# Patient Record
Sex: FEMALE | Race: WHITE | Employment: FULL TIME | URBAN - METROPOLITAN AREA
[De-identification: names, ages, dates, MRNs, and addresses within clinical notes are randomized per-mention and may not be internally consistent; named-entity substitution may affect disease eponyms.]

---

## 2017-01-11 DIAGNOSIS — F33.0 MAJOR DEPRESSIVE DISORDER, RECURRENT EPISODE, MILD (H): Primary | ICD-10-CM

## 2017-01-11 RX ORDER — SERTRALINE HYDROCHLORIDE 100 MG/1
TABLET, FILM COATED ORAL
Qty: 180 TABLET | Refills: 1 | Status: SHIPPED | OUTPATIENT
Start: 2017-01-11 | End: 2017-07-06

## 2017-01-11 NOTE — TELEPHONE ENCOUNTER
Prescription approved per [facility] Refill Protocol or patient Primary care provider (PCP)  EBER Mayer RN/Lowell Jones

## 2017-01-11 NOTE — TELEPHONE ENCOUNTER
sertraline      Last Written Prescription Date: 6/15/16  Last Fill Quantity: 180, # refills: 1  Last Office Visit with [facility] primary care provider:  7/25/16        Last PHQ-9 score on record=   PHQ-9 SCORE 6/27/2016   Total Score -   Total Score MyChart 2 (Minimal depression)   Total Score -

## 2017-02-20 ENCOUNTER — TELEPHONE (OUTPATIENT)
Dept: FAMILY MEDICINE | Facility: CLINIC | Age: 57
End: 2017-02-20

## 2017-02-20 NOTE — TELEPHONE ENCOUNTER
Panel Management Review      Patient has the following on her problem list:     Depression / Dysthymia review  PHQ-9 SCORE 9/22/2015 6/15/2016 6/27/2016   Total Score - - -   Total Score MyChart - - 2 (Minimal depression)   Total Score 4 5 -      Patient is due for:  PHQ9    Asthma review     ACT Total Scores 6/15/2016   ACT TOTAL SCORE -   ASTHMA ER VISITS -   ASTHMA HOSPITALIZATIONS -   ACT TOTAL SCORE (Goal Greater than or Equal to 20) 24   In the past 12 months, how many times did you visit the emergency room for your asthma without being admitted to the hospital? 0   In the past 12 months, how many times were you hospitalized overnight because of your asthma? 0      1. Is Asthma diagnosis on the Problem List? Yes    2. Is Asthma listed on Health Maintenance? Yes    3. Patient is due for:  ACT    Hypertension   Last three blood pressure readings:  BP Readings from Last 3 Encounters:   07/25/16 122/76   06/29/16 108/73   06/15/16 132/78     Blood pressure: Passed    HTN Guidelines:  Age 18-59 BP range:  Less than 140/90  Age 60-85 with Diabetes:  Less than 140/90  Age 60-85 without Diabetes:  less than 150/90      Composite cancer screening  Chart review shows that this patient is due/due soon for the following Mammogram  Summary:    Patient is due/failing the following:   MAMMOGRAM; ACT; PHQ9    Action needed:   Patient needs to do ACT; Patient needs to do PHQ9; Patient needs referral/order: MAMMOGRAM    Type of outreach:    Sent [employer] message.    Questions for provider review:    None                                                                                                                                    Phyllis Kwan CMA (AAMA)       Chart routed to None .

## 2017-02-23 ENCOUNTER — TELEPHONE (OUTPATIENT)
Dept: FAMILY MEDICINE | Facility: CLINIC | Age: 57
End: 2017-02-23

## 2017-02-23 DIAGNOSIS — F98.8 ATTENTION DEFICIT DISORDER OF ADULT: ICD-10-CM

## 2017-02-23 NOTE — TELEPHONE ENCOUNTER
Pt is calling and states that she needs a refill on her adderall medication.     Please advise.     Cassy Jaramillo, [place]

## 2017-02-24 RX ORDER — DEXTROAMPHETAMINE SACCHARATE, AMPHETAMINE ASPARTATE MONOHYDRATE, DEXTROAMPHETAMINE SULFATE AND AMPHETAMINE SULFATE 6.25; 6.25; 6.25; 6.25 MG/1; MG/1; MG/1; MG/1
25 CAPSULE, EXTENDED RELEASE ORAL DAILY
Qty: 30 CAPSULE | Refills: 0 | Status: SHIPPED | OUTPATIENT
Start: 2017-02-24 | End: 2017-06-08

## 2017-02-24 RX ORDER — DEXTROAMPHETAMINE SACCHARATE, AMPHETAMINE ASPARTATE MONOHYDRATE, DEXTROAMPHETAMINE SULFATE AND AMPHETAMINE SULFATE 6.25; 6.25; 6.25; 6.25 MG/1; MG/1; MG/1; MG/1
25 CAPSULE, EXTENDED RELEASE ORAL DAILY
Qty: 30 CAPSULE | Refills: 0 | Status: SHIPPED | OUTPATIENT
Start: 2017-04-24 | End: 2017-06-08

## 2017-02-24 RX ORDER — DEXTROAMPHETAMINE SACCHARATE, AMPHETAMINE ASPARTATE MONOHYDRATE, DEXTROAMPHETAMINE SULFATE AND AMPHETAMINE SULFATE 6.25; 6.25; 6.25; 6.25 MG/1; MG/1; MG/1; MG/1
25 CAPSULE, EXTENDED RELEASE ORAL DAILY
Qty: 30 CAPSULE | Refills: 0 | Status: SHIPPED | OUTPATIENT
Start: 2017-03-24 | End: 2017-06-08

## 2017-02-27 NOTE — TELEPHONE ENCOUNTER
Pt notified scripts are at the [facility] pharmacy.     Cassy Jaramillo, Station

## 2017-03-17 ENCOUNTER — OFFICE VISIT (OUTPATIENT)
Dept: URGENT CARE | Facility: URGENT CARE | Age: 57
End: 2017-03-17
Payer: COMMERCIAL

## 2017-03-17 VITALS
OXYGEN SATURATION: 97 % | DIASTOLIC BLOOD PRESSURE: 91 MMHG | HEART RATE: 82 BPM | WEIGHT: 158 LBS | SYSTOLIC BLOOD PRESSURE: 152 MMHG | TEMPERATURE: 97 F | BODY MASS INDEX: 27.12 KG/M2

## 2017-03-17 DIAGNOSIS — S61.219A LACERATION OF FINGER, INITIAL ENCOUNTER: Primary | ICD-10-CM

## 2017-03-17 PROCEDURE — 99207 ZZC DROP WITH A PROCEDURE: CPT | Mod: 25 | Performed by: NURSE PRACTITIONER

## 2017-03-17 PROCEDURE — 12001 RPR S/N/AX/GEN/TRNK 2.5CM/<: CPT | Performed by: NURSE PRACTITIONER

## 2017-03-17 ASSESSMENT — ENCOUNTER SYMPTOMS
CONSTITUTIONAL NEGATIVE: 1
CARDIOVASCULAR NEGATIVE: 1
TINGLING: 0
RESPIRATORY NEGATIVE: 1
MUSCULOSKELETAL NEGATIVE: 1
FOCAL WEAKNESS: 0

## 2017-03-17 NOTE — MR AVS SNAPSHOT
After Visit Summary   3/17/2017    Patricia Perez    MRN: [id number]           Patient Information     Date Of Birth          1960        Visit Information        Provider Department      3/17/2017 8:30 PM Dona Kerr APRN [facility]        Today's Diagnoses     Laceration of finger, initial encounter    -  1      Care Instructions    SUTURE REMOVAL IN & DAYS    Extremity Laceration: Sutures, Staples, or Tape  A laceration is a cut through the skin. If it is deep, it may require stitches (sutures) or staples to close so it can heal. Minor cuts may be treated with surgical tape closures.   X-rays may be done if something may have entered the skin through the cut. You may also need a tetanus shot if you are not up to date on this vaccination.  Home care    Follow the health care provider s instructions on how to care for the cut.    Wash your hands with soap and warm water before and after caring for your wound. This is to help prevent infection.    Keep the wound clean and dry. If a bandage was applied and it becomes wet or dirty, replace it. Otherwise, leave it in place for the first 24 hours, then change it once a day or as directed.    If sutures or staples were used, clean the wound daily:    After removing the bandage, wash the area with soap and water. Use a wet cotton swab to loosen and remove any blood or crust that forms.    After cleaning, keep the wound clean and dry. Talk with your doctor before applying any antibiotic ointment to the wound. Reapply the bandage.    You may remove the bandage to shower as usual after the first 24 hours, but do not soak the area in water (no swimming) until the stitches or staples are removed.    If surgical tape closures were used, keep the area clean and dry. If it becomes wet, blot it dry with a towel.    The doctor may prescribe an antibiotic cream or ointment to prevent infection. Do not stop taking this medication  until you have finished the prescribed course or the doctor tells you to stop. The doctor may also prescribe medications for pain. Follow the doctor s instructions for taking these medications.    Avoid activities that may reopen your wound.  Follow-up care  Follow up with your health care provider. Most skin wounds heal within ten days. However, an infection may sometimes occur despite proper treatment. Therefore, check the wound daily for the signs of infection listed below. Stitches and staples should be removed within 7-14 days. If surgical tape closures were used, you may remove them after 10 days if they have not fallen off by then.   When to seek medical advice  Call your health care provider right away if any of these occur:    Wound bleeding not controlled by direct pressure    Signs of infection, including increasing pain in the wound, increasing wound redness or swelling, or pus or bad odor coming from the wound    Fever of 100.4 F (38 C) or higher or as directed by your healthcare provider    Stitches or staples come apart or fall out or surgical tape falls off before 7 days    Wound edges re-open    Wound changes colors    Numbness around the wound     Decreased movement around the injured area    1878-6226 [employer]. [address] [place] [address]. All rights reserved. This information is not intended as a substitute for professional medical care. Always follow your healthcare professional's instructions.      Last tetanus shot was 4/2011        Follow-ups after your visit        Follow-up notes from your care team     Return in about 10 days (around 3/27/2017), or For suture removal.      Who to contact     If you have questions or need follow up information about today's clinic visit or your schedule please contact [facility] directly at [phone].  Normal or non-critical lab and imaging results will be communicated to you by MyChart, letter or phone  "within 4 business days after the clinic has received the results. If you do not hear from us within 7 days, please contact the clinic through [email or web address] or phone. If you have a critical or abnormal lab result, we will notify you by phone as soon as possible.  Submit refill requests through [email or web address] or call your pharmacy and they will forward the refill request to us. Please allow 3 business days for your refill to be completed.          Additional Information About Your Visit        [email or web address] Information     [email or web address] lets you send messages to your doctor, view your test results, renew your prescriptions, schedule appointments and more. To sign up, go to [email or web address] . Click on \"Log in\" on the left side of the screen, which will take you to the Welcome page. Then click on \"Sign up Now\" on the right side of the page.     You will be asked to enter the access code listed below, as well as some personal information. Please follow the directions to create your username and password.     Your access code is: [email or web address]  Expires: 6/15/2017  9:11 PM     Your access code will  in 90 days. If you need help or a new code, please call your [facility] clinic or [phone].        Care EveryWhere ID     This is your Care EveryWhere ID. This could be used by other organizations to access your [facility] medical records  [id number]        Your Vitals Were     Pulse Temperature Last Period Pulse Oximetry BMI (Body Mass Index)       82 97  F (36.1  C) (Oral) 2012 97% 27.12 kg/m2        Blood Pressure from Last 3 Encounters:   17 (!) 152/91   16 122/76   16 108/73    Weight from Last 3 Encounters:   17 158 lb (71.7 kg)   16 161 lb 12.8 oz (73.4 kg)   16 163 lb (73.9 kg)              Today, you had the following     No orders found for display         Today's Medication Changes          These changes are accurate as of: 3/17/17  9:11 PM.  If you have any questions, ask your nurse or " doctor.               These medicines have changed or have updated prescriptions.        Dose/Directions    predniSONE 20 MG tablet   Commonly known as:  DELTASONE   This may have changed:    - when to take this  - reasons to take this  - additional instructions        Dose:  20 mg   Take 1 tablet by mouth 2 times daily. For Yellow or Red zone on Asthma Action Plan.   Quantity:  10 tablet   Refills:  1                Primary Care Provider Office Phone # Fax #    Blanche Cummins APRJACQUIE HERNANDEZ [phone]       [facility] [address] [place] [address]        Thank you!     Thank you for choosing [facility]  for your care. Our goal is always to provide you with excellent care. Hearing back from our patients is one way we can continue to improve our services. Please take a few minutes to complete the written survey that you may receive in the mail after your visit with us. Thank you!             Your Updated Medication List - Protect others around you: Learn how to safely use, store and throw away your medicines at www.disposemymeds.org.          This list is accurate as of: 3/17/17  9:11 PM.  Always use your most recent med list.                   Brand Name Dispense Instructions for use    albuterol 108 (90 BASE) MCG/ACT Inhaler    PROAIR HFA/PROVENTIL HFA/VENTOLIN HFA    1 Inhaler    Inhale 2 puffs into the lungs every 6 hours as needed for shortness of breath / dyspnea or wheezing       * amphetamine-dextroamphetamine 25 MG 24 hr capsule    ADDERALL XR    30 capsule    Take 1 capsule (25 mg) by mouth daily       * amphetamine-dextroamphetamine 25 MG 24 hr capsule   Start taking on:  3/24/2017    ADDERALL XR    30 capsule    Take 1 capsule (25 mg) by mouth daily       * amphetamine-dextroamphetamine 25 MG 24 hr capsule   Start taking on:  4/24/2017    ADDERALL XR    30 capsule    Take 1 capsule (25 mg) by mouth daily       blood glucose monitoring test strip     no brand specified    1 Box    Use to test blood sugar 1-2 times daily or as directed.       calcium carbonate-vitamin D 600-400 MG-UNIT Chew    CALTRATE 600+D    180 tablet    Take 1 chew tab by mouth 2 times daily       CALCIUM CITRATE + D PO      Take  by mouth daily.       carvedilol 25 MG tablet    COREG    180 tablet    TAKE 1 TABLET(25 MG) BY MOUTH TWICE DAILY WITH MEALS       * COMFORT LANCETS Misc     1 Box    1 Device 2 times daily as needed       * MICROLET LANCETS Misc     2 Box    1 each by Subdermal route 2 times daily       estrogen (conjugated)-medroxyPROGESTERone 0.625-5 MG per tablet    PREMPRO    90 tablet    Take 1 tablet by mouth daily       hydrALAZINE 10 MG tablet    APRESOLINE    270 tablet    Take 1 tablet (10 mg) by mouth 3 times daily       isosorbide mononitrate 30 MG 24 hr tablet    IMDUR    90 tablet    Take 1 tablet (30 mg) by mouth daily       lisinopril 5 MG tablet    PRINIVIL/ZESTRIL    90 tablet    Take 1 tablet (5 mg) by mouth daily       meclizine 25 MG tablet    ANTIVERT    30 tablet    Take 1 tablet (25 mg) by mouth 3 times daily as needed       Multi-vitamin Tabs tablet   Generic drug:  multivitamin, therapeutic with minerals     100 tablet    Take 1 tablet by mouth daily.       ondansetron 4 MG ODT tab    ZOFRAN-ODT    20 tablet    Take 1 tablet (4 mg) by mouth every 6 hours as needed for nausea       predniSONE 20 MG tablet    DELTASONE    10 tablet    Take 1 tablet by mouth 2 times daily. For Yellow or Red zone on Asthma Action Plan.       sertraline 100 MG tablet    ZOLOFT    180 tablet    Take 2 tablets daily  DUE for yearly office visit in June 2017       simvastatin 20 MG tablet    ZOCOR    90 tablet    Take 1 tablet (20 mg) by mouth At Bedtime       sulfamethoxazole-trimethoprim 800-160 MG per tablet    BACTRIM DS/SEPTRA DS    14 tablet    Take 1 tablet by mouth 2 times daily       venlafaxine 225 MG Tb24 24 hr tablet    EFFEXOR-ER    90 each    Take 1 tablet (225  mg) by mouth daily (with breakfast)       VITAMIN B-12 SL      Place  under the tongue daily.       * Notice:  This list has 5 medication(s) that are the same as other medications prescribed for you. Read the directions carefully, and ask your doctor or other care provider to review them with you.

## 2017-03-18 NOTE — PATIENT INSTRUCTIONS
SUTURE REMOVAL IN & DAYS    Extremity Laceration: Sutures, Staples, or Tape  A laceration is a cut through the skin. If it is deep, it may require stitches (sutures) or staples to close so it can heal. Minor cuts may be treated with surgical tape closures.   X-rays may be done if something may have entered the skin through the cut. You may also need a tetanus shot if you are not up to date on this vaccination.  Home care    Follow the health care provider s instructions on how to care for the cut.    Wash your hands with soap and warm water before and after caring for your wound. This is to help prevent infection.    Keep the wound clean and dry. If a bandage was applied and it becomes wet or dirty, replace it. Otherwise, leave it in place for the first 24 hours, then change it once a day or as directed.    If sutures or staples were used, clean the wound daily:    After removing the bandage, wash the area with soap and water. Use a wet cotton swab to loosen and remove any blood or crust that forms.    After cleaning, keep the wound clean and dry. Talk with your doctor before applying any antibiotic ointment to the wound. Reapply the bandage.    You may remove the bandage to shower as usual after the first 24 hours, but do not soak the area in water (no swimming) until the stitches or staples are removed.    If surgical tape closures were used, keep the area clean and dry. If it becomes wet, blot it dry with a towel.    The doctor may prescribe an antibiotic cream or ointment to prevent infection. Do not stop taking this medication until you have finished the prescribed course or the doctor tells you to stop. The doctor may also prescribe medications for pain. Follow the doctor s instructions for taking these medications.    Avoid activities that may reopen your wound.  Follow-up care  Follow up with your health care provider. Most skin wounds heal within ten days. However, an infection may sometimes occur despite  proper treatment. Therefore, check the wound daily for the signs of infection listed below. Stitches and staples should be removed within 7-14 days. If surgical tape closures were used, you may remove them after 10 days if they have not fallen off by then.   When to seek medical advice  Call your health care provider right away if any of these occur:    Wound bleeding not controlled by direct pressure    Signs of infection, including increasing pain in the wound, increasing wound redness or swelling, or pus or bad odor coming from the wound    Fever of 100.4 F (38 C) or higher or as directed by your healthcare provider    Stitches or staples come apart or fall out or surgical tape falls off before 7 days    Wound edges re-open    Wound changes colors    Numbness around the wound     Decreased movement around the injured area    5105-4846 [employer] [address] [place] [address], [place] [address]. All rights reserved. This information is not intended as a substitute for professional medical care. Always follow your healthcare professional's instructions.      Last tetanus shot was 4/2011

## 2017-03-18 NOTE — PROCEDURES
Wound cleaned with soap and water. Sterile prep in usual fashion.  Four simple interrupted sutures of 4-0 nylon placed with close approximation  Bacitracin and bandaid applied.  Dona Kerr, ELIOT, CNP

## 2017-03-18 NOTE — PROGRESS NOTES
"Chief Complaint   Patient presents with     Laceration       Initial BP (!) 152/91 (BP Location: Left arm, Patient Position: Chair, Cuff Size: Adult Large)  Pulse 82  Temp 97  F (36.1  C) (Oral)  Wt 158 lb (71.7 kg)  LMP 06/01/2012  SpO2 97%  BMI 27.12 kg/m2 Estimated body mass index is 27.12 kg/(m^2) as calculated from the following:    Height as of 6/29/16: 5' 4\" (1.626 m).    Weight as of this encounter: 158 lb (71.7 kg).  Medication Reconciliation: complete   Clau Serrato CMA      "

## 2017-03-18 NOTE — PROGRESS NOTES
HPI  56 year old Patricia Perez presents with a laceration to the right 5th digit. She was cut on a kitchen knife preparing vegetables. It continues to actively bleed. Injury 1.5 hours ago. Tetanus is up to date.     Past Medical History   Diagnosis Date     Anxiety      Arthritis      Asthma      Bursitis of shoulder 3/4/2010     Chronic rhinitis 3/25/2009     Coronary artery disease      Depression      Depression      Dyspnea on exertion      Gastro-oesophageal reflux disease      GERD (gastroesophageal reflux disease) 10/14/2008     Hypertension      Idiopathic cardiomyopathy (H) 1/8/2009     Indigestion      Itchy eyes      Itchy nose      Left leg pain 6/16/2011     Motion sickness 2/27/2008     Nasal congestion      Pneumonia      Problems related to lack of adequate sleep      Ringing in ears      Sleep apnea 3/25/2009     Sneezing        Past Surgical History   Procedure Laterality Date     Uretural sticture  As Child     Diabetes resources  As Child     Tonsillectomy     Hernia repair       Ent surgery       Laparoscopic bypass gastric  10/16/2012     Procedure: LAPAROSCOPIC BYPASS GASTRIC;  laparoscopic reyna en y gastric bypass;  Surgeon: Nish Bradford MD;  Location: [facility] OR     Bariatric surgery       Tonsillectomy         Allergies   Allergen Reactions     No Known Drug Allergies        Current Outpatient Prescriptions   Medication     [START ON 4/24/2017] amphetamine-dextroamphetamine (ADDERALL XR) 25 MG 24 hr capsule     [START ON 3/24/2017] amphetamine-dextroamphetamine (ADDERALL XR) 25 MG 24 hr capsule     amphetamine-dextroamphetamine (ADDERALL XR) 25 MG 24 hr capsule     sertraline (ZOLOFT) 100 MG tablet     carvedilol (COREG) 25 MG tablet     venlafaxine (EFFEXOR-ER) 225 MG TB24     estrogen, conjugated,-medroxyPROGESTERone (PREMPRO) 0.625-5 MG per tablet     hydrALAZINE (APRESOLINE) 10 MG tablet     isosorbide mononitrate (IMDUR) 30 MG 24 hr tablet     lisinopril (PRINIVIL,ZESTRIL) 5 MG  tablet     simvastatin (ZOCOR) 20 MG tablet     sulfamethoxazole-trimethoprim (BACTRIM DS,SEPTRA DS) 800-160 MG per tablet     ondansetron (ZOFRAN-ODT) 4 MG disintegrating tablet     blood glucose monitoring (NO BRAND SPECIFIED) test strip     MICROLET LANCETS MISC     albuterol (PROAIR HFA, PROVENTIL HFA, VENTOLIN HFA) 108 (90 BASE) MCG/ACT inhaler     calcium carbonate-vitamin D (CALTRATE 600+D) 600-400 MG-UNIT CHEW     COMFORT LANCETS MISC     meclizine (ANTIVERT) 25 MG tablet     Calcium Citrate-Vitamin D (CALCIUM CITRATE + D PO)     predniSONE (DELTASONE) 20 MG tablet     Cyanocobalamin (VITAMIN B-12 SL)     Multiple Vitamin (MULTI-VITAMIN) per tablet     No current facility-administered medications for this visit.        Review of Systems   Constitutional: Negative.    Respiratory: Negative.    Cardiovascular: Negative.    Musculoskeletal: Negative.    Skin:        1 cm finger laceration at the PIP joint palmar surface of the right 5th digit.    Neurological: Negative for tingling and focal weakness.         Physical Exam   Constitutional: She is well-developed, well-nourished, and in no distress. No distress.   BP (!) 152/91 (BP Location: Left arm, Patient Position: Chair, Cuff Size: Adult Large)  Pulse 82  Temp 97  F (36.1  C) (Oral)  Wt 158 lb (71.7 kg)  LMP 06/01/2012  SpO2 97%  BMI 27.12 kg/m2     HENT:   Head: Normocephalic.   Cardiovascular: Normal rate.    Pulmonary/Chest: Effort normal.   Musculoskeletal: Normal range of motion.        Left hand: She exhibits laceration. She exhibits normal range of motion and normal two-point discrimination. Normal sensation noted. Normal strength noted.        Hands:  Neurological: She has normal sensation.   Nursing note and vitals reviewed.    Assessment:  1. Laceration of finger, initial encounter        Plan:  Wound Prep and 4 simple interrupted sutures placed in the right 5th finger at the PIP joint palmar surface  Bacitacin and tube gauze  applied      Instructions regarding self-care of patient with laceration/sutures reviewed.   Written instructions provided in after visit summary and reviewed.  Patient instructed to see primary care provider for suture removal in 10 DAYS      ELIOT Adams, CNP

## 2017-03-28 ENCOUNTER — ALLIED HEALTH/NURSE VISIT (OUTPATIENT)
Dept: NURSING | Facility: CLINIC | Age: 57
End: 2017-03-28
Payer: COMMERCIAL

## 2017-03-28 DIAGNOSIS — Z48.02 ENCOUNTER FOR REMOVAL OF SUTURES: Primary | ICD-10-CM

## 2017-03-28 PROCEDURE — 99207 ZZC NO CHARGE NURSE ONLY: CPT

## 2017-03-28 NOTE — MR AVS SNAPSHOT
After Visit Summary   3/28/2017    Patricia Perez    MRN: [id number]           Patient Information     Date Of Birth          1960        Visit Information        Provider Department      3/28/2017 8:30 AM PATRICE CAMPBELL RN [facility]        Today's Diagnoses     Encounter for removal of sutures    -  1       Follow-ups after your visit        Your next 10 appointments already scheduled     Apr 13, 2017 11:30 AM CDT   [facility] Anita [facility]   [facility]    [address] [place] [id number] [phone]            May 10, 2017  8:00 AM CDT   (Arrive by 7:45 AM)   Return Visit with Preston Cuadra MD   [facility])    [address] [place] [address] [phone]              Who to contact     If you have questions or need follow up information about today's clinic visit or your schedule please contact [facility] directly at [phone].  Normal or non-critical lab and imaging results will be communicated to you by Ludeihart, letter or phone within 4 business days after the clinic has received the results. If you do not hear from us within 7 days, please contact the clinic through Ludeihart or phone. If you have a critical or abnormal lab result, we will notify you by phone as soon as possible.  Submit refill requests through Neuralieve or call your pharmacy and they will forward the refill request to us. Please allow 3 business days for your refill to be completed.          Additional Information About Your Visit        MyChart Information     Neuralieve gives you secure access to your electronic health record. If you see a primary care provider, you can also send messages to your care team and make appointments. If you have questions, please call your primary care clinic.  If you do not have a primary care  provider, please call [phone] and they will assist you.        Care EveryWhere ID     This is your Care EveryWhere ID. This could be used by other organizations to access your [place] medical records  [id number]        Your Vitals Were     Last Period                   06/01/2012            Blood Pressure from Last 3 Encounters:   03/17/17 (!) 152/91   07/25/16 122/76   06/29/16 108/73    Weight from Last 3 Encounters:   03/17/17 158 lb (71.7 kg)   07/25/16 161 lb 12.8 oz (73.4 kg)   06/29/16 163 lb (73.9 kg)              Today, you had the following     No orders found for display         Today's Medication Changes          These changes are accurate as of: 3/28/17  9:03 AM.  If you have any questions, ask your nurse or doctor.               These medicines have changed or have updated prescriptions.        Dose/Directions    predniSONE 20 MG tablet   Commonly known as:  DELTASONE   This may have changed:    - when to take this  - reasons to take this  - additional instructions        Dose:  20 mg   Take 1 tablet by mouth 2 times daily. For Yellow or Red zone on Asthma Action Plan.   Quantity:  10 tablet   Refills:  1                Primary Care Provider Office Phone # Fax #    ELIOT Rodriguez [facility] [phone]       [facility] [address]   [facility] [address]        Thank you!     Thank you for choosing [facility]  for your care. Our goal is always to provide you with excellent care. Hearing back from our patients is one way we can continue to improve our services. Please take a few minutes to complete the written survey that you may receive in the mail after your visit with us. Thank you!             Your Updated Medication List - Protect others around you: Learn how to safely use, store and throw away your medicines at www.disposemymeds.org.          This list is accurate as of: 3/28/17  9:03 AM.  Always use your most recent med list.                    Brand Name Dispense Instructions for use    albuterol 108 (90 BASE) MCG/ACT Inhaler    PROAIR HFA/PROVENTIL HFA/VENTOLIN HFA    1 Inhaler    Inhale 2 puffs into the lungs every 6 hours as needed for shortness of breath / dyspnea or wheezing       * amphetamine-dextroamphetamine 25 MG 24 hr capsule    ADDERALL XR    30 capsule    Take 1 capsule (25 mg) by mouth daily       * amphetamine-dextroamphetamine 25 MG 24 hr capsule    ADDERALL XR    30 capsule    Take 1 capsule (25 mg) by mouth daily       * amphetamine-dextroamphetamine 25 MG 24 hr capsule   Start taking on:  4/24/2017    ADDERALL XR    30 capsule    Take 1 capsule (25 mg) by mouth daily       blood glucose monitoring test strip    no brand specified    1 Box    Use to test blood sugar 1-2 times daily or as directed.       calcium carbonate-vitamin D 600-400 MG-UNIT Chew    CALTRATE 600+D    180 tablet    Take 1 chew tab by mouth 2 times daily       CALCIUM CITRATE + D PO      Take  by mouth daily.       carvedilol 25 MG tablet    COREG    180 tablet    TAKE 1 TABLET(25 MG) BY MOUTH TWICE DAILY WITH MEALS       * COMFORT LANCETS Misc     1 Box    1 Device 2 times daily as needed       * MICROLET LANCETS Misc     2 Box    1 each by Subdermal route 2 times daily       estrogen (conjugated)-medroxyPROGESTERone 0.625-5 MG per tablet    PREMPRO    90 tablet    Take 1 tablet by mouth daily       hydrALAZINE 10 MG tablet    APRESOLINE    270 tablet    Take 1 tablet (10 mg) by mouth 3 times daily       isosorbide mononitrate 30 MG 24 hr tablet    IMDUR    90 tablet    Take 1 tablet (30 mg) by mouth daily       lisinopril 5 MG tablet    PRINIVIL/ZESTRIL    90 tablet    Take 1 tablet (5 mg) by mouth daily       meclizine 25 MG tablet    ANTIVERT    30 tablet    Take 1 tablet (25 mg) by mouth 3 times daily as needed       Multi-vitamin Tabs tablet   Generic drug:  multivitamin, therapeutic with minerals     100 tablet    Take 1 tablet by mouth daily.        ondansetron 4 MG ODT tab    ZOFRAN-ODT    20 tablet    Take 1 tablet (4 mg) by mouth every 6 hours as needed for nausea       predniSONE 20 MG tablet    DELTASONE    10 tablet    Take 1 tablet by mouth 2 times daily. For Yellow or Red zone on Asthma Action Plan.       sertraline 100 MG tablet    ZOLOFT    180 tablet    Take 2 tablets daily  DUE for yearly office visit in June 2017       simvastatin 20 MG tablet    ZOCOR    90 tablet    Take 1 tablet (20 mg) by mouth At Bedtime       sulfamethoxazole-trimethoprim 800-160 MG per tablet    BACTRIM DS/SEPTRA DS    14 tablet    Take 1 tablet by mouth 2 times daily       venlafaxine 225 MG Tb24 24 hr tablet    EFFEXOR-ER    90 each    Take 1 tablet (225 mg) by mouth daily (with breakfast)       VITAMIN B-12 SL      Place  under the tongue daily.       * Notice:  This list has 5 medication(s) that are the same as other medications prescribed for you. Read the directions carefully, and ask your doctor or other care provider to review them with you.

## 2017-04-19 ENCOUNTER — OFFICE VISIT (OUTPATIENT)
Dept: OPTOMETRY | Facility: CLINIC | Age: 57
End: 2017-04-19
Payer: COMMERCIAL

## 2017-04-19 DIAGNOSIS — H52.202 ASTIGMATISM, LEFT: ICD-10-CM

## 2017-04-19 DIAGNOSIS — H52.4 MYOPIA WITH PRESBYOPIA, BILATERAL: Primary | ICD-10-CM

## 2017-04-19 DIAGNOSIS — H25.13 NUCLEAR SCLEROSIS OF BOTH EYES: ICD-10-CM

## 2017-04-19 DIAGNOSIS — H52.13 MYOPIA WITH PRESBYOPIA, BILATERAL: Primary | ICD-10-CM

## 2017-04-19 PROCEDURE — 92015 DETERMINE REFRACTIVE STATE: CPT | Performed by: OPTOMETRIST

## 2017-04-19 PROCEDURE — 92014 COMPRE OPH EXAM EST PT 1/>: CPT | Performed by: OPTOMETRIST

## 2017-04-19 ASSESSMENT — REFRACTION_MANIFEST
OS_SPHERE: -7.00
OD_ADD: +2.75
OS_AXIS: 079
METHOD_AUTOREFRACTION: 1
OS_CYLINDER: +0.75
OD_SPHERE: -5.75
OS_CYLINDER: +1.25
OD_AXIS: 104
OS_ADD: +2.75
OS_SPHERE: -7.00
OD_CYLINDER: +1.75
OD_SPHERE: -6.50
OS_AXIS: 083

## 2017-04-19 ASSESSMENT — TONOMETRY
OD_IOP_MMHG: 20
IOP_METHOD: APPLANATION
OS_IOP_MMHG: 20

## 2017-04-19 ASSESSMENT — VISUAL ACUITY
OS_SC: CF @ 3'
OS_CC: 20/30-1
OS_CC: 20/30
OS_SC: 20/200
METHOD: SNELLEN - LINEAR
OD_CC: 20/40
OD_CC: 20/40
OD_SC: 20/120
OD_SC: CF @ 3'
CORRECTION_TYPE: GLASSES

## 2017-04-19 ASSESSMENT — REFRACTION_WEARINGRX
OS_AXIS: 078
SPECS_TYPE: PAL
OS_SPHERE: -7.75
OD_ADD: +2.75
OD_CYLINDER: +1.50
OS_CYLINDER: +1.25
OD_SPHERE: -6.75
OD_AXIS: 108
OS_ADD: +2.75

## 2017-04-19 ASSESSMENT — SLIT LAMP EXAM - LIDS
COMMENTS: NORMAL
COMMENTS: NORMAL

## 2017-04-19 ASSESSMENT — CONF VISUAL FIELD
OS_NORMAL: 1
OD_NORMAL: 1
METHOD: COUNTING FINGERS

## 2017-04-19 ASSESSMENT — CUP TO DISC RATIO
OD_RATIO: 0.2
OS_RATIO: 0.2

## 2017-04-19 ASSESSMENT — EXTERNAL EXAM - LEFT EYE: OS_EXAM: NORMAL

## 2017-04-19 ASSESSMENT — EXTERNAL EXAM - RIGHT EYE: OD_EXAM: NORMAL

## 2017-04-19 NOTE — PATIENT INSTRUCTIONS
There was a change in the prescription for your glasses.    Your eyes may be blurry at near and sensitive to light for several hours from the dilating drops.    Please return yearly for eye exams.

## 2017-04-19 NOTE — MR AVS SNAPSHOT
After Visit Summary   4/19/2017    Patricia Perez    MRN: [id number]           Patient Information     Date Of Birth          1960        Visit Information        Provider Department      4/19/2017 2:30 PM Anita Borjas OD [facility]        Today's Diagnoses     Myopia with presbyopia, bilateral    -  1    Astigmatism, left        Nuclear sclerosis of both eyes          Care Instructions    There was a change in the prescription for your glasses.    Your eyes may be blurry at near and sensitive to light for several hours from the dilating drops.    Please return yearly for eye exams.          Follow-ups after your visit        Follow-up notes from your care team     Return in about 1 year (around 4/19/2018) for comprehensive eye exam.      Your next 10 appointments already scheduled     Apr 24, 2017 12:40 PM CDT   Teddy Bacon [facility] ELIOT Rodriguez [facility]    [address] [address]   [phone]            May 10, 2017  8:00 AM CDT   (Arrive by 7:45 AM)   Return Visit with Preston Cuadra MD   [facility] ([facility])    [address] [place] [address] [phone]              Who to contact     If you have questions or need follow up information about today's clinic visit or your schedule please contact [facility] directly at [phone].  Normal or non-critical lab and imaging results will be communicated to you by MyChart, letter or phone within 4 business days after the clinic has received the results. If you do not hear from us within 7 days, please contact the clinic through Unowhyhart or phone. If you have a critical or abnormal lab result, we will notify you by phone as soon as possible.  Submit refill requests through [employer] or call your pharmacy and they will forward the refill request to  us. Please allow 3 business days for your refill to be completed.          Additional Information About Your Visit        MyChart Information     Applaudhart gives you secure access to your electronic health record. If you see a primary care provider, you can also send messages to your care team and make appointments. If you have questions, please call your primary care clinic.  If you do not have a primary care provider, please call [phone] and they will assist you.        Care EveryWhere ID     This is your Care EveryWhere ID. This could be used by other organizations to access your [place] medical records  [id number]        Your Vitals Were     Last Period                   06/01/2012            Blood Pressure from Last 3 Encounters:   03/17/17 (!) 152/91   07/25/16 122/76   06/29/16 108/73    Weight from Last 3 Encounters:   03/17/17 71.7 kg (158 lb)   07/25/16 73.4 kg (161 lb 12.8 oz)   06/29/16 73.9 kg (163 lb)              We Performed the Following     EYE EXAM (SIMPLE-NONBILLABLE)     REFRACTION          Today's Medication Changes          These changes are accurate as of: 4/19/17  4:24 PM.  If you have any questions, ask your nurse or doctor.               These medicines have changed or have updated prescriptions.        Dose/Directions    predniSONE 20 MG tablet   Commonly known as:  DELTASONE   This may have changed:    - when to take this  - reasons to take this  - additional instructions        Dose:  20 mg   Take 1 tablet by mouth 2 times daily. For Yellow or Red zone on Asthma Action Plan.   Quantity:  10 tablet   Refills:  1                Primary Care Provider Office Phone # Fax #    ELIOT Rodriguez [facility] [phone]       [place] [facility] [address] [place] [address]        Thank you!     Thank you for choosing [facility]  for your care. Our goal is always to provide you with excellent care. Hearing back from our patients is one way  we can continue to improve our services. Please take a few minutes to complete the written survey that you may receive in the mail after your visit with us. Thank you!             Your Updated Medication List - Protect others around you: Learn how to safely use, store and throw away your medicines at www.disposemymeds.org.          This list is accurate as of: 4/19/17  4:24 PM.  Always use your most recent med list.                   Brand Name Dispense Instructions for use    albuterol 108 (90 BASE) MCG/ACT Inhaler    PROAIR HFA/PROVENTIL HFA/VENTOLIN HFA    1 Inhaler    Inhale 2 puffs into the lungs every 6 hours as needed for shortness of breath / dyspnea or wheezing       * amphetamine-dextroamphetamine 25 MG 24 hr capsule    ADDERALL XR    30 capsule    Take 1 capsule (25 mg) by mouth daily       * amphetamine-dextroamphetamine 25 MG 24 hr capsule    ADDERALL XR    30 capsule    Take 1 capsule (25 mg) by mouth daily       * amphetamine-dextroamphetamine 25 MG 24 hr capsule   Start taking on:  4/24/2017    ADDERALL XR    30 capsule    Take 1 capsule (25 mg) by mouth daily       blood glucose monitoring test strip    no brand specified    1 Box    Use to test blood sugar 1-2 times daily or as directed.       calcium carbonate-vitamin D 600-400 MG-UNIT Chew    CALTRATE 600+D    180 tablet    Take 1 chew tab by mouth 2 times daily       CALCIUM CITRATE + D PO      Take  by mouth daily.       carvedilol 25 MG tablet    COREG    180 tablet    TAKE 1 TABLET(25 MG) BY MOUTH TWICE DAILY WITH MEALS       * COMFORT LANCETS Misc     1 Box    1 Device 2 times daily as needed       * MICROLET LANCETS Misc     2 Box    1 each by Subdermal route 2 times daily       estrogen (conjugated)-medroxyPROGESTERone 0.625-5 MG per tablet    PREMPRO    90 tablet    Take 1 tablet by mouth daily       hydrALAZINE 10 MG tablet    APRESOLINE    270 tablet    Take 1 tablet (10 mg) by mouth 3 times daily       isosorbide mononitrate 30 MG 24  hr tablet    IMDUR    90 tablet    Take 1 tablet (30 mg) by mouth daily       lisinopril 5 MG tablet    PRINIVIL/ZESTRIL    90 tablet    Take 1 tablet (5 mg) by mouth daily       meclizine 25 MG tablet    ANTIVERT    30 tablet    Take 1 tablet (25 mg) by mouth 3 times daily as needed       Multi-vitamin Tabs tablet   Generic drug:  multivitamin, therapeutic with minerals     100 tablet    Take 1 tablet by mouth daily.       ondansetron 4 MG ODT tab    ZOFRAN-ODT    20 tablet    Take 1 tablet (4 mg) by mouth every 6 hours as needed for nausea       predniSONE 20 MG tablet    DELTASONE    10 tablet    Take 1 tablet by mouth 2 times daily. For Yellow or Red zone on Asthma Action Plan.       sertraline 100 MG tablet    ZOLOFT    180 tablet    Take 2 tablets daily  DUE for yearly office visit in June 2017       simvastatin 20 MG tablet    ZOCOR    90 tablet    Take 1 tablet (20 mg) by mouth At Bedtime       sulfamethoxazole-trimethoprim 800-160 MG per tablet    BACTRIM DS/SEPTRA DS    14 tablet    Take 1 tablet by mouth 2 times daily       venlafaxine 225 MG Tb24 24 hr tablet    EFFEXOR-ER    90 each    Take 1 tablet (225 mg) by mouth daily (with breakfast)       VITAMIN B-12 SL      Place  under the tongue daily.       * Notice:  This list has 5 medication(s) that are the same as other medications prescribed for you. Read the directions carefully, and ask your doctor or other care provider to review them with you.

## 2017-04-19 NOTE — PROGRESS NOTES
Chief Complaint   Patient presents with     COMPREHENSIVE EYE EXAM      Pt is still seeing floaters - but no flashes of light.       Last Eye Exam: 2 years ago  Dilated Previously: Yes    What are you currently using to see?  glasses       Distance Vision Acuity: Noticed gradual change in both eyes    Near Vision Acuity: Satisfied with vision while reading  with glasses    Eye Comfort: May have discomfort with allergies  Do you use eye drops? : Yes: If needed for allergies - Zaditor  Occupation or Hobbies: Works at [employer]    Janie Garibay  [facility]            Medical, surgical and family histories reviewed and updated 4/19/2017.       OBJECTIVE: See Ophthalmology exam    ASSESSMENT:    ICD-10-CM    1. Myopia with presbyopia, bilateral H52.13 EYE EXAM (SIMPLE-NONBILLABLE)    H52.4 REFRACTION   2. Astigmatism, left H52.202 EYE EXAM (SIMPLE-NONBILLABLE)     REFRACTION   3. Nuclear sclerosis of both eyes H25.13 EYE EXAM (SIMPLE-NONBILLABLE)      PLAN:     Patient Instructions   There was a change in the prescription for your glasses.    Your eyes may be blurry at near and sensitive to light for several hours from the dilating drops.    Please return yearly for eye exams.

## 2017-04-20 ENCOUNTER — MYC MEDICAL ADVICE (OUTPATIENT)
Dept: FAMILY MEDICINE | Facility: CLINIC | Age: 57
End: 2017-04-20

## 2017-04-20 DIAGNOSIS — Z01.84 IMMUNITY STATUS TESTING: Primary | ICD-10-CM

## 2017-04-24 ENCOUNTER — OFFICE VISIT (OUTPATIENT)
Dept: FAMILY MEDICINE | Facility: CLINIC | Age: 57
End: 2017-04-24
Payer: COMMERCIAL

## 2017-04-24 VITALS
BODY MASS INDEX: 26.88 KG/M2 | OXYGEN SATURATION: 98 % | HEART RATE: 84 BPM | WEIGHT: 156.6 LBS | SYSTOLIC BLOOD PRESSURE: 104 MMHG | TEMPERATURE: 97.9 F | DIASTOLIC BLOOD PRESSURE: 62 MMHG

## 2017-04-24 DIAGNOSIS — J01.01 ACUTE RECURRENT MAXILLARY SINUSITIS: Primary | ICD-10-CM

## 2017-04-24 DIAGNOSIS — J20.9 ACUTE BRONCHITIS, UNSPECIFIED ORGANISM: ICD-10-CM

## 2017-04-24 PROCEDURE — 99213 OFFICE O/P EST LOW 20 MIN: CPT | Performed by: NURSE PRACTITIONER

## 2017-04-24 RX ORDER — CODEINE PHOSPHATE AND GUAIFENESIN 10; 100 MG/5ML; MG/5ML
1 SOLUTION ORAL EVERY 4 HOURS PRN
Qty: 120 ML | Refills: 0 | Status: SHIPPED | OUTPATIENT
Start: 2017-04-24 | End: 2017-06-22

## 2017-04-24 RX ORDER — BENZONATATE 200 MG/1
200 CAPSULE ORAL 3 TIMES DAILY PRN
Qty: 21 CAPSULE | Refills: 0 | Status: SHIPPED | OUTPATIENT
Start: 2017-04-24 | End: 2017-06-22

## 2017-04-24 NOTE — PATIENT INSTRUCTIONS
Stay well hydrated - urine should be clear.     Use the tessalon pearls during the day for cough   And the Robitussin AC during the night     For the sore throat use warm tea and honey or even just spoonful of honey and hold it to the back of the throat. This will help to decrease the inflammation and thin the mucous.     Start the  Augmentin  Today and you can still get both doses in today   TAKE WITH FOOD>

## 2017-04-24 NOTE — MR AVS SNAPSHOT
After Visit Summary   4/24/2017    Patricia Perez    MRN: [id number]           Patient Information     Date Of Birth          1960        Visit Information        Provider Department      4/24/2017 12:40 PM Blanche Cummins APRN [facility]        Today's Diagnoses     Acute recurrent maxillary sinusitis    -  1    Acute bronchitis, unspecified organism          Care Instructions    Stay well hydrated - urine should be clear.     Use the tessalon pearls during the day for cough   And the Robitussin AC during the night     For the sore throat use warm tea and honey or even just spoonful of honey and hold it to the back of the throat. This will help to decrease the inflammation and thin the mucous.     Start the  Augmentin  Today and you can still get both doses in today   TAKE WITH FOOD>         Follow-ups after your visit        Your next 10 appointments already scheduled     May 31, 2017  7:30 AM CDT   (Arrive by 7:15 AM)   Return Visit with Preston Cuadra MD   [facility])    [address] [place] [id number] [phone]              Who to contact     Normal or non-critical lab and imaging results will be communicated to you by [employer], letter or phone within 4 business days after the clinic has received the results. If you do not hear from us within 7 days, please contact the clinic through [employer] or phone. If you have a critical or abnormal lab result, we will notify you by phone as soon as possible.  Submit refill requests through Flite or call your pharmacy and they will forward the refill request to us. Please allow 3 business days for your refill to be completed.          If you need to speak with a  for additional information , please call: [phone]           Additional Information About Your Visit        Flite Information     Flite gives you secure access  to your electronic health record. If you see a primary care provider, you can also send messages to your care team and make appointments. If you have questions, please call your primary care clinic.  If you do not have a primary care provider, please call [phone] and they will assist you.        Care EveryWhere ID     This is your Care EveryWhere ID. This could be used by other organizations to access your [place] medical records  [id number]        Your Vitals Were     Pulse Temperature Last Period Pulse Oximetry BMI (Body Mass Index)       84 97.9  F (36.6  C) (Tympanic) 06/01/2012 98% 26.88 kg/m2        Blood Pressure from Last 3 Encounters:   04/24/17 104/62   03/17/17 (!) 152/91   07/25/16 122/76    Weight from Last 3 Encounters:   04/24/17 156 lb 9.6 oz (71 kg)   03/17/17 158 lb (71.7 kg)   07/25/16 161 lb 12.8 oz (73.4 kg)              Today, you had the following     No orders found for display         Today's Medication Changes          These changes are accurate as of: 4/24/17  1:26 PM.  If you have any questions, ask your nurse or doctor.               Start taking these medicines.        Dose/Directions    amoxicillin-clavulanate 875-125 MG per tablet   Commonly known as:  AUGMENTIN   Used for:  Acute recurrent maxillary sinusitis, Acute bronchitis, unspecified organism   Started by:  Blanche Cummins APRN CNP        Dose:  1 tablet   Take 1 tablet by mouth 2 times daily Take with food   Quantity:  20 tablet   Refills:  0       benzonatate 200 MG capsule   Commonly known as:  TESSALON   Used for:  Acute bronchitis, unspecified organism   Started by:  Blanche Cummins APRN CNP        Dose:  200 mg   Take 1 capsule (200 mg) by mouth 3 times daily as needed for cough   Quantity:  21 capsule   Refills:  0       guaiFENesin-codeine 100-10 MG/5ML Soln solution   Commonly known as:  ROBITUSSIN AC   Used for:  Acute bronchitis, unspecified organism   Started by:  Blanche Cummins  APRN CNP        Dose:  1 tsp.   Take 5 mLs by mouth every 4 hours as needed for cough   Quantity:  120 mL   Refills:  0         These medicines have changed or have updated prescriptions.        Dose/Directions    COMFORT LANCETS Misc   This may have changed:  Another medication with the same name was removed. Continue taking this medication, and follow the directions you see here.   Used for:  Hypoglycemia   Changed by:  Blanche Cummins APRN CNP        Dose:  1 Device   1 Device 2 times daily as needed   Quantity:  1 Box   Refills:  1            Where to get your medicines      These medications were sent to [facility] [address] [facility]  [address] [place] [id number]     Phone:  [phone]     amoxicillin-clavulanate 875-125 MG per tablet    benzonatate 200 MG capsule         Some of these will need a paper prescription and others can be bought over the counter.  Ask your nurse if you have questions.     Bring a paper prescription for each of these medications     guaiFENesin-codeine 100-10 MG/5ML Soln solution                Primary Care Provider Office Phone # Fax #    ELIOT Rodriguez [phone]       [facility] [address] [facility] [address]        Thank you!     Thank you for choosing [facility]  for your care. Our goal is always to provide you with excellent care. Hearing back from our patients is one way we can continue to improve our services. Please take a few minutes to complete the written survey that you may receive in the mail after your visit with us. Thank you!             Your Updated Medication List - Protect others around you: Learn how to safely use, store and throw away your medicines at www.disposemymeds.org.          This list is accurate as of: 4/24/17  1:26 PM.  Always use your most recent med list.                   Brand Name Dispense Instructions for use     albuterol 108 (90 BASE) MCG/ACT Inhaler    PROAIR HFA/PROVENTIL HFA/VENTOLIN HFA    1 Inhaler    Inhale 2 puffs into the lungs every 6 hours as needed for shortness of breath / dyspnea or wheezing       amoxicillin-clavulanate 875-125 MG per tablet    AUGMENTIN    20 tablet    Take 1 tablet by mouth 2 times daily Take with food       * amphetamine-dextroamphetamine 25 MG 24 hr capsule    ADDERALL XR    30 capsule    Take 1 capsule (25 mg) by mouth daily       * amphetamine-dextroamphetamine 25 MG 24 hr capsule    ADDERALL XR    30 capsule    Take 1 capsule (25 mg) by mouth daily       * amphetamine-dextroamphetamine 25 MG 24 hr capsule    ADDERALL XR    30 capsule    Take 1 capsule (25 mg) by mouth daily       benzonatate 200 MG capsule    TESSALON    21 capsule    Take 1 capsule (200 mg) by mouth 3 times daily as needed for cough       blood glucose monitoring test strip    no brand specified    1 Box    Use to test blood sugar 1-2 times daily or as directed.       calcium carbonate-vitamin D 600-400 MG-UNIT Chew    CALTRATE 600+D    180 tablet    Take 1 chew tab by mouth 2 times daily       CALCIUM CITRATE + D PO      Take  by mouth daily.       carvedilol 25 MG tablet    COREG    180 tablet    TAKE 1 TABLET(25 MG) BY MOUTH TWICE DAILY WITH MEALS       COMFORT LANCETS Misc     1 Box    1 Device 2 times daily as needed       estrogen (conjugated)-medroxyPROGESTERone 0.625-5 MG per tablet    PREMPRO    90 tablet    Take 1 tablet by mouth daily       guaiFENesin-codeine 100-10 MG/5ML Soln solution    ROBITUSSIN AC    120 mL    Take 5 mLs by mouth every 4 hours as needed for cough       hydrALAZINE 10 MG tablet    APRESOLINE    270 tablet    Take 1 tablet (10 mg) by mouth 3 times daily       isosorbide mononitrate 30 MG 24 hr tablet    IMDUR    90 tablet    Take 1 tablet (30 mg) by mouth daily       lisinopril 5 MG tablet    PRINIVIL/ZESTRIL    90 tablet    Take 1 tablet (5 mg) by mouth daily       meclizine 25 MG  tablet    ANTIVERT    30 tablet    Take 1 tablet (25 mg) by mouth 3 times daily as needed       Multi-vitamin Tabs tablet   Generic drug:  multivitamin, therapeutic with minerals     100 tablet    Take 1 tablet by mouth daily.       ondansetron 4 MG ODT tab    ZOFRAN-ODT    20 tablet    Take 1 tablet (4 mg) by mouth every 6 hours as needed for nausea       predniSONE 20 MG tablet    DELTASONE    10 tablet    Take 1 tablet by mouth 2 times daily. For Yellow or Red zone on Asthma Action Plan.       sertraline 100 MG tablet    ZOLOFT    180 tablet    Take 2 tablets daily  DUE for yearly office visit in June 2017       simvastatin 20 MG tablet    ZOCOR    90 tablet    Take 1 tablet (20 mg) by mouth At Bedtime       venlafaxine 225 MG Tb24 24 hr tablet    EFFEXOR-ER    90 each    Take 1 tablet (225 mg) by mouth daily (with breakfast)       VITAMIN B-12 SL      Place  under the tongue daily.       * Notice:  This list has 3 medication(s) that are the same as other medications prescribed for you. Read the directions carefully, and ask your doctor or other care provider to review them with you.

## 2017-04-24 NOTE — PROGRESS NOTES
SUBJECTIVE:                                                    Patricia Perez is a 57 year old female who presents to clinic today for the following health issues:      ENT Symptoms             Symptoms: cc Present Absent Comment   Fever/Chills   x    Fatigue  x  Tired x2 weeks   Muscle Aches       Eye Irritation   x    Sneezing   x    Nasal Ryne/Drg  x  Nasal congestion and drainage x2 weeks   Sinus Pressure/Pain   x    Loss of smell  x  And can't taste anything    Dental pain   x    Sore Throat   x    Swollen Glands   x    Ear Pain/Fullness   x    Cough x   Nonproductive cough x2 weeks   Wheeze  x  Wheezing x1 week   Chest Pain   x    Shortness of breath  x  Some shortness of breath and chest tightness after coughing   Rash   x    Other  x  Headache x3-4 days, related to  Sinuses      Symptom duration:  2 weeks   Symptom severity:  severe   Treatments tried:  inhaler, mucinex, waltussin, severe cold and allergy medication, vicks vapor rub   Contacts:  none         Bethea been sick for the past  2 + weeks.      Problem list and histories reviewed & adjusted, as indicated.  Additional history: as documented    Patient Active Problem List   Diagnosis     Dizziness and giddiness     Contraceptive management     Motion sickness     GERD (gastroesophageal reflux disease)     Idiopathic cardiomyopathy (H)     Sleep apnea     Bursitis of Shoulder left shoulder     Hyperlipidemia LDL goal <100     Allergic rhinitis     Mild major depression (H)     Hypertension goal BP (blood pressure) < 130/80     Pelvic pain in female     Health Care Home     24 hour contact handout given     ETD (eustachian tube dysfunction)     DUB (dysfunctional uterine bleeding)     Muscle tension headache     Advanced directives, counseling/discussion     Bilirubin in urine     Obese     Menopausal syndrome (hot flashes)     Ingrown toenail     Elevated PTHrP level (H)     H/O bariatric surgery     Hx of gastric bypass     Hypovitaminosis D     Heat  intolerance     Allergic rhinitis     Attention deficit disorder of adult     Mild persistent asthma     Low back pain     Hand joint pain     Posterior vitreous detachment, left eye     Myopia, bilateral     Dumping syndrome     Benign essential hypertension     Nuclear sclerosis of both eyes     Past Surgical History:   Procedure Laterality Date     BARIATRIC SURGERY       DIABETES RESOURCES  As Child    Tonsillectomy     ENT SURGERY       HERNIA REPAIR       LAPAROSCOPIC BYPASS GASTRIC  10/16/2012    Procedure: LAPAROSCOPIC BYPASS GASTRIC;  laparoscopic reyna en y gastric bypass;  Surgeon: Nish Bradford MD;  Location: [facility] OR     TONSILLECTOMY       Uretural Sticture  As Child       Social History   Substance Use Topics     Smoking status: Never Smoker     Smokeless tobacco: Never Used     Alcohol use No     Family History   Problem Relation Age of Onset     Hypertension Father      Lipids Father      Alcohol/Drug Father      Abuse     Depression Father      Respiratory Father      COPD     Cardiovascular Father      GASTROINTESTINAL DISEASE Mother      non cancerous pancreatic tumor     Hypertension Mother      HEART DISEASE Mother      A fib     Breast Cancer Maternal Grandmother      Glaucoma Paternal Grandmother      Depression Brother      Hypertension Brother      CEREBROVASCULAR DISEASE Other      Macular Degeneration Other      DIABETES No family hx of      Cancer - colorectal No family hx of      Retinal detachment No family hx of      Amblyopia No family hx of      Thyroid Disease No family hx of          Current Outpatient Prescriptions   Medication Sig Dispense Refill     amphetamine-dextroamphetamine (ADDERALL XR) 25 MG 24 hr capsule Take 1 capsule (25 mg) by mouth daily 30 capsule 0     sertraline (ZOLOFT) 100 MG tablet Take 2 tablets daily  DUE for yearly office visit in June 2017 180 tablet 1     carvedilol (COREG) 25 MG tablet TAKE 1 TABLET(25 MG) BY MOUTH TWICE DAILY WITH MEALS 180 tablet 3      venlafaxine (EFFEXOR-ER) 225 MG TB24 Take 1 tablet (225 mg) by mouth daily (with breakfast) 90 each 1     estrogen, conjugated,-medroxyPROGESTERone (PREMPRO) 0.625-5 MG per tablet Take 1 tablet by mouth daily 90 tablet 3     hydrALAZINE (APRESOLINE) 10 MG tablet Take 1 tablet (10 mg) by mouth 3 times daily 270 tablet 3     isosorbide mononitrate (IMDUR) 30 MG 24 hr tablet Take 1 tablet (30 mg) by mouth daily 90 tablet 3     lisinopril (PRINIVIL,ZESTRIL) 5 MG tablet Take 1 tablet (5 mg) by mouth daily 90 tablet 3     simvastatin (ZOCOR) 20 MG tablet Take 1 tablet (20 mg) by mouth At Bedtime 90 tablet 3     ondansetron (ZOFRAN-ODT) 4 MG disintegrating tablet Take 1 tablet (4 mg) by mouth every 6 hours as needed for nausea 20 tablet 0     blood glucose monitoring (NO BRAND SPECIFIED) test strip Use to test blood sugar 1-2 times daily or as directed. 1 Box 2     albuterol (PROAIR HFA, PROVENTIL HFA, VENTOLIN HFA) 108 (90 BASE) MCG/ACT inhaler Inhale 2 puffs into the lungs every 6 hours as needed for shortness of breath / dyspnea or wheezing 1 Inhaler 11     COMFORT LANCETS MISC 1 Device 2 times daily as needed 1 Box 1     meclizine (ANTIVERT) 25 MG tablet Take 1 tablet (25 mg) by mouth 3 times daily as needed 30 tablet 1     Calcium Citrate-Vitamin D (CALCIUM CITRATE + D PO) Take  by mouth daily.       Cyanocobalamin (VITAMIN B-12 SL) Place  under the tongue daily.       Multiple Vitamin (MULTI-VITAMIN) per tablet Take 1 tablet by mouth daily. 100 tablet 3     amphetamine-dextroamphetamine (ADDERALL XR) 25 MG 24 hr capsule Take 1 capsule (25 mg) by mouth daily 30 capsule 0     amphetamine-dextroamphetamine (ADDERALL XR) 25 MG 24 hr capsule Take 1 capsule (25 mg) by mouth daily 30 capsule 0     calcium carbonate-vitamin D (CALTRATE 600+D) 600-400 MG-UNIT CHEW Take 1 chew tab by mouth 2 times daily 180 tablet 3     predniSONE (DELTASONE) 20 MG tablet Take 1 tablet by mouth 2 times daily. For Yellow or Red zone on  Asthma Action Plan. (Patient not taking: Reported on 4/24/2017) 10 tablet 1     Allergies   Allergen Reactions     No Known Drug Allergies      BP Readings from Last 3 Encounters:   04/24/17 104/62   03/17/17 (!) 152/91   07/25/16 122/76    Wt Readings from Last 3 Encounters:   04/24/17 156 lb 9.6 oz (71 kg)   03/17/17 158 lb (71.7 kg)   07/25/16 161 lb 12.8 oz (73.4 kg)                    Reviewed and updated as needed this visit by clinical staff       Reviewed and updated as needed this visit by Provider         ROS:  See notes above for  HPI     OBJECTIVE:                                                    /62  Pulse 84  Temp 97.9  F (36.6  C) (Tympanic)  Wt 156 lb 9.6 oz (71 kg)  LMP 06/01/2012  SpO2 98%  BMI 26.88 kg/m2  Body mass index is 26.88 kg/(m^2).  GENERAL: no distress, pale and fatigued  HENT: right ear: clear effusion, left ear: clear effusion, nasal mucosa edematous , rhinorrhea clear and yellow, oropharynx clear, oral mucous membranes moist and sinuses: maxillary, frontal tenderness on bilateral, maxillary, frontal swelling on bilateral  NECK: no adenopathy, no asymmetry, masses, or scars and thyroid normal to palpation  RESP: rhonchi bilateral, expiratory wheezes bilateral and inspiratory wheezes bilateral  CV: regular rate and rhythm, normal S1 S2, no S3 or S4, no murmur, click or rub, no peripheral edema and peripheral pulses strong    Diagnostic Test Results:  none      ASSESSMENT/PLAN:                                                      ASSESSMENT/PLAN:      ICD-10-CM    1. Acute recurrent maxillary sinusitis J01.01 amoxicillin-clavulanate (AUGMENTIN) 875-125 MG per tablet   2. Acute bronchitis, unspecified organism J20.9 amoxicillin-clavulanate (AUGMENTIN) 875-125 MG per tablet     guaiFENesin-codeine (ROBITUSSIN AC) 100-10 MG/5ML SOLN solution     benzonatate (TESSALON) 200 MG capsule       Patient Instructions   Stay well hydrated - urine should be clear.     Use the tessalon  pearls during the day for cough   And the Robitussin AC during the night     For the sore throat use warm tea and honey or even just spoonful of honey and hold it to the back of the throat. This will help to decrease the inflammation and thin the mucous.     Start the  Augmentin  Today and you can still get both doses in today   TAKE WITH FOOD>         MEDICATIONS:        - Start taking Augmentin        - Continue other medications without change  See Patient Instructions    JOHN VELAZQUEZ NP, APRN [facility]

## 2017-04-24 NOTE — NURSING NOTE
"Initial /62  Pulse 84  Temp 97.9  F (36.6  C) (Tympanic)  Wt 156 lb 9.6 oz (71 kg)  LMP 06/01/2012  SpO2 98%  BMI 26.88 kg/m2 Estimated body mass index is 26.88 kg/(m^2) as calculated from the following:    Height as of 6/29/16: 5' 4\" (1.626 m).    Weight as of this encounter: 156 lb 9.6 oz (71 kg). .    "

## 2017-04-29 ENCOUNTER — APPOINTMENT (OUTPATIENT)
Dept: CT IMAGING | Facility: CLINIC | Age: 57
End: 2017-04-29
Attending: INTERNAL MEDICINE
Payer: COMMERCIAL

## 2017-04-29 ENCOUNTER — HOSPITAL ENCOUNTER (EMERGENCY)
Facility: CLINIC | Age: 57
Discharge: HOME | End: 2017-04-29
Attending: INTERNAL MEDICINE | Admitting: INTERNAL MEDICINE
Payer: COMMERCIAL

## 2017-04-29 VITALS
OXYGEN SATURATION: 98 % | SYSTOLIC BLOOD PRESSURE: 138 MMHG | RESPIRATION RATE: 14 BRPM | DIASTOLIC BLOOD PRESSURE: 93 MMHG | TEMPERATURE: 98 F | HEIGHT: 64 IN | HEART RATE: 78 BPM

## 2017-04-29 DIAGNOSIS — N23 RENAL COLIC: ICD-10-CM

## 2017-04-29 DIAGNOSIS — R00.1 SINUS BRADYCARDIA: ICD-10-CM

## 2017-04-29 DIAGNOSIS — N13.2 HYDRONEPHROSIS WITH RENAL AND URETERAL CALCULUS OBSTRUCTION: ICD-10-CM

## 2017-04-29 LAB
ALBUMIN SERPL-MCNC: 3.3 G/DL (ref 3.4–5)
ALBUMIN UR-MCNC: NEGATIVE MG/DL
ALP SERPL-CCNC: 72 U/L (ref 40–150)
ALT SERPL W P-5'-P-CCNC: 27 U/L (ref 0–50)
ANION GAP SERPL CALCULATED.3IONS-SCNC: 8 MMOL/L (ref 3–14)
APPEARANCE UR: CLEAR
AST SERPL W P-5'-P-CCNC: ABNORMAL U/L (ref 0–45)
BASOPHILS # BLD AUTO: 0 10E9/L (ref 0–0.2)
BASOPHILS NFR BLD AUTO: 0.3 %
BILIRUB SERPL-MCNC: 0.5 MG/DL (ref 0.2–1.3)
BILIRUB UR QL STRIP: NEGATIVE
BUN SERPL-MCNC: 17 MG/DL (ref 7–30)
CALCIUM SERPL-MCNC: 8.6 MG/DL (ref 8.5–10.1)
CHLORIDE SERPL-SCNC: 106 MMOL/L (ref 94–109)
CO2 SERPL-SCNC: 25 MMOL/L (ref 20–32)
COLOR UR AUTO: YELLOW
CREAT SERPL-MCNC: 0.66 MG/DL (ref 0.52–1.04)
DIFFERENTIAL METHOD BLD: ABNORMAL
EOSINOPHIL # BLD AUTO: 0.1 10E9/L (ref 0–0.7)
EOSINOPHIL NFR BLD AUTO: 1.1 %
ERYTHROCYTE [DISTWIDTH] IN BLOOD BY AUTOMATED COUNT: 13.3 % (ref 10–15)
GFR SERPL CREATININE-BSD FRML MDRD: ABNORMAL ML/MIN/1.7M2
GLUCOSE SERPL-MCNC: 123 MG/DL (ref 70–99)
GLUCOSE UR STRIP-MCNC: NEGATIVE MG/DL
HCT VFR BLD AUTO: 37.3 % (ref 35–47)
HGB BLD-MCNC: 12.4 G/DL (ref 11.7–15.7)
HGB UR QL STRIP: ABNORMAL
IMM GRANULOCYTES # BLD: 0.1 10E9/L (ref 0–0.4)
IMM GRANULOCYTES NFR BLD: 0.5 %
KETONES UR STRIP-MCNC: 10 MG/DL
LEUKOCYTE ESTERASE UR QL STRIP: NEGATIVE
LIPASE SERPL-CCNC: 153 U/L (ref 73–393)
LYMPHOCYTES # BLD AUTO: 1.6 10E9/L (ref 0.8–5.3)
LYMPHOCYTES NFR BLD AUTO: 13.1 %
MCH RBC QN AUTO: 31.3 PG (ref 26.5–33)
MCHC RBC AUTO-ENTMCNC: 33.2 G/DL (ref 31.5–36.5)
MCV RBC AUTO: 94 FL (ref 78–100)
MONOCYTES # BLD AUTO: 0.7 10E9/L (ref 0–1.3)
MONOCYTES NFR BLD AUTO: 5.7 %
MUCOUS THREADS #/AREA URNS LPF: PRESENT /LPF
NEUTROPHILS # BLD AUTO: 9.7 10E9/L (ref 1.6–8.3)
NEUTROPHILS NFR BLD AUTO: 79.3 %
NITRATE UR QL: NEGATIVE
NRBC # BLD AUTO: 0 10*3/UL
NRBC BLD AUTO-RTO: 0 /100
PH UR STRIP: 6 PH (ref 5–7)
PLATELET # BLD AUTO: 307 10E9/L (ref 150–450)
POTASSIUM SERPL-SCNC: 5.2 MMOL/L (ref 3.4–5.3)
PROT SERPL-MCNC: 7 G/DL (ref 6.8–8.8)
RADIOLOGIST FLAGS: ABNORMAL
RBC # BLD AUTO: 3.96 10E12/L (ref 3.8–5.2)
RBC #/AREA URNS AUTO: 19 /HPF (ref 0–2)
SODIUM SERPL-SCNC: 140 MMOL/L (ref 133–144)
SP GR UR STRIP: 1.02 (ref 1–1.03)
SQUAMOUS #/AREA URNS AUTO: 3 /HPF (ref 0–1)
TRANS CELLS #/AREA URNS HPF: <1 /HPF (ref 0–1)
URN SPEC COLLECT METH UR: ABNORMAL
UROBILINOGEN UR STRIP-MCNC: 2 MG/DL (ref 0–2)
WBC # BLD AUTO: 12.2 10E9/L (ref 4–11)
WBC #/AREA URNS AUTO: 3 /HPF (ref 0–2)

## 2017-04-29 PROCEDURE — 81001 URINALYSIS AUTO W/SCOPE: CPT | Performed by: EMERGENCY MEDICINE

## 2017-04-29 PROCEDURE — 99285 EMERGENCY DEPT VISIT HI MDM: CPT | Mod: 25 | Performed by: INTERNAL MEDICINE

## 2017-04-29 PROCEDURE — 74176 CT ABD & PELVIS W/O CONTRAST: CPT

## 2017-04-29 PROCEDURE — 83690 ASSAY OF LIPASE: CPT | Performed by: EMERGENCY MEDICINE

## 2017-04-29 PROCEDURE — 96374 THER/PROPH/DIAG INJ IV PUSH: CPT | Performed by: INTERNAL MEDICINE

## 2017-04-29 PROCEDURE — 87086 URINE CULTURE/COLONY COUNT: CPT | Performed by: INTERNAL MEDICINE

## 2017-04-29 PROCEDURE — 80053 COMPREHEN METABOLIC PANEL: CPT | Performed by: EMERGENCY MEDICINE

## 2017-04-29 PROCEDURE — 96376 TX/PRO/DX INJ SAME DRUG ADON: CPT | Performed by: INTERNAL MEDICINE

## 2017-04-29 PROCEDURE — 25000128 H RX IP 250 OP 636: Performed by: EMERGENCY MEDICINE

## 2017-04-29 PROCEDURE — 96375 TX/PRO/DX INJ NEW DRUG ADDON: CPT | Performed by: INTERNAL MEDICINE

## 2017-04-29 PROCEDURE — 93010 ELECTROCARDIOGRAM REPORT: CPT | Mod: Z6 | Performed by: INTERNAL MEDICINE

## 2017-04-29 PROCEDURE — 85025 COMPLETE CBC W/AUTO DIFF WBC: CPT | Performed by: EMERGENCY MEDICINE

## 2017-04-29 PROCEDURE — 93005 ELECTROCARDIOGRAM TRACING: CPT | Performed by: INTERNAL MEDICINE

## 2017-04-29 PROCEDURE — 25000128 H RX IP 250 OP 636: Performed by: INTERNAL MEDICINE

## 2017-04-29 RX ORDER — ONDANSETRON 4 MG/1
4 TABLET, ORALLY DISINTEGRATING ORAL
Status: DISCONTINUED | OUTPATIENT
Start: 2017-04-29 | End: 2017-04-29 | Stop reason: HOSPADM

## 2017-04-29 RX ORDER — HYDROCODONE BITARTRATE AND ACETAMINOPHEN 5; 325 MG/1; MG/1
1-2 TABLET ORAL EVERY 6 HOURS PRN
Qty: 16 TABLET | Refills: 0 | Status: SHIPPED | OUTPATIENT
Start: 2017-04-29 | End: 2018-03-01

## 2017-04-29 RX ORDER — KETOROLAC TROMETHAMINE 15 MG/ML
15 INJECTION, SOLUTION INTRAMUSCULAR; INTRAVENOUS ONCE
Status: COMPLETED | OUTPATIENT
Start: 2017-04-29 | End: 2017-04-29

## 2017-04-29 RX ORDER — HYDROMORPHONE HYDROCHLORIDE 1 MG/ML
0.5 INJECTION, SOLUTION INTRAMUSCULAR; INTRAVENOUS; SUBCUTANEOUS
Status: DISCONTINUED | OUTPATIENT
Start: 2017-04-29 | End: 2017-04-29 | Stop reason: HOSPADM

## 2017-04-29 RX ORDER — ONDANSETRON 2 MG/ML
4 INJECTION INTRAMUSCULAR; INTRAVENOUS
Status: COMPLETED | OUTPATIENT
Start: 2017-04-29 | End: 2017-04-29

## 2017-04-29 RX ORDER — ONDANSETRON 4 MG/1
4 TABLET, ORALLY DISINTEGRATING ORAL EVERY 8 HOURS PRN
Qty: 10 TABLET | Refills: 0 | Status: SHIPPED | OUTPATIENT
Start: 2017-04-29 | End: 2018-03-01

## 2017-04-29 RX ADMIN — HYDROMORPHONE HYDROCHLORIDE 0.5 MG: 1 INJECTION, SOLUTION INTRAMUSCULAR; INTRAVENOUS; SUBCUTANEOUS at 17:34

## 2017-04-29 RX ADMIN — HYDROMORPHONE HYDROCHLORIDE 0.5 MG: 1 INJECTION, SOLUTION INTRAMUSCULAR; INTRAVENOUS; SUBCUTANEOUS at 18:52

## 2017-04-29 RX ADMIN — KETOROLAC TROMETHAMINE 15 MG: 15 INJECTION, SOLUTION INTRAMUSCULAR; INTRAVENOUS at 20:00

## 2017-04-29 RX ADMIN — ONDANSETRON 4 MG: 2 INJECTION INTRAMUSCULAR; INTRAVENOUS at 17:07

## 2017-04-29 ASSESSMENT — ENCOUNTER SYMPTOMS
CHILLS: 1
COUGH: 0
APPETITE CHANGE: 0
CHEST TIGHTNESS: 0
VOMITING: 0
FLANK PAIN: 1
RHINORRHEA: 0
HEADACHES: 0
FREQUENCY: 0
CONSTIPATION: 0
CONFUSION: 0
NECK PAIN: 0
PALPITATIONS: 0
SORE THROAT: 0
NUMBNESS: 0
NAUSEA: 1
BACK PAIN: 0
FEVER: 0
WHEEZING: 0
LIGHT-HEADEDNESS: 0
DYSURIA: 0
DIARRHEA: 0
SHORTNESS OF BREATH: 1
DIFFICULTY URINATING: 0
WEAKNESS: 0
ABDOMINAL PAIN: 1
ADENOPATHY: 0

## 2017-04-29 NOTE — ED NOTES
Pain on left side above hip. + nausea. Denies vomiting and diarrhea. Started at 1000 today while she was sitting on the sofa. Felt fine when she woke up at 0830. Pain has been getting worse throughout the day. Has not taken any medications for the pain.

## 2017-04-29 NOTE — ED AVS SNAPSHOT
[facility], Emergency Department    [address] [address]    Phone:  [phone]                                       Patricia Perez   MRN: [id number]    Department:  [facility], Emergency Department   Date of Visit:  4/29/2017           Patient Information     Date Of Birth          1960        Your diagnoses for this visit were:     Renal colic        You were seen by Louie Rivera MD.      Follow-up Information     Follow up with Blanche Cummins, ELIOT CNP.    Specialty:  Family Practice    Contact information:    [facility]  [address] [place] [address]  [phone]          Discharge Instructions       Miralax twice/ day in water for 2 days.  Ibuprofen 400 mg every 6 hours as needed for pain.  Vicodin 1-2 every 6 hours as needed for pain.  Zofran 4 mg every 6 hours as needed for nausea.  Return for worsened, uncontrolled pain, fever, frequent vomiting, new or worsening symptoms.  Follow up [facility].    Future Appointments        Provider Department Dept Phone Center    5/31/2017 7:30 AM Preston Cuadra MD [facility] [phone] [facility]      24 Hour Appointment Hotline       To make an appointment at any [facility], call [phone] ([phone]). If you don't have a family doctor or clinic, we will help you find one. [facility] are conveniently located to serve the needs of you and your family.             Review of your medicines      START taking        Dose / Directions Last dose taken    HYDROcodone-acetaminophen 5-325 MG per tablet   Commonly known as:  NORCO   Dose:  1-2 tablet   Quantity:  16 tablet        Take 1-2 tablets by mouth every 6 hours as needed for moderate to severe pain   Refills:  0          CONTINUE these medicines which may have CHANGED, or have new prescriptions. If we are uncertain of the size of tablets/capsules you have at home, strength may be listed as something  that might have changed.        Dose / Directions Last dose taken    ondansetron 4 MG ODT tab   Commonly known as:  ZOFRAN ODT   Dose:  4 mg   What changed:  when to take this   Quantity:  10 tablet        Take 1 tablet (4 mg) by mouth every 8 hours as needed for nausea   Refills:  0          Our records show that you are taking the medicines listed below. If these are incorrect, please call your family doctor or clinic.        Dose / Directions Last dose taken    albuterol 108 (90 BASE) MCG/ACT Inhaler   Commonly known as:  PROAIR HFA/PROVENTIL HFA/VENTOLIN HFA   Dose:  2 puff   Quantity:  1 Inhaler        Inhale 2 puffs into the lungs every 6 hours as needed for shortness of breath / dyspnea or wheezing   Refills:  11        amoxicillin-clavulanate 875-125 MG per tablet   Commonly known as:  AUGMENTIN   Dose:  1 tablet   Quantity:  20 tablet        Take 1 tablet by mouth 2 times daily Take with food   Refills:  0        * amphetamine-dextroamphetamine 25 MG 24 hr capsule   Commonly known as:  ADDERALL XR   Dose:  25 mg   Quantity:  30 capsule        Take 1 capsule (25 mg) by mouth daily   Refills:  0        * amphetamine-dextroamphetamine 25 MG 24 hr capsule   Commonly known as:  ADDERALL XR   Dose:  25 mg   Quantity:  30 capsule        Take 1 capsule (25 mg) by mouth daily   Refills:  0        * amphetamine-dextroamphetamine 25 MG 24 hr capsule   Commonly known as:  ADDERALL XR   Dose:  25 mg   Quantity:  30 capsule        Take 1 capsule (25 mg) by mouth daily   Refills:  0        benzonatate 200 MG capsule   Commonly known as:  TESSALON   Dose:  200 mg   Quantity:  21 capsule        Take 1 capsule (200 mg) by mouth 3 times daily as needed for cough   Refills:  0        blood glucose monitoring test strip   Commonly known as:  no brand specified   Quantity:  1 Box        Use to test blood sugar 1-2 times daily or as directed.   Refills:  2        calcium carbonate-vitamin D 600-400 MG-UNIT Chew   Commonly known  as:  CALTRATE 600+D   Dose:  1 chew tab   Quantity:  180 tablet        Take 1 chew tab by mouth 2 times daily   Refills:  3        CALCIUM CITRATE + D PO        Take  by mouth daily.   Refills:  0        carvedilol 25 MG tablet   Commonly known as:  COREG   Quantity:  180 tablet        TAKE 1 TABLET(25 MG) BY MOUTH TWICE DAILY WITH MEALS   Refills:  3        COMFORT LANCETS Misc   Dose:  1 Device   Quantity:  1 Box        1 Device 2 times daily as needed   Refills:  1        estrogen (conjugated)-medroxyPROGESTERone 0.625-5 MG per tablet   Commonly known as:  PREMPRO   Dose:  1 tablet   Quantity:  90 tablet        Take 1 tablet by mouth daily   Refills:  3        guaiFENesin-codeine 100-10 MG/5ML Soln solution   Commonly known as:  ROBITUSSIN AC   Dose:  1 tsp.   Quantity:  120 mL        Take 5 mLs by mouth every 4 hours as needed for cough   Refills:  0        hydrALAZINE 10 MG tablet   Commonly known as:  APRESOLINE   Dose:  10 mg   Quantity:  270 tablet        Take 1 tablet (10 mg) by mouth 3 times daily   Refills:  3        isosorbide mononitrate 30 MG 24 hr tablet   Commonly known as:  IMDUR   Dose:  30 mg   Quantity:  90 tablet        Take 1 tablet (30 mg) by mouth daily   Refills:  3        lisinopril 5 MG tablet   Commonly known as:  PRINIVIL/ZESTRIL   Dose:  5 mg   Quantity:  90 tablet        Take 1 tablet (5 mg) by mouth daily   Refills:  3        meclizine 25 MG tablet   Commonly known as:  ANTIVERT   Dose:  25 mg   Quantity:  30 tablet        Take 1 tablet (25 mg) by mouth 3 times daily as needed   Refills:  1        Multi-vitamin Tabs tablet   Dose:  1 tablet   Quantity:  100 tablet   Generic drug:  multivitamin, therapeutic with minerals        Take 1 tablet by mouth daily.   Refills:  3        predniSONE 20 MG tablet   Commonly known as:  DELTASONE   Dose:  20 mg   Quantity:  10 tablet        Take 1 tablet by mouth 2 times daily. For Yellow or Red zone on Asthma Action Plan.   Refills:  1         sertraline 100 MG tablet   Commonly known as:  ZOLOFT   Quantity:  180 tablet        Take 2 tablets daily  DUE for yearly office visit in June 2017   Refills:  1        simvastatin 20 MG tablet   Commonly known as:  ZOCOR   Dose:  20 mg   Quantity:  90 tablet        Take 1 tablet (20 mg) by mouth At Bedtime   Refills:  3        venlafaxine 225 MG Tb24 24 hr tablet   Commonly known as:  EFFEXOR-ER   Dose:  225 mg   Quantity:  90 each        Take 1 tablet (225 mg) by mouth daily (with breakfast)   Refills:  1        VITAMIN B-12 SL        Place  under the tongue daily.   Refills:  0        * Notice:  This list has 3 medication(s) that are the same as other medications prescribed for you. Read the directions carefully, and ask your doctor or other care provider to review them with you.            Prescriptions were sent or printed at these locations (2 Prescriptions)                   Other Prescriptions                Printed at Department/Unit printer (2 of 2)         ondansetron (ZOFRAN ODT) 4 MG ODT tab               HYDROcodone-acetaminophen (NORCO) 5-325 MG per tablet                Procedures and tests performed during your visit     Abd/pelvis CT no contrast - Stone Protocol    CBC with platelets differential    Comprehensive metabolic panel    EKG 12-lead, tracing only    Lipase    Peripheral IV: Standard    Routine UA with microscopic    Urine Culture      Orders Needing Specimen Collection     None      Pending Results     Date and Time Order Name Status Description    4/29/2017 1722 Abd/pelvis CT no contrast - Stone Protocol Preliminary     4/29/2017 1714 Urine Culture Preliminary     4/29/2017 1644 EKG 12-lead, tracing only Preliminary             Pending Culture Results     Date and Time Order Name Status Description    4/29/2017 1714 Urine Culture Preliminary             Thank you for choosing [facility]       Thank you for choosing [facility] for your care. Our goal is always to provide you with excellent  care. Hearing back from our patients is one way we can continue to improve our services. Please take a few minutes to complete the written survey that you may receive in the mail after you visit with us. Thank you!        [email or web address] Information     [facility] gives you secure access to your electronic health record. If you see a primary care provider, you can also send messages to your care team and make appointments. If you have questions, please call your primary care clinic.  If you do not have a primary care provider, please call [phone] and they will assist you.        Care EveryWhere ID     This is your Care EveryWhere ID. This could be used by other organizations to access your [facility] medical records  [id number]        After Visit Summary       This is your record. Keep this with you and show to your community pharmacist(s) and doctor(s) at your next visit.

## 2017-04-29 NOTE — ED PROVIDER NOTES
History     Chief Complaint   Patient presents with     Abdominal Pain     LLQ and left side     HPI  Patricia Perez is a 57 year old female who presents with abrupt onset of left flank/LUQ abdominal pain radiating to the suprapubic region this afternoon while sitting working on the computer. The pain is more anterior than posterior. There is no associated chest pain, palpitations, diarrhea, constipation, dysuria, frequency or urgency. The pain waxes and wanes in intensity, but is never gone. She has no fever, but has had some chills. She has no cough. She at times is short of breath with the pain. She has nausea without vomiting. She has never experienced pain like this before. There is no mid back pain or radiation of the pain to the lower extremities.    PAST MEDICAL HISTORY:   Past Medical History:   Diagnosis Date     Anxiety      Arthritis      Asthma      Bursitis of shoulder 3/4/2010     Chronic rhinitis 3/25/2009     Coronary artery disease      Depression      Depression      Dyspnea on exertion      Gastro-oesophageal reflux disease      GERD (gastroesophageal reflux disease) 10/14/2008     Hypertension      Idiopathic cardiomyopathy (H) 1/8/2009     Indigestion      Itchy eyes      Itchy nose      Left leg pain 6/16/2011     Motion sickness 2/27/2008     Nasal congestion      Pneumonia      Problems related to lack of adequate sleep      Ringing in ears      Sleep apnea 3/25/2009     Sneezing        PAST SURGICAL HISTORY:   Past Surgical History:   Procedure Laterality Date     BARIATRIC SURGERY       DIABETES RESOURCES  As Child    Tonsillectomy     ENT SURGERY       HERNIA REPAIR       LAPAROSCOPIC BYPASS GASTRIC  10/16/2012    Procedure: LAPAROSCOPIC BYPASS GASTRIC;  laparoscopic reyna en y gastric bypass;  Surgeon: Nish Bradford MD;  Location: [facility] OR     TONSILLECTOMY       Uretural Sticture  As Child       FAMILY HISTORY:   Family History   Problem Relation Age of Onset     Hypertension Father       Lipids Father      Alcohol/Drug Father      Abuse     Depression Father      Respiratory Father      COPD     Cardiovascular Father      GASTROINTESTINAL DISEASE Mother      non cancerous pancreatic tumor     Hypertension Mother      HEART DISEASE Mother      A fib     Breast Cancer Maternal Grandmother      Glaucoma Paternal Grandmother      Depression Brother      Hypertension Brother      CEREBROVASCULAR DISEASE Other      Macular Degeneration Other      DIABETES No family hx of      Cancer - colorectal No family hx of      Retinal detachment No family hx of      Amblyopia No family hx of      Thyroid Disease No family hx of        SOCIAL HISTORY:   Social History   Substance Use Topics     Smoking status: Never Smoker     Smokeless tobacco: Never Used     Alcohol use No         I have reviewed the Medications, Allergies, Past Medical and Surgical History, and Social History in the Epic system.    Review of Systems   Constitutional: Positive for chills. Negative for appetite change and fever.   HENT: Negative for congestion, rhinorrhea and sore throat.    Eyes: Negative for visual disturbance.   Respiratory: Positive for shortness of breath. Negative for cough, chest tightness and wheezing.    Cardiovascular: Negative for chest pain, palpitations and leg swelling.   Gastrointestinal: Positive for abdominal pain and nausea. Negative for constipation, diarrhea and vomiting.   Endocrine: Negative for polyuria.   Genitourinary: Positive for flank pain. Negative for difficulty urinating, dysuria, frequency and urgency.   Musculoskeletal: Negative for back pain and neck pain.   Skin: Negative for rash.   Allergic/Immunologic: Negative for immunocompromised state.   Neurological: Negative for weakness, light-headedness, numbness and headaches.   Hematological: Negative for adenopathy.   Psychiatric/Behavioral: Negative for confusion.       Physical Exam   BP: 145/52  Heart Rate: 51  Temp: 97.3  F (36.3  C)  Resp:  "18  Height: 162.6 cm (5' 4\")  SpO2: 100 %  Physical Exam   Constitutional: She is oriented to person, place, and time. She appears well-developed and well-nourished. No distress.   HENT:   Head: Normocephalic and atraumatic.   Right Ear: External ear normal.   Left Ear: External ear normal.   Nose: Nose normal.   Mouth/Throat: Oropharynx is clear and moist. No oropharyngeal exudate.   Eyes: EOM are normal. Pupils are equal, round, and reactive to light. No scleral icterus.   Neck: Normal range of motion. Neck supple. No JVD present.   Cardiovascular: Normal rate, regular rhythm and normal heart sounds.  Exam reveals no friction rub.    No murmur heard.  Pulmonary/Chest: Effort normal. She has no wheezes. She has no rales.   Abdominal: Soft. Bowel sounds are normal. There is no tenderness. There is no rebound, no guarding and no CVA tenderness.   Musculoskeletal: She exhibits no edema or tenderness.   Lymphadenopathy:     She has no cervical adenopathy.   Neurological: She is alert and oriented to person, place, and time. No cranial nerve deficit.   Skin: Skin is warm and dry.   Psychiatric: She has a normal mood and affect. Her behavior is normal.   Nursing note and vitals reviewed.      ED Course     ED Course     Procedures             EKG Interpretation:      Interpreted by TERESO CLANCY  Time reviewed: 1650  Symptoms at time of EKG: abdominal pain   Rhythm: sinus bradycardia  Rate: 50-60  Axis: Normal  Ectopy: none  Conduction: normal  ST Segments/ T Waves: No ST-T wave changes and No acute ischemic changes  Q Waves: none  Comparison to prior: No old EKG available    Clinical Impression: no acute changes      Labs/Imaging    Results for orders placed or performed during the hospital encounter of 04/29/17 (from the past 24 hour(s))   EKG 12-lead, tracing only   Result Value Ref Range    Interpretation ECG Click View Image link to view waveform and result    CBC with platelets differential   Result Value Ref " Range    WBC 12.2 (H) 4.0 - 11.0 10e9/L    RBC Count 3.96 3.8 - 5.2 10e12/L    Hemoglobin 12.4 11.7 - 15.7 g/dL    Hematocrit 37.3 35.0 - 47.0 %    MCV 94 78 - 100 fl    MCH 31.3 26.5 - 33.0 pg    MCHC 33.2 31.5 - 36.5 g/dL    RDW 13.3 10.0 - 15.0 %    Platelet Count 307 150 - 450 10e9/L    Diff Method Automated Method     % Neutrophils 79.3 %    % Lymphocytes 13.1 %    % Monocytes 5.7 %    % Eosinophils 1.1 %    % Basophils 0.3 %    % Immature Granulocytes 0.5 %    Nucleated RBCs 0 0 /100    Absolute Neutrophil 9.7 (H) 1.6 - 8.3 10e9/L    Absolute Lymphocytes 1.6 0.8 - 5.3 10e9/L    Absolute Monocytes 0.7 0.0 - 1.3 10e9/L    Absolute Eosinophils 0.1 0.0 - 0.7 10e9/L    Absolute Basophils 0.0 0.0 - 0.2 10e9/L    Abs Immature Granulocytes 0.1 0 - 0.4 10e9/L    Absolute Nucleated RBC 0.0    Comprehensive metabolic panel   Result Value Ref Range    Sodium 140 133 - 144 mmol/L    Potassium 5.2 3.4 - 5.3 mmol/L    Chloride 106 94 - 109 mmol/L    Carbon Dioxide 25 20 - 32 mmol/L    Anion Gap 8 3 - 14 mmol/L    Glucose 123 (H) 70 - 99 mg/dL    Urea Nitrogen 17 7 - 30 mg/dL    Creatinine 0.66 0.52 - 1.04 mg/dL    GFR Estimate >90  Non  GFR Calc   >60 mL/min/1.7m2    GFR Estimate If Black >90   GFR Calc   >60 mL/min/1.7m2    Calcium 8.6 8.5 - 10.1 mg/dL    Bilirubin Total 0.5 0.2 - 1.3 mg/dL    Albumin 3.3 (L) 3.4 - 5.0 g/dL    Protein Total 7.0 6.8 - 8.8 g/dL    Alkaline Phosphatase 72 40 - 150 U/L    ALT 27 0 - 50 U/L    AST  0 - 45 U/L     Unsatisfactory specimen - hemolyzed   Canceled, Test credited  NOTIFIED LINNETTE ABRAHAM RN [facility] 1830 4/29/2017 BY AA     Lipase   Result Value Ref Range    Lipase 153 73 - 393 U/L   Routine UA with microscopic   Result Value Ref Range    Color Urine Yellow     Appearance Urine Clear     Glucose Urine Negative NEG mg/dL    Bilirubin Urine Negative NEG    Ketones Urine 10 (A) NEG mg/dL    Specific Gravity Urine 1.017 1.003 - 1.035    Blood Urine Small (A)  NEG    pH Urine 6.0 5.0 - 7.0 pH    Protein Albumin Urine Negative NEG mg/dL    Urobilinogen mg/dL 2.0 0.0 - 2.0 mg/dL    Nitrite Urine Negative NEG    Leukocyte Esterase Urine Negative NEG    Source Midstream Urine     WBC Urine 3 (H) 0 - 2 /HPF    RBC Urine 19 (H) 0 - 2 /HPF    Squamous Epithelial /HPF Urine 3 (H) 0 - 1 /HPF    Transitional Epi <1 0 - 1 /HPF    Mucous Urine Present (A) NEG /LPF   Urine Culture   Result Value Ref Range    Specimen Description Midstream Urine     Special Requests Specimen received in preservative     Culture Micro Pending     Micro Report Status Pending    Abd/pelvis CT no contrast - Stone Protocol   Result Value Ref Range    Radiologist flags (Urgent)      Mild to moderate left hydronephrosis with mild left    Narrative    EXAMINATION: CT ABDOMEN PELVIS W/O CONTRAST, 4/29/2017 6:09 PM    TECHNIQUE:  Helical CT images from the lung bases through the  symphysis pubis were obtained without IV contrast.    COMPARISON: CT abdomen 11/17/2010.    HISTORY: left flank pain radiating to suprapubic region    FINDINGS:    Abdomen and pelvis:     Left kidney: Mild to moderate left hydronephrosis with mild proximal  hydroureter. There is a 3 mm renal calculus within the inferior left  calyx without upstream calyceal dilatation. Asymmetric enlargement of  the left kidney. Left perinephric fatty stranding. No perinephric  collection. The left ureter is obscured by adjacent bowel and vessels,  making it difficult to identify in the pelvis.    Right kidney: No hydronephrosis or perinephric fatty stranding. There  is nonobstructive 2 mm right interpolar renal calculus. No  hydroureter.    The urinary bladder is decompressed. No urinary calculus. No evidence  of urethral calculus.    Noncontrast evaluation of liver, gallbladder, adrenal gland, pancreas,  spleen are unremarkable. Postsurgical changes of Basilio-en-Y gastric  bypass. Redundant transverse colon. No bowel obstruction. Significant  colonic  stool burden. Atherosclerotic calcification of abdominal aorta  and iliac vessels without aneurysmal dilatation. Prominent mesenteric  vessels. No significant free fluid or air within the abdomen and  pelvis.    Lower thorax: No pleural effusion. Mild bibasilar subsegmental  atelectasis. Subtle tree-in-bud and groundglass opacities of the  posterior left lower lung (series 3, image 7).    Osseous: Moderate to severe degenerative changes of lower thoracic and  lumbar spine. Multilevel Schmorl nodes. Multilevel facet arthropathy.  Subchondral cystic changes of left acetabulum. Mild left convex lumbar  scoliosis.      Impression    IMPRESSION:   1. Mild to moderate left hydronephrosis and mild left hydroureter with  perinephric fatty stranding . Differential diagnosis includes acute  pyelonephritis versus recent passed stone. Please correlate with  clinical findings.  2. Nonobstructive 3 mm left lower calyceal stone and nonobstructive 2  mm right interpolar renal calculi.  3. Postsurgical changes of Basilio-en-Y gastric bypass.  4. Small tree-in-bud and ground glass opacity within the left lower  lobe, may represent infectious process.    [Urgent Result: Mild to moderate left hydronephrosis with mild left  hydroureter]    Finding was identified on 4/29/2017 6:16 PM.     Dr. Gardner was contacted by Dr. Darcy Barrett M.D.  at  4/29/2017 6:42 PM and verbalized understanding of the urgent finding.     I have personally reviewed the examination and initial interpretation  and I agree with the findings.    SALOMON MARSHALL MD     1920: Pain improved, still some residual pain.    Assessments & Plan (with Medical Decision Making)   Impression:  Middle aged female presents with Abrupt onset of LUQ/left flank pain. She has intraparenchymal stone on CT. There is mild fat stranding around the left kidney, mild hydronephrosis and mild hydroureter on the left without evidence of ureteral stone. She has mild hematuria without  pyuria. Labs otherwise notable for mildly elevated WBC which could be related to demargination from pain. She is afebrile. She has normal creatinine, LFT and lipase. Abdominal CT is otherwise unremarkable other than moderate stool burden in the colon in the LUQ. Overall I think her symptoms are most suggestive of a recently passed kidney stone with the combination of hematuria, hydronephrosis/hudroureter and residual stones in the kidney as well as the pattern and character of the pain being consistent.    I have reviewed the nursing notes.    I have reviewed the findings, diagnosis, plan and need for follow up with the patient.    Discharge Medication List as of 4/29/2017  7:58 PM      START taking these medications    Details   HYDROcodone-acetaminophen (NORCO) 5-325 MG per tablet Take 1-2 tablets by mouth every 6 hours as needed for moderate to severe pain, Disp-16 tablet, R-0, Local Print             Final diagnoses:   Renal colic       4/29/2017   [facility], [place], EMERGENCY DEPARTMENT     Louie Rivera MD  04/30/17 0128

## 2017-04-29 NOTE — ED AVS SNAPSHOT
[facility], [place], Emergency Department    [address] [place] [address]    Phone:  [phone]                                       Patricia Perez   MRN: [id number]    Department:  [facility], Emergency Department   Date of Visit:  4/29/2017           After Visit Summary Signature Page     I have received my discharge instructions, and my questions have been answered. I have discussed any challenges I see with this plan with the nurse or doctor.    ..........................................................................................................................................  Patient/Patient Representative Signature      ..........................................................................................................................................  Patient Representative Print Name and Relationship to Patient    ..................................................               ................................................  Date                                            Time    ..........................................................................................................................................  Reviewed by Signature/Title    ...................................................              ..............................................  Date                                                            Time

## 2017-04-30 LAB
BACTERIA SPEC CULT: NORMAL
Lab: NORMAL
MICRO REPORT STATUS: NORMAL
SPECIMEN SOURCE: NORMAL

## 2017-04-30 NOTE — DISCHARGE INSTRUCTIONS
Miralax twice/ day in water for 2 days.  Ibuprofen 400 mg every 6 hours as needed for pain.  Vicodin 1-2 every 6 hours as needed for pain.  Zofran 4 mg every 6 hours as needed for nausea.  Return for worsened, uncontrolled pain, fever, frequent vomiting, new or worsening symptoms.  Follow up [facility].

## 2017-05-02 LAB — INTERPRETATION ECG - MUSE: NORMAL

## 2017-05-17 ASSESSMENT — ENCOUNTER SYMPTOMS
DECREASED APPETITE: 0
SNORES LOUDLY: 0
CHILLS: 0
EYE REDNESS: 0
DECREASED LIBIDO: 1
SMELL DISTURBANCE: 0
NECK MASS: 0
WEIGHT LOSS: 1
SORE THROAT: 0
EYE IRRITATION: 0
MUSCLE CRAMPS: 1
SINUS CONGESTION: 1
HALLUCINATIONS: 0
EYE WATERING: 0
INCREASED ENERGY: 1
TROUBLE SWALLOWING: 0
DYSPNEA ON EXERTION: 0
WEIGHT GAIN: 0
MUSCLE WEAKNESS: 0
ALTERED TEMPERATURE REGULATION: 1
HOARSE VOICE: 1
INSOMNIA: 1
TASTE DISTURBANCE: 0
FEVER: 0
SHORTNESS OF BREATH: 0
HEMOPTYSIS: 0
STIFFNESS: 1
WHEEZING: 1
DEPRESSION: 1
FATIGUE: 1
DECREASED CONCENTRATION: 1
NERVOUS/ANXIOUS: 1
HOT FLASHES: 0
ARTHRALGIAS: 0
DOUBLE VISION: 0
COUGH DISTURBING SLEEP: 0
NIGHT SWEATS: 0
COUGH: 0
BACK PAIN: 1
SINUS PAIN: 0
POLYDIPSIA: 0
SPUTUM PRODUCTION: 1
EYE PAIN: 0
NECK PAIN: 0
JOINT SWELLING: 0
PANIC: 0
POSTURAL DYSPNEA: 1
MYALGIAS: 1
RESPIRATORY PAIN: 0

## 2017-05-30 ENCOUNTER — MYC MEDICAL ADVICE (OUTPATIENT)
Dept: FAMILY MEDICINE | Facility: CLINIC | Age: 57
End: 2017-05-30

## 2017-05-30 ENCOUNTER — PRE VISIT (OUTPATIENT)
Dept: CARDIOLOGY | Facility: CLINIC | Age: 57
End: 2017-05-30

## 2017-05-30 DIAGNOSIS — F33.0 MAJOR DEPRESSIVE DISORDER, RECURRENT EPISODE, MILD (H): ICD-10-CM

## 2017-05-30 RX ORDER — VENLAFAXINE HYDROCHLORIDE 225 MG/1
225 TABLET, EXTENDED RELEASE ORAL
Qty: 90 EACH | Refills: 0 | Status: SHIPPED | OUTPATIENT
Start: 2017-05-30 | End: 2018-03-01

## 2017-05-30 NOTE — TELEPHONE ENCOUNTER
Prescription approved per [facility] Refill Protocol.  Sneha Amaya RN

## 2017-05-30 NOTE — TELEPHONE ENCOUNTER
Venlafaxine ER 225mg    Last Written Prescription Date: 08/18/2016 #90 x 1  Last filled 02/23/2017  Last Office Visit with [facility], [facility] or  Health prescribing provider: 04/24/2017 DINORA Cummins        BP Readings from Last 3 Encounters:   04/29/17 (!) 138/93   04/24/17 104/62   03/17/17 (!) 152/91     Pulse: (for Fetzima)  Creatinine   Date Value Ref Range Status   04/29/2017 0.66 0.52 - 1.04 mg/dL Final   ]    Last PHQ-9 score on record=   PHQ-9 SCORE 6/27/2016   Total Score -   Total Score MyChart 2 (Minimal depression)   Total Score -

## 2017-05-31 ENCOUNTER — OFFICE VISIT (OUTPATIENT)
Dept: CARDIOLOGY | Facility: CLINIC | Age: 57
End: 2017-05-31
Attending: INTERNAL MEDICINE
Payer: COMMERCIAL

## 2017-05-31 VITALS
DIASTOLIC BLOOD PRESSURE: 68 MMHG | SYSTOLIC BLOOD PRESSURE: 107 MMHG | HEART RATE: 86 BPM | OXYGEN SATURATION: 98 % | WEIGHT: 156.6 LBS | BODY MASS INDEX: 26.73 KG/M2 | HEIGHT: 64 IN

## 2017-05-31 DIAGNOSIS — I25.10 CORONARY ARTERY DISEASE INVOLVING NATIVE CORONARY ARTERY OF NATIVE HEART WITHOUT ANGINA PECTORIS: ICD-10-CM

## 2017-05-31 DIAGNOSIS — I42.9 CARDIOMYOPATHY, UNSPECIFIED (H): ICD-10-CM

## 2017-05-31 DIAGNOSIS — E78.5 HYPERLIPIDEMIA LDL GOAL <100: ICD-10-CM

## 2017-05-31 DIAGNOSIS — I42.9 IDIOPATHIC CARDIOMYOPATHY (H): ICD-10-CM

## 2017-05-31 DIAGNOSIS — I10 ESSENTIAL HYPERTENSION WITH GOAL BLOOD PRESSURE LESS THAN 130/80: ICD-10-CM

## 2017-05-31 PROCEDURE — 99212 OFFICE O/P EST SF 10 MIN: CPT | Mod: ZF

## 2017-05-31 PROCEDURE — 99214 OFFICE O/P EST MOD 30 MIN: CPT | Mod: ZP | Performed by: INTERNAL MEDICINE

## 2017-05-31 RX ORDER — SIMVASTATIN 20 MG
20 TABLET ORAL AT BEDTIME
Qty: 90 TABLET | Refills: 3 | Status: SHIPPED | OUTPATIENT
Start: 2017-05-31 | End: 2018-06-07

## 2017-05-31 RX ORDER — HYDRALAZINE HYDROCHLORIDE 10 MG/1
10 TABLET, FILM COATED ORAL 3 TIMES DAILY
Qty: 270 TABLET | Refills: 3 | Status: SHIPPED | OUTPATIENT
Start: 2017-05-31 | End: 2018-06-07

## 2017-05-31 RX ORDER — LISINOPRIL 5 MG/1
5 TABLET ORAL DAILY
Qty: 90 TABLET | Refills: 3 | Status: SHIPPED | OUTPATIENT
Start: 2017-05-31 | End: 2018-06-07

## 2017-05-31 RX ORDER — CARVEDILOL 25 MG/1
TABLET ORAL
Qty: 180 TABLET | Refills: 3 | Status: SHIPPED | OUTPATIENT
Start: 2017-05-31 | End: 2018-06-07

## 2017-05-31 RX ORDER — ISOSORBIDE MONONITRATE 30 MG/1
30 TABLET, EXTENDED RELEASE ORAL DAILY
Qty: 90 TABLET | Refills: 3 | Status: SHIPPED | OUTPATIENT
Start: 2017-05-31 | End: 2018-03-01

## 2017-05-31 ASSESSMENT — PAIN SCALES - GENERAL: PAINLEVEL: NO PAIN (0)

## 2017-05-31 NOTE — PATIENT INSTRUCTIONS
You were seen today in the Cardiovascular Clinic at the [facility].     Cardiology Providers you saw during your visit: Dr Preston Cuadra    Diagnosis: History of hypertension and hyperlipidemia    Results: No testing at this time    Recommendations:   No changes    Follow-up: Follow up with Dr Cuadra in one year.         For emergencies call 911.    For any scheduling needs or nursing related questions, please call [phone].    Thank you for your visit today! If you have questions or concerns about today's visit, please call me.      Chet Patricio RN  RN Care Coordinator  [facility]  [phone]    Press option 1 for the [place] and then 3 for nursing related questions

## 2017-05-31 NOTE — NURSING NOTE
Chief Complaint   Patient presents with     Follow Up For     heart problem--57 year old female with history of hypertension, hyperlipidemia, FABIANA, depression and mild nonischemic cardiomyopathy presenting for follow up

## 2017-05-31 NOTE — NURSING NOTE
Med Reconcile: Reviewed and verified all current medications with the patient. The updated medication list was printed and given to the patient.    Return Appointment:  Follow up with Dr Cuadra in one year. Patient given instructions regarding scheduling next clinic visit. Patient demonstrated understanding of this information and agreed to call with further questions or concerns.    Patient stated she understood all health information given and agreed to call with further questions or concerns.    Chet Patricio RN  RN Care Coordinator  [facility]  [phone]

## 2017-05-31 NOTE — PROGRESS NOTES
May 31, 2017             ELIOT Elise, CNP    [address] [address]       RE:    Patricia Perez   MRN:  [id number]   :  1960      Dear Ms. Cummins:       It was a pleasure participating care of your patient, Ms. Patricia Perez.  As you know, she is a 57-year-old lady whom I see today for a mild nonischemic cardiomyopathy.      Her past medical history is significant for the followin.  Hypertension.   2.  Hyperlipidemia.   3.  Sleep apnea on CPAP.   4.  Depression.   5.  Gastroesophageal reflux disease.   6.  Asthma.   7.  Obesity.   8.  Status post gastric bypass surgery.      Her cardiac history is significant for a nonischemic cardiomyopathy that was mild to moderate in degree back in .  Coronary angiogram 2008 revealed the following:     Left main within normal limits.   LAD, mild disease.   Circumflex normal.   RCA 20%-30% stenosis.      Echocardiogram in  revealed an ejection fraction of 40%-45%.      I last saw her 2016.  At that time she was doing well.  She presents today for continuing care.      Since our last visit, she continues to walk for 10 minutes 2-3 times a day.  She feels that her exertional capacity has been good, and has not limited order and has not changed her decreased in any way since our last visit.  She denies any new chest pain or problems breathing.  She denies PND, orthopnea or edema.  She denies palpitations, syncope or near-syncope.  She is tolerating her medications well without gross side effects.      In terms of her present medications, she is takin.  Carvedilol 25 twice daily.   2.  Imdur 30 mg a day.   3.  Lisinopril 5 mg a day.   4.  Zocor 20 mg a day.      PHYSICAL EXAMINATION:     VITAL SIGNS:  Her blood pressure is 107/68 with a pulse 68.  Her weight is 156 pounds.   NECK:  Exam reveals normal jugular venous pressure.  No gross hepatojugular reflux is identified.   LUNGS:  Clear to  auscultation.  Respiratory effort is normal.   CARDIAC:  Reveals a regular rate and rhythm, no obvious murmur or gallop appreciated.   ABDOMEN:  Belly soft, nontender.   EXTREMITIES:  Without gross edema.      Her echocardiogram 06/29/2015 reveals an ejection fraction of 51% without gross valvular pathology, no significant change from prior.      Pharmacologic nuclear stress test 09/12/2012 reveals no evidence for stress-induced ischemia.      On 04/25/2017, potassium 5.2, GFR normal.      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        IMPRESSION:       Patricia is a 57-year-old lady whose cardiac history is significant for a mild nonischemic cardiomyopathy.  Back in 2008, her coronary angiogram revealed only mild nonobstructive coronary disease.  In 2009, her ejection fraction was in the 40%-45% range.  With medical therapy, her ejection fraction has improved to the 51% range as of echo 06/29/2016.      Currently, from a clinical standpoint, she does not manifest any objective or subjective evidence for congestive heart failure and appears clinically stable.        PLAN:     1.  Continue present medications at present doses.     2.  Renew medications and follow up in 1 year or earlier if needed.      Once again, it was a pleasure participating care of your patient, Mr. Patricia Perez.  Please feel free to contact me anytime if you have any questions regarding her care in the future.      Sincerely,         Preston Cuadra MD       Addendum 12/8/20:    Lipids elevated  in context of known CAD    Plan:    1.  Discontinue simvistatin    2.  Start Lipitor 40mg po at bedtime    3.  Schedule virtual video visit Dec 16th, 2020

## 2017-05-31 NOTE — LETTER
2017      RE: Patricia Perez  [address]    [facility] [address]       Dear Colleague,    Thank you for the opportunity to participate in the care of your patient, Patricia Perez, at the [facility]. Please see a copy of my visit note below.    May 31, 2017             ELIOT Elise, CNP    [facility]   [address] [place] [address]       RE:    Patricia Perez   MRN:  [id number]   :  1960      Dear Ms. Cummins:       It was a pleasure participating care of your patient, Ms. Patricia Perez.  As you know, she is a 57-year-old lady whom I see today for a mild nonischemic cardiomyopathy.      Her past medical history is significant for the followin.  Hypertension.   2.  Hyperlipidemia.   3.  Sleep apnea on CPAP.   4.  Depression.   5.  Gastroesophageal reflux disease.   6.  Asthma.   7.  Obesity.   8.  Status post gastric bypass surgery.      Her cardiac history is significant for a nonischemic cardiomyopathy that was mild to moderate in degree back in .  Coronary angiogram 2008 revealed the following:     Left main within normal limits.   LAD, mild disease.   Circumflex normal.   RCA 20%-30% stenosis.      Echocardiogram in  revealed an ejection fraction of 40%-45%.      I last saw her 2016.  At that time she was doing well.  She presents today for continuing care.      Since our last visit, she continues to walk for 10 minutes 2-3 times a day.  She feels that her exertional capacity has been good, and has not limited order and has not changed her decreased in any way since our last visit.  She denies any new chest pain or problems breathing.  She denies PND, orthopnea or edema.  She denies palpitations, syncope or near-syncope.  She is tolerating her medications well without gross side effects.      In terms of her present medications, she is takin.  Carvedilol 25  twice daily.   2.  Imdur 30 mg a day.   3.  Lisinopril 5 mg a day.   4.  Zocor 20 mg a day.      PHYSICAL EXAMINATION:     VITAL SIGNS:  Her blood pressure is 107/68 with a pulse 68.  Her weight is 156 pounds.   NECK:  Exam reveals normal jugular venous pressure.  No gross hepatojugular reflux is identified.   LUNGS:  Clear to auscultation.  Respiratory effort is normal.   CARDIAC:  Reveals a regular rate and rhythm, no obvious murmur or gallop appreciated.   ABDOMEN:  Belly soft, nontender.   EXTREMITIES:  Without gross edema.      Her echocardiogram 06/29/2015 reveals an ejection fraction of 51% without gross valvular pathology, no significant change from prior.      Pharmacologic nuclear stress test 09/12/2012 reveals no evidence for stress-induced ischemia.      On 04/25/2017, potassium 5.2, GFR normal.      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        IMPRESSION:       Patricia is a 57-year-old lady whose cardiac history is significant for a mild nonischemic cardiomyopathy.  Back in 2008, her coronary angiogram revealed only mild nonobstructive coronary disease.  In 2009, her ejection fraction was in the 40%-45% range.  With medical therapy, her ejection fraction has improved to the 51% range as of echo 06/29/2016.      Currently, from a clinical standpoint, she does not manifest any objective or subjective evidence for congestive heart failure and appears clinically stable.        PLAN:     1.  Continue present medications at present doses.     2.  Renew medications and follow up in 1 year or earlier if needed.      Once again, it was a pleasure participating care of your patient, Mr. Patricia Perez.  Please feel free to contact me anytime if you have any questions regarding her care in the future.      Sincerely,         Preston Cuadra MD

## 2017-05-31 NOTE — MR AVS SNAPSHOT
After Visit Summary   5/31/2017    Patricia Perez    MRN: [id number]           Patient Information     Date Of Birth          1960        Visit Information        Provider Department      5/31/2017 7:30 AM Preston Cuadra MD [facility]        Today's Diagnoses     Cardiomyopathy, unspecified (H)          Care Instructions    You were seen today in the Cardiovascular Clinic at the [facility].     Cardiology Providers you saw during your visit: Dr Preston Cuadra    Diagnosis: History of hypertension and hyperlipidemia    Results: No testing at this time    Recommendations:   No changes    Follow-up: Follow up with Dr Cuadra in one year.         For emergencies call 911.    For any scheduling needs or nursing related questions, please call [phone].    Thank you for your visit today! If you have questions or concerns about today's visit, please call me.      Chet Patricio RN  RN Care Coordinator  [facility]  [phone]    Press option 1 for the [facility] and then 3 for nursing related questions                  Follow-ups after your visit        Who to contact     If you have questions or need follow up information about today's clinic visit or your schedule please contact [facility] directly at [phone].  Normal or non-critical lab and imaging results will be communicated to you by [employer], letter or phone within 4 business days after the clinic has received the results. If you do not hear from us within 7 days, please contact the clinic through [employer] or phone. If you have a critical or abnormal lab result, we will notify you by phone as soon as possible.  Submit refill requests through [employer] or call your pharmacy and they will forward the refill request to us. Please allow 3 business days for your refill to be completed.          Additional Information About Your Visit        [employer] Information     [employer] gives you secure  "access to your electronic health record. If you see a primary care provider, you can also send messages to your care team and make appointments. If you have questions, please call your primary care clinic.  If you do not have a primary care provider, please call [phone] and they will assist you.        Care EveryWhere ID     This is your Care EveryWhere ID. This could be used by other organizations to access your [place] medical records  [id number]        Your Vitals Were     Pulse Height Last Period Pulse Oximetry BMI (Body Mass Index)       86 1.613 m (5' 3.5\") 06/01/2012 98% 27.31 kg/m2        Blood Pressure from Last 3 Encounters:   05/31/17 107/68   04/29/17 (!) 138/93   04/24/17 104/62    Weight from Last 3 Encounters:   05/31/17 71 kg (156 lb 9.6 oz)   04/24/17 71 kg (156 lb 9.6 oz)   03/17/17 71.7 kg (158 lb)              Today, you had the following     No orders found for display       Primary Care Provider Office Phone # Fax #    ELIOT Rodriguez [phone]       [facility] [address] [facility] [address] [facility] [address]        Thank you!     Thank you for choosing [facility]  for your care. Our goal is always to provide you with excellent care. Hearing back from our patients is one way we can continue to improve our services. Please take a few minutes to complete the written survey that you may receive in the mail after your visit with us. Thank you!             Your Updated Medication List - Protect others around you: Learn how to safely use, store and throw away your medicines at www.disposemymeds.org.          This list is accurate as of: 5/31/17  8:00 AM.  Always use your most recent med list.                   Brand Name Dispense Instructions for use    albuterol 108 (90 BASE) MCG/ACT Inhaler    PROAIR HFA/PROVENTIL HFA/VENTOLIN HFA    1 Inhaler    Inhale 2 puffs into the lungs every 6 hours as needed for shortness of breath / dyspnea or wheezing    "    amoxicillin-clavulanate 875-125 MG per tablet    AUGMENTIN    20 tablet    Take 1 tablet by mouth 2 times daily Take with food       * amphetamine-dextroamphetamine 25 MG 24 hr capsule    ADDERALL XR    30 capsule    Take 1 capsule (25 mg) by mouth daily       * amphetamine-dextroamphetamine 25 MG 24 hr capsule    ADDERALL XR    30 capsule    Take 1 capsule (25 mg) by mouth daily       * amphetamine-dextroamphetamine 25 MG 24 hr capsule    ADDERALL XR    30 capsule    Take 1 capsule (25 mg) by mouth daily       benzonatate 200 MG capsule    TESSALON    21 capsule    Take 1 capsule (200 mg) by mouth 3 times daily as needed for cough       blood glucose monitoring test strip    no brand specified    1 Box    Use to test blood sugar 1-2 times daily or as directed.       calcium carbonate-vitamin D 600-400 MG-UNIT Chew    CALTRATE 600+D    180 tablet    Take 1 chew tab by mouth 2 times daily       CALCIUM CITRATE + D PO      Take  by mouth daily.       carvedilol 25 MG tablet    COREG    180 tablet    TAKE 1 TABLET(25 MG) BY MOUTH TWICE DAILY WITH MEALS       COMFORT LANCETS Misc     1 Box    1 Device 2 times daily as needed       estrogen (conjugated)-medroxyPROGESTERone 0.625-5 MG per tablet    PREMPRO    90 tablet    Take 1 tablet by mouth daily       guaiFENesin-codeine 100-10 MG/5ML Soln solution    ROBITUSSIN AC    120 mL    Take 5 mLs by mouth every 4 hours as needed for cough       hydrALAZINE 10 MG tablet    APRESOLINE    270 tablet    Take 1 tablet (10 mg) by mouth 3 times daily       HYDROcodone-acetaminophen 5-325 MG per tablet    NORCO    16 tablet    Take 1-2 tablets by mouth every 6 hours as needed for moderate to severe pain       isosorbide mononitrate 30 MG 24 hr tablet    IMDUR    90 tablet    Take 1 tablet (30 mg) by mouth daily       lisinopril 5 MG tablet    PRINIVIL/ZESTRIL    90 tablet    Take 1 tablet (5 mg) by mouth daily       meclizine 25 MG tablet    ANTIVERT    30 tablet    Take 1 tablet  (25 mg) by mouth 3 times daily as needed       Multi-vitamin Tabs tablet   Generic drug:  multivitamin, therapeutic with minerals     100 tablet    Take 1 tablet by mouth daily.       ondansetron 4 MG ODT tab    ZOFRAN ODT    10 tablet    Take 1 tablet (4 mg) by mouth every 8 hours as needed for nausea       predniSONE 20 MG tablet    DELTASONE    10 tablet    Take 1 tablet by mouth 2 times daily. For Yellow or Red zone on Asthma Action Plan.       sertraline 100 MG tablet    ZOLOFT    180 tablet    Take 2 tablets daily  DUE for yearly office visit in June 2017       simvastatin 20 MG tablet    ZOCOR    90 tablet    Take 1 tablet (20 mg) by mouth At Bedtime       venlafaxine 225 MG Tb24 24 hr tablet    EFFEXOR-ER    90 each    Take 1 tablet (225 mg) by mouth daily (with breakfast)       VITAMIN B-12 SL      Place  under the tongue daily.       * Notice:  This list has 3 medication(s) that are the same as other medications prescribed for you. Read the directions carefully, and ask your doctor or other care provider to review them with you.

## 2017-06-08 ENCOUNTER — MYC MEDICAL ADVICE (OUTPATIENT)
Dept: FAMILY MEDICINE | Facility: CLINIC | Age: 57
End: 2017-06-08

## 2017-06-08 DIAGNOSIS — F98.8 ATTENTION DEFICIT DISORDER OF ADULT: ICD-10-CM

## 2017-06-08 RX ORDER — DEXTROAMPHETAMINE SACCHARATE, AMPHETAMINE ASPARTATE MONOHYDRATE, DEXTROAMPHETAMINE SULFATE AND AMPHETAMINE SULFATE 6.25; 6.25; 6.25; 6.25 MG/1; MG/1; MG/1; MG/1
25 CAPSULE, EXTENDED RELEASE ORAL DAILY
Qty: 30 CAPSULE | Refills: 0 | Status: SHIPPED | OUTPATIENT
Start: 2017-07-07 | End: 2017-08-31

## 2017-06-08 RX ORDER — DEXTROAMPHETAMINE SACCHARATE, AMPHETAMINE ASPARTATE MONOHYDRATE, DEXTROAMPHETAMINE SULFATE AND AMPHETAMINE SULFATE 6.25; 6.25; 6.25; 6.25 MG/1; MG/1; MG/1; MG/1
25 CAPSULE, EXTENDED RELEASE ORAL DAILY
Qty: 30 CAPSULE | Refills: 0 | Status: SHIPPED | OUTPATIENT
Start: 2017-06-08 | End: 2017-08-31

## 2017-06-08 RX ORDER — DEXTROAMPHETAMINE SACCHARATE, AMPHETAMINE ASPARTATE MONOHYDRATE, DEXTROAMPHETAMINE SULFATE AND AMPHETAMINE SULFATE 6.25; 6.25; 6.25; 6.25 MG/1; MG/1; MG/1; MG/1
25 CAPSULE, EXTENDED RELEASE ORAL DAILY
Qty: 30 CAPSULE | Refills: 0 | Status: SHIPPED | OUTPATIENT
Start: 2017-08-07 | End: 2017-08-31

## 2017-06-22 ENCOUNTER — OFFICE VISIT (OUTPATIENT)
Dept: FAMILY MEDICINE | Facility: CLINIC | Age: 57
End: 2017-06-22
Payer: COMMERCIAL

## 2017-06-22 VITALS
SYSTOLIC BLOOD PRESSURE: 118 MMHG | HEIGHT: 63 IN | HEART RATE: 68 BPM | TEMPERATURE: 98.1 F | DIASTOLIC BLOOD PRESSURE: 70 MMHG | BODY MASS INDEX: 28.14 KG/M2 | WEIGHT: 158.8 LBS

## 2017-06-22 DIAGNOSIS — F43.21 ADJUSTMENT DISORDER WITH DEPRESSED MOOD: ICD-10-CM

## 2017-06-22 DIAGNOSIS — I10 HYPERTENSION GOAL BP (BLOOD PRESSURE) < 130/80: ICD-10-CM

## 2017-06-22 DIAGNOSIS — F98.8 ATTENTION DEFICIT DISORDER OF ADULT: ICD-10-CM

## 2017-06-22 DIAGNOSIS — F33.0 MAJOR DEPRESSIVE DISORDER, RECURRENT EPISODE, MILD (H): ICD-10-CM

## 2017-06-22 DIAGNOSIS — I42.9 IDIOPATHIC CARDIOMYOPATHY (H): ICD-10-CM

## 2017-06-22 DIAGNOSIS — K21.9 GASTROESOPHAGEAL REFLUX DISEASE WITHOUT ESOPHAGITIS: ICD-10-CM

## 2017-06-22 DIAGNOSIS — H91.93 DECREASED HEARING OF BOTH EARS: ICD-10-CM

## 2017-06-22 DIAGNOSIS — H93.13 RINGING IN EARS, BILATERAL: ICD-10-CM

## 2017-06-22 DIAGNOSIS — Z00.00 ROUTINE GENERAL MEDICAL EXAMINATION AT A HEALTH CARE FACILITY: Primary | ICD-10-CM

## 2017-06-22 DIAGNOSIS — E78.5 HYPERLIPIDEMIA LDL GOAL <100: ICD-10-CM

## 2017-06-22 DIAGNOSIS — N95.1 MENOPAUSAL SYNDROME (HOT FLASHES): ICD-10-CM

## 2017-06-22 LAB
ANION GAP SERPL CALCULATED.3IONS-SCNC: 5 MMOL/L (ref 3–14)
BUN SERPL-MCNC: 23 MG/DL (ref 7–30)
CALCIUM SERPL-MCNC: 8.9 MG/DL (ref 8.5–10.1)
CHLORIDE SERPL-SCNC: 107 MMOL/L (ref 94–109)
CHOLEST SERPL-MCNC: 267 MG/DL
CO2 SERPL-SCNC: 27 MMOL/L (ref 20–32)
CREAT SERPL-MCNC: 0.66 MG/DL (ref 0.52–1.04)
CREAT UR-MCNC: 83 MG/DL
GFR SERPL CREATININE-BSD FRML MDRD: NORMAL ML/MIN/1.7M2
GLUCOSE SERPL-MCNC: 91 MG/DL (ref 70–99)
HDLC SERPL-MCNC: 116 MG/DL
LDLC SERPL CALC-MCNC: 136 MG/DL
MICROALBUMIN UR-MCNC: 6 MG/L
MICROALBUMIN/CREAT UR: 6.95 MG/G CR (ref 0–25)
NONHDLC SERPL-MCNC: 151 MG/DL
POTASSIUM SERPL-SCNC: 4.4 MMOL/L (ref 3.4–5.3)
SODIUM SERPL-SCNC: 139 MMOL/L (ref 133–144)
TRIGL SERPL-MCNC: 73 MG/DL

## 2017-06-22 PROCEDURE — 82043 UR ALBUMIN QUANTITATIVE: CPT | Performed by: NURSE PRACTITIONER

## 2017-06-22 PROCEDURE — 80061 LIPID PANEL: CPT | Performed by: NURSE PRACTITIONER

## 2017-06-22 PROCEDURE — 80048 BASIC METABOLIC PNL TOTAL CA: CPT | Performed by: NURSE PRACTITIONER

## 2017-06-22 PROCEDURE — 99396 PREV VISIT EST AGE 40-64: CPT | Performed by: NURSE PRACTITIONER

## 2017-06-22 PROCEDURE — 36415 COLL VENOUS BLD VENIPUNCTURE: CPT | Performed by: NURSE PRACTITIONER

## 2017-06-22 RX ORDER — VENLAFAXINE HYDROCHLORIDE 150 MG/1
150 CAPSULE, EXTENDED RELEASE ORAL DAILY
Qty: 90 CAPSULE | Refills: 1 | Status: SHIPPED | OUTPATIENT
Start: 2017-06-22 | End: 2017-12-03

## 2017-06-22 ASSESSMENT — ANXIETY QUESTIONNAIRES
6. BECOMING EASILY ANNOYED OR IRRITABLE: NOT AT ALL
3. WORRYING TOO MUCH ABOUT DIFFERENT THINGS: SEVERAL DAYS
GAD7 TOTAL SCORE: 5
5. BEING SO RESTLESS THAT IT IS HARD TO SIT STILL: SEVERAL DAYS
7. FEELING AFRAID AS IF SOMETHING AWFUL MIGHT HAPPEN: NOT AT ALL
1. FEELING NERVOUS, ANXIOUS, OR ON EDGE: SEVERAL DAYS
2. NOT BEING ABLE TO STOP OR CONTROL WORRYING: SEVERAL DAYS

## 2017-06-22 ASSESSMENT — PATIENT HEALTH QUESTIONNAIRE - PHQ9: 5. POOR APPETITE OR OVEREATING: SEVERAL DAYS

## 2017-06-22 NOTE — MR AVS SNAPSHOT
After Visit Summary   6/22/2017    Patricia Perez    MRN: [id number]           Patient Information     Date Of Birth          1960        Visit Information        Provider Department      6/22/2017 7:40 AM Blanche Cummins APRN [facility]        Today's Diagnoses     Routine general medical examination at a health care facility    -  1    Idiopathic cardiomyopathy (H)        Hypertension goal BP (blood pressure) < 130/80        Hyperlipidemia LDL goal <100        Gastroesophageal reflux disease without esophagitis        Attention deficit disorder of adult        Menopausal syndrome (hot flashes)        Major depressive disorder, recurrent episode, mild (H)        Adjustment disorder with depressed mood        Decreased hearing of both ears        Ringing in ears, bilateral          Care Instructions      Preventive Health Recommendations  Female Ages 50 - 64    Yearly exam: See your health care provider every year in order to  o Review health changes.   o Discuss preventive care.    o Review your medicines if your doctor has prescribed any.      Get a Pap test every three years (unless you have an abnormal result and your provider advises testing more often).    If you get Pap tests with HPV test, you only need to test every 5 years, unless you have an abnormal result.     You do not need a Pap test if your uterus was removed (hysterectomy) and you have not had cancer.    You should be tested each year for STDs (sexually transmitted diseases) if you're at risk.     Have a mammogram every 1 to 2 years.    Have a colonoscopy at age 50, or have a yearly FIT test (stool test). These exams screen for colon cancer.      Have a cholesterol test every 5 years, or more often if advised.    Have a diabetes test (fasting glucose) every three years. If you are at risk for diabetes, you should have this test more often.     If you are at risk for osteoporosis (brittle bone  disease), think about having a bone density scan (DEXA).    Shots: Get a flu shot each year. Get a tetanus shot every 10 years.    Nutrition:     Eat at least 5 servings of fruits and vegetables each day.    Eat whole-grain bread, whole-wheat pasta and brown rice instead of white grains and rice.    Talk to your provider about Calcium and Vitamin D.     Lifestyle    Exercise at least 150 minutes a week (30 minutes a day, 5 days a week). This will help you control your weight and prevent disease.    Limit alcohol to one drink per day.    No smoking.     Wear sunscreen to prevent skin cancer.     See your dentist every six months for an exam and cleaning.    See your eye doctor every 1 to 2 years.      Did decrease the Effexor to  150 mg   Start to alternate the  225 mg - that you already have. And then once you are feeling comfortable you can then go down to the    150 mg dose daily     Did decrease the  Prempro. To  4 mg     Stay well hydrated - urine should be clear.      Exercise 30-60 min daily     For the constipation try adding in Benefiber     Get your ear rechecked  For hearing  Then see ENT            Follow-ups after your visit        Additional Services     AUDIOLOGY ADULT REFERRAL       [facility] - [place] [phone]   [email or web address]    Specialty Testing:  Audiogram w/Tymps and Reflexes (Comprehensive Audiology Evaluation)            OTOLARYNGOLOGY REFERRAL       Your provider has referred you to: [facility]: Adult Ear, Nose and Throat Clinic (Otolaryngology) - [place] [phone]  [email or web address]    Please be aware that coverage of these services is subject to the terms and limitations of your health insurance plan.  Call member services at your health plan with any benefit or coverage questions.      Please bring  "the following with you to your appointment:    (1) Any X-Rays, CTs or MRIs which have been performed.  Contact the facility where they were done to arrange for  prior to your scheduled appointment.   (2) List of current medications  (3) This referral request   (4) Any documents/labs given to you for this referral                  Who to contact     Normal or non-critical lab and imaging results will be communicated to you by ConnectAndSellhart, letter or phone within 4 business days after the clinic has received the results. If you do not hear from us within 7 days, please contact the clinic through ConnectAndSell[employer] or phone. If you have a critical or abnormal lab result, we will notify you by phone as soon as possible.  Submit refill requests through MobiApps or call your pharmacy and they will forward the refill request to us. Please allow 3 business days for your refill to be completed.          If you need to speak with a  for additional information , please call: [phone]           Additional Information About Your Visit        MobiApps Information     MobiApps gives you secure access to your electronic health record. If you see a primary care provider, you can also send messages to your care team and make appointments. If you have questions, please call your primary care clinic.  If you do not have a primary care provider, please call [phone] and they will assist you.        Care EveryWhere ID     This is your Care EveryWhere ID. This could be used by other organizations to access your [place] medical records  [id number]        Your Vitals Were     Pulse Temperature Height Last Period BMI (Body Mass Index)       68 98.1  F (36.7  C) (Tympanic) 5' 3\" (1.6 m) 06/01/2012 28.13 kg/m2        Blood Pressure from Last 3 Encounters:   06/22/17 118/70   05/31/17 107/68   04/29/17 (!) 138/93    Weight from Last 3 Encounters:   06/22/17 158 lb 12.8 oz (72 kg)   05/31/17 156 lb 9.6 oz (71 kg)   04/24/17 156 " lb 9.6 oz (71 kg)              We Performed the Following     Albumin Random Urine Quantitative     AUDIOLOGY ADULT REFERRAL     Basic metabolic panel  (Ca, Cl, CO2, Creat, Gluc, K, Na, BUN)     DEPRESSION ACTION PLAN (DAP)     HEART FAILURE ACTION PLAN     Lipid panel reflex to direct LDL     OTOLARYNGOLOGY REFERRAL          Today's Medication Changes          These changes are accurate as of: 6/22/17  8:27 AM.  If you have any questions, ask your nurse or doctor.               Start taking these medicines.        Dose/Directions    estrogen conj-medroxyPROGESTERone 0.45-1.5 MG per tablet   Commonly known as:  Prempro   Used for:  Menopausal syndrome (hot flashes)   Replaces:  estrogen (conjugated)-medroxyPROGESTERone 0.625-5 MG per tablet   Started by:  Blanche Cummins APRN CNP        Dose:  1 tablet   Take 1 tablet by mouth daily   Quantity:  90 tablet   Refills:  3         These medicines have changed or have updated prescriptions.        Dose/Directions    * venlafaxine 225 MG Tb24 24 hr tablet   Commonly known as:  EFFEXOR-ER   This may have changed:  Another medication with the same name was added. Make sure you understand how and when to take each.   Used for:  Major depressive disorder, recurrent episode, mild (H)   Changed by:  Blanche Cummins APRN CNP        Dose:  225 mg   Take 1 tablet (225 mg) by mouth daily (with breakfast)   Quantity:  90 each   Refills:  0       * venlafaxine 150 MG 24 hr capsule   Commonly known as:  EFFEXOR-XR   This may have changed:  You were already taking a medication with the same name, and this prescription was added. Make sure you understand how and when to take each.   Used for:  Adjustment disorder with depressed mood   Changed by:  Blanche Cummins APRN CNP        Dose:  150 mg   Take 1 capsule (150 mg) by mouth daily   Quantity:  90 capsule   Refills:  1       * Notice:  This list has 2 medication(s) that are the same as other medications  prescribed for you. Read the directions carefully, and ask your doctor or other care provider to review them with you.      Stop taking these medicines if you haven't already. Please contact your care team if you have questions.     estrogen (conjugated)-medroxyPROGESTERone 0.625-5 MG per tablet   Commonly known as:  PREMPRO   Replaced by:  estrogen conj-medroxyPROGESTERone 0.45-1.5 MG per tablet   Stopped by:  Blanche Cummins, ELIOT HERNANDEZ                Where to get your medicines      These medications were sent to [id number] - [address]  AT [address] [facility] [address]     Phone:  [phone]     estrogen conj-medroxyPROGESTERone 0.45-1.5 MG per tablet    venlafaxine 150 MG 24 hr capsule                Primary Care Provider Office Phone # Fax #    ELIOT Rodriguez [phone]       [facility] [address] [place] [facility] [address]        Equal Access to Services     [facility] Center[facility]AVEEN : Hadii aad ku hadasho Soomaali, waaxda luqadaha, qaybta kaalmada adeegyada, danilo euceda . So [facility] [phone].    ATENCIÓN: Si habla español, tiene a ortiz disposición servicios gratuitos de asistencia lingüística. Rosenda al [phone].    We comply with applicable federal civil rights laws and [place] laws. We do not discriminate on the basis of race, color, national origin, age, disability sex, sexual orientation or gender identity.            Thank you!     Thank you for choosing [facility]  for your care. Our goal is always to provide you with excellent care. Hearing back from our patients is one way we can continue to improve our services. Please take a few minutes to complete the written survey that you may receive in the mail after your visit with us. Thank you!             Your Updated Medication List - Protect others around you: Learn how to safely use,  store and throw away your medicines at www.disposemymeds.org.          This list is accurate as of: 6/22/17  8:27 AM.  Always use your most recent med list.                   Brand Name Dispense Instructions for use Diagnosis    albuterol 108 (90 BASE) MCG/ACT Inhaler    PROAIR HFA/PROVENTIL HFA/VENTOLIN HFA    1 Inhaler    Inhale 2 puffs into the lungs every 6 hours as needed for shortness of breath / dyspnea or wheezing    Mild persistent asthma, uncomplicated       * amphetamine-dextroamphetamine 25 MG 24 hr capsule    ADDERALL XR    30 capsule    Take 1 capsule (25 mg) by mouth daily    Attention deficit disorder of adult       * amphetamine-dextroamphetamine 25 MG 24 hr capsule   Start taking on:  7/7/2017    ADDERALL XR    30 capsule    Take 1 capsule (25 mg) by mouth daily    Attention deficit disorder of adult       * amphetamine-dextroamphetamine 25 MG 24 hr capsule   Start taking on:  8/7/2017    ADDERALL XR    30 capsule    Take 1 capsule (25 mg) by mouth daily    Attention deficit disorder of adult       blood glucose monitoring test strip    no brand specified    1 Box    Use to test blood sugar 1-2 times daily or as directed.    Hypoglycemia       calcium carbonate-vitamin D 600-400 MG-UNIT Chew    CALTRATE 600+D    180 tablet    Take 1 chew tab by mouth 2 times daily    Hypovitaminosis D, Hx of gastric bypass       carvedilol 25 MG tablet    COREG    180 tablet    TAKE 1 TABLET(25 MG) BY MOUTH TWICE DAILY WITH MEALS    Cardiomyopathy, unspecified (H)       COMFORT LANCETS Misc     1 Box    1 Device 2 times daily as needed    Hypoglycemia       estrogen conj-medroxyPROGESTERone 0.45-1.5 MG per tablet    Prempro    90 tablet    Take 1 tablet by mouth daily    Menopausal syndrome (hot flashes)       hydrALAZINE 10 MG tablet    APRESOLINE    270 tablet    Take 1 tablet (10 mg) by mouth 3 times daily    Idiopathic cardiomyopathy (H), Essential hypertension with goal blood pressure less than 130/80        HYDROcodone-acetaminophen 5-325 MG per tablet    NORCO    16 tablet    Take 1-2 tablets by mouth every 6 hours as needed for moderate to severe pain        isosorbide mononitrate 30 MG 24 hr tablet    IMDUR    90 tablet    Take 1 tablet (30 mg) by mouth daily    Essential hypertension with goal blood pressure less than 130/80       lisinopril 5 MG tablet    PRINIVIL/ZESTRIL    90 tablet    Take 1 tablet (5 mg) by mouth daily    Coronary artery disease involving native coronary artery of native heart without angina pectoris       meclizine 25 MG tablet    ANTIVERT    30 tablet    Take 1 tablet (25 mg) by mouth 3 times daily as needed    Labyrinthitis, unspecified       Multi-vitamin Tabs tablet   Generic drug:  multivitamin, therapeutic with minerals     100 tablet    Take 1 tablet by mouth daily.    Gastric bypass status for obesity       ondansetron 4 MG ODT tab    ZOFRAN ODT    10 tablet    Take 1 tablet (4 mg) by mouth every 8 hours as needed for nausea        sertraline 100 MG tablet    ZOLOFT    180 tablet    Take 2 tablets daily  DUE for yearly office visit in June 2017    Major depressive disorder, recurrent episode, mild (H)       simvastatin 20 MG tablet    ZOCOR    90 tablet    Take 1 tablet (20 mg) by mouth At Bedtime    Hyperlipidemia LDL goal <100       * venlafaxine 225 MG Tb24 24 hr tablet    EFFEXOR-ER    90 each    Take 1 tablet (225 mg) by mouth daily (with breakfast)    Major depressive disorder, recurrent episode, mild (H)       * venlafaxine 150 MG 24 hr capsule    EFFEXOR-XR    90 capsule    Take 1 capsule (150 mg) by mouth daily    Adjustment disorder with depressed mood       VITAMIN B-12 SL      Place  under the tongue daily.        * Notice:  This list has 5 medication(s) that are the same as other medications prescribed for you. Read the directions carefully, and ask your doctor or other care provider to review them with you.

## 2017-06-22 NOTE — NURSING NOTE
"Chief Complaint   Patient presents with     Physical     pt is fasting       Initial /70 (BP Location: Left arm, Patient Position: Chair, Cuff Size: Adult Regular)  Pulse 68  Temp 98.1  F (36.7  C) (Tympanic)  Ht 5' 3\" (1.6 m)  Wt 158 lb 12.8 oz (72 kg)  LMP 06/01/2012  BMI 28.13 kg/m2 Estimated body mass index is 28.13 kg/(m^2) as calculated from the following:    Height as of this encounter: 5' 3\" (1.6 m).    Weight as of this encounter: 158 lb 12.8 oz (72 kg).  Medication Reconciliation: complete     April JUSTIN Marte      "

## 2017-06-22 NOTE — LETTER
My Heart Failure Action Plan   Name: Patricia Perez    YOB: 1960   Date: 6/22/2017    My doctor: Blanche Cummins     [address] [id number] [phone]  My Diagnosis:    My Ejection Fraction:     My Exercise Goal: 30 minutes daily  .     My Weight Goal:   Wt Readings from Last 2 Encounters:   05/31/17 156 lb 9.6 oz (71 kg)   04/24/17 156 lb 9.6 oz (71 kg)     Weigh yourself daily using the same scale. If you gain more than 2 pounds in 24 hours or 5 pounds in a week     My Diet Goal:     Emergency Room Visits:    Our goal is to improve your quality of life and help you avoid a visit to the emergency room or hospital.  If we work together, we can achieve this goal. But, if you feel you need to call 911 or go to the emergency room, please do so.  If you go to the emergency room, please bring your list of medicines and your daily weight chart with you.       GREEN ZONE     Doing well today    Weight gained is no more than 2 pounds a day or 5 pounds a week.    No swelling in feet, ankles, legs or stomach.    No more swelling than usual.    No more trouble breathing than usual.    No change in my sleep.    No other problems. Actions:    I am doing fine.  I will take my medicine, follow my diet, see my doctor, exercise, and watch for symptoms.           YELLOW ZONE         Having a bad day or flare up    Weight gain of more than 2 pounds in one day or 5 pounds in one week.    New swelling in ankle, leg, knee or thigh.    Bloating in belly, pants feel tighter.    Swelling in hands or face.    Coughing or trouble breathing while walking or talking.    Harder to breathe last night.    Have trouble sleeping, wake up short of breath.    Much more tired than usual.    Not eating.    Pain in my chest or bad leg cramps.    Feel weak or dizzy. Actions:    I need to take action and call my doctor or nurse today.                 RED ZONE         Need  medical care now    Weight gain of 5 pounds overnight.    Chest pain or pressure that does not go away.    Feel less alert.    Wheezing or have trouble breathing when at rest.    Cannot sleep lying down.    Cannot take my water pill.    Pass out or faint. Actions:    I need to call my doctor or nurse now!    Call 911 if I have chest pain or cannot breathe.

## 2017-06-22 NOTE — PROGRESS NOTES
SUBJECTIVE:     CC: Patricia Perez is an 57 year old woman who presents for preventive health visit.   Answers for HPI/ROS submitted by the patient on 6/19/2017   Annual Exam:  Getting at least 3 servings of Calcium per day:: Yes  Bi-annual eye exam:: Yes  Dental care twice a year:: Yes  Sleep apnea or symptoms of sleep apnea:: Daytime drowsiness  Diet:: Regular (no restrictions)  Frequency of exercise:: 1 day/week  Taking medications regularly:: Yes  Medication side effects:: Not applicable  Additional concerns today:: YES  PHQ-2 Score: 2  Duration of exercise:: Less than 15 minutes        Today's PHQ-2 Score:   PHQ-2 ( 1999 [employer]) 6/19/2017 6/15/2016   Q1: Little interest or pleasure in doing things 1 0   Q2: Feeling down, depressed or hopeless 1 1   PHQ-2 Score 2 1   Q1: Little interest or pleasure in doing things Several days -   Q2: Feeling down, depressed or hopeless Several days -   PHQ-2 Score 2 -       Abuse: Current or Past(Physical, Sexual or Emotional)- No  Do you feel safe in your environment - Yes    Social History   Substance Use Topics     Smoking status: Never Smoker     Smokeless tobacco: Never Used     Alcohol use No     The patient does not drink >3 drinks per day nor >7 drinks per week.    Recent Labs   Lab Test  06/15/16   0948  06/29/15   0940  06/24/14   0945  11/20/13   1240   CHOL  272*   --   224*  270*   HDL  107   --   96  103   LDL  145*  148*  112  145*   TRIG  98   --   82  109   CHOLHDLRATIO   --    --   2.0  2.6   NHDL  165*   --    --    --        Reviewed orders with patient.  Reviewed health maintenance and updated orders accordingly - Yes    Mammo Decision Support:  Patient over age 50, mutual decision to screen reflected in health maintenance.    Pertinent mammograms are reviewed under the imaging tab.  History of abnormal Pap smear: NO - age 30-65 PAP every 5 years with negative HPV co-testing recommended    Reviewed and updated as needed this visit by clinical  staff  Tobacco  Allergies  Meds  Med Hx  Surg Hx  Fam Hx  Soc Hx      Reviewed and updated as needed this visit by Provider  Allergies  Meds            ROS:  C: NEGATIVE for fever, chills, change in weight  I: NEGATIVE for worrisome rashes, moles or lesions  EYES: NEGATIVE for vision changes or irritation and POSITIVE for corrected vision and current with eye exam   ENT: NEGATIVE for ear, mouth and throat problems and POSITIVE for decreased hearing   R: NEGATIVE for significant cough or SOB  B: NEGATIVE for masses, tenderness or discharge  BREAST: NEGATIVE for masses, tenderness or discharge  CV: NEGATIVE for chest pain, palpitations or peripheral edema and is current with cardiology   GI: NEGATIVE for nausea, abdominal pain, heartburn, or change in bowel habits - does have  Some constipation    menopausal female: no unusual urinary symptoms, no unusual vaginal symptoms and does have decreased sex drive   M: NEGATIVE for significant arthralgias or myalgia  NEURO: NEGATIVE for weakness, dizziness or paresthesias  ENDOCRINE: NEGATIVE for temperature intolerance, skin/hair changes  H: NEGATIVE for bleeding problems  PSYCHIATRIC: NEGATIVE for changes in mood or affect and depression and anxiety are controlled      Problem list, Medication list, Allergies, and Medical/Social/Surgical histories reviewed in [facility] and updated as appropriate.  Labs reviewed in [facility]  BP Readings from Last 3 Encounters:   06/22/17 118/70   05/31/17 107/68   04/29/17 (!) 138/93    Wt Readings from Last 3 Encounters:   06/22/17 158 lb 12.8 oz (72 kg)   05/31/17 156 lb 9.6 oz (71 kg)   04/24/17 156 lb 9.6 oz (71 kg)                  Patient Active Problem List   Diagnosis     Dizziness and giddiness     Motion sickness     GERD (gastroesophageal reflux disease)     Idiopathic cardiomyopathy (H)     Sleep apnea     Hyperlipidemia LDL goal <100     Allergic rhinitis     Mild major depression (H)     Hypertension goal BP (blood pressure) <  130/80     Pelvic pain in female     Health Care Home     24 hour contact handout given     Advanced directives, counseling/discussion     Elevated PTHrP level (H)     H/O bariatric surgery     Hx of gastric bypass     Hypovitaminosis D     Allergic rhinitis     Attention deficit disorder of adult     Mild persistent asthma     Low back pain     Hand joint pain     Posterior vitreous detachment, left eye     Myopia, bilateral     Dumping syndrome     Benign essential hypertension     Nuclear sclerosis of both eyes     Major depressive disorder, recurrent episode, mild (H)     Adjustment disorder with depressed mood     Decreased hearing of both ears     Past Surgical History:   Procedure Laterality Date     BARIATRIC SURGERY       DIABETES RESOURCES  As Child    Tonsillectomy     ENT SURGERY       HERNIA REPAIR       LAPAROSCOPIC BYPASS GASTRIC  10/16/2012    Procedure: LAPAROSCOPIC BYPASS GASTRIC;  laparoscopic reyna en y gastric bypass;  Surgeon: Nish Bradford MD;  Location: [facility] OR     TONSILLECTOMY       Uretural Sticture  As Child       Social History   Substance Use Topics     Smoking status: Never Smoker     Smokeless tobacco: Never Used     Alcohol use No     Family History   Problem Relation Age of Onset     Hypertension Father      Lipids Father      Alcohol/Drug Father      Abuse     Depression Father      Respiratory Father      COPD     Cardiovascular Father      GASTROINTESTINAL DISEASE Mother      non cancerous pancreatic tumor     Hypertension Mother      HEART DISEASE Mother      A fib     Breast Cancer Maternal Grandmother      Glaucoma Paternal Grandmother      Depression Brother      Hypertension Brother      CEREBROVASCULAR DISEASE Other      Macular Degeneration Other      DIABETES No family hx of      Cancer - colorectal No family hx of      Retinal detachment No family hx of      Amblyopia No family hx of      Thyroid Disease No family hx of          Current Outpatient Prescriptions    Medication Sig Dispense Refill     estrogen conj-medroxyPROGESTERone (PREMPRO) 0.45-1.5 MG per tablet Take 1 tablet by mouth daily 90 tablet 3     venlafaxine (EFFEXOR-XR) 150 MG 24 hr capsule Take 1 capsule (150 mg) by mouth daily 90 capsule 1     [START ON 8/7/2017] amphetamine-dextroamphetamine (ADDERALL XR) 25 MG 24 hr capsule Take 1 capsule (25 mg) by mouth daily 30 capsule 0     carvedilol (COREG) 25 MG tablet TAKE 1 TABLET(25 MG) BY MOUTH TWICE DAILY WITH MEALS 180 tablet 3     hydrALAZINE (APRESOLINE) 10 MG tablet Take 1 tablet (10 mg) by mouth 3 times daily 270 tablet 3     isosorbide mononitrate (IMDUR) 30 MG 24 hr tablet Take 1 tablet (30 mg) by mouth daily 90 tablet 3     lisinopril (PRINIVIL/ZESTRIL) 5 MG tablet Take 1 tablet (5 mg) by mouth daily 90 tablet 3     simvastatin (ZOCOR) 20 MG tablet Take 1 tablet (20 mg) by mouth At Bedtime 90 tablet 3     venlafaxine (EFFEXOR-ER) 225 MG TB24 24 hr tablet Take 1 tablet (225 mg) by mouth daily (with breakfast) 90 each 0     ondansetron (ZOFRAN ODT) 4 MG ODT tab Take 1 tablet (4 mg) by mouth every 8 hours as needed for nausea 10 tablet 0     sertraline (ZOLOFT) 100 MG tablet Take 2 tablets daily  DUE for yearly office visit in June 2017 180 tablet 1     blood glucose monitoring (NO BRAND SPECIFIED) test strip Use to test blood sugar 1-2 times daily or as directed. 1 Box 2     albuterol (PROAIR HFA, PROVENTIL HFA, VENTOLIN HFA) 108 (90 BASE) MCG/ACT inhaler Inhale 2 puffs into the lungs every 6 hours as needed for shortness of breath / dyspnea or wheezing 1 Inhaler 11     calcium carbonate-vitamin D (CALTRATE 600+D) 600-400 MG-UNIT CHEW Take 1 chew tab by mouth 2 times daily 180 tablet 3     COMFORT LANCETS MISC 1 Device 2 times daily as needed 1 Box 1     meclizine (ANTIVERT) 25 MG tablet Take 1 tablet (25 mg) by mouth 3 times daily as needed 30 tablet 1     Cyanocobalamin (VITAMIN B-12 SL) Place  under the tongue daily.       Multiple Vitamin  "(MULTI-VITAMIN) per tablet Take 1 tablet by mouth daily. 100 tablet 3     [START ON 7/7/2017] amphetamine-dextroamphetamine (ADDERALL XR) 25 MG 24 hr capsule Take 1 capsule (25 mg) by mouth daily 30 capsule 0     amphetamine-dextroamphetamine (ADDERALL XR) 25 MG 24 hr capsule Take 1 capsule (25 mg) by mouth daily 30 capsule 0     HYDROcodone-acetaminophen (NORCO) 5-325 MG per tablet Take 1-2 tablets by mouth every 6 hours as needed for moderate to severe pain (Patient not taking: Reported on 6/22/2017) 16 tablet 0     [DISCONTINUED] estrogen, conjugated,-medroxyPROGESTERone (PREMPRO) 0.625-5 MG per tablet Take 1 tablet by mouth daily 90 tablet 3     Allergies   Allergen Reactions     No Known Drug Allergies      OBJECTIVE:     /70 (BP Location: Left arm, Patient Position: Chair, Cuff Size: Adult Regular)  Pulse 68  Temp 98.1  F (36.7  C) (Tympanic)  Ht 5' 3\" (1.6 m)  Wt 158 lb 12.8 oz (72 kg)  LMP 06/01/2012  BMI 28.13 kg/m2  EXAM:  GENERAL APPEARANCE: healthy, alert and no distress  EYES: Eyes grossly normal to inspection, PERRL and conjunctivae and sclerae normal  HENT: ear canals and TM's normal, nose and mouth without ulcers or lesions, oropharynx clear and oral mucous membranes moist  NECK: no adenopathy, no asymmetry, masses, or scars and thyroid normal to palpation  RESP: lungs clear to auscultation - no rales, rhonchi or wheezes  BREAST: normal without masses, tenderness or nipple discharge and no palpable axillary masses or adenopathy  CV: regular rate and rhythm, normal S1 S2, no S3 or S4, no murmur, click or rub, no peripheral edema and peripheral pulses strong  ABDOMEN: soft, nontender, no hepatosplenomegaly, no masses and bowel sounds normal   (female): deferred  Normal pap last year   MS: no musculoskeletal defects are noted, gait is age appropriate without ataxia, does have  Hammer toe with the  2nd toe on both feet   SKIN: no suspicious lesions or rashes  NEURO: Normal strength and " tone, sensory exam grossly normal, mentation intact and speech normal  PSYCH: mentation appears normal and affect normal/bright    ASSESSMENT/PLAN:       ASSESSMENT/PLAN:      ICD-10-CM    1. Routine general medical examination at a health care facility Z00.00    2. Idiopathic cardiomyopathy (H) I42.8 HEART FAILURE ACTION PLAN   3. Hypertension goal BP (blood pressure) < 130/80 I10 Albumin Random Urine Quantitative     Basic metabolic panel  (Ca, Cl, CO2, Creat, Gluc, K, Na, BUN)   4. Hyperlipidemia LDL goal <100 E78.5 Lipid panel reflex to direct LDL   5. Gastroesophageal reflux disease without esophagitis K21.9    6. Attention deficit disorder of adult F98.8    7. Menopausal syndrome (hot flashes) N95.1 estrogen conj-medroxyPROGESTERone (PREMPRO) 0.45-1.5 MG per tablet   8. Major depressive disorder, recurrent episode, mild (H) F33.0    9. Adjustment disorder with depressed mood F43.21 venlafaxine (EFFEXOR-XR) 150 MG 24 hr capsule   10. Decreased hearing of both ears H91.93    11. Ringing in ears, bilateral H93.13 AUDIOLOGY ADULT REFERRAL     OTOLARYNGOLOGY REFERRAL       Patient Instructions     Preventive Health Recommendations  Female Ages 50 - 64    Yearly exam: See your health care provider every year in order to  o Review health changes.   o Discuss preventive care.    o Review your medicines if your doctor has prescribed any.      Get a Pap test every three years (unless you have an abnormal result and your provider advises testing more often).    If you get Pap tests with HPV test, you only need to test every 5 years, unless you have an abnormal result.     You do not need a Pap test if your uterus was removed (hysterectomy) and you have not had cancer.    You should be tested each year for STDs (sexually transmitted diseases) if you're at risk.     Have a mammogram every 1 to 2 years.    Have a colonoscopy at age 50, or have a yearly FIT test (stool test). These exams screen for colon cancer.      Have a  "cholesterol test every 5 years, or more often if advised.    Have a diabetes test (fasting glucose) every three years. If you are at risk for diabetes, you should have this test more often.     If you are at risk for osteoporosis (brittle bone disease), think about having a bone density scan (DEXA).    Shots: Get a flu shot each year. Get a tetanus shot every 10 years.    Nutrition:     Eat at least 5 servings of fruits and vegetables each day.    Eat whole-grain bread, whole-wheat pasta and brown rice instead of white grains and rice.    Talk to your provider about Calcium and Vitamin D.     Lifestyle    Exercise at least 150 minutes a week (30 minutes a day, 5 days a week). This will help you control your weight and prevent disease.    Limit alcohol to one drink per day.    No smoking.     Wear sunscreen to prevent skin cancer.     See your dentist every six months for an exam and cleaning.    See your eye doctor every 1 to 2 years.      Did decrease the Effexor to  150 mg   Start to alternate the  225 mg - that you already have. And then once you are feeling comfortable you can then go down to the    150 mg dose daily     Did decrease the  Prempro. To  4 mg     Stay well hydrated - urine should be clear.      Exercise 30-60 min daily     For the constipation try adding in Benefiber     Get your ear rechecked  For hearing  Then see ENT                        COUNSELING:   Reviewed preventive health counseling, as reflected in patient instructions         reports that she has never smoked. She has never used smokeless tobacco.    Estimated body mass index is 28.13 kg/(m^2) as calculated from the following:    Height as of this encounter: 5' 3\" (1.6 m).    Weight as of this encounter: 158 lb 12.8 oz (72 kg).   Weight management plan: Discussed healthy diet and exercise guidelines and patient will follow up in 6 months in clinic to re-evaluate.    Counseling Resources:  ATP IV Guidelines  Pooled Cohorts Equation " Calculator  Breast Cancer Risk Calculator  FRAX Risk Assessment  ICSI Preventive Guidelines  Dietary Guidelines for Americans, 2010  USDA's MyPlate  ASA Prophylaxis  Lung CA Screening    JOHN VELAZQUEZ NP, APRN CNP  [facility]

## 2017-06-22 NOTE — LETTER
My Depression Action Plan  Name: Patricia Perez   Date of Birth 1960  Date: 6/22/2017    My doctor: Blanche Cummins   My clinic: [address] [id number] [phone]          GREEN    ZONE   Good Control    What it looks like:     Things are going generally well. You have normal up s and down s. You may even feel depressed from time to time, but bad moods usually last less than a day.   What you need to do:  1. Continue to care for yourself (see self care plan)  2. Check your depression survival kit and update it as needed  3. Follow your physician s recommendations including any medication.  4. Do not stop taking medication unless you consult with your physician first.           YELLOW         ZONE Getting Worse    What it looks like:     Depression is starting to interfere with your life.     It may be hard to get out of bed; you may be starting to isolate yourself from others.    Symptoms of depression are starting to last most all day and this has happened for several days.     You may have suicidal thoughts but they are not constant.   What you need to do:     1. Call your care team, your response to treatment will improve if you keep your care team informed of your progress. Yellow periods are signs an adjustment may need to be made.     2. Continue your self-care, even if you have to fake it!    3. Talk to someone in your support network    4. Open up your depression survival kit           RED    ZONE Medical Alert - Get Help    What it looks like:     Depression is seriously interfering with your life.     You may experience these or other symptoms: You can t get out of bed most days, can t work or engage in other necessary activities, you have trouble taking care of basic hygiene, or basic responsibilities, thoughts of suicide or death that will not go away, self-injurious behavior.     What you need to do:  1. Call your care  team and request a same-day appointment. If they are not available (weekends or after hours) call your local crisis line, emergency room or 911.          Depression Self Care Plan / Survival Kit    Self-Care for Depression  Here s the deal. Your body and mind are really not as separate as most people think.  What you do and think affects how you feel and how you feel influences what you do and think. This means if you do things that people who feel good do, it will help you feel better.  Sometimes this is all it takes.  There is also a place for medication and therapy depending on how severe your depression is, so be sure to consult with your medical provider and/ or Behavioral Health Consultant if your symptoms are worsening or not improving.     In order to better manage my stress, I will:    Exercise  Get some form of exercise, every day. This will help reduce pain and release endorphins, the  feel good  chemicals in your brain. This is almost as good as taking antidepressants!  This is not the same as joining a gym and then never going! (they count on that by the way ) It can be as simple as just going for a walk or doing some gardening, anything that will get you moving.      Hygiene   Maintain good hygiene (Get out of bed in the morning, Make your bed, Brush your teeth, Take a shower, and Get dressed like you were going to work, even if you are unemployed).  If your clothes don't fit try to get ones that do.    Diet  I will strive to eat foods that are good for me, drink plenty of water, and avoid excessive sugar, caffeine, alcohol, and other mood-altering substances.  Some foods that are helpful in depression are: complex carbohydrates, B vitamins, flaxseed, fish or fish oil, fresh fruits and vegetables.    Psychotherapy  I agree to participate in Individual Therapy (if recommended).    Medication  If prescribed medications, I agree to take them.  Missing doses can result in serious side effects.  I  understand that drinking alcohol, or other illicit drug use, may cause potential side effects.  I will not stop my medication abruptly without first discussing it with my provider.    Staying Connected With Others  I will stay in touch with my friends, family members, and my primary care provider/team.    Use your imagination  Be creative.  We all have a creative side; it doesn t matter if it s oil painting, sand castles, or mud pies! This will also kick up the endorphins.    Witness Beauty  (AKA stop and smell the roses) Take a look outside, even in mid-winter. Notice colors, textures. Watch the squirrels and birds.     Service to others  Be of service to others.  There is always someone else in need.  By helping others we can  get out of ourselves  and remember the really important things.  This also provides opportunities for practicing all the other parts of the program.    Humor  Laugh and be silly!  Adjust your TV habits for less news and crime-drama and more comedy.    Control your stress  Try breathing deep, massage therapy, biofeedback, and meditation. Find time to relax each day.     My support system    Clinic Contact:  Phone number:    Contact 1:  Phone number:    Contact 2:  Phone number:    Anabaptism/:  Phone number:    Therapist:  Phone number:    Local crisis center:    Phone number:    Other community support:  Phone number:

## 2017-06-22 NOTE — PATIENT INSTRUCTIONS
Preventive Health Recommendations  Female Ages 50 - 64    Yearly exam: See your health care provider every year in order to  o Review health changes.   o Discuss preventive care.    o Review your medicines if your doctor has prescribed any.      Get a Pap test every three years (unless you have an abnormal result and your provider advises testing more often).    If you get Pap tests with HPV test, you only need to test every 5 years, unless you have an abnormal result.     You do not need a Pap test if your uterus was removed (hysterectomy) and you have not had cancer.    You should be tested each year for STDs (sexually transmitted diseases) if you're at risk.     Have a mammogram every 1 to 2 years.    Have a colonoscopy at age 50, or have a yearly FIT test (stool test). These exams screen for colon cancer.      Have a cholesterol test every 5 years, or more often if advised.    Have a diabetes test (fasting glucose) every three years. If you are at risk for diabetes, you should have this test more often.     If you are at risk for osteoporosis (brittle bone disease), think about having a bone density scan (DEXA).    Shots: Get a flu shot each year. Get a tetanus shot every 10 years.    Nutrition:     Eat at least 5 servings of fruits and vegetables each day.    Eat whole-grain bread, whole-wheat pasta and brown rice instead of white grains and rice.    Talk to your provider about Calcium and Vitamin D.     Lifestyle    Exercise at least 150 minutes a week (30 minutes a day, 5 days a week). This will help you control your weight and prevent disease.    Limit alcohol to one drink per day.    No smoking.     Wear sunscreen to prevent skin cancer.     See your dentist every six months for an exam and cleaning.    See your eye doctor every 1 to 2 years.      Did decrease the Effexor to  150 mg   Start to alternate the  225 mg - that you already have. And then once you are feeling comfortable you can then go down to  the    150 mg dose daily     Did decrease the  Prempro. To  4 mg     Stay well hydrated - urine should be clear.      Exercise 30-60 min daily     For the constipation try adding in Benefiber     Get your ear rechecked  For hearing  Then see ENT

## 2017-06-23 ASSESSMENT — ANXIETY QUESTIONNAIRES: GAD7 TOTAL SCORE: 5

## 2017-06-23 ASSESSMENT — ASTHMA QUESTIONNAIRES: ACT_TOTALSCORE: 21

## 2017-06-23 ASSESSMENT — PATIENT HEALTH QUESTIONNAIRE - PHQ9: SUM OF ALL RESPONSES TO PHQ QUESTIONS 1-9: 3

## 2017-06-27 ENCOUNTER — TELEPHONE (OUTPATIENT)
Dept: FAMILY MEDICINE | Facility: CLINIC | Age: 57
End: 2017-06-27

## 2017-06-27 DIAGNOSIS — J45.30 MILD PERSISTENT ASTHMA: Primary | ICD-10-CM

## 2017-06-27 NOTE — LETTER
My Asthma Action Plan  Name: Patricia Perez   YOB: 1960  Date: 6/27/2017   My doctor: JOHN VELAZQUEZ NP, APRN CNP   My clinic: [facility]        My Control Medicine: none  My Rescue Medicine: Albuterol (Proair/Ventolin/Proventil) inhaler 2 puffs every 6 hours as needed for shortness of breath   My Asthma Severity: mild persistent  Avoid your asthma triggers: see enclosed list             GREEN ZONE   Good Control    I feel good    No cough or wheeze    Can work, sleep and play without asthma symptoms       Take your asthma control medicine every day.     1. If exercise triggers your asthma, take your rescue medication    15 minutes before exercise or sports, and    During exercise if you have asthma symptoms  2. Spacer to use with inhaler: If you have a spacer, make sure to use it with your inhaler             YELLOW ZONE Getting Worse  I have ANY of these:    I do not feel good    Cough or wheeze    Chest feels tight    Wake up at night   1. Keep taking your Green Zone medications  2. Start taking your rescue medicine:    every 20 minutes for up to 1 hour. Then every 4 hours for 24-48 hours.  3. If you stay in the Yellow Zone for more than 12-24 hours, contact your doctor.  4. If you do not return to the Green Zone in 12-24 hours or you get worse, start taking your oral steroid medicine if prescribed by your provider.           RED ZONE Medical Alert - Get Help  I have ANY of these:    I feel awful    Medicine is not helping    Breathing getting harder    Trouble walking or talking    Nose opens wide to breathe       1. Take your rescue medicine NOW  2. If your provider has prescribed an oral steroid medicine, start taking it NOW  3. Call your doctor NOW  4. If you are still in the Red Zone after 20 minutes and you have not reached your doctor:    Take your rescue medicine again and    Call 911 or go to the emergency room right away    See your regular doctor within 2 weeks of an  Emergency Room or Urgent Care visit for follow-up treatment.          Annual Reminders:  Meet with Asthma Educator,  Flu Shot in the Fall, consider Pneumonia Vaccination for patients with asthma (aged 19 and older).    Pharmacy:    [facility] [place], [place] - [address] [facility] [address] [address] [facility] [address] AT [facility]                    Asthma Triggers  How To Control Things That Make Your Asthma Worse    Triggers are things that make your asthma worse.  Look at the list below to help you find your triggers and what you can do about them.  You can help prevent asthma flare-ups by staying away from your triggers.      Trigger                                                          What you can do   Cigarette Smoke  Tobacco smoke can make asthma worse. Do not allow smoking in your home, car or around you.  Be sure no one smokes at a child s day care or school.  If you smoke, ask your health care provider for ways to help you quit.  Ask family members to quit too.  Ask your health care provider for a referral to Quit Plan to help you quit smoking, or call 8-187-662-PLAN.     Colds, Flu, Bronchitis  These are common triggers of asthma. Wash your hands often.  Don t touch your eyes, nose or mouth.  Get a flu shot every year.     Dust Mites  These are tiny bugs that live in cloth or carpet. They are too small to see. Wash sheets and blankets in hot water every week.   Encase pillows and mattress in dust mite proof covers.  Avoid having carpet if you can. If you have carpet, vacuum weekly.   Use a dust mask and HEPA vacuum.   Pollen and Outdoor Mold  Some people are allergic to trees, grass, or weed pollen, or molds. Try to keep your windows closed.  Limit time out doors when pollen count is high.   Ask you health care provider about taking medicine during allergy season.     Animal Dander  Some people are allergic to skin flakes, urine  or saliva from pets with fur or feathers. Keep pets with fur or feathers out of your home.    If you can t keep the pet outdoors, then keep the pet out of your bedroom.  Keep the bedroom door closed.  Keep pets off cloth furniture and away from stuffed toys.     Mice, Rats, and Cockroaches  Some people are allergic to the waste from these pests.   Cover food and garbage.  Clean up spills and food crumbs.  Store grease in the refrigerator.   Keep food out of the bedroom.   Indoor Mold  This can be a trigger if your home has high moisture. Fix leaking faucets, pipes, or other sources of water.   Clean moldy surfaces.  Dehumidify basement if it is damp and smelly.   Smoke, Strong Odors, and Sprays  These can reduce air quality. Stay away from strong odors and sprays, such as perfume, powder, hair spray, paints, smoke incense, paint, cleaning products, candles and new carpet.   Exercise or Sports  Some people with asthma have this trigger. Be active!  Ask your doctor about taking medicine before sports or exercise to prevent symptoms.    Warm up for 5-10 minutes before and after sports or exercise.     Other Triggers of Asthma  Cold air:  Cover your nose and mouth with a scarf.  Sometimes laughing or crying can be a trigger.  Some medicines and food can trigger asthma.

## 2017-06-27 NOTE — TELEPHONE ENCOUNTER
Panel Management Review      Patient has the following on her problem list:     Depression / Dysthymia review  PHQ-9 SCORE 6/15/2016 6/27/2016 6/22/2017   Total Score - - -   Total Score MyChart - 2 (Minimal depression) -   Total Score 5 - 3      Patient is due for:  None    Asthma review     ACT Total Scores 6/22/2017   ACT TOTAL SCORE (Goal Greater than or Equal to 20) 21   In the past 12 months, how many times did you visit the emergency room for your asthma without being admitted to the hospital? 0   In the past 12 months, how many times were you hospitalized overnight because of your asthma? 0      1. Is Asthma diagnosis on the Problem List? Yes    2. Is Asthma listed on Health Maintenance? Yes    3. Patient is due for:  LACHELLE COELLO   ASA: FAILED    Last LDL:    Lab Results   Component Value Date    CHOL 267 06/22/2017     Lab Results   Component Value Date     06/22/2017     Lab Results   Component Value Date     06/22/2017     Lab Results   Component Value Date    TRIG 73 06/22/2017        Lab Results   Component Value Date    CHOLHDLRATIO 2.0 06/24/2014        Is the patient on a Statin? YES   Is the patient on Aspirin? NO                  Medications     HMG CoA Reductase Inhibitors    simvastatin (ZOCOR) 20 MG tablet          Last three blood pressure readings:  BP Readings from Last 3 Encounters:   06/22/17 118/70   05/31/17 107/68   04/29/17 (!) 138/93        Tobacco History:     History   Smoking Status     Never Smoker   Smokeless Tobacco     Never Used       Hypertension   Last three blood pressure readings:  BP Readings from Last 3 Encounters:   06/22/17 118/70   05/31/17 107/68   04/29/17 (!) 138/93     Blood pressure: Passed    HTN Guidelines:  Age 18-59 BP range:  Less than 140/90  Age 60-85 with Diabetes:  Less than 140/90  Age 60-85 without Diabetes:  less than 150/90      Composite cancer screening  Chart review shows that this patient is due/due soon for the following  None  Summary:    Patient is due/failing the following:   AAP    Action needed:   Do AAP letter    Type of outreach:    Sent MindQuiltt message. for patient to view letter to see AAP.    Questions for provider review:    None                                                                                                                                    Bibiana HELLER       Chart routed to none .

## 2017-07-06 DIAGNOSIS — F33.0 MAJOR DEPRESSIVE DISORDER, RECURRENT EPISODE, MILD (H): ICD-10-CM

## 2017-07-07 RX ORDER — SERTRALINE HYDROCHLORIDE 100 MG/1
TABLET, FILM COATED ORAL
Qty: 180 TABLET | Refills: 1 | Status: SHIPPED | OUTPATIENT
Start: 2017-07-07 | End: 2017-12-31

## 2017-07-07 NOTE — TELEPHONE ENCOUNTER
Sertraline 100mg     Last Written Prescription Date: 01/11/2017  #180 x 1  Last filled 04/08/2017  Last Office Visit with FM primary care provider:  06/22/2017 DINORA Cummins        Last PHQ-9 score on record=   PHQ-9 SCORE 6/22/2017   Total Score -   Total Score MyChart -   Total Score 3

## 2017-07-19 DIAGNOSIS — I10 ESSENTIAL HYPERTENSION WITH GOAL BLOOD PRESSURE LESS THAN 130/80: ICD-10-CM

## 2017-07-19 RX ORDER — ISOSORBIDE MONONITRATE 30 MG/1
TABLET, EXTENDED RELEASE ORAL
Qty: 90 TABLET | Refills: 3 | Status: SHIPPED | OUTPATIENT
Start: 2017-07-19 | End: 2018-06-07

## 2017-08-04 ENCOUNTER — OFFICE VISIT (OUTPATIENT)
Dept: URGENT CARE | Facility: URGENT CARE | Age: 57
End: 2017-08-04
Payer: COMMERCIAL

## 2017-08-04 VITALS
BODY MASS INDEX: 28.87 KG/M2 | OXYGEN SATURATION: 99 % | HEART RATE: 80 BPM | DIASTOLIC BLOOD PRESSURE: 80 MMHG | WEIGHT: 163 LBS | SYSTOLIC BLOOD PRESSURE: 119 MMHG | TEMPERATURE: 97.8 F

## 2017-08-04 DIAGNOSIS — S00.81XA ABRASION OF FACE, INITIAL ENCOUNTER: Primary | ICD-10-CM

## 2017-08-04 PROCEDURE — 12013 RPR F/E/E/N/L/M 2.6-5.0 CM: CPT | Performed by: FAMILY MEDICINE

## 2017-08-04 PROCEDURE — 99212 OFFICE O/P EST SF 10 MIN: CPT | Mod: 25 | Performed by: FAMILY MEDICINE

## 2017-08-04 ASSESSMENT — PAIN SCALES - GENERAL: PAINLEVEL: EXTREME PAIN (8)

## 2017-08-04 NOTE — MR AVS SNAPSHOT
After Visit Summary   8/4/2017    Patricia Perez    MRN: [id number]           Patient Information     Date Of Birth          1960        Visit Information        Provider Department      8/4/2017 2:05 PM Brice Mosher MD [facility]         Follow-ups after your visit        Who to contact     If you have questions or need follow up information about today's clinic visit or your schedule please contact [facility] directly at [phone].  Normal or non-critical lab and imaging results will be communicated to you by Vidienthart, letter or phone within 4 business days after the clinic has received the results. If you do not hear from us within 7 days, please contact the clinic through Ziipat or phone. If you have a critical or abnormal lab result, we will notify you by phone as soon as possible.  Submit refill requests through aCon or call your pharmacy and they will forward the refill request to us. Please allow 3 business days for your refill to be completed.          Additional Information About Your Visit        MyChart Information     aCon gives you secure access to your electronic health record. If you see a primary care provider, you can also send messages to your care team and make appointments. If you have questions, please call your primary care clinic.  If you do not have a primary care provider, please call [phone] and they will assist you.        Care EveryWhere ID     This is your Care EveryWhere ID. This could be used by other organizations to access your [place] medical records  [id number]        Your Vitals Were     Pulse Temperature Last Period Pulse Oximetry Breastfeeding? BMI (Body Mass Index)    80 97.8  F (36.6  C) (Oral) 06/01/2012 99% No 28.87 kg/m2       Blood Pressure from Last 3 Encounters:   08/04/17 119/80   06/22/17 118/70   05/31/17 107/68    Weight from Last 3 Encounters:   08/04/17 163 lb (73.9 kg)    06/22/17 158 lb 12.8 oz (72 kg)   05/31/17 156 lb 9.6 oz (71 kg)              Today, you had the following     No orders found for display       Primary Care Provider Office Phone # Fax #    Blanche Olguin ELIOT [facility] [phone]       [place] [facility] [address]   [place] [address]        Equal Access to Services     FROILAN HAY : Hadii aad ku hadasho Soomaali, waaxda luqadaha, qaybta kaalmada adeegyada, waxay idiin hayaan adeeg kharash la'aan . So [facility] [phone].    ATENCIÓN: Si habla español, tiene a ortiz disposición servicios gratuitos de asistencia lingüística. Llame al [phone].    We comply with applicable federal civil rights laws and [place] laws. We do not discriminate on the basis of race, color, national origin, age, disability sex, sexual orientation or gender identity.            Thank you!     Thank you for choosing [facility]  for your care. Our goal is always to provide you with excellent care. Hearing back from our patients is one way we can continue to improve our services. Please take a few minutes to complete the written survey that you may receive in the mail after your visit with us. Thank you!             Your Updated Medication List - Protect others around you: Learn how to safely use, store and throw away your medicines at www.disposemymeds.org.          This list is accurate as of: 8/4/17  3:04 PM.  Always use your most recent med list.                   Brand Name Dispense Instructions for use Diagnosis    albuterol 108 (90 BASE) MCG/ACT Inhaler    PROAIR HFA/PROVENTIL HFA/VENTOLIN HFA    1 Inhaler    Inhale 2 puffs into the lungs every 6 hours as needed for shortness of breath / dyspnea or wheezing    Mild persistent asthma, uncomplicated       * amphetamine-dextroamphetamine 25 MG 24 hr capsule    ADDERALL XR    30 capsule    Take 1 capsule (25 mg) by mouth daily    Attention deficit disorder of adult       *  amphetamine-dextroamphetamine 25 MG 24 hr capsule    ADDERALL XR    30 capsule    Take 1 capsule (25 mg) by mouth daily    Attention deficit disorder of adult       * amphetamine-dextroamphetamine 25 MG 24 hr capsule   Start taking on:  8/7/2017    ADDERALL XR    30 capsule    Take 1 capsule (25 mg) by mouth daily    Attention deficit disorder of adult       blood glucose monitoring test strip    no brand specified    1 Box    Use to test blood sugar 1-2 times daily or as directed.    Hypoglycemia       calcium carbonate-vitamin D 600-400 MG-UNIT Chew    CALTRATE 600+D    180 tablet    Take 1 chew tab by mouth 2 times daily    Hypovitaminosis D, Hx of gastric bypass       carvedilol 25 MG tablet    COREG    180 tablet    TAKE 1 TABLET(25 MG) BY MOUTH TWICE DAILY WITH MEALS    Cardiomyopathy, unspecified (H)       COMFORT LANCETS Misc     1 Box    1 Device 2 times daily as needed    Hypoglycemia       estrogen conj-medroxyPROGESTERone 0.45-1.5 MG per tablet    Prempro    90 tablet    Take 1 tablet by mouth daily    Menopausal syndrome (hot flashes)       hydrALAZINE 10 MG tablet    APRESOLINE    270 tablet    Take 1 tablet (10 mg) by mouth 3 times daily    Idiopathic cardiomyopathy (H), Essential hypertension with goal blood pressure less than 130/80       HYDROcodone-acetaminophen 5-325 MG per tablet    NORCO    16 tablet    Take 1-2 tablets by mouth every 6 hours as needed for moderate to severe pain        * isosorbide mononitrate 30 MG 24 hr tablet    IMDUR    90 tablet    Take 1 tablet (30 mg) by mouth daily    Essential hypertension with goal blood pressure less than 130/80       * isosorbide mononitrate 30 MG 24 hr tablet    IMDUR    90 tablet    TAKE 1 TABLET(30 MG) BY MOUTH DAILY    Essential hypertension with goal blood pressure less than 130/80       lisinopril 5 MG tablet    PRINIVIL/ZESTRIL    90 tablet    Take 1 tablet (5 mg) by mouth daily    Coronary artery disease involving native coronary artery of  native heart without angina pectoris       meclizine 25 MG tablet    ANTIVERT    30 tablet    Take 1 tablet (25 mg) by mouth 3 times daily as needed    Labyrinthitis, unspecified       Multi-vitamin Tabs tablet   Generic drug:  multivitamin, therapeutic with minerals     100 tablet    Take 1 tablet by mouth daily.    Gastric bypass status for obesity       ondansetron 4 MG ODT tab    ZOFRAN ODT    10 tablet    Take 1 tablet (4 mg) by mouth every 8 hours as needed for nausea        sertraline 100 MG tablet    ZOLOFT    180 tablet    TAKE 2 TABLETS BY MOUTH DAILY    Major depressive disorder, recurrent episode, mild (H)       simvastatin 20 MG tablet    ZOCOR    90 tablet    Take 1 tablet (20 mg) by mouth At Bedtime    Hyperlipidemia LDL goal <100       * venlafaxine 225 MG Tb24 24 hr tablet    EFFEXOR-ER    90 each    Take 1 tablet (225 mg) by mouth daily (with breakfast)    Major depressive disorder, recurrent episode, mild (H)       * venlafaxine 150 MG 24 hr capsule    EFFEXOR-XR    90 capsule    Take 1 capsule (150 mg) by mouth daily    Adjustment disorder with depressed mood       VITAMIN B-12 SL      Place  under the tongue daily.        * Notice:  This list has 7 medication(s) that are the same as other medications prescribed for you. Read the directions carefully, and ask your doctor or other care provider to review them with you.

## 2017-08-04 NOTE — NURSING NOTE
"Chief Complaint   Patient presents with     Fall     Pt c/o falling around 1pm.        Initial /80 (BP Location: Left arm, Patient Position: Chair, Cuff Size: Adult Regular)  Pulse 80  Temp 97.8  F (36.6  C) (Oral)  Wt 163 lb (73.9 kg)  LMP 06/01/2012  SpO2 99%  Breastfeeding? No  BMI 28.87 kg/m2 Estimated body mass index is 28.87 kg/(m^2) as calculated from the following:    Height as of 6/22/17: 5' 3\" (1.6 m).    Weight as of this encounter: 163 lb (73.9 kg).  Medication Reconciliation: complete     Martha Betancourt CMA (AAMA)      "

## 2017-08-04 NOTE — PROGRESS NOTES
Some of this note was populated by a medical assistant.    SUBJECTIVE:                                                    Patricia Perez is a 57 year old female who presents to clinic today for the following health issues:    Fall      Duration: around 1 pm    Description (location/character/radiation): Pt was on the way to a meeting and she tripped and fell outside. Pt fell face forward and hit her head on the ground. Apparently wearing glasses at the time and was cut by the classes.     Intensity:  8/10    Accompanying signs and symptoms: nose bleed, cuts on nose, face, lip    History (similar episodes/previous evaluation): None    Precipitating or alleviating factors: None    Therapies tried and outcome: None       Problem list and histories reviewed & adjusted, as indicated.  Additional history: as documented    Patient Active Problem List   Diagnosis     Dizziness and giddiness     Motion sickness     GERD (gastroesophageal reflux disease)     Idiopathic cardiomyopathy (H)     Sleep apnea     Hyperlipidemia LDL goal <100     Allergic rhinitis     Mild major depression (H)     Hypertension goal BP (blood pressure) < 130/80     Pelvic pain in female     Health Care Home     24 hour contact handout given     Advanced directives, counseling/discussion     Elevated PTHrP level (H)     H/O bariatric surgery     Hx of gastric bypass     Hypovitaminosis D     Allergic rhinitis     Attention deficit disorder of adult     Mild persistent asthma     Low back pain     Hand joint pain     Posterior vitreous detachment, left eye     Myopia, bilateral     Dumping syndrome     Benign essential hypertension     Nuclear sclerosis of both eyes     Major depressive disorder, recurrent episode, mild (H)     Adjustment disorder with depressed mood     Decreased hearing of both ears     Past Surgical History:   Procedure Laterality Date     BARIATRIC SURGERY       DIABETES RESOURCES  As Child    Tonsillectomy     ENT SURGERY        HERNIA REPAIR       LAPAROSCOPIC BYPASS GASTRIC  10/16/2012    Procedure: LAPAROSCOPIC BYPASS GASTRIC;  laparoscopic reyna en y gastric bypass;  Surgeon: Nish Bradford MD;  Location: [facility] OR     TONSILLECTOMY       Uretural Sticture  As Child       Social History   Substance Use Topics     Smoking status: Never Smoker     Smokeless tobacco: Never Used     Alcohol use No     Family History   Problem Relation Age of Onset     Hypertension Father      Lipids Father      Alcohol/Drug Father      Abuse     Depression Father      Respiratory Father      COPD     Cardiovascular Father      GASTROINTESTINAL DISEASE Mother      non cancerous pancreatic tumor     Hypertension Mother      HEART DISEASE Mother      A fib     Breast Cancer Maternal Grandmother      Glaucoma Paternal Grandmother      Depression Brother      Hypertension Brother      CEREBROVASCULAR DISEASE Other      Macular Degeneration Other      DIABETES No family hx of      Cancer - colorectal No family hx of      Retinal detachment No family hx of      Amblyopia No family hx of      Thyroid Disease No family hx of          Current Outpatient Prescriptions   Medication Sig Dispense Refill     isosorbide mononitrate (IMDUR) 30 MG 24 hr tablet TAKE 1 TABLET(30 MG) BY MOUTH DAILY 90 tablet 3     sertraline (ZOLOFT) 100 MG tablet TAKE 2 TABLETS BY MOUTH DAILY 180 tablet 1     estrogen conj-medroxyPROGESTERone (PREMPRO) 0.45-1.5 MG per tablet Take 1 tablet by mouth daily 90 tablet 3     venlafaxine (EFFEXOR-XR) 150 MG 24 hr capsule Take 1 capsule (150 mg) by mouth daily 90 capsule 1     [START ON 8/7/2017] amphetamine-dextroamphetamine (ADDERALL XR) 25 MG 24 hr capsule Take 1 capsule (25 mg) by mouth daily 30 capsule 0     amphetamine-dextroamphetamine (ADDERALL XR) 25 MG 24 hr capsule Take 1 capsule (25 mg) by mouth daily 30 capsule 0     amphetamine-dextroamphetamine (ADDERALL XR) 25 MG 24 hr capsule Take 1 capsule (25 mg) by mouth daily 30 capsule 0      carvedilol (COREG) 25 MG tablet TAKE 1 TABLET(25 MG) BY MOUTH TWICE DAILY WITH MEALS 180 tablet 3     hydrALAZINE (APRESOLINE) 10 MG tablet Take 1 tablet (10 mg) by mouth 3 times daily 270 tablet 3     isosorbide mononitrate (IMDUR) 30 MG 24 hr tablet Take 1 tablet (30 mg) by mouth daily 90 tablet 3     lisinopril (PRINIVIL/ZESTRIL) 5 MG tablet Take 1 tablet (5 mg) by mouth daily 90 tablet 3     simvastatin (ZOCOR) 20 MG tablet Take 1 tablet (20 mg) by mouth At Bedtime 90 tablet 3     venlafaxine (EFFEXOR-ER) 225 MG TB24 24 hr tablet Take 1 tablet (225 mg) by mouth daily (with breakfast) 90 each 0     HYDROcodone-acetaminophen (NORCO) 5-325 MG per tablet Take 1-2 tablets by mouth every 6 hours as needed for moderate to severe pain 16 tablet 0     blood glucose monitoring (NO BRAND SPECIFIED) test strip Use to test blood sugar 1-2 times daily or as directed. 1 Box 2     albuterol (PROAIR HFA, PROVENTIL HFA, VENTOLIN HFA) 108 (90 BASE) MCG/ACT inhaler Inhale 2 puffs into the lungs every 6 hours as needed for shortness of breath / dyspnea or wheezing 1 Inhaler 11     calcium carbonate-vitamin D (CALTRATE 600+D) 600-400 MG-UNIT CHEW Take 1 chew tab by mouth 2 times daily 180 tablet 3     COMFORT LANCETS MISC 1 Device 2 times daily as needed 1 Box 1     meclizine (ANTIVERT) 25 MG tablet Take 1 tablet (25 mg) by mouth 3 times daily as needed 30 tablet 1     Cyanocobalamin (VITAMIN B-12 SL) Place  under the tongue daily.       Multiple Vitamin (MULTI-VITAMIN) per tablet Take 1 tablet by mouth daily. 100 tablet 3     ondansetron (ZOFRAN ODT) 4 MG ODT tab Take 1 tablet (4 mg) by mouth every 8 hours as needed for nausea 10 tablet 0     Allergies   Allergen Reactions     No Known Drug Allergies          Reviewed and updated as needed this visit by clinical staff     Reviewed and updated as needed this visit by Provider         ROS:  Constitutional, HEENT, cardiovascular, pulmonary, gi and gu systems are negative, except as  otherwise noted.      OBJECTIVE:   /80 (BP Location: Left arm, Patient Position: Chair, Cuff Size: Adult Regular)  Pulse 80  Temp 97.8  F (36.6  C) (Oral)  Wt 163 lb (73.9 kg)  LMP 06/01/2012  SpO2 99%  Breastfeeding? No  BMI 28.87 kg/m2  Body mass index is 28.87 kg/(m^2).  GENERAL: healthy, alert and no distress  NECK: no adenopathy, no asymmetry, masses, or scars and thyroid normal to palpation  PERRLA, EOMI  NOSE: noted laceration, nasal mucosa without septal hematoma   RESP: lungs clear to auscultation - no rales, rhonchi or wheezes  CV: regular rate and rhythm, normal S1 S2, no S3 or S4, no murmur, click or rub, no peripheral edema and peripheral pulses strong  ABDOMEN: soft, nontender, no hepatosplenomegaly, no masses and bowel sounds normal  MS: no gross musculoskeletal defects noted, no edema  SKIN; noted multiple lacerations along nose and lips anterior x 3 and bridge of the nose horizontally each between 1 and 3 cm in length.   NEURO: CN II-12 intact, full ROM with normal sensation and strength.       Diagnostic Test Results:  none     ASSESSMENT/PLAN:      Diagnosis Comments   1. Abrasion of face, initial encounter  REPAIR SUPERFICIAL, WOUND BODY 2.6-7.5 CM         PLAN  Area cleaned and dermabond applied to 4 lacerations with good result.   Wound cares explained.   Head injury information provided.   The patient indicates understanding of these issues and agrees with the plan.  Brice Mosher MD      [facility]

## 2017-08-31 ENCOUNTER — RADIANT APPOINTMENT (OUTPATIENT)
Dept: GENERAL RADIOLOGY | Facility: CLINIC | Age: 57
End: 2017-08-31
Attending: NURSE PRACTITIONER
Payer: COMMERCIAL

## 2017-08-31 ENCOUNTER — OFFICE VISIT (OUTPATIENT)
Dept: FAMILY MEDICINE | Facility: CLINIC | Age: 57
End: 2017-08-31
Payer: COMMERCIAL

## 2017-08-31 VITALS
WEIGHT: 161.6 LBS | TEMPERATURE: 98.3 F | HEART RATE: 68 BPM | DIASTOLIC BLOOD PRESSURE: 78 MMHG | SYSTOLIC BLOOD PRESSURE: 120 MMHG | BODY MASS INDEX: 28.63 KG/M2

## 2017-08-31 DIAGNOSIS — H81.10 BPV (BENIGN POSITIONAL VERTIGO), UNSPECIFIED LATERALITY: ICD-10-CM

## 2017-08-31 DIAGNOSIS — F98.8 ATTENTION DEFICIT DISORDER OF ADULT: ICD-10-CM
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
## 2017-08-31 DIAGNOSIS — S02.2XXD: Primary | ICD-10-CM

## 2017-08-31 PROBLEM — Z71.89 ADVANCED DIRECTIVES, COUNSELING/DISCUSSION: Status: ACTIVE | Noted: 2017-08-31

## 2017-08-31 PROCEDURE — 99214 OFFICE O/P EST MOD 30 MIN: CPT | Performed by: NURSE PRACTITIONER

## 2017-08-31 PROCEDURE — 70160 X-RAY EXAM OF NASAL BONES: CPT

## 2017-08-31 RX ORDER — DEXTROAMPHETAMINE SACCHARATE, AMPHETAMINE ASPARTATE MONOHYDRATE, DEXTROAMPHETAMINE SULFATE AND AMPHETAMINE SULFATE 6.25; 6.25; 6.25; 6.25 MG/1; MG/1; MG/1; MG/1
25 CAPSULE, EXTENDED RELEASE ORAL DAILY
Qty: 30 CAPSULE | Refills: 0 | Status: SHIPPED | OUTPATIENT
Start: 2017-09-06 | End: 2018-03-01

## 2017-08-31 RX ORDER — MECLIZINE HYDROCHLORIDE 25 MG/1
25 TABLET ORAL 3 TIMES DAILY PRN
Qty: 30 TABLET | Refills: 1 | Status: SHIPPED | OUTPATIENT
Start: 2017-08-31

## 2017-08-31 RX ORDER — DEXTROAMPHETAMINE SACCHARATE, AMPHETAMINE ASPARTATE MONOHYDRATE, DEXTROAMPHETAMINE SULFATE AND AMPHETAMINE SULFATE 6.25; 6.25; 6.25; 6.25 MG/1; MG/1; MG/1; MG/1
25 CAPSULE, EXTENDED RELEASE ORAL DAILY
Qty: 30 CAPSULE | Refills: 0 | Status: SHIPPED | OUTPATIENT
Start: 2017-11-06 | End: 2018-03-01

## 2017-08-31 RX ORDER — DEXTROAMPHETAMINE SACCHARATE, AMPHETAMINE ASPARTATE MONOHYDRATE, DEXTROAMPHETAMINE SULFATE AND AMPHETAMINE SULFATE 6.25; 6.25; 6.25; 6.25 MG/1; MG/1; MG/1; MG/1
25 CAPSULE, EXTENDED RELEASE ORAL DAILY
Qty: 30 CAPSULE | Refills: 0 | Status: SHIPPED | OUTPATIENT
Start: 2017-10-06 | End: 2018-03-01

## 2017-08-31 NOTE — MR AVS SNAPSHOT
After Visit Summary   8/31/2017    aPtricia Perez    MRN: [id number]           Patient Information     Date Of Birth          1960        Visit Information        Provider Department      8/31/2017 2:00 PM Blanche Cummins, [facility]        Today's Diagnoses     Nasal septum fracture, with routine healing, subsequent encounter    -  1    Fall, subsequent encounter        BPV (benign positional vertigo), unspecified laterality        Attention deficit disorder of adult          Care Instructions    For the dizziness .  Avoid  Coffee and salt.   Stay well hydrated - urine should be clear.    Lay down and hold the position until the dizziness resolves. And then sit up slowly  And hold position again until the dizziness resolved     Nasal films  You do have a nasal fracture  - call ENT for an appointment     For the nose and for the dizziness take the Meclizine  Or   For the allergies get started on a non-sedating anti-histamine such as Claritin, Allegra or Zyrtec, ( get the generic - it is a lot cheaper)  Stay well hydrated - urine should be clear.        Please get an over the counter Vitamin D supplement of 2000 units. Take 4000 units during the fall/winter months and 1000 units during the spring/summer. If after taking Vitamin D for  2-3 months and the memory isn't getting better let me know           Follow-ups after your visit        Additional Services     OTOLARYNGOLOGY REFERRAL       Your provider has referred you to: [facility]: Adult [facility] (Otolaryngology) - [place] [phone]  [email or web address]    Please be aware that coverage of these services is subject to the terms and limitations of your health insurance plan.  Call member services at your health plan with any benefit or coverage questions.      Please bring the following with you to your appointment:    (1) Any X-Rays, CTs or MRIs  which have been performed.  Contact the facility where they were done to arrange for  prior to your scheduled appointment.   (2) List of current medications  (3) This referral request   (4) Any documents/labs given to you for this referral                  Who to contact     Normal or non-critical lab and imaging results will be communicated to you by [employer], letter or phone within 4 business days after the clinic has received the results. If you do not hear from us within 7 days, please contact the clinic through [employer] or phone. If you have a critical or abnormal lab result, we will notify you by phone as soon as possible.  Submit refill requests through [employer] or call your pharmacy and they will forward the refill request to us. Please allow 3 business days for your refill to be completed.          If you need to speak with a  for additional information , please call: [phone]           Additional Information About Your Visit        [employer] Information     [employer] gives you secure access to your electronic health record. If you see a primary care provider, you can also send messages to your care team and make appointments. If you have questions, please call your primary care clinic.  If you do not have a primary care provider, please call [phone] and they will assist you.        Care EveryWhere ID     This is your Care EveryWhere ID. This could be used by other organizations to access your [place] medical records  [id number]        Your Vitals Were     Pulse Temperature Last Period BMI (Body Mass Index)          68 98.3  F (36.8  C) (Tympanic) 06/01/2012 28.63 kg/m2         Blood Pressure from Last 3 Encounters:   08/31/17 120/78   08/04/17 119/80   06/22/17 118/70    Weight from Last 3 Encounters:   08/31/17 161 lb 9.6 oz (73.3 kg)   08/04/17 163 lb (73.9 kg)   06/22/17 158 lb 12.8 oz (72 kg)              We Performed the Following     OTOLARYNGOLOGY REFERRAL          Where  to get your medicines      These medications were sent to [id number] [place] [facility] [address] AT [address] , [place] [facility] [address]     Phone:  [phone]     meclizine 25 MG tablet         Some of these will need a paper prescription and others can be bought over the counter.  Ask your nurse if you have questions.     Bring a paper prescription for each of these medications     amphetamine-dextroamphetamine 25 MG 24 hr capsule    amphetamine-dextroamphetamine 25 MG 24 hr capsule    amphetamine-dextroamphetamine 25 MG 24 hr capsule          Primary Care Provider Office Phone # Fax #    Blanche Olguin Lorenza Cummins, APRN [facility] [phone] [address] [place] [address]        Equal Access to Services     FROILAN HAY : Hadii aad ku hadasho Soomaali, waaxda luqadaha, qaybta kaalmada adeegyada, danilo qureshi haydarnell euceda . So [facility] [phone].    ATENCIÓN: Si habla español, tiene a ortiz disposición servicios gratuitos de asistencia lingüística. Llame al [phone].    We comply with applicable federal civil rights laws and [place] laws. We do not discriminate on the basis of race, color, national origin, age, disability sex, sexual orientation or gender identity.            Thank you!     Thank you for choosing [facility]  for your care. Our goal is always to provide you with excellent care. Hearing back from our patients is one way we can continue to improve our services. Please take a few minutes to complete the written survey that you may receive in the mail after your visit with us. Thank you!             Your Updated Medication List - Protect others around you: Learn how to safely use, store and throw away your medicines at www.disposemymeds.org.          This list is accurate as of: 8/31/17  3:05 PM.  Always use your most recent med list.                   Brand Name Dispense Instructions for use  Diagnosis    albuterol 108 (90 BASE) MCG/ACT Inhaler    PROAIR HFA/PROVENTIL HFA/VENTOLIN HFA    1 Inhaler    Inhale 2 puffs into the lungs every 6 hours as needed for shortness of breath / dyspnea or wheezing    Mild persistent asthma, uncomplicated       * amphetamine-dextroamphetamine 25 MG 24 hr capsule   Start taking on:  9/6/2017    ADDERALL XR    30 capsule    Take 1 capsule (25 mg) by mouth daily    Attention deficit disorder of adult       * amphetamine-dextroamphetamine 25 MG 24 hr capsule   Start taking on:  10/6/2017    ADDERALL XR    30 capsule    Take 1 capsule (25 mg) by mouth daily    Attention deficit disorder of adult       * amphetamine-dextroamphetamine 25 MG 24 hr capsule   Start taking on:  11/6/2017    ADDERALL XR    30 capsule    Take 1 capsule (25 mg) by mouth daily    Attention deficit disorder of adult       blood glucose monitoring test strip    no brand specified    1 Box    Use to test blood sugar 1-2 times daily or as directed.    Hypoglycemia       calcium carbonate-vitamin D 600-400 MG-UNIT Chew    CALTRATE 600+D    180 tablet    Take 1 chew tab by mouth 2 times daily    Hypovitaminosis D, Hx of gastric bypass       carvedilol 25 MG tablet    COREG    180 tablet    TAKE 1 TABLET(25 MG) BY MOUTH TWICE DAILY WITH MEALS    Cardiomyopathy, unspecified (H)       COMFORT LANCETS Misc     1 Box    1 Device 2 times daily as needed    Hypoglycemia       estrogen conj-medroxyPROGESTERone 0.45-1.5 MG per tablet    Prempro    90 tablet    Take 1 tablet by mouth daily    Menopausal syndrome (hot flashes)       hydrALAZINE 10 MG tablet    APRESOLINE    270 tablet    Take 1 tablet (10 mg) by mouth 3 times daily    Idiopathic cardiomyopathy (H), Essential hypertension with goal blood pressure less than 130/80       HYDROcodone-acetaminophen 5-325 MG per tablet    NORCO    16 tablet    Take 1-2 tablets by mouth every 6 hours as needed for moderate to severe pain        * isosorbide mononitrate 30 MG  24 hr tablet    IMDUR    90 tablet    Take 1 tablet (30 mg) by mouth daily    Essential hypertension with goal blood pressure less than 130/80       * isosorbide mononitrate 30 MG 24 hr tablet    IMDUR    90 tablet    TAKE 1 TABLET(30 MG) BY MOUTH DAILY    Essential hypertension with goal blood pressure less than 130/80       lisinopril 5 MG tablet    PRINIVIL/ZESTRIL    90 tablet    Take 1 tablet (5 mg) by mouth daily    Coronary artery disease involving native coronary artery of native heart without angina pectoris       meclizine 25 MG tablet    ANTIVERT    30 tablet    Take 1 tablet (25 mg) by mouth 3 times daily as needed    BPV (benign positional vertigo), unspecified laterality       Multi-vitamin Tabs tablet   Generic drug:  multivitamin, therapeutic with minerals     100 tablet    Take 1 tablet by mouth daily.    Gastric bypass status for obesity       ondansetron 4 MG ODT tab    ZOFRAN ODT    10 tablet    Take 1 tablet (4 mg) by mouth every 8 hours as needed for nausea        sertraline 100 MG tablet    ZOLOFT    180 tablet    TAKE 2 TABLETS BY MOUTH DAILY    Major depressive disorder, recurrent episode, mild (H)       simvastatin 20 MG tablet    ZOCOR    90 tablet    Take 1 tablet (20 mg) by mouth At Bedtime    Hyperlipidemia LDL goal <100       * venlafaxine 225 MG Tb24 24 hr tablet    EFFEXOR-ER    90 each    Take 1 tablet (225 mg) by mouth daily (with breakfast)    Major depressive disorder, recurrent episode, mild (H)       * venlafaxine 150 MG 24 hr capsule    EFFEXOR-XR    90 capsule    Take 1 capsule (150 mg) by mouth daily    Adjustment disorder with depressed mood       VITAMIN B-12 SL      Place  under the tongue daily.        * Notice:  This list has 7 medication(s) that are the same as other medications prescribed for you. Read the directions carefully, and ask your doctor or other care provider to review them with you.

## 2017-08-31 NOTE — NURSING NOTE
"Chief Complaint   Patient presents with     Hospital F/U       Initial /78 (BP Location: Left arm, Patient Position: Chair, Cuff Size: Adult Regular)  Pulse 68  Temp 98.3  F (36.8  C) (Tympanic)  Wt 161 lb 9.6 oz (73.3 kg)  LMP 06/01/2012  BMI 28.63 kg/m2 Estimated body mass index is 28.63 kg/(m^2) as calculated from the following:    Height as of 6/22/17: 5' 3\" (1.6 m).    Weight as of this encounter: 161 lb 9.6 oz (73.3 kg).  Medication Reconciliation: complete     Phyllis Kwan CMA (AAMA)      "

## 2017-08-31 NOTE — PROGRESS NOTES
"  SUBJECTIVE:   Patricia Perez is a 57 year old female who presents to clinic today for the following health issues:      ED/ Followup:    Facility:  [facility]  Date of visit: 08/04/17  Reason for visit: Fall; Abrasion of face  Current Status: Tripped on an uneven part of the sidewalk, did fall face first into the grass. Did have quite a bit of bleeding, they did use derma bond to get the skin closed up. Patient states that she has felt \"scatter-brained\" since the incident and has been having problems with nasal congestion, states that the nose is still very tender, especially when she has to blow her nose. Is wondering if she has a fractured nose and/or a concussion. The woman who picked her up and brought her to the  stated that patient was confused surrounding the fall. Patient states that she remembers falling and feeling dazed, does not believe there was any LOC, but can not be positive. Does remember 2 young men helping her up and helping her get all of the blood cleaned up immediately following the fall.          The fall she hit the ground face first.  Her glasses  Remained on her face when the  Hit the ground.  Black eye to the left eye .  Was gazed after the fall. Was able to sit up. l  Sat on the ground for a little while.    2 young men helped her. She did have blood on her face.  And they helped to clean her up.   Her friend picked her up and brought her to  at [facility].   Had  Eaten and had been drinking water,   Knows that she did trip on the sidewalk.     Problem list and histories reviewed & adjusted, as indicated.  Additional history: as documented    Patient Active Problem List   Diagnosis     Dizziness and giddiness     Motion sickness     GERD (gastroesophageal reflux disease)     Idiopathic cardiomyopathy (H)     Sleep apnea     Hyperlipidemia LDL goal <100     Allergic rhinitis     Mild major depression (H)     Hypertension goal BP (blood pressure) < 130/80     " Pelvic pain in female     Health Care Home     24 hour contact handout given     Advanced directives, counseling/discussion     Elevated PTHrP level (H)     H/O bariatric surgery     Hx of gastric bypass     Hypovitaminosis D     Allergic rhinitis     Attention deficit disorder of adult     Mild persistent asthma     Low back pain     Hand joint pain     Posterior vitreous detachment, left eye     Myopia, bilateral     Dumping syndrome     Benign essential hypertension     Nuclear sclerosis of both eyes     Major depressive disorder, recurrent episode, mild (H)     Adjustment disorder with depressed mood     Decreased hearing of both ears     Past Surgical History:   Procedure Laterality Date     BARIATRIC SURGERY       DIABETES RESOURCES  As Child    Tonsillectomy     ENT SURGERY       HERNIA REPAIR       LAPAROSCOPIC BYPASS GASTRIC  10/16/2012    Procedure: LAPAROSCOPIC BYPASS GASTRIC;  laparoscopic reyna en y gastric bypass;  Surgeon: Nish Bradford MD;  Location: [facility] OR     TONSILLECTOMY       Uretural Sticture  As Child       Social History   Substance Use Topics     Smoking status: Never Smoker     Smokeless tobacco: Never Used     Alcohol use No     Family History   Problem Relation Age of Onset     Hypertension Father      Lipids Father      Alcohol/Drug Father      Abuse     Depression Father      Respiratory Father      COPD     Cardiovascular Father      GASTROINTESTINAL DISEASE Mother      non cancerous pancreatic tumor     Hypertension Mother      HEART DISEASE Mother      A fib     Breast Cancer Maternal Grandmother      Glaucoma Paternal Grandmother      Depression Brother      Hypertension Brother      CEREBROVASCULAR DISEASE Other      Macular Degeneration Other      DIABETES No family hx of      Cancer - colorectal No family hx of      Retinal detachment No family hx of      Amblyopia No family hx of      Thyroid Disease No family hx of          Current Outpatient Prescriptions   Medication Sig  Dispense Refill     isosorbide mononitrate (IMDUR) 30 MG 24 hr tablet TAKE 1 TABLET(30 MG) BY MOUTH DAILY 90 tablet 3     sertraline (ZOLOFT) 100 MG tablet TAKE 2 TABLETS BY MOUTH DAILY 180 tablet 1     estrogen conj-medroxyPROGESTERone (PREMPRO) 0.45-1.5 MG per tablet Take 1 tablet by mouth daily 90 tablet 3     venlafaxine (EFFEXOR-XR) 150 MG 24 hr capsule Take 1 capsule (150 mg) by mouth daily 90 capsule 1     amphetamine-dextroamphetamine (ADDERALL XR) 25 MG 24 hr capsule Take 1 capsule (25 mg) by mouth daily 30 capsule 0     amphetamine-dextroamphetamine (ADDERALL XR) 25 MG 24 hr capsule Take 1 capsule (25 mg) by mouth daily 30 capsule 0     amphetamine-dextroamphetamine (ADDERALL XR) 25 MG 24 hr capsule Take 1 capsule (25 mg) by mouth daily 30 capsule 0     carvedilol (COREG) 25 MG tablet TAKE 1 TABLET(25 MG) BY MOUTH TWICE DAILY WITH MEALS 180 tablet 3     hydrALAZINE (APRESOLINE) 10 MG tablet Take 1 tablet (10 mg) by mouth 3 times daily 270 tablet 3     isosorbide mononitrate (IMDUR) 30 MG 24 hr tablet Take 1 tablet (30 mg) by mouth daily 90 tablet 3     lisinopril (PRINIVIL/ZESTRIL) 5 MG tablet Take 1 tablet (5 mg) by mouth daily 90 tablet 3     simvastatin (ZOCOR) 20 MG tablet Take 1 tablet (20 mg) by mouth At Bedtime 90 tablet 3     venlafaxine (EFFEXOR-ER) 225 MG TB24 24 hr tablet Take 1 tablet (225 mg) by mouth daily (with breakfast) 90 each 0     albuterol (PROAIR HFA, PROVENTIL HFA, VENTOLIN HFA) 108 (90 BASE) MCG/ACT inhaler Inhale 2 puffs into the lungs every 6 hours as needed for shortness of breath / dyspnea or wheezing 1 Inhaler 11     calcium carbonate-vitamin D (CALTRATE 600+D) 600-400 MG-UNIT CHEW Take 1 chew tab by mouth 2 times daily 180 tablet 3     COMFORT LANCETS MISC 1 Device 2 times daily as needed 1 Box 1     meclizine (ANTIVERT) 25 MG tablet Take 1 tablet (25 mg) by mouth 3 times daily as needed 30 tablet 1     Cyanocobalamin (VITAMIN B-12 SL) Place  under the tongue daily.        Multiple Vitamin (MULTI-VITAMIN) per tablet Take 1 tablet by mouth daily. 100 tablet 3     ondansetron (ZOFRAN ODT) 4 MG ODT tab Take 1 tablet (4 mg) by mouth every 8 hours as needed for nausea 10 tablet 0     HYDROcodone-acetaminophen (NORCO) 5-325 MG per tablet Take 1-2 tablets by mouth every 6 hours as needed for moderate to severe pain (Patient not taking: Reported on 8/31/2017) 16 tablet 0     blood glucose monitoring (NO BRAND SPECIFIED) test strip Use to test blood sugar 1-2 times daily or as directed. (Patient not taking: Reported on 8/31/2017) 1 Box 2     Allergies   Allergen Reactions     No Known Drug Allergies      BP Readings from Last 3 Encounters:   08/31/17 120/78   08/04/17 119/80   06/22/17 118/70    Wt Readings from Last 3 Encounters:   08/31/17 161 lb 9.6 oz (73.3 kg)   08/04/17 163 lb (73.9 kg)   06/22/17 158 lb 12.8 oz (72 kg)                        Reviewed and updated as needed this visit by clinical staff       Reviewed and updated as needed this visit by Provider         ROS:  C: NEGATIVE for fever, chills, change in weight  EYES: NEGATIVE for vision changes or irritation and will be getting new glasses   E/M: NEGATIVE for ear, mouth and throat problems,    Is having  Problems breathing through her nose.   R: NEGATIVE for significant cough or SOB  CV: NEGATIVE for chest pain, palpitations or peripheral edema  NEURO: NEGATIVE for weakness, POSITIVE for having difficulty remembering things. Getting up to get something and will forget what she was going to get. Some times she is able to remember it.  Or she will try to start over from the beginning   Will have some dizziness  With quick turning of head.  Getting up quickly  But the    Dizziness is not completely uncommon for her       OBJECTIVE:     /78 (BP Location: Left arm, Patient Position: Chair, Cuff Size: Adult Regular)  Pulse 68  Temp 98.3  F (36.8  C) (Tympanic)  Wt 161 lb 9.6 oz (73.3 kg)  LMP 06/01/2012  BMI 28.63  kg/m2  Body mass index is 28.63 kg/(m^2).   GENERAL: healthy, alert and no distress  EYES: Eyes grossly normal to inspection, PERRL and conjunctivae and sclerae normal  HENT: normal cephalic/atraumatic, ear canals and TM's normal, nasal mucosa edematous , rhinorrhea clear, oropharynx clear, oral mucous membranes moist, sinuses: not tender and nose is tender to palpation.  The right nostril is more difficult to  visualize the  Turbinates   NECK: no adenopathy, no asymmetry, masses, or scars and thyroid normal to palpation  RESP: lungs clear to auscultation - no rales, rhonchi or wheezes  CV: regular rate and rhythm, normal S1 S2, no S3 or S4, no murmur, click or rub, no peripheral edema and peripheral pulses strong  ABDOMEN: soft, nontender, no hepatosplenomegaly, no masses and bowel sounds normal  NEURO: Normal strength and tone, sensory exam grossly normal, mentation intact, cranial nerves 2-12 intact, Romberg normal, rapid alternating movements normal and  Does have mild dizziness with  Laying /sitting   Does have a little issue with bringing up the date.  But know day of week/month     Diagnostic Test Results:  Nasal fracture      ASSESSMENT/PLAN:        ASSESSMENT/PLAN:      ICD-10-CM    1. Nasal septum fracture, with routine healing, subsequent encounter S02.2XXD OTOLARYNGOLOGY REFERRAL   2. Fall, subsequent encounter W19.XXXD XR Nasal Bones 3 Views     OTOLARYNGOLOGY REFERRAL   3. BPV (benign positional vertigo), unspecified laterality H81.10 meclizine (ANTIVERT) 25 MG tablet   4. Attention deficit disorder of adult F98.8 amphetamine-dextroamphetamine (ADDERALL XR) 25 MG 24 hr capsule     amphetamine-dextroamphetamine (ADDERALL XR) 25 MG 24 hr capsule     amphetamine-dextroamphetamine (ADDERALL XR) 25 MG 24 hr capsule       Patient Instructions   For the dizziness .  Avoid  Coffee and salt.   Stay well hydrated - urine should be clear.    Lay down and hold the position until the dizziness resolves. And then  sit up slowly  And hold position again until the dizziness resolved     Nasal films  You do have a nasal fracture  - call ENT for an appointment     For the nose and for the dizziness take the Meclizine  Or   For the allergies get started on a non-sedating anti-histamine such as Claritin, Allegra or Zyrtec, ( get the generic - it is a lot cheaper)  Stay well hydrated - urine should be clear.        Please get an over the counter Vitamin D supplement of 2000 units. Take 4000 units during the fall/winter months and 1000 units during the spring/summer. If after taking Vitamin D for  2-3 months and the memory isn't getting better let me know                 MEDICATIONS:        - Continue other medications without change       - restart the Vitamin D   CONSULTATION/REFERRAL to ENT   See Patient Instructions    JOHN VELAZQUEZ NP, APRN [facility]

## 2017-08-31 NOTE — PATIENT INSTRUCTIONS
For the dizziness .  Avoid  Coffee and salt.   Stay well hydrated - urine should be clear.    Lay down and hold the position until the dizziness resolves. And then sit up slowly  And hold position again until the dizziness resolved     Nasal films  You do have a nasal fracture  - call ENT for an appointment     For the nose and for the dizziness take the Meclizine  Or   For the allergies get started on a non-sedating anti-histamine such as Claritin, Allegra or Zyrtec, ( get the generic - it is a lot cheaper)  Stay well hydrated - urine should be clear.        Please get an over the counter Vitamin D supplement of 2000 units. Take 4000 units during the fall/winter months and 1000 units during the spring/summer. If after taking Vitamin D for  2-3 months and the memory isn't getting better let me know

## 2017-12-03 DIAGNOSIS — F43.21 ADJUSTMENT DISORDER WITH DEPRESSED MOOD: ICD-10-CM

## 2017-12-04 RX ORDER — VENLAFAXINE HYDROCHLORIDE 150 MG/1
CAPSULE, EXTENDED RELEASE ORAL
Qty: 90 CAPSULE | Refills: 1 | Status: SHIPPED | OUTPATIENT
Start: 2017-12-04 | End: 2018-06-07

## 2017-12-31 DIAGNOSIS — F33.0 MAJOR DEPRESSIVE DISORDER, RECURRENT EPISODE, MILD (H): ICD-10-CM

## 2018-01-03 RX ORDER — SERTRALINE HYDROCHLORIDE 100 MG/1
TABLET, FILM COATED ORAL
Qty: 180 TABLET | Refills: 1 | Status: SHIPPED | OUTPATIENT
Start: 2018-01-03 | End: 2018-06-07

## 2018-01-03 NOTE — TELEPHONE ENCOUNTER
PHQ-9 SCORE 6/15/2016 6/27/2016 6/22/2017   Total Score - - -   Total Score MyChart - 2 (Minimal depression) -   Total Score 5 - 3       Prescription approved per [facility] Refill Protocol or patient Primary care provider (PCP)  EBER Mayer  RN/Lowell Jones

## 2018-03-01 ENCOUNTER — OFFICE VISIT (OUTPATIENT)
Dept: FAMILY MEDICINE | Facility: CLINIC | Age: 58
End: 2018-03-01
Payer: COMMERCIAL

## 2018-03-01 ENCOUNTER — TELEPHONE (OUTPATIENT)
Dept: FAMILY MEDICINE | Facility: CLINIC | Age: 58
End: 2018-03-01

## 2018-03-01 VITALS
HEART RATE: 72 BPM | SYSTOLIC BLOOD PRESSURE: 118 MMHG | DIASTOLIC BLOOD PRESSURE: 72 MMHG | RESPIRATION RATE: 16 BRPM | BODY MASS INDEX: 28.81 KG/M2 | HEIGHT: 63 IN | WEIGHT: 162.6 LBS

## 2018-03-01 DIAGNOSIS — T75.3XXS MOTION SICKNESS, SEQUELA: ICD-10-CM

## 2018-03-01 DIAGNOSIS — E55.9 VITAMIN D DEFICIENCY: Primary | ICD-10-CM

## 2018-03-01 DIAGNOSIS — Z72.820 SLEEP DEPRIVATION: Primary | ICD-10-CM

## 2018-03-01 DIAGNOSIS — E55.9 VITAMIN D DEFICIENCY: ICD-10-CM

## 2018-03-01 DIAGNOSIS — F98.8 ATTENTION DEFICIT DISORDER OF ADULT: ICD-10-CM

## 2018-03-01 LAB — DEPRECATED CALCIDIOL+CALCIFEROL SERPL-MC: 38 UG/L (ref 20–75)

## 2018-03-01 PROCEDURE — 99213 OFFICE O/P EST LOW 20 MIN: CPT | Performed by: NURSE PRACTITIONER

## 2018-03-01 PROCEDURE — 36415 COLL VENOUS BLD VENIPUNCTURE: CPT | Performed by: NURSE PRACTITIONER

## 2018-03-01 PROCEDURE — 82306 VITAMIN D 25 HYDROXY: CPT | Performed by: NURSE PRACTITIONER

## 2018-03-01 RX ORDER — ONDANSETRON 4 MG/1
4 TABLET, ORALLY DISINTEGRATING ORAL EVERY 8 HOURS PRN
Qty: 10 TABLET | Refills: 0 | Status: SHIPPED | OUTPATIENT
Start: 2018-03-01 | End: 2019-06-14

## 2018-03-01 RX ORDER — DEXTROAMPHETAMINE SACCHARATE, AMPHETAMINE ASPARTATE MONOHYDRATE, DEXTROAMPHETAMINE SULFATE AND AMPHETAMINE SULFATE 6.25; 6.25; 6.25; 6.25 MG/1; MG/1; MG/1; MG/1
25 CAPSULE, EXTENDED RELEASE ORAL DAILY
Qty: 90 CAPSULE | Refills: 0 | Status: SHIPPED | OUTPATIENT
Start: 2018-03-01 | End: 2018-05-31

## 2018-03-01 ASSESSMENT — ANXIETY QUESTIONNAIRES
2. NOT BEING ABLE TO STOP OR CONTROL WORRYING: MORE THAN HALF THE DAYS
GAD7 TOTAL SCORE: 12
7. FEELING AFRAID AS IF SOMETHING AWFUL MIGHT HAPPEN: SEVERAL DAYS
5. BEING SO RESTLESS THAT IT IS HARD TO SIT STILL: SEVERAL DAYS
6. BECOMING EASILY ANNOYED OR IRRITABLE: NEARLY EVERY DAY
1. FEELING NERVOUS, ANXIOUS, OR ON EDGE: MORE THAN HALF THE DAYS
3. WORRYING TOO MUCH ABOUT DIFFERENT THINGS: MORE THAN HALF THE DAYS

## 2018-03-01 ASSESSMENT — PATIENT HEALTH QUESTIONNAIRE - PHQ9: 5. POOR APPETITE OR OVEREATING: SEVERAL DAYS

## 2018-03-01 ASSESSMENT — PAIN SCALES - GENERAL: PAINLEVEL: NO PAIN (0)

## 2018-03-01 NOTE — PATIENT INSTRUCTIONS
For the day time drowsiness and irritableness,  Try getting back on Adderall       Try to NOT get up when the cat is coming in and waking you up.   You have established is new BAD habit.    it will take several days /nights to change the habit,    Vitamin D  Take  5000 units daily.  Until summer and then you can drop to 2000 u daily then in the fall/winter go back to  5000 units

## 2018-03-01 NOTE — PROGRESS NOTES
SUBJECTIVE:   Patricia Perez is a 57 year old female who presents to clinic today for the following health issues:    *Is fasting.    Concern - Fatigue  Onset: 2-3 weeks ago    Description:   Mind feels foggy and is finding herself short tempered, which is not normal for her. Is going to bed about 8PM and wakes around 4-5AM, is being woken 2-3 times per night by her fiance's cat. States that she feels almost like it is hormonal with the fatigue and is feeling emotional. Will be weepy at times.     Intensity: moderate    Progression of Symptoms:  same    Accompanying Signs & Symptoms:  None    Previous history of similar problem:   Does feel similar to menopause    Therapies Tried and outcome: None    She is feeling at odds.  She is weepy  And is losing her patience    She is feeling hormonal     Problem list and histories reviewed & adjusted, as indicated.  Additional history: as documented    Patient Active Problem List   Diagnosis     Dizziness and giddiness     Motion sickness     GERD (gastroesophageal reflux disease)     Idiopathic cardiomyopathy (H)     Sleep apnea     Hyperlipidemia LDL goal <100     Allergic rhinitis     Mild major depression (H)     Hypertension goal BP (blood pressure) < 130/80     Pelvic pain in female     Health Care Home     24 hour contact handout given     Elevated PTHrP level (H)     H/O bariatric surgery     Hx of gastric bypass     Hypovitaminosis D     Allergic rhinitis     Attention deficit disorder of adult     Mild persistent asthma     Low back pain     Hand joint pain     Posterior vitreous detachment, left eye     Myopia, bilateral     Dumping syndrome     Benign essential hypertension     Nuclear sclerosis of both eyes     Major depressive disorder, recurrent episode, mild (H)     Adjustment disorder with depressed mood     Decreased hearing of both ears     BPV (benign positional vertigo), unspecified laterality     Advanced directives, counseling/discussion     Past  Surgical History:   Procedure Laterality Date     BARIATRIC SURGERY       DIABETES RESOURCES  As Child    Tonsillectomy     ENT SURGERY       HERNIA REPAIR       LAPAROSCOPIC BYPASS GASTRIC  10/16/2012    Procedure: LAPAROSCOPIC BYPASS GASTRIC;  laparoscopic reyna en y gastric bypass;  Surgeon: Nish Bradford MD;  Location: [facility] OR     TONSILLECTOMY       Uretural Sticture  As Child       Social History   Substance Use Topics     Smoking status: Never Smoker     Smokeless tobacco: Never Used     Alcohol use No     Family History   Problem Relation Age of Onset     Hypertension Father      Lipids Father      Alcohol/Drug Father      Abuse     Depression Father      Respiratory Father      COPD     Cardiovascular Father      GASTROINTESTINAL DISEASE Mother      non cancerous pancreatic tumor     Hypertension Mother      HEART DISEASE Mother      A fib     Breast Cancer Maternal Grandmother      Glaucoma Paternal Grandmother      Depression Brother      Hypertension Brother      CEREBROVASCULAR DISEASE Other      Macular Degeneration Other      DIABETES No family hx of      Cancer - colorectal No family hx of      Retinal detachment No family hx of      Amblyopia No family hx of      Thyroid Disease No family hx of          Current Outpatient Prescriptions   Medication Sig Dispense Refill     sertraline (ZOLOFT) 100 MG tablet TAKE 2 TABLETS BY MOUTH DAILY 180 tablet 1     venlafaxine (EFFEXOR-XR) 150 MG 24 hr capsule TAKE 1 CAPSULE(150 MG) BY MOUTH DAILY 90 capsule 1     meclizine (ANTIVERT) 25 MG tablet Take 1 tablet (25 mg) by mouth 3 times daily as needed 30 tablet 1     isosorbide mononitrate (IMDUR) 30 MG 24 hr tablet TAKE 1 TABLET(30 MG) BY MOUTH DAILY 90 tablet 3     estrogen conj-medroxyPROGESTERone (PREMPRO) 0.45-1.5 MG per tablet Take 1 tablet by mouth daily 90 tablet 3     carvedilol (COREG) 25 MG tablet TAKE 1 TABLET(25 MG) BY MOUTH TWICE DAILY WITH MEALS 180 tablet 3     hydrALAZINE (APRESOLINE) 10 MG  tablet Take 1 tablet (10 mg) by mouth 3 times daily 270 tablet 3     lisinopril (PRINIVIL/ZESTRIL) 5 MG tablet Take 1 tablet (5 mg) by mouth daily 90 tablet 3     simvastatin (ZOCOR) 20 MG tablet Take 1 tablet (20 mg) by mouth At Bedtime 90 tablet 3     blood glucose monitoring (NO BRAND SPECIFIED) test strip Use to test blood sugar 1-2 times daily or as directed. 1 Box 2     calcium carbonate-vitamin D (CALTRATE 600+D) 600-400 MG-UNIT CHEW Take 1 chew tab by mouth 2 times daily 180 tablet 3     COMFORT LANCETS MISC 1 Device 2 times daily as needed 1 Box 1     Cyanocobalamin (VITAMIN B-12 SL) Place  under the tongue daily.       Multiple Vitamin (MULTI-VITAMIN) per tablet Take 1 tablet by mouth daily. 100 tablet 3     [DISCONTINUED] isosorbide mononitrate (IMDUR) 30 MG 24 hr tablet Take 1 tablet (30 mg) by mouth daily 90 tablet 3     [DISCONTINUED] venlafaxine (EFFEXOR-ER) 225 MG TB24 24 hr tablet Take 1 tablet (225 mg) by mouth daily (with breakfast) 90 each 0     ondansetron (ZOFRAN ODT) 4 MG ODT tab Take 1 tablet (4 mg) by mouth every 8 hours as needed for nausea 10 tablet 0     albuterol (PROAIR HFA, PROVENTIL HFA, VENTOLIN HFA) 108 (90 BASE) MCG/ACT inhaler Inhale 2 puffs into the lungs every 6 hours as needed for shortness of breath / dyspnea or wheezing (Patient not taking: Reported on 3/1/2018) 1 Inhaler 11     Allergies   Allergen Reactions     No Known Drug Allergies      BP Readings from Last 3 Encounters:   03/01/18 118/72   08/31/17 120/78   08/04/17 119/80    Wt Readings from Last 3 Encounters:   03/01/18 162 lb 9.6 oz (73.8 kg)   08/31/17 161 lb 9.6 oz (73.3 kg)   08/04/17 163 lb (73.9 kg)                    Reviewed and updated as needed this visit by clinical staff       Reviewed and updated as needed this visit by Provider         ROS:  CONSTITUTIONAL: NEGATIVE for fever, chills, change in weight  ENT/MOUTH: NEGATIVE for ear, mouth and throat problems  RESP: NEGATIVE for significant cough or  "SOB  CV: NEGATIVE for chest pain, palpitations or peripheral edema  NEURO: 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  Is doing some word searching   PSYCHIATRIC: POSITIVE for concentration difficulty, depressed mood, fatigue and sleep is being disrupted with the cat waking her up several times per night.   She is getting short tempered and she has never been this way.   She is becoming short tempered at work.   Has stopped her Adderall as it is hard to pick the medication up every 30 days.       OBJECTIVE:     /72 (BP Location: Left arm, Patient Position: Chair, Cuff Size: Adult Regular)  Pulse 72  Resp 16  Ht 5' 2.72\" (1.593 m)  Wt 162 lb 9.6 oz (73.8 kg)  LMP 07/16/2012  BMI 29.06 kg/m2  Body mass index is 29.06 kg/(m^2).   GENERAL: healthy, alert and no distress  NECK: no adenopathy, no asymmetry, masses, or scars and thyroid normal to palpation  RESP: lungs clear to auscultation - no rales, rhonchi or wheezes  CV: regular rate and rhythm, normal S1 S2, no S3 or S4, no murmur, click or rub, no peripheral edema and peripheral pulses strong  PSYCH: mentation appears normal, judgement and insight intact, appearance well groomed and she is upset with the move of her office to a smaller room - the size of the office is a statis symbol at her work and now they are giving her office to an  .   Is sleep deprived .  Reviewed the s/sx of sleep deprivation     Diagnostic Test Results:  Vitamin D pending     ASSESSMENT/PLAN:     ASSESSMENT/PLAN:      ICD-10-CM    1. Sleep deprivation Z72.820    2. Attention deficit disorder of adult F98.8 amphetamine-dextroamphetamine (ADDERALL XR) 25 MG 24 hr capsule   3. Vitamin D deficiency E55.9 Vitamin D Deficiency   4. Motion sickness, sequela T75.3XXS ondansetron (ZOFRAN ODT) 4 MG ODT tab       Patient Instructions   For the day time drowsiness and irritableness,  Try getting back on Adderall       Try to NOT get up when the cat is coming in and waking you " up.   You have established is new BAD habit.    it will take several days /nights to change the habit,    Vitamin D  Take  5000 units daily.  Until summer and then you can drop to 2000 u daily then in the fall/winter go back to  5000 units                MEDICATIONS:        - Start taking Adderall        - Continue other medications without change    See Patient Instructions    JOHN VELAZQUEZ NP, APRN [facility]

## 2018-03-01 NOTE — MR AVS SNAPSHOT
After Visit Summary   3/1/2018    Patricia Perez    MRN: [id number]           Patient Information     Date Of Birth          1960        Visit Information        Provider Department      3/1/2018 8:40 AM Blanche Cummins APRN [facility]        Today's Diagnoses     Sleep deprivation    -  1    Attention deficit disorder of adult        Vitamin D deficiency        Motion sickness, sequela          Care Instructions    For the day time drowsiness and irritableness,  Try getting back on Adderall       Try to NOT get up when the cat is coming in and waking you up.   You have established is new BAD habit.    it will take several days /nights to change the habit,    Vitamin D  Take  5000 units daily.  Until summer and then you can drop to 2000 u daily then in the fall/winter go back to  5000 units           Follow-ups after your visit        Who to contact     Normal or non-critical lab and imaging results will be communicated to you by Taskdoerhart, letter or phone within 4 business days after the clinic has received the results. If you do not hear from us within 7 days, please contact the clinic through [employer] or phone. If you have a critical or abnormal lab result, we will notify you by phone as soon as possible.  Submit refill requests through [employer] or call your pharmacy and they will forward the refill request to us. Please allow 3 business days for your refill to be completed.          If you need to speak with a  for additional information , please call: [phone]           Additional Information About Your Visit        TaskdoerharWhi Information     [employer] gives you secure access to your electronic health record. If you see a primary care provider, you can also send messages to your care team and make appointments. If you have questions, please call your primary care clinic.  If you do not have a primary care provider, please call [phone] and they  "will assist you.        Care EveryWhere ID     This is your Care EveryWhere ID. This could be used by other organizations to access your [place] medical records  [id number]        Your Vitals Were     Pulse Respirations Height Last Period BMI (Body Mass Index)       72 16 5' 2.72\" (1.593 m) 07/16/2012 29.06 kg/m2        Blood Pressure from Last 3 Encounters:   03/01/18 118/72   08/31/17 120/78   08/04/17 119/80    Weight from Last 3 Encounters:   03/01/18 162 lb 9.6 oz (73.8 kg)   08/31/17 161 lb 9.6 oz (73.3 kg)   08/04/17 163 lb (73.9 kg)              We Performed the Following     Vitamin D Deficiency          Today's Medication Changes          These changes are accurate as of 3/1/18  9:23 AM.  If you have any questions, ask your nurse or doctor.               These medicines have changed or have updated prescriptions.        Dose/Directions    amphetamine-dextroamphetamine 25 MG 24 hr capsule   Commonly known as:  ADDERALL XR   This may have changed:  Another medication with the same name was removed. Continue taking this medication, and follow the directions you see here.   Used for:  Attention deficit disorder of adult   Changed by:  Blanche Cummins APRN CNP        Dose:  25 mg   Take 1 capsule (25 mg) by mouth daily   Quantity:  90 capsule   Refills:  0       isosorbide mononitrate 30 MG 24 hr tablet   Commonly known as:  IMDUR   This may have changed:  Another medication with the same name was removed. Continue taking this medication, and follow the directions you see here.   Used for:  Essential hypertension with goal blood pressure less than 130/80   Changed by:  Blanche Cummins APRN CNP        TAKE 1 TABLET(30 MG) BY MOUTH DAILY   Quantity:  90 tablet   Refills:  3       venlafaxine 150 MG 24 hr capsule   Commonly known as:  EFFEXOR-XR   This may have changed:  Another medication with the same name was removed. Continue taking this medication, and follow the directions you see here. "   Used for:  Adjustment disorder with depressed mood   Changed by:  Blanche Cummins APRN CNP        TAKE 1 CAPSULE(150 MG) BY MOUTH DAILY   Quantity:  90 capsule   Refills:  1         Stop taking these medicines if you haven't already. Please contact your care team if you have questions.     HYDROcodone-acetaminophen 5-325 MG per tablet   Commonly known as:  NORCO   Stopped by:  Blanche Cummins APRN CNP                Where to get your medicines      These medications were sent to [facility] [address] [place] [address]  AT [address] [facility] [address]     Phone:  [phone]     ondansetron 4 MG ODT tab         Some of these will need a paper prescription and others can be bought over the counter.  Ask your nurse if you have questions.     Bring a paper prescription for each of these medications     amphetamine-dextroamphetamine 25 MG 24 hr capsule                Primary Care Provider Office Phone # Fax #    ELIOT Rodriguez [phone] [address] [place] [address]        Equal Access to Services     SAURABH [facility]portPRAVEEN : Hadii aad ku hadasho Soomaali, waaxda luqadaha, qaybta kaalmada adeegyada, danilo euceda . So [facility] [phone].    ATENCIÓN: Si habla español, tiene a ortiz disposición servicios gratuitos de asistencia lingüística. [facility] [phone].    We comply with applicable federal civil rights laws and [place] laws. We do not discriminate on the basis of race, color, national origin, age, disability, sex, sexual orientation, or gender identity.            Thank you!     Thank you for choosing [facility]  for your care. Our goal is always to provide you with excellent care. Hearing back from our patients is one way we can continue to improve our services. Please take a few minutes to complete the written survey that you may receive in the mail  after your visit with us. Thank you!             Your Updated Medication List - Protect others around you: Learn how to safely use, store and throw away your medicines at www.disposemymeds.org.          This list is accurate as of 3/1/18  9:23 AM.  Always use your most recent med list.                   Brand Name Dispense Instructions for use Diagnosis    albuterol 108 (90 BASE) MCG/ACT Inhaler    PROAIR HFA/PROVENTIL HFA/VENTOLIN HFA    1 Inhaler    Inhale 2 puffs into the lungs every 6 hours as needed for shortness of breath / dyspnea or wheezing    Mild persistent asthma, uncomplicated       amphetamine-dextroamphetamine 25 MG 24 hr capsule    ADDERALL XR    90 capsule    Take 1 capsule (25 mg) by mouth daily    Attention deficit disorder of adult       blood glucose monitoring test strip    no brand specified    1 Box    Use to test blood sugar 1-2 times daily or as directed.    Hypoglycemia       calcium carbonate-vitamin D 600-400 MG-UNIT Chew    CALTRATE 600+D    180 tablet    Take 1 chew tab by mouth 2 times daily    Hypovitaminosis D, Hx of gastric bypass       carvedilol 25 MG tablet    COREG    180 tablet    TAKE 1 TABLET(25 MG) BY MOUTH TWICE DAILY WITH MEALS    Cardiomyopathy, unspecified       COMFORT LANCETS Misc     1 Box    1 Device 2 times daily as needed    Hypoglycemia       estrogen conj-medroxyPROGESTERone 0.45-1.5 MG per tablet    Prempro    90 tablet    Take 1 tablet by mouth daily    Menopausal syndrome (hot flashes)       hydrALAZINE 10 MG tablet    APRESOLINE    270 tablet    Take 1 tablet (10 mg) by mouth 3 times daily    Idiopathic cardiomyopathy (H), Essential hypertension with goal blood pressure less than 130/80       isosorbide mononitrate 30 MG 24 hr tablet    IMDUR    90 tablet    TAKE 1 TABLET(30 MG) BY MOUTH DAILY    Essential hypertension with goal blood pressure less than 130/80       lisinopril 5 MG tablet    PRINIVIL/ZESTRIL    90 tablet    Take 1 tablet (5 mg) by mouth  daily    Coronary artery disease involving native coronary artery of native heart without angina pectoris       meclizine 25 MG tablet    ANTIVERT    30 tablet    Take 1 tablet (25 mg) by mouth 3 times daily as needed    BPV (benign positional vertigo), unspecified laterality       Multi-vitamin Tabs tablet   Generic drug:  multivitamin, therapeutic with minerals     100 tablet    Take 1 tablet by mouth daily.    Gastric bypass status for obesity       ondansetron 4 MG ODT tab    ZOFRAN ODT    10 tablet    Take 1 tablet (4 mg) by mouth every 8 hours as needed for nausea    Motion sickness, sequela       sertraline 100 MG tablet    ZOLOFT    180 tablet    TAKE 2 TABLETS BY MOUTH DAILY    Major depressive disorder, recurrent episode, mild (H)       simvastatin 20 MG tablet    ZOCOR    90 tablet    Take 1 tablet (20 mg) by mouth At Bedtime    Hyperlipidemia LDL goal <100       venlafaxine 150 MG 24 hr capsule    EFFEXOR-XR    90 capsule    TAKE 1 CAPSULE(150 MG) BY MOUTH DAILY    Adjustment disorder with depressed mood       VITAMIN B-12 SL      Place  under the tongue daily.

## 2018-03-01 NOTE — NURSING NOTE
"Chief Complaint   Patient presents with     Fatigue       Initial /72 (BP Location: Left arm, Patient Position: Chair, Cuff Size: Adult Regular)  Pulse 72  Resp 16  Ht 5' 2.72\" (1.593 m)  Wt 162 lb 9.6 oz (73.8 kg)  LMP 07/16/2012  BMI 29.06 kg/m2 Estimated body mass index is 29.06 kg/(m^2) as calculated from the following:    Height as of this encounter: 5' 2.72\" (1.593 m).    Weight as of this encounter: 162 lb 9.6 oz (73.8 kg).  Medication Reconciliation: complete     Phyllis Kwan CMA (AAMA)      "

## 2018-03-02 ASSESSMENT — PATIENT HEALTH QUESTIONNAIRE - PHQ9: SUM OF ALL RESPONSES TO PHQ QUESTIONS 1-9: 4

## 2018-03-02 ASSESSMENT — ANXIETY QUESTIONNAIRES: GAD7 TOTAL SCORE: 12

## 2018-03-02 ASSESSMENT — ASTHMA QUESTIONNAIRES: ACT_TOTALSCORE: 25

## 2018-05-30 ENCOUNTER — MYC MEDICAL ADVICE (OUTPATIENT)
Dept: FAMILY MEDICINE | Facility: CLINIC | Age: 58
End: 2018-05-30

## 2018-05-30 DIAGNOSIS — F98.8 ATTENTION DEFICIT DISORDER OF ADULT: ICD-10-CM

## 2018-05-31 ENCOUNTER — TELEPHONE (OUTPATIENT)
Dept: FAMILY MEDICINE | Facility: CLINIC | Age: 58
End: 2018-05-31

## 2018-05-31 DIAGNOSIS — F98.8 ATTENTION DEFICIT DISORDER OF ADULT: ICD-10-CM

## 2018-05-31 RX ORDER — DEXTROAMPHETAMINE SACCHARATE, AMPHETAMINE ASPARTATE MONOHYDRATE, DEXTROAMPHETAMINE SULFATE AND AMPHETAMINE SULFATE 6.25; 6.25; 6.25; 6.25 MG/1; MG/1; MG/1; MG/1
25 CAPSULE, EXTENDED RELEASE ORAL DAILY
Qty: 90 CAPSULE | Refills: 0 | Status: SHIPPED | OUTPATIENT
Start: 2018-05-31 | End: 2018-09-11

## 2018-06-01 NOTE — TELEPHONE ENCOUNTER
Script walked over to the [facility].    Ifrah Francis, [facility]

## 2018-06-07 ENCOUNTER — OFFICE VISIT (OUTPATIENT)
Dept: FAMILY MEDICINE | Facility: CLINIC | Age: 58
End: 2018-06-07
Payer: COMMERCIAL

## 2018-06-07 VITALS
WEIGHT: 166.2 LBS | HEART RATE: 72 BPM | BODY MASS INDEX: 29.71 KG/M2 | RESPIRATION RATE: 14 BRPM | DIASTOLIC BLOOD PRESSURE: 76 MMHG | TEMPERATURE: 97.3 F | SYSTOLIC BLOOD PRESSURE: 118 MMHG

## 2018-06-07 DIAGNOSIS — E78.5 HYPERLIPIDEMIA LDL GOAL <100: ICD-10-CM

## 2018-06-07 DIAGNOSIS — F32.0 MILD MAJOR DEPRESSION (H): ICD-10-CM

## 2018-06-07 DIAGNOSIS — I25.10 CORONARY ARTERY DISEASE INVOLVING NATIVE CORONARY ARTERY OF NATIVE HEART WITHOUT ANGINA PECTORIS: ICD-10-CM

## 2018-06-07 DIAGNOSIS — Z12.31 ENCOUNTER FOR SCREENING MAMMOGRAM FOR MALIGNANT NEOPLASM OF BREAST: ICD-10-CM

## 2018-06-07 DIAGNOSIS — I10 HYPERTENSION GOAL BP (BLOOD PRESSURE) < 130/80: ICD-10-CM

## 2018-06-07 DIAGNOSIS — I42.9 IDIOPATHIC CARDIOMYOPATHY (H): ICD-10-CM

## 2018-06-07 DIAGNOSIS — J45.30 MILD PERSISTENT ASTHMA WITHOUT COMPLICATION: ICD-10-CM

## 2018-06-07 DIAGNOSIS — N95.1 MENOPAUSAL SYNDROME (HOT FLASHES): ICD-10-CM

## 2018-06-07 DIAGNOSIS — F33.0 MAJOR DEPRESSIVE DISORDER, RECURRENT EPISODE, MILD (H): ICD-10-CM

## 2018-06-07 DIAGNOSIS — Z11.4 SCREENING FOR HIV (HUMAN IMMUNODEFICIENCY VIRUS): ICD-10-CM

## 2018-06-07 DIAGNOSIS — I10 ESSENTIAL HYPERTENSION WITH GOAL BLOOD PRESSURE LESS THAN 130/80: ICD-10-CM

## 2018-06-07 DIAGNOSIS — Z00.00 ROUTINE GENERAL MEDICAL EXAMINATION AT A HEALTH CARE FACILITY: Primary | ICD-10-CM

## 2018-06-07 DIAGNOSIS — F43.21 ADJUSTMENT DISORDER WITH DEPRESSED MOOD: ICD-10-CM

## 2018-06-07 LAB
ALBUMIN SERPL-MCNC: 3.8 G/DL (ref 3.4–5)
ALP SERPL-CCNC: 86 U/L (ref 40–150)
ALT SERPL W P-5'-P-CCNC: 27 U/L (ref 0–50)
ANION GAP SERPL CALCULATED.3IONS-SCNC: 9 MMOL/L (ref 3–14)
AST SERPL W P-5'-P-CCNC: 17 U/L (ref 0–45)
BASOPHILS # BLD AUTO: 0 10E9/L (ref 0–0.2)
BASOPHILS NFR BLD AUTO: 0.7 %
BILIRUB SERPL-MCNC: 0.3 MG/DL (ref 0.2–1.3)
BUN SERPL-MCNC: 18 MG/DL (ref 7–30)
CALCIUM SERPL-MCNC: 8.7 MG/DL (ref 8.5–10.1)
CHLORIDE SERPL-SCNC: 104 MMOL/L (ref 94–109)
CHOLEST SERPL-MCNC: 271 MG/DL
CO2 SERPL-SCNC: 27 MMOL/L (ref 20–32)
CREAT SERPL-MCNC: 0.68 MG/DL (ref 0.52–1.04)
CREAT UR-MCNC: 21 MG/DL
DIFFERENTIAL METHOD BLD: NORMAL
EOSINOPHIL # BLD AUTO: 0.1 10E9/L (ref 0–0.7)
EOSINOPHIL NFR BLD AUTO: 1.8 %
ERYTHROCYTE [DISTWIDTH] IN BLOOD BY AUTOMATED COUNT: 13.9 % (ref 10–15)
GFR SERPL CREATININE-BSD FRML MDRD: 89 ML/MIN/1.7M2
GLUCOSE SERPL-MCNC: 84 MG/DL (ref 70–99)
HCT VFR BLD AUTO: 38.5 % (ref 35–47)
HDLC SERPL-MCNC: 115 MG/DL
HGB BLD-MCNC: 12.3 G/DL (ref 11.7–15.7)
LDLC SERPL CALC-MCNC: 134 MG/DL
LYMPHOCYTES # BLD AUTO: 2.5 10E9/L (ref 0.8–5.3)
LYMPHOCYTES NFR BLD AUTO: 40.2 %
MCH RBC QN AUTO: 29.9 PG (ref 26.5–33)
MCHC RBC AUTO-ENTMCNC: 31.9 G/DL (ref 31.5–36.5)
MCV RBC AUTO: 94 FL (ref 78–100)
MICROALBUMIN UR-MCNC: <5 MG/L
MICROALBUMIN/CREAT UR: NORMAL MG/G CR (ref 0–25)
MONOCYTES # BLD AUTO: 0.4 10E9/L (ref 0–1.3)
MONOCYTES NFR BLD AUTO: 7 %
NEUTROPHILS # BLD AUTO: 3.1 10E9/L (ref 1.6–8.3)
NEUTROPHILS NFR BLD AUTO: 50.3 %
NONHDLC SERPL-MCNC: 156 MG/DL
PLATELET # BLD AUTO: 264 10E9/L (ref 150–450)
POTASSIUM SERPL-SCNC: 3.7 MMOL/L (ref 3.4–5.3)
PROT SERPL-MCNC: 7 G/DL (ref 6.8–8.8)
RBC # BLD AUTO: 4.11 10E12/L (ref 3.8–5.2)
SODIUM SERPL-SCNC: 140 MMOL/L (ref 133–144)
TRIGL SERPL-MCNC: 109 MG/DL
WBC # BLD AUTO: 6.1 10E9/L (ref 4–11)

## 2018-06-07 PROCEDURE — 87389 HIV-1 AG W/HIV-1&-2 AB AG IA: CPT | Performed by: NURSE PRACTITIONER

## 2018-06-07 PROCEDURE — 85025 COMPLETE CBC W/AUTO DIFF WBC: CPT | Performed by: NURSE PRACTITIONER

## 2018-06-07 PROCEDURE — 99396 PREV VISIT EST AGE 40-64: CPT | Performed by: NURSE PRACTITIONER

## 2018-06-07 PROCEDURE — 80061 LIPID PANEL: CPT | Performed by: NURSE PRACTITIONER

## 2018-06-07 PROCEDURE — 80053 COMPREHEN METABOLIC PANEL: CPT | Performed by: NURSE PRACTITIONER

## 2018-06-07 PROCEDURE — 36415 COLL VENOUS BLD VENIPUNCTURE: CPT | Performed by: NURSE PRACTITIONER

## 2018-06-07 PROCEDURE — 82043 UR ALBUMIN QUANTITATIVE: CPT | Performed by: NURSE PRACTITIONER

## 2018-06-07 RX ORDER — ISOSORBIDE MONONITRATE 30 MG/1
TABLET, EXTENDED RELEASE ORAL
Qty: 90 TABLET | Refills: 3 | Status: SHIPPED | OUTPATIENT
Start: 2018-06-07 | End: 2019-06-14

## 2018-06-07 RX ORDER — HYDRALAZINE HYDROCHLORIDE 10 MG/1
10 TABLET, FILM COATED ORAL 3 TIMES DAILY
Qty: 270 TABLET | Refills: 3 | Status: SHIPPED | OUTPATIENT
Start: 2018-06-07 | End: 2019-06-14

## 2018-06-07 RX ORDER — VENLAFAXINE HYDROCHLORIDE 150 MG/1
CAPSULE, EXTENDED RELEASE ORAL
Qty: 90 CAPSULE | Refills: 1 | Status: SHIPPED | OUTPATIENT
Start: 2018-06-07 | End: 2018-12-12

## 2018-06-07 RX ORDER — SIMVASTATIN 20 MG
20 TABLET ORAL AT BEDTIME
Qty: 90 TABLET | Refills: 3 | Status: SHIPPED | OUTPATIENT
Start: 2018-06-07 | End: 2019-06-14

## 2018-06-07 RX ORDER — LISINOPRIL 5 MG/1
5 TABLET ORAL DAILY
Qty: 90 TABLET | Refills: 3 | Status: SHIPPED | OUTPATIENT
Start: 2018-06-07 | End: 2019-06-14

## 2018-06-07 RX ORDER — CARVEDILOL 25 MG/1
TABLET ORAL
Qty: 180 TABLET | Refills: 3 | Status: SHIPPED | OUTPATIENT
Start: 2018-06-07 | End: 2019-06-14

## 2018-06-07 RX ORDER — SERTRALINE HYDROCHLORIDE 100 MG/1
TABLET, FILM COATED ORAL
Qty: 180 TABLET | Refills: 1 | Status: SHIPPED | OUTPATIENT
Start: 2018-06-07 | End: 2018-12-27

## 2018-06-07 ASSESSMENT — ANXIETY QUESTIONNAIRES
3. WORRYING TOO MUCH ABOUT DIFFERENT THINGS: SEVERAL DAYS
1. FEELING NERVOUS, ANXIOUS, OR ON EDGE: SEVERAL DAYS
7. FEELING AFRAID AS IF SOMETHING AWFUL MIGHT HAPPEN: NOT AT ALL
5. BEING SO RESTLESS THAT IT IS HARD TO SIT STILL: SEVERAL DAYS
2. NOT BEING ABLE TO STOP OR CONTROL WORRYING: NOT AT ALL
GAD7 TOTAL SCORE: 3
6. BECOMING EASILY ANNOYED OR IRRITABLE: NOT AT ALL

## 2018-06-07 ASSESSMENT — PAIN SCALES - GENERAL: PAINLEVEL: NO PAIN (0)

## 2018-06-07 ASSESSMENT — PATIENT HEALTH QUESTIONNAIRE - PHQ9: 5. POOR APPETITE OR OVEREATING: NOT AT ALL

## 2018-06-07 NOTE — LETTER
My Depression Action Plan  Name: Patricia Perez   Date of Birth 1960  Date: 6/7/2018    My doctor: Blanche Cummins   My clinic: [address] [id number] [phone]          GREEN    ZONE   Good Control    What it looks like:     Things are going generally well. You have normal up s and down s. You may even feel depressed from time to time, but bad moods usually last less than a day.   What you need to do:  1. Continue to care for yourself (see self care plan)  2. Check your depression survival kit and update it as needed  3. Follow your physician s recommendations including any medication.  4. Do not stop taking medication unless you consult with your physician first.           YELLOW         ZONE Getting Worse    What it looks like:     Depression is starting to interfere with your life.     It may be hard to get out of bed; you may be starting to isolate yourself from others.    Symptoms of depression are starting to last most all day and this has happened for several days.     You may have suicidal thoughts but they are not constant.   What you need to do:     1. Call your care team, your response to treatment will improve if you keep your care team informed of your progress. Yellow periods are signs an adjustment may need to be made.     2. Continue your self-care, even if you have to fake it!    3. Talk to someone in your support network    4. Open up your depression survival kit           RED    ZONE Medical Alert - Get Help    What it looks like:     Depression is seriously interfering with your life.     You may experience these or other symptoms: You can t get out of bed most days, can t work or engage in other necessary activities, you have trouble taking care of basic hygiene, or basic responsibilities, thoughts of suicide or death that will not go away, self-injurious behavior.     What you need to do:  1. Call your care team  and request a same-day appointment. If they are not available (weekends or after hours) call your local crisis line, emergency room or 911.            Depression Self Care Plan / Survival Kit    Self-Care for Depression  Here s the deal. Your body and mind are really not as separate as most people think.  What you do and think affects how you feel and how you feel influences what you do and think. This means if you do things that people who feel good do, it will help you feel better.  Sometimes this is all it takes.  There is also a place for medication and therapy depending on how severe your depression is, so be sure to consult with your medical provider and/ or Behavioral Health Consultant if your symptoms are worsening or not improving.     In order to better manage my stress, I will:    Exercise  Get some form of exercise, every day. This will help reduce pain and release endorphins, the  feel good  chemicals in your brain. This is almost as good as taking antidepressants!  This is not the same as joining a gym and then never going! (they count on that by the way ) It can be as simple as just going for a walk or doing some gardening, anything that will get you moving.      Hygiene   Maintain good hygiene (Get out of bed in the morning, Make your bed, Brush your teeth, Take a shower, and Get dressed like you were going to work, even if you are unemployed).  If your clothes don't fit try to get ones that do.    Diet  I will strive to eat foods that are good for me, drink plenty of water, and avoid excessive sugar, caffeine, alcohol, and other mood-altering substances.  Some foods that are helpful in depression are: complex carbohydrates, B vitamins, flaxseed, fish or fish oil, fresh fruits and vegetables.    Psychotherapy  I agree to participate in Individual Therapy (if recommended).    Medication  If prescribed medications, I agree to take them.  Missing doses can result in serious side effects.  I understand  that drinking alcohol, or other illicit drug use, may cause potential side effects.  I will not stop my medication abruptly without first discussing it with my provider.    Staying Connected With Others  I will stay in touch with my friends, family members, and my primary care provider/team.    Use your imagination  Be creative.  We all have a creative side; it doesn t matter if it s oil painting, sand castles, or mud pies! This will also kick up the endorphins.    Witness Beauty  (AKA stop and smell the roses) Take a look outside, even in mid-winter. Notice colors, textures. Watch the squirrels and birds.     Service to others  Be of service to others.  There is always someone else in need.  By helping others we can  get out of ourselves  and remember the really important things.  This also provides opportunities for practicing all the other parts of the program.    Humor  Laugh and be silly!  Adjust your TV habits for less news and crime-drama and more comedy.    Control your stress  Try breathing deep, massage therapy, biofeedback, and meditation. Find time to relax each day.     My support system    Clinic Contact:  Phone number:    Contact 1:  Phone number:    Contact 2:  Phone number:    Sabianism/:  Phone number:    Therapist:  Phone number:    Local crisis center:    Phone number:    Other community support:  Phone number:

## 2018-06-07 NOTE — MR AVS SNAPSHOT
After Visit Summary   6/7/2018    Patricia Perez    MRN: [id number]           Patient Information     Date Of Birth          1960        Visit Information        Provider Department      6/7/2018 7:40 AM Blanche Cummins APRN [facility]        Today's Diagnoses     Routine general medical examination at a health care facility    -  1    Hypertension goal BP (blood pressure) < 130/80        Hyperlipidemia LDL goal <100        Mild major depression (H)        Mild persistent asthma without complication        Adjustment disorder with depressed mood        Major depressive disorder, recurrent episode, mild (H)        Coronary artery disease involving native coronary artery of native heart without angina pectoris        Menopausal syndrome (hot flashes)        Idiopathic cardiomyopathy (H)        Essential hypertension with goal blood pressure less than 130/80        Encounter for screening mammogram for malignant neoplasm of breast          Care Instructions      Preventive Health Recommendations  Female Ages 50 - 64    Yearly exam: See your health care provider every year in order to  o Review health changes.   o Discuss preventive care.    o Review your medicines if your doctor has prescribed any.      Get a Pap test every three years (unless you have an abnormal result and your provider advises testing more often).    If you get Pap tests with HPV test, you only need to test every 5 years, unless you have an abnormal result.     You do not need a Pap test if your uterus was removed (hysterectomy) and you have not had cancer.    You should be tested each year for STDs (sexually transmitted diseases) if you're at risk.     Have a mammogram every 1 to 2 years.    Have a colonoscopy at age 50, or have a yearly FIT test (stool test). These exams screen for colon cancer.      Have a cholesterol test every 5 years, or more often if advised.    Have a diabetes test (fasting  glucose) every three years. If you are at risk for diabetes, you should have this test more often.     If you are at risk for osteoporosis (brittle bone disease), think about having a bone density scan (DEXA).    Shots: Get a flu shot each year. Get a tetanus shot every 10 years.    Nutrition:     Eat at least 5 servings of fruits and vegetables each day.    Eat whole-grain bread, whole-wheat pasta and brown rice instead of white grains and rice.    Talk to your provider about Calcium and Vitamin D.     Lifestyle    Exercise at least 150 minutes a week (30 minutes a day, 5 days a week). This will help you control your weight and prevent disease.    Limit alcohol to one drink per day.    No smoking.     Wear sunscreen to prevent skin cancer.     See your dentist every six months for an exam and cleaning.    See your eye doctor every 1 to 2 years.    For the decreased sexual interest.   Exercise,    Decrease the Zoloft by  1/2 tablet    Work is very busy,  Is leaving home at 6 am to  6 pm .       Try this  Every  90 min  Walk 90 seconds = MOVE      Stay well hydrated - urine should be clear.            Follow-ups after your visit        Who to contact     Normal or non-critical lab and imaging results will be communicated to you by Krakent, letter or phone within 4 business days after the clinic has received the results. If you do not hear from us within 7 days, please contact the clinic through Krakent or phone. If you have a critical or abnormal lab result, we will notify you by phone as soon as possible.  Submit refill requests through AskU or call your pharmacy and they will forward the refill request to us. Please allow 3 business days for your refill to be completed.          If you need to speak with a  for additional information , please call: [phone]           Additional Information About Your Visit        AskU Information     AskU gives you secure access to your electronic  health record. If you see a primary care provider, you can also send messages to your care team and make appointments. If you have questions, please call your primary care clinic.  If you do not have a primary care provider, please call [phone] and they will assist you.        Care EveryWhere ID     This is your Care EveryWhere ID. This could be used by other organizations to access your [facility] medical records  [id number]        Your Vitals Were     Pulse Temperature Respirations Last Period BMI (Body Mass Index)       72 97.3  F (36.3  C) (Tympanic) 14 07/16/2012 29.71 kg/m2        Blood Pressure from Last 3 Encounters:   06/07/18 118/76   03/01/18 118/72   08/31/17 120/78    Weight from Last 3 Encounters:   06/07/18 166 lb 3.2 oz (75.4 kg)   03/01/18 162 lb 9.6 oz (73.8 kg)   08/31/17 161 lb 9.6 oz (73.3 kg)              Today, you had the following     No orders found for display       Primary Care Provider Office Phone # Fax #    Blanche Cummins, APRN [facility] [phone] [address] [place] [address]        Equal Access to Services     FROILAN HAY : Hadii aad ku hadasho Soomaali, waaxda luqadaha, qaybta kaalmada adeegyada, danilo luquein haycarlitan andry mathur. So [facility] [phone].    ATENCIÓN: Si habla español, tiene a ortiz disposición servicios gratuitos de asistencia lingüística. Llame al [phone].    We comply with applicable federal civil rights laws and [place] laws. We do not discriminate on the basis of race, color, national origin, age, disability, sex, sexual orientation, or gender identity.            Thank you!     Thank you for choosing [facility] [place]  for your care. Our goal is always to provide you with excellent care. Hearing back from our patients is one way we can continue to improve our services. Please take a few minutes to complete the written survey that you may receive in the mail after your visit with us. Thank you!              Your Updated Medication List - Protect others around you: Learn how to safely use, store and throw away your medicines at www.disposemymeds.org.          This list is accurate as of 6/7/18  8:38 AM.  Always use your most recent med list.                   Brand Name Dispense Instructions for use Diagnosis    albuterol 108 (90 Base) MCG/ACT Inhaler    PROAIR HFA/PROVENTIL HFA/VENTOLIN HFA    1 Inhaler    Inhale 2 puffs into the lungs every 6 hours as needed for shortness of breath / dyspnea or wheezing    Mild persistent asthma, uncomplicated       amphetamine-dextroamphetamine 25 MG 24 hr capsule    ADDERALL XR    90 capsule    Take 1 capsule (25 mg) by mouth daily    Attention deficit disorder of adult       blood glucose monitoring test strip    no brand specified    1 Box    Use to test blood sugar 1-2 times daily or as directed.    Hypoglycemia       calcium carbonate-vitamin D 600-400 MG-UNIT Chew    CALTRATE 600+D    180 tablet    Take 1 chew tab by mouth 2 times daily    Hypovitaminosis D, Hx of gastric bypass       carvedilol 25 MG tablet    COREG    180 tablet    TAKE 1 TABLET(25 MG) BY MOUTH TWICE DAILY WITH MEALS    Cardiomyopathy, unspecified       COMFORT LANCETS Misc     1 Box    1 Device 2 times daily as needed    Hypoglycemia       estrogen conj-medroxyPROGESTERone 0.45-1.5 MG per tablet    Prempro    90 tablet    Take 1 tablet by mouth daily    Menopausal syndrome (hot flashes)       hydrALAZINE 10 MG tablet    APRESOLINE    270 tablet    Take 1 tablet (10 mg) by mouth 3 times daily    Idiopathic cardiomyopathy (H), Essential hypertension with goal blood pressure less than 130/80       isosorbide mononitrate 30 MG 24 hr tablet    IMDUR    90 tablet    TAKE 1 TABLET(30 MG) BY MOUTH DAILY    Essential hypertension with goal blood pressure less than 130/80       lisinopril 5 MG tablet    PRINIVIL/ZESTRIL    90 tablet    Take 1 tablet (5 mg) by mouth daily    Coronary artery disease involving native  coronary artery of native heart without angina pectoris       meclizine 25 MG tablet    ANTIVERT    30 tablet    Take 1 tablet (25 mg) by mouth 3 times daily as needed    BPV (benign positional vertigo), unspecified laterality       Multi-vitamin Tabs tablet   Generic drug:  multivitamin, therapeutic with minerals     100 tablet    Take 1 tablet by mouth daily.    Gastric bypass status for obesity       ondansetron 4 MG ODT tab    ZOFRAN ODT    10 tablet    Take 1 tablet (4 mg) by mouth every 8 hours as needed for nausea    Motion sickness, sequela       sertraline 100 MG tablet    ZOLOFT    180 tablet    TAKE 2 TABLETS BY MOUTH DAILY    Major depressive disorder, recurrent episode, mild (H)       simvastatin 20 MG tablet    ZOCOR    90 tablet    Take 1 tablet (20 mg) by mouth At Bedtime    Hyperlipidemia LDL goal <100       venlafaxine 150 MG 24 hr capsule    EFFEXOR-XR    90 capsule    TAKE 1 CAPSULE(150 MG) BY MOUTH DAILY    Adjustment disorder with depressed mood       VITAMIN B-12 SL      Place  under the tongue daily.

## 2018-06-07 NOTE — LETTER
My Asthma Action Plan  Name: Patricia Perez   YOB: 1960  Date: 6/7/2018   My doctor: JOHN VELAZQUEZ NP, APRN CNP   My clinic: [facility]        My Control Medicine: None  My Rescue Medicine: Albuterol (Proair/Ventolin/Proventil) inhaler     My Asthma Severity: mild persistent  Avoid your asthma triggers: Please see attached list of possible triggers               GREEN ZONE   Good Control    I feel good    No cough or wheeze    Can work, sleep and play without asthma symptoms       Take your asthma control medicine every day.     1. If exercise triggers your asthma, take your rescue medication    15 minutes before exercise or sports, and    During exercise if you have asthma symptoms  2. Spacer to use with inhaler: If you have a spacer, make sure to use it with your inhaler             YELLOW ZONE Getting Worse  I have ANY of these:    I do not feel good    Cough or wheeze    Chest feels tight    Wake up at night   1. Keep taking your Green Zone medications  2. Start taking your rescue medicine:    every 20 minutes for up to 1 hour. Then every 4 hours for 24-48 hours.  3. If you stay in the Yellow Zone for more than 12-24 hours, contact your doctor.  4. If you do not return to the Green Zone in 12-24 hours or you get worse, start taking your oral steroid medicine if prescribed by your provider.           RED ZONE Medical Alert - Get Help  I have ANY of these:    I feel awful    Medicine is not helping    Breathing getting harder    Trouble walking or talking    Nose opens wide to breathe       1. Take your rescue medicine NOW  2. If your provider has prescribed an oral steroid medicine, start taking it NOW  3. Call your doctor NOW  4. If you are still in the Red Zone after 20 minutes and you have not reached your doctor:    Take your rescue medicine again and    Call 911 or go to the emergency room right away    See your regular doctor within 2 weeks of an Emergency Room or Urgent Care  visit for follow-up treatment.          Annual Reminders:  Meet with Asthma Educator,  Flu Shot in the Fall, consider Pneumonia Vaccination for patients with asthma (aged 19 and older).    Pharmacy:    [facility] [address] [facility] [address] [facility] [address] AT [facility]                      Asthma Triggers  How To Control Things That Make Your Asthma Worse    Triggers are things that make your asthma worse.  Look at the list below to help you find your triggers and what you can do about them.  You can help prevent asthma flare-ups by staying away from your triggers.      Trigger                                                          What you can do   Cigarette Smoke  Tobacco smoke can make asthma worse. Do not allow smoking in your home, car or around you.  Be sure no one smokes at a child s day care or school.  If you smoke, ask your health care provider for ways to help you quit.  Ask family members to quit too.  Ask your health care provider for a referral to Quit Plan to help you quit smoking, or call 9-740-117-PLAN.     Colds, Flu, Bronchitis  These are common triggers of asthma. Wash your hands often.  Don t touch your eyes, nose or mouth.  Get a flu shot every year.     Dust Mites  These are tiny bugs that live in cloth or carpet. They are too small to see. Wash sheets and blankets in hot water every week.   Encase pillows and mattress in dust mite proof covers.  Avoid having carpet if you can. If you have carpet, vacuum weekly.   Use a dust mask and HEPA vacuum.   Pollen and Outdoor Mold  Some people are allergic to trees, grass, or weed pollen, or molds. Try to keep your windows closed.  Limit time out doors when pollen count is high.   Ask you health care provider about taking medicine during allergy season.     Animal Dander  Some people are allergic to skin flakes, urine or saliva from pets with fur  or feathers. Keep pets with fur or feathers out of your home.    If you can t keep the pet outdoors, then keep the pet out of your bedroom.  Keep the bedroom door closed.  Keep pets off cloth furniture and away from stuffed toys.     Mice, Rats, and Cockroaches  Some people are allergic to the waste from these pests.   Cover food and garbage.  Clean up spills and food crumbs.  Store grease in the refrigerator.   Keep food out of the bedroom.   Indoor Mold  This can be a trigger if your home has high moisture. Fix leaking faucets, pipes, or other sources of water.   Clean moldy surfaces.  Dehumidify basement if it is damp and smelly.   Smoke, Strong Odors, and Sprays  These can reduce air quality. Stay away from strong odors and sprays, such as perfume, powder, hair spray, paints, smoke incense, paint, cleaning products, candles and new carpet.   Exercise or Sports  Some people with asthma have this trigger. Be active!  Ask your doctor about taking medicine before sports or exercise to prevent symptoms.    Warm up for 5-10 minutes before and after sports or exercise.     Other Triggers of Asthma  Cold air:  Cover your nose and mouth with a scarf.  Sometimes laughing or crying can be a trigger.  Some medicines and food can trigger asthma.

## 2018-06-07 NOTE — PATIENT INSTRUCTIONS

## 2018-06-07 NOTE — PROGRESS NOTES
SUBJECTIVE:   CC: Patricia Perez is an 58 year old woman who presents for preventive health visit.     Healthy Habits:    Do you get at least three servings of calcium containing foods daily (dairy, green leafy vegetables, etc.)? no, taking calcium and/or vitamin D supplement: yes    Amount of exercise or daily activities, outside of work: 2-3 day(s) per week    Problems taking medications regularly No    Medication side effects: No    Have you had an eye exam in the past two years? yes    Do you see a dentist twice per year? yes    Do you have sleep apnea, excessive snoring or daytime drowsiness?no    *Is fasting.     *No sex drive, for probably over the past year.       Today's PHQ-2 Score:   PHQ-2 ( 1999 Pfizer) 3/1/2018 6/19/2017   Q1: Little interest or pleasure in doing things 1 1   Q2: Feeling down, depressed or hopeless 1 1   PHQ-2 Score 2 2   Q1: Little interest or pleasure in doing things - Several days   Q2: Feeling down, depressed or hopeless - Several days   PHQ-2 Score - 2     Abuse: Current or Past(Physical, Sexual or Emotional)- No  Do you feel safe in your environment - Yes    Social History   Substance Use Topics     Smoking status: Never Smoker     Smokeless tobacco: Never Used     Alcohol use No     If you drink alcohol do you typically have >3 drinks per day or >7 drinks per week? No                     Reviewed orders with patient.  Reviewed health maintenance and updated orders accordingly - Yes  [facility]    Patient over age 50, mutual decision to screen reflected in health maintenance.    Pertinent mammograms are reviewed under the imaging tab.  History of abnormal Pap smear: NO - age 30- 65 PAP every 3 years recommended    Reviewed and updated as needed this visit by clinical staff  Tobacco  Allergies  Meds  Med Hx  Surg Hx  Fam Hx  Soc Hx        Reviewed and updated as needed this visit by Provider  Tobacco  Allergies  Meds  Med Hx  Surg Hx  Fam Hx  Soc Hx            ROS:  CONSTITUTIONAL:NEGATIVE for fever, chills, change in weight and POSITIVE  for weight gain over the last year   INTEGUMENTARY/SKIN: NEGATIVE for worrisome rashes, moles or lesions  EYES: NEGATIVE for vision changes or irritation and POSITIVE for corrected vision and current with  Eye exam   ENT: NEGATIVE for ear, mouth and throat problems and current with dentist   RESP: NEGATIVE for significant cough or SOB  BREAST: NEGATIVE for masses, tenderness or discharge  CV: NEGATIVE for chest pain, palpitations or peripheral edema  GI: NEGATIVE for nausea, abdominal pain, heartburn, or change in bowel habits  : NEGATIVE for unusual urinary or vaginal symptoms. No vaginal bleeding.  MUSCULOSKELETAL:NEGATIVE for significant arthralgias or myalgia and POSITIVE  for arthralgias  With the hands.   NEURO: NEGATIVE for weakness, dizziness or paresthesias  ENDOCRINE: NEGATIVE for temperature intolerance, skin/hair changes  HEME/ALLERGY/IMMUNE: NEGATIVE for bleeding problems  PSYCHIATRIC: NEGATIVE for changes in mood or affect and feels that her depression /anxiety are controlled,   Does have a decreased sex drive,        OBJECTIVE:   /76 (BP Location: Left arm, Patient Position: Chair, Cuff Size: Adult Regular)  Pulse 72  Temp 97.3  F (36.3  C) (Tympanic)  Resp 14  Wt 166 lb 3.2 oz (75.4 kg)  LMP 07/16/2012  BMI 29.71 kg/m2  EXAM:  GENERAL APPEARANCE: healthy, alert and no distress  EYES: Eyes grossly normal to inspection, PERRL and conjunctivae and sclerae normal  HENT: ear canals and TM's normal, nose and mouth without ulcers or lesions, oropharynx clear and oral mucous membranes moist  NECK: no adenopathy, no asymmetry, masses, or scars and thyroid normal to palpation  RESP: lungs clear to auscultation - no rales, rhonchi or wheezes  BREAST: normal without masses, tenderness or nipple discharge and no palpable axillary masses or adenopathy  CV: regular rate and rhythm, normal S1 S2, no S3 or S4, no  murmur, click or rub, no peripheral edema and peripheral pulses strong  ABDOMEN: soft, nontender, no hepatosplenomegaly, no masses and bowel sounds normal   (female): deferred  MS: no musculoskeletal defects are noted and gait is age appropriate without ataxia  SKIN: no suspicious lesions or rashes  NEURO: Normal strength and tone, sensory exam grossly normal, mentation intact and speech normal  PSYCH: mentation appears normal and affect normal/bright    ASSESSMENT/PLAN:     ASSESSMENT/PLAN:      ICD-10-CM    1. Routine general medical examination at a health care facility Z00.00 CBC with platelets and differential   2. Hypertension goal BP (blood pressure) < 130/80 I10 Albumin Random Urine Quantitative with Creat Ratio     Comprehensive metabolic panel (BMP + Alb, Alk Phos, ALT, AST, Total. Bili, TP)   3. Hyperlipidemia LDL goal <100 E78.5 Lipid panel reflex to direct LDL Fasting     simvastatin (ZOCOR) 20 MG tablet   4. Mild major depression (H) F32.0    5. Mild persistent asthma without complication J45.30 Asthma Action Plan (AAP)   6. Adjustment disorder with depressed mood F43.21 DEPRESSION ACTION PLAN (DAP)     venlafaxine (EFFEXOR-XR) 150 MG 24 hr capsule   7. Major depressive disorder, recurrent episode, mild (H) F33.0 sertraline (ZOLOFT) 100 MG tablet   8. Coronary artery disease involving native coronary artery of native heart without angina pectoris I25.10 lisinopril (PRINIVIL/ZESTRIL) 5 MG tablet   9. Menopausal syndrome (hot flashes) N95.1 estrogen conj-medroxyPROGESTERone (PREMPRO) 0.45-1.5 MG per tablet   10. Idiopathic cardiomyopathy (H) I42.8 hydrALAZINE (APRESOLINE) 10 MG tablet     carvedilol (COREG) 25 MG tablet   11. Essential hypertension with goal blood pressure less than 130/80 I10 hydrALAZINE (APRESOLINE) 10 MG tablet     isosorbide mononitrate (IMDUR) 30 MG 24 hr tablet     carvedilol (COREG) 25 MG tablet   12. Encounter for screening mammogram for malignant neoplasm of breast Z12.31 MA  Screening Digital Bilateral   13. Screening for HIV (human immunodeficiency virus) Z11.4 **HIV Antigen Antibody Combo FUTURE anytime       Patient Instructions     Preventive Health Recommendations  Female Ages 50 - 64    Yearly exam: See your health care provider every year in order to  o Review health changes.   o Discuss preventive care.    o Review your medicines if your doctor has prescribed any.      Get a Pap test every three years (unless you have an abnormal result and your provider advises testing more often).    If you get Pap tests with HPV test, you only need to test every 5 years, unless you have an abnormal result.     You do not need a Pap test if your uterus was removed (hysterectomy) and you have not had cancer.    You should be tested each year for STDs (sexually transmitted diseases) if you're at risk.     Have a mammogram every 1 to 2 years.    Have a colonoscopy at age 50, or have a yearly FIT test (stool test). These exams screen for colon cancer.      Have a cholesterol test every 5 years, or more often if advised.    Have a diabetes test (fasting glucose) every three years. If you are at risk for diabetes, you should have this test more often.     If you are at risk for osteoporosis (brittle bone disease), think about having a bone density scan (DEXA).    Shots: Get a flu shot each year. Get a tetanus shot every 10 years.    Nutrition:     Eat at least 5 servings of fruits and vegetables each day.    Eat whole-grain bread, whole-wheat pasta and brown rice instead of white grains and rice.    Talk to your provider about Calcium and Vitamin D.     Lifestyle    Exercise at least 150 minutes a week (30 minutes a day, 5 days a week). This will help you control your weight and prevent disease.    Limit alcohol to one drink per day.    No smoking.     Wear sunscreen to prevent skin cancer.     See your dentist every six months for an exam and cleaning.    See your eye doctor every 1 to 2  "years.    For the decreased sexual interest.   Exercise,    Decrease the Zoloft by  1/2 tablet    Work is very busy,  Is leaving home at 6 am to  6 pm .       Try this  Every  90 min  Walk 90 seconds = MOVE      Stay well hydrated - urine should be clear.              COUNSELING:   Reviewed preventive health counseling, as reflected in patient instructions       Regular exercise       Healthy diet/nutrition       Vision screening       Advance Care Planning         reports that she has never smoked. She has never used smokeless tobacco.    Estimated body mass index is 29.71 kg/(m^2) as calculated from the following:    Height as of 3/1/18: 5' 2.72\" (1.593 m).    Weight as of this encounter: 166 lb 3.2 oz (75.4 kg).   Weight management plan: Discussed healthy diet and exercise guidelines and patient will follow up in 6 months in clinic to re-evaluate.    Counseling Resources:  ATP IV Guidelines  Pooled Cohorts Equation Calculator  Breast Cancer Risk Calculator  FRAX Risk Assessment  ICSI Preventive Guidelines  Dietary Guidelines for Americans, 2010  USDA's MyPlate  ASA Prophylaxis  Lung CA Screening    JOHN VELAZQUEZ NP, APRN CNP  [facility]  "

## 2018-06-08 LAB — HIV 1+2 AB+HIV1 P24 AG SERPL QL IA: NONREACTIVE

## 2018-06-08 ASSESSMENT — ANXIETY QUESTIONNAIRES: GAD7 TOTAL SCORE: 3

## 2018-06-08 ASSESSMENT — PATIENT HEALTH QUESTIONNAIRE - PHQ9: SUM OF ALL RESPONSES TO PHQ QUESTIONS 1-9: 2

## 2018-06-08 ASSESSMENT — ASTHMA QUESTIONNAIRES: ACT_TOTALSCORE: 22

## 2018-08-06 PROBLEM — I25.10 CORONARY ARTERY DISEASE INVOLVING NATIVE CORONARY ARTERY OF NATIVE HEART WITHOUT ANGINA PECTORIS: Status: ACTIVE | Noted: 2018-08-06

## 2018-09-07 ENCOUNTER — MYC MEDICAL ADVICE (OUTPATIENT)
Dept: FAMILY MEDICINE | Facility: CLINIC | Age: 58
End: 2018-09-07

## 2018-09-07 DIAGNOSIS — F98.8 ATTENTION DEFICIT DISORDER OF ADULT: ICD-10-CM

## 2018-09-11 RX ORDER — DEXTROAMPHETAMINE SACCHARATE, AMPHETAMINE ASPARTATE MONOHYDRATE, DEXTROAMPHETAMINE SULFATE AND AMPHETAMINE SULFATE 6.25; 6.25; 6.25; 6.25 MG/1; MG/1; MG/1; MG/1
25 CAPSULE, EXTENDED RELEASE ORAL DAILY
Qty: 90 CAPSULE | Refills: 0 | Status: SHIPPED | OUTPATIENT
Start: 2018-09-11 | End: 2018-12-27

## 2018-11-16 ENCOUNTER — OFFICE VISIT (OUTPATIENT)
Dept: FAMILY MEDICINE | Facility: CLINIC | Age: 58
End: 2018-11-16
Payer: COMMERCIAL

## 2018-11-16 VITALS
TEMPERATURE: 98.3 F | RESPIRATION RATE: 16 BRPM | BODY MASS INDEX: 30.24 KG/M2 | SYSTOLIC BLOOD PRESSURE: 124 MMHG | DIASTOLIC BLOOD PRESSURE: 72 MMHG | HEART RATE: 84 BPM | WEIGHT: 169.2 LBS

## 2018-11-16 DIAGNOSIS — M25.562 CHRONIC PAIN OF LEFT KNEE: Primary | ICD-10-CM

## 2018-11-16 DIAGNOSIS — I10 BENIGN ESSENTIAL HYPERTENSION: ICD-10-CM

## 2018-11-16 DIAGNOSIS — G89.29 CHRONIC PAIN OF LEFT KNEE: Primary | ICD-10-CM

## 2018-11-16 DIAGNOSIS — Z23 NEED FOR PROPHYLACTIC VACCINATION AND INOCULATION AGAINST INFLUENZA: ICD-10-CM

## 2018-11-16 PROCEDURE — 99213 OFFICE O/P EST LOW 20 MIN: CPT | Mod: 25 | Performed by: NURSE PRACTITIONER

## 2018-11-16 PROCEDURE — 90471 IMMUNIZATION ADMIN: CPT | Performed by: NURSE PRACTITIONER

## 2018-11-16 PROCEDURE — 90682 RIV4 VACC RECOMBINANT DNA IM: CPT | Performed by: NURSE PRACTITIONER

## 2018-11-16 ASSESSMENT — PAIN SCALES - GENERAL: PAINLEVEL: NO PAIN (0)

## 2018-11-16 ASSESSMENT — ANXIETY QUESTIONNAIRES
GAD7 TOTAL SCORE: 4
2. NOT BEING ABLE TO STOP OR CONTROL WORRYING: SEVERAL DAYS
1. FEELING NERVOUS, ANXIOUS, OR ON EDGE: SEVERAL DAYS
3. WORRYING TOO MUCH ABOUT DIFFERENT THINGS: NOT AT ALL
7. FEELING AFRAID AS IF SOMETHING AWFUL MIGHT HAPPEN: NOT AT ALL
6. BECOMING EASILY ANNOYED OR IRRITABLE: SEVERAL DAYS
5. BEING SO RESTLESS THAT IT IS HARD TO SIT STILL: SEVERAL DAYS

## 2018-11-16 ASSESSMENT — PATIENT HEALTH QUESTIONNAIRE - PHQ9
SUM OF ALL RESPONSES TO PHQ QUESTIONS 1-9: 3
5. POOR APPETITE OR OVEREATING: NOT AT ALL

## 2018-11-16 NOTE — PATIENT INSTRUCTIONS
Get to the [facility]  And start to workout .    Try the exercise bike,  The machines for leg exercises,     Get up and MOVE during your working hours.  Do NOT cross your legs.       Consider getting a stand up /sit down desk     You can also try to massage in Asper cream to decrease any inflammation

## 2018-11-16 NOTE — PROGRESS NOTES

## 2018-11-16 NOTE — PROGRESS NOTES
SUBJECTIVE:   Patricia Perez is a 58 year old female who presents to clinic today for the following health issues:      Knee Problem    Onset: A couple of week    Description:   Location: left knee, medial  Character: Sharp    Intensity: moderate, intermittent    Progression of Symptoms: happening more frequently    Accompanying Signs & Symptoms:  Other symptoms: none    History:   Previous similar pain: no       Precipitating factors:   Trauma or overuse: no     Alleviating factors:  Improved by: massage    Therapies Tried and outcome: Massage, moving the joint - this will help to alleviate some of the pain      No injury or trauma .   Will fall every now and then .  Will trip over things.      Problem list and histories reviewed & adjusted, as indicated.  Additional history: as documented    Patient Active Problem List   Diagnosis     Dizziness and giddiness     Motion sickness     GERD (gastroesophageal reflux disease)     Idiopathic cardiomyopathy (H)     Sleep apnea     Hyperlipidemia LDL goal <100     Allergic rhinitis     Mild major depression (H)     Hypertension goal BP (blood pressure) < 130/80     Pelvic pain in female     Health Care Home     24 hour contact handout given     Elevated PTHrP level     H/O bariatric surgery     Hx of gastric bypass     Hypovitaminosis D     Allergic rhinitis     Attention deficit disorder of adult     Mild persistent asthma     Low back pain     Hand joint pain     Posterior vitreous detachment, left eye     Myopia, bilateral     Dumping syndrome     Benign essential hypertension     Nuclear sclerosis of both eyes     Major depressive disorder, recurrent episode, mild (H)     Adjustment disorder with depressed mood     Decreased hearing of both ears     BPV (benign positional vertigo), unspecified laterality     Advanced directives, counseling/discussion     Vitamin D deficiency     Coronary artery disease involving native coronary artery of native heart without angina  pectoris     Past Surgical History:   Procedure Laterality Date     BARIATRIC SURGERY       DIABETES RESOURCES  As Child    Tonsillectomy     ENT SURGERY       HERNIA REPAIR       LAPAROSCOPIC BYPASS GASTRIC  10/16/2012    Procedure: LAPAROSCOPIC BYPASS GASTRIC;  laparoscopic reyna en y gastric bypass;  Surgeon: Nish Bradford MD;  Location: [facility] OR     TONSILLECTOMY       Uretural Sticture  As Child       Social History   Substance Use Topics     Smoking status: Never Smoker     Smokeless tobacco: Never Used     Alcohol use No     Family History   Problem Relation Age of Onset     Hypertension Father      Lipids Father      Alcohol/Drug Father      Abuse     Depression Father      Respiratory Father      COPD     Cardiovascular Father      GASTROINTESTINAL DISEASE Mother      non cancerous pancreatic tumor     Hypertension Mother      HEART DISEASE Mother      A fib     Breast Cancer Maternal Grandmother      Glaucoma Paternal Grandmother      Depression Brother      Hypertension Brother      Cerebrovascular Disease Other      Macular Degeneration Other      Diabetes No family hx of      Cancer - colorectal No family hx of      Retinal detachment No family hx of      Amblyopia No family hx of      Thyroid Disease No family hx of          Current Outpatient Prescriptions   Medication Sig Dispense Refill     amphetamine-dextroamphetamine (ADDERALL XR) 25 MG 24 hr capsule Take 1 capsule (25 mg) by mouth daily 90 capsule 0     blood glucose monitoring (NO BRAND SPECIFIED) test strip Use to test blood sugar 1-2 times daily or as directed. 1 Box 2     calcium carbonate-vitamin D (CALTRATE 600+D) 600-400 MG-UNIT CHEW Take 1 chew tab by mouth 2 times daily 180 tablet 3     carvedilol (COREG) 25 MG tablet TAKE 1 TABLET(25 MG) BY MOUTH TWICE DAILY WITH MEALS 180 tablet 3     COMFORT LANCETS MISC 1 Device 2 times daily as needed 1 Box 1     Cyanocobalamin (VITAMIN B-12 SL) Place  under the tongue daily.       estrogen  conj-medroxyPROGESTERone (PREMPRO) 0.45-1.5 MG per tablet Take 1 tablet by mouth daily 90 tablet 3     hydrALAZINE (APRESOLINE) 10 MG tablet Take 1 tablet (10 mg) by mouth 3 times daily 270 tablet 3     isosorbide mononitrate (IMDUR) 30 MG 24 hr tablet TAKE 1 TABLET(30 MG) BY MOUTH DAILY 90 tablet 3     lisinopril (PRINIVIL/ZESTRIL) 5 MG tablet Take 1 tablet (5 mg) by mouth daily 90 tablet 3     MAGNESIUM PO        Multiple Vitamin (MULTI-VITAMIN) per tablet Take 1 tablet by mouth daily. 100 tablet 3     sertraline (ZOLOFT) 100 MG tablet TAKE 2 TABLETS BY MOUTH DAILY 180 tablet 1     simvastatin (ZOCOR) 20 MG tablet Take 1 tablet (20 mg) by mouth At Bedtime 90 tablet 3     venlafaxine (EFFEXOR-XR) 150 MG 24 hr capsule TAKE 1 CAPSULE(150 MG) BY MOUTH DAILY 90 capsule 1     albuterol (PROAIR HFA, PROVENTIL HFA, VENTOLIN HFA) 108 (90 BASE) MCG/ACT inhaler Inhale 2 puffs into the lungs every 6 hours as needed for shortness of breath / dyspnea or wheezing (Patient not taking: Reported on 3/1/2018) 1 Inhaler 11     meclizine (ANTIVERT) 25 MG tablet Take 1 tablet (25 mg) by mouth 3 times daily as needed (Patient not taking: Reported on 11/16/2018) 30 tablet 1     ondansetron (ZOFRAN ODT) 4 MG ODT tab Take 1 tablet (4 mg) by mouth every 8 hours as needed for nausea (Patient not taking: Reported on 6/7/2018) 10 tablet 0     Allergies   Allergen Reactions     No Known Drug Allergies      BP Readings from Last 3 Encounters:   11/16/18 124/72   06/07/18 118/76   03/01/18 118/72    Wt Readings from Last 3 Encounters:   11/16/18 169 lb 3.2 oz (76.7 kg)   06/07/18 166 lb 3.2 oz (75.4 kg)   03/01/18 162 lb 9.6 oz (73.8 kg)                    Reviewed and updated as needed this visit by clinical staff       Reviewed and updated as needed this visit by Provider         ROS:  CONSTITUTIONAL: NEGATIVE for fever, chills, change in weight  ENT/MOUTH: NEGATIVE for ear, mouth and throat problems  RESP: NEGATIVE for significant cough or  SOB  CV: NEGATIVE for chest pain, palpitations or peripheral edema  MUSCULOSKELETAL: POSITIVE  for joint pain left knee pain .  No particular injury ,  Inner left knee is still and will just hurt.  The pain is at the point that she just can't walk.  At times the pain will go away .    It is not the shoes- happens with any shoe type     OBJECTIVE:     /72 (BP Location: Right arm, Patient Position: Chair, Cuff Size: Adult Regular)  Pulse 84  Temp 98.3  F (36.8  C) (Oral)  Resp 16  Wt 169 lb 3.2 oz (76.7 kg)  LMP 07/16/2012  BMI 30.24 kg/m2  Body mass index is 30.24 kg/(m^2).   GENERAL: healthy, alert and no distress  RESP: lungs clear to auscultation - no rales, rhonchi or wheezes  CV: regular rate and rhythm, normal S1 S2, no S3 or S4, no murmur, click or rub, no peripheral edema and peripheral pulses strong  ABDOMEN: soft, nontender, no hepatosplenomegaly, no masses and bowel sounds normal  MS: Inspection: genu valgum, No effusion, No quad atrophy  Tender: patella tendon  Non-tender: lateral patellar facet, medial patellar facet, MCL, LCL, lateral joint line, medial joint line, medial tibial plateau, lateral tibial plateau  Active Range of Motion: full flexion, no pain with flexion, full extension, no pain with extension  Strength: quad  5-/5, Hamstrings  5-/5 and Gastroc  5-/5  Special tests: normal Varus, negative Lachman's test, negative pivot shift, negative posterior drawer        Diagnostic Test Results:  none     ASSESSMENT/PLAN:     ASSESSMENT/PLAN:      ICD-10-CM    1. Chronic pain of left knee M25.562     G89.29    2. Benign essential hypertension I10 aspirin 81 MG EC tablet       Patient Instructions   Get to the [facility]  And start to workout .    Try the exercise bike,  The machines for leg exercises,     Get up and MOVE during your working hours.  Do NOT cross your legs.       Consider getting a stand up /sit down desk     You can also try to massage in Asper cream to decrease any inflammation                       See Patient Instructions    JOHN VELAZQUEZ NP, APRN CNP  [facility]

## 2018-11-16 NOTE — MR AVS SNAPSHOT
After Visit Summary   11/16/2018    Patricia Perez    MRN: [id number]           Patient Information     Date Of Birth          1960        Visit Information        Provider Department      11/16/2018 2:40 PM Blanche Cummins APRN [facility]        Care Instructions    Get to the [facility]  And start to workout .    Try the exercise bike,  The machines for leg exercises,     Get up and MOVE during your working hours.  Do NOT cross your legs.       Consider getting a stand up /sit down desk     You can also try to massage in Asper cream to decrease any inflammation               Follow-ups after your visit        Who to contact     Normal or non-critical lab and imaging results will be communicated to you by erentohart, letter or phone within 4 business days after the clinic has received the results. If you do not hear from us within 7 days, please contact the clinic through erentohart or phone. If you have a critical or abnormal lab result, we will notify you by phone as soon as possible.  Submit refill requests through [email or web address] or call your pharmacy and they will forward the refill request to us. Please allow 3 business days for your refill to be completed.          If you need to speak with a  for additional information , please call: [phone]           Additional Information About Your Visit        [email or web address] Information     [email or web address] gives you secure access to your electronic health record. If you see a primary care provider, you can also send messages to your care team and make appointments. If you have questions, please call your primary care clinic.  If you do not have a primary care provider, please call [phone] and they will assist you.        Care EveryWhere ID     This is your Care EveryWhere ID. This could be used by other organizations to access your [facility] medical records  [id number]        Your Vitals Were     Pulse Temperature Respirations  Last Period BMI (Body Mass Index)       84 98.3  F (36.8  C) (Oral) 16 07/16/2012 30.24 kg/m2        Blood Pressure from Last 3 Encounters:   11/16/18 124/72   06/07/18 118/76   03/01/18 118/72    Weight from Last 3 Encounters:   11/16/18 169 lb 3.2 oz (76.7 kg)   06/07/18 166 lb 3.2 oz (75.4 kg)   03/01/18 162 lb 9.6 oz (73.8 kg)              Today, you had the following     No orders found for display       Primary Care Provider Office Phone # Fax #    Blanche Olguin Lorenza Cummins, APRN [facility] [phone]       [address] [place] [address]        Equal Access to Services     FROILAN HAY : Heladio Jean, waaxda luqadaha, qaybta kaalmada adeegyada, danilo euceda . So [facility] [phone].    ATENCIÓN: Si habla español, tiene a ortiz disposición servicios gratuitos de asistencia lingüística. Llame al [phone].    We comply with applicable federal civil rights laws and [place] laws. We do not discriminate on the basis of race, color, national origin, age, disability, sex, sexual orientation, or gender identity.            Thank you!     Thank you for choosing [facility] [place]  for your care. Our goal is always to provide you with excellent care. Hearing back from our patients is one way we can continue to improve our services. Please take a few minutes to complete the written survey that you may receive in the mail after your visit with us. Thank you!             Your Updated Medication List - Protect others around you: Learn how to safely use, store and throw away your medicines at www.disposemymeds.org.          This list is accurate as of 11/16/18  3:17 PM.  Always use your most recent med list.                   Brand Name Dispense Instructions for use Diagnosis    albuterol 108 (90 Base) MCG/ACT inhaler    PROAIR HFA/PROVENTIL HFA/VENTOLIN HFA    1 Inhaler    Inhale 2 puffs into the lungs every 6 hours as needed for shortness of breath / dyspnea  or wheezing    Mild persistent asthma, uncomplicated       amphetamine-dextroamphetamine 25 MG 24 hr capsule    ADDERALL XR    90 capsule    Take 1 capsule (25 mg) by mouth daily    Attention deficit disorder of adult       blood glucose monitoring test strip    no brand specified    1 Box    Use to test blood sugar 1-2 times daily or as directed.    Hypoglycemia       calcium carbonate-vitamin D 600-400 MG-UNIT chewable tablet    CALTRATE 600+D    180 tablet    Take 1 chew tab by mouth 2 times daily    Hypovitaminosis D, Hx of gastric bypass       carvedilol 25 MG tablet    COREG    180 tablet    TAKE 1 TABLET(25 MG) BY MOUTH TWICE DAILY WITH MEALS    Idiopathic cardiomyopathy (H), Essential hypertension with goal blood pressure less than 130/80       COMFORT LANCETS Misc     1 Box    1 Device 2 times daily as needed    Hypoglycemia       estrogen conj-medroxyPROGESTERone 0.45-1.5 MG per tablet    Prempro    90 tablet    Take 1 tablet by mouth daily    Menopausal syndrome (hot flashes)       hydrALAZINE 10 MG tablet    APRESOLINE    270 tablet    Take 1 tablet (10 mg) by mouth 3 times daily    Idiopathic cardiomyopathy (H), Essential hypertension with goal blood pressure less than 130/80       isosorbide mononitrate 30 MG 24 hr tablet    IMDUR    90 tablet    TAKE 1 TABLET(30 MG) BY MOUTH DAILY    Essential hypertension with goal blood pressure less than 130/80       lisinopril 5 MG tablet    PRINIVIL/ZESTRIL    90 tablet    Take 1 tablet (5 mg) by mouth daily    Coronary artery disease involving native coronary artery of native heart without angina pectoris       MAGNESIUM PO           meclizine 25 MG tablet    ANTIVERT    30 tablet    Take 1 tablet (25 mg) by mouth 3 times daily as needed    BPV (benign positional vertigo), unspecified laterality       Multi-vitamin Tabs tablet   Generic drug:  multivitamin, therapeutic with minerals     100 tablet    Take 1 tablet by mouth daily.    Gastric bypass status for  obesity       ondansetron 4 MG ODT tab    ZOFRAN ODT    10 tablet    Take 1 tablet (4 mg) by mouth every 8 hours as needed for nausea    Motion sickness, sequela       sertraline 100 MG tablet    ZOLOFT    180 tablet    TAKE 2 TABLETS BY MOUTH DAILY    Major depressive disorder, recurrent episode, mild (H)       simvastatin 20 MG tablet    ZOCOR    90 tablet    Take 1 tablet (20 mg) by mouth At Bedtime    Hyperlipidemia LDL goal <100       venlafaxine 150 MG 24 hr capsule    EFFEXOR-XR    90 capsule    TAKE 1 CAPSULE(150 MG) BY MOUTH DAILY    Adjustment disorder with depressed mood       VITAMIN B-12 SL      Place  under the tongue daily.

## 2018-11-16 NOTE — NURSING NOTE
"Initial /72 (BP Location: Right arm, Patient Position: Chair, Cuff Size: Adult Regular)  Pulse 84  Temp 98.3  F (36.8  C) (Oral)  Resp 16  Wt 169 lb 3.2 oz (76.7 kg)  LMP 07/16/2012  BMI 30.24 kg/m2 Estimated body mass index is 30.24 kg/(m^2) as calculated from the following:    Height as of 3/1/18: 5' 2.72\" (1.593 m).    Weight as of this encounter: 169 lb 3.2 oz (76.7 kg). .    Phyllis Jo CMA ([facility])    "

## 2018-11-17 ASSESSMENT — ASTHMA QUESTIONNAIRES: ACT_TOTALSCORE: 25

## 2018-11-17 ASSESSMENT — ANXIETY QUESTIONNAIRES: GAD7 TOTAL SCORE: 4

## 2018-12-05 ENCOUNTER — OFFICE VISIT (OUTPATIENT)
Dept: OPTOMETRY | Facility: CLINIC | Age: 58
End: 2018-12-05
Payer: COMMERCIAL

## 2018-12-05 DIAGNOSIS — H43.812 POSTERIOR VITREOUS DETACHMENT, LEFT EYE: ICD-10-CM

## 2018-12-05 DIAGNOSIS — H25.13 NUCLEAR SCLEROSIS OF BOTH EYES: ICD-10-CM

## 2018-12-05 DIAGNOSIS — H52.4 MYOPIA WITH PRESBYOPIA OF RIGHT EYE: Primary | ICD-10-CM

## 2018-12-05 DIAGNOSIS — H52.4 MYOPIA OF LEFT EYE WITH ASTIGMATISM AND PRESBYOPIA: ICD-10-CM

## 2018-12-05 DIAGNOSIS — H52.12 MYOPIA OF LEFT EYE WITH ASTIGMATISM AND PRESBYOPIA: ICD-10-CM

## 2018-12-05 DIAGNOSIS — H10.13 ALLERGIC CONJUNCTIVITIS, BILATERAL: ICD-10-CM

## 2018-12-05 DIAGNOSIS — H52.11 MYOPIA WITH PRESBYOPIA OF RIGHT EYE: Primary | ICD-10-CM

## 2018-12-05 DIAGNOSIS — H52.202 MYOPIA OF LEFT EYE WITH ASTIGMATISM AND PRESBYOPIA: ICD-10-CM

## 2018-12-05 PROCEDURE — 92015 DETERMINE REFRACTIVE STATE: CPT | Performed by: OPTOMETRIST

## 2018-12-05 PROCEDURE — 92014 COMPRE OPH EXAM EST PT 1/>: CPT | Performed by: OPTOMETRIST

## 2018-12-05 RX ORDER — OLOPATADINE HYDROCHLORIDE 1 MG/ML
1 SOLUTION/ DROPS OPHTHALMIC 2 TIMES DAILY PRN
Qty: 5 ML | Refills: 11 | Status: SHIPPED | OUTPATIENT
Start: 2018-12-05 | End: 2020-09-03

## 2018-12-05 ASSESSMENT — CUP TO DISC RATIO
OD_RATIO: 0.2
OS_RATIO: 0.2

## 2018-12-05 ASSESSMENT — SLIT LAMP EXAM - LIDS
COMMENTS: 1+ MEIBOMIAN GLAND DYSFUNCTION
COMMENTS: 1+ MEIBOMIAN GLAND DYSFUNCTION

## 2018-12-05 ASSESSMENT — REFRACTION_MANIFEST
OD_SPHERE: -5.75
OD_ADD: +2.75
OD_CYLINDER: +0.75
OS_AXIS: 085
OS_AXIS: 076
OS_ADD: +2.75
OS_CYLINDER: +1.00
OS_CYLINDER: +1.25
OD_SPHERE: -5.25
OS_SPHERE: -7.00
OS_SPHERE: -7.00
OD_AXIS: 106
METHOD_AUTOREFRACTION: 1

## 2018-12-05 ASSESSMENT — REFRACTION_WEARINGRX
SPECS_TYPE: PAL
OS_AXIS: 083
OD_ADD: +2.75
OD_SPHERE: -5.75
OS_SPHERE: -7.00
OS_ADD: +2.75
OS_CYLINDER: +0.75

## 2018-12-05 ASSESSMENT — EXTERNAL EXAM - LEFT EYE: OS_EXAM: NORMAL

## 2018-12-05 ASSESSMENT — VISUAL ACUITY
OS_CC: 20/30
OD_CC: 20/30
OS_CC+: +2
CORRECTION_TYPE: GLASSES
OD_CC+: +2
METHOD: SNELLEN - LINEAR
OD_SC: 20/600
OD_CC: 20/30-2
OS_CC: 20/25
OS_SC: 20/400

## 2018-12-05 ASSESSMENT — TONOMETRY
OD_IOP_MMHG: 22
OS_IOP_MMHG: 22
IOP_METHOD: APPLANATION

## 2018-12-05 ASSESSMENT — EXTERNAL EXAM - RIGHT EYE: OD_EXAM: NORMAL

## 2018-12-05 ASSESSMENT — CONF VISUAL FIELD
OD_NORMAL: 1
METHOD: COUNTING FINGERS
OS_NORMAL: 1

## 2018-12-05 NOTE — PROGRESS NOTES
Chief Complaint   Patient presents with     COMPREHENSIVE EYE EXAM         Last Eye Exam: 4/2017  Dilated Previously: Yes    Pt has recently seen an increase in floaters - floaters mostly in left eye - no light flashes with floaters.    What are you currently using to see?  glasses       Distance Vision Acuity: Noticed gradual change in both eyes    Near Vision Acuity: Satisfied with vision while reading  with glasses - sometimes computer is a little iffy    Eye Comfort: watery and itchy  Do you use eye drops? : Yes: Clear eyes triple action - 1-2x a day  Occupation or Hobbies: works at [employer]    [email or web address]            Medical, surgical and family histories reviewed and updated 12/5/2018.       OBJECTIVE: See Ophthalmology exam    ASSESSMENT:    ICD-10-CM    1. Myopia with presbyopia of right eye H52.11 EYE EXAM (SIMPLE-NONBILLABLE)    H52.4 REFRACTION   2. Myopia of left eye with astigmatism and presbyopia H52.12 EYE EXAM (SIMPLE-NONBILLABLE)    H52.202 REFRACTION    H52.4    3. Posterior vitreous detachment, left eye H43.812 EYE EXAM (SIMPLE-NONBILLABLE)   4. Nuclear sclerosis of both eyes H25.13 EYE EXAM (SIMPLE-NONBILLABLE)   5. Allergic conjunctivitis, bilateral H10.13 EYE EXAM (SIMPLE-NONBILLABLE)     olopatadine (PATANOL) 0.1 % ophthalmic solution      PLAN:     Patient Instructions   There was no significant change in the prescription for your glasses.    Your eyes may be blurry at near and sensitive to light for several hours from the dilating drops.    Use the Patanol drops twice a day for about 3 weeks and then try to decrease to once a day.    Yearly eye exams recommended.     Patricia does have a family history of glaucoma in her paternal grandparents and one uncle. She wanted to wait on glaucoma testing. May consider next year.

## 2018-12-05 NOTE — LETTER
12/5/2018         RE: Patricia Perez  [address]  [place] [address]        Dear Colleague,    Thank you for referring your patient, Patricia Perez, to the [facility]. Please see a copy of my visit note below.    Chief Complaint   Patient presents with     COMPREHENSIVE EYE EXAM         Last Eye Exam: 4/2017  Dilated Previously: Yes    Pt has recently seen an increase in floaters - floaters mostly in left eye - no light flashes with floaters.    What are you currently using to see?  glasses       Distance Vision Acuity: Noticed gradual change in both eyes    Near Vision Acuity: Satisfied with vision while reading  with glasses - sometimes computer is a little iffy    Eye Comfort: watery and itchy  Do you use eye drops? : Yes: Clear eyes triple action - 1-2x a day  Occupation or Hobbies: works at [employer]            Medical, surgical and family histories reviewed and updated 12/5/2018.       OBJECTIVE: See Ophthalmology exam    ASSESSMENT:    ICD-10-CM    1. Myopia with presbyopia of right eye H52.11 EYE EXAM (SIMPLE-NONBILLABLE)    H52.4 REFRACTION   2. Myopia of left eye with astigmatism and presbyopia H52.12 EYE EXAM (SIMPLE-NONBILLABLE)    H52.202 REFRACTION    H52.4    3. Posterior vitreous detachment, left eye H43.812 EYE EXAM (SIMPLE-NONBILLABLE)   4. Nuclear sclerosis of both eyes H25.13 EYE EXAM (SIMPLE-NONBILLABLE)   5. Allergic conjunctivitis, bilateral H10.13 EYE EXAM (SIMPLE-NONBILLABLE)     olopatadine (PATANOL) 0.1 % ophthalmic solution      PLAN:     Patient Instructions   There was no significant change in the prescription for your glasses.    Your eyes may be blurry at near and sensitive to light for several hours from the dilating drops.    Use the Patanol drops twice a day for about 3 weeks and then try to decrease to once a day.    Yearly eye exams recommended.     Patricia does have a family history of  glaucoma in her paternal grandparents and one uncle. She wanted to wait on glaucoma testing. May consider next year.    Again, thank you for allowing me to participate in the care of your patient.        Sincerely,        Anita Borjas OD

## 2018-12-05 NOTE — PATIENT INSTRUCTIONS
There was no significant change in the prescription for your glasses.    Your eyes may be blurry at near and sensitive to light for several hours from the dilating drops.    Use the Patanol drops twice a day for about 3 weeks and then try to decrease to once a day.     Yearly eye exams recommended.

## 2018-12-05 NOTE — MR AVS SNAPSHOT
After Visit Summary   12/5/2018    Patricia Perez    MRN: [id number]           Patient Information     Date Of Birth          1960        Visit Information        Provider Department      12/5/2018 11:40 AM Anita Borjas OD [facility]        Today's Diagnoses     Myopia with presbyopia of right eye    -  1    Myopia of left eye with astigmatism and presbyopia        Posterior vitreous detachment, left eye        Nuclear sclerosis of both eyes        Allergic conjunctivitis, bilateral          Care Instructions    There was no significant change in the prescription for your glasses.    Your eyes may be blurry at near and sensitive to light for several hours from the dilating drops.    Use the Patanol drops twice a day for about 3 weeks and then try to decrease to once a day.     Yearly eye exams recommended.            Follow-ups after your visit        Who to contact     If you have questions or need follow up information about today's clinic visit or your schedule please contact [facility] directly at [phone].  Normal or non-critical lab and imaging results will be communicated to you by Coravinhart, letter or phone within 4 business days after the clinic has received the results. If you do not hear from us within 7 days, please contact the clinic through Stemt or phone. If you have a critical or abnormal lab result, we will notify you by phone as soon as possible.  Submit refill requests through Spacious or call your pharmacy and they will forward the refill request to us. Please allow 3 business days for your refill to be completed.          Additional Information About Your Visit        MyChart Information     Spacious gives you secure access to your electronic health record. If you see a primary care provider, you can also send messages to your care team and make appointments. If you have questions, please call your primary care clinic.  If you  do not have a primary care provider, please call [phone] and they will assist you.        Care EveryWhere ID     This is your Care EveryWhere ID. This could be used by other organizations to access your [place] medical records  [id number]        Your Vitals Were     Last Period                   07/16/2012            Blood Pressure from Last 3 Encounters:   11/16/18 124/72   06/07/18 118/76   03/01/18 118/72    Weight from Last 3 Encounters:   11/16/18 76.7 kg (169 lb 3.2 oz)   06/07/18 75.4 kg (166 lb 3.2 oz)   03/01/18 73.8 kg (162 lb 9.6 oz)              We Performed the Following     EYE EXAM (SIMPLE-NONBILLABLE)     REFRACTION          Today's Medication Changes          These changes are accurate as of 12/5/18  2:51 PM.  If you have any questions, ask your nurse or doctor.               Start taking these medicines.        Dose/Directions    olopatadine 0.1 % ophthalmic solution   Commonly known as:  PATANOL   Used for:  Allergic conjunctivitis, bilateral   Started by:  Anita Borjas, ORACIO        Dose:  1 drop   Place 1 drop into both eyes 2 times daily as needed for allergies   Quantity:  5 mL   Refills:  11            Where to get your medicines      These medications were sent to [facility] - [place], [place] - [address]  [address], [facility] [address]     Phone:  [phone]     olopatadine 0.1 % ophthalmic solution                Primary Care Provider Office Phone # Fax #    Blanche Cummins, APRN [facility] [phone] [address] [place] [address]        Equal Access to Services     [facility] AH: Hadii michelle beltran Sotye, waaxda luqadaha, qaybta kaalmada danilo akhtar. So [facility] [phone].    ATENCIÓN: Si habla español, tiene a ortiz disposición servicios gratuitos de asistencia lingüística. Rosenda moses [phone].    We comply with applicable federal civil rights laws and [place]  laws. We do not discriminate on the basis of race, color, national origin, age, disability, sex, sexual orientation, or gender identity.            Thank you!     Thank you for choosing [facility]  for your care. Our goal is always to provide you with excellent care. Hearing back from our patients is one way we can continue to improve our services. Please take a few minutes to complete the written survey that you may receive in the mail after your visit with us. Thank you!             Your Updated Medication List - Protect others around you: Learn how to safely use, store and throw away your medicines at www.disposemymeds.org.          This list is accurate as of 12/5/18  2:51 PM.  Always use your most recent med list.                   Brand Name Dispense Instructions for use Diagnosis    albuterol 108 (90 Base) MCG/ACT inhaler    PROAIR HFA/PROVENTIL HFA/VENTOLIN HFA    1 Inhaler    Inhale 2 puffs into the lungs every 6 hours as needed for shortness of breath / dyspnea or wheezing    Mild persistent asthma, uncomplicated       amphetamine-dextroamphetamine 25 MG 24 hr capsule    ADDERALL XR    90 capsule    Take 1 capsule (25 mg) by mouth daily    Attention deficit disorder of adult       aspirin 81 MG EC tablet    ASA    90 tablet    Take 1 tablet (81 mg) by mouth daily    Benign essential hypertension       blood glucose monitoring test strip    NO BRAND SPECIFIED    1 Box    Use to test blood sugar 1-2 times daily or as directed.    Hypoglycemia       calcium carbonate-vitamin D 600-400 MG-UNIT chewable tablet    CALTRATE 600+D    180 tablet    Take 1 chew tab by mouth 2 times daily    Hypovitaminosis D, Hx of gastric bypass       carvedilol 25 MG tablet    COREG    180 tablet    TAKE 1 TABLET(25 MG) BY MOUTH TWICE DAILY WITH MEALS    Idiopathic cardiomyopathy (H), Essential hypertension with goal blood pressure less than 130/80       COMFORT LANCETS Misc     1 Box    1 Device 2 times daily  as needed    Hypoglycemia       estrogen conj-medroxyPROGESTERone 0.45-1.5 MG tablet    PREMPRO    90 tablet    Take 1 tablet by mouth daily    Menopausal syndrome (hot flashes)       hydrALAZINE 10 MG tablet    APRESOLINE    270 tablet    Take 1 tablet (10 mg) by mouth 3 times daily    Idiopathic cardiomyopathy (H), Essential hypertension with goal blood pressure less than 130/80       isosorbide mononitrate 30 MG 24 hr tablet    IMDUR    90 tablet    TAKE 1 TABLET(30 MG) BY MOUTH DAILY    Essential hypertension with goal blood pressure less than 130/80       lisinopril 5 MG tablet    PRINIVIL/ZESTRIL    90 tablet    Take 1 tablet (5 mg) by mouth daily    Coronary artery disease involving native coronary artery of native heart without angina pectoris       MAGNESIUM PO           meclizine 25 MG tablet    ANTIVERT    30 tablet    Take 1 tablet (25 mg) by mouth 3 times daily as needed    BPV (benign positional vertigo), unspecified laterality       Multi-vitamin tablet   Generic drug:  multivitamin w/minerals     100 tablet    Take 1 tablet by mouth daily.    Gastric bypass status for obesity       olopatadine 0.1 % ophthalmic solution    PATANOL    5 mL    Place 1 drop into both eyes 2 times daily as needed for allergies    Allergic conjunctivitis, bilateral       ondansetron 4 MG ODT tab    ZOFRAN ODT    10 tablet    Take 1 tablet (4 mg) by mouth every 8 hours as needed for nausea    Motion sickness, sequela       sertraline 100 MG tablet    ZOLOFT    180 tablet    TAKE 2 TABLETS BY MOUTH DAILY    Major depressive disorder, recurrent episode, mild (H)       simvastatin 20 MG tablet    ZOCOR    90 tablet    Take 1 tablet (20 mg) by mouth At Bedtime    Hyperlipidemia LDL goal <100       venlafaxine 150 MG 24 hr capsule    EFFEXOR-XR    90 capsule    TAKE 1 CAPSULE(150 MG) BY MOUTH DAILY    Adjustment disorder with depressed mood       VITAMIN B-12 SL      Place  under the tongue daily.

## 2018-12-12 DIAGNOSIS — F43.21 ADJUSTMENT DISORDER WITH DEPRESSED MOOD: ICD-10-CM

## 2018-12-12 RX ORDER — VENLAFAXINE HYDROCHLORIDE 150 MG/1
CAPSULE, EXTENDED RELEASE ORAL
Qty: 90 CAPSULE | Refills: 1 | Status: SHIPPED | OUTPATIENT
Start: 2018-12-12 | End: 2019-06-10

## 2018-12-12 NOTE — TELEPHONE ENCOUNTER
"Requested Prescriptions   Pending Prescriptions Disp Refills     venlafaxine (EFFEXOR-XR) 150 MG 24 hr capsule [Pharmacy Med Name: VENLAFAXINE ER 150MG CAPSULES] 90 capsule 0    Last Written Prescription Date:  6/7/18  Last Fill Quantity: 90,  # refills: 1   Last office visit: 11/16/2018 with prescribing provider:  myke   Future Office Visit:     Sig: TAKE 1 CAPSULE(150 MG) BY MOUTH DAILY    Serotonin-Norepinephrine Reuptake Inhibitors  Passed - 12/12/2018 12:46 PM       Passed - Blood pressure under 140/90 in past 12 months    BP Readings from Last 3 Encounters:   11/16/18 124/72   06/07/18 118/76   03/01/18 118/72                Passed - PHQ-9 score of less than 5 in past 6 months    Please review last PHQ-9 score.          Passed - Patient is age 18 or older       Passed - No active pregnancy on record       Passed - Normal serum creatinine on file in past 12 months    Recent Labs   Lab Test 06/07/18  0843   CR 0.68            Passed - No positive pregnancy test in past 12 months       Passed - Recent (6 mo) or future (30 days) visit within the authorizing provider's specialty    Patient had office visit in the last 6 months or has a visit in the next 30 days with authorizing provider or within the authorizing provider's specialty.  See \"Patient Info\" tab in inbasket, or \"Choose Columns\" in Meds & Orders section of the refill encounter.              "

## 2018-12-20 ENCOUNTER — MYC REFILL (OUTPATIENT)
Dept: FAMILY MEDICINE | Facility: CLINIC | Age: 58
End: 2018-12-20

## 2018-12-20 DIAGNOSIS — F98.8 ATTENTION DEFICIT DISORDER OF ADULT: ICD-10-CM

## 2018-12-26 NOTE — TELEPHONE ENCOUNTER
Routing refill request to provider for review/approval because:  Drug not on the [facility] refill protocol or controlled substance  [facility]  RN/Lowell Jones

## 2018-12-27 DIAGNOSIS — F98.8 ATTENTION DEFICIT DISORDER OF ADULT: ICD-10-CM

## 2018-12-27 DIAGNOSIS — J45.30 MILD PERSISTENT ASTHMA, UNCOMPLICATED: ICD-10-CM

## 2018-12-27 DIAGNOSIS — F33.0 MAJOR DEPRESSIVE DISORDER, RECURRENT EPISODE, MILD (H): ICD-10-CM

## 2018-12-27 RX ORDER — SERTRALINE HYDROCHLORIDE 100 MG/1
TABLET, FILM COATED ORAL
Qty: 180 TABLET | Refills: 1 | Status: SHIPPED | OUTPATIENT
Start: 2018-12-27 | End: 2019-06-14

## 2018-12-27 RX ORDER — DEXTROAMPHETAMINE SACCHARATE, AMPHETAMINE ASPARTATE MONOHYDRATE, DEXTROAMPHETAMINE SULFATE AND AMPHETAMINE SULFATE 6.25; 6.25; 6.25; 6.25 MG/1; MG/1; MG/1; MG/1
25 CAPSULE, EXTENDED RELEASE ORAL DAILY
Qty: 90 CAPSULE | Refills: 0 | Status: SHIPPED | OUTPATIENT
Start: 2018-12-27 | End: 2019-06-14

## 2018-12-27 RX ORDER — DEXTROAMPHETAMINE SACCHARATE, AMPHETAMINE ASPARTATE MONOHYDRATE, DEXTROAMPHETAMINE SULFATE AND AMPHETAMINE SULFATE 6.25; 6.25; 6.25; 6.25 MG/1; MG/1; MG/1; MG/1
25 CAPSULE, EXTENDED RELEASE ORAL DAILY
Qty: 90 CAPSULE | Refills: 0 | Status: SHIPPED | OUTPATIENT
Start: 2018-12-27 | End: 2019-04-02

## 2018-12-27 RX ORDER — ALBUTEROL SULFATE 90 UG/1
2 AEROSOL, METERED RESPIRATORY (INHALATION) EVERY 6 HOURS PRN
Qty: 1 INHALER | Refills: 11 | Status: SHIPPED | OUTPATIENT
Start: 2018-12-27 | End: 2021-04-15

## 2018-12-27 NOTE — TELEPHONE ENCOUNTER
PHQ-9 SCORE 3/1/2018 6/7/2018 11/16/2018   PHQ-9 Total Score - - -   PHQ-9 Total Score MyChart - - -   PHQ-9 Total Score 4 2 3       Prescription approved per [facility] Refill Protocol or patient Primary care provider (PCP)  EBER Mayer RN/Lowell Jones

## 2019-03-11 ENCOUNTER — OFFICE VISIT (OUTPATIENT)
Dept: FAMILY MEDICINE | Facility: CLINIC | Age: 59
End: 2019-03-11
Payer: COMMERCIAL

## 2019-03-11 VITALS
DIASTOLIC BLOOD PRESSURE: 84 MMHG | OXYGEN SATURATION: 100 % | WEIGHT: 167 LBS | RESPIRATION RATE: 14 BRPM | SYSTOLIC BLOOD PRESSURE: 136 MMHG | HEART RATE: 75 BPM | HEIGHT: 63 IN | BODY MASS INDEX: 29.59 KG/M2 | TEMPERATURE: 98.9 F

## 2019-03-11 DIAGNOSIS — J38.3 VOCAL CORD DYSFUNCTION: Primary | ICD-10-CM

## 2019-03-11 PROCEDURE — 99213 OFFICE O/P EST LOW 20 MIN: CPT | Performed by: NURSE PRACTITIONER

## 2019-03-11 RX ORDER — PREDNISONE 10 MG/1
TABLET ORAL
Qty: 30 TABLET | Refills: 0 | Status: SHIPPED | OUTPATIENT
Start: 2019-03-11 | End: 2019-06-14

## 2019-03-11 ASSESSMENT — PAIN SCALES - GENERAL: PAINLEVEL: NO PAIN (0)

## 2019-03-11 ASSESSMENT — MIFFLIN-ST. JEOR: SCORE: 1291.87

## 2019-03-11 NOTE — PATIENT INSTRUCTIONS
You need to rest your voice.   Your vocal cords are inflamed causing the hoarseness with the voice.     Rest your voice.     Take the Prednisone.   This is an antiinflammatory. l     For the sore throat use warm tea and honey or even just spoonful of honey and hold it to the back of the throat. This will help to decrease the inflammation and thin the mucous.    Take an antihistamine.      Stay well hydrated.

## 2019-03-11 NOTE — NURSING NOTE
"Initial /84 (BP Location: Left arm, Patient Position: Chair, Cuff Size: Adult Regular)   Pulse 75   Temp 98.9  F (37.2  C) (Tympanic)   Resp 14   Ht 1.593 m (5' 2.7\")   Wt 75.8 kg (167 lb)   LMP 07/16/2012   SpO2 100%   BMI 29.87 kg/m   Estimated body mass index is 29.87 kg/m  as calculated from the following:    Height as of this encounter: 1.593 m (5' 2.7\").    Weight as of this encounter: 75.8 kg (167 lb). .    Phyllis Jo CMA ([facility])    "

## 2019-03-11 NOTE — PROGRESS NOTES
SUBJECTIVE:   Patricia Perez is a 58 year old female who presents to clinic today for the following health issues:    ENT Symptoms             Symptoms: cc Present Absent Comment   Fever/Chills   x    Fatigue  x  More tired than usual, taking 2 naps and going to bed early   Muscle Aches   x    Eye Irritation   x    Sneezing   x    Nasal Ryne/Drg  x  Stuffy/runny nose   Sinus Pressure/Pain  x     Loss of smell  x  Some loss of smell   Dental pain   x    Sore Throat  x  Intermittent throat pain   Swollen Glands   x    Ear Pain/Fullness  x  Intermittent right ear pain   Cough  x  Mostly dry cough, will be productive at times   Wheeze   x    Chest Pain   x    Shortness of breath  x  With exertion   Rash   x    Other  x  Loss of voice x 4 days      Symptom duration:  4 days   Symptom severity:  Worsening, moderate   Treatments tried:  Rest, OTC cold medication   Contacts:  Unknown             Problem list and histories reviewed & adjusted, as indicated.  Additional history: as documented    Patient Active Problem List   Diagnosis     Dizziness and giddiness     Motion sickness     GERD (gastroesophageal reflux disease)     Idiopathic cardiomyopathy (H)     Sleep apnea     Hyperlipidemia LDL goal <100     Allergic rhinitis     Mild major depression (H)     Hypertension goal BP (blood pressure) < 130/80     Pelvic pain in female     Health Care Home     24 hour contact handout given     Elevated PTHrP level     H/O bariatric surgery     Hx of gastric bypass     Hypovitaminosis D     Allergic rhinitis     Attention deficit disorder of adult     Mild persistent asthma     Low back pain     Hand joint pain     Posterior vitreous detachment, left eye     Myopia, bilateral     Dumping syndrome     Benign essential hypertension     Nuclear sclerosis of both eyes     Major depressive disorder, recurrent episode, mild (H)     Adjustment disorder with depressed mood     Decreased hearing of both ears     BPV (benign positional  vertigo), unspecified laterality     Advanced directives, counseling/discussion     Vitamin D deficiency     Coronary artery disease involving native coronary artery of native heart without angina pectoris     Chronic pain of left knee     Past Surgical History:   Procedure Laterality Date     BARIATRIC SURGERY       DIABETES RESOURCES  As Child    Tonsillectomy     ENT SURGERY       HERNIA REPAIR       LAPAROSCOPIC BYPASS GASTRIC  10/16/2012    Procedure: LAPAROSCOPIC BYPASS GASTRIC;  laparoscopic reyna en y gastric bypass;  Surgeon: Nish Bradford MD;  Location: [facility] OR     TONSILLECTOMY       Uretural Sticture  As Child       Social History     Tobacco Use     Smoking status: Never Smoker     Smokeless tobacco: Never Used   Substance Use Topics     Alcohol use: No     Family History   Problem Relation Age of Onset     Hypertension Father      Lipids Father      Alcohol/Drug Father         Abuse     Depression Father      Respiratory Father         COPD     Cardiovascular Father      Gastrointestinal Disease Mother         non cancerous pancreatic tumor     Hypertension Mother      Heart Disease Mother         A fib     Breast Cancer Maternal Grandmother      Glaucoma Paternal Grandmother      Depression Brother      Hypertension Brother      Cerebrovascular Disease Other      Macular Degeneration Other      Diabetes No family hx of      Cancer - colorectal No family hx of      Retinal detachment No family hx of      Amblyopia No family hx of      Thyroid Disease No family hx of          Current Outpatient Medications   Medication Sig Dispense Refill     albuterol (PROAIR HFA/PROVENTIL HFA/VENTOLIN HFA) 108 (90 Base) MCG/ACT inhaler Inhale 2 puffs into the lungs every 6 hours as needed for shortness of breath / dyspnea or wheezing 1 Inhaler 11     amphetamine-dextroamphetamine (ADDERALL XR) 25 MG 24 hr capsule Take 1 capsule (25 mg) by mouth daily 90 capsule 0     amphetamine-dextroamphetamine (ADDERALL XR) 25  MG 24 hr capsule Take 1 capsule (25 mg) by mouth daily 90 capsule 0     blood glucose monitoring (NO BRAND SPECIFIED) test strip Use to test blood sugar 1-2 times daily or as directed. 1 Box 2     calcium carbonate-vitamin D (CALTRATE 600+D) 600-400 MG-UNIT CHEW Take 1 chew tab by mouth 2 times daily 180 tablet 3     carvedilol (COREG) 25 MG tablet TAKE 1 TABLET(25 MG) BY MOUTH TWICE DAILY WITH MEALS 180 tablet 3     COMFORT LANCETS MISC 1 Device 2 times daily as needed 1 Box 1     Cyanocobalamin (VITAMIN B-12 SL) Place  under the tongue daily.       estrogen conj-medroxyPROGESTERone (PREMPRO) 0.45-1.5 MG per tablet Take 1 tablet by mouth daily 90 tablet 3     hydrALAZINE (APRESOLINE) 10 MG tablet Take 1 tablet (10 mg) by mouth 3 times daily 270 tablet 3     isosorbide mononitrate (IMDUR) 30 MG 24 hr tablet TAKE 1 TABLET(30 MG) BY MOUTH DAILY 90 tablet 3     lisinopril (PRINIVIL/ZESTRIL) 5 MG tablet Take 1 tablet (5 mg) by mouth daily 90 tablet 3     MAGNESIUM PO        Multiple Vitamin (MULTI-VITAMIN) per tablet Take 1 tablet by mouth daily. 100 tablet 3     olopatadine (PATANOL) 0.1 % ophthalmic solution Place 1 drop into both eyes 2 times daily as needed for allergies 5 mL 11     sertraline (ZOLOFT) 100 MG tablet TAKE 2 TABLETS BY MOUTH DAILY 180 tablet 1     simvastatin (ZOCOR) 20 MG tablet Take 1 tablet (20 mg) by mouth At Bedtime 90 tablet 3     venlafaxine (EFFEXOR-XR) 150 MG 24 hr capsule TAKE 1 CAPSULE(150 MG) BY MOUTH DAILY 90 capsule 1     aspirin 81 MG EC tablet Take 1 tablet (81 mg) by mouth daily (Patient not taking: Reported on 3/11/2019) 90 tablet 3     meclizine (ANTIVERT) 25 MG tablet Take 1 tablet (25 mg) by mouth 3 times daily as needed (Patient not taking: Reported on 11/16/2018) 30 tablet 1     ondansetron (ZOFRAN ODT) 4 MG ODT tab Take 1 tablet (4 mg) by mouth every 8 hours as needed for nausea (Patient not taking: Reported on 6/7/2018) 10 tablet 0     Allergies   Allergen Reactions     No  "Known Drug Allergies      BP Readings from Last 3 Encounters:   03/11/19 136/84   11/16/18 124/72   06/07/18 118/76    Wt Readings from Last 3 Encounters:   03/11/19 75.8 kg (167 lb)   11/16/18 76.7 kg (169 lb 3.2 oz)   06/07/18 75.4 kg (166 lb 3.2 oz)                    Reviewed and updated as needed this visit by clinical staff       Reviewed and updated as needed this visit by Provider         ROS:  See notes above.      OBJECTIVE:     /84 (BP Location: Left arm, Patient Position: Chair, Cuff Size: Adult Regular)   Pulse 75   Temp 98.9  F (37.2  C) (Tympanic)   Resp 14   Ht 1.593 m (5' 2.7\")   Wt 75.8 kg (167 lb)   LMP 07/16/2012   SpO2 100%   BMI 29.87 kg/m    Body mass index is 29.87 kg/m .   GENERAL: healthy, alert and no distress  HENT: ear canals and TM's normal, nasal mucosa edematous , rhinorrhea clear, oropharynx clear, oral mucous membranes moist and sinuses: not tender  NECK: no adenopathy, no asymmetry, masses, or scars and thyroid normal to palpation  RESP: lungs clear to auscultation - no rales, rhonchi or wheezes  CV: regular rate and rhythm, normal S1 S2, no S3 or S4, no murmur, click or rub, no peripheral edema and peripheral pulses strong      Diagnostic Test Results:  none     ASSESSMENT/PLAN:     ASSESSMENT/PLAN:      ICD-10-CM    1. Vocal cord dysfunction J38.3 predniSONE (DELTASONE) 10 MG tablet       Patient Instructions   You need to rest your voice.   Your vocal cords are inflamed causing the hoarseness with the voice.     Rest your voice.     Take the Prednisone.   This is an antiinflammatory. l     For the sore throat use warm tea and honey or even just spoonful of honey and hold it to the back of the throat. This will help to decrease the inflammation and thin the mucous.    Take an antihistamine.      Stay well hydrated.                      MEDICATIONS:        - Start taking Prednisone        - Continue other medications without change  See Patient Instructions    JOHN CHAVEZ" MARCUS NP, APRN [facility]

## 2019-03-23 ENCOUNTER — ANCILLARY PROCEDURE (OUTPATIENT)
Dept: GENERAL RADIOLOGY | Facility: CLINIC | Age: 59
End: 2019-03-23
Attending: PHYSICIAN ASSISTANT
Payer: COMMERCIAL

## 2019-03-23 ENCOUNTER — OFFICE VISIT (OUTPATIENT)
Dept: URGENT CARE | Facility: URGENT CARE | Age: 59
End: 2019-03-23
Payer: COMMERCIAL

## 2019-03-23 VITALS
OXYGEN SATURATION: 98 % | BODY MASS INDEX: 30.42 KG/M2 | HEART RATE: 81 BPM | TEMPERATURE: 98 F | WEIGHT: 170.1 LBS | SYSTOLIC BLOOD PRESSURE: 115 MMHG | DIASTOLIC BLOOD PRESSURE: 67 MMHG

## 2019-03-23 DIAGNOSIS — M25.562 ACUTE PAIN OF LEFT KNEE: Primary | ICD-10-CM

## 2019-03-23 DIAGNOSIS — S83.412A SPRAIN OF MEDIAL COLLATERAL LIGAMENT OF LEFT KNEE, INITIAL ENCOUNTER: ICD-10-CM

## 2019-03-23 DIAGNOSIS — M17.12 ARTHRITIS OF LEFT KNEE: ICD-10-CM

## 2019-03-23 DIAGNOSIS — M25.562 ACUTE PAIN OF LEFT KNEE: ICD-10-CM

## 2019-03-23 PROCEDURE — 73562 X-RAY EXAM OF KNEE 3: CPT | Mod: LT

## 2019-03-23 PROCEDURE — 99213 OFFICE O/P EST LOW 20 MIN: CPT | Performed by: PHYSICIAN ASSISTANT

## 2019-03-23 NOTE — PROGRESS NOTES
S: 58-year-old female comes in with left knee pain for 3 days.  She did walk [facility] the [facility] Wednesday twice that day and that evening she noted the onset of some soreness and swelling.  No specific injury.  When reviewing her chart I see that she was seen for chronic left knee pain x 2 weeks in November 2018.  Note says that she had had recurring episodes of it.  This pain was always posterior aspect of the knee.  This pain is much different.  She denies any numbness or tingling.  No fever.  No night sweats.  Does have a recurrent viral respiratory infection that she is getting over.  Knee feels as if it will give out.  Does buckle at times.    Past Medical History:   Diagnosis Date     Anxiety      Arthritis      Asthma      Bursitis of shoulder 3/4/2010     Chronic rhinitis 3/25/2009     Coronary artery disease      Depression      Depression      Dyspnea on exertion      Gastro-oesophageal reflux disease      GERD (gastroesophageal reflux disease) 10/14/2008     Hypertension      Idiopathic cardiomyopathy (H) 1/8/2009     Indigestion      Itchy eyes      Itchy nose      Left leg pain 6/16/2011     Motion sickness 2/27/2008     Nasal congestion      Pneumonia      Problems related to lack of adequate sleep      Ringing in ears      Sleep apnea 3/25/2009     Sneezing      History   Smoking Status     Never Smoker   Smokeless Tobacco     Never Used       ROS:  GEN no fevers  SKIN no erythema  Musculoskeletal:  See HPI.      OBJECTIVE:  Blood pressure 115/67, pulse 81, temperature 98  F (36.7  C), temperature source Oral, weight 77.2 kg (170 lb 1.6 oz), last menstrual period 07/16/2012, SpO2 98 %, not currently breastfeeding.  Patient is alert and NAD.  EYES: conjunctiva clear  Knee  Exam (left):  Inspection: Swelling over the medial aspect.  No bruising.  Palpation: Joint lines nontender.  Tender over the medial collateral ligament.  No joint laxity noted with varus and valgus stress.   Negative drawers and Lachman's.  Full extension.  Mild decreased flexion.  Cap refill intact.    Good doralis pedis.  Neurovascularly Intact Distally.     Study Result     LEFT KNEE THREE VIEWS  3/23/2019 10:09 AM      HISTORY: Acute pain of left knee.     COMPARISON: None.                                                                      IMPRESSION: Very advanced patellofemoral degenerative changes. Mild to  moderate medial and lateral compartment degenerative changes. No  evidence of acute fracture.         ASSESSMENT:    ICD-10-CM    1. Acute pain of left knee M25.562 XR Knee Left 3 Views     order for DME     ORTHO  REFERRAL   2. Sprain of medial collateral ligament of left knee, initial encounter S83.412A ORTHO  REFERRAL   3. Arthritis of left knee M17.12 ORTHO  REFERRAL         PLAN:  Advil 200 mg, 2-3 tabs p.o. 3 times daily with food for 7-10 days.  Ice/elevate/knee brace.  Follow-up with Orth O 2 weeks if not improved.  Suspect medial collateral ligament injury although cannot exclude that meniscus tear or ACL/PCL injury.      Sharon Krueger PA-C

## 2019-03-28 ENCOUNTER — OFFICE VISIT (OUTPATIENT)
Dept: ORTHOPEDICS | Facility: CLINIC | Age: 59
End: 2019-03-28
Payer: COMMERCIAL

## 2019-03-28 VITALS
SYSTOLIC BLOOD PRESSURE: 143 MMHG | BODY MASS INDEX: 29.77 KG/M2 | DIASTOLIC BLOOD PRESSURE: 85 MMHG | HEIGHT: 63 IN | RESPIRATION RATE: 16 BRPM | WEIGHT: 168 LBS

## 2019-03-28 DIAGNOSIS — M17.12 PRIMARY OSTEOARTHRITIS OF LEFT KNEE: Primary | ICD-10-CM

## 2019-03-28 DIAGNOSIS — M25.562 ACUTE PAIN OF LEFT KNEE: ICD-10-CM

## 2019-03-28 PROCEDURE — 99244 OFF/OP CNSLTJ NEW/EST MOD 40: CPT | Performed by: ORTHOPAEDIC SURGERY

## 2019-03-28 ASSESSMENT — MIFFLIN-ST. JEOR: SCORE: 1301.17

## 2019-03-28 ASSESSMENT — PAIN SCALES - GENERAL: PAINLEVEL: SEVERE PAIN (6)

## 2019-03-28 NOTE — PROGRESS NOTES
"CHIEF COMPLAINT:   Chief Complaint   Patient presents with     Left Knee - Pain     Onset: 1 week. NKI or past injuries or surgeries. Pain mainly medial. Has tried some ice and ibuprofen. Went to  and referred here.   .    Patricia Perez is seen today in the [facility] for evaluation of left knee pain at the request of Sharon Krueger PA-C.    HISTORY OF PRESENT ILLNESS    Patricia Perez is a 58 year old female seen for evaluation of ongoing left knee pain with no known injury.   Pain has been present for 1 weeks. 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 By the time she got home that day, pain was progressing. A couple days later upon wakening had really bad pain. Locates pain mostly medial and some anterior. Ok at rest, aggravated with walking, standing and gets worse as the day goes on. Denies numbness and tingling. Ok at night.    States has had pain in the knee \"once in a while\" over the years.    Treatment has been rest, knee sleeve, ice, ibuprofen.    History of low back pain, hip pain, unrelated.    Present symptoms: pain medially  and anteriorly, pain dull/achy , mild pain, moderate pain.    Pain severity: 6/10  Frequency of symptoms: are constant  Exacerbating Factors: weight bearing, prolonged standing  Relieving Factors: rest, sitting, ice  Night Pain: No  Pain while at rest: No   Numbness or tingling: No   Patient has tried:     NSAIDS: Yes      Physical Therapy: No      Activity modification: Yes      Bracing: knee sleeve      Injections: No      Ice: Yes      Assistive device:  No    Other PMH:  has a past medical history of Anxiety, Arthritis, Asthma, Bursitis of shoulder (3/4/2010), Chronic rhinitis (3/25/2009), Coronary artery disease, Depression, Depression, Dyspnea on exertion, Gastro-oesophageal reflux disease, GERD (gastroesophageal reflux disease) (10/14/2008), Hypertension, " Idiopathic cardiomyopathy (H) (1/8/2009), Indigestion, Itchy eyes, Itchy nose, Left leg pain (6/16/2011), Motion sickness (2/27/2008), Nasal congestion, Pneumonia, Problems related to lack of adequate sleep, Ringing in ears, Sleep apnea (3/25/2009), and Sneezing.  Patient Active Problem List   Diagnosis     Dizziness and giddiness     Motion sickness     GERD (gastroesophageal reflux disease)     Idiopathic cardiomyopathy (H)     Sleep apnea     Hyperlipidemia LDL goal <100     Allergic rhinitis     Mild major depression (H)     Hypertension goal BP (blood pressure) < 130/80     Pelvic pain in female     Health Care Home     24 hour contact handout given     Elevated PTHrP level     H/O bariatric surgery     Hx of gastric bypass     Hypovitaminosis D     Allergic rhinitis     Attention deficit disorder of adult     Mild persistent asthma     Low back pain     Hand joint pain     Posterior vitreous detachment, left eye     Myopia, bilateral     Dumping syndrome     Benign essential hypertension     Nuclear sclerosis of both eyes     Major depressive disorder, recurrent episode, mild (H)     Adjustment disorder with depressed mood     Decreased hearing of both ears     BPV (benign positional vertigo), unspecified laterality     Advanced directives, counseling/discussion     Vitamin D deficiency     Coronary artery disease involving native coronary artery of native heart without angina pectoris     Acute pain of left knee       Surgical Hx:  has a past surgical history that includes Uretural Sticture (As Child); diabetes resources (As Child); hernia repair; ENT surgery; Laparoscopic bypass gastric (10/16/2012); Bariatric Surgery; and tonsillectomy.    Medications:   Current Outpatient Medications:      albuterol (PROAIR HFA/PROVENTIL HFA/VENTOLIN HFA) 108 (90 Base) MCG/ACT inhaler, Inhale 2 puffs into the lungs every 6 hours as needed for shortness of breath / dyspnea or wheezing, Disp: 1 Inhaler, Rfl: 11      amphetamine-dextroamphetamine (ADDERALL XR) 25 MG 24 hr capsule, Take 1 capsule (25 mg) by mouth daily, Disp: 90 capsule, Rfl: 0     amphetamine-dextroamphetamine (ADDERALL XR) 25 MG 24 hr capsule, Take 1 capsule (25 mg) by mouth daily, Disp: 90 capsule, Rfl: 0     aspirin 81 MG EC tablet, Take 1 tablet (81 mg) by mouth daily (Patient not taking: Reported on 3/11/2019), Disp: 90 tablet, Rfl: 3     blood glucose monitoring (NO BRAND SPECIFIED) test strip, Use to test blood sugar 1-2 times daily or as directed., Disp: 1 Box, Rfl: 2     calcium carbonate-vitamin D (CALTRATE 600+D) 600-400 MG-UNIT CHEW, Take 1 chew tab by mouth 2 times daily, Disp: 180 tablet, Rfl: 3     carvedilol (COREG) 25 MG tablet, TAKE 1 TABLET(25 MG) BY MOUTH TWICE DAILY WITH MEALS, Disp: 180 tablet, Rfl: 3     COMFORT LANCETS MISC, 1 Device 2 times daily as needed, Disp: 1 Box, Rfl: 1     Cyanocobalamin (VITAMIN B-12 SL), Place  under the tongue daily., Disp: , Rfl:      estrogen conj-medroxyPROGESTERone (PREMPRO) 0.45-1.5 MG per tablet, Take 1 tablet by mouth daily, Disp: 90 tablet, Rfl: 3     hydrALAZINE (APRESOLINE) 10 MG tablet, Take 1 tablet (10 mg) by mouth 3 times daily, Disp: 270 tablet, Rfl: 3     isosorbide mononitrate (IMDUR) 30 MG 24 hr tablet, TAKE 1 TABLET(30 MG) BY MOUTH DAILY, Disp: 90 tablet, Rfl: 3     lisinopril (PRINIVIL/ZESTRIL) 5 MG tablet, Take 1 tablet (5 mg) by mouth daily, Disp: 90 tablet, Rfl: 3     MAGNESIUM PO, , Disp: , Rfl:      meclizine (ANTIVERT) 25 MG tablet, Take 1 tablet (25 mg) by mouth 3 times daily as needed (Patient not taking: Reported on 11/16/2018), Disp: 30 tablet, Rfl: 1     Multiple Vitamin (MULTI-VITAMIN) per tablet, Take 1 tablet by mouth daily., Disp: 100 tablet, Rfl: 3     olopatadine (PATANOL) 0.1 % ophthalmic solution, Place 1 drop into both eyes 2 times daily as needed for allergies, Disp: 5 mL, Rfl: 11     ondansetron (ZOFRAN ODT) 4 MG ODT tab, Take 1 tablet (4 mg) by mouth every 8 hours as  "needed for nausea (Patient not taking: Reported on 6/7/2018), Disp: 10 tablet, Rfl: 0     predniSONE (DELTASONE) 10 MG tablet, Take  40 mg for  3 days.  30 mg for 3 days.  20 mg for 3 days and  10 mg for 3 days.., Disp: 30 tablet, Rfl: 0     sertraline (ZOLOFT) 100 MG tablet, TAKE 2 TABLETS BY MOUTH DAILY, Disp: 180 tablet, Rfl: 1     simvastatin (ZOCOR) 20 MG tablet, Take 1 tablet (20 mg) by mouth At Bedtime, Disp: 90 tablet, Rfl: 3     venlafaxine (EFFEXOR-XR) 150 MG 24 hr capsule, TAKE 1 CAPSULE(150 MG) BY MOUTH DAILY, Disp: 90 capsule, Rfl: 1    Allergies:   Allergies   Allergen Reactions     No Known Drug Allergies        Social Hx: .   reports that  has never smoked. she has never used smokeless tobacco. She reports that she does not drink alcohol or use drugs.    Family Hx: family history includes Alcohol/Drug in her father; Breast Cancer in her maternal grandmother; Cardiovascular in her father; Cerebrovascular Disease in an other family member; Depression in her brother and father; Gastrointestinal Disease in her mother; Glaucoma in her paternal grandmother; Heart Disease in her mother; Hypertension in her brother, father, and mother; Lipids in her father; Macular Degeneration in an other family member; Respiratory in her father.    REVIEW OF SYSTEMS: 10 point ROS neg other than the symptoms noted above in the HPI and PMH. Notables include  CONSTITUTIONAL:NEGATIVE for fever, chills, change in weight  INTEGUMENTARY/SKIN: NEGATIVE for worrisome rashes, moles or lesions  MUSCULOSKELETAL:See HPI above  NEURO: NEGATIVE for weakness, dizziness or paresthesias    PHYSICAL EXAM:  /85   Resp 16   Ht 1.6 m (5' 3\")   Wt 76.2 kg (168 lb)   LMP 07/16/2012   BMI 29.76 kg/m     GENERAL APPEARANCE: healthy, alert, no distress  SKIN: no suspicious lesions or rashes  NEURO: Normal strength and tone, mentation intact and speech normal  PSYCH:  mentation appears normal and affect normal, not " anxious  RESPIRATORY: No increased work of breathing.  HANDS: no clubbing, nail pitting  LYMPH: no palpable popliteal lymphadenopathy.    BILATERAL LOWER EXTREMITIES:  Gait: quadriceps avoidance left   Alignment: valgus left more than the right.  No gross deformities or masses.  No Quad atrophy, strength normal.  Intact sensation deep peroneal nerve, superficial peroneal nerve, med/lat tibial nerve, sural nerve, saphenous nerve  Intact EHL, EDL, TA, FHL, GS, quadriceps hamstrings and hip flexors  Toes warm and well perfused, brisk capillary refill. Palpable 2+ dp pulses.  Bilateral calf soft and nttp or squeeze.  DTRs: achilles 2+, patella 2+.  Edema: 1+    LEFT KNEE EXAM:    Skin: intact, no ecchymosis or erythema  Squat: 100 %, not limited by pain.     ROM: 2 extension to 120 flexion  Tight hamstrings on straight leg raise.  Effusion: small  Tender: medial joint line, lateral joint line, pes.   McMurrays: negative    MCL: stable, and non-painful at both 0 and 30 degrees knee flexion  Varus stress: stable, and non-painful at both 0 and 30 degrees knee flexion  Lachmans: neg, firm endpoint  Posterior Drawer stable  Patellofemoral joint:                Apprehension: negative              Crepitations: moderate   Grind: positive.    RIGHT KNEE EXAM:    Skin: intact, no ecchymosis or erythema  Squat: 100 %, not limited by pain.     ROM: 1 extension to 130 flexion  Tight hamstrings on straight leg raise.  Effusion: none  Tender: NTTP med/lat joint line, anterior or posterior knee  McMurrays: negative    MCL: stable, and non-painful at both 0 and 30 degrees knee flexion  Varus stress: stable, and non-painful at both 0 and 30 degrees knee flexion  Lachmans: neg, firm endpoint  Posterior Drawer stable  Patellofemoral joint:                Apprehension: negative              Crepitations: minimal    X-RAY:  3 views left knee from 3/23/2019 were reviewed in clinic today. On my review, no obvious fractures or dislocations.  "There is bone on bone loss of patello-femoral joint space, mostly lateral, with articular flattening and subchondral sclerosis. There is mild-moderate lateral > medial joint space narrowing. Tricompartmental marginal osteophytes.         ASSESSMENT/PLAN: Patricia Perez is a 58 year old female with acute left knee pain, advanced primary osteoarthritis.     * reviewed imaging studies with patient, showing arthritic changes, or wearing of the cartilage in the knee. This can be caused by normal \"wear and tear\" over the years or following prior injury to the knee.    Non-surgical treatment for knee arthritis includes:    * rest, sitting  * Activity modification - avoid impact activities or activities that aggravate symptoms.  * NSAIDS (non-steroidal anti-inflammatory medications; e.g. Aleve, advil, motrin, ibuprofen) - regular use for inflammation ( twice daily or three times daily), with food, as long as no contra-indications Please discuss with primary care doctor if needed  * ice, 15-20 minutes at a time several times a day or as needed.  * Strengthening of quadriceps muscles  * Physical Therapy for strengthening, stretching and range of motion exercises of legs  * Tylenol as needed for pain, consider Tylenol arthritis or similar  * Weight loss: Weight loss:  There is no height or weight on file to calculate BMI.. weight loss benefits, not only for the current pain symptoms, but also overall health. Recommend a good diet plan that works for the patient, with the assistance of a dietician or primary care doctor as needed. Also, a good, low-impact exercise program for at least 20 minutes per day, 3 times per week, such as exercise bike, elliptical , or pool.  * Exercise: low impact such as stationary bike, elliptical, pool.  * Injections: cortisone versus viscosupplementation (hyaluronic acid, \"rooster comb\", \"gel shots\"); risks and perceived benefits discussed today. Patient elects NOT to proceed.  * Bracing: " bracing the knee may offer some relief of symptoms when worn and provide some stability.  * over the counter supplements such as glucosamine and chondroitin sulfate may help with joint pain.  * topical ointments may help as well    * return to clinic as needed.          Hima Quinn M.D., M.S.  Dept. of Orthopaedic Surgery  [facility]

## 2019-03-28 NOTE — LETTER
"    3/28/2019         RE: Patricia Perez  [address]  [facility] [address]        Dear Colleague,    Thank you for referring your patient, Patricia Perez, to the [facility]. Please see a copy of my visit note below.    CHIEF COMPLAINT:   Chief Complaint   Patient presents with     Left Knee - Pain     Onset: 1 week. NKI or past injuries or surgeries. Pain mainly medial. Has tried some ice and ibuprofen. Went to  and referred here.   .    Patricia Perez is seen today in the [facility] for evaluation of left knee pain at the request of Sharon Krueger PA-C.    HISTORY OF PRESENT ILLNESS    Patricia Perez is a 58 year old female seen for evaluation of ongoing left knee pain with no known injury.   Pain has been present for 1 weeks. 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 By the time she got home that day, pain was progressing. A couple days later upon wakening had really bad pain. Locates pain mostly medial and some anterior. Ok at rest, aggravated with walking, standing and gets worse as the day goes on. Denies numbness and tingling. Ok at night.    States has had pain in the knee \"once in a while\" over the years.    Treatment has been rest, knee sleeve, ice, ibuprofen.    History of low back pain, hip pain, unrelated.    Present symptoms: pain medially  and anteriorly, pain dull/achy , mild pain, moderate pain.    Pain severity: 6/10  Frequency of symptoms: are constant  Exacerbating Factors: weight bearing, prolonged standing  Relieving Factors: rest, sitting, ice  Night Pain: No  Pain while at rest: No   Numbness or tingling: No   Patient has tried:     NSAIDS: Yes      Physical Therapy: No      Activity modification: Yes      Bracing: knee sleeve      Injections: No      Ice: Yes      Assistive device:  No    Other PMH:  has a past medical history of " Anxiety, Arthritis, Asthma, Bursitis of shoulder (3/4/2010), Chronic rhinitis (3/25/2009), Coronary artery disease, Depression, Depression, Dyspnea on exertion, Gastro-oesophageal reflux disease, GERD (gastroesophageal reflux disease) (10/14/2008), Hypertension, Idiopathic cardiomyopathy (H) (1/8/2009), Indigestion, Itchy eyes, Itchy nose, Left leg pain (6/16/2011), Motion sickness (2/27/2008), Nasal congestion, Pneumonia, Problems related to lack of adequate sleep, Ringing in ears, Sleep apnea (3/25/2009), and Sneezing.  Patient Active Problem List   Diagnosis     Dizziness and giddiness     Motion sickness     GERD (gastroesophageal reflux disease)     Idiopathic cardiomyopathy (H)     Sleep apnea     Hyperlipidemia LDL goal <100     Allergic rhinitis     Mild major depression (H)     Hypertension goal BP (blood pressure) < 130/80     Pelvic pain in female     Health Care Home     24 hour contact handout given     Elevated PTHrP level     H/O bariatric surgery     Hx of gastric bypass     Hypovitaminosis D     Allergic rhinitis     Attention deficit disorder of adult     Mild persistent asthma     Low back pain     Hand joint pain     Posterior vitreous detachment, left eye     Myopia, bilateral     Dumping syndrome     Benign essential hypertension     Nuclear sclerosis of both eyes     Major depressive disorder, recurrent episode, mild (H)     Adjustment disorder with depressed mood     Decreased hearing of both ears     BPV (benign positional vertigo), unspecified laterality     Advanced directives, counseling/discussion     Vitamin D deficiency     Coronary artery disease involving native coronary artery of native heart without angina pectoris     Acute pain of left knee       Surgical Hx:  has a past surgical history that includes Uretural Sticture (As Child); diabetes resources (As Child); hernia repair; ENT surgery; Laparoscopic bypass gastric (10/16/2012); Bariatric Surgery; and  tonsillectomy.    Medications:   Current Outpatient Medications:      albuterol (PROAIR HFA/PROVENTIL HFA/VENTOLIN HFA) 108 (90 Base) MCG/ACT inhaler, Inhale 2 puffs into the lungs every 6 hours as needed for shortness of breath / dyspnea or wheezing, Disp: 1 Inhaler, Rfl: 11     amphetamine-dextroamphetamine (ADDERALL XR) 25 MG 24 hr capsule, Take 1 capsule (25 mg) by mouth daily, Disp: 90 capsule, Rfl: 0     amphetamine-dextroamphetamine (ADDERALL XR) 25 MG 24 hr capsule, Take 1 capsule (25 mg) by mouth daily, Disp: 90 capsule, Rfl: 0     aspirin 81 MG EC tablet, Take 1 tablet (81 mg) by mouth daily (Patient not taking: Reported on 3/11/2019), Disp: 90 tablet, Rfl: 3     blood glucose monitoring (NO BRAND SPECIFIED) test strip, Use to test blood sugar 1-2 times daily or as directed., Disp: 1 Box, Rfl: 2     calcium carbonate-vitamin D (CALTRATE 600+D) 600-400 MG-UNIT CHEW, Take 1 chew tab by mouth 2 times daily, Disp: 180 tablet, Rfl: 3     carvedilol (COREG) 25 MG tablet, TAKE 1 TABLET(25 MG) BY MOUTH TWICE DAILY WITH MEALS, Disp: 180 tablet, Rfl: 3     COMFORT LANCETS MISC, 1 Device 2 times daily as needed, Disp: 1 Box, Rfl: 1     Cyanocobalamin (VITAMIN B-12 SL), Place  under the tongue daily., Disp: , Rfl:      estrogen conj-medroxyPROGESTERone (PREMPRO) 0.45-1.5 MG per tablet, Take 1 tablet by mouth daily, Disp: 90 tablet, Rfl: 3     hydrALAZINE (APRESOLINE) 10 MG tablet, Take 1 tablet (10 mg) by mouth 3 times daily, Disp: 270 tablet, Rfl: 3     isosorbide mononitrate (IMDUR) 30 MG 24 hr tablet, TAKE 1 TABLET(30 MG) BY MOUTH DAILY, Disp: 90 tablet, Rfl: 3     lisinopril (PRINIVIL/ZESTRIL) 5 MG tablet, Take 1 tablet (5 mg) by mouth daily, Disp: 90 tablet, Rfl: 3     MAGNESIUM PO, , Disp: , Rfl:      meclizine (ANTIVERT) 25 MG tablet, Take 1 tablet (25 mg) by mouth 3 times daily as needed (Patient not taking: Reported on 11/16/2018), Disp: 30 tablet, Rfl: 1     Multiple Vitamin (MULTI-VITAMIN) per tablet, Take  "1 tablet by mouth daily., Disp: 100 tablet, Rfl: 3     olopatadine (PATANOL) 0.1 % ophthalmic solution, Place 1 drop into both eyes 2 times daily as needed for allergies, Disp: 5 mL, Rfl: 11     ondansetron (ZOFRAN ODT) 4 MG ODT tab, Take 1 tablet (4 mg) by mouth every 8 hours as needed for nausea (Patient not taking: Reported on 6/7/2018), Disp: 10 tablet, Rfl: 0     predniSONE (DELTASONE) 10 MG tablet, Take  40 mg for  3 days.  30 mg for 3 days.  20 mg for 3 days and  10 mg for 3 days.., Disp: 30 tablet, Rfl: 0     sertraline (ZOLOFT) 100 MG tablet, TAKE 2 TABLETS BY MOUTH DAILY, Disp: 180 tablet, Rfl: 1     simvastatin (ZOCOR) 20 MG tablet, Take 1 tablet (20 mg) by mouth At Bedtime, Disp: 90 tablet, Rfl: 3     venlafaxine (EFFEXOR-XR) 150 MG 24 hr capsule, TAKE 1 CAPSULE(150 MG) BY MOUTH DAILY, Disp: 90 capsule, Rfl: 1    Allergies:   Allergies   Allergen Reactions     No Known Drug Allergies        Social Hx: .   reports that  has never smoked. she has never used smokeless tobacco. She reports that she does not drink alcohol or use drugs.    Family Hx: family history includes Alcohol/Drug in her father; Breast Cancer in her maternal grandmother; Cardiovascular in her father; Cerebrovascular Disease in an other family member; Depression in her brother and father; Gastrointestinal Disease in her mother; Glaucoma in her paternal grandmother; Heart Disease in her mother; Hypertension in her brother, father, and mother; Lipids in her father; Macular Degeneration in an other family member; Respiratory in her father.    REVIEW OF SYSTEMS: 10 point ROS neg other than the symptoms noted above in the HPI and PMH. Notables include  CONSTITUTIONAL:NEGATIVE for fever, chills, change in weight  INTEGUMENTARY/SKIN: NEGATIVE for worrisome rashes, moles or lesions  MUSCULOSKELETAL:See HPI above  NEURO: NEGATIVE for weakness, dizziness or paresthesias    PHYSICAL EXAM:  /85   Resp 16   Ht 1.6 m (5' 3\")   Wt " 76.2 kg (168 lb)   LMP 07/16/2012   BMI 29.76 kg/m      GENERAL APPEARANCE: healthy, alert, no distress  SKIN: no suspicious lesions or rashes  NEURO: Normal strength and tone, mentation intact and speech normal  PSYCH:  mentation appears normal and affect normal, not anxious  RESPIRATORY: No increased work of breathing.  HANDS: no clubbing, nail pitting  LYMPH: no palpable popliteal lymphadenopathy.    BILATERAL LOWER EXTREMITIES:  Gait: quadriceps avoidance left   Alignment: valgus left more than the right.  No gross deformities or masses.  No Quad atrophy, strength normal.  Intact sensation deep peroneal nerve, superficial peroneal nerve, med/lat tibial nerve, sural nerve, saphenous nerve  Intact EHL, EDL, TA, FHL, GS, quadriceps hamstrings and hip flexors  Toes warm and well perfused, brisk capillary refill. Palpable 2+ dp pulses.  Bilateral calf soft and nttp or squeeze.  DTRs: achilles 2+, patella 2+.  Edema: 1+    LEFT KNEE EXAM:    Skin: intact, no ecchymosis or erythema  Squat: 100 %, not limited by pain.     ROM: 2 extension to 120 flexion  Tight hamstrings on straight leg raise.  Effusion: small  Tender: medial joint line, lateral joint line, pes.   McMurrays: negative    MCL: stable, and non-painful at both 0 and 30 degrees knee flexion  Varus stress: stable, and non-painful at both 0 and 30 degrees knee flexion  Lachmans: neg, firm endpoint  Posterior Drawer stable  Patellofemoral joint:                Apprehension: negative              Crepitations: moderate   Grind: positive.    RIGHT KNEE EXAM:    Skin: intact, no ecchymosis or erythema  Squat: 100 %, not limited by pain.     ROM: 1 extension to 130 flexion  Tight hamstrings on straight leg raise.  Effusion: none  Tender: NTTP med/lat joint line, anterior or posterior knee  McMurrays: negative    MCL: stable, and non-painful at both 0 and 30 degrees knee flexion  Varus stress: stable, and non-painful at both 0 and 30 degrees knee  "flexion  Lachmans: neg, firm endpoint  Posterior Drawer stable  Patellofemoral joint:                Apprehension: negative              Crepitations: minimal    X-RAY:  3 views left knee from 3/23/2019 were reviewed in clinic today. On my review, no obvious fractures or dislocations. There is bone on bone loss of patello-femoral joint space, mostly lateral, with articular flattening and subchondral sclerosis. There is mild-moderate lateral > medial joint space narrowing. Tricompartmental marginal osteophytes.         ASSESSMENT/PLAN: Patricia Perez is a 58 year old female with acute left knee pain, advanced primary osteoarthritis.     * reviewed imaging studies with patient, showing arthritic changes, or wearing of the cartilage in the knee. This can be caused by normal \"wear and tear\" over the years or following prior injury to the knee.    Non-surgical treatment for knee arthritis includes:    * rest, sitting  * Activity modification - avoid impact activities or activities that aggravate symptoms.  * NSAIDS (non-steroidal anti-inflammatory medications; e.g. Aleve, advil, motrin, ibuprofen) - regular use for inflammation ( twice daily or three times daily), with food, as long as no contra-indications Please discuss with primary care doctor if needed  * ice, 15-20 minutes at a time several times a day or as needed.  * Strengthening of quadriceps muscles  * Physical Therapy for strengthening, stretching and range of motion exercises of legs  * Tylenol as needed for pain, consider Tylenol arthritis or similar  * Weight loss: Weight loss:  There is no height or weight on file to calculate BMI.. weight loss benefits, not only for the current pain symptoms, but also overall health. Recommend a good diet plan that works for the patient, with the assistance of a dietician or primary care doctor as needed. Also, a good, low-impact exercise program for at least 20 minutes per day, 3 times per week, such as exercise bike, " "elliptical , or pool.  * Exercise: low impact such as stationary bike, elliptical, pool.  * Injections: cortisone versus viscosupplementation (hyaluronic acid, \"rooster comb\", \"gel shots\"); risks and perceived benefits discussed today. Patient elects NOT to proceed.  * Bracing: bracing the knee may offer some relief of symptoms when worn and provide some stability.  * over the counter supplements such as glucosamine and chondroitin sulfate may help with joint pain.  * topical ointments may help as well    * return to clinic as needed.          Hima Quinn M.D., M.S.  Dept. of Orthopaedic Surgery  [facility]        Again, thank you for allowing me to participate in the care of your patient.        Sincerely,        Hima Quinn MD    "

## 2019-04-02 ENCOUNTER — MYC MEDICAL ADVICE (OUTPATIENT)
Dept: FAMILY MEDICINE | Facility: CLINIC | Age: 59
End: 2019-04-02

## 2019-04-02 DIAGNOSIS — F98.8 ATTENTION DEFICIT DISORDER OF ADULT: ICD-10-CM

## 2019-04-04 DIAGNOSIS — F98.8 ATTENTION DEFICIT DISORDER OF ADULT: ICD-10-CM

## 2019-04-04 RX ORDER — DEXTROAMPHETAMINE SACCHARATE, AMPHETAMINE ASPARTATE MONOHYDRATE, DEXTROAMPHETAMINE SULFATE AND AMPHETAMINE SULFATE 6.25; 6.25; 6.25; 6.25 MG/1; MG/1; MG/1; MG/1
25 CAPSULE, EXTENDED RELEASE ORAL DAILY
Qty: 90 CAPSULE | Refills: 0 | Status: SHIPPED | OUTPATIENT
Start: 2019-04-04 | End: 2019-07-02

## 2019-04-04 RX ORDER — DEXTROAMPHETAMINE SACCHARATE, AMPHETAMINE ASPARTATE MONOHYDRATE, DEXTROAMPHETAMINE SULFATE AND AMPHETAMINE SULFATE 6.25; 6.25; 6.25; 6.25 MG/1; MG/1; MG/1; MG/1
25 CAPSULE, EXTENDED RELEASE ORAL DAILY
Qty: 90 CAPSULE | Refills: 0 | Status: SHIPPED | OUTPATIENT
Start: 2019-04-04 | End: 2019-04-04

## 2019-04-04 NOTE — TELEPHONE ENCOUNTER
Script walked over to the [facility].    Ifrah Francis, [facility]

## 2019-04-18 ENCOUNTER — THERAPY VISIT (OUTPATIENT)
Dept: PHYSICAL THERAPY | Facility: CLINIC | Age: 59
End: 2019-04-18
Payer: COMMERCIAL

## 2019-04-18 DIAGNOSIS — M22.2X2 PATELLOFEMORAL PAIN SYNDROME OF LEFT KNEE: ICD-10-CM

## 2019-04-18 DIAGNOSIS — M17.12 PRIMARY OSTEOARTHRITIS OF LEFT KNEE: ICD-10-CM

## 2019-04-18 PROCEDURE — 97110 THERAPEUTIC EXERCISES: CPT | Mod: GP | Performed by: PHYSICAL THERAPIST

## 2019-04-18 PROCEDURE — 97530 THERAPEUTIC ACTIVITIES: CPT | Mod: GP | Performed by: PHYSICAL THERAPIST

## 2019-04-18 PROCEDURE — 97161 PT EVAL LOW COMPLEX 20 MIN: CPT | Mod: GP | Performed by: PHYSICAL THERAPIST

## 2019-04-18 ASSESSMENT — ACTIVITIES OF DAILY LIVING (ADL)
LIMPING: THE SYMPTOM AFFECTS MY ACTIVITY SLIGHTLY
GO UP STAIRS: ACTIVITY IS SOMEWHAT DIFFICULT
WALK: ACTIVITY IS MINIMALLY DIFFICULT
AS_A_RESULT_OF_YOUR_KNEE_INJURY,_HOW_WOULD_YOU_RATE_YOUR_CURRENT_LEVEL_OF_DAILY_ACTIVITY?: ABNORMAL
RAW_SCORE: 47
SQUAT: ACTIVITY IS FAIRLY DIFFICULT
RISE FROM A CHAIR: ACTIVITY IS NOT DIFFICULT
STIFFNESS: THE SYMPTOM AFFECTS MY ACTIVITY SLIGHTLY
KNEE_ACTIVITY_OF_DAILY_LIVING_SUM: 47
GO DOWN STAIRS: ACTIVITY IS MINIMALLY DIFFICULT
SWELLING: THE SYMPTOM AFFECTS MY ACTIVITY MODERATELY
SIT WITH YOUR KNEE BENT: ACTIVITY IS SOMEWHAT DIFFICULT
HOW_WOULD_YOU_RATE_THE_OVERALL_FUNCTION_OF_YOUR_KNEE_DURING_YOUR_USUAL_DAILY_ACTIVITIES?: ABNORMAL
HOW_WOULD_YOU_RATE_THE_CURRENT_FUNCTION_OF_YOUR_KNEE_DURING_YOUR_USUAL_DAILY_ACTIVITIES_ON_A_SCALE_FROM_0_TO_100_WITH_100_BEING_YOUR_LEVEL_OF_KNEE_FUNCTION_PRIOR_TO_YOUR_INJURY_AND_0_BEING_THE_INABILITY_TO_PERFORM_ANY_OF_YOUR_USUAL_DAILY_ACTIVITIES?: 70
GIVING WAY, BUCKLING OR SHIFTING OF KNEE: THE SYMPTOM AFFECTS MY ACTIVITY MODERATELY
STAND: ACTIVITY IS NOT DIFFICULT
KNEE_ACTIVITY_OF_DAILY_LIVING_SCORE: 67.14
WEAKNESS: I DO NOT HAVE THE SYMPTOM
KNEEL ON THE FRONT OF YOUR KNEE: ACTIVITY IS SOMEWHAT DIFFICULT
PAIN: THE SYMPTOM AFFECTS MY ACTIVITY SLIGHTLY

## 2019-04-18 NOTE — PROGRESS NOTES
[facility] Initial Evaluation  Subjective:  The history is provided by the patient.   Patricia Perez is a 59 year old female with a left knee condition.  Condition occurred with:  Repetition/overuse.  Condition occurred: in the community.  This is a recurrent condition  Pt reports 1 month history of L knee pain with swelling. Was walking and felt sudden onset of anteromedial aspect of L knee. Pain was a sharp pain. Went to urgent care the next day and had x-rays done, no significant findings. Was given a knee brace. Since then the pain has gotten a little better, but still is painful. Feels at times the knee gives way. Pain worse with stairs, lifting/bending/kneeling, any sort of bending of the knee, sleeping (without brace). Pain better with bracing, walking, ice, OTC meds. 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 Does have history of L hip pain..    Patient reports pain:  Anterior and medial.    Pain is described as sharp and is intermittent and reported as 2/10.  Associated symptoms:  Buckling/giving out, edema and loss of motion/stiffness (Due to pain).        Special tests:  X-ray (No significant findings).      General health as reported by patient is good.  Pertinent medical history includes:  Asthma, depression, heart problems, high blood pressure, menopausal, overweight and sleep disorder/apnea.  Medical allergies: no.    Current medications:  Anti-depressants, anti-inflammatory and high blood pressure medication.  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                                    Objective:  System                                           Hip Evaluation    Hip Strength:    Flexion:   Left: 5-/5   Pain:  Right: 4+/5   Pain:                    Extension:  Left: 4/5  Pain:Right: 4/5    Pain:    Abduction:  Left: 4/5     Pain:Right: 4/5    Pain:                           Knee Evaluation:  ROM:    AROM      Extension:  Left: WNL    Right:  WNL  Flexion: Left: 126     Right: 131        Strength:     Extension:  Left: 5-/5   Pain:      Right: 5-/5   Pain:  Flexion:  Left: 5-/5   Pain:      Right: 5-/5   Pain:    Quad Set Left: Fair    Pain:   Quad Set Right: Good    Pain:    Special Tests:   Left knee positive for the following special tests:  Patellar Compression  Left knee negative for the following special tests:  Meninscal        Mobility Testing:      Patellofemoral Medial:  Left: hypomobile    Right: normal  Patellofemoral Lateral:  Left: hypomobile    Right: normal  Patellofemoral Superior:  Left: hypomobile    Right: normal  Patellofemoral Inferior:  Left: hypomobile    Right: normal  Functional Testing:          Quad:    Single Leg Squat:  Left:      Right:        Bilateral Leg Squat:   Mild loss of control, hip substitution, femoral IR and excessive anterior knee excursion                  General Evaluation:                      Balance:    Single Leg Stance--Eyes Open:  Left: 4/30 sec    Right: 3/30 sec                                                     ROS    Assessment/Plan:    Patient is a 59 year old female with left side knee complaints.    Patient has the following significant findings with corresponding treatment plan.                Diagnosis 1:  L PFP  Pain -  hot/cold therapy, US, electric stimulation, mechanical traction, manual therapy, splint/taping/bracing/orthotics, self management, education, directional preference exercise and home program  Decreased ROM/flexibility - manual therapy, therapeutic exercise, therapeutic activity and home program  Decreased joint mobility - manual therapy, therapeutic exercise, therapeutic activity and home program  Decreased strength - therapeutic exercise, therapeutic activities and home program  Impaired balance - neuro re-education, therapeutic activities and home program  Impaired muscle performance - neuro re-education and home program  Decreased function - therapeutic activities and home program    Therapy  Evaluation Codes:   1) History comprised of:   Personal factors that impact the plan of care:      None.    Comorbidity factors that impact the plan of care are:      Asthma, Depression, Heart problems, High blood pressure, Menopausal, Overweight and Sleep disorder/apnea.     Medications impacting care: Anti-depressant, Anti-inflammatory and High blood pressure.  2) Examination of Body Systems comprised of:   Body structures and functions that impact the plan of care:      Knee.   Activity limitations that impact the plan of care are:      Dressing, Lifting and Stairs.  3) Clinical presentation characteristics are:   Stable/Uncomplicated.  4) Decision-Making    Low complexity using standardized patient assessment instrument and/or measureable assessment of functional outcome.  Cumulative Therapy Evaluation is: Low complexity.    Previous and current functional limitations:  (See Goal Flow Sheet for this information)    Short term and Long term goals: (See Goal Flow Sheet for this information)     Communication ability:  Patient appears to be able to clearly communicate and understand verbal and written communication and follow directions correctly.  Treatment Explanation - The following has been discussed with the patient:   RX ordered/plan of care  Anticipated outcomes  Possible risks and side effects  This patient would benefit from PT intervention to resume normal activities.   Rehab potential is good.    Frequency:  1 X week, once daily  Duration:  for 6 weeks  Discharge Plan:  Achieve all LTG.  Independent in home treatment program.  Reach maximal therapeutic benefit.    Please refer to the daily flowsheet for treatment today, total treatment time and time spent performing 1:1 timed codes.

## 2019-05-30 PROBLEM — M22.2X2 PATELLOFEMORAL PAIN SYNDROME OF LEFT KNEE: Status: RESOLVED | Noted: 2019-04-18 | Resolved: 2019-05-30

## 2019-06-10 DIAGNOSIS — F43.21 ADJUSTMENT DISORDER WITH DEPRESSED MOOD: ICD-10-CM

## 2019-06-10 NOTE — TELEPHONE ENCOUNTER
"Requested Prescriptions   Pending Prescriptions Disp Refills     venlafaxine (EFFEXOR-XR) 150 MG 24 hr capsule [Pharmacy Med Name: VENLAFAXINE ER 150MG CAPSULES] 90 capsule 0     Sig: TAKE 1 CAPSULE(150 MG) BY MOUTH DAILY  Last Written Prescription Date:  12/12/2018 #90 x 1  Last filled 03/15/2019   Last office visit: 3/11/2019 DINORA Cummins     Future Office Visit:   Next 5 appointments (look out 90 days)    Jun 14, 2019  8:00 AM CDT  PHYSICAL with ELIOT Rodriguez CNP  [facility] ([facility]) [address] [address]  [phone]                Serotonin-Norepinephrine Reuptake Inhibitors  Failed - 6/10/2019 12:10 PM        Failed - Blood pressure under 140/90 in past 12 months     BP Readings from Last 3 Encounters:   03/28/19 143/85   03/23/19 115/67   03/11/19 136/84                 Failed - PHQ-9 score of less than 5 in past 6 months     Please review last PHQ-9 score.   PHQ-9 SCORE 3/1/2018 6/7/2018 11/16/2018   PHQ-9 Total Score - - -   PHQ-9 Total Score MyChart - - -   PHQ-9 Total Score 4 2 3               Failed - Normal serum creatinine on file in past 12 months     Recent Labs   Lab Test 06/07/18  0843   CR 0.68             Passed - Medication is active on med list        Passed - Patient is age 18 or older        Passed - No active pregnancy on record        Passed - No positive pregnancy test in past 12 months        Passed - Recent (6 mo) or future (30 days) visit within the authorizing provider's specialty     Patient had office visit in the last 6 months or has a visit in the next 30 days with authorizing provider or within the authorizing provider's specialty.  See \"Patient Info\" tab in inbasket, or \"Choose Columns\" in Meds & Orders section of the refill encounter.              "

## 2019-06-11 RX ORDER — VENLAFAXINE HYDROCHLORIDE 150 MG/1
CAPSULE, EXTENDED RELEASE ORAL
Qty: 90 CAPSULE | Refills: 0 | Status: SHIPPED | OUTPATIENT
Start: 2019-06-11 | End: 2019-09-27

## 2019-06-11 NOTE — TELEPHONE ENCOUNTER
Routing refill request to provider for review/approval because:  Due for PHQ-9. Sent to patient via My Chart. Rachna Strauss RN

## 2019-06-14 ENCOUNTER — OFFICE VISIT (OUTPATIENT)
Dept: FAMILY MEDICINE | Facility: CLINIC | Age: 59
End: 2019-06-14
Payer: COMMERCIAL

## 2019-06-14 VITALS
BODY MASS INDEX: 30.26 KG/M2 | SYSTOLIC BLOOD PRESSURE: 116 MMHG | TEMPERATURE: 98 F | RESPIRATION RATE: 14 BRPM | HEART RATE: 72 BPM | WEIGHT: 170.8 LBS | DIASTOLIC BLOOD PRESSURE: 72 MMHG

## 2019-06-14 DIAGNOSIS — Z00.00 ROUTINE GENERAL MEDICAL EXAMINATION AT A HEALTH CARE FACILITY: Primary | ICD-10-CM

## 2019-06-14 DIAGNOSIS — I10 ESSENTIAL HYPERTENSION WITH GOAL BLOOD PRESSURE LESS THAN 130/80: ICD-10-CM

## 2019-06-14 DIAGNOSIS — I42.9 IDIOPATHIC CARDIOMYOPATHY (H): ICD-10-CM

## 2019-06-14 DIAGNOSIS — Z12.31 ENCOUNTER FOR SCREENING MAMMOGRAM FOR MALIGNANT NEOPLASM OF BREAST: ICD-10-CM

## 2019-06-14 DIAGNOSIS — T75.3XXS MOTION SICKNESS, SEQUELA: ICD-10-CM

## 2019-06-14 DIAGNOSIS — F33.0 MAJOR DEPRESSIVE DISORDER, RECURRENT EPISODE, MILD (H): ICD-10-CM

## 2019-06-14 DIAGNOSIS — I25.10 CORONARY ARTERY DISEASE INVOLVING NATIVE CORONARY ARTERY OF NATIVE HEART WITHOUT ANGINA PECTORIS: ICD-10-CM

## 2019-06-14 DIAGNOSIS — M54.2 NECK PAIN: ICD-10-CM

## 2019-06-14 DIAGNOSIS — E78.5 HYPERLIPIDEMIA LDL GOAL <100: ICD-10-CM

## 2019-06-14 PROCEDURE — 99396 PREV VISIT EST AGE 40-64: CPT | Performed by: NURSE PRACTITIONER

## 2019-06-14 RX ORDER — LISINOPRIL 5 MG/1
5 TABLET ORAL DAILY
Qty: 90 TABLET | Refills: 3 | Status: SHIPPED | OUTPATIENT
Start: 2019-06-14 | End: 2020-08-26

## 2019-06-14 RX ORDER — HYDRALAZINE HYDROCHLORIDE 10 MG/1
10 TABLET, FILM COATED ORAL 3 TIMES DAILY
Qty: 270 TABLET | Refills: 3 | Status: SHIPPED | OUTPATIENT
Start: 2019-06-14 | End: 2020-08-26

## 2019-06-14 RX ORDER — CARVEDILOL 25 MG/1
TABLET ORAL
Qty: 180 TABLET | Refills: 3 | Status: SHIPPED | OUTPATIENT
Start: 2019-06-14 | End: 2020-06-24

## 2019-06-14 RX ORDER — ISOSORBIDE MONONITRATE 30 MG/1
TABLET, EXTENDED RELEASE ORAL
Qty: 90 TABLET | Refills: 3 | Status: SHIPPED | OUTPATIENT
Start: 2019-06-14 | End: 2020-08-26

## 2019-06-14 RX ORDER — ONDANSETRON 4 MG/1
4 TABLET, ORALLY DISINTEGRATING ORAL EVERY 8 HOURS PRN
Qty: 10 TABLET | Refills: 0 | Status: SHIPPED | OUTPATIENT
Start: 2019-06-14 | End: 2021-04-15

## 2019-06-14 RX ORDER — SERTRALINE HYDROCHLORIDE 100 MG/1
200 TABLET, FILM COATED ORAL DAILY
Qty: 180 TABLET | Refills: 1 | Status: SHIPPED | OUTPATIENT
Start: 2019-06-14 | End: 2019-12-26

## 2019-06-14 RX ORDER — SIMVASTATIN 20 MG
20 TABLET ORAL AT BEDTIME
Qty: 90 TABLET | Refills: 3 | Status: SHIPPED | OUTPATIENT
Start: 2019-06-14 | End: 2020-08-26

## 2019-06-14 ASSESSMENT — ANXIETY QUESTIONNAIRES
7. FEELING AFRAID AS IF SOMETHING AWFUL MIGHT HAPPEN: NOT AT ALL
1. FEELING NERVOUS, ANXIOUS, OR ON EDGE: SEVERAL DAYS
GAD7 TOTAL SCORE: 8
6. BECOMING EASILY ANNOYED OR IRRITABLE: SEVERAL DAYS
5. BEING SO RESTLESS THAT IT IS HARD TO SIT STILL: MORE THAN HALF THE DAYS
3. WORRYING TOO MUCH ABOUT DIFFERENT THINGS: SEVERAL DAYS
2. NOT BEING ABLE TO STOP OR CONTROL WORRYING: SEVERAL DAYS

## 2019-06-14 ASSESSMENT — PATIENT HEALTH QUESTIONNAIRE - PHQ9
SUM OF ALL RESPONSES TO PHQ QUESTIONS 1-9: 4
5. POOR APPETITE OR OVEREATING: MORE THAN HALF THE DAYS

## 2019-06-14 ASSESSMENT — PAIN SCALES - GENERAL: PAINLEVEL: NO PAIN (0)

## 2019-06-14 NOTE — PROGRESS NOTES
"   SUBJECTIVE:   CC: Patricia Perez is an 59 year old woman who presents for preventive health visit.     Healthy Habits:    Do you get at least three servings of calcium containing foods daily (dairy, green leafy vegetables, etc.)? no, taking calcium and/or vitamin D supplement: yes    Amount of exercise or daily activities, outside of work: 1-2 day(s) per week    Problems taking medications regularly No    Medication side effects: No    Have you had an eye exam in the past two years? yes    Do you see a dentist twice per year? yes    Do you have sleep apnea, excessive snoring or daytime drowsiness?no    *Is not fasting.    *Neck pain for \"a while\" that does not seem to be getting better. Always has a stiffness in her neck, has tried a lighter purse and massage but it always seems to be tight. The neck \"crackles\" when she will move it around.   She changed from carrying a brief case to a pull     *Will notice that when her mouth is dry that she will keep biting her cheek, is wondering why this is happening. Did discuss this with her dentist and they didn't say too much about it.     Today's PHQ-2 Score:   PHQ-2 ( 1999 Pfizer) 6/14/2019 11/16/2018   Q1: Little interest or pleasure in doing things 0 1   Q2: Feeling down, depressed or hopeless 0 1   PHQ-2 Score 0 2   Q1: Little interest or pleasure in doing things - -   Q2: Feeling down, depressed or hopeless - -   PHQ-2 Score - -     Abuse: Current or Past(Physical, Sexual or Emotional)- No  Do you feel safe in your environment? Yes    Social History     Tobacco Use     Smoking status: Never Smoker     Smokeless tobacco: Never Used   Substance Use Topics     Alcohol use: No     If you drink alcohol do you typically have >3 drinks per day or >7 drinks per week? No                     Reviewed orders with patient.  Reviewed health maintenance and updated orders accordingly - Yes  Labs reviewed in [facility]    Mammogram Screening: Patient over age 50, mutual decision to " screen reflected in health maintenance.    Pertinent mammograms are reviewed under the imaging tab.  History of abnormal Pap smear: NO - age 30- 65 PAP every 3 years recommended  PAP / HPV Latest Ref Rng & Units 6/15/2016 5/29/2013 5/9/2012   PAP - NIL NIL NIL   HPV 16 DNA NEG Negative - -   HPV 18 DNA NEG Negative - -   OTHER HR HPV NEG Negative - -     Reviewed and updated as needed this visit by clinical staff  Tobacco  Allergies  Meds  Med Hx  Surg Hx  Fam Hx  Soc Hx        Reviewed and updated as needed this visit by Provider  Tobacco  Allergies  Meds  Med Hx  Surg Hx  Fam Hx  Soc Hx           ROS:  CONSTITUTIONAL:POSITIVE  for weight gain and not getting out and exercising   INTEGUMENTARY/SKIN: NEGATIVE for worrisome rashes, moles or lesions  EYES: NEGATIVE for vision changes or irritation  ENT: NEGATIVE for ear, mouth and throat problems and POSITIVE for started to bite the inside of her mouth on the right side   RESP: NEGATIVE for significant cough or SOB  BREAST: NEGATIVE for masses, tenderness or discharge  CV: NEGATIVE for chest pain, palpitations or peripheral edema  GI: NEGATIVE for nausea, abdominal pain, heartburn, or change in bowel habits and POSITIVE for heartburn or reflux every now and then   : NEGATIVE for unusual urinary or vaginal symptoms. No vaginal bleeding.  MUSCULOSKELETAL:NEGATIVE for significant arthralgias or myalgia and POSITIVE  for arthralgias knees left knee worse than the right   NEURO: NEGATIVE for weakness, dizziness or paresthesias  ENDOCRINE: NEGATIVE for temperature intolerance, skin/hair changes  HEME/ALLERGY/IMMUNE: NEGATIVE for bleeding problems  PSYCHIATRIC: NEGATIVE for changes in mood or affect and POSITIVE for stress related to work      OBJECTIVE:   /72 (BP Location: Right arm, Patient Position: Chair, Cuff Size: Adult Large)   Pulse 72   Temp 98  F (36.7  C) (Oral)   Resp 14   Wt 77.5 kg (170 lb 12.8 oz)   LMP 07/16/2012   BMI 30.26 kg/m     EXAM:  GENERAL APPEARANCE: healthy, alert and no distress  EYES: Eyes grossly normal to inspection, PERRL and conjunctivae and sclerae normal  HENT: ear canals and TM's normal, nose and mouth without ulcers or lesions, oropharynx clear and oral mucous membranes moist  NECK: no adenopathy, no asymmetry, masses, or scars and thyroid normal to palpation  RESP: lungs clear to auscultation - no rales, rhonchi or wheezes  BREAST: normal without masses, tenderness or nipple discharge and no palpable axillary masses or adenopathy  CV: regular rate and rhythm, normal S1 S2, no S3 or S4, no murmur, click or rub, no peripheral edema and peripheral pulses strong  ABDOMEN: soft, nontender, no hepatosplenomegaly, no masses and bowel sounds normal  MS: gait is age appropriate without ataxia, neck exam reveals normal strength and does have tight muscles  And the head is forward going.   SKIN: no suspicious lesions or rashes  NEURO: Normal strength and tone, sensory exam grossly normal, mentation intact and speech normal  PSYCH: mentation appears normal, well groomed, judgement and insight intact and stressed     Diagnostic Test Results:  Labs reviewed in Epic    ASSESSMENT/PLAN:     ASSESSMENT/PLAN:      ICD-10-CM    1. Routine general medical examination at a health care facility Z00.00 CBC with platelets and differential   2. Idiopathic cardiomyopathy (H) I42.8 carvedilol (COREG) 25 MG tablet     hydrALAZINE (APRESOLINE) 10 MG tablet     CARDIOLOGY EVAL ADULT REFERRAL     **Comprehensive metabolic panel FUTURE 1yr   3. Essential hypertension with goal blood pressure less than 130/80 I10 carvedilol (COREG) 25 MG tablet     hydrALAZINE (APRESOLINE) 10 MG tablet     isosorbide mononitrate (IMDUR) 30 MG 24 hr tablet     CARDIOLOGY EVAL ADULT REFERRAL     **Comprehensive metabolic panel FUTURE 1yr     Albumin Random Urine Quantitative with Creat Ratio     CANCELED: Comprehensive metabolic panel (BMP + Alb, Alk Phos, ALT, AST, Total.  KEVIN Logan)     CANCELED: Albumin Random Urine Quantitative with Creat Ratio   4. Coronary artery disease involving native coronary artery of native heart without angina pectoris I25.10 lisinopril (PRINIVIL/ZESTRIL) 5 MG tablet     CARDIOLOGY EVAL ADULT REFERRAL     **Comprehensive metabolic panel FUTURE 1yr   5. Major depressive disorder, recurrent episode, mild (H) F33.0 sertraline (ZOLOFT) 100 MG tablet   6. Hyperlipidemia LDL goal <100 E78.5 simvastatin (ZOCOR) 20 MG tablet     Lipid panel reflex to direct LDL Fasting     CANCELED: Lipid panel reflex to direct LDL Fasting   7. Encounter for screening mammogram for malignant neoplasm of breast Z12.31 MA Screening Digital Bilateral   8. Motion sickness, sequela T75.3XXS ondansetron (ZOFRAN ODT) 4 MG ODT tab   9. Neck pain M54.2 [facility]ANK PT, HAND, AND CHIROPRACTIC REFERRAL       Patient Instructions     Preventive Health Recommendations  Female Ages 50 - 64    Yearly exam: See your health care provider every year in order to  o Review health changes.   o Discuss preventive care.    o Review your medicines if your doctor has prescribed any.      Get a Pap test every three years (unless you have an abnormal result and your provider advises testing more often).    If you get Pap tests with HPV test, you only need to test every 5 years, unless you have an abnormal result.     You do not need a Pap test if your uterus was removed (hysterectomy) and you have not had cancer.    You should be tested each year for STDs (sexually transmitted diseases) if you're at risk.     Have a mammogram every 1 to 2 years.    Have a colonoscopy at age 50, or have a yearly FIT test (stool test). These exams screen for colon cancer.      Have a cholesterol test every 5 years, or more often if advised.    Have a diabetes test (fasting glucose) every three years. If you are at risk for diabetes, you should have this test more often.     If you are at risk for osteoporosis (brittle bone disease),  "think about having a bone density scan (DEXA).    Shots: Get a flu shot each year. Get a tetanus shot every 10 years.    Nutrition:     Eat at least 5 servings of fruits and vegetables each day.    Eat whole-grain bread, whole-wheat pasta and brown rice instead of white grains and rice.    Get adequate Calcium and Vitamin D.     Lifestyle    Exercise at least 150 minutes a week (30 minutes a day, 5 days a week). This will help you control your weight and prevent disease.    Limit alcohol to one drink per day.    No smoking.     Wear sunscreen to prevent skin cancer.     See your dentist every six months for an exam and cleaning.    See your eye doctor every 1 to 2 years.    You need to bring some fun to work .   Try getting up and turn up some music and dance ,  Stretch and relieve stress     Stay well hydrated - urine should be clear.       for the biting of the mouth  Try using a bite guard to prevent the biting and it will also help to relax your jaw       See cardiology     You will need to make a lab only appointment,  [phone].                     COUNSELING:   Reviewed preventive health counseling, as reflected in patient instructions       Regular exercise       Healthy diet/nutrition       Vision screening       Immunizations    {due for  Shingles vaccine and  Pneu 13         Estimated body mass index is 30.26 kg/m  as calculated from the following:    Height as of 3/28/19: 1.6 m (5' 3\").    Weight as of this encounter: 77.5 kg (170 lb 12.8 oz).    Weight management plan: Discussed healthy diet and exercise guidelines     reports that she has never smoked. She has never used smokeless tobacco.      Counseling Resources:  ATP IV Guidelines  Pooled Cohorts Equation Calculator  Breast Cancer Risk Calculator  FRAX Risk Assessment  ICSI Preventive Guidelines  Dietary Guidelines for Americans, 2010  USDA's MyPlate  ASA Prophylaxis  Lung CA Screening    JOHN VELAZQUEZ NP, APRN CNP  [facility]  "

## 2019-06-14 NOTE — PATIENT INSTRUCTIONS
Preventive Health Recommendations  Female Ages 50 - 64    Yearly exam: See your health care provider every year in order to  o Review health changes.   o Discuss preventive care.    o Review your medicines if your doctor has prescribed any.      Get a Pap test every three years (unless you have an abnormal result and your provider advises testing more often).    If you get Pap tests with HPV test, you only need to test every 5 years, unless you have an abnormal result.     You do not need a Pap test if your uterus was removed (hysterectomy) and you have not had cancer.    You should be tested each year for STDs (sexually transmitted diseases) if you're at risk.     Have a mammogram every 1 to 2 years.    Have a colonoscopy at age 50, or have a yearly FIT test (stool test). These exams screen for colon cancer.      Have a cholesterol test every 5 years, or more often if advised.    Have a diabetes test (fasting glucose) every three years. If you are at risk for diabetes, you should have this test more often.     If you are at risk for osteoporosis (brittle bone disease), think about having a bone density scan (DEXA).    Shots: Get a flu shot each year. Get a tetanus shot every 10 years.    Nutrition:     Eat at least 5 servings of fruits and vegetables each day.    Eat whole-grain bread, whole-wheat pasta and brown rice instead of white grains and rice.    Get adequate Calcium and Vitamin D.     Lifestyle    Exercise at least 150 minutes a week (30 minutes a day, 5 days a week). This will help you control your weight and prevent disease.    Limit alcohol to one drink per day.    No smoking.     Wear sunscreen to prevent skin cancer.     See your dentist every six months for an exam and cleaning.    See your eye doctor every 1 to 2 years.    You need to bring some fun to work .   Try getting up and turn up some music and dance ,  Stretch and relieve stress     Stay well hydrated - urine should be clear.       for  the biting of the mouth  Try using a bite guard to prevent the biting and it will also help to relax your jaw       See cardiology     You will need to make a lab only appointment,  [phone].

## 2019-06-14 NOTE — NURSING NOTE
"Initial /72 (BP Location: Right arm, Patient Position: Chair, Cuff Size: Adult Large)   Pulse 72   Temp 98  F (36.7  C) (Oral)   Resp 14   Wt 77.5 kg (170 lb 12.8 oz)   LMP 07/16/2012   BMI 30.26 kg/m   Estimated body mass index is 30.26 kg/m  as calculated from the following:    Height as of 3/28/19: 1.6 m (5' 3\").    Weight as of this encounter: 77.5 kg (170 lb 12.8 oz). .    Phyllis Jo CMA ([facility])    "

## 2019-06-15 ASSESSMENT — ANXIETY QUESTIONNAIRES: GAD7 TOTAL SCORE: 8

## 2019-06-15 ASSESSMENT — ASTHMA QUESTIONNAIRES: ACT_TOTALSCORE: 23

## 2019-06-25 ENCOUNTER — THERAPY VISIT (OUTPATIENT)
Dept: PHYSICAL THERAPY | Facility: CLINIC | Age: 59
End: 2019-06-25
Attending: NURSE PRACTITIONER
Payer: COMMERCIAL

## 2019-06-25 DIAGNOSIS — M54.2 NECK PAIN: ICD-10-CM

## 2019-06-25 PROCEDURE — 97161 PT EVAL LOW COMPLEX 20 MIN: CPT | Mod: GP | Performed by: PHYSICAL THERAPIST

## 2019-06-25 PROCEDURE — 97110 THERAPEUTIC EXERCISES: CPT | Mod: GP | Performed by: PHYSICAL THERAPIST

## 2019-06-25 PROCEDURE — 97530 THERAPEUTIC ACTIVITIES: CPT | Mod: GP | Performed by: PHYSICAL THERAPIST

## 2019-06-25 NOTE — PROGRESS NOTES
[facility] Initial Evaluation  Subjective:  The history is provided by the patient.   Type of problem:  Cervical spine   Condition occurred with:  Insidious onset. This is a chronic condition   Problem details: Pt notes onset of midline to R sided neck pain since March of 2019. Not sure what caused onset of pain. Pain described as dull and achy, lately getting sharper. Pain worse general movement, checking blind spots, looking up to wash hair in shower, looking down. Does work at computer at work, but pain does not limit her ability to work. Pain better with ice and medication. .           Patricia Perez being seen for Neck pain.   Date of Onset: March 2019. Problem occurred: Unknown  and reported as 5/10 on pain scale. General health as reported by patient is good. Pertinent medical history includes:  Asthma, depression, heart problems, high blood pressure, overweight and sleep disorder/apnea.    Surgeries include:  None.  Current medications:  Anti-depressants, cardiac medication, high blood pressure medication and hormone replacement therapy.              Patient is Admin.                           Objective:  System              Cervical/Thoracic Evaluation    AROM:  AROM Cervical:    Flexion:          100% ROM, 6/10 pain  Extension:       50% ROM, 5/10 pain  Rotation:         Left: 75% ROM, 5/10 pain     Right: 75% ROM, 6-7/10 pain  Side Bend:      Left:     Right:                                                                   General     ROS    Assessment/Plan:    Patient is a 59 year old female with cervical complaints.    Patient has the following significant findings with corresponding treatment plan.                Diagnosis 1:  R sided neck pain  Pain -  hot/cold therapy, US, electric stimulation, mechanical traction, manual therapy, splint/taping/bracing/orthotics, self management, education, directional preference exercise and home program  Decreased ROM/flexibility - manual  therapy, therapeutic exercise, therapeutic activity and home program  Decreased joint mobility - manual therapy, therapeutic exercise, therapeutic activity and home program  Decreased strength - therapeutic exercise, therapeutic activities and home program  Impaired muscle performance - neuro re-education and home program  Decreased function - therapeutic activities and home program  Impaired posture - neuro re-education, therapeutic activities and home program    Therapy Evaluation Codes:   1) History comprised of:   Personal factors that impact the plan of care:      None.    Comorbidity factors that impact the plan of care are:      Asthma, Depression, Heart problems, High blood pressure, Overweight and Sleep disorder/apnea.     Medications impacting care: Anti-depressant, Cardiac and High blood pressure.  2) Examination of Body Systems comprised of:   Body structures and functions that impact the plan of care:      Cervical spine.   Activity limitations that impact the plan of care are:      Cooking, Driving, Dressing, Lifting, Reading/Computer work, Sitting and Sleeping.  3) Clinical presentation characteristics are:   Stable/Uncomplicated.  4) Decision-Making    Low complexity using standardized patient assessment instrument and/or measureable assessment of functional outcome.  Cumulative Therapy Evaluation is: Low complexity.    Previous and current functional limitations:  (See Goal Flow Sheet for this information)    Short term and Long term goals: (See Goal Flow Sheet for this information)     Communication ability:  Patient appears to be able to clearly communicate and understand verbal and written communication and follow directions correctly.  Treatment Explanation - The following has been discussed with the patient:   RX ordered/plan of care  Anticipated outcomes  Possible risks and side effects  This patient would benefit from PT intervention to resume normal activities.   Rehab potential is  good.    Frequency:  1 X week, once daily  Duration:  for 6 weeks  Discharge Plan:  Achieve all LTG.  Independent in home treatment program.  Reach maximal therapeutic benefit.    Please refer to the daily flowsheet for treatment today, total treatment time and time spent performing 1:1 timed codes.

## 2019-07-02 ENCOUNTER — THERAPY VISIT (OUTPATIENT)
Dept: PHYSICAL THERAPY | Facility: CLINIC | Age: 59
End: 2019-07-02
Payer: COMMERCIAL

## 2019-07-02 ENCOUNTER — ALLIED HEALTH/NURSE VISIT (OUTPATIENT)
Dept: NURSING | Facility: CLINIC | Age: 59
End: 2019-07-02
Payer: COMMERCIAL

## 2019-07-02 DIAGNOSIS — M54.2 NECK PAIN: ICD-10-CM

## 2019-07-02 DIAGNOSIS — F98.8 ATTENTION DEFICIT DISORDER OF ADULT: ICD-10-CM

## 2019-07-02 DIAGNOSIS — F98.8 ATTENTION DEFICIT DISORDER OF ADULT: Primary | ICD-10-CM

## 2019-07-02 PROCEDURE — 97112 NEUROMUSCULAR REEDUCATION: CPT | Mod: GP | Performed by: PHYSICAL THERAPIST

## 2019-07-02 PROCEDURE — 97110 THERAPEUTIC EXERCISES: CPT | Mod: GP | Performed by: PHYSICAL THERAPIST

## 2019-07-02 PROCEDURE — 99207 ZZC NO CHARGE NURSE ONLY: CPT

## 2019-07-02 RX ORDER — DEXTROAMPHETAMINE SACCHARATE, AMPHETAMINE ASPARTATE MONOHYDRATE, DEXTROAMPHETAMINE SULFATE AND AMPHETAMINE SULFATE 6.25; 6.25; 6.25; 6.25 MG/1; MG/1; MG/1; MG/1
25 CAPSULE, EXTENDED RELEASE ORAL DAILY
Qty: 30 CAPSULE | Refills: 0 | Status: ON HOLD | OUTPATIENT
Start: 2019-07-03 | End: 2020-01-07

## 2019-07-02 NOTE — TELEPHONE ENCOUNTER
Unfortunately as a covering provider, I can only prescribe 30 day supply.  Patient needs to address with PCP for longer than 30 days refills.    1. Attention deficit disorder of adult  - amphetamine-dextroamphetamine (ADDERALL XR) 25 MG 24 hr capsule; Take 1 capsule (25 mg) by mouth daily  Dispense: 30 capsule; Refill: 0

## 2019-07-02 NOTE — NURSING NOTE
Patient presents to clinic to request her Adderall XR be refilled. I submitted a request for her.    Belén Cobian RN

## 2019-07-02 NOTE — TELEPHONE ENCOUNTER
Patient walked into clinic to request her Adderall XR be refilled. She is requesting a 90 day supply. Advised I will submit her request.     Belén Cobian RN

## 2019-07-03 RX ORDER — DEXTROAMPHETAMINE SACCHARATE, AMPHETAMINE ASPARTATE MONOHYDRATE, DEXTROAMPHETAMINE SULFATE AND AMPHETAMINE SULFATE 6.25; 6.25; 6.25; 6.25 MG/1; MG/1; MG/1; MG/1
25 CAPSULE, EXTENDED RELEASE ORAL DAILY
Qty: 90 CAPSULE | Refills: 0 | Status: SHIPPED | OUTPATIENT
Start: 2019-07-03 | End: 2019-10-14

## 2019-07-09 ENCOUNTER — THERAPY VISIT (OUTPATIENT)
Dept: PHYSICAL THERAPY | Facility: CLINIC | Age: 59
End: 2019-07-09
Payer: COMMERCIAL

## 2019-07-09 DIAGNOSIS — M54.2 NECK PAIN: ICD-10-CM

## 2019-07-09 PROCEDURE — 97140 MANUAL THERAPY 1/> REGIONS: CPT | Mod: GP | Performed by: PHYSICAL THERAPIST

## 2019-07-09 PROCEDURE — 97112 NEUROMUSCULAR REEDUCATION: CPT | Mod: GP | Performed by: PHYSICAL THERAPIST

## 2019-07-09 PROCEDURE — 97110 THERAPEUTIC EXERCISES: CPT | Mod: GP | Performed by: PHYSICAL THERAPIST

## 2019-09-30 ENCOUNTER — DOCUMENTATION ONLY (OUTPATIENT)
Dept: CARE COORDINATION | Facility: CLINIC | Age: 59
End: 2019-09-30

## 2019-10-01 ENCOUNTER — HEALTH MAINTENANCE LETTER (OUTPATIENT)
Age: 59
End: 2019-10-01

## 2019-10-17 ENCOUNTER — OFFICE VISIT (OUTPATIENT)
Dept: OPTOMETRY | Facility: CLINIC | Age: 59
End: 2019-10-17
Payer: COMMERCIAL

## 2019-10-17 DIAGNOSIS — H16.141 SUPERFICIAL PUNCTATE KERATITIS OF RIGHT EYE: Primary | ICD-10-CM

## 2019-10-17 PROCEDURE — 92014 COMPRE OPH EXAM EST PT 1/>: CPT | Performed by: OPTOMETRIST

## 2019-10-17 RX ORDER — TOBRAMYCIN AND DEXAMETHASONE 3; 1 MG/ML; MG/ML
1 SUSPENSION/ DROPS OPHTHALMIC 4 TIMES DAILY
Qty: 1 BOTTLE | Refills: 0 | Status: ON HOLD | OUTPATIENT
Start: 2019-10-17 | End: 2020-01-07

## 2019-10-17 ASSESSMENT — EXTERNAL EXAM - RIGHT EYE: OD_EXAM: NORMAL

## 2019-10-17 ASSESSMENT — VISUAL ACUITY
CORRECTION_TYPE: GLASSES
METHOD: SNELLEN - LINEAR
OD_CC: 20/30
OS_CC+: -2
OS_CC: 20/20

## 2019-10-17 ASSESSMENT — TONOMETRY
OD_IOP_MMHG: 18
IOP_METHOD: TONOPEN

## 2019-10-17 ASSESSMENT — SLIT LAMP EXAM - LIDS: COMMENTS: MEIBOMIAN GLAND DYSFUNCTION

## 2019-10-17 ASSESSMENT — EXTERNAL EXAM - LEFT EYE: OS_EXAM: NORMAL

## 2019-10-17 NOTE — PROGRESS NOTES
Chief Complaint   Patient presents with     Eye Pain     Eye Pain   HPI    Eye Pain      Laterality:  In right eye     Quality:  aching and foreign body sensation     Pain scale:  4/10     Frequency:  intermittently     Timing:  throughout the day     Duration:  2 weeks     Course:  gradually worsening     Associated symptoms:  foreign body sensation and tearing     Treatments tried:  no treatments     Patient does not wear contacts     Mely Lopez, Optometric Assistant, A.B.O.C.      Medical, surgical and family histories reviewed and updated 10/17/2019.       OBJECTIVE: See Ophthalmology exam    ASSESSMENT:    ICD-10-CM    1. Superficial punctate keratitis of right eye H16.141 tobramycin-dexamethasone (TOBRADEX) 0.3-0.1 % ophthalmic suspension      PLAN:     Patient Instructions   1 drop Tobradex- right eye 4 x day for 1 week.    Non preserved Refresh- 2-4 x day.    Return for recheck 1 week and discuss dry eyes and hygeine.    Khoa Michel, OD

## 2019-10-17 NOTE — LETTER
10/17/2019         RE: Patricia Perez  [address]  [place] [address]        Dear Colleague,    Thank you for referring your patient, Patricia Perez, to the [facility]. Please see a copy of my visit note below.    Chief Complaint   Patient presents with     Eye Pain     Eye Pain   HPI    Eye Pain      Laterality:  In right eye     Quality:  aching and foreign body sensation     Pain scale:  4/10     Frequency:  intermittently     Timing:  throughout the day     Duration:  2 weeks     Course:  gradually worsening     Associated symptoms:  foreign body sensation and tearing     Treatments tried:  no treatments     Patient does not wear contacts     Mely Lopez, Optometric Assistant, A.B.O.C.      Medical, surgical and family histories reviewed and updated 10/17/2019.       OBJECTIVE: See Ophthalmology exam    ASSESSMENT:    ICD-10-CM    1. Superficial punctate keratitis of right eye H16.141 tobramycin-dexamethasone (TOBRADEX) 0.3-0.1 % ophthalmic suspension      PLAN:     Patient Instructions   1 drop Tobradex- right eye 4 x day for 1 week.    Non preserved Refresh- 2-4 x day.    Return for recheck 1 week and discuss dry eyes and hygeine.    Khoa Michel, ORACIO           Again, thank you for allowing me to participate in the care of your patient.        Sincerely,        Khoa Michel OD

## 2019-10-24 ENCOUNTER — OFFICE VISIT (OUTPATIENT)
Dept: OPTOMETRY | Facility: CLINIC | Age: 59
End: 2019-10-24
Payer: COMMERCIAL

## 2019-10-24 DIAGNOSIS — H02.889 MEIBOMIAN GLAND DYSFUNCTION: ICD-10-CM

## 2019-10-24 DIAGNOSIS — H16.141 SUPERFICIAL PUNCTATE KERATITIS OF RIGHT EYE: Primary | ICD-10-CM

## 2019-10-24 PROCEDURE — 99213 OFFICE O/P EST LOW 20 MIN: CPT | Performed by: OPTOMETRIST

## 2019-10-24 ASSESSMENT — EXTERNAL EXAM - RIGHT EYE: OD_EXAM: NORMAL

## 2019-10-24 ASSESSMENT — VISUAL ACUITY
CORRECTION_TYPE: GLASSES
OD_CC+: -2
OS_CC+: -2
OS_CC: 20/25
METHOD: SNELLEN - LINEAR
OD_CC: 20/25

## 2019-10-24 ASSESSMENT — TONOMETRY
OS_IOP_MMHG: 17
OD_IOP_MMHG: 17
IOP_METHOD: TONOPEN

## 2019-10-24 ASSESSMENT — SLIT LAMP EXAM - LIDS
COMMENTS: MEIBOMIAN GLAND DYSFUNCTION
COMMENTS: MEIBOMIAN GLAND DYSFUNCTION

## 2019-10-24 ASSESSMENT — EXTERNAL EXAM - LEFT EYE: OS_EXAM: NORMAL

## 2019-10-24 NOTE — PROGRESS NOTES
Red Eye Recheck    Reason:   Chief Complaint   Patient presents with     Eye Pain Follow-Up         Eyes feel: better    Medications used: Tobradex    How often: 3 times per day      Mely Lopez, Optometric Assistant, A.B.O.C.     OBJECTIVE:     See ophthalmology exam      ASSESSMENT:        ICD-10-CM    1. Superficial punctate keratitis of right eye H16.141    2. Meibomian gland dysfunction H02.889    Resolved       PLAN:       Patient Instructions   Continue Tobradex- 1 drop right eye 2 x day for 1 week and discontinue.    Heat to the eyes daily for 10-15 minutes nightly with warm washcloth or reusable gel masks from the pharmacy or  [employer] heat masks can be purchased at [employer].    Blephadex eyelid wipes or Ocusoft Oust- cleanse eyelids 2 x day for 6 weeks then nightly.  These can be purchased on Amazon.[email or web address]    Return  for annual exam 12/19 or sooner if needed.    Khoa Michel, OD

## 2019-10-24 NOTE — PATIENT INSTRUCTIONS
Continue Tobradex- 1 drop right eye 2 x day for 1 week and discontinue.    Heat to the eyes daily for 10-15 minutes nightly with warm washcloth or reusable gel masks from the pharmacy or  Fei heat masks can be purchased at [employer].    Blephadex eyelid wipes or Ocusoft Oust- cleanse eyelids 2 x day for 6 weeks then nightly.  These can be purchased on [employer].[email or web address]    Return  for annual exam 12/19 or sooner if needed.    Khoa Michel, OD    Use one drop of artificial tears both eyes 3-4 x daily.  Continue to use the drops regardless if your eyes are comfortable.  Artificial tears work best as a preventative and not as well after your eyes are starting to bother you.  It may take 4- 6 weeks of using the drops before you notice improvement.  If after that time you are still having problems schedule an appointment for an evaluation and discussion of different treatments such as Restasis or Xiidra.  Dry eyes are a chronic condition and you may have more symptoms at certain times of the year.    Excess tearing can be due to the right tears not working properly or a blockage in the tear drainage system.  You can try using artificial tears 1 drop right eye 3-4 x day.  If the excess tearing is bothersome after 3-4 weeks and that doesn't help we can send you for further testing on the puncta which allows the tears to drain.  This would entail a referral to our oculo plastic specialist at the [facility].    Brands of drops that are recommended are:    Systane Complete  Systane Ultra  Systane Balance  Refresh Advanced Optive  Blink    If you are using drops more than 4 x day or have sensitivities to preservatives I recommend non preserved artificial tears.  These come in 1 use vials.  They can be used every 1-2 hours.    Brands of drops that are recommended are:    Systane- preservative free  Refresh-  preservative free  Blink- preservative free    Gels or ointment can be used at night.    Brands recommended  are:    Systane Gel  Refresh Gel  Blink Gel  Genteal Gel    Systane night time (ointment)  Refresh Celluvisc  Refresh PM (ointment)      Visine, Clear Eyes or Murine (drops that get the red out) can irritate the eyes and cause a rebound effect where the eyes become more red and you end up using more drops.  Avoid drops containing tetrahydrozoline, naphazoline, phenylephrine, oxymetazoline.      OTC Lumify is a newer product that gives immediate redness relief with out the rebound effect.  Use as needed to take the redness out.    Artificial tears may be used with other drops (such as allergy, glaucoma, antibiotics) around the same time.  Be sure to wait 5 minutes in between drops.    Heat to the eyelids can also improve your symptoms of dry eyes.  Fei heat masks can be purchased at [employer] to be used daily for 10-15 minutes.  Other options are gel masks that can be put in the microwave and purchased at most pharmacies.

## 2019-10-24 NOTE — LETTER
10/24/2019         RE: Patricia Perez  [address]  [facility] [address]        Dear Colleague,    Thank you for referring your patient, Patricia Perez, to the [facility]. Please see a copy of my visit note below.    Red Eye Recheck    Reason:   Chief Complaint   Patient presents with     Eye Pain Follow-Up         Eyes feel: better    Medications used: Tobradex    How often: 3 times per day      Mely Lopez, Optometric Assistant, A.B.O.C.     OBJECTIVE:     See ophthalmology exam      ASSESSMENT:        ICD-10-CM    1. Superficial punctate keratitis of right eye H16.141    2. Meibomian gland dysfunction H02.889    Resolved       PLAN:       Patient Instructions   Continue Tobradex- 1 drop right eye 2 x day for 1 week and discontinue.    Heat to the eyes daily for 10-15 minutes nightly with warm washcloth or reusable gel masks from the pharmacy or  Fei heat masks can be purchased at [employer].    Blephadex eyelid wipes or Ocusoft Oust- cleanse eyelids 2 x day for 6 weeks then nightly.  These can be purchased on [email or web address]    Return  for annual exam 12/19 or sooner if needed.    Khoa Michel OD               Again, thank you for allowing me to participate in the care of your patient.        Sincerely,        Khoa Michel OD

## 2019-10-29 DIAGNOSIS — Z00.00 ROUTINE GENERAL MEDICAL EXAMINATION AT A HEALTH CARE FACILITY: ICD-10-CM

## 2019-10-29 DIAGNOSIS — E78.5 HYPERLIPIDEMIA LDL GOAL <100: ICD-10-CM

## 2019-10-29 DIAGNOSIS — I42.9 IDIOPATHIC CARDIOMYOPATHY (H): ICD-10-CM

## 2019-10-29 DIAGNOSIS — I10 ESSENTIAL HYPERTENSION WITH GOAL BLOOD PRESSURE LESS THAN 130/80: ICD-10-CM

## 2019-10-29 DIAGNOSIS — I25.10 CORONARY ARTERY DISEASE INVOLVING NATIVE CORONARY ARTERY OF NATIVE HEART WITHOUT ANGINA PECTORIS: ICD-10-CM

## 2019-10-29 LAB
ALBUMIN SERPL-MCNC: 3.7 G/DL (ref 3.4–5)
ALP SERPL-CCNC: 94 U/L (ref 40–150)
ALT SERPL W P-5'-P-CCNC: 23 U/L (ref 0–50)
ANION GAP SERPL CALCULATED.3IONS-SCNC: 4 MMOL/L (ref 3–14)
AST SERPL W P-5'-P-CCNC: 14 U/L (ref 0–45)
BASOPHILS # BLD AUTO: 0 10E9/L (ref 0–0.2)
BASOPHILS NFR BLD AUTO: 0.8 %
BILIRUB SERPL-MCNC: 0.3 MG/DL (ref 0.2–1.3)
BUN SERPL-MCNC: 19 MG/DL (ref 7–30)
CALCIUM SERPL-MCNC: 8.7 MG/DL (ref 8.5–10.1)
CHLORIDE SERPL-SCNC: 107 MMOL/L (ref 94–109)
CHOLEST SERPL-MCNC: 284 MG/DL
CO2 SERPL-SCNC: 28 MMOL/L (ref 20–32)
CREAT SERPL-MCNC: 0.72 MG/DL (ref 0.52–1.04)
CREAT UR-MCNC: 90 MG/DL
DIFFERENTIAL METHOD BLD: NORMAL
EOSINOPHIL # BLD AUTO: 0.1 10E9/L (ref 0–0.7)
EOSINOPHIL NFR BLD AUTO: 2 %
ERYTHROCYTE [DISTWIDTH] IN BLOOD BY AUTOMATED COUNT: 15 % (ref 10–15)
GFR SERPL CREATININE-BSD FRML MDRD: 90 ML/MIN/{1.73_M2}
GLUCOSE SERPL-MCNC: 85 MG/DL (ref 70–99)
HCT VFR BLD AUTO: 36.9 % (ref 35–47)
HDLC SERPL-MCNC: 127 MG/DL
HGB BLD-MCNC: 11.8 G/DL (ref 11.7–15.7)
LDLC SERPL CALC-MCNC: 138 MG/DL
LYMPHOCYTES # BLD AUTO: 1.9 10E9/L (ref 0.8–5.3)
LYMPHOCYTES NFR BLD AUTO: 39.4 %
MCH RBC QN AUTO: 28.4 PG (ref 26.5–33)
MCHC RBC AUTO-ENTMCNC: 32 G/DL (ref 31.5–36.5)
MCV RBC AUTO: 89 FL (ref 78–100)
MICROALBUMIN UR-MCNC: 10 MG/L
MICROALBUMIN/CREAT UR: 10.63 MG/G CR (ref 0–25)
MONOCYTES # BLD AUTO: 0.4 10E9/L (ref 0–1.3)
MONOCYTES NFR BLD AUTO: 7.8 %
NEUTROPHILS # BLD AUTO: 2.5 10E9/L (ref 1.6–8.3)
NEUTROPHILS NFR BLD AUTO: 50 %
NONHDLC SERPL-MCNC: 157 MG/DL
PLATELET # BLD AUTO: 245 10E9/L (ref 150–450)
POTASSIUM SERPL-SCNC: 4.7 MMOL/L (ref 3.4–5.3)
PROT SERPL-MCNC: 7 G/DL (ref 6.8–8.8)
RBC # BLD AUTO: 4.16 10E12/L (ref 3.8–5.2)
SODIUM SERPL-SCNC: 139 MMOL/L (ref 133–144)
TRIGL SERPL-MCNC: 95 MG/DL
WBC # BLD AUTO: 4.9 10E9/L (ref 4–11)

## 2019-10-29 PROCEDURE — 36415 COLL VENOUS BLD VENIPUNCTURE: CPT | Performed by: NURSE PRACTITIONER

## 2019-10-29 PROCEDURE — 80053 COMPREHEN METABOLIC PANEL: CPT | Performed by: NURSE PRACTITIONER

## 2019-10-29 PROCEDURE — 82043 UR ALBUMIN QUANTITATIVE: CPT | Performed by: NURSE PRACTITIONER

## 2019-10-29 PROCEDURE — 85025 COMPLETE CBC W/AUTO DIFF WBC: CPT | Performed by: NURSE PRACTITIONER

## 2019-10-29 PROCEDURE — 80061 LIPID PANEL: CPT | Performed by: NURSE PRACTITIONER

## 2019-11-11 ENCOUNTER — TELEPHONE (OUTPATIENT)
Dept: FAMILY MEDICINE | Facility: CLINIC | Age: 59
End: 2019-11-11

## 2019-11-11 NOTE — TELEPHONE ENCOUNTER
Panel Management Review      Patient has the following on her problem list:     Depression / Dysthymia review    Patient is due for:  PHQ9      Composite cancer screening  Chart review shows that this patient is due/due soon for the following Mammogram  Summary:    Patient is due/failing the following:   PHQ9, ACT, Mammogram, Shingrix    Action needed:   Patient needs to do ACT, Patient needs to do PHQ9, Patient needs nurse only appointment for SHINGRIX and Patient needs referral/order: MAMMOGRAM    Type of outreach:    Sent letter.-included PHQ9 and ACT with return envelope    Questions for provider review:    None                                                                                                                                    Phyllis Jo CMA (AAMA)       Chart routed to none .

## 2019-11-11 NOTE — LETTER
November 11, 2019      Patricia Perez  [address]  [place] [address]      Dear Patricia Perez    We care about your health and have reviewed your health plan including your medical conditions, medication list, and lab results.  Based on this review, it is recommended that you follow up regarding the following health topic(s):     -Asthma  -Depression  -Breast Cancer Screening  -Immunization    We recommend you take the following action(s):    -- Shingles vaccine. This is recommended for your age group. To receive this vaccine please call to schedule a nurse appointment or, if it is more convenient for you, our pharmacy takes walk ins for this also.     -- An Asthma Control Test. To ensure you are receiving the best care, it is important to stay current with healthcare visits. We recommend that you have an office visit every 6 months for your asthma and have an  Updated Asthma Control Test and Asthma Action Plan each year to help you stay in good control with your asthma.      We have enclosed the Asthma Control Test for you to fill out and mail back to us (no matter what your score is).  If your end score is 20 or less, please schedule an appointment to discuss your asthma score.    -- Questionnaire for depression. Please fill out the attached questionnaire. These questions are about your depression and how you have been feeling in the last 2 weeks.     -- Mammogram. The last mammogram that we have on file for you was from 06/05/2017.   Please call one of the following numbers to schedule:  ** [facility] [phone] (at [facility] once a month)  [facility] [phone]  [facility] [phone]  [facility] [phone]   of [facility] [phone]  [facility] [phone]    Please call us at [phone] (or use [employer]) to address the above recommendations.     Thank you for trusting [facility] and we appreciate the opportunity to serve  you.  We look forward to supporting your healthcare needs in the future.    Healthy Regards,      Your Health Care Team  [facility]

## 2019-11-25 ENCOUNTER — OFFICE VISIT (OUTPATIENT)
Dept: URGENT CARE | Facility: URGENT CARE | Age: 59
End: 2019-11-25
Payer: COMMERCIAL

## 2019-11-25 VITALS
BODY MASS INDEX: 30.93 KG/M2 | SYSTOLIC BLOOD PRESSURE: 150 MMHG | HEART RATE: 80 BPM | TEMPERATURE: 98.4 F | RESPIRATION RATE: 16 BRPM | DIASTOLIC BLOOD PRESSURE: 88 MMHG | OXYGEN SATURATION: 99 % | WEIGHT: 174.6 LBS

## 2019-11-25 DIAGNOSIS — M77.02 MEDIAL EPICONDYLITIS OF LEFT ELBOW: Primary | ICD-10-CM

## 2019-11-25 PROCEDURE — 99213 OFFICE O/P EST LOW 20 MIN: CPT | Performed by: PHYSICIAN ASSISTANT

## 2019-11-25 RX ORDER — IBUPROFEN 800 MG/1
800 TABLET, FILM COATED ORAL EVERY 8 HOURS PRN
Qty: 30 TABLET | Refills: 0 | Status: SHIPPED | OUTPATIENT
Start: 2019-11-25 | End: 2021-04-15

## 2019-11-25 ASSESSMENT — ENCOUNTER SYMPTOMS
ARTHRALGIAS: 1
CARDIOVASCULAR NEGATIVE: 1
CONSTITUTIONAL NEGATIVE: 1
RESPIRATORY NEGATIVE: 1
EYES NEGATIVE: 1

## 2019-11-25 ASSESSMENT — PAIN SCALES - GENERAL: PAINLEVEL: MILD PAIN (3)

## 2019-11-25 NOTE — PROGRESS NOTES
SUBJECTIVE:   Patricia Perez is a 59 year old female presenting with a chief complaint of   Chief Complaint   Patient presents with     Elbow Pain     left elbow hurts started yesterday       She is an established patient of [facility].  Patient presents with left elbow pain that started yesterday.  No trauma.  RHD    MS Injury/Pain    Onset of symptoms was 1 day(s) ago.  Location: left elbow  Context:       The injury happened while no trauma      Mechanism: no trauma      Patient experienced immediate pain  Course of symptoms is same.    Severity moderate  Current and Associated symptoms: Pain  Denies  Bruising, Warmth, Redness, Decreased range of motion and Stiffness  Aggravating Factors: movement  Therapies to improve symptoms include: ibuprofen  This is the first time this type of problem has occurred for this patient.       Review of Systems   Constitutional: Negative.    HENT: Negative.    Eyes: Negative.    Respiratory: Negative.    Cardiovascular: Negative.    Genitourinary: Negative.    Musculoskeletal: Positive for arthralgias.   Skin: Negative.    All other systems reviewed and are negative.      Past Medical History:   Diagnosis Date     Anxiety      Arthritis      Asthma      Bursitis of shoulder 3/4/2010     Chronic rhinitis 3/25/2009     Coronary artery disease      Depression      Depression      Dyspnea on exertion      Gastro-oesophageal reflux disease      GERD (gastroesophageal reflux disease) 10/14/2008     Hypertension      Idiopathic cardiomyopathy (H) 1/8/2009     Indigestion      Itchy eyes      Itchy nose      Left leg pain 6/16/2011     Motion sickness 2/27/2008     Nasal congestion      Pneumonia      Problems related to lack of adequate sleep      Ringing in ears      Sleep apnea 3/25/2009     Sneezing      Family History   Problem Relation Age of Onset     Hypertension Father      Lipids Father      Alcohol/Drug Father         Abuse     Depression Father      Respiratory Father          COPD     Cardiovascular Father      Gastrointestinal Disease Mother         non cancerous pancreatic tumor     Hypertension Mother      Heart Disease Mother         A fib     Breast Cancer Maternal Grandmother      Glaucoma Paternal Grandmother      Depression Brother      Hypertension Brother      Cerebrovascular Disease Other      Macular Degeneration Other      Diabetes No family hx of      Cancer - colorectal No family hx of      Retinal detachment No family hx of      Amblyopia No family hx of      Thyroid Disease No family hx of      Current Outpatient Medications   Medication Sig Dispense Refill     albuterol (PROAIR HFA/PROVENTIL HFA/VENTOLIN HFA) 108 (90 Base) MCG/ACT inhaler Inhale 2 puffs into the lungs every 6 hours as needed for shortness of breath / dyspnea or wheezing 1 Inhaler 11     amphetamine-dextroamphetamine (ADDERALL XR) 25 MG 24 hr capsule Take 1 capsule (25 mg) by mouth daily 90 capsule 0     amphetamine-dextroamphetamine (ADDERALL XR) 25 MG 24 hr capsule Take 1 capsule (25 mg) by mouth daily 30 capsule 0     blood glucose monitoring (NO BRAND SPECIFIED) test strip Use to test blood sugar 1-2 times daily or as directed. 1 Box 2     calcium carbonate-vitamin D (CALTRATE 600+D) 600-400 MG-UNIT CHEW Take 1 chew tab by mouth 2 times daily 180 tablet 3     carvedilol (COREG) 25 MG tablet TAKE 1 TABLET(25 MG) BY MOUTH TWICE DAILY WITH MEALS 180 tablet 3     Cyanocobalamin (VITAMIN B-12 SL) Place  under the tongue daily.       hydrALAZINE (APRESOLINE) 10 MG tablet Take 1 tablet (10 mg) by mouth 3 times daily 270 tablet 3     isosorbide mononitrate (IMDUR) 30 MG 24 hr tablet TAKE 1 TABLET(30 MG) BY MOUTH DAILY 90 tablet 3     lisinopril (PRINIVIL/ZESTRIL) 5 MG tablet Take 1 tablet (5 mg) by mouth daily 90 tablet 3     MAGNESIUM PO        MAGNESIUM PO        meclizine (ANTIVERT) 25 MG tablet Take 1 tablet (25 mg) by mouth 3 times daily as needed 30 tablet 1     Multiple Vitamin (MULTI-VITAMIN) per  tablet Take 1 tablet by mouth daily. 100 tablet 3     olopatadine (PATANOL) 0.1 % ophthalmic solution Place 1 drop into both eyes 2 times daily as needed for allergies 5 mL 11     ondansetron (ZOFRAN ODT) 4 MG ODT tab Take 1 tablet (4 mg) by mouth every 8 hours as needed for nausea 10 tablet 0     PREMPRO 0.45-1.5 MG tablet TAKE 1 TABLET BY MOUTH DAILY 84 tablet 1     sertraline (ZOLOFT) 100 MG tablet Take 2 tablets (200 mg) by mouth daily 180 tablet 1     simvastatin (ZOCOR) 20 MG tablet Take 1 tablet (20 mg) by mouth At Bedtime 90 tablet 3     tobramycin-dexamethasone (TOBRADEX) 0.3-0.1 % ophthalmic suspension Place 1 drop into both eyes 4 times daily 1 Bottle 0     TURMERIC PO        venlafaxine (EFFEXOR-XR) 150 MG 24 hr capsule TAKE 1 CAPSULE(150 MG) BY MOUTH DAILY 30 capsule 0     COMFORT LANCETS MISC 1 Device 2 times daily as needed 1 Box 1     Social History     Tobacco Use     Smoking status: Never Smoker     Smokeless tobacco: Never Used   Substance Use Topics     Alcohol use: No       OBJECTIVE  BP (!) 150/88 (BP Location: Right arm, Patient Position: Sitting, Cuff Size: Adult Regular)   Pulse 80   Temp 98.4  F (36.9  C) (Oral)   Resp 16   Wt 79.2 kg (174 lb 9.6 oz)   LMP 07/16/2012   SpO2 99%   BMI 30.93 kg/m      Physical Exam  Vitals signs and nursing note reviewed.   Constitutional:       Appearance: Normal appearance. She is normal weight.   HENT:      Head: Normocephalic and atraumatic.      Right Ear: Tympanic membrane normal.   Eyes:      Extraocular Movements: Extraocular movements intact.      Conjunctiva/sclera: Conjunctivae normal.   Cardiovascular:      Rate and Rhythm: Normal rate.      Pulses: Normal pulses.   Musculoskeletal:      Comments: Left elbow with tenderness to palpation of medial epicondyle.  Full ROM , excellent radial pulses.     Skin:     General: Skin is warm and dry.      Capillary Refill: Capillary refill takes less than 2 seconds.   Neurological:      General: No  focal deficit present.      Mental Status: She is alert and oriented to person, place, and time.   Psychiatric:         Mood and Affect: Mood normal.         Behavior: Behavior normal.         Labs:  No results found for this or any previous visit (from the past 24 hour(s)).    X-Ray was not done.    ASSESSMENT:    No diagnosis found.     Medical Decision Making:    Differential Diagnosis:  MS Injury Pain: tendonitis    Serious Comorbid Conditions:  Adult:  None    PLAN:    MS Injury/Pain  ice and Ibuprofen    Followup:    If not improving or if condition worsens, follow up with your Primary Care Provider, If not improving or if conditions worsens over the next 12-24 hours, go to the Emergency Department    There are no Patient Instructions on file for this visit.

## 2019-11-25 NOTE — PATIENT INSTRUCTIONS
Patient Education     Understanding Medial Epicondylitis    Several muscles attach to the arm at the elbow joint. The tough bands of tissue that attach muscle to bones are called tendons. The bone in the upper arm has knobs on the farthest end called epicondyles. Tendons attach some arm muscles to these knobs. The tissues in this area can become irritated.  Epicondylitis is the medical term for a painful elbow over the epicondyle. Medial refers to the inner side of the elbow. Medial epicondylitis is sometimes called  golfer s elbow.      How to say it  IDRIS-jin-peter ax-yxj-MKEW-dye-lie-tis   Causes of medial epicondylitis  A painful inner elbow may be caused by:    Using an elbow or hand the same way over and over    Using poor form or too much force in a sport such as golf, tennis, or baseball    Lifting too heavy a weight    Other injuries to the arm or elbow  Symptoms of medial epicondylitis    Pain or tenderness on the inside of the elbow that may travel down the forearm    Pain when moving the wrist    Pain or weakness when gripping something    A crackling sound or grating feeling when moving the elbow  Treatment for medial epicondylitis  Treatments may include:    Avoiding or changing the action that caused the problem. This helps prevent irritating the tissues more.    Prescription or over-the-counter pain medicines. These help reduce inflammation, swelling, and pain.    Cold or heat packs. These help reduce pain and swelling.    Stretching and other exercises. These improve flexibility and strength.    Physical therapy. This may include exercises or other treatments.    Injections of medicine. This may relieve symptoms.  If other treatments do not relieve symptoms, you may need surgery.  Possible complications  If you don t give your elbow time to heal, symptoms may return or get worse. Follow your healthcare provider s instructions on resting and treating your elbow.     When to call your healthcare  provider  Call your healthcare provider right away if you have any of these:    Fever of 100.4 F (38 C) or higher, or as directed    Redness, swelling, or warmth that gets worse    Symptoms that don t get better with prescribed medicines, or get worse    New symptoms   Date Last Reviewed: 3/10/2016    0216-4385 [employer]. [address] [place] [address], [place] [address]. All rights reserved. This information is not intended as a substitute for professional medical care. Always follow your healthcare professional's instructions.

## 2019-12-05 ENCOUNTER — MYC MEDICAL ADVICE (OUTPATIENT)
Dept: FAMILY MEDICINE | Facility: CLINIC | Age: 59
End: 2019-12-05

## 2019-12-05 DIAGNOSIS — F43.21 ADJUSTMENT DISORDER WITH DEPRESSED MOOD: ICD-10-CM

## 2019-12-05 RX ORDER — VENLAFAXINE HYDROCHLORIDE 150 MG/1
150 CAPSULE, EXTENDED RELEASE ORAL DAILY
Qty: 30 CAPSULE | Refills: 0 | Status: SHIPPED | OUTPATIENT
Start: 2019-12-05 | End: 2020-09-03

## 2019-12-14 ENCOUNTER — OFFICE VISIT (OUTPATIENT)
Dept: URGENT CARE | Facility: URGENT CARE | Age: 59
End: 2019-12-14
Payer: COMMERCIAL

## 2019-12-14 VITALS
OXYGEN SATURATION: 100 % | SYSTOLIC BLOOD PRESSURE: 118 MMHG | BODY MASS INDEX: 31 KG/M2 | DIASTOLIC BLOOD PRESSURE: 76 MMHG | TEMPERATURE: 97.4 F | WEIGHT: 175 LBS | HEART RATE: 75 BPM | RESPIRATION RATE: 19 BRPM

## 2019-12-14 DIAGNOSIS — H92.01 ACUTE EAR PAIN, RIGHT: ICD-10-CM

## 2019-12-14 DIAGNOSIS — M26.609 TMJ (TEMPOROMANDIBULAR JOINT SYNDROME): Primary | ICD-10-CM

## 2019-12-14 PROCEDURE — 99213 OFFICE O/P EST LOW 20 MIN: CPT | Performed by: PHYSICIAN ASSISTANT

## 2019-12-14 RX ORDER — CYCLOBENZAPRINE HCL 10 MG
10 TABLET ORAL
Qty: 20 TABLET | Refills: 0 | Status: SHIPPED | OUTPATIENT
Start: 2019-12-14 | End: 2020-01-03

## 2019-12-14 RX ORDER — IBUPROFEN 800 MG/1
800 TABLET, FILM COATED ORAL EVERY 8 HOURS PRN
Qty: 30 TABLET | Refills: 0 | Status: ON HOLD | OUTPATIENT
Start: 2019-12-14 | End: 2020-01-07

## 2019-12-14 NOTE — PATIENT INSTRUCTIONS
Patient Education     Understanding Temporomandibular Disorders (TMD)    Do you have pain in your face, jaw, or teeth? Do you have trouble chewing? Does your jaw make clicking or popping noises? These symptoms can be caused by temporomandibular disorders (TMD). This term describes a group of problems related to the temporomandibular joint (TMJ) and nearby muscles. Your symptoms may be painful and frustrating. But don t worry. Your healthcare team can help you treat TMD and prevent future problems.  What s wrong?  TMD causes many kinds of symptoms. That s part of the reason it can be hard to diagnose. You may have headaches, tooth pain, or muscle aches. Your pain may be constant. Or it may come and go without any apparent reason. TMD-related problems include:    Tight muscles    Joint inflammation    Joint damage    Teeth grinding or clenching  What can you do?  If you are having TMD symptoms, don t wait. Call your dentist or healthcare provider right away. You don t have to live with pain or discomfort. TMD can be treated. In fact, a key part of treatment is learning to manage your condition at home.  Which treatment is right for you?  Treatment helps rest the muscles and joint. It also helps relieve symptoms and restore function. Depending on the type of problem you have, your treatment plan may include:    Short-term (temporary) diet changes    New habits for managing stress and maintaining the health of your jaw    Medicine to reduce pain and inflammation    Therapy to reduce pressure on the joint and restore function    Dental treatment to reduce pressure on the joint  How can you avoid future problems?  Treatment can help relieve your current condition. But TMD symptoms may return over time. You can avoid future problems by maintaining the health of your jaw:    Stay away foods and habits that make your symptoms worse.    Lower the stress level in your life.    Follow your treatment plan.    Pay attention to  your body and get help if symptoms return.  Date Last Reviewed: 8/1/2017 2000-2018 [employer]. [address] [facility], [place] [address]. All rights reserved. This information is not intended as a substitute for professional medical care. Always follow your healthcare professional's instructions.

## 2019-12-14 NOTE — PROGRESS NOTES
S: 59-year-old female presents for right ear pain for 5 to 6 days.  She denies any current cold symptoms.  Did see her dentist 3 days ago and was told it was not a tooth problem.  She states she does clench and grind her teeth.  She has history of bilateral tinnitus, longstanding.  She denies any worsening hearing in the right ear.  No fever.  The pain seems to be related to chewing.      Allergies   Allergen Reactions     No Known Drug Allergies        Past Medical History:   Diagnosis Date     Anxiety      Arthritis      Asthma      Bursitis of shoulder 3/4/2010     Chronic rhinitis 3/25/2009     Coronary artery disease      Depression      Depression      Dyspnea on exertion      Gastro-oesophageal reflux disease      GERD (gastroesophageal reflux disease) 10/14/2008     Hypertension      Idiopathic cardiomyopathy (H) 1/8/2009     Indigestion      Itchy eyes      Itchy nose      Left leg pain 6/16/2011     Motion sickness 2/27/2008     Nasal congestion      Pneumonia      Problems related to lack of adequate sleep      Ringing in ears      Sleep apnea 3/25/2009     Sneezing        albuterol (PROAIR HFA/PROVENTIL HFA/VENTOLIN HFA) 108 (90 Base) MCG/ACT inhaler, Inhale 2 puffs into the lungs every 6 hours as needed for shortness of breath / dyspnea or wheezing  amphetamine-dextroamphetamine (ADDERALL XR) 25 MG 24 hr capsule, Take 1 capsule (25 mg) by mouth daily  amphetamine-dextroamphetamine (ADDERALL XR) 25 MG 24 hr capsule, Take 1 capsule (25 mg) by mouth daily  blood glucose monitoring (NO BRAND SPECIFIED) test strip, Use to test blood sugar 1-2 times daily or as directed.  calcium carbonate-vitamin D (CALTRATE 600+D) 600-400 MG-UNIT CHEW, Take 1 chew tab by mouth 2 times daily  carvedilol (COREG) 25 MG tablet, TAKE 1 TABLET(25 MG) BY MOUTH TWICE DAILY WITH MEALS  COMFORT LANCETS MISC, 1 Device 2 times daily as needed  Cyanocobalamin (VITAMIN B-12 SL), Place  under the tongue daily.  hydrALAZINE (APRESOLINE) 10  MG tablet, Take 1 tablet (10 mg) by mouth 3 times daily  ibuprofen (ADVIL/MOTRIN) 800 MG tablet, Take 1 tablet (800 mg) by mouth every 8 hours as needed  lisinopril (PRINIVIL/ZESTRIL) 5 MG tablet, Take 1 tablet (5 mg) by mouth daily  MAGNESIUM PO,   MAGNESIUM PO,   meclizine (ANTIVERT) 25 MG tablet, Take 1 tablet (25 mg) by mouth 3 times daily as needed  Multiple Vitamin (MULTI-VITAMIN) per tablet, Take 1 tablet by mouth daily.  olopatadine (PATANOL) 0.1 % ophthalmic solution, Place 1 drop into both eyes 2 times daily as needed for allergies  ondansetron (ZOFRAN ODT) 4 MG ODT tab, Take 1 tablet (4 mg) by mouth every 8 hours as needed for nausea  PREMPRO 0.45-1.5 MG tablet, TAKE 1 TABLET BY MOUTH DAILY  sertraline (ZOLOFT) 100 MG tablet, Take 2 tablets (200 mg) by mouth daily  simvastatin (ZOCOR) 20 MG tablet, Take 1 tablet (20 mg) by mouth At Bedtime  tobramycin-dexamethasone (TOBRADEX) 0.3-0.1 % ophthalmic suspension, Place 1 drop into both eyes 4 times daily  TURMERIC PO,   venlafaxine (EFFEXOR-XR) 150 MG 24 hr capsule, Take 1 capsule (150 mg) by mouth daily  isosorbide mononitrate (IMDUR) 30 MG 24 hr tablet, TAKE 1 TABLET(30 MG) BY MOUTH DAILY (Patient not taking: Reported on 11/25/2019)    No current facility-administered medications on file prior to visit.       Social History     Tobacco Use     Smoking status: Never Smoker     Smokeless tobacco: Never Used   Substance Use Topics     Alcohol use: No       ROS:  CONSTITUTIONAL: Negative for fatigue or fever.  EYES: Negative for eye problems.  ENT: As above.  RESP: As above.  CV: Negative for chest pains.  GI: Negative for vomiting.  MUSCULOSKELETAL:  Negative for significant muscle or joint pains.  NEUROLOGIC: Negative for headaches.  SKIN: Negative for rash.  PSYCH: Normal mentation for age.    OBJECTIVE:  /76 (BP Location: Left arm, Patient Position: Chair, Cuff Size: Adult Large)   Pulse 75   Temp 97.4  F (36.3  C) (Oral)   Resp 19   Wt 79.4 kg  (175 lb)   LMP 07/16/2012   SpO2 100%   BMI 31.00 kg/m    GENERAL APPEARANCE: Healthy, alert and no distress.  EYES:Conjunctiva/sclera clear.  EARS: No cerumen.   Ear canals w/o erythema.  TM's intact w/o erythema.    SINUSES: No maxillary sinus tenderness.  THROAT: No erythema w/o tonsillar enlargement . No exudates.  NECK: Supple, nontender, no lymphadenopathy.  RESP: Lungs clear to auscultation - no rales, rhonchi or wheezes  CV: Regular rate and rhythm, normal S1 S2, no murmur noted.  NEURO: Awake, alert    SKIN: No rashes  Right TMJ tenderness to palpation.      ASSESSMENT:     ICD-10-CM    1. TMJ (temporomandibular joint syndrome) M26.609 ibuprofen (ADVIL/MOTRIN) 800 MG tablet     cyclobenzaprine (FLEXERIL) 10 MG tablet   2. Acute ear pain, right H92.01 ibuprofen (ADVIL/MOTRIN) 800 MG tablet     cyclobenzaprine (FLEXERIL) 10 MG tablet         PLAN: Motrin and muscle relaxant.  Avoid chewing hard foods.  There are over-the-counter mouthguards she could try at bedtime.  Otherwise see dentist for evaluation for possible mouthguard.  Follow-up as needed 2 weeks  I have discussed clinical findings with patient.  Side effects of medications discussed.  Symptomatic care is discussed.  I have discussed the possibility of  worsening symptoms and to RTC or ER if they occur.  All questions are answered and patient is in agreement with plan.   Patient care instructions are given to at the end of visit.   Lots of rest and fluids.  Patient Instructions     Patient Education     Understanding Temporomandibular Disorders (TMD)    Do you have pain in your face, jaw, or teeth? Do you have trouble chewing? Does your jaw make clicking or popping noises? These symptoms can be caused by temporomandibular disorders (TMD). This term describes a group of problems related to the temporomandibular joint (TMJ) and nearby muscles. Your symptoms may be painful and frustrating. But don t worry. Your healthcare team can help you treat TMD  and prevent future problems.  What s wrong?  TMD causes many kinds of symptoms. That s part of the reason it can be hard to diagnose. You may have headaches, tooth pain, or muscle aches. Your pain may be constant. Or it may come and go without any apparent reason. TMD-related problems include:    Tight muscles    Joint inflammation    Joint damage    Teeth grinding or clenching  What can you do?  If you are having TMD symptoms, don t wait. Call your dentist or healthcare provider right away. You don t have to live with pain or discomfort. TMD can be treated. In fact, a key part of treatment is learning to manage your condition at home.  Which treatment is right for you?  Treatment helps rest the muscles and joint. It also helps relieve symptoms and restore function. Depending on the type of problem you have, your treatment plan may include:    Short-term (temporary) diet changes    New habits for managing stress and maintaining the health of your jaw    Medicine to reduce pain and inflammation    Therapy to reduce pressure on the joint and restore function    Dental treatment to reduce pressure on the joint  How can you avoid future problems?  Treatment can help relieve your current condition. But TMD symptoms may return over time. You can avoid future problems by maintaining the health of your jaw:    Stay away foods and habits that make your symptoms worse.    Lower the stress level in your life.    Follow your treatment plan.    Pay attention to your body and get help if symptoms return.  Date Last Reviewed: 8/1/2017 2000-2018 [employer]. [address] [place] [address]. All rights reserved. This information is not intended as a substitute for professional medical care. Always follow your healthcare professional's instructions.                 Sharon Krueger PA-C

## 2019-12-18 DIAGNOSIS — N95.1 MENOPAUSAL SYNDROME (HOT FLASHES): ICD-10-CM

## 2019-12-18 NOTE — TELEPHONE ENCOUNTER
Routed to myke to review due to pt on hormones, HTN, heart disease , plz review and address   I dont see it addressed at [facility]  RN/Lowell Jones

## 2019-12-19 RX ORDER — ESTROGEN,CON/M-PROGEST ACET 0.45-1.5MG
TABLET ORAL
Qty: 84 TABLET | Refills: 1 | Status: SHIPPED | OUTPATIENT
Start: 2019-12-19 | End: 2020-06-10

## 2019-12-24 DIAGNOSIS — F33.0 MAJOR DEPRESSIVE DISORDER, RECURRENT EPISODE, MILD (H): ICD-10-CM

## 2019-12-24 NOTE — TELEPHONE ENCOUNTER
"Requested Prescriptions   Pending Prescriptions Disp Refills     sertraline (ZOLOFT) 100 MG tablet [Pharmacy Med Name: SERTRALINE 100MG TABLETS] 180 tablet 1     Sig: TAKE 2 TABLETS(200 MG) BY MOUTH DAILY  Last Written Prescription Date:  06/14/2019 #180 x 1  Last filled - not provided  Last office visit: 6/14/2019 DINORA Cummins     Future Office Visit:   Next 5 appointments (look out 90 days)    Jan 03, 2020  8:00 AM SPIKE Bacon with ELIOT Rodriguez CNP  [facility] [address] [address]  [phone]                SSRIs Protocol Failed - 12/24/2019  4:11 AM        Failed - PHQ-9 score less than 5 in past 6 months     Please review last PHQ-9 score.   PHQ-9 SCORE 6/7/2018 11/16/2018 6/14/2019   PHQ-9 Total Score - - -   PHQ-9 Total Score MyCsuzannat - - -   PHQ-9 Total Score 2 3 4       PRISCILLA-7 SCORE 6/7/2018 11/16/2018 6/14/2019   Total Score - - -   Total Score 3 4 8                 Passed - Medication is active on med list        Passed - Patient is age 18 or older        Passed - No active pregnancy on record        Passed - No positive pregnancy test in last 12 months        Passed - Recent (6 mo) or future (30 days) visit within the authorizing provider's specialty     Patient had office visit in the last 6 months or has a visit in the next 30 days with authorizing provider or within the authorizing provider's specialty.  See \"Patient Info\" tab in inbasket, or \"Choose Columns\" in Meds & Orders section of the refill encounter.              "

## 2019-12-26 ENCOUNTER — MYC MEDICAL ADVICE (OUTPATIENT)
Dept: FAMILY MEDICINE | Facility: CLINIC | Age: 59
End: 2019-12-26

## 2019-12-26 RX ORDER — SERTRALINE HYDROCHLORIDE 100 MG/1
TABLET, FILM COATED ORAL
Qty: 10 TABLET | Refills: 0 | Status: SHIPPED | OUTPATIENT
Start: 2019-12-26 | End: 2020-01-16

## 2019-12-26 ASSESSMENT — PATIENT HEALTH QUESTIONNAIRE - PHQ9
SUM OF ALL RESPONSES TO PHQ QUESTIONS 1-9: 8
10. IF YOU CHECKED OFF ANY PROBLEMS, HOW DIFFICULT HAVE THESE PROBLEMS MADE IT FOR YOU TO DO YOUR WORK, TAKE CARE OF THINGS AT HOME, OR GET ALONG WITH OTHER PEOPLE: SOMEWHAT DIFFICULT
SUM OF ALL RESPONSES TO PHQ QUESTIONS 1-9: 8

## 2019-12-26 ASSESSMENT — ANXIETY QUESTIONNAIRES
2. NOT BEING ABLE TO STOP OR CONTROL WORRYING: MORE THAN HALF THE DAYS
7. FEELING AFRAID AS IF SOMETHING AWFUL MIGHT HAPPEN: NOT AT ALL
4. TROUBLE RELAXING: MORE THAN HALF THE DAYS
GAD7 TOTAL SCORE: 9
1. FEELING NERVOUS, ANXIOUS, OR ON EDGE: SEVERAL DAYS
7. FEELING AFRAID AS IF SOMETHING AWFUL MIGHT HAPPEN: NOT AT ALL
3. WORRYING TOO MUCH ABOUT DIFFERENT THINGS: SEVERAL DAYS
6. BECOMING EASILY ANNOYED OR IRRITABLE: SEVERAL DAYS
5. BEING SO RESTLESS THAT IT IS HARD TO SIT STILL: MORE THAN HALF THE DAYS
GAD7 TOTAL SCORE: 9

## 2019-12-26 NOTE — TELEPHONE ENCOUNTER
Review chart pt has scheduled appt Farshad 3,  Short fill till appt only send  #10 tabs   Pt must keep appt   EBER Jiménez  Clinic  RN/Lowell Jones

## 2019-12-27 ASSESSMENT — PATIENT HEALTH QUESTIONNAIRE - PHQ9: SUM OF ALL RESPONSES TO PHQ QUESTIONS 1-9: 8

## 2019-12-27 ASSESSMENT — ANXIETY QUESTIONNAIRES: GAD7 TOTAL SCORE: 9

## 2019-12-27 NOTE — TELEPHONE ENCOUNTER
PHQ-9 and PRISCILLA-7 completed via [employer].  Has appt scheduled for 1/3/2020.  Next 5 appointments (look out 90 days)    Jan 03, 2020  8:00 AM SPIKE Bacon with ELIOT Rodriguez CNP  [facility] [address] [address]  [phone]           PHQ-9 SCORE 11/16/2018 6/14/2019 12/26/2019   PHQ-9 Total Score - - -   PHQ-9 Total Score MyChart - - 8 (Mild depression)   PHQ-9 Total Score 3 4 8     PRISCILLA-7 SCORE 11/16/2018 6/14/2019 12/26/2019   Total Score - - -   Total Score - - 9 (mild anxiety)   Total Score 4 8 9       Elizabeth JERONIMO RN

## 2020-01-03 ENCOUNTER — OFFICE VISIT (OUTPATIENT)
Dept: FAMILY MEDICINE | Facility: CLINIC | Age: 60
End: 2020-01-03
Payer: COMMERCIAL

## 2020-01-03 VITALS
SYSTOLIC BLOOD PRESSURE: 122 MMHG | WEIGHT: 176.2 LBS | BODY MASS INDEX: 31.22 KG/M2 | OXYGEN SATURATION: 98 % | RESPIRATION RATE: 18 BRPM | HEART RATE: 79 BPM | TEMPERATURE: 97.5 F | DIASTOLIC BLOOD PRESSURE: 74 MMHG | HEIGHT: 63 IN

## 2020-01-03 DIAGNOSIS — H92.01 ACUTE EAR PAIN, RIGHT: ICD-10-CM

## 2020-01-03 DIAGNOSIS — F33.0 MAJOR DEPRESSIVE DISORDER, RECURRENT EPISODE, MILD (H): Primary | ICD-10-CM

## 2020-01-03 DIAGNOSIS — F43.21 ADJUSTMENT DISORDER WITH DEPRESSED MOOD: ICD-10-CM

## 2020-01-03 DIAGNOSIS — F32.0 MILD MAJOR DEPRESSION (H): ICD-10-CM

## 2020-01-03 DIAGNOSIS — M26.609 TMJ (TEMPOROMANDIBULAR JOINT SYNDROME): ICD-10-CM

## 2020-01-03 PROCEDURE — 99213 OFFICE O/P EST LOW 20 MIN: CPT | Performed by: NURSE PRACTITIONER

## 2020-01-03 RX ORDER — VENLAFAXINE HYDROCHLORIDE 225 MG/1
225 TABLET, EXTENDED RELEASE ORAL DAILY
Qty: 90 TABLET | Refills: 1 | Status: SHIPPED | OUTPATIENT
Start: 2020-01-03 | End: 2020-07-06

## 2020-01-03 RX ORDER — CYCLOBENZAPRINE HCL 10 MG
10 TABLET ORAL
Qty: 30 TABLET | Refills: 0 | Status: SHIPPED | OUTPATIENT
Start: 2020-01-03 | End: 2020-08-03

## 2020-01-03 RX ORDER — VENLAFAXINE HYDROCHLORIDE 150 MG/1
150 CAPSULE, EXTENDED RELEASE ORAL DAILY
Qty: 30 CAPSULE | Refills: 0 | Status: CANCELLED | OUTPATIENT
Start: 2020-01-03

## 2020-01-03 ASSESSMENT — MIFFLIN-ST. JEOR: SCORE: 1333.37

## 2020-01-03 ASSESSMENT — ANXIETY QUESTIONNAIRES
5. BEING SO RESTLESS THAT IT IS HARD TO SIT STILL: MORE THAN HALF THE DAYS
2. NOT BEING ABLE TO STOP OR CONTROL WORRYING: MORE THAN HALF THE DAYS
1. FEELING NERVOUS, ANXIOUS, OR ON EDGE: MORE THAN HALF THE DAYS
7. FEELING AFRAID AS IF SOMETHING AWFUL MIGHT HAPPEN: NOT AT ALL
IF YOU CHECKED OFF ANY PROBLEMS ON THIS QUESTIONNAIRE, HOW DIFFICULT HAVE THESE PROBLEMS MADE IT FOR YOU TO DO YOUR WORK, TAKE CARE OF THINGS AT HOME, OR GET ALONG WITH OTHER PEOPLE: NOT DIFFICULT AT ALL
3. WORRYING TOO MUCH ABOUT DIFFERENT THINGS: MORE THAN HALF THE DAYS
6. BECOMING EASILY ANNOYED OR IRRITABLE: SEVERAL DAYS
GAD7 TOTAL SCORE: 11

## 2020-01-03 ASSESSMENT — PATIENT HEALTH QUESTIONNAIRE - PHQ9
5. POOR APPETITE OR OVEREATING: MORE THAN HALF THE DAYS
SUM OF ALL RESPONSES TO PHQ QUESTIONS 1-9: 10

## 2020-01-03 NOTE — PROGRESS NOTES
"Subjective     Patricia Perez is a 59 year old female who presents to clinic today for the following health issues:    HPI   Depression and Anxiety Follow-Up    How are you doing with your depression since your last visit? \"not well, I think we may need to change the meds around a little.\"    How are you doing with your anxiety since your last visit?  Worsened     Are you having other symptoms that might be associated with depression or anxiety? Yes:  jaw concerns on right side. has seen dentist and urgent care.     Have you had a significant life event? 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     Do you have any concerns with your use of alcohol or other drugs? No    Social History     Tobacco Use     Smoking status: Never Smoker     Smokeless tobacco: Never Used   Substance Use Topics     Alcohol use: No     Drug use: No     PHQ 11/16/2018 6/14/2019 12/26/2019   PHQ-9 Total Score 3 4 8   Q9: Thoughts of better off dead/self-harm past 2 weeks Not at all Not at all Not at all     PRISCILLA-7 SCORE 11/16/2018 6/14/2019 12/26/2019   Total Score - - -   Total Score - - 9 (mild anxiety)   Total Score 4 8 9     Last PHQ-9 12/26/2019   1.  Little interest or pleasure in doing things 1   2.  Feeling down, depressed, or hopeless 1   3.  Trouble falling or staying asleep, or sleeping too much 0   4.  Feeling tired or having little energy 2   5.  Poor appetite or overeating 1   6.  Feeling bad about yourself 0   7.  Trouble concentrating 3   8.  Moving slowly or restless 0   Q9: Thoughts of better off dead/self-harm past 2 weeks 0   PHQ-9 Total Score 8     PRISCILLA-7  12/26/2019   1. Feeling nervous, anxious, or on edge 1   2. Not being able to stop or control worrying 2   3. Worrying too much about different things 1   4. Trouble relaxing 2   5. Being so restless that it is hard to sit still 2   6. Becoming easily annoyed or " irritable 1   7. Feeling afraid, as if something awful might happen 0   PRISCILLA-7 Total Score 9     In the past two weeks have you had thoughts of suicide or self-harm?  No.    Do you have concerns about your personal safety or the safety of others?   No    Suicide Assessment Five-step Evaluation and Treatment (SAFE-T)    Chronic Pain Follow-Up       Type / Location of Pain: Jaw pain on right side  Analgesia/pain control:       Recent changes:  worse      Overall control: unsure  Activity level/function:      Daily activities:  Able to do all daily activities    Work:  not applicable  Adverse effects:  No  Adherance    Taking medication as directed?  Yes    Participating in other treatments: yes  Risk Factors:    Sleep:  Fair    Mood/anxiety:  worsened    Recent family or social stressors:  job change/loss    Other aggravating factors: clenching of jaw  PHQ-9 SCORE 11/16/2018 6/14/2019 12/26/2019   PHQ-9 Total Score - - -   PHQ-9 Total Score MyChart - - 8 (Mild depression)   PHQ-9 Total Score 3 4 8     PRISCILLA-7 SCORE 11/16/2018 6/14/2019 12/26/2019   Total Score - - -   Total Score - - 9 (mild anxiety)   Total Score 4 8 9     Encounter-Level CSA:    There are no encounter-level csa.     Patient-Level CSA:    There are no patient-level csa.         How many servings of fruits and vegetables do you eat daily?  2-3    On average, how many sweetened beverages do you drink each day (Examples: soda, juice, sweet tea, etc.  Do NOT count diet or artificially sweetened beverages)?   0    How many days per week do you miss taking your medication? 0        Patient Active Problem List   Diagnosis     Dizziness and giddiness     Motion sickness     GERD (gastroesophageal reflux disease)     Idiopathic cardiomyopathy (H)     Sleep apnea     Hyperlipidemia LDL goal <100     Allergic rhinitis     Mild major depression (H)     Hypertension goal BP (blood pressure) < 130/80     Pelvic pain in female     Health Care Home     24 hour contact  handout given     Elevated PTHrP level     H/O bariatric surgery     Hx of gastric bypass     Hypovitaminosis D     Allergic rhinitis     Attention deficit disorder of adult     Mild persistent asthma     Low back pain     Hand joint pain     Posterior vitreous detachment, left eye     Myopia, bilateral     Dumping syndrome     Benign essential hypertension     Nuclear sclerosis of both eyes     Major depressive disorder, recurrent episode, mild (H)     Adjustment disorder with depressed mood     Decreased hearing of both ears     BPV (benign positional vertigo), unspecified laterality     Advanced directives, counseling/discussion     Vitamin D deficiency     Coronary artery disease involving native coronary artery of native heart without angina pectoris     Acute pain of left knee     Neck pain     Past Surgical History:   Procedure Laterality Date     BARIATRIC SURGERY       DIABETES RESOURCES  As Child    Tonsillectomy     ENT SURGERY       HERNIA REPAIR       LAPAROSCOPIC BYPASS GASTRIC  10/16/2012    Procedure: LAPAROSCOPIC BYPASS GASTRIC;  laparoscopic reyna en y gastric bypass;  Surgeon: Nish Bradford MD;  Location: [facility] OR     TONSILLECTOMY       Uretural Sticture  As Child       Social History     Tobacco Use     Smoking status: Never Smoker     Smokeless tobacco: Never Used   Substance Use Topics     Alcohol use: No     Family History   Problem Relation Age of Onset     Hypertension Father      Lipids Father      Alcohol/Drug Father         Abuse     Depression Father      Respiratory Father         COPD     Cardiovascular Father      Gastrointestinal Disease Mother         non cancerous pancreatic tumor     Hypertension Mother      Heart Disease Mother         A fib     Breast Cancer Maternal Grandmother      Glaucoma Paternal Grandmother      Depression Brother      Hypertension Brother      Cerebrovascular Disease Other      Macular Degeneration Other      Diabetes No family hx of      Cancer -  colorectal No family hx of      Retinal detachment No family hx of      Amblyopia No family hx of      Thyroid Disease No family hx of          Current Outpatient Medications   Medication Sig Dispense Refill     albuterol (PROAIR HFA/PROVENTIL HFA/VENTOLIN HFA) 108 (90 Base) MCG/ACT inhaler Inhale 2 puffs into the lungs every 6 hours as needed for shortness of breath / dyspnea or wheezing 1 Inhaler 11     amphetamine-dextroamphetamine (ADDERALL XR) 25 MG 24 hr capsule Take 1 capsule (25 mg) by mouth daily 90 capsule 0     amphetamine-dextroamphetamine (ADDERALL XR) 25 MG 24 hr capsule Take 1 capsule (25 mg) by mouth daily 30 capsule 0     blood glucose monitoring (NO BRAND SPECIFIED) test strip Use to test blood sugar 1-2 times daily or as directed. 1 Box 2     calcium carbonate-vitamin D (CALTRATE 600+D) 600-400 MG-UNIT CHEW Take 1 chew tab by mouth 2 times daily 180 tablet 3     carvedilol (COREG) 25 MG tablet TAKE 1 TABLET(25 MG) BY MOUTH TWICE DAILY WITH MEALS 180 tablet 3     COMFORT LANCETS MISC 1 Device 2 times daily as needed 1 Box 1     Cyanocobalamin (VITAMIN B-12 SL) Place  under the tongue daily.       cyclobenzaprine (FLEXERIL) 10 MG tablet Take 1 tablet (10 mg) by mouth nightly as needed for muscle spasms 20 tablet 0     hydrALAZINE (APRESOLINE) 10 MG tablet Take 1 tablet (10 mg) by mouth 3 times daily 270 tablet 3     ibuprofen (ADVIL/MOTRIN) 800 MG tablet Take 1 tablet (800 mg) by mouth every 8 hours as needed 30 tablet 0     lisinopril (PRINIVIL/ZESTRIL) 5 MG tablet Take 1 tablet (5 mg) by mouth daily 90 tablet 3     MAGNESIUM PO        MAGNESIUM PO        meclizine (ANTIVERT) 25 MG tablet Take 1 tablet (25 mg) by mouth 3 times daily as needed 30 tablet 1     Multiple Vitamin (MULTI-VITAMIN) per tablet Take 1 tablet by mouth daily. 100 tablet 3     olopatadine (PATANOL) 0.1 % ophthalmic solution Place 1 drop into both eyes 2 times daily as needed for allergies 5 mL 11     ondansetron (ZOFRAN ODT) 4  "MG ODT tab Take 1 tablet (4 mg) by mouth every 8 hours as needed for nausea 10 tablet 0     PREMPRO 0.45-1.5 MG tablet TAKE 1 TABLET BY MOUTH DAILY 84 tablet 1     sertraline (ZOLOFT) 100 MG tablet TAKE 2 TABLETS(200 MG) BY MOUTH DAILY 10 tablet 0     simvastatin (ZOCOR) 20 MG tablet Take 1 tablet (20 mg) by mouth At Bedtime 90 tablet 3     tobramycin-dexamethasone (TOBRADEX) 0.3-0.1 % ophthalmic suspension Place 1 drop into both eyes 4 times daily 1 Bottle 0     venlafaxine (EFFEXOR-XR) 150 MG 24 hr capsule Take 1 capsule (150 mg) by mouth daily 30 capsule 0     isosorbide mononitrate (IMDUR) 30 MG 24 hr tablet TAKE 1 TABLET(30 MG) BY MOUTH DAILY (Patient not taking: Reported on 11/25/2019) 90 tablet 3     TURMERIC PO        Allergies   Allergen Reactions     No Known Drug Allergies      BP Readings from Last 3 Encounters:   01/03/20 122/74   12/14/19 118/76   11/25/19 (!) 150/88    Wt Readings from Last 3 Encounters:   01/03/20 79.9 kg (176 lb 3.2 oz)   12/14/19 79.4 kg (175 lb)   11/25/19 79.2 kg (174 lb 9.6 oz)                    Reviewed and updated as needed this visit by Provider         Review of Systems   ROS COMP: CONSTITUTIONAL: NEGATIVE for fever, chills, change in weight  ENT/MOUTH: NEGATIVE for ear, mouth and throat problems, is having   RESP: NEGATIVE for significant cough or SOB  CV: NEGATIVE for chest pain, palpitations or peripheral edema  GI: NEGATIVE for nausea, abdominal pain, heartburn, or change in bowel habits  PSYCHIATRIC: POSITIVE for anxiety, depressed mood and stress related to her work.  Does a lot of clenching   Pushes her tongue to her teeth       Objective    /74 (BP Location: Right arm, Patient Position: Sitting, Cuff Size: Adult Regular)   Pulse 79   Temp 97.5  F (36.4  C) (Tympanic)   Resp 18   Ht 1.6 m (5' 3\")   Wt 79.9 kg (176 lb 3.2 oz)   LMP 07/16/2012   SpO2 98%   BMI 31.21 kg/m    Body mass index is 31.21 kg/m .  Physical Exam   GENERAL: healthy, alert and no " distress  HENT: ear canals and TM's normal, nose and mouth without ulcers or lesions, oropharynx clear, oral mucous membranes moist, sinuses: not tender and does have mild TMJ pain .   NECK: no adenopathy, no asymmetry, masses, or scars and thyroid normal to palpation  RESP: lungs clear to auscultation - no rales, rhonchi or wheezes  CV: regular rate and rhythm, normal S1 S2, no S3 or S4, no murmur, click or rub, no peripheral edema and peripheral pulses strong  PSYCH: mentation appears normal, affect normal/bright, judgement and insight intact, appearance well groomed and is very concerned about her job     Diagnostic Test Results:  Labs reviewed in Epic  none         ASSESSMENT/PLAN:      ICD-10-CM    1. Major depressive disorder, recurrent episode, mild (H) F33.0    2. Acute ear pain, right H92.01 cyclobenzaprine (FLEXERIL) 10 MG tablet   3. TMJ (temporomandibular joint syndrome) M26.609 cyclobenzaprine (FLEXERIL) 10 MG tablet   4. Adjustment disorder with depressed mood F43.21    5. Mild major depression (H) F32.0        Patient Instructions   With the situation and the winter months.  Increase your Effexor to  225 mg daily     Let me know how that is working.     Make a plan for work exit       Talk with your dentist about a  Mouth guard

## 2020-01-03 NOTE — PATIENT INSTRUCTIONS
With the situation and the winter months.  Increase your Effexor to  225 mg daily     Let me know how that is working.     Make a plan for work exit       Talk with your dentist about a  Mouth guard

## 2020-01-04 ASSESSMENT — ASTHMA QUESTIONNAIRES: ACT_TOTALSCORE: 25

## 2020-01-04 ASSESSMENT — ANXIETY QUESTIONNAIRES: GAD7 TOTAL SCORE: 11

## 2020-01-07 ENCOUNTER — HOSPITAL ENCOUNTER (OUTPATIENT)
Facility: CLINIC | Age: 60
Setting detail: OBSERVATION
Discharge: HOME | End: 2020-01-08
Attending: EMERGENCY MEDICINE | Admitting: EMERGENCY MEDICINE
Payer: COMMERCIAL

## 2020-01-07 ENCOUNTER — APPOINTMENT (OUTPATIENT)
Dept: GENERAL RADIOLOGY | Facility: CLINIC | Age: 60
End: 2020-01-07
Attending: EMERGENCY MEDICINE
Payer: COMMERCIAL

## 2020-01-07 ENCOUNTER — APPOINTMENT (OUTPATIENT)
Dept: CARDIOLOGY | Facility: CLINIC | Age: 60
End: 2020-01-07
Attending: PHYSICIAN ASSISTANT
Payer: COMMERCIAL

## 2020-01-07 ENCOUNTER — APPOINTMENT (OUTPATIENT)
Dept: CT IMAGING | Facility: CLINIC | Age: 60
End: 2020-01-07
Attending: PHYSICIAN ASSISTANT
Payer: COMMERCIAL

## 2020-01-07 DIAGNOSIS — R68.84 JAW PAIN: ICD-10-CM

## 2020-01-07 DIAGNOSIS — R55 SYNCOPE AND COLLAPSE: ICD-10-CM

## 2020-01-07 LAB
ALBUMIN SERPL-MCNC: 3.1 G/DL (ref 3.4–5)
ALBUMIN UR-MCNC: NEGATIVE MG/DL
ALP SERPL-CCNC: 83 U/L (ref 40–150)
ALT SERPL W P-5'-P-CCNC: 24 U/L (ref 0–50)
ANION GAP SERPL CALCULATED.3IONS-SCNC: 6 MMOL/L (ref 3–14)
APPEARANCE UR: CLEAR
AST SERPL W P-5'-P-CCNC: 12 U/L (ref 0–45)
BASOPHILS # BLD AUTO: 0 10E9/L (ref 0–0.2)
BASOPHILS NFR BLD AUTO: 0.7 %
BILIRUB SERPL-MCNC: 0.3 MG/DL (ref 0.2–1.3)
BILIRUB UR QL STRIP: NEGATIVE
BUN SERPL-MCNC: 21 MG/DL (ref 7–30)
CALCIUM SERPL-MCNC: 8.1 MG/DL (ref 8.5–10.1)
CHLORIDE SERPL-SCNC: 114 MMOL/L (ref 94–109)
CO2 SERPL-SCNC: 23 MMOL/L (ref 20–32)
COLOR UR AUTO: YELLOW
CREAT SERPL-MCNC: 0.69 MG/DL (ref 0.52–1.04)
CREAT SERPL-MCNC: 0.83 MG/DL (ref 0.52–1.04)
DIFFERENTIAL METHOD BLD: ABNORMAL
EOSINOPHIL # BLD AUTO: 0.1 10E9/L (ref 0–0.7)
EOSINOPHIL NFR BLD AUTO: 1.7 %
ERYTHROCYTE [DISTWIDTH] IN BLOOD BY AUTOMATED COUNT: 14.3 % (ref 10–15)
GFR SERPL CREATININE-BSD FRML MDRD: 75 ML/MIN/{1.73_M2}
GFR SERPL CREATININE-BSD FRML MDRD: >90 ML/MIN/{1.73_M2}
GLUCOSE SERPL-MCNC: 124 MG/DL (ref 70–99)
GLUCOSE UR STRIP-MCNC: NEGATIVE MG/DL
HCT VFR BLD AUTO: 32.4 % (ref 35–47)
HGB BLD-MCNC: 10.1 G/DL (ref 11.7–15.7)
HGB UR QL STRIP: NEGATIVE
HYALINE CASTS #/AREA URNS LPF: 3 /LPF (ref 0–2)
IMM GRANULOCYTES # BLD: 0 10E9/L (ref 0–0.4)
IMM GRANULOCYTES NFR BLD: 0.5 %
INTERPRETATION ECG - MUSE: NORMAL
KETONES UR STRIP-MCNC: NEGATIVE MG/DL
LEUKOCYTE ESTERASE UR QL STRIP: ABNORMAL
LYMPHOCYTES # BLD AUTO: 1.1 10E9/L (ref 0.8–5.3)
LYMPHOCYTES NFR BLD AUTO: 18.5 %
MAGNESIUM SERPL-MCNC: 2 MG/DL (ref 1.6–2.3)
MCH RBC QN AUTO: 28.3 PG (ref 26.5–33)
MCHC RBC AUTO-ENTMCNC: 31.2 G/DL (ref 31.5–36.5)
MCV RBC AUTO: 91 FL (ref 78–100)
MONOCYTES # BLD AUTO: 0.4 10E9/L (ref 0–1.3)
MONOCYTES NFR BLD AUTO: 6.5 %
MUCOUS THREADS #/AREA URNS LPF: PRESENT /LPF
NEUTROPHILS # BLD AUTO: 4.3 10E9/L (ref 1.6–8.3)
NEUTROPHILS NFR BLD AUTO: 72.1 %
NITRATE UR QL: NEGATIVE
NRBC # BLD AUTO: 0 10*3/UL
NRBC BLD AUTO-RTO: 0 /100
NT-PROBNP SERPL-MCNC: 159 PG/ML (ref 0–900)
PH UR STRIP: 6 PH (ref 5–7)
PLATELET # BLD AUTO: 248 10E9/L (ref 150–450)
POTASSIUM SERPL-SCNC: 3.7 MMOL/L (ref 3.4–5.3)
PROT SERPL-MCNC: 5.9 G/DL (ref 6.8–8.8)
RBC # BLD AUTO: 3.57 10E12/L (ref 3.8–5.2)
RBC #/AREA URNS AUTO: 1 /HPF (ref 0–2)
SODIUM SERPL-SCNC: 143 MMOL/L (ref 133–144)
SOURCE: ABNORMAL
SP GR UR STRIP: 1.01 (ref 1–1.03)
SQUAMOUS #/AREA URNS AUTO: 1 /HPF (ref 0–1)
TRANS CELLS #/AREA URNS HPF: <1 /HPF (ref 0–1)
TROPONIN I SERPL-MCNC: <0.015 UG/L (ref 0–0.04)
UROBILINOGEN UR STRIP-MCNC: NORMAL MG/DL (ref 0–2)
WBC # BLD AUTO: 6 10E9/L (ref 4–11)
WBC #/AREA URNS AUTO: 2 /HPF (ref 0–5)

## 2020-01-07 PROCEDURE — 25000128 H RX IP 250 OP 636: Performed by: EMERGENCY MEDICINE

## 2020-01-07 PROCEDURE — 36415 COLL VENOUS BLD VENIPUNCTURE: CPT | Performed by: PHYSICIAN ASSISTANT

## 2020-01-07 PROCEDURE — 99285 EMERGENCY DEPT VISIT HI MDM: CPT | Mod: 25

## 2020-01-07 PROCEDURE — 25500064 ZZH RX 255 OP 636: Performed by: EMERGENCY MEDICINE

## 2020-01-07 PROCEDURE — 71046 X-RAY EXAM CHEST 2 VIEWS: CPT

## 2020-01-07 PROCEDURE — 25000132 ZZH RX MED GY IP 250 OP 250 PS 637: Performed by: INTERNAL MEDICINE

## 2020-01-07 PROCEDURE — 80053 COMPREHEN METABOLIC PANEL: CPT | Performed by: EMERGENCY MEDICINE

## 2020-01-07 PROCEDURE — G0378 HOSPITAL OBSERVATION PER HR: HCPCS

## 2020-01-07 PROCEDURE — 84484 ASSAY OF TROPONIN QUANT: CPT | Performed by: PHYSICIAN ASSISTANT

## 2020-01-07 PROCEDURE — 25000132 ZZH RX MED GY IP 250 OP 250 PS 637: Performed by: EMERGENCY MEDICINE

## 2020-01-07 PROCEDURE — 93306 TTE W/DOPPLER COMPLETE: CPT | Mod: 26 | Performed by: INTERNAL MEDICINE

## 2020-01-07 PROCEDURE — 99220 ZZC INITIAL OBSERVATION CARE,LEVL III: CPT | Mod: 25 | Performed by: PHYSICIAN ASSISTANT

## 2020-01-07 PROCEDURE — 83735 ASSAY OF MAGNESIUM: CPT | Performed by: EMERGENCY MEDICINE

## 2020-01-07 PROCEDURE — 83880 ASSAY OF NATRIURETIC PEPTIDE: CPT | Performed by: EMERGENCY MEDICINE

## 2020-01-07 PROCEDURE — 82565 ASSAY OF CREATININE: CPT | Performed by: PHYSICIAN ASSISTANT

## 2020-01-07 PROCEDURE — 93306 TTE W/DOPPLER COMPLETE: CPT

## 2020-01-07 PROCEDURE — 25000132 ZZH RX MED GY IP 250 OP 250 PS 637: Performed by: PHYSICIAN ASSISTANT

## 2020-01-07 PROCEDURE — 75574 CT ANGIO HRT W/3D IMAGE: CPT

## 2020-01-07 PROCEDURE — 93010 ELECTROCARDIOGRAM REPORT: CPT | Mod: Z6 | Performed by: EMERGENCY MEDICINE

## 2020-01-07 PROCEDURE — 93005 ELECTROCARDIOGRAM TRACING: CPT

## 2020-01-07 PROCEDURE — 40000559 ZZH STATISTIC FAILED PERIPHERAL IV START

## 2020-01-07 PROCEDURE — 85025 COMPLETE CBC W/AUTO DIFF WBC: CPT | Performed by: EMERGENCY MEDICINE

## 2020-01-07 PROCEDURE — 81001 URINALYSIS AUTO W/SCOPE: CPT | Performed by: EMERGENCY MEDICINE

## 2020-01-07 PROCEDURE — 75574 CT ANGIO HRT W/3D IMAGE: CPT | Mod: 26 | Performed by: INTERNAL MEDICINE

## 2020-01-07 PROCEDURE — 84484 ASSAY OF TROPONIN QUANT: CPT | Performed by: EMERGENCY MEDICINE

## 2020-01-07 RX ORDER — METOPROLOL TARTRATE 50 MG
50-100 TABLET ORAL
Status: DISCONTINUED | OUTPATIENT
Start: 2020-01-07 | End: 2020-01-08 | Stop reason: HOSPADM

## 2020-01-07 RX ORDER — HYDRALAZINE HYDROCHLORIDE 10 MG/1
10 TABLET, FILM COATED ORAL 3 TIMES DAILY
Status: DISCONTINUED | OUTPATIENT
Start: 2020-01-07 | End: 2020-01-07

## 2020-01-07 RX ORDER — METOPROLOL TARTRATE 1 MG/ML
5-15 INJECTION, SOLUTION INTRAVENOUS
Status: DISCONTINUED | OUTPATIENT
Start: 2020-01-07 | End: 2020-01-07

## 2020-01-07 RX ORDER — METHYLPREDNISOLONE SODIUM SUCCINATE 125 MG/2ML
125 INJECTION, POWDER, LYOPHILIZED, FOR SOLUTION INTRAMUSCULAR; INTRAVENOUS
Status: DISCONTINUED | OUTPATIENT
Start: 2020-01-07 | End: 2020-01-07

## 2020-01-07 RX ORDER — METOPROLOL TARTRATE 50 MG
50-100 TABLET ORAL
Status: DISCONTINUED | OUTPATIENT
Start: 2020-01-07 | End: 2020-01-07

## 2020-01-07 RX ORDER — CETIRIZINE HYDROCHLORIDE 10 MG/1
10 TABLET ORAL DAILY
COMMUNITY
End: 2020-08-24

## 2020-01-07 RX ORDER — NITROGLYCERIN 0.4 MG/1
.4-.8 TABLET SUBLINGUAL
Status: DISCONTINUED | OUTPATIENT
Start: 2020-01-07 | End: 2020-01-07

## 2020-01-07 RX ORDER — ONDANSETRON 2 MG/ML
4 INJECTION INTRAMUSCULAR; INTRAVENOUS
Status: DISCONTINUED | OUTPATIENT
Start: 2020-01-07 | End: 2020-01-07

## 2020-01-07 RX ORDER — METOPROLOL TARTRATE 50 MG
100 TABLET ORAL ONCE
Status: COMPLETED | OUTPATIENT
Start: 2020-01-07 | End: 2020-01-07

## 2020-01-07 RX ORDER — ACYCLOVIR 200 MG/1
0-1 CAPSULE ORAL
Status: DISCONTINUED | OUTPATIENT
Start: 2020-01-07 | End: 2020-01-08 | Stop reason: HOSPADM

## 2020-01-07 RX ORDER — LIDOCAINE 40 MG/G
CREAM TOPICAL
Status: DISCONTINUED | OUTPATIENT
Start: 2020-01-07 | End: 2020-01-08 | Stop reason: HOSPADM

## 2020-01-07 RX ORDER — DIPHENHYDRAMINE HCL 25 MG
25 CAPSULE ORAL
Status: DISCONTINUED | OUTPATIENT
Start: 2020-01-07 | End: 2020-01-07

## 2020-01-07 RX ORDER — IOPAMIDOL 755 MG/ML
120 INJECTION, SOLUTION INTRAVASCULAR ONCE
Status: COMPLETED | OUTPATIENT
Start: 2020-01-07 | End: 2020-01-07

## 2020-01-07 RX ORDER — DEXTROAMPHETAMINE SACCHARATE, AMPHETAMINE ASPARTATE MONOHYDRATE, DEXTROAMPHETAMINE SULFATE AND AMPHETAMINE SULFATE 6.25; 6.25; 6.25; 6.25 MG/1; MG/1; MG/1; MG/1
25 CAPSULE, EXTENDED RELEASE ORAL DAILY
Status: DISCONTINUED | OUTPATIENT
Start: 2020-01-07 | End: 2020-01-07

## 2020-01-07 RX ORDER — OXYMETAZOLINE HYDROCHLORIDE 0.05 G/100ML
1 SPRAY NASAL EVERY EVENING
COMMUNITY
End: 2021-04-28

## 2020-01-07 RX ORDER — ASPIRIN 81 MG/1
324 TABLET, CHEWABLE ORAL ONCE
Status: COMPLETED | OUTPATIENT
Start: 2020-01-07 | End: 2020-01-07

## 2020-01-07 RX ORDER — CARVEDILOL 25 MG/1
25 TABLET ORAL 2 TIMES DAILY WITH MEALS
Status: DISCONTINUED | OUTPATIENT
Start: 2020-01-07 | End: 2020-01-07

## 2020-01-07 RX ORDER — NITROGLYCERIN 0.4 MG/1
0.4 TABLET SUBLINGUAL EVERY 5 MIN PRN
Status: DISCONTINUED | OUTPATIENT
Start: 2020-01-07 | End: 2020-01-08 | Stop reason: HOSPADM

## 2020-01-07 RX ORDER — ONDANSETRON 2 MG/ML
4 INJECTION INTRAMUSCULAR; INTRAVENOUS EVERY 6 HOURS PRN
Status: DISCONTINUED | OUTPATIENT
Start: 2020-01-07 | End: 2020-01-08 | Stop reason: HOSPADM

## 2020-01-07 RX ORDER — SIMVASTATIN 20 MG
20 TABLET ORAL EVERY MORNING
Status: DISCONTINUED | OUTPATIENT
Start: 2020-01-08 | End: 2020-01-08 | Stop reason: HOSPADM

## 2020-01-07 RX ORDER — CARVEDILOL 25 MG/1
25 TABLET ORAL 2 TIMES DAILY WITH MEALS
Status: DISCONTINUED | OUTPATIENT
Start: 2020-01-07 | End: 2020-01-08 | Stop reason: HOSPADM

## 2020-01-07 RX ORDER — ONDANSETRON 4 MG/1
4 TABLET, ORALLY DISINTEGRATING ORAL EVERY 6 HOURS PRN
Status: DISCONTINUED | OUTPATIENT
Start: 2020-01-07 | End: 2020-01-08 | Stop reason: HOSPADM

## 2020-01-07 RX ORDER — VENLAFAXINE HYDROCHLORIDE 150 MG/1
150 CAPSULE, EXTENDED RELEASE ORAL DAILY
Status: DISCONTINUED | OUTPATIENT
Start: 2020-01-08 | End: 2020-01-08 | Stop reason: HOSPADM

## 2020-01-07 RX ORDER — SIMVASTATIN 20 MG
20 TABLET ORAL AT BEDTIME
Status: DISCONTINUED | OUTPATIENT
Start: 2020-01-07 | End: 2020-01-07

## 2020-01-07 RX ORDER — CARVEDILOL 25 MG/1
25 TABLET ORAL 2 TIMES DAILY WITH MEALS
Status: DISCONTINUED | OUTPATIENT
Start: 2020-01-08 | End: 2020-01-07

## 2020-01-07 RX ORDER — ACETAMINOPHEN 325 MG/1
650 TABLET ORAL EVERY 4 HOURS PRN
Status: DISCONTINUED | OUTPATIENT
Start: 2020-01-07 | End: 2020-01-08 | Stop reason: HOSPADM

## 2020-01-07 RX ORDER — DIPHENHYDRAMINE HYDROCHLORIDE 50 MG/ML
25-50 INJECTION INTRAMUSCULAR; INTRAVENOUS
Status: DISCONTINUED | OUTPATIENT
Start: 2020-01-07 | End: 2020-01-07

## 2020-01-07 RX ORDER — LISINOPRIL 2.5 MG/1
5 TABLET ORAL DAILY
Status: DISCONTINUED | OUTPATIENT
Start: 2020-01-08 | End: 2020-01-08 | Stop reason: HOSPADM

## 2020-01-07 RX ORDER — SERTRALINE HYDROCHLORIDE 100 MG/1
200 TABLET, FILM COATED ORAL DAILY
Status: DISCONTINUED | OUTPATIENT
Start: 2020-01-08 | End: 2020-01-08 | Stop reason: HOSPADM

## 2020-01-07 RX ADMIN — HUMAN ALBUMIN MICROSPHERES AND PERFLUTREN 6 ML: 10; .22 INJECTION, SOLUTION INTRAVENOUS at 16:30

## 2020-01-07 RX ADMIN — ASPIRIN 81 MG CHEWABLE TABLET 324 MG: 81 TABLET CHEWABLE at 08:59

## 2020-01-07 RX ADMIN — CARVEDILOL 25 MG: 25 TABLET, FILM COATED ORAL at 19:41

## 2020-01-07 RX ADMIN — METOPROLOL TARTRATE 100 MG: 50 TABLET, FILM COATED ORAL at 14:14

## 2020-01-07 RX ADMIN — IOPAMIDOL 120 ML: 755 INJECTION, SOLUTION INTRAVENOUS at 15:42

## 2020-01-07 RX ADMIN — NITROGLYCERIN 0.8 MG: 0.4 TABLET SUBLINGUAL at 15:40

## 2020-01-07 ASSESSMENT — ENCOUNTER SYMPTOMS
NAUSEA: 1
BLOOD IN STOOL: 0
HEADACHES: 0
UNEXPECTED WEIGHT CHANGE: 0
SHORTNESS OF BREATH: 1
FEVER: 0
DIARRHEA: 0
LIGHT-HEADEDNESS: 1
VOMITING: 0
COUGH: 0

## 2020-01-07 ASSESSMENT — MIFFLIN-ST. JEOR: SCORE: 1343.13

## 2020-01-07 NOTE — PHARMACY-ADMISSION MEDICATION HISTORY
Admission medication history interview status for the 1/7/2020 admission is complete. See Epic admission navigator for allergy information, pharmacy, prior to admission medications and immunization status.     Medication history interview sources:  Patient, SureScripts, Chart Review    Changes made to PTA medication list (reason)  Added: Cetirizine, afrin (Pt reports taking)  Deleted: Turmeric, tobradex (Pt reported not taking either)  Changed:   -Cyanocobalamin place under the tongue daily-> place under the tongue every other day  -Carvedilol take 1 tablet by mouth twice daily with meals->take 1 tablet by mouth daily  -Hydralazine take 1 tablet by mouth 3 times daily->take 10mg by mouth daily    Additional medication history information (including reliability of information, actions taken by pharmacist):  -Pt reports being prescribed to take carvedilol twice daily and hydralazine three times daily but takes both once daily  -Pt reports taking her simvastatin in the morning even though prescribed to take it at night  -Pt endorsed nightly use of afrin nasal spray    Prior to Admission medications    Medication Sig Last Dose Taking? Auth Provider   albuterol (PROAIR HFA/PROVENTIL HFA/VENTOLIN HFA) 108 (90 Base) MCG/ACT inhaler Inhale 2 puffs into the lungs every 6 hours as needed for shortness of breath / dyspnea or wheezing 1/7/2020 at AM Yes Blanche Cummins APRN CNP   amphetamine-dextroamphetamine (ADDERALL XR) 25 MG 24 hr capsule Take 1 capsule (25 mg) by mouth daily 1/7/2020 at AM Yes Radha Saeed MD   calcium carbonate-vitamin D (CALTRATE 600+D) 600-400 MG-UNIT CHEW Take 1 chew tab by mouth 2 times daily Past Week at Unknown time Yes Blanche Cummins APRN CNP   carvedilol (COREG) 25 MG tablet TAKE 1 TABLET(25 MG) BY MOUTH TWICE DAILY WITH MEALS  Patient taking differently: TAKE 1 TABLET(25 MG) BY MOUTH DAILY 1/7/2020 at AM Yes Blanche Cummins APRN CNP   cetirizine (ZYRTEC) 10 MG  tablet Take 10 mg by mouth daily  Yes Unknown, Entered By History   Cyanocobalamin (VITAMIN B-12 SL) Place under the tongue every other day  Past Week at Unknown time Yes Reported, Patient   cyclobenzaprine (FLEXERIL) 10 MG tablet Take 1 tablet (10 mg) by mouth nightly as needed for muscle spasms Past Week at Unknown time Yes Blanche Cummins APRN CNP   hydrALAZINE (APRESOLINE) 10 MG tablet Take 1 tablet (10 mg) by mouth 3 times daily  Patient taking differently: Take 10 mg by mouth daily  1/7/2020 at AM Yes Blanche Cummins APRN CNP   ibuprofen (ADVIL/MOTRIN) 800 MG tablet Take 1 tablet (800 mg) by mouth every 8 hours as needed 1/6/2020 at PM Yes Yoana Melton PA-C   isosorbide mononitrate (IMDUR) 30 MG 24 hr tablet TAKE 1 TABLET(30 MG) BY MOUTH DAILY 1/7/2020 at AM Yes Blanche Cummins APRN CNP   lisinopril (PRINIVIL/ZESTRIL) 5 MG tablet Take 1 tablet (5 mg) by mouth daily 1/7/2020 at AM Yes Blanche Cummins APRN CNP   meclizine (ANTIVERT) 25 MG tablet Take 1 tablet (25 mg) by mouth 3 times daily as needed Past Week at Unknown time Yes Blanche Cummins APRN CNP   Multiple Vitamin (MULTI-VITAMIN) per tablet Take 1 tablet by mouth daily. Past Week at Unknown time Yes Ankur Centeno MD   ondansetron (ZOFRAN ODT) 4 MG ODT tab Take 1 tablet (4 mg) by mouth every 8 hours as needed for nausea Past Week at Unknown time Yes Blanche Cummins APRN CNP   oxymetazoline (AFRIN) 0.05 % nasal spray Spray 1 spray into both nostrils every evening 1/6/2020 at PM Yes Unknown, Entered By History   PREMPRO 0.45-1.5 MG tablet TAKE 1 TABLET BY MOUTH DAILY 1/7/2020 at AM Yes Blanche Cummins APRN CNP   sertraline (ZOLOFT) 100 MG tablet TAKE 2 TABLETS(200 MG) BY MOUTH DAILY 1/7/2020 at AM Yes Blanche Cummins APRN CNP   simvastatin (ZOCOR) 20 MG tablet Take 1 tablet (20 mg) by mouth At Bedtime 1/7/2020 at AM Yes Blanche Cummins APRN CNP   venlafaxine  (EFFEXOR-XR) 150 MG 24 hr capsule Take 1 capsule (150 mg) by mouth daily 1/7/2020 at AM Yes Blanche Cummins APRN CNP   blood glucose monitoring (NO BRAND SPECIFIED) test strip Use to test blood sugar 1-2 times daily or as directed.   Blanche Cummins APRN CNP   COMFORT LANCETS MISC 1 Device 2 times daily as needed   Blanche Cummins APRN CNP   MAGNESIUM PO Take 500 mg by mouth daily    Reported, Patient   olopatadine (PATANOL) 0.1 % ophthalmic solution Place 1 drop into both eyes 2 times daily as needed for allergies More than a month at Unknown time  Anita Borjas, OD   venlafaxine (EFFEXOR-ER) 225 MG 24 hr tablet Take 1 tablet (225 mg) by mouth daily Not Started  Blanche Cummins APRN CNP         Medication history completed by:   Otoniel Forrest, PharmD  Pharmacy Practice Resident

## 2020-01-07 NOTE — PROGRESS NOTES
Pt arrived for Coronary CT angiogram. Test, meds and side effects reviewed with pt.  Resting HR >70 bpm. Given 100 mg PO Metoprolol per verbal order. Administered 0.8 mg SL nitro  on CTA table per order. CTA completed.  Patient tolerated procedure well and denies symptoms of allergic reaction.  Post monitoring completed and VSS.  D/C instructions reviewed with pt whom verbalized understanding of need to increase PO fluids today. Report called to 6D clancy secretary.

## 2020-01-07 NOTE — PROGRESS NOTES
Vascular Access Services Notes:    Pt was NOT in the room for PIV catheter insertion.        JAMARI BustamanteN, RN [facility]

## 2020-01-07 NOTE — ED TRIAGE NOTES
Pt BIBA c/o lightheadedness and nausea that started around 0630. Pt came to work and fainted in elevator and woke up face down. 4mg zofran with EMS. Jaw pain present for a few weeks d/t stress, none present on arrival.

## 2020-01-07 NOTE — PLAN OF CARE
Observation Goals:   List all  goals to be met before discharge:   - Diagnostic tests and consults completed and resulted: not met   - No further episodes of syncope and any new arrhythmia addressed with controlled heart rates: met   - Vital signs normal or at patient baseline and orthostatic vitals are normal and patient not lightheaded with standing: met   - Tolerating oral intake to maintain hydration: pending   - Safe disposition plan has been identified: pending   Nurse to notify provider when observation goals have been met and patient is ready for discharge.

## 2020-01-07 NOTE — ED NOTES
Bed: ED10  Expected date: 1/7/20  Expected time: 7:23 AM  Means of arrival:   Comments:  H425. 59F. Syncope and hypotension. Yellow.

## 2020-01-07 NOTE — ED PROVIDER NOTES
[place] EMERGENCY DEPARTMENT ([facility])  1/07/20 ED 10  History     Chief Complaint   Patient presents with     Loss of Consciousness     Hypotension     The history is provided by the patient and medical records.     Patricia Perez is a 59 year old female with a history of idiopathic cardiomyopathy, anxiety, depression, HTN, hyperlipidemia, GERD, BPV, and status post bariatric surgery who presents to the Emergency Department today for evaluation of loss of consciousness.  The patient reports that she woke up this morning feeling unwell.  She states that she still went to work and while walking into work she notes feeling lightheaded.  When she was at work, the patient was on elevator and when she was walking out of the elevator felt warm and blacked out.  The patient reports that she did strike her face in the process of the fall.  The patient is unaware of how long she was unconscious.  The patient denies any similar episodes to this in the past.  She denies any loss of control of bowel or bladder and did not bite her tongue.  She denies any pain from the fall.  Patient otherwise reports having some shortness of breath this morning as well as some nausea.  She denies chest pain.  She does note a new jaw pain for the past 2 weeks and thought this was due to clenching her jaw.  The patient reports that she was feeling normal yesterday, but notes that she has been under a lot of stress lately due to work.  The patient notes that she did not eat dinner last night as she was very tired but did eat breakfast this morning.  She denies leg swelling, vomiting, diarrhea, cough, fever, weight changes, black or bloody stools, urinary symptoms, headache, dental injuries.  She denies recent changes in medications.    I have reviewed the Medications, Allergies, Past Medical and Surgical History, and Social History in the [employer] system.    Past Medical History:   Diagnosis Date     Anxiety      Arthritis      Asthma       Bursitis of shoulder 3/4/2010     Chronic rhinitis 3/25/2009     Coronary artery disease      Depression      Depression      Dyspnea on exertion      Gastro-oesophageal reflux disease      GERD (gastroesophageal reflux disease) 10/14/2008     Hypertension      Idiopathic cardiomyopathy (H) 1/8/2009     Indigestion      Itchy eyes      Itchy nose      Left leg pain 6/16/2011     Motion sickness 2/27/2008     Nasal congestion      Pneumonia      Problems related to lack of adequate sleep      Ringing in ears      Sleep apnea 3/25/2009     Sneezing      Past Surgical History:   Procedure Laterality Date     BARIATRIC SURGERY       DIABETES RESOURCES  As Child    Tonsillectomy     ENT SURGERY       HERNIA REPAIR       LAPAROSCOPIC BYPASS GASTRIC  10/16/2012    Procedure: LAPAROSCOPIC BYPASS GASTRIC;  laparoscopic reyna en y gastric bypass;  Surgeon: Nish Bradford MD;  Location: [facility] OR     TONSILLECTOMY       Uretural Sticture  As Child     Family History   Problem Relation Age of Onset     Hypertension Father      Lipids Father      Alcohol/Drug Father         Abuse     Depression Father      Respiratory Father         COPD     Cardiovascular Father      Gastrointestinal Disease Mother         non cancerous pancreatic tumor     Hypertension Mother      Heart Disease Mother         A fib     Breast Cancer Maternal Grandmother      Glaucoma Paternal Grandmother      Depression Brother      Hypertension Brother      Cerebrovascular Disease Other      Macular Degeneration Other      Diabetes No family hx of      Cancer - colorectal No family hx of      Retinal detachment No family hx of      Amblyopia No family hx of      Thyroid Disease No family hx of      Social History     Tobacco Use     Smoking status: Never Smoker     Smokeless tobacco: Never Used   Substance Use Topics     Alcohol use: No     No current facility-administered medications for this encounter.      Current Outpatient Medications   Medication  "    albuterol (PROAIR HFA/PROVENTIL HFA/VENTOLIN HFA) 108 (90 Base) MCG/ACT inhaler     amphetamine-dextroamphetamine (ADDERALL XR) 25 MG 24 hr capsule     amphetamine-dextroamphetamine (ADDERALL XR) 25 MG 24 hr capsule     blood glucose monitoring (NO BRAND SPECIFIED) test strip     calcium carbonate-vitamin D (CALTRATE 600+D) 600-400 MG-UNIT CHEW     carvedilol (COREG) 25 MG tablet     COMFORT LANCETS MISC     Cyanocobalamin (VITAMIN B-12 SL)     cyclobenzaprine (FLEXERIL) 10 MG tablet     hydrALAZINE (APRESOLINE) 10 MG tablet     ibuprofen (ADVIL/MOTRIN) 800 MG tablet     isosorbide mononitrate (IMDUR) 30 MG 24 hr tablet     lisinopril (PRINIVIL/ZESTRIL) 5 MG tablet     MAGNESIUM PO     MAGNESIUM PO     meclizine (ANTIVERT) 25 MG tablet     Multiple Vitamin (MULTI-VITAMIN) per tablet     olopatadine (PATANOL) 0.1 % ophthalmic solution     ondansetron (ZOFRAN ODT) 4 MG ODT tab     PREMPRO 0.45-1.5 MG tablet     sertraline (ZOLOFT) 100 MG tablet     simvastatin (ZOCOR) 20 MG tablet     tobramycin-dexamethasone (TOBRADEX) 0.3-0.1 % ophthalmic suspension     TURMERIC PO     venlafaxine (EFFEXOR-ER) 225 MG 24 hr tablet     venlafaxine (EFFEXOR-XR) 150 MG 24 hr capsule     Allergies   Allergen Reactions     No Known Drug Allergies       Review of Systems   Constitutional: Negative for fever and unexpected weight change.   HENT: Negative for dental problem.    Respiratory: Positive for shortness of breath. Negative for cough.    Cardiovascular: Negative for leg swelling.   Gastrointestinal: Positive for nausea. Negative for blood in stool, diarrhea and vomiting.   Genitourinary: Negative.    Neurological: Positive for syncope and light-headedness. Negative for headaches.   All other systems reviewed and are negative.    Physical Exam   BP: 103/69  Heart Rate: 60  Resp: 18  Height: 160 cm (5' 3\")  SpO2: 99 %    Physical Exam  General: patient is alert and oriented and in no acute distress   Head: atraumatic and " normocephalic   EENT: moist mucus membranes without tonsillar erythema or exudates, pupils 3mm equal round and reactive, extraocular movements intact, no tenderness to palpation of the orbital rims, very slight abrasion along the left eye just lateral to the temporal aspect, nasal septum is midline without any septal hematoma, no laxity or subluxed teeth luxation of the dentition, no periorbital or postauricular ecchymoses or post auricular ecchycmosis  Neck: supple, no midline TTP of the cervical spine, full ROM without tenderness or pain with axial loading  Cardiovascular: regular rate and rhythm, extremities warm and well perfused, no lower extremity edema, 2+ radial and PT pulses bilaterally  Pulmonary: lungs clear to auscultation bilaterally, symmetric  Abdomen: soft, non-tender, non-distended  Musculoskeletal: normal range of motion   Neurological: alert and oriented, moving all extremities symmetrically, gait normal   Skin: warm, dry    ED Course   8:41 AM  The patient was seen and examined by Dr. Bay in Room 10.       Procedures             EKG Interpretation:      Interpreted by Oksana Bay MD  Time reviewed: 0740  Symptoms at time of EKG: None   Rhythm: sinus bradycardia  Rate: Bradycardia  Axis: Right Axis Deviation  Ectopy: none  Conduction: normal  ST Segments/ T Waves: No acute ischemic changes  Q Waves: none  Comparison to prior: Unchanged    Clinical Impression: sinus bradycardia                Critical Care time:  none             Labs Ordered and Resulted from Time of ED Arrival Up to the Time of Departure from the ED   CBC WITH PLATELETS DIFFERENTIAL - Abnormal; Notable for the following components:       Result Value    RBC Count 3.57 (*)     Hemoglobin 10.1 (*)     Hematocrit 32.4 (*)     MCHC 31.2 (*)     All other components within normal limits   COMPREHENSIVE METABOLIC PANEL - Abnormal; Notable for the following components:    Chloride 114 (*)     Glucose 124 (*)     Calcium 8.1  (*)     Albumin 3.1 (*)     Protein Total 5.9 (*)     All other components within normal limits   TROPONIN I          Assessments & Plan (with Medical Decision Making)   Ms. Perez is a 59-year-old female with a history of coronary artery disease, idiopathic cardiomyopathy, hypertension, depression and asthma who presents to the emergency department following a syncopal episode.  She is currently noted to be mildly bradycardic, similar to prior visits and normotensive.  Her ECG shows no acute ischemic changes, sinus bradycardia, no evidence of QT prolongation, WPW or Brugada syndrome.  Labs are notable for anemia of 10.1 down from 11.8 2 months ago.  She denies any melanic stools.  Her troponin is negative.  Remainder of electrolytes WNL. Currently she is asymptomatic however given her cardiac history, recent sharp pain and syncopal episode I am concerned for possible cardiac etiology.  Low suspicion for PE.  Her episode does sound due to a syncopal episode as opposed to seizure activity.  She otherwise denies any recent medication changes or decrease in p.o. intake. Bedside cardiac ultrasound shows generally normal global function with no pericardial effusion.  Her cervical spine was cleared clinically with NEXUS criteria.  She remains asymptomatic without headache or nausea and does not have indication for additional head CT by [place] head CT rules.  Will plan to admit to Emergency Department Obs unit for cardiac work-up.    This part of the document was transcribed by Loren Ely Medical Scribe.      I have reviewed the nursing notes.    I have reviewed the findings, diagnosis, plan and need for follow up with the patient.  Current Discharge Medication List        Final diagnoses:   Syncope and collapse   Jaw pain   I, Karthik Johnson, am serving as a trained medical scribe to document services personally performed by Oksana Bay MD, based on the provider's statements to me.   I, Oksana Bay MD, was  physically present and have reviewed and verified the accuracy of this note documented by Karthik Johnson.     1/7/2020   [facility] [place], EMERGENCY DEPARTMENT     Oksana Bay MD  01/07/20 3103

## 2020-01-07 NOTE — H&P
[facility] ED Observation Admission Note    Chief Complaint   Patient presents with     Loss of Consciousness     Hypotension       Assessment/Plan:  Patricia Perez is a 59 year old female with a history of idiopathic cardiomyopathy, anxiety, depression, HTN, hyperlipidemia, GERD, BPV, and status post bariatric surgery who presents to the Emergency Department today for evaluation of loss of consciousness.     1. Syncope: presents in the ed for syncope evaluation. Reports waking up this morning with dizziness and slightly lightheaded. Report that she went to work this morning despite not feeling well. Patient states she was walking out of an elevator when she all of sudden collapsed face down. Reports hitting her face in the process. No facial laceration or contusions. She is not clear how long she was unconscious. She denies any warning symptoms prior to the syncope. Denies chest pain or SOB. No diaphoresis. No nausea or vomiting. No vision changes.  No prior hx of syncope. She reports intermittent jaw pain for about 3-4 weeks. Denies urinary symptoms. Denies cough or fever. Her cardiac history is significant for a nonischemic cardiomyopathy that was mild to moderate in degree back in 2008. Coronary angiogram in 2008 revealed only mild nonobstructive coronary disease.  In 2009, her ejection fraction was in the 40%-45% range.  With medical therapy, her ejection fraction has improved to the 51% range as of echo 06/29/2016. She is currently on Carvedilol 25 twice daily, Lisinopril 5 mg daily , and Zocor 20 mg daily. In the ED, VSS. Lab: Na 143, K 3.7. Cr 0.83, BUN 21. LFTs normal. . Trop x 2 negative. EKG shows sinus bradycardia. CBC with hgb of 10.1 (this is at baseline). No leukocytosis. UA, no indication of infection. Chest Xray is clear. Pt is being admitted for ACS r/o with serial troponin, tele, and CT angiogram as well as resting echo to evaluate EF.   - Serial troponins q4h x 1 more  - Continuous telemetry  -  CT angiogram    - Echocardiogram   - Nitro PRN  - ASA 81mg daily  - Clear liquid diet after midnight  - Zio patch at discharge      2. HTN:   -Continue with PTA Lisinopril    3. Depression/Anxiety:   -Continue with PTA Venlafaxine and Sertraline       HPI:    Patricia Perez is a 59 year old female with a history of idiopathic cardiomyopathy, anxiety, depression, HTN, hyperlipidemia, GERD, BPV, and status post bariatric surgery who presents to the Emergency Department today for evaluation of loss of consciousness.  The patient reports that she woke up this morning feeling unwell.  She states that she still went to work and while walking into work she notes feeling lightheaded.  When she was at work, the patient was on elevator and when she was walking out of the elevator felt warm and blacked out.  The patient reports that she did strike her face in the process of the fall.  The patient is unaware of how long she was unconscious.  The patient denies any similar episodes to this in the past.  She denies any loss of control of bowel or bladder and did not bite her tongue.  She did not sustain injuries from the fall.  Patient otherwise reports having some shortness of breath this morning as well as some nausea.  She denies chest pain.  She does note having chronic neck pain, but denies changes in this.  She also notes a new jaw pain for the past few weeks and thought this was due to clenching her jaw.  The patient reports that she was feeling normal yesterday, but notes that she has been under a lot of stress lately due to work.  The patient notes that she did not eat dinner last night as she was very tired but did eat breakfast this morning.  She denies leg swelling, vomiting, diarrhea, cough, fever, weight changes, black or bloody stools, urinary symptoms, headache, dental injuries.  She denies recent changes in medications.    In the ED, VSS. Lab: Na 143, K 3.7. Cr 0.83, BUN 21. LFTs normal. . Trop x 2  negative. EKG shows sinus bradycardia. CBC with hgb of 10.1 (this is at baseline). No leukocytosis. UA, no indication of infection. Chest Xray is clear. Pt is being admitted for ACS r/o with serial troponin, tele, and CT angiogram as well as resting echo to evaluate EF.     On admission to the observation unit the patient was stable.    History:    Past Medical History:   Diagnosis Date     Anxiety      Arthritis      Asthma      Bursitis of shoulder 3/4/2010     Chronic rhinitis 3/25/2009     Coronary artery disease      Depression      Depression      Dyspnea on exertion      Gastro-oesophageal reflux disease      GERD (gastroesophageal reflux disease) 10/14/2008     Hypertension      Idiopathic cardiomyopathy (H) 1/8/2009     Indigestion      Itchy eyes      Itchy nose      Left leg pain 6/16/2011     Motion sickness 2/27/2008     Nasal congestion      Pneumonia      Problems related to lack of adequate sleep      Ringing in ears      Sleep apnea 3/25/2009     Sneezing        Past Surgical History:   Procedure Laterality Date     BARIATRIC SURGERY       DIABETES RESOURCES  As Child    Tonsillectomy     ENT SURGERY       HERNIA REPAIR       LAPAROSCOPIC BYPASS GASTRIC  10/16/2012    Procedure: LAPAROSCOPIC BYPASS GASTRIC;  laparoscopic reyna en y gastric bypass;  Surgeon: Nish Bradford MD;  Location: [facility] OR     TONSILLECTOMY       Uretural Sticture  As Child       Family History   Problem Relation Age of Onset     Hypertension Father      Lipids Father      Alcohol/Drug Father         Abuse     Depression Father      Respiratory Father         COPD     Cardiovascular Father      Gastrointestinal Disease Mother         non cancerous pancreatic tumor     Hypertension Mother      Heart Disease Mother         A fib     Breast Cancer Maternal Grandmother      Glaucoma Paternal Grandmother      Depression Brother      Hypertension Brother      Cerebrovascular Disease Other      Macular Degeneration Other       Diabetes No family hx of      Cancer - colorectal No family hx of      Retinal detachment No family hx of      Amblyopia No family hx of      Thyroid Disease No family hx of        Social History     Socioeconomic History     Marital status:      Spouse name: Not on file     Number of children: 0     Years of education: 12+     Highest education level: Not on file   Occupational History     Employer: [employer]   Social Needs     Financial resource strain: Not on file     Food insecurity:     Worry: Not on file     Inability: Not on file     Transportation needs:     Medical: Not on file     Non-medical: Not on file   Tobacco Use     Smoking status: Never Smoker     Smokeless tobacco: Never Used   Substance and Sexual Activity     Alcohol use: No     Drug use: No     Sexual activity: Not Currently     Partners: Male   Lifestyle     Physical activity:     Days per week: Not on file     Minutes per session: Not on file     Stress: Not on file   Relationships     Social connections:     Talks on phone: Not on file     Gets together: Not on file     Attends Baptism service: Not on file     Active member of club or organization: Not on file     Attends meetings of clubs or organizations: Not on file     Relationship status: Not on file     Intimate partner violence:     Fear of current or ex partner: Not on file     Emotionally abused: Not on file     Physically abused: Not on file     Forced sexual activity: Not on file   Other Topics Concern     Parent/sibling w/ CABG, MI or angioplasty before 65F 55M? No   Social History Narrative    Dairy/d 2 servings/d.     Caffeine 3 servings/d    Exercise 2 x week    Sunscreen used - Yes    Seatbelts used - Yes    Working smoke/CO detectors in the home - Yes    Guns stored in the home - No    Self Breast Exams - Yes    Self Testicular Exam - NA    Eye Exam up to date - Yes    Dental Exam up to date - No    Pap Smear up to date - Yes    Mammogram up to date -  Yes    PSA up to date - NA    Dexa Scan up to date - NA    Flex Sig / Colonoscopy up to date - Yes    Immunizations up to date - Yes    Abuse: Current or Past(Physical, Sexual or Emotional)- No    Do you feel safe in your environment - Yes    LOWELL SMITH LPN.    02/05/2007       No current facility-administered medications on file prior to encounter.   albuterol (PROAIR HFA/PROVENTIL HFA/VENTOLIN HFA) 108 (90 Base) MCG/ACT inhaler, Inhale 2 puffs into the lungs every 6 hours as needed for shortness of breath / dyspnea or wheezing  amphetamine-dextroamphetamine (ADDERALL XR) 25 MG 24 hr capsule, Take 1 capsule (25 mg) by mouth daily  blood glucose monitoring (NO BRAND SPECIFIED) test strip, Use to test blood sugar 1-2 times daily or as directed.  calcium carbonate-vitamin D (CALTRATE 600+D) 600-400 MG-UNIT CHEW, Take 1 chew tab by mouth 2 times daily  carvedilol (COREG) 25 MG tablet, TAKE 1 TABLET(25 MG) BY MOUTH TWICE DAILY WITH MEALS  COMFORT LANCETS MISC, 1 Device 2 times daily as needed  Cyanocobalamin (VITAMIN B-12 SL), Place  under the tongue daily.  cyclobenzaprine (FLEXERIL) 10 MG tablet, Take 1 tablet (10 mg) by mouth nightly as needed for muscle spasms  hydrALAZINE (APRESOLINE) 10 MG tablet, Take 1 tablet (10 mg) by mouth 3 times daily  ibuprofen (ADVIL/MOTRIN) 800 MG tablet, Take 1 tablet (800 mg) by mouth every 8 hours as needed  isosorbide mononitrate (IMDUR) 30 MG 24 hr tablet, TAKE 1 TABLET(30 MG) BY MOUTH DAILY (Patient not taking: Reported on 11/25/2019)  lisinopril (PRINIVIL/ZESTRIL) 5 MG tablet, Take 1 tablet (5 mg) by mouth daily  MAGNESIUM PO,   meclizine (ANTIVERT) 25 MG tablet, Take 1 tablet (25 mg) by mouth 3 times daily as needed  Multiple Vitamin (MULTI-VITAMIN) per tablet, Take 1 tablet by mouth daily.  olopatadine (PATANOL) 0.1 % ophthalmic solution, Place 1 drop into both eyes 2 times daily as needed for allergies  ondansetron (ZOFRAN ODT) 4 MG ODT tab, Take 1 tablet (4 mg) by mouth every  8 hours as needed for nausea  PREMPRO 0.45-1.5 MG tablet, TAKE 1 TABLET BY MOUTH DAILY  sertraline (ZOLOFT) 100 MG tablet, TAKE 2 TABLETS(200 MG) BY MOUTH DAILY  simvastatin (ZOCOR) 20 MG tablet, Take 1 tablet (20 mg) by mouth At Bedtime  venlafaxine (EFFEXOR-ER) 225 MG 24 hr tablet, Take 1 tablet (225 mg) by mouth daily  venlafaxine (EFFEXOR-XR) 150 MG 24 hr capsule, Take 1 capsule (150 mg) by mouth daily        Data:    Results for orders placed or performed during the hospital encounter of 01/07/20   CBC with platelets differential     Status: Abnormal   Result Value Ref Range    WBC 6.0 4.0 - 11.0 10e9/L    RBC Count 3.57 (L) 3.8 - 5.2 10e12/L    Hemoglobin 10.1 (L) 11.7 - 15.7 g/dL    Hematocrit 32.4 (L) 35.0 - 47.0 %    MCV 91 78 - 100 fl    MCH 28.3 26.5 - 33.0 pg    MCHC 31.2 (L) 31.5 - 36.5 g/dL    RDW 14.3 10.0 - 15.0 %    Platelet Count 248 150 - 450 10e9/L    Diff Method Automated Method     % Neutrophils 72.1 %    % Lymphocytes 18.5 %    % Monocytes 6.5 %    % Eosinophils 1.7 %    % Basophils 0.7 %    % Immature Granulocytes 0.5 %    Nucleated RBCs 0 0 /100    Absolute Neutrophil 4.3 1.6 - 8.3 10e9/L    Absolute Lymphocytes 1.1 0.8 - 5.3 10e9/L    Absolute Monocytes 0.4 0.0 - 1.3 10e9/L    Absolute Eosinophils 0.1 0.0 - 0.7 10e9/L    Absolute Basophils 0.0 0.0 - 0.2 10e9/L    Abs Immature Granulocytes 0.0 0 - 0.4 10e9/L    Absolute Nucleated RBC 0.0    Comprehensive metabolic panel     Status: Abnormal   Result Value Ref Range    Sodium 143 133 - 144 mmol/L    Potassium 3.7 3.4 - 5.3 mmol/L    Chloride 114 (H) 94 - 109 mmol/L    Carbon Dioxide 23 20 - 32 mmol/L    Anion Gap 6 3 - 14 mmol/L    Glucose 124 (H) 70 - 99 mg/dL    Urea Nitrogen 21 7 - 30 mg/dL    Creatinine 0.83 0.52 - 1.04 mg/dL    GFR Estimate 75 >60 mL/min/[1.73_m2]    GFR Estimate If Black 87 >60 mL/min/[1.73_m2]    Calcium 8.1 (L) 8.5 - 10.1 mg/dL    Bilirubin Total 0.3 0.2 - 1.3 mg/dL    Albumin 3.1 (L) 3.4 - 5.0 g/dL    Protein Total  5.9 (L) 6.8 - 8.8 g/dL    Alkaline Phosphatase 83 40 - 150 U/L    ALT 24 0 - 50 U/L    AST 12 0 - 45 U/L   Troponin I     Status: None   Result Value Ref Range    Troponin I ES <0.015 0.000 - 0.045 ug/L   Magnesium     Status: None   Result Value Ref Range    Magnesium 2.0 1.6 - 2.3 mg/dL   Nt probnp inpatient     Status: None   Result Value Ref Range    N-Terminal Pro BNP Inpatient 159 0 - 900 pg/mL   Troponin I     Status: None   Result Value Ref Range    Troponin I ES <0.015 0.000 - 0.045 ug/L   EKG 12 lead     Status: None (Preliminary result)   Result Value Ref Range    Interpretation ECG Click View Image link to view waveform and result              EKG Interpretation:      Interpreted by Oksana Bay MD  Time reviewed: 0740  Symptoms at time of EKG: None   Rhythm: sinus bradycardia  Rate: Bradycardia  Axis: Right Axis Deviation  Ectopy: none  Conduction: normal  ST Segments/ T Waves: No acute ischemic changes  Q Waves: none  Comparison to prior: Unchanged     Clinical Impression: sinus bradycardia       ROS:    REVIEW OF SYSTEMS:   General: fatigue   EYES: Negative for vision changes or eye problems   ENT: No problems with ears, nose or throat. No difficulty swallowing.   RESP: No coughing, wheezing or shortness of breath   CV: Syncope and jaw tightness. No Chest pains or palpitations.   GI: No nausea, vomiting, heartburn, abdominal pain, diarrhea, constipation or change in bowel habits   : No urinary frequency or dysuria, bladder or kidney problems   MUSCULOSKELETAL: No significant muscle or joint pains   NEUROLOGIC: Dizziness and lightheadedness. No headaches, numbness, tingling, weakness, problems with balance or coordination   PSYCHIATRIC: No problems with anxiety, depression or mental health   HEME/IMMUNE/ALLERGY: No history of bleeding or clotting problems or anemia. No allergies or immune system problems   ENDOCRINE: No history of thyroid disease, diabetes or other endocrine disorders   SKIN:  No rashes,worrisome lesions or skin problems      PCP: JOHN VELAZQUEZ  CARDIAC RISK: Non ischemic cardiomyopathy, HTN, HLD     10 point ROS negative other than the symptoms noted above.      Exam:    Vitals:  B/P: 127/81, T: 97.7, P: 70, R: 18    Physical Exam   Constitutional: Pt is oriented to person, place, and time.Pt appears well-developed and well-nourished.   HENT:   Head: Normocephalic and atraumatic.   Eyes: Conjunctivae are normal. Pupils are equal, round, and reactive to light.   Neck: Normal range of motion. Neck supple.   Cardiovascular: Normal rate, regular rhythm, normal heart sounds and intact distal pulses.    Pulmonary/Chest: Effort normal and breath sounds normal. No respiratory distress. Pt has no wheezes. Pt has no rales  Abdominal: Soft. Bowel sounds are normal. Pt exhibits no distension and no mass. No tenderness. Pt has no rebound and no guarding.   Musculoskeletal: Normal range of motion. Pt exhibits no edema.   Neurological: Pt is alert and oriented to person, place, and time. Normal reflexes.   Skin: Skin is warm and dry. No rash noted.   Psychiatric: Pt has a normal mood and affect. Behavior is normal. Judgment and thought content normal.     Consults: Low threshold   FEN: Regular   DVT prophylaxis: Early ambulation  Code Status: Full  Disposition: Stable vital signs. Patient return to baseline.  All labs/images reviewed    Signed:  Eli Garcia PA-C  January 7, 2020 at 2:03 PM

## 2020-01-08 ENCOUNTER — APPOINTMENT (OUTPATIENT)
Dept: CARDIOLOGY | Facility: CLINIC | Age: 60
End: 2020-01-08
Attending: NURSE PRACTITIONER
Payer: COMMERCIAL

## 2020-01-08 VITALS
HEIGHT: 63 IN | HEART RATE: 73 BPM | DIASTOLIC BLOOD PRESSURE: 81 MMHG | TEMPERATURE: 96.5 F | SYSTOLIC BLOOD PRESSURE: 141 MMHG | RESPIRATION RATE: 16 BRPM | WEIGHT: 178.35 LBS | BODY MASS INDEX: 31.6 KG/M2 | OXYGEN SATURATION: 96 %

## 2020-01-08 LAB
ANION GAP SERPL CALCULATED.3IONS-SCNC: 6 MMOL/L (ref 3–14)
BUN SERPL-MCNC: 15 MG/DL (ref 7–30)
CALCIUM SERPL-MCNC: 8.7 MG/DL (ref 8.5–10.1)
CHLORIDE SERPL-SCNC: 112 MMOL/L (ref 94–109)
CO2 SERPL-SCNC: 25 MMOL/L (ref 20–32)
CREAT SERPL-MCNC: 0.71 MG/DL (ref 0.52–1.04)
ERYTHROCYTE [DISTWIDTH] IN BLOOD BY AUTOMATED COUNT: 14.5 % (ref 10–15)
GFR SERPL CREATININE-BSD FRML MDRD: >90 ML/MIN/{1.73_M2}
GLUCOSE SERPL-MCNC: 84 MG/DL (ref 70–99)
HCT VFR BLD AUTO: 35.8 % (ref 35–47)
HGB BLD-MCNC: 11.1 G/DL (ref 11.7–15.7)
MCH RBC QN AUTO: 27.9 PG (ref 26.5–33)
MCHC RBC AUTO-ENTMCNC: 31 G/DL (ref 31.5–36.5)
MCV RBC AUTO: 90 FL (ref 78–100)
PLATELET # BLD AUTO: 276 10E9/L (ref 150–450)
POTASSIUM SERPL-SCNC: 3.8 MMOL/L (ref 3.4–5.3)
RBC # BLD AUTO: 3.98 10E12/L (ref 3.8–5.2)
SODIUM SERPL-SCNC: 142 MMOL/L (ref 133–144)
WBC # BLD AUTO: 5.5 10E9/L (ref 4–11)

## 2020-01-08 PROCEDURE — 80048 BASIC METABOLIC PNL TOTAL CA: CPT | Performed by: PHYSICIAN ASSISTANT

## 2020-01-08 PROCEDURE — 25000132 ZZH RX MED GY IP 250 OP 250 PS 637: Performed by: PHYSICIAN ASSISTANT

## 2020-01-08 PROCEDURE — G0378 HOSPITAL OBSERVATION PER HR: HCPCS

## 2020-01-08 PROCEDURE — 0296T ZIO PATCH HOLTER ADULT PEDIATRIC GREATER THAN 48 HRS: CPT

## 2020-01-08 PROCEDURE — 0298T ZIO PATCH HOLTER ADULT PEDIATRIC GREATER THAN 48 HRS: CPT | Performed by: INTERNAL MEDICINE

## 2020-01-08 PROCEDURE — 85027 COMPLETE CBC AUTOMATED: CPT | Performed by: PHYSICIAN ASSISTANT

## 2020-01-08 PROCEDURE — 36415 COLL VENOUS BLD VENIPUNCTURE: CPT | Performed by: PHYSICIAN ASSISTANT

## 2020-01-08 RX ADMIN — CARVEDILOL 25 MG: 25 TABLET, FILM COATED ORAL at 07:24

## 2020-01-08 RX ADMIN — VENLAFAXINE HYDROCHLORIDE 150 MG: 150 CAPSULE, EXTENDED RELEASE ORAL at 07:24

## 2020-01-08 RX ADMIN — SIMVASTATIN 20 MG: 20 TABLET, FILM COATED ORAL at 07:24

## 2020-01-08 RX ADMIN — LISINOPRIL 5 MG: 2.5 TABLET ORAL at 07:23

## 2020-01-08 RX ADMIN — SERTRALINE HYDROCHLORIDE 200 MG: 100 TABLET ORAL at 07:23

## 2020-01-08 NOTE — PLAN OF CARE
Observation Goals:   List all  goals to be met before discharge:   - Diagnostic tests and consults completed and resulted: met     - No further episodes of syncope and any new arrhythmia addressed with controlled heart rates: met     - Vital signs normal or at patient baseline and orthostatic vitals are normal and patient not lightheaded with standing: met     - Tolerating oral intake to maintain hydration: met     - Safe disposition plan has been identified: met     Nurse to notify provider when observation goals have been met and patient is ready for discharge.

## 2020-01-08 NOTE — DISCHARGE SUMMARY
[facility], [place]  Hospitalist Discharge Summary       Date of Admission:  1/7/2020  Date of Discharge:  1/8/2020  Discharging Provider: ELIOT Gonsales CNP  Discharge Team: Emergency Department Observation Unit    Discharge Diagnoses   syncope    Follow-ups Needed After Discharge   Follow-up Appointments     Adult [facility] Follow-up and recommended labs and tests      Follow up with primary care provider, JOHN VELAZQUEZ NP, within 7 days for   hospital follow- up.  Follow up on Ziopatch results.     Appointments on [place] and/or [facility] (with [facility] or [facility]   provider or service). Call [phone] if you haven't heard regarding   these appointments within 7 days of discharge.         {Additional follow-up instructions/to-do's for PCP    :Hospital follow up    Unresulted Labs Ordered in the Past 30 Days of this Admission     No orders found for last 31 day(s).      These results will be followed up by PCP    Discharge Disposition   Discharged to home  Condition at discharge: Stable    Hospital Course   Patricia Perez is a 59 year old female with a history of idiopathic cardiomyopathy, anxiety, depression, HTN, hyperlipidemia, GERD, BPV, and status post bariatric surgery who presents to the Emergency Department today for evaluation of loss of consciousness.      1. Syncope: presents in the ed for syncope evaluation. Reports waking up this morning with dizziness and slightly lightheaded. Report that she went to work this morning despite not feeling well. Patient states she was walking out of an elevator when she all of sudden collapsed face down. Reports hitting her face in the process. No facial laceration or contusions. She is not clear how long she was unconscious. She denies any warning symptoms prior to the syncope. Denies chest pain or SOB. No diaphoresis. No nausea or vomiting. No vision changes.  No prior hx of syncope. She reports intermittent jaw pain for about  3-4 weeks. Denies urinary symptoms. Denies cough or fever. Her cardiac history is significant for a nonischemic cardiomyopathy that was mild to moderate in degree back in 2008. Coronary angiogram in 2008 revealed only mild nonobstructive coronary disease.  In 2009, her ejection fraction was in the 40%-45% range.  With medical therapy, her ejection fraction has improved to the 51% range as of echo 06/29/2016. She is currently on Carvedilol 25 twice daily, Lisinopril 5 mg daily , and Zocor 20 mg daily. In the ED, VSS. Lab: Na 143, K 3.7. Cr 0.83, BUN 21. LFTs normal. . Trop x 2 negative. EKG shows sinus bradycardia. CBC with hgb of 10.1 (this is at baseline). No leukocytosis. UA, no indication of infection. Chest Xray is clear. Pt is being admitted for ACS r/o with serial troponin, tele, and CT angiogram as well as resting echo to evaluate EF.  Mild non-obstructive coronary artery disease.Total Agatston score 362 placing the patient in the 97th percentile when compared to age and gender matched control group.  Please review Radiology report for incidental noncardiac findings that will follow separately. Echo shows: Compared to the previous study dated 6/29/2016: There has been no significant change. Ziopatch placed at discharge.      2. HTN:   -Continue with PTA Lisinopril     3. Depression/Anxiety:   -Continue with PTA Venlafaxine and Sertraline     Consultations This Hospital Stay   VASCULAR ACCESS CARE ADULT IP CONSULT  MEDICATION HISTORY IP PHARMACY CONSULT    Code Status   Full Code    Time Spent on this Encounter   I, ELIOT Gonsales CNP, personally saw the patient today and spent less than or equal to 30 minutes discharging this patient.       ELIOT Gonsales CNP  [facility] [place]  ______________________________________________________________________    Physical Exam   Vital Signs: Temp: 96.5  F (35.8  C) Temp src: Axillary BP: (!) 141/81 Pulse: 73 Heart Rate:  74 Resp: 16 SpO2: 96 % O2 Device: None (Room air)    Weight: 178 lbs 5.63 oz  Constitutional: Pt is oriented to person, place, and time.Pt appears well-developed and well-nourished.   HENT:   Head: Normocephalic and atraumatic.   Eyes: Conjunctivae are normal. Pupils are equal, round, and reactive to light.   Neck: Normal range of motion. Neck supple.   Cardiovascular: Normal rate, regular rhythm, normal heart sounds and intact distal pulses.    Pulmonary/Chest: Effort normal and breath sounds normal. No respiratory distress. Pt has no wheezes. Pt has no rales  Abdominal: Soft. Bowel sounds are normal. Pt exhibits no distension and no mass. No tenderness. Pt has no rebound and no guarding.   Musculoskeletal: Normal range of motion. Pt exhibits no edema.   Neurological: Pt is alert and oriented to person, place, and time. Normal reflexes.   Skin: Skin is warm and dry. No rash noted.   Psychiatric: Pt has a normal mood and affect. Behavior is normal. Judgment and thought content normal.           Primary Care Physician   JOHN VELAZQUEZ NP    Discharge Orders      Reason for your hospital stay    Syncopal episode     Adult [facility] Follow-up and recommended labs and tests    Follow up with primary care provider, JOHN VELAZQUEZ NP, within 7 days for hospital follow- up.  Follow up on Ziopatch results.     Appointments on [place] and/or [facility] (with [facility] or [facility] provider or service). Call [phone] if you haven't heard regarding these appointments within 7 days of discharge.     Activity    Your activity upon discharge: activity as tolerated     Discharge Instructions    You were admitted to the ED observation unit at the  of  after a syncopal event. Troponins (a lab test to check if there were damage to the heart tissue) were checked and these were negative. Chest xray was negative for infection.   CT angiogram was completed yesterday and showed Mild non-obstructive coronary artery disease and a small lung  nodule. Follow up with your primary care doctor regarding your lung nodule and further imaging if necessary. Echo or ultrasound of your heart showed no change from your previous Echo in June 2019. A Zio patch was placed at discharge to evaluate your heart rhythm in your everyday life. These results can be reviewed by your primary care doctor. Please follow up with your primary care doctor in one week for hospital follow up.     When to contact your care team    Call 911 if any of these occur:    Another fainting spell     Pain in your chest, arm, neck, jaw, back, or abdomen    Shortness of breath    Severe headache or seizure    Blood in vomit or stools (black or red color)    Unexpected vaginal bleeding    Your heart beats very rapidly, very slowly, or irregularly (palpitations)    Weakness in an arm or leg or on one side of the face    Difficulty speaking or seeing    Extreme drowsiness, confusion, dizziness, or fainting     Diet    Follow this diet upon discharge: Regular       Significant Results and Procedures   Results for orders placed or performed during the hospital encounter of 01/07/20   XR Chest 2 Views    Narrative    EXAM: XR CHEST 2 VW  1/7/2020 9:24 AM     HISTORY:  syncope, chest heaviness       COMPARISON:  9/28/2012    FINDINGS:   PA and lateral radiographs of the chest. The trachea is midline. The  cardiac silhouette size is normal. No pneumothorax or large pleural  effusion. No acute airspace opacities. Azygos lobe visualized. The  visualized upper abdomen is unremarkable. No acute osseous  abnormality. Diffuse degenerative changes of the spine.      Impression    IMPRESSION: No acute airspace opacities.    I have personally reviewed the examination and initial interpretation  and I agree with the findings.    SALOMON MARSHALL MD   POC US ECHO LIMITED    Impression    Limited Bedside Cardiac Ultrasound, performed and interpreted by me.   Indication: syncope.  Parasternal long axis, parasternal  short axis, apical 4 chamber and subcostal views were acquired.   Image quality was satisfactory.    Findings:    Global left ventricular function appears intact.  Chambers do not appear dilated.  There is no evidence of free fluid within the pericardium.    IMPRESSION: Grossly normal left ventricular function and chamber size.  No pericardial effusion..   CT Angiogram coronary artery    Narrative    Procedure: CTA ANGIOGRAM CORONARY ARTERY   Examination Date: 2020 3:52 PM   Indication: CAD eval, syncope, high CHD risk, Asx for ischemia;  Syncope episode. History of coronary artery disease, idiopathic  cardiomyopathy, hypertension   Ordering Provider: JOHN Edwards  Overall quality of the study: Good.     PROCEDURE: ECG gated multi-slice computed tomography of the heart   with and without intravenous contrast  (Isovue 370, 120 mL at rate of  6.0 mL/sec) was  performed on a Siemens Dual Source Flash scanner  without incident. Beta-blockers were used to optimize heart rate  (Metoprolol 100 mg Oral/ Metoprolol 0 mg IV). Sublingual Nitrostat 0.8  mg was given prior to scanning. Coronary artery calcium score was  performed using the Flash scanner protocol. CTA was performed in the  spiral mode at a heart rate of 66 bpm with 100 kVp. Images were  reconstructed and analyzed on a [employer] workstation. Scan protocol  was optimized to minimize radiation exposure. The total radiation  exposure including calcium score was calculated to be 249 DLP, and 3.5  mSv.        Impression    IMPRESSION:  1.  Mild non-obstructive coronary artery disease.  2.  Total Agatston score 362 placing the patient in the 97th  percentile when compared to age and gender matched control group.  3.  Please review Radiology report for incidental noncardiac findings  that will follow separately.    FINDINGS:    CORONARY CALCIUM SCORE    Total Agatston calcium score: 362   Left main: 0  Left anterior descendin  Left circumflex: 1  Right  coronary artery: 12   This places the patient in the 97th  percentile when compared to age  and gender matched control group.    CORONARY ANGIOGRAPHY    DOMINANCE: Left dominant system.   Normal coronary origins and course.    LEFT MAIN:   The LM is a large caliber vessel.  The left main arises normally from the left coronary cusp and is  widely patent without detectable atherosclerosis or stenosis.     LEFT ANTERIOR DESCENDING:   The LAD is a large caliber vessel that has a type III morphology. It  gives rise to one very small caliber diagonal branch.  Proximal LAD: Mild (25-49%) stenosis composed of mixed plaque.  Mid LAD: Mild (25-49%) stenosis composed of mixed plaque.  Distal LAD: Minimal (<25%) stenosis composed of noncalcified plaque.  The remainder of the left anterior descending and its major diagonal  branches are patent with minimal luminal irregularities.    LEFT CIRCUMFLEX:   The LCx is a large caliber vessel that is dominant.  It gives rise to  a large, branching OM1, a small caliber OM2, and a small caliber OM3.   Proximal LCX:Mild (25-49%) stenosis composed of noncalcified plaque.  The remainder of the left circumflex and its major marginal branches  are patent with minimal luminal irregularities.    RIGHT CORONARY ARTERY:   The RCA is a small caliber vessel.  Proximal RCA: Mild (25-49%) stenosis stenosis composed of mixed  plaque.  Mid RCA: Mild (25-49%) stenosis composed of noncalcified plaque.  Distal RCA: Minimal (<25%) stenosis composed of noncalcified plaque.    ADDITIONAL FINDINGS:   The proximal ascending aorta is normal in size.   Normal pulmonary venous anatomy with all four pulmonary veins draining  into the left atrium.    There is no left ventricular mass or thrombus.   There is no left atrial appendage thrombus.  Normal pericardial thickness. There is no pericardial effusion.  Please review Radiology report for incidental noncardiac findings that  will follow separately.    I have  personally reviewed the examination and initial interpretation  and I agree with the findings.    KATERYNA MILIAN MD   Radiologist Consult For Cardiology    Narrative    EXAMINATION: RADIOLOGIST CONSULT FOR CARDIOLOGY, 2020 3:52 PM    TECHNIQUE:  Helical CT images from the thoracic inlet through the lung  bases were obtained without IV contrast.     COMPARISON: Radiograph 2020    HISTORY: CAD eval, syncope, high CHD risk, Asx for ischemia    FINDINGS:    Postsurgical changes of gastric bypass.     Chest:  Visualized tracheobronchial tree appears patent. Visualized  esophagus appears unremarkable. 4 mm groundglass pulmonary nodule of  the right upper lobe (series 5, image 8). No evidence of lung  infection. No pleural effusion. The pleura appears unremarkable.. No  pneumothorax. Atherosclerotic calcifications of the coronary arteries.  Heart size is within normal limits. No significant pericardial  effusion.  Visualized thoracic aorta and main pulmonary artery  diameters appear within normal limits. There are no visualized  pathologically enlarged mediastinal or hilar with nodes. Please see  cardiology report for detailed assessment of the cardiac findings.     Abdomen: Visualized abdomen limited . Gastric bypass changes.    Bones and Soft Tissues: No suspicious osseous lesion. No suspicious  mass.          Impression    IMPRESSION: 4 mm ground left pulmonary nodule of the right upper lobe,  likely benign. Attention on follow-up.     Please see cardiology report for detailed assessment of the cardiac  findings.    I have personally reviewed the examination and initial interpretation  and I agree with the findings.    SALOMON MARSHALL MD   Echo Complete    Narrative    [id number]  [id number]  [id number]  [id number]^PJ^ALTHEA^EDITH           [facility],[place]  Echocardiography Laboratory  [address] [place] [id number]     Name: DONNA OLIVA  MRN: [id number]  :  1960  Study Date: 01/07/2020 04:05 PM  Age: 59 yrs  Gender: Female  Patient Location: [facility]  Reason For Study: Syncope  Ordering Physician: ALTHEA OLIVA  Referring Physician: ALTHEA OLIVA  Performed By: Tavares Villanueva     BSA: 1.8 m2  Height: 63 in  Weight: 180 lb  HR: 65  BP: 118/81 mmHg  _____________________________________________________________________________  __        Procedure  Complete Portable Echo Adult. Contrast Optison. Patient was given 6 ml mixture  of 3 ml Optison and 6 ml saline. 3 ml wasted.  _____________________________________________________________________________  __        Interpretation Summary  Borderline (EF 50-55%) reduced left ventricular function is present.  Moderate right ventricular dilation is present. Global right ventricular  function is normal.  Pulmonary artery systolic pressure cannot be assessed.  IVC diameter <2.1 cm collapsing >50% with sniff suggests a normal RA pressure  of 3 mmHg.  Compared to the previous study dated 6/29/2016: There has been no significant  change.  _____________________________________________________________________________  __        Left Ventricle  Left ventricular size is normal. Left ventricular wall thickness is normal.  LVEF 53% based on biplane 2D tracing. Borderline (EF 50-55%) reduced left  ventricular function is present. Left ventricular diastolic function is  normal.     Right Ventricle  Global right ventricular function is normal. Right ventricular wall thickness  is normal. Moderate right ventricular dilation is present.     Atria  The right atria appears normal. Mild left atrial enlargement is present.     Mitral Valve  Mild mitral annular calcification is present.        Aortic Valve  Aortic valve is normal in structure and function. The aortic valve is  tricuspid.     Tricuspid Valve  Trace tricuspid insufficiency is present. The peak velocity of the tricuspid  regurgitant jet is not obtainable. Pulmonary artery systolic  pressure cannot  be assessed.     Pulmonic Valve  The pulmonic valve is normal.     Vessels  The aorta root is normal. The inferior vena cava was normal in size with  preserved respiratory variability. IVC diameter <2.1 cm collapsing >50% with  sniff suggests a normal RA pressure of 3 mmHg.     Pericardium  No pericardial effusion is present.        Compared to Previous Study  Compared to the previous study dated 2016: There has been no significant  change.     Attestation  I have personally viewed the imaging and agree with the interpretation and  report as documented by the fellow, Devante Lei MD, and/or edited by me.  _____________________________________________________________________________  __     MMode/2D Measurements & Calculations  IVSd: 0.87 cm  LVIDd: 4.4 cm  LVIDs: 3.0 cm  LVPWd: 0.71 cm  FS: 32.2 %  LV mass(C)d: 108.1 grams  LV mass(C)dI: 58.4 grams/m2  Ao root diam: 3.5 cm  asc Aorta Diam: 3.3 cm  LVOT diam: 2.2 cm  LVOT area: 3.8 cm2  LA Volume (BP): 72.4 ml     LA Volume Index (BP): 39.1 ml/m2  RWT: 0.32        Doppler Measurements & Calculations  MV E max oneal: 95.5 cm/sec  MV A max oneal: 92.4 cm/sec  MV E/A: 1.0  MV dec slope: 470.0 cm/sec2  Ao V2 max: 162.0 cm/sec  Ao max PG: 10.0 mmHg  Ao V2 mean: 111.0 cm/sec  Ao mean P.0 mmHg  Ao V2 VTI: 34.7 cm  YONY(I,D): 2.7 cm2  YONY(V,D): 2.4 cm2  LV V1 max P.3 mmHg  LV V1 max: 104.0 cm/sec  LV V1 VTI: 25.1 cm  SV(LVOT): 95.4 ml  SI(LVOT): 51.6 ml/m2  PA V2 max: 95.3 cm/sec  PA max PG: 3.6 mmHg  PA acc time: 0.08 sec  AV Oneal Ratio (DI): 0.64  YONY Index (cm2/m2): 1.5  Lateral E/e': 7.2        _____________________________________________________________________________  __        Report approved by: Josephine Tristan 2020 05:31 PM            Discharge Medications   Current Discharge Medication List      CONTINUE these medications which have NOT CHANGED    Details   albuterol (PROAIR HFA/PROVENTIL HFA/VENTOLIN HFA) 108 (90 Base)  MCG/ACT inhaler Inhale 2 puffs into the lungs every 6 hours as needed for shortness of breath / dyspnea or wheezing  Qty: 1 Inhaler, Refills: 11    Associated Diagnoses: Mild persistent asthma, uncomplicated      amphetamine-dextroamphetamine (ADDERALL XR) 25 MG 24 hr capsule Take 1 capsule (25 mg) by mouth daily  Qty: 90 capsule, Refills: 0    Associated Diagnoses: Attention deficit disorder of adult      calcium carbonate-vitamin D (CALTRATE 600+D) 600-400 MG-UNIT CHEW Take 1 chew tab by mouth 2 times daily  Qty: 180 tablet, Refills: 3    Associated Diagnoses: Hypovitaminosis D; Hx of gastric bypass      carvedilol (COREG) 25 MG tablet TAKE 1 TABLET(25 MG) BY MOUTH TWICE DAILY WITH MEALS  Qty: 180 tablet, Refills: 3    Associated Diagnoses: Idiopathic cardiomyopathy (H); Essential hypertension with goal blood pressure less than 130/80      cetirizine (ZYRTEC) 10 MG tablet Take 10 mg by mouth daily      Cyanocobalamin (VITAMIN B-12 SL) Place under the tongue every other day       cyclobenzaprine (FLEXERIL) 10 MG tablet Take 1 tablet (10 mg) by mouth nightly as needed for muscle spasms  Qty: 30 tablet, Refills: 0    Associated Diagnoses: Acute ear pain, right; TMJ (temporomandibular joint syndrome)      hydrALAZINE (APRESOLINE) 10 MG tablet Take 1 tablet (10 mg) by mouth 3 times daily  Qty: 270 tablet, Refills: 3    Associated Diagnoses: Idiopathic cardiomyopathy (H); Essential hypertension with goal blood pressure less than 130/80      ibuprofen (ADVIL/MOTRIN) 800 MG tablet Take 1 tablet (800 mg) by mouth every 8 hours as needed  Qty: 30 tablet, Refills: 0    Associated Diagnoses: Medial epicondylitis of left elbow      isosorbide mononitrate (IMDUR) 30 MG 24 hr tablet TAKE 1 TABLET(30 MG) BY MOUTH DAILY  Qty: 90 tablet, Refills: 3    Associated Diagnoses: Essential hypertension with goal blood pressure less than 130/80      lisinopril (PRINIVIL/ZESTRIL) 5 MG tablet Take 1 tablet (5 mg) by mouth daily  Qty: 90  tablet, Refills: 3    Associated Diagnoses: Coronary artery disease involving native coronary artery of native heart without angina pectoris      meclizine (ANTIVERT) 25 MG tablet Take 1 tablet (25 mg) by mouth 3 times daily as needed  Qty: 30 tablet, Refills: 1    Associated Diagnoses: BPV (benign positional vertigo), unspecified laterality      Multiple Vitamin (MULTI-VITAMIN) per tablet Take 1 tablet by mouth daily.  Qty: 100 tablet, Refills: 3    Associated Diagnoses: Gastric bypass status for obesity      ondansetron (ZOFRAN ODT) 4 MG ODT tab Take 1 tablet (4 mg) by mouth every 8 hours as needed for nausea  Qty: 10 tablet, Refills: 0    Associated Diagnoses: Motion sickness, sequela      oxymetazoline (AFRIN) 0.05 % nasal spray Spray 1 spray into both nostrils every evening      PREMPRO 0.45-1.5 MG tablet TAKE 1 TABLET BY MOUTH DAILY  Qty: 84 tablet, Refills: 1    Associated Diagnoses: Menopausal syndrome (hot flashes)      sertraline (ZOLOFT) 100 MG tablet TAKE 2 TABLETS(200 MG) BY MOUTH DAILY  Qty: 10 tablet, Refills: 0    Comments: Very short fill till sched appt only pt must keep sched appt Jan3 2020  Associated Diagnoses: Major depressive disorder, recurrent episode, mild (H)      simvastatin (ZOCOR) 20 MG tablet Take 1 tablet (20 mg) by mouth At Bedtime  Qty: 90 tablet, Refills: 3    Associated Diagnoses: Hyperlipidemia LDL goal <100      venlafaxine (EFFEXOR-XR) 150 MG 24 hr capsule Take 1 capsule (150 mg) by mouth daily  Qty: 30 capsule, Refills: 0    Associated Diagnoses: Adjustment disorder with depressed mood      blood glucose monitoring (NO BRAND SPECIFIED) test strip Use to test blood sugar 1-2 times daily or as directed.  Qty: 1 Box, Refills: 2    Associated Diagnoses: Hypoglycemia      COMFORT LANCETS MISC 1 Device 2 times daily as needed  Qty: 1 Box, Refills: 1    Associated Diagnoses: Hypoglycemia      MAGNESIUM PO Take 500 mg by mouth daily       olopatadine (PATANOL) 0.1 % ophthalmic  solution Place 1 drop into both eyes 2 times daily as needed for allergies  Qty: 5 mL, Refills: 11    Associated Diagnoses: Allergic conjunctivitis, bilateral      venlafaxine (EFFEXOR-ER) 225 MG 24 hr tablet Take 1 tablet (225 mg) by mouth daily  Qty: 90 tablet, Refills: 1    Associated Diagnoses: Adjustment disorder with depressed mood; Mild major depression (H)           Allergies   Allergies   Allergen Reactions     No Known Drug Allergies

## 2020-01-08 NOTE — PLAN OF CARE
"Observation Goals  - Diagnostic tests and consults completed and resulted No  - No further episodes of syncope and any new arrhythmia addressed with controlled heart rates Yes  - Vital signs normal or at patient baseline and orthostatic vitals are normal and patient not lightheaded with standing Yes BP (!) 151/92 (BP Location: Right arm)   Pulse 60   Temp 97.6  F (36.4  C) (Axillary)   Resp 18   Ht 1.6 m (5' 3\")   Wt 80.9 kg (178 lb 5.6 oz)   LMP 07/16/2012   SpO2 98%   BMI 31.59 kg/m    - Tolerating oral intake to maintain hydration Yes  - Safe disposition plan has been identified No  "

## 2020-01-08 NOTE — PLAN OF CARE
- Diagnostic tests and consults completed and resulted: No   - No further episodes of syncope and any new arrhythmia addressed with controlled heart rates: Yes   - Vital signs normal or at patient baseline and orthostatic vitals are normal and patient not lightheaded with standing: Yes   - Tolerating oral intake to maintain hydration: Yes   - Safe disposition plan has been identified: Pending

## 2020-01-08 NOTE — DISCHARGE SUMMARY
Discharge instructions reviewed, understood, and signed by patient. VSS, PIV removed, patient has all belongings. Patient ambulated off unit   HISTORY  2013    mole removal    TONSILLECTOMY  1950's       Social History:    Social History   Substance Use Topics    Smoking status: Current Every Day Smoker     Packs/day: 1.00     Years: 44.00     Types: Cigarettes    Smokeless tobacco: Never Used    Alcohol use Yes      Comment: 1-2 BEERS DAILY                                Ready to quit: Not Answered  Counseling given: Not Answered      Vital Signs (Current):   Vitals:    10/03/17 0918 10/06/17 0836   BP:  135/81   Pulse:  94   Resp:  18   Temp:  98.6 °F (37 °C)   TempSrc:  Temporal   SpO2:  97%   Weight: 209 lb (94.8 kg) 204 lb 6.4 oz (92.7 kg)   Height: 6' (1.829 m) 6' (1.829 m)                                              BP Readings from Last 3 Encounters:   10/06/17 135/81   09/21/17 122/70   08/17/17 112/68       NPO Status: Time of last liquid consumption: 0630 (sips with medication)                        Time of last solid consumption: 1030                        Date of last liquid consumption: 10/06/17                        Date of last solid food consumption: 10/05/17    BMI:   Wt Readings from Last 3 Encounters:   10/06/17 204 lb 6.4 oz (92.7 kg)   09/21/17 209 lb (94.8 kg)   08/17/17 200 lb (90.7 kg)     Body mass index is 27.72 kg/(m^2). CBC:   Lab Results   Component Value Date    WBC 7.0 09/29/2017    RBC 4.76 09/29/2017    HGB 15.3 09/29/2017    HCT 44.7 09/29/2017    MCV 93.9 09/29/2017    RDW 14.5 09/29/2017     09/29/2017       CMP:   Lab Results   Component Value Date     09/29/2017    K 4.3 09/29/2017     09/29/2017    CO2 24 09/29/2017    BUN 16 09/29/2017    CREATININE 1.13 09/29/2017    LABGLOM 67 01/09/2016    GLUCOSE 97 09/29/2017    PROT 6.9 01/09/2016    CALCIUM 9.1 09/29/2017    BILITOT 0.6 09/29/2017    ALKPHOS 53 09/29/2017    AST 29 09/29/2017    ALT 52 09/29/2017       POC Tests: No results for input(s): POCGLU, POCNA, POCK, POCCL, POCBUN, POCHEMO, POCHCT in the last 72 hours.     Coags: No

## 2020-01-08 NOTE — PLAN OF CARE
"Observation Goals  - Diagnostic tests and consults completed and resulted No  - No further episodes of syncope and any new arrhythmia addressed with controlled heart rates Yes  - Vital signs normal or at patient baseline and orthostatic vitals are normal and patient not lightheaded with standing Yes /67 (BP Location: Left arm)   Pulse 73   Temp 97.7  F (36.5  C) (Oral)   Resp 16   Ht 1.6 m (5' 3\")   Wt 80.9 kg (178 lb 5.6 oz)   LMP 07/16/2012   SpO2 100%   BMI 31.59 kg/m    - Tolerating oral intake to maintain hydration Yes  - Safe disposition plan has been identified No  "

## 2020-01-09 ENCOUNTER — TELEPHONE (OUTPATIENT)
Dept: FAMILY MEDICINE | Facility: CLINIC | Age: 60
End: 2020-01-09

## 2020-01-09 NOTE — TELEPHONE ENCOUNTER
"    ED/Discharge Protocol    \"Hi, my name is FEDE Moy, a registered nurse, and I am calling on behalf of Dr. Mendosa's office at [place].  I am calling to follow up and see how things are going for you after your recent visit.\"    \"I see that you were in the (ER/UC/IP)   How are you doing now that you are home?\"    ED 1/7/2020  Syncope, HTN,depresson anxiety     Is patient experiencing symptoms that may require a hospital visit? No       Discharge Instructions   spoke with patient  Has f/u appt made with [facility],   Pt has ZIO patch,   On , for 14 days   Currently        Medications    Per pt no change in meds ,,  Was advised to take her medication as  ordered she has NOT beentaking her medications as ordered, but now is doing so     Will call if need before advised to call cardiologist and make future appt  After she is done with ZIO test , she will do that   PSly Mayer  RN/Lowell Jones    "

## 2020-01-09 NOTE — TELEPHONE ENCOUNTER
ED/UC/IP follow up phone call:    RN please call to follow up.    Number of ED visits in past 12 months = 0/0.    Lowell Scott [facility]

## 2020-01-13 ENCOUNTER — OFFICE VISIT (OUTPATIENT)
Dept: OPTOMETRY | Facility: CLINIC | Age: 60
End: 2020-01-13
Payer: COMMERCIAL

## 2020-01-13 DIAGNOSIS — S05.10XS: Primary | ICD-10-CM

## 2020-01-13 DIAGNOSIS — H52.13 MYOPIA WITH PRESBYOPIA OF BOTH EYES: ICD-10-CM

## 2020-01-13 DIAGNOSIS — H52.4 MYOPIA WITH PRESBYOPIA OF BOTH EYES: ICD-10-CM

## 2020-01-13 DIAGNOSIS — H25.043 POSTERIOR SUBCAPSULAR POLAR AGE-RELATED CATARACT OF BOTH EYES: ICD-10-CM

## 2020-01-13 ASSESSMENT — TONOMETRY
OS_IOP_MMHG: 21
OD_IOP_MMHG: 18
IOP_METHOD: ICARE

## 2020-01-13 ASSESSMENT — REFRACTION_WEARINGRX
OS_ADD: +2.75
OS_SPHERE: -7.00
OS_AXIS: 083
SPECS_TYPE: PAL
OS_CYLINDER: +0.75
OD_ADD: +2.75
OD_SPHERE: -5.75

## 2020-01-13 ASSESSMENT — REFRACTION_MANIFEST
OS_ADD: +2.75
OD_SPHERE: -5.75
OS_SPHERE: -7.00
OS_CYLINDER: +0.75
OD_AXIS: 165
OD_ADD: +2.75
OD_CYLINDER: +0.50
OS_AXIS: 080

## 2020-01-13 ASSESSMENT — VISUAL ACUITY
METHOD: SNELLEN - LINEAR
OS_CC+: -1
OD_CC: 20/30
OD_CC: J2
OS_CC: 20/25
CORRECTION_TYPE: GLASSES
OS_CC: J1

## 2020-01-13 ASSESSMENT — CONF VISUAL FIELD
OD_NORMAL: 1
OS_NORMAL: 1
METHOD: COUNTING FINGERS

## 2020-01-13 ASSESSMENT — SLIT LAMP EXAM - LIDS
COMMENTS: MEIBOMIAN GLAND DYSFUNCTION
COMMENTS: MEIBOMIAN GLAND DYSFUNCTION

## 2020-01-13 ASSESSMENT — CUP TO DISC RATIO
OS_RATIO: 0.2
OD_RATIO: 0.2

## 2020-01-13 ASSESSMENT — EXTERNAL EXAM - RIGHT EYE: OD_EXAM: NORMAL

## 2020-01-13 NOTE — PROGRESS NOTES
A/P  1.) Left eye periorbtial ecchymosis s/p fall last week  -Eye itself unaffected, dilated ocular health unremarkable  -Reviewed signs/symptoms of RD and need for stat eye eval should they occur  -Cold compress for any residual swelling/irritation    2.) Myopia/Presbyopia each eye  -Doing well in current Rx - continue    3.) PSC cataracts  -Minimal visual significance  -No DM, no longterm steroid use  -Continue to monitor    Monitor 1 year routine eye exam, sooner prn    I have confirmed the patient's CC, HPI and reviewed Past Medical History, Past Surgical History, Social History, Family History, Problem List, Medication List and agree with Tech note.     Dona Boone, OD [facility]

## 2020-01-16 ENCOUNTER — OFFICE VISIT (OUTPATIENT)
Dept: FAMILY MEDICINE | Facility: CLINIC | Age: 60
End: 2020-01-16
Payer: COMMERCIAL

## 2020-01-16 VITALS
HEIGHT: 63 IN | DIASTOLIC BLOOD PRESSURE: 78 MMHG | RESPIRATION RATE: 14 BRPM | WEIGHT: 175.6 LBS | HEART RATE: 84 BPM | TEMPERATURE: 98.2 F | SYSTOLIC BLOOD PRESSURE: 114 MMHG | BODY MASS INDEX: 31.11 KG/M2

## 2020-01-16 DIAGNOSIS — F33.0 MAJOR DEPRESSIVE DISORDER, RECURRENT EPISODE, MILD (H): ICD-10-CM

## 2020-01-16 DIAGNOSIS — R55 SYNCOPE, UNSPECIFIED SYNCOPE TYPE: Primary | ICD-10-CM

## 2020-01-16 DIAGNOSIS — Z12.31 ENCOUNTER FOR SCREENING MAMMOGRAM FOR MALIGNANT NEOPLASM OF BREAST: ICD-10-CM

## 2020-01-16 PROCEDURE — 99213 OFFICE O/P EST LOW 20 MIN: CPT | Performed by: NURSE PRACTITIONER

## 2020-01-16 RX ORDER — SERTRALINE HYDROCHLORIDE 100 MG/1
100 TABLET, FILM COATED ORAL DAILY
Qty: 90 TABLET | Refills: 1 | Status: SHIPPED | OUTPATIENT
Start: 2020-01-16 | End: 2020-03-26

## 2020-01-16 ASSESSMENT — PAIN SCALES - GENERAL: PAINLEVEL: NO PAIN (0)

## 2020-01-16 ASSESSMENT — ANXIETY QUESTIONNAIRES
7. FEELING AFRAID AS IF SOMETHING AWFUL MIGHT HAPPEN: NOT AT ALL
1. FEELING NERVOUS, ANXIOUS, OR ON EDGE: MORE THAN HALF THE DAYS
6. BECOMING EASILY ANNOYED OR IRRITABLE: MORE THAN HALF THE DAYS
3. WORRYING TOO MUCH ABOUT DIFFERENT THINGS: SEVERAL DAYS
GAD7 TOTAL SCORE: 10
2. NOT BEING ABLE TO STOP OR CONTROL WORRYING: MORE THAN HALF THE DAYS
5. BEING SO RESTLESS THAT IT IS HARD TO SIT STILL: SEVERAL DAYS

## 2020-01-16 ASSESSMENT — MIFFLIN-ST. JEOR: SCORE: 1328.9

## 2020-01-16 ASSESSMENT — PATIENT HEALTH QUESTIONNAIRE - PHQ9
SUM OF ALL RESPONSES TO PHQ QUESTIONS 1-9: 7
5. POOR APPETITE OR OVEREATING: MORE THAN HALF THE DAYS

## 2020-01-16 NOTE — PATIENT INSTRUCTIONS
Stay well hydrated - bring a clear water bottle and fill it up.  So you know how much you drink     Try to leave work on time     If you know that there will be a late meeting  Go in later.     Get up slower. Don't  Pop up out of a chair,

## 2020-01-16 NOTE — NURSING NOTE
"Initial /78 (BP Location: Left arm, Patient Position: Chair, Cuff Size: Adult Regular)   Pulse 84   Temp 98.2  F (36.8  C) (Tympanic)   Resp 14   Ht 1.597 m (5' 2.89\")   Wt 79.7 kg (175 lb 9.6 oz)   LMP 07/16/2012   BMI 31.22 kg/m   Estimated body mass index is 31.22 kg/m  as calculated from the following:    Height as of this encounter: 1.597 m (5' 2.89\").    Weight as of this encounter: 79.7 kg (175 lb 9.6 oz). .    Phyllis Jo CMA ([facility])    "

## 2020-01-16 NOTE — PROGRESS NOTES
Subjective     Patricia Perez is a 59 year old female who presents to clinic today for the following health issues:    HPI     Hospital Follow-up Visit:    Hospital/Nursing Home/IP Rehab Facility: [facility]  Date of Admission: 01/07/2020  Date of Discharge: 01/08/2020  Reason(s) for Admission: Syncope and collapse; Jaw pain            Problems taking medications regularly:  None       Medication changes since discharge: None       Problems adhering to non-medication therapy:  None    Summary of hospitalization:  [facility] discharge summary reviewed  Diagnostic Tests/Treatments reviewed.  Follow up needed: none  Other Healthcare Providers Involved in Patient s Care:         None  Update since discharge: improved.     Post Discharge Medication Reconciliation: discharge medications reconciled, continue medications without change.  Plan of care communicated with patient     Coding guidelines for this visit:  Type of Medical   Decision Making Face-to-Face Visit       within 7 Days of discharge Face-to-Face Visit        within 14 days of discharge   Moderate Complexity [id number]   High Complexity [id number]                Patient Active Problem List   Diagnosis     Dizziness and giddiness     Motion sickness     GERD (gastroesophageal reflux disease)     Idiopathic cardiomyopathy (H)     Sleep apnea     Hyperlipidemia LDL goal <100     Allergic rhinitis     Mild major depression (H)     Hypertension goal BP (blood pressure) < 130/80     Pelvic pain in female     Health Care Home     24 hour contact handout given     Elevated PTHrP level     H/O bariatric surgery     Hx of gastric bypass     Hypovitaminosis D     Allergic rhinitis     Attention deficit disorder of adult     Mild persistent asthma     Low back pain     Hand joint pain     Posterior vitreous detachment, left eye     Myopia, bilateral     Dumping syndrome     Benign essential hypertension     Nuclear sclerosis of both eyes      Major depressive disorder, recurrent episode, mild (H)     Adjustment disorder with depressed mood     Decreased hearing of both ears     BPV (benign positional vertigo), unspecified laterality     Advanced directives, counseling/discussion     Vitamin D deficiency     Coronary artery disease involving native coronary artery of native heart without angina pectoris     Acute pain of left knee     Neck pain     Syncope     Past Surgical History:   Procedure Laterality Date     BARIATRIC SURGERY       DIABETES RESOURCES  As Child    Tonsillectomy     ENT SURGERY       HERNIA REPAIR       LAPAROSCOPIC BYPASS GASTRIC  10/16/2012    Procedure: LAPAROSCOPIC BYPASS GASTRIC;  laparoscopic reyna en y gastric bypass;  Surgeon: Nish Bradford MD;  Location: [facility] OR     TONSILLECTOMY       Uretural Sticture  As Child       Social History     Tobacco Use     Smoking status: Never Smoker     Smokeless tobacco: Never Used   Substance Use Topics     Alcohol use: No     Family History   Problem Relation Age of Onset     Hypertension Father      Lipids Father      Alcohol/Drug Father         Abuse     Depression Father      Respiratory Father         COPD     Cardiovascular Father      Gastrointestinal Disease Mother         non cancerous pancreatic tumor     Hypertension Mother      Heart Disease Mother         A fib     Breast Cancer Maternal Grandmother      Glaucoma Paternal Grandmother      Depression Brother      Hypertension Brother      Cerebrovascular Disease Other      Macular Degeneration Other      Diabetes No family hx of      Cancer - colorectal No family hx of      Retinal detachment No family hx of      Amblyopia No family hx of      Thyroid Disease No family hx of          Current Outpatient Medications   Medication Sig Dispense Refill     albuterol (PROAIR HFA/PROVENTIL HFA/VENTOLIN HFA) 108 (90 Base) MCG/ACT inhaler Inhale 2 puffs into the lungs every 6 hours as needed for shortness of breath / dyspnea or  wheezing 1 Inhaler 11     amphetamine-dextroamphetamine (ADDERALL XR) 25 MG 24 hr capsule Take 1 capsule (25 mg) by mouth daily 90 capsule 0     blood glucose monitoring (NO BRAND SPECIFIED) test strip Use to test blood sugar 1-2 times daily or as directed. 1 Box 2     calcium carbonate-vitamin D (CALTRATE 600+D) 600-400 MG-UNIT CHEW Take 1 chew tab by mouth 2 times daily 180 tablet 3     carvedilol (COREG) 25 MG tablet TAKE 1 TABLET(25 MG) BY MOUTH TWICE DAILY WITH MEALS (Patient taking differently: 2 times daily (with meals) ) 180 tablet 3     cetirizine (ZYRTEC) 10 MG tablet Take 10 mg by mouth daily       COMFORT LANCETS MISC 1 Device 2 times daily as needed 1 Box 1     Cyanocobalamin (VITAMIN B-12 SL) Place under the tongue every other day        cyclobenzaprine (FLEXERIL) 10 MG tablet Take 1 tablet (10 mg) by mouth nightly as needed for muscle spasms 30 tablet 0     hydrALAZINE (APRESOLINE) 10 MG tablet Take 1 tablet (10 mg) by mouth 3 times daily (Patient taking differently: Take 10 mg by mouth 2 times daily ) 270 tablet 3     ibuprofen (ADVIL/MOTRIN) 800 MG tablet Take 1 tablet (800 mg) by mouth every 8 hours as needed 30 tablet 0     isosorbide mononitrate (IMDUR) 30 MG 24 hr tablet TAKE 1 TABLET(30 MG) BY MOUTH DAILY 90 tablet 3     lisinopril (PRINIVIL/ZESTRIL) 5 MG tablet Take 1 tablet (5 mg) by mouth daily 90 tablet 3     MAGNESIUM PO Take 500 mg by mouth daily        meclizine (ANTIVERT) 25 MG tablet Take 1 tablet (25 mg) by mouth 3 times daily as needed 30 tablet 1     Multiple Vitamin (MULTI-VITAMIN) per tablet Take 1 tablet by mouth daily. 100 tablet 3     olopatadine (PATANOL) 0.1 % ophthalmic solution Place 1 drop into both eyes 2 times daily as needed for allergies 5 mL 11     oxymetazoline (AFRIN) 0.05 % nasal spray Spray 1 spray into both nostrils every evening       PREMPRO 0.45-1.5 MG tablet TAKE 1 TABLET BY MOUTH DAILY 84 tablet 1     sertraline (ZOLOFT) 100 MG tablet TAKE 2 TABLETS(200 MG)  "BY MOUTH DAILY 10 tablet 0     simvastatin (ZOCOR) 20 MG tablet Take 1 tablet (20 mg) by mouth At Bedtime 90 tablet 3     venlafaxine (EFFEXOR-ER) 225 MG 24 hr tablet Take 1 tablet (225 mg) by mouth daily 90 tablet 1     venlafaxine (EFFEXOR-XR) 150 MG 24 hr capsule Take 1 capsule (150 mg) by mouth daily 30 capsule 0     ondansetron (ZOFRAN ODT) 4 MG ODT tab Take 1 tablet (4 mg) by mouth every 8 hours as needed for nausea (Patient not taking: Reported on 1/16/2020) 10 tablet 0     Allergies   Allergen Reactions     No Known Drug Allergies      BP Readings from Last 3 Encounters:   01/16/20 114/78   01/08/20 (!) 141/81   01/03/20 122/74    Wt Readings from Last 3 Encounters:   01/16/20 79.7 kg (175 lb 9.6 oz)   01/07/20 80.9 kg (178 lb 5.6 oz)   01/03/20 79.9 kg (176 lb 3.2 oz)                      Reviewed and updated as needed this visit by Provider  Allergies         Review of Systems   ROS COMP: CONSTITUTIONAL: NEGATIVE for fever, chills, change in weight  EYES: NEGATIVE for vision changes or irritation and POSITIVE for does have glasses,   Left eye has resolving black eye.    ENT/MOUTH: NEGATIVE for ear, mouth and throat problems  RESP: NEGATIVE for significant cough or SOB  CV: NEGATIVE for chest pain, palpitations or peripheral edema, after having a syncopal episode  Her blood pressure was low    MUSCULOSKELETAL: NEGATIVE for significant arthralgias or myalgia  NEURO: NEGATIVE for weakness, dizziness or paresthesias and she did have a syncopal episode   She is thinking it was from maybe taking roxana extra diuretic pill    PSYCHIATRIC: POSITIVE stress at work.       Objective    /78 (BP Location: Left arm, Patient Position: Chair, Cuff Size: Adult Regular)   Pulse 84   Temp 98.2  F (36.8  C) (Tympanic)   Resp 14   Ht 1.597 m (5' 2.89\")   Wt 79.7 kg (175 lb 9.6 oz)   LMP 07/16/2012   BMI 31.22 kg/m    Body mass index is 31.22 kg/m .  Physical Exam   GENERAL: healthy, alert and no distress  HENT: ear " canals and TM's normal, nose and mouth without ulcers or lesions  NECK: no adenopathy, no asymmetry, masses, or scars and thyroid normal to palpation  RESP: lungs clear to auscultation - no rales, rhonchi or wheezes  CV: regular rate and rhythm, normal S1 S2, no S3 or S4, no murmur, click or rub, no peripheral edema and peripheral pulses strong  MS: no gross musculoskeletal defects noted, no edema  NEURO: Normal strength and tone, sensory exam grossly normal, mentation intact, oriented times 3, speech normal and cranial nerves 2-12 intact    Diagnostic Test Results:  Labs reviewed in Epic  none         ASSESSMENT/PLAN:    ASSESSMENT/PLAN:      ICD-10-CM    1. Syncope, unspecified syncope type R55    2. Encounter for screening mammogram for malignant neoplasm of breast Z12.31 MA SCREENING DIGITAL BILAT - Future  (s+30)   3. Major depressive disorder, recurrent episode, mild (H) F33.0 sertraline (ZOLOFT) 100 MG tablet       Patient Instructions   Stay well hydrated - bring a clear water bottle and fill it up.  So you know how much you drink     Try to leave work on time     If you know that there will be a late meeting  Go in later.     Get up slower. Don't  Pop up out of a chair,

## 2020-01-17 ASSESSMENT — ASTHMA QUESTIONNAIRES: ACT_TOTALSCORE: 22

## 2020-01-17 ASSESSMENT — ANXIETY QUESTIONNAIRES: GAD7 TOTAL SCORE: 10

## 2020-02-05 ENCOUNTER — MYC MEDICAL ADVICE (OUTPATIENT)
Dept: FAMILY MEDICINE | Facility: CLINIC | Age: 60
End: 2020-02-05

## 2020-02-05 DIAGNOSIS — F98.8 ATTENTION DEFICIT DISORDER OF ADULT: ICD-10-CM

## 2020-02-05 RX ORDER — DEXTROAMPHETAMINE SACCHARATE, AMPHETAMINE ASPARTATE MONOHYDRATE, DEXTROAMPHETAMINE SULFATE AND AMPHETAMINE SULFATE 6.25; 6.25; 6.25; 6.25 MG/1; MG/1; MG/1; MG/1
25 CAPSULE, EXTENDED RELEASE ORAL DAILY
Qty: 90 CAPSULE | Refills: 0 | Status: SHIPPED | OUTPATIENT
Start: 2020-02-05 | End: 2020-05-18

## 2020-03-03 NOTE — PATIENT INSTRUCTIONS
1 drop Tobradex- right eye 4 x day for 1 week.    Non preserved Refresh- 2-4 x day.    Return for recheck 1 week and discuss dry eyes and hygeine.    Khoa Michel, OD     no

## 2020-03-24 PROBLEM — M54.2 NECK PAIN: Status: RESOLVED | Noted: 2019-06-25 | Resolved: 2020-03-24

## 2020-03-24 NOTE — PROGRESS NOTES
Discharge Note    Progress reporting period is from initial eval to Jul 9, 2019.     Patricia failed to return for next follow up visit and current status is unknown.  Please see information below for last relevant information on current status.  Patient seen for 3 visits.    SUBJECTIVE  Subjective changes noted by patient:  Pt overall is feeling a little better. Hasn't been doing neck exercises as much as she knows she should. Driving is getting better, checking blind spots is getting much less painful. Still notes mild pain with checking blind spots.  .  Current pain level is 2/10.     Previous pain level was  6/10.   Changes in function:  Yes (See Goal flowsheet attached for changes in current functional level)  Adverse reaction to treatment or activity: None    OBJECTIVE  Changes noted in objective findings: Cervical ROM: Extension 50% ROM 2/10 pain, flexion 100% ROM 3/10 pain, R rot 100% ROM 2/10 pain, L rot 10% ROM 1/10 pain     ASSESSMENT/PLAN  Diagnosis: R sided neck pain   DIAGP:  The encounter diagnosis was Neck pain.  STG/LTGs have been met or progress has been made towards goals:  Yes, please see goal flowsheet for most current information  Assessment of Progress: current status is unknown.    Last current status:     Self Management Plans:  HEP  I have re-evaluated this patient and find that the nature, scope, duration and intensity of the therapy is appropriate for the medical condition of the patient.  Patricia continues to require the following intervention to meet STG and LTG's:  HEP.    Recommendations:  Discharge with current home program.  Patient to follow up with MD as needed.    Please refer to the daily flowsheet for treatment today, total treatment time and time spent performing 1:1 timed codes.

## 2020-03-26 ENCOUNTER — MYC MEDICAL ADVICE (OUTPATIENT)
Dept: FAMILY MEDICINE | Facility: CLINIC | Age: 60
End: 2020-03-26

## 2020-03-26 DIAGNOSIS — F33.0 MAJOR DEPRESSIVE DISORDER, RECURRENT EPISODE, MILD (H): ICD-10-CM

## 2020-03-26 RX ORDER — SERTRALINE HYDROCHLORIDE 100 MG/1
TABLET, FILM COATED ORAL
Qty: 180 TABLET | Refills: 1 | Status: SHIPPED | OUTPATIENT
Start: 2020-03-26 | End: 2020-10-19

## 2020-04-23 ENCOUNTER — TELEPHONE (OUTPATIENT)
Dept: BEHAVIORAL HEALTH | Facility: CLINIC | Age: 60
End: 2020-04-23

## 2020-04-23 NOTE — TELEPHONE ENCOUNTER
Community Paramedic Social Needs Outreach  [facility]/Voicemail       Clinical Data: Community Paramedic Outreach    Outreach attempted x 1.      Left message on patient's voicemail with call back information and requested return call.    Community Paramedic Plan will try to reach patient again in 1-2 business days.

## 2020-04-27 ENCOUNTER — TELEPHONE (OUTPATIENT)
Dept: BEHAVIORAL HEALTH | Facility: CLINIC | Age: 60
End: 2020-04-27

## 2020-04-27 NOTE — TELEPHONE ENCOUNTER
Community Paramedic Social Needs Outreach  [facility] [[email or web address]Voicemail       Clinical Data: Community Paramedic Outreach    Outreach attempted x 2.      Left message on patient's voicemail with call back information and requested return call.    Community Paramedic Plan will do no further outreaches at this time.

## 2020-05-18 ENCOUNTER — MYC MEDICAL ADVICE (OUTPATIENT)
Dept: FAMILY MEDICINE | Facility: CLINIC | Age: 60
End: 2020-05-18

## 2020-05-18 DIAGNOSIS — F98.8 ATTENTION DEFICIT DISORDER OF ADULT: ICD-10-CM

## 2020-05-18 RX ORDER — DEXTROAMPHETAMINE SACCHARATE, AMPHETAMINE ASPARTATE MONOHYDRATE, DEXTROAMPHETAMINE SULFATE AND AMPHETAMINE SULFATE 6.25; 6.25; 6.25; 6.25 MG/1; MG/1; MG/1; MG/1
25 CAPSULE, EXTENDED RELEASE ORAL DAILY
Qty: 90 CAPSULE | Refills: 0 | Status: SHIPPED | OUTPATIENT
Start: 2020-05-18 | End: 2020-08-24

## 2020-06-09 DIAGNOSIS — N95.1 MENOPAUSAL SYNDROME (HOT FLASHES): ICD-10-CM

## 2020-06-10 RX ORDER — ESTROGEN,CON/M-PROGEST ACET 0.45-1.5MG
TABLET ORAL
Qty: 84 TABLET | Refills: 1 | Status: SHIPPED | OUTPATIENT
Start: 2020-06-10 | End: 2020-12-05

## 2020-06-10 NOTE — TELEPHONE ENCOUNTER
Prescription approved per [facility] Refill Protocol or patient Primary care provider (PCP) Chart and last OV reviewed   EBER Mayer RN/Lowell Jones

## 2020-06-15 NOTE — TELEPHONE ENCOUNTER
RECORDS RECEIVED FROM: Internal   DATE RECEIVED: 8-26-20   NOTES STATUS DETAILS   OFFICE NOTE from referring provider    N/A Self-referred   OFFICE NOTE from other cardiologist    Internal    DISCHARGE SUMMARY from hospital    Internal 1-7-20   DISCHARGE REPORT from the ER   N/A    OPERATIVE REPORT    N/A    MEDICATION LIST   Internal    LABS     BMP   Internal 1-8-20   CBC   Internal 1-8-20   CMP   Internal 1-7-20   Lipids   Internal 10-29-19   TSH   N/A    DIAGNOSTIC PROCEDURES     EKG   Internal 1-7-20   Monitor Reports   Internal 1-8-20   IMAGING (DISC & REPORT)      Echo   Internal 1-7-20   Stress Tests   N/A    Cath   N/A    MRI/MRA   N/A    CT/CTA   Internal 1-7-20

## 2020-06-23 DIAGNOSIS — I42.9 IDIOPATHIC CARDIOMYOPATHY (H): ICD-10-CM

## 2020-06-23 DIAGNOSIS — I10 ESSENTIAL HYPERTENSION WITH GOAL BLOOD PRESSURE LESS THAN 130/80: ICD-10-CM

## 2020-06-24 RX ORDER — CARVEDILOL 25 MG/1
TABLET ORAL
Qty: 180 TABLET | Refills: 1 | Status: SHIPPED | OUTPATIENT
Start: 2020-06-24 | End: 2021-03-19

## 2020-06-24 NOTE — TELEPHONE ENCOUNTER
JEZ for pt to callback make virtual visit  For 6 mo f/u post hospital     Prescription approved per [facility] Refill Protocol or patient Primary care provider (PCP) Chart and last OV reviewed   EBER Mayer  RN/Lowell Jones

## 2020-07-04 DIAGNOSIS — F43.21 ADJUSTMENT DISORDER WITH DEPRESSED MOOD: ICD-10-CM

## 2020-07-04 DIAGNOSIS — F32.0 MILD MAJOR DEPRESSION (H): ICD-10-CM

## 2020-07-06 RX ORDER — VENLAFAXINE HYDROCHLORIDE 225 MG/1
225 TABLET, EXTENDED RELEASE ORAL DAILY
Qty: 90 TABLET | Refills: 0 | Status: SHIPPED | OUTPATIENT
Start: 2020-07-06 | End: 2020-09-17

## 2020-07-06 NOTE — TELEPHONE ENCOUNTER
"Prescription approved per [facility] [place].  Due for follow up in July 2020  Sneha Amaya RN      Last office visit: 1/16/2020 with prescribing provider:  [facility]     Requested Prescriptions   Pending Prescriptions Disp Refills     venlafaxine (EFFEXOR-ER) 225 MG 24 hr tablet [Pharmacy Med Name: VENLAFAXINE ER 225MG TABLETS] 90 tablet 1     Sig: TAKE 1 TABLET(225 MG) BY MOUTH DAILY       Serotonin-Norepinephrine Reuptake Inhibitors  Failed - 7/4/2020  4:10 AM        Failed - PHQ-9 score of less than 5 in past 6 months     Please review last PHQ-9 score.           Passed - Blood pressure under 140/90 in past 12 months     BP Readings from Last 3 Encounters:   01/16/20 114/78   01/08/20 (!) 141/81   01/03/20 122/74                 Passed - Medication is active on med list        Passed - Patient is age 18 or older        Passed - No active pregnancy on record        Passed - Normal serum creatinine on file in past 12 months     Recent Labs   Lab Test 01/08/20  0606   CR 0.71       Ok to refill medication if creatinine is low          Passed - No positive pregnancy test in past 12 months        Passed - Recent (6 mo) or future (30 days) visit within the authorizing provider's specialty     Patient had office visit in the last 6 months or has a visit in the next 30 days with authorizing provider or within the authorizing provider's specialty.  See \"Patient Info\" tab in inbasket, or \"Choose Columns\" in Meds & Orders section of the refill encounter.                 "

## 2020-08-02 ENCOUNTER — MYC MEDICAL ADVICE (OUTPATIENT)
Dept: FAMILY MEDICINE | Facility: CLINIC | Age: 60
End: 2020-08-02

## 2020-08-02 DIAGNOSIS — M26.609 TMJ (TEMPOROMANDIBULAR JOINT SYNDROME): ICD-10-CM

## 2020-08-02 DIAGNOSIS — H92.01 ACUTE EAR PAIN, RIGHT: ICD-10-CM

## 2020-08-03 RX ORDER — CYCLOBENZAPRINE HCL 10 MG
10 TABLET ORAL
Qty: 30 TABLET | Refills: 0 | Status: SHIPPED | OUTPATIENT
Start: 2020-08-03 | End: 2021-03-01

## 2020-08-23 ENCOUNTER — MYC MEDICAL ADVICE (OUTPATIENT)
Dept: FAMILY MEDICINE | Facility: CLINIC | Age: 60
End: 2020-08-23

## 2020-08-23 DIAGNOSIS — F98.8 ATTENTION DEFICIT DISORDER OF ADULT: ICD-10-CM

## 2020-08-24 ENCOUNTER — TELEPHONE (OUTPATIENT)
Dept: CARDIOLOGY | Facility: CLINIC | Age: 60
End: 2020-08-24

## 2020-08-24 DIAGNOSIS — E78.5 HYPERLIPIDEMIA LDL GOAL <100: ICD-10-CM

## 2020-08-24 DIAGNOSIS — I42.9 IDIOPATHIC CARDIOMYOPATHY (H): Primary | ICD-10-CM

## 2020-08-24 DIAGNOSIS — I10 BENIGN ESSENTIAL HYPERTENSION: ICD-10-CM

## 2020-08-24 DIAGNOSIS — I25.10 CORONARY ARTERY DISEASE INVOLVING NATIVE CORONARY ARTERY OF NATIVE HEART WITHOUT ANGINA PECTORIS: ICD-10-CM

## 2020-08-24 RX ORDER — DEXTROAMPHETAMINE SACCHARATE, AMPHETAMINE ASPARTATE MONOHYDRATE, DEXTROAMPHETAMINE SULFATE AND AMPHETAMINE SULFATE 6.25; 6.25; 6.25; 6.25 MG/1; MG/1; MG/1; MG/1
25 CAPSULE, EXTENDED RELEASE ORAL DAILY
Qty: 90 CAPSULE | Refills: 0 | Status: SHIPPED | OUTPATIENT
Start: 2020-08-24 | End: 2020-11-11

## 2020-08-24 NOTE — TELEPHONE ENCOUNTER
Date: 8/24/2020    Time of Call: 12:43 PM     Diagnosis:  Cardiomyopathy, HLD, HTN, CAD     [ TORB ] Ordering provider: Dr Preston Cuadra  Order: CBC, BMP,TSH, lipids prior to 08/26 visit     Order received by: Sofia Neely LPN     Follow-up/additional notes: Called and left  for Pt informing her of the request for fasting labs prior to her visit with Dr Cuadra.  Informed her that orders were placed and labs may be completed at local [facility].  Requested a return call to clinic to discuss further if needed.  Clinic telephone provided.

## 2020-08-25 DIAGNOSIS — I10 ESSENTIAL HYPERTENSION WITH GOAL BLOOD PRESSURE LESS THAN 130/80: ICD-10-CM

## 2020-08-25 DIAGNOSIS — E78.5 HYPERLIPIDEMIA LDL GOAL <100: ICD-10-CM

## 2020-08-25 DIAGNOSIS — I25.10 CORONARY ARTERY DISEASE INVOLVING NATIVE CORONARY ARTERY OF NATIVE HEART WITHOUT ANGINA PECTORIS: ICD-10-CM

## 2020-08-25 DIAGNOSIS — I42.9 IDIOPATHIC CARDIOMYOPATHY (H): ICD-10-CM

## 2020-08-26 ENCOUNTER — PRE VISIT (OUTPATIENT)
Dept: CARDIOLOGY | Facility: CLINIC | Age: 60
End: 2020-08-26

## 2020-08-26 RX ORDER — SIMVASTATIN 20 MG
TABLET ORAL
Qty: 90 TABLET | Refills: 3 | Status: SHIPPED | OUTPATIENT
Start: 2020-08-26 | End: 2021-08-18

## 2020-08-26 RX ORDER — HYDRALAZINE HYDROCHLORIDE 10 MG/1
10 TABLET, FILM COATED ORAL 2 TIMES DAILY
Qty: 180 TABLET | Refills: 3 | Status: SHIPPED | OUTPATIENT
Start: 2020-08-26 | End: 2021-08-18

## 2020-08-26 RX ORDER — LISINOPRIL 5 MG/1
TABLET ORAL
Qty: 90 TABLET | Refills: 3 | Status: SHIPPED | OUTPATIENT
Start: 2020-08-26 | End: 2021-08-18

## 2020-08-26 RX ORDER — ISOSORBIDE MONONITRATE 30 MG/1
TABLET, EXTENDED RELEASE ORAL
Qty: 90 TABLET | Refills: 3 | Status: SHIPPED | OUTPATIENT
Start: 2020-08-26 | End: 2021-08-18

## 2020-09-02 NOTE — PROGRESS NOTES
Subjective     Patricia Perez is a 60 year old female who presents to clinic today for the following health issues:    HPI       Musculoskeletal problem/pain  Onset/Duration: 1 week  Description  Location: jaw - right  Joint Swelling: no  Redness: no  Pain: YES  Warmth: no  Intensity:  6/10  Progression of Symptoms:  improving  Accompanying signs and symptoms: pain in right forehead, excessive thirst/dry mouth  Fevers: no  Numbness/tingling/weakness: no  History  Trauma to the area: YES-had been using OTC mouth guard before pain began  Recent illness:  no  Previous similar problem: YES  Previous evaluation:  no  Precipitating or alleviating factors:  Aggravating factors include: eating  Therapies tried and outcome: Ibuprofen and fluoride rinse helps          Is having difficulty working from home   Is using zoom    Is having increase in  Depression,  She isn't seeing people other than her ,    The medication had worked well, but now for a while     Review of Systems   CONSTITUTIONAL: NEGATIVE for fever, chills, change in weight  ENT/MOUTH: NEGATIVE for ear, mouth and throat problems,  Is noticing that she is having some hearing issues   POSITIVE for jaw pain.  Got some over the counter mouth guards.  Had one in her mouth , woke with terrible pain.  Did go to the dentist but they were not able to find anything wrong.   She does clench her teeth during work. She doesn't wear a mouth guard when working   Has been using a florid rinse with this have been helping    RESP: NEGATIVE for significant cough or SOB  CV: NEGATIVE for chest pain, palpitations or peripheral edema  GI: NEGATIVE for nausea, abdominal pain, heartburn, or change in bowel habits  MUSCULOSKELETAL: POSITIVE  for jaw pain started using the over counter mouth guard   Does have a very bad habit with clenching   Is using the muscle relaxants ? If that is helping   PSYCHIATRIC: POSITIVE foranxiety, depressed mood and stress does have a strong family  history of depression.  Mother and  Siblings  ( both of her sisters and her brother)       Objective    /70 (BP Location: Right arm, Patient Position: Sitting, Cuff Size: Adult Large)   Pulse 64   Temp 97.6  F (36.4  C) (Tympanic)   Resp 20   Wt 82.1 kg (181 lb)   LMP 07/16/2012   BMI 32.17 kg/m    Body mass index is 32.17 kg/m .  Physical Exam   GENERAL: healthy, alert and no distress  HENT: normal cephalic/atraumatic, ear canals and TM's normal, nose and mouth without ulcers or lesions, oropharynx clear, oral mucous membranes moist, sinuses: not tender and does have bilat jaw pain. .  Does have pain with opening and closing her mouth   When biting down her teeth her upper teeth will go in front her bottom teeth    Is able to hear our conversation    NECK: no adenopathy, no asymmetry, masses, or scars and thyroid normal to palpation  RESP: lungs clear to auscultation - no rales, rhonchi or wheezes  CV: regular rate and rhythm, normal S1 S2, no S3 or S4, no murmur, click or rub, no peripheral edema and peripheral pulses strong  PSYCH: mentation appears normal, affect normal/bright, judgement and insight intact, appearance well groomed and does have  Increased self assessment  Is increased  PHQ-9 and  PRISCILLA 7 .       {does have pending labs will get those done         ASSESSMENT/PLAN:      ICD-10-CM    1. Mild major depression (H)  F32.0 MENTAL HEALTH REFERRAL  - Adult; Psychiatry; Psychiatry; [facility]: Psychiatry Clinic [phone]; We will contact you to schedule the appointment or please call with any questions   2. Major depressive disorder, recurrent episode, mild (H)  F33.0 MENTAL HEALTH REFERRAL  - Adult; Psychiatry; Psychiatry; [facility]: Psychiatry Clinic [phone]; We will contact you to schedule the appointment or please call with any questions   3. Decreased hearing of both ears  H91.93 AUDIOLOGY ADULT REFERRAL       Patient Instructions   With the elevated PHQ-9 and   PRISCILLA 7 and taking medication  is no longer improving your depression and anxiety.   I do  Recommend that you see psychiatry to discuss medication  And possible  Therapy       Set positive goals.   Get out and go for a walk     For the jaw pain .  Call your dentist to get a mouth guard     If that doesn't work will need to see  ENT for  TMJ       You do have future labs still in the computer.    [facility],  .[address] [facility] [place] [address], [place] [address] [phone]

## 2020-09-03 ENCOUNTER — OFFICE VISIT (OUTPATIENT)
Dept: FAMILY MEDICINE | Facility: CLINIC | Age: 60
End: 2020-09-03
Payer: COMMERCIAL

## 2020-09-03 VITALS
BODY MASS INDEX: 32.17 KG/M2 | RESPIRATION RATE: 20 BRPM | HEART RATE: 64 BPM | WEIGHT: 181 LBS | SYSTOLIC BLOOD PRESSURE: 112 MMHG | TEMPERATURE: 97.6 F | DIASTOLIC BLOOD PRESSURE: 70 MMHG

## 2020-09-03 DIAGNOSIS — H91.93 DECREASED HEARING OF BOTH EARS: ICD-10-CM

## 2020-09-03 DIAGNOSIS — F33.0 MAJOR DEPRESSIVE DISORDER, RECURRENT EPISODE, MILD (H): ICD-10-CM

## 2020-09-03 DIAGNOSIS — F32.0 MILD MAJOR DEPRESSION (H): Primary | ICD-10-CM

## 2020-09-03 PROCEDURE — 99214 OFFICE O/P EST MOD 30 MIN: CPT | Performed by: NURSE PRACTITIONER

## 2020-09-03 ASSESSMENT — ANXIETY QUESTIONNAIRES
3. WORRYING TOO MUCH ABOUT DIFFERENT THINGS: SEVERAL DAYS
6. BECOMING EASILY ANNOYED OR IRRITABLE: SEVERAL DAYS
2. NOT BEING ABLE TO STOP OR CONTROL WORRYING: MORE THAN HALF THE DAYS
GAD7 TOTAL SCORE: 10
IF YOU CHECKED OFF ANY PROBLEMS ON THIS QUESTIONNAIRE, HOW DIFFICULT HAVE THESE PROBLEMS MADE IT FOR YOU TO DO YOUR WORK, TAKE CARE OF THINGS AT HOME, OR GET ALONG WITH OTHER PEOPLE: SOMEWHAT DIFFICULT
5. BEING SO RESTLESS THAT IT IS HARD TO SIT STILL: MORE THAN HALF THE DAYS
1. FEELING NERVOUS, ANXIOUS, OR ON EDGE: MORE THAN HALF THE DAYS
7. FEELING AFRAID AS IF SOMETHING AWFUL MIGHT HAPPEN: NOT AT ALL

## 2020-09-03 ASSESSMENT — PATIENT HEALTH QUESTIONNAIRE - PHQ9
SUM OF ALL RESPONSES TO PHQ QUESTIONS 1-9: 15
5. POOR APPETITE OR OVEREATING: MORE THAN HALF THE DAYS

## 2020-09-03 ASSESSMENT — PAIN SCALES - GENERAL: PAINLEVEL: SEVERE PAIN (6)

## 2020-09-03 NOTE — LETTER
My Asthma Action Plan  Name: Patricia Perez   YOB: 1960  Date: 9/3/2020   My doctor: Blanche Cummins   My clinic: [facility]      My Control Medicine:         Dose:   My Rescue Medicine:         Dose:    My Asthma Severity:   Avoid your asthma triggers:         GREEN ZONE   Good Control    I feel good    No cough or wheeze    Can work, sleep and play without asthma symptoms       Take your asthma control medicine every day.     1. If exercise triggers your asthma, take your rescue medication    15 minutes before exercise or sports, and    During exercise if you have asthma symptoms  2. Spacer to use with inhaler: If you have a spacer, make sure to use it with your inhaler             YELLOW ZONE Getting Worse  I have ANY of these:    I do not feel good    Cough or wheeze    Chest feels tight    Wake up at night   1. Keep taking your Green Zone medications  2. Start taking your rescue medicine:    every 20 minutes for up to 1 hour. Then every 4 hours for 24-48 hours.  3. If you stay in the Yellow Zone for more than 12-24 hours, contact your doctor.  4. If you do not return to the Green Zone in 12-24 hours or you get worse, start taking your oral steroid medicine if prescribed by your provider.           RED ZONE Medical Alert - Get Help  I have ANY of these:    I feel awful    Medicine is not helping    Breathing getting harder    Trouble walking or talking    Nose opens wide to breathe       1. Take your rescue medicine NOW  2. If your provider has prescribed an oral steroid medicine, start taking it NOW  3. Call your doctor NOW  4. If you are still in the Red Zone after 20 minutes and you have not reached your doctor:    Take your rescue medicine again and    Call 911 or go to the emergency room right away    See your regular doctor within 2 weeks of an Emergency Room or Urgent Care visit for follow-up treatment.        The above medication may be given at school or day care?:    Child can carry and use inhaler(s) at school with approval of school nurse?:       Annual Reminders:  Meet with Asthma Educator,  Flu Shot in the Fall, consider Pneumonia Vaccination for patients with asthma (aged 19 and older).    Pharmacy:    [facility] [address] [id number] - [facility] [address] AT [facility]                    Asthma Triggers  How To Control Things That Make Your Asthma Worse    Triggers are things that make your asthma worse.  Look at the list below to help you find your triggers and what you can do about them.  You can help prevent asthma flare-ups by staying away from your triggers.      Trigger                                                          What you can do   Cigarette Smoke  Tobacco smoke can make asthma worse. Do not allow smoking in your home, car or around you.  Be sure no one smokes at a child s day care or school.  If you smoke, ask your health care provider for ways to help you quit.  Ask family members to quit too.  Ask your health care provider for a referral to Quit Plan to help you quit smoking, or call 1-812-708-PLAN.     Colds, Flu, Bronchitis  These are common triggers of asthma. Wash your hands often.  Don t touch your eyes, nose or mouth.  Get a flu shot every year.     Dust Mites  These are tiny bugs that live in cloth or carpet. They are too small to see. Wash sheets and blankets in hot water every week.   Encase pillows and mattress in dust mite proof covers.  Avoid having carpet if you can. If you have carpet, vacuum weekly.   Use a dust mask and HEPA vacuum.   Pollen and Outdoor Mold  Some people are allergic to trees, grass, or weed pollen, or molds. Try to keep your windows closed.  Limit time out doors when pollen count is high.   Ask you health care provider about taking medicine during allergy season.     Animal Dander  Some people are allergic to skin  flakes, urine or saliva from pets with fur or feathers. Keep pets with fur or feathers out of your home.    If you can t keep the pet outdoors, then keep the pet out of your bedroom.  Keep the bedroom door closed.  Keep pets off cloth furniture and away from stuffed toys.     Mice, Rats, and Cockroaches  Some people are allergic to the waste from these pests.   Cover food and garbage.  Clean up spills and food crumbs.  Store grease in the refrigerator.   Keep food out of the bedroom.   Indoor Mold  This can be a trigger if your home has high moisture. Fix leaking faucets, pipes, or other sources of water.   Clean moldy surfaces.  Dehumidify basement if it is damp and smelly.   Smoke, Strong Odors, and Sprays  These can reduce air quality. Stay away from strong odors and sprays, such as perfume, powder, hair spray, paints, smoke incense, paint, cleaning products, candles and new carpet.   Exercise or Sports  Some people with asthma have this trigger. Be active!  Ask your doctor about taking medicine before sports or exercise to prevent symptoms.    Warm up for 5-10 minutes before and after sports or exercise.     Other Triggers of Asthma  Cold air:  Cover your nose and mouth with a scarf.  Sometimes laughing or crying can be a trigger.  Some medicines and food can trigger asthma.

## 2020-09-03 NOTE — PATIENT INSTRUCTIONS
With the elevated PHQ-9 and   PRISCILLA 7 and taking medication is no longer improving your depression and anxiety.   I do  Recommend that you see psychiatry to discuss medication  And possible  Therapy       Set positive goals.   Get out and go for a walk     For the jaw pain .  Call your dentist to get a mouth guard     If that doesn't work will need to see  ENT for  TMJ       You do have future labs still in the computer.    [facility],  .[address] [facility] [address], [place] [address] [phone]

## 2020-09-04 ASSESSMENT — ANXIETY QUESTIONNAIRES: GAD7 TOTAL SCORE: 10

## 2020-09-04 ASSESSMENT — ASTHMA QUESTIONNAIRES: ACT_TOTALSCORE: 21

## 2020-09-17 DIAGNOSIS — F43.21 ADJUSTMENT DISORDER WITH DEPRESSED MOOD: ICD-10-CM

## 2020-09-17 DIAGNOSIS — F32.0 MILD MAJOR DEPRESSION (H): ICD-10-CM

## 2020-09-17 RX ORDER — VENLAFAXINE HYDROCHLORIDE 225 MG/1
225 TABLET, EXTENDED RELEASE ORAL DAILY
Qty: 90 TABLET | Refills: 0 | Status: SHIPPED | OUTPATIENT
Start: 2020-09-17 | End: 2020-11-11

## 2020-09-17 NOTE — TELEPHONE ENCOUNTER
"Prescription approved per [facility] Refill Protocol. Patient need appointment with psychiatry.  Sneha Amaya RN    Last office visit: 9.3.2020 with prescribing provider:  Placido     Requested Prescriptions   Pending Prescriptions Disp Refills     venlafaxine (EFFEXOR-ER) 225 MG 24 hr tablet 90 tablet 0     Sig: Take 1 tablet (225 mg) by mouth daily Due for follow up in July 2020       Serotonin-Norepinephrine Reuptake Inhibitors  Failed - 9/17/2020  9:20 AM        Failed - PHQ-9 score of less than 5 in past 6 months     Please review last PHQ-9 score.           Passed - Blood pressure under 140/90 in past 12 months     BP Readings from Last 3 Encounters:   09/03/20 112/70   01/16/20 114/78   01/08/20 (!) 141/81                 Passed - Medication is active on med list        Passed - Patient is age 18 or older        Passed - No active pregnancy on record        Passed - Normal serum creatinine on file in past 12 months     Recent Labs   Lab Test 01/08/20  0606   CR 0.71       Ok to refill medication if creatinine is low          Passed - No positive pregnancy test in past 12 months        Passed - Recent (6 mo) or future (30 days) visit within the authorizing provider's specialty     Patient had office visit in the last 6 months or has a visit in the next 30 days with authorizing provider or within the authorizing provider's specialty.  See \"Patient Info\" tab in inbasket, or \"Choose Columns\" in Meds & Orders section of the refill encounter.                 "

## 2020-09-28 ENCOUNTER — TELEPHONE (OUTPATIENT)
Dept: PSYCHIATRY | Facility: CLINIC | Age: 60
End: 2020-09-28

## 2020-09-28 NOTE — TELEPHONE ENCOUNTER
PSYCHIATRY CLINIC PHONE INTAKE     SERVICES REQUESTED / INTERESTED IN          Med Management, therapy    Presenting Problem and Brief History                              What would you like to be seen for? (brief description):  Patient has been taking the same medications for several years (adderall 25mg, sertraline 200mg, venlafaxine 225mg) and feel they may have lost their effectiveness. Life factors have also contributed to an increase in depression (work issues and death in the family). Patient reported she is open to other things that might help, such as therapy.   Have you received a mental health diagnosis? Yes   Which one (s): Depression  Is there any history of developmental delay?  No   Are you currently seeing a mental health provider?  No            Who / month last seen:   Do you have mental health records elsewhere?  No  Will you sign a release so we can obtain them?  N/A    Have you ever been hospitalized for psychiatric reasons?  No  Describe:      Do you have current thoughts of self-harm?  No    Do you currently have thoughts of harming others?  No       Substance Use History     Do you have any history of alcohol / illicit drug use?  No  Describe:    Have you ever received treatment for this?  No    Describe:       Social History     Who is the patient's a guardian?  No    Name / number:   Have you had an ACT team in last 12 months?  No  Describe:    Do you have any current or past legal issues?  No  Describe:    OK to leave a detailed voicemail?  Yes    Medical/ Surgical History                                   Patient Active Problem List   Diagnosis     Dizziness and giddiness     Motion sickness     GERD (gastroesophageal reflux disease)     Idiopathic cardiomyopathy (H)     Sleep apnea     Hyperlipidemia LDL goal <100     Allergic rhinitis     Mild major depression (H)     Hypertension goal BP (blood pressure) < 130/80     Pelvic pain in female     Health Care Home     24 hour contact  handout given     Elevated PTHrP level     H/O bariatric surgery     Hx of gastric bypass     Hypovitaminosis D     Allergic rhinitis     Attention deficit disorder of adult     Mild persistent asthma     Low back pain     Hand joint pain     Posterior vitreous detachment, left eye     Myopia, bilateral     Dumping syndrome     Benign essential hypertension     Nuclear sclerosis of both eyes     Major depressive disorder, recurrent episode, mild (H)     Adjustment disorder with depressed mood     Decreased hearing of both ears     BPV (benign positional vertigo), unspecified laterality     Advanced directives, counseling/discussion     Vitamin D deficiency     Coronary artery disease involving native coronary artery of native heart without angina pectoris     Acute pain of left knee     Syncope          Medications             Current Outpatient Medications   Medication Sig Dispense Refill     albuterol (PROAIR HFA/PROVENTIL HFA/VENTOLIN HFA) 108 (90 Base) MCG/ACT inhaler Inhale 2 puffs into the lungs every 6 hours as needed for shortness of breath / dyspnea or wheezing 1 Inhaler 11     amphetamine-dextroamphetamine (ADDERALL XR) 25 MG 24 hr capsule Take 1 capsule (25 mg) by mouth daily 90 capsule 0     blood glucose monitoring (NO BRAND SPECIFIED) test strip Use to test blood sugar 1-2 times daily or as directed. 1 Box 2     calcium carbonate-vitamin D (CALTRATE 600+D) 600-400 MG-UNIT CHEW Take 1 chew tab by mouth 2 times daily 180 tablet 3     carvedilol (COREG) 25 MG tablet TAKE 1 TABLET(25 MG) BY MOUTH TWICE DAILY WITH MEALS 180 tablet 1     COMFORT LANCETS MISC 1 Device 2 times daily as needed 1 Box 1     Cyanocobalamin (VITAMIN B-12 SL) Place under the tongue every other day        cyclobenzaprine (FLEXERIL) 10 MG tablet Take 1 tablet (10 mg) by mouth nightly as needed for muscle spasms 30 tablet 0     hydrALAZINE (APRESOLINE) 10 MG tablet Take 1 tablet (10 mg) by mouth 2 times daily 180 tablet 3     ibuprofen  (ADVIL/MOTRIN) 800 MG tablet Take 1 tablet (800 mg) by mouth every 8 hours as needed 30 tablet 0     isosorbide mononitrate (IMDUR) 30 MG 24 hr tablet TAKE 1 TABLET(30 MG) BY MOUTH DAILY 90 tablet 3     lisinopril (ZESTRIL) 5 MG tablet TAKE 1 TABLET(5 MG) BY MOUTH DAILY 90 tablet 3     meclizine (ANTIVERT) 25 MG tablet Take 1 tablet (25 mg) by mouth 3 times daily as needed 30 tablet 1     Multiple Vitamin (MULTI-VITAMIN) per tablet Take 1 tablet by mouth daily. 100 tablet 3     ondansetron (ZOFRAN ODT) 4 MG ODT tab Take 1 tablet (4 mg) by mouth every 8 hours as needed for nausea 10 tablet 0     oxymetazoline (AFRIN) 0.05 % nasal spray Spray 1 spray into both nostrils every evening       PREMPRO 0.45-1.5 MG tablet TAKE 1 TABLET BY MOUTH DAILY 84 tablet 1     sertraline (ZOLOFT) 100 MG tablet Take 2 tablets daily 180 tablet 1     simvastatin (ZOCOR) 20 MG tablet TAKE 1 TABLET BY MOUTH AT BEDTIME 90 tablet 3     venlafaxine (EFFEXOR-ER) 225 MG 24 hr tablet Take 1 tablet (225 mg) by mouth daily 90 tablet 0         DISPOSITION      Completed phone screen with patient. Added to AGE wait list for first available provider.    Marifer Eric,

## 2020-10-08 ENCOUNTER — OFFICE VISIT (OUTPATIENT)
Dept: URGENT CARE | Facility: URGENT CARE | Age: 60
End: 2020-10-08
Payer: COMMERCIAL

## 2020-10-08 VITALS
BODY MASS INDEX: 31 KG/M2 | HEART RATE: 79 BPM | DIASTOLIC BLOOD PRESSURE: 92 MMHG | TEMPERATURE: 98.2 F | OXYGEN SATURATION: 99 % | SYSTOLIC BLOOD PRESSURE: 168 MMHG | WEIGHT: 174.4 LBS | RESPIRATION RATE: 12 BRPM

## 2020-10-08 DIAGNOSIS — Z78.9: ICD-10-CM

## 2020-10-08 DIAGNOSIS — S61.213A LACERATION OF LEFT MIDDLE FINGER WITHOUT FOREIGN BODY WITHOUT DAMAGE TO NAIL, INITIAL ENCOUNTER: Primary | ICD-10-CM

## 2020-10-08 DIAGNOSIS — I10 BENIGN ESSENTIAL HYPERTENSION: ICD-10-CM

## 2020-10-08 PROCEDURE — 90471 IMMUNIZATION ADMIN: CPT | Performed by: PHYSICIAN ASSISTANT

## 2020-10-08 PROCEDURE — 90715 TDAP VACCINE 7 YRS/> IM: CPT | Performed by: PHYSICIAN ASSISTANT

## 2020-10-08 PROCEDURE — 12001 RPR S/N/AX/GEN/TRNK 2.5CM/<: CPT | Performed by: PHYSICIAN ASSISTANT

## 2020-10-08 PROCEDURE — 90715 TDAP VACCINE 7 YRS/> IM: CPT

## 2020-10-08 PROCEDURE — 99212 OFFICE O/P EST SF 10 MIN: CPT | Mod: 25 | Performed by: PHYSICIAN ASSISTANT

## 2020-10-08 PROCEDURE — 90471 IMMUNIZATION ADMIN: CPT

## 2020-10-08 ASSESSMENT — ENCOUNTER SYMPTOMS
CHILLS: 0
VOMITING: 0
DIARRHEA: 0
MUSCULOSKELETAL NEGATIVE: 1
FEVER: 0
HEADACHES: 0
BRUISES/BLEEDS EASILY: 0
JOINT SWELLING: 0
MYALGIAS: 0
RHINORRHEA: 0
WEAKNESS: 0
BACK PAIN: 0
RESPIRATORY NEGATIVE: 1
NECK STIFFNESS: 0
EYES NEGATIVE: 1
CARDIOVASCULAR NEGATIVE: 1
LIGHT-HEADEDNESS: 0
COUGH: 0
WOUND: 1
ENDOCRINE NEGATIVE: 1
PALPITATIONS: 0
NAUSEA: 0
SORE THROAT: 0
ARTHRALGIAS: 0
ALLERGIC/IMMUNOLOGIC NEGATIVE: 1
DIZZINESS: 0
SHORTNESS OF BREATH: 0
HEMATOLOGIC/LYMPHATIC NEGATIVE: 1
NECK PAIN: 0

## 2020-10-08 NOTE — PATIENT INSTRUCTIONS
Patient Education     Extremity Laceration: Stitches, Staples, or Tape  A laceration is a cut through the skin. If it is deep, it may require stitches or staples to close so it can heal. Minor cuts may be treated with surgical tape closures, or skin glue.  X-rays may be done if something may have entered the skin through the cut. You may also need a tetanus shot if you are not up to date on this vaccine.  Home care    Follow the healthcare provider s instructions on how to care for the cut.    Wash your hands with soap and warm water before and after caring for your wound. This is to help prevent infection.    Keep the wound clean and dry. If a bandage was applied and it becomes wet or dirty, replace it. Otherwise, leave it in place for the first 24 hours, then change it once a day or as directed.    If stitches or staples were used, clean the wound daily:  ? After removing the bandage, wash the area with soap and water. Use a wet cotton swab to loosen and remove any blood or crust that forms.  ? After cleaning, keep the wound clean and dry. Talk with your healthcare provider before putting any antibiotic ointment on the wound. Reapply the bandage.    You may remove the bandage to shower as usual after the first 24 hours, but don't soak the area in water (no swimming) until the stitches or staples are removed.    If surgical tape closures were used, keep the area clean and dry. If it becomes wet, blot it dry with a towel. Let the surgical tape fall off on its own.    The healthcare provider may prescribe an antibiotic cream or ointment to prevent infection. He or she may also prescribe an antibiotic pill. Don't stop taking this medicine until you have finished it all or the provider tells you to stop.    The provider may also prescribe medicine for pain. Follow the instructions for taking these medicines.    Don't do activities that may reopen your wound.  Follow-up care  Follow up with your healthcare provider,  or as advised. Most skin wounds heal within 10 days. But an infection may sometimes occur even with proper treatment. Check the wound daily for the signs of infection listed below. Stitches and staples should be removed within 7 to14 days. If surgical tape closures were used, you may remove them after 10 days if they have not fallen off by then.   When to seek medical advice  Call your healthcare provider right away if any of these occur:    Wound bleeding not controlled by direct pressure    Signs of infection, including increasing pain in the wound, increasing wound redness or swelling, or pus or bad odor coming from the wound    Fever of 100.4 F (38 C) or higher, or as directed by your healthcare provider    Stitches or staples come apart or fall out or surgical tape falls off before 7 days    Wound edges reopen    Wound changes colors    Numbness occurs around the wound     Decreased movement around the injured area  Date Last Reviewed: 7/1/2017 2000-2019 [employer]. [address] [place] [address]. All rights reserved. This information is not intended as a substitute for professional medical care. Always follow your healthcare professional's instructions.

## 2020-10-08 NOTE — PROGRESS NOTES
Chief Complaint:     Chief Complaint   Patient presents with     Laceration     Cut right middle finger on mandolin slicer yesterday          HPI: Patricia Perez is an 60 year old female that presents to clinic after cutting self on the R middle finger with a slicer yesterday. She denies numbness of the finger. She denies dysfunction of the finger. Patient denies any loss of strength to the finger.  Patient is not up to date on tetanus.     Review of Systems:    Review of Systems   Constitutional: Negative for chills and fever.   HENT: Negative for congestion, ear pain, rhinorrhea and sore throat.    Eyes: Negative.    Respiratory: Negative.  Negative for cough and shortness of breath.    Cardiovascular: Negative.  Negative for chest pain and palpitations.   Gastrointestinal: Negative for diarrhea, nausea and vomiting.   Endocrine: Negative.    Genitourinary: Negative.    Musculoskeletal: Negative.  Negative for arthralgias, back pain, joint swelling, myalgias, neck pain and neck stiffness.   Skin: Positive for wound. Negative for rash.   Allergic/Immunologic: Negative.  Negative for immunocompromised state.   Neurological: Negative for dizziness, weakness, light-headedness and headaches.   Hematological: Negative.  Does not bruise/bleed easily.         Family History   Family History   Problem Relation Age of Onset     Hypertension Father      Lipids Father      Alcohol/Drug Father         Abuse     Depression Father      Respiratory Father         COPD     Cardiovascular Father      Gastrointestinal Disease Mother         non cancerous pancreatic tumor     Hypertension Mother      Heart Disease Mother         A fib     Breast Cancer Maternal Grandmother      Glaucoma Paternal Grandmother      Depression Brother      Hypertension Brother      Cerebrovascular Disease Other      Macular Degeneration Other      Diabetes No family hx of      Cancer - colorectal No family hx of      Retinal detachment No family hx of       Amblyopia No family hx of      Thyroid Disease No family hx of         Problem history  Patient Active Problem List   Diagnosis     Dizziness and giddiness     Motion sickness     GERD (gastroesophageal reflux disease)     Idiopathic cardiomyopathy (H)     Sleep apnea     Hyperlipidemia LDL goal <100     Allergic rhinitis     Mild major depression (H)     Hypertension goal BP (blood pressure) < 130/80     Pelvic pain in female     Health Care Home     24 hour contact handout given     Elevated PTHrP level     H/O bariatric surgery     Hx of gastric bypass     Hypovitaminosis D     Allergic rhinitis     Attention deficit disorder of adult     Mild persistent asthma     Low back pain     Hand joint pain     Posterior vitreous detachment, left eye     Myopia, bilateral     Dumping syndrome     Benign essential hypertension     Nuclear sclerosis of both eyes     Major depressive disorder, recurrent episode, mild (H)     Adjustment disorder with depressed mood     Decreased hearing of both ears     BPV (benign positional vertigo), unspecified laterality     Advanced directives, counseling/discussion     Vitamin D deficiency     Coronary artery disease involving native coronary artery of native heart without angina pectoris     Acute pain of left knee     Syncope        Allergies  Allergies   Allergen Reactions     No Known Drug Allergies         Social History  Social History     Socioeconomic History     Marital status:      Spouse name: Not on file     Number of children: 0     Years of education: 12+     Highest education level: Not on file   Occupational History     Employer: [employer]   Social Needs     Financial resource strain: Not on file     Food insecurity     Worry: Not on file     Inability: Not on file     Transportation needs     Medical: Not on file     Non-medical: Not on file   Tobacco Use     Smoking status: Never Smoker     Smokeless tobacco: Never Used   Substance and Sexual  Activity     Alcohol use: No     Drug use: No     Sexual activity: Not Currently     Partners: Male   Lifestyle     Physical activity     Days per week: Not on file     Minutes per session: Not on file     Stress: Not on file   Relationships     Social connections     Talks on phone: Not on file     Gets together: Not on file     Attends [employer] service: Not on file     Active member of club or organization: Not on file     Attends meetings of clubs or organizations: Not on file     Relationship status: Not on file     Intimate partner violence     Fear of current or ex partner: Not on file     Emotionally abused: Not on file     Physically abused: Not on file     Forced sexual activity: Not on file   Other Topics Concern     Parent/sibling w/ CABG, MI or angioplasty before 65F 55M? No   Social History Narrative    Dairy/d 2 servings/d.     Caffeine 3 servings/d    Exercise 2 x week    Sunscreen used - Yes    Seatbelts used - Yes    Working smoke/CO detectors in the home - Yes    Guns stored in the home - No    Self Breast Exams - Yes    Self Testicular Exam - NA    Eye Exam up to date - Yes    Dental Exam up to date - No    Pap Smear up to date - Yes    Mammogram up to date - Yes    PSA up to date - NA    Dexa Scan up to date - NA    Flex Sig / Colonoscopy up to date - Yes    Immunizations up to date - Yes    Abuse: Current or Past(Physical, Sexual or Emotional)- No    Do you feel safe in your environment - Yes    LOWELL SMITH LPN.    02/05/2007        Current Meds    Current Outpatient Medications:      albuterol (PROAIR HFA/PROVENTIL HFA/VENTOLIN HFA) 108 (90 Base) MCG/ACT inhaler, Inhale 2 puffs into the lungs every 6 hours as needed for shortness of breath / dyspnea or wheezing, Disp: 1 Inhaler, Rfl: 11     amphetamine-dextroamphetamine (ADDERALL XR) 25 MG 24 hr capsule, Take 1 capsule (25 mg) by mouth daily, Disp: 90 capsule, Rfl: 0     blood glucose monitoring (NO BRAND SPECIFIED) test strip, Use to test  blood sugar 1-2 times daily or as directed., Disp: 1 Box, Rfl: 2     calcium carbonate-vitamin D (CALTRATE 600+D) 600-400 MG-UNIT CHEW, Take 1 chew tab by mouth 2 times daily, Disp: 180 tablet, Rfl: 3     carvedilol (COREG) 25 MG tablet, TAKE 1 TABLET(25 MG) BY MOUTH TWICE DAILY WITH MEALS, Disp: 180 tablet, Rfl: 1     COMFORT LANCETS MISC, 1 Device 2 times daily as needed, Disp: 1 Box, Rfl: 1     Cyanocobalamin (VITAMIN B-12 SL), Place under the tongue every other day , Disp: , Rfl:      cyclobenzaprine (FLEXERIL) 10 MG tablet, Take 1 tablet (10 mg) by mouth nightly as needed for muscle spasms, Disp: 30 tablet, Rfl: 0     hydrALAZINE (APRESOLINE) 10 MG tablet, Take 1 tablet (10 mg) by mouth 2 times daily, Disp: 180 tablet, Rfl: 3     ibuprofen (ADVIL/MOTRIN) 800 MG tablet, Take 1 tablet (800 mg) by mouth every 8 hours as needed, Disp: 30 tablet, Rfl: 0     isosorbide mononitrate (IMDUR) 30 MG 24 hr tablet, TAKE 1 TABLET(30 MG) BY MOUTH DAILY, Disp: 90 tablet, Rfl: 3     lisinopril (ZESTRIL) 5 MG tablet, TAKE 1 TABLET(5 MG) BY MOUTH DAILY, Disp: 90 tablet, Rfl: 3     meclizine (ANTIVERT) 25 MG tablet, Take 1 tablet (25 mg) by mouth 3 times daily as needed, Disp: 30 tablet, Rfl: 1     Multiple Vitamin (MULTI-VITAMIN) per tablet, Take 1 tablet by mouth daily., Disp: 100 tablet, Rfl: 3     ondansetron (ZOFRAN ODT) 4 MG ODT tab, Take 1 tablet (4 mg) by mouth every 8 hours as needed for nausea, Disp: 10 tablet, Rfl: 0     oxymetazoline (AFRIN) 0.05 % nasal spray, Spray 1 spray into both nostrils every evening, Disp: , Rfl:      PREMPRO 0.45-1.5 MG tablet, TAKE 1 TABLET BY MOUTH DAILY, Disp: 84 tablet, Rfl: 1     sertraline (ZOLOFT) 100 MG tablet, Take 2 tablets daily, Disp: 180 tablet, Rfl: 1     simvastatin (ZOCOR) 20 MG tablet, TAKE 1 TABLET BY MOUTH AT BEDTIME, Disp: 90 tablet, Rfl: 3     venlafaxine (EFFEXOR-ER) 225 MG 24 hr tablet, Take 1 tablet (225 mg) by mouth daily, Disp: 90 tablet, Rfl: 0       Vitals reviewed  by Montana Dhillon PA-C  BP (!) 169/105   Pulse 79   Temp 98.2  F (36.8  C) (Oral)   Resp 12   Wt 79.1 kg (174 lb 6.4 oz)   LMP 07/16/2012   SpO2 99%   BMI 31.00 kg/m      Physical Exam:    Physical Exam  Vitals signs and nursing note reviewed.   Constitutional:       General: She is not in acute distress.     Appearance: She is well-developed. She is not ill-appearing, toxic-appearing or diaphoretic.   HENT:      Head: Normocephalic and atraumatic.      Right Ear: Tympanic membrane and external ear normal. No drainage, swelling or tenderness. Tympanic membrane is not perforated, erythematous, retracted or bulging.      Left Ear: Tympanic membrane and external ear normal. No drainage, swelling or tenderness. Tympanic membrane is not perforated, erythematous, retracted or bulging.      Nose: No mucosal edema, congestion or rhinorrhea.      Right Sinus: No maxillary sinus tenderness or frontal sinus tenderness.      Left Sinus: No maxillary sinus tenderness or frontal sinus tenderness.      Mouth/Throat:      Pharynx: No pharyngeal swelling, oropharyngeal exudate, posterior oropharyngeal erythema or uvula swelling.      Tonsils: No tonsillar abscesses.   Eyes:      Pupils: Pupils are equal, round, and reactive to light.   Neck:      Musculoskeletal: Full passive range of motion without pain, normal range of motion and neck supple.      Trachea: Trachea normal.   Cardiovascular:      Rate and Rhythm: Normal rate and regular rhythm.      Heart sounds: Normal heart sounds, S1 normal and S2 normal. No murmur. No friction rub. No gallop.    Pulmonary:      Effort: Pulmonary effort is normal. No respiratory distress.      Breath sounds: Normal breath sounds. No decreased breath sounds, wheezing, rhonchi or rales.   Abdominal:      General: Bowel sounds are normal. There is no distension.      Palpations: Abdomen is soft. Abdomen is not rigid. There is no mass.      Tenderness: There is no abdominal tenderness. There  is no guarding or rebound.   Musculoskeletal:      Left hand: She exhibits laceration. She exhibits normal range of motion, no tenderness, normal two-point discrimination, normal capillary refill, no deformity and no swelling.        Hands:       Comments: Roughly 2 cm laceration more superficial in nature with clean wound edges and no contamination.   Lymphadenopathy:      Cervical: No cervical adenopathy.   Skin:     General: Skin is warm and dry.   Neurological:      Mental Status: She is alert and oriented to person, place, and time.      Cranial Nerves: No cranial nerve deficit.      Deep Tendon Reflexes: Reflexes are normal and symmetric.   Psychiatric:         Behavior: Behavior normal. Behavior is cooperative.         Thought Content: Thought content normal.         Judgment: Judgment normal.           Medical Decision Making:  Laceration no evidence of neurovascular injury. The wound will be closed using glue.     Assessment:     1. Laceration of left middle finger without foreign body without damage to nail, initial encounter    2. Benign essential hypertension    3. Tetanus toxoid vaccination administered 5-10 years ago         Plan:    Imaging of the injured area for foreign body or fracture was not  Indicated  Patient given tetanus booster in clinic today.  Wound closed per procedure note below.    Wound was cleaned with sterile saline and surgiscrub.  Antibiotic ointment and sterile dressing applied in clinic.     Discussed home wound care. Return to  with increased swelling, pain, redness, pus or fevers.    Patient is hypertensive in clinic today.  Second BP reading was also above goal.  Patient instructed to follow up with PCP in the next week for BP recheck.  Sooner if symptoms worsen.      Patient was discharged in stable condition.  Patient verbalized understanding and agreed with this plan.      Procedure Note - Wound Repair:  Procedure performed by Luis Carlos Dhillon.     3 coats of glue applied  allowing each to dry in between applications.  Patient tolerated this procedure well.    Montana Dhillon PA-C 12:59 PM

## 2020-10-15 DIAGNOSIS — F33.0 MAJOR DEPRESSIVE DISORDER, RECURRENT EPISODE, MILD (H): ICD-10-CM

## 2020-10-19 RX ORDER — SERTRALINE HYDROCHLORIDE 100 MG/1
TABLET, FILM COATED ORAL
Qty: 180 TABLET | Refills: 0 | Status: SHIPPED | OUTPATIENT
Start: 2020-10-19 | End: 2020-11-11

## 2020-11-11 ENCOUNTER — TELEPHONE (OUTPATIENT)
Dept: PSYCHIATRY | Facility: CLINIC | Age: 60
End: 2020-11-11

## 2020-11-11 ENCOUNTER — VIRTUAL VISIT (OUTPATIENT)
Dept: PSYCHIATRY | Facility: CLINIC | Age: 60
End: 2020-11-11
Attending: PSYCHIATRY & NEUROLOGY
Payer: COMMERCIAL

## 2020-11-11 DIAGNOSIS — R41.840 ATTENTION DEFICIT: ICD-10-CM

## 2020-11-11 DIAGNOSIS — F33.1 MODERATE EPISODE OF RECURRENT MAJOR DEPRESSIVE DISORDER (H): Primary | ICD-10-CM

## 2020-11-11 PROCEDURE — 90792 PSYCH DIAG EVAL W/MED SRVCS: CPT | Mod: GC | Performed by: STUDENT IN AN ORGANIZED HEALTH CARE EDUCATION/TRAINING PROGRAM

## 2020-11-11 RX ORDER — VENLAFAXINE HYDROCHLORIDE 225 MG/1
225 TABLET, EXTENDED RELEASE ORAL DAILY
Qty: 90 TABLET | Refills: 1 | Status: SHIPPED | OUTPATIENT
Start: 2020-11-11 | End: 2021-05-24

## 2020-11-11 RX ORDER — DEXTROAMPHETAMINE SACCHARATE, AMPHETAMINE ASPARTATE MONOHYDRATE, DEXTROAMPHETAMINE SULFATE AND AMPHETAMINE SULFATE 6.25; 6.25; 6.25; 6.25 MG/1; MG/1; MG/1; MG/1
25 CAPSULE, EXTENDED RELEASE ORAL EVERY MORNING
Qty: 90 CAPSULE | Refills: 0 | Status: SHIPPED | OUTPATIENT
Start: 2020-11-20 | End: 2021-03-01

## 2020-11-11 RX ORDER — SERTRALINE HYDROCHLORIDE 100 MG/1
TABLET, FILM COATED ORAL
Qty: 45 TABLET | Refills: 0 | Status: SHIPPED | OUTPATIENT
Start: 2020-11-11 | End: 2021-03-01

## 2020-11-11 RX ORDER — ARIPIPRAZOLE 2 MG/1
2 TABLET ORAL DAILY
Qty: 30 TABLET | Refills: 3 | Status: SHIPPED | OUTPATIENT
Start: 2020-11-11 | End: 2021-01-19

## 2020-11-11 NOTE — TELEPHONE ENCOUNTER
On 11/11/2020, at 1232, writer called patient at mobile to confirm Virtual Visit. Writer unable to make contact with patient. Writer left detailed voice message for callback. [phone], left as call back number. JIMMIE Joe, EMT

## 2020-11-11 NOTE — PROGRESS NOTES
Video- Visit Details  Type of service:  video visit for medication management  Time of service:    Date:  11/11/2020    Video Start Time:  1:00 PM        Video End Time:  2:30pm    Reason for video visit:  Services only offered telehealth  Originating Site (patient location):  [facility]   Location- Patient's home  Distant Site (provider location):  Remote location  Mode of Communication:  Video Conference via AmWell  Consent:  Patient has given verbal consent for video visit?: Yes         [facility]  Psychiatry Clinic  DIAGNOSTIC ASSESSMENT     CARE TEAM:  PCP- Blanche Cummins, Cardiologist,  Therapist: None     Patricia Perez is a 60 year old patient who prefers the name Patricia and uses pronouns she, her, hers, herself.     PERTINENT BACKGROUND                           [most recent eval 11/11/20]     Psych pertinent item history includes trauma hx and eating disorder (binge eating)    INTERIM HISTORY                                                                                    [4, 4]   History was provided by the patient who was a good historian. Treatment adherence is good.  Since the last visit:   - Patient reports she was referred to our clinic by her PCP because her medications are no longer working well for her.   - She believes she has been on Adderall, Effexor, and sertraline combination for a long time.  She recently increased Effexor dose.  The medications used to be helpful but then stopped working.   - She reports things worsened after an incident at work in which she was demoted.   - She reports overeating and overspending when she is depressed.   - She has gotten in financial trouble for overspending in the past.   - She endorses anhedonia, difficulty finding motivation to do things, wants to sit down all day.   - She also has a hard time focusing and concentrating.   - She shakes her leg or swivels in her chair often.   - She usually enjoys cooking, backing,  crossword puzzles, reading, however has not been able to do these things as often lately.    - Her 21 year old niece  in a car accident in September, this has greatly affected her.  She reports crying every other day.  She often has intrusive thoughts about her niece.    - She denies acute safety concerns including SI, SIB, HI and reports feeling safe at home.     RECENT PSYCH ROS:   Depression:  depressed mood, anhedonia, low energy, feeling hopeless and overwhelmed  Elevated:  none  Psychosis:  none  Anxiety:  excessive worry and nervous/overwhelmed  Trauma Related:  none  Eating Disorder: binge eating   Other: easilty distracted, problems with focuss and attention    Adverse Effects:  Denies adverse effects of medications   Pertinent Negative Symptoms: No suicidal ideation, self-injurious behavior/urges, violent ideation, aggression, psychosis, delusions and yuko  Recent Substance Use:     None reported     FAMILY and SOCIAL HISTORY                                   [1ea, 1ea]       pt reported        Family Hx:  Father had alcoholism. Depression in mother, father and two sisters.     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     Partner/ - , 2.5 years to her first , Mitchell, relationship feels very strong   Children- no      Living situation- Rent a townhouse      Social/ Spiritual Support- Mitchell, three friends       Feels Safe at Home- Safe  Guns in the home: No   Legal- No  Trauma History (self-report)- Younger brother  of Leukemia when patient was in 8th grade.    Early History/Education-  Grew up in [place], [place]. Family is still in [place]. Grad school in [place].  Reports her childhood was jackie, father was an alcoholic.  Lived in a trailer with 4 siblings.  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   Younger brother  from Leukemia at 4 y/o       PSYCHIATRIC HISTORY                                                             SIB- No   Suicidal Ideation Hx- reports some passive suicidal thoughts occasionally   Suicide Attempt- #- none , most recent- no    Violence/Aggression Hx- no  Psychosis Hx- no  Eating Disorder Hx- binge eating  Other- no    Psych Hosp- #- No  most recent- no   Commitment- no  ECT- No  Outpatient Programs - no      PAST MED TRIALS                                                              Medication  Dose (mg) Effect  Dates of Use   Wellbutrin   Tried it and it didn't work                  SUBSTANCE USE HISTORY     Past Use- Reports history of occasional alcohol use, tried marijuana occasionally In the 1970s   Treatment- #, most recent- no  Medical Consequences- no  HIV/Hepatitis- no  Legal Consequences- no   Other- no    MEDICAL HISTORY and ALLERGY     ALLERGIES: No known drug allergies     Patient Active Problem List   Diagnosis     Dizziness and giddiness     Motion sickness     GERD (gastroesophageal reflux disease)     Idiopathic cardiomyopathy (H)     Sleep apnea     Hyperlipidemia LDL goal <100     Allergic rhinitis     Mild major depression (H)     Hypertension goal BP (blood pressure) < 130/80     Pelvic pain in female     Health Care Home     24 hour contact handout given     Elevated PTHrP level     H/O bariatric surgery     Hx of gastric bypass     Hypovitaminosis D     Allergic rhinitis     Attention deficit disorder of adult     Mild persistent asthma     Low back pain     Hand joint pain     Posterior vitreous detachment, left eye     Myopia, bilateral     Dumping syndrome     Benign essential hypertension     Nuclear sclerosis of both eyes     Major depressive disorder, recurrent episode, mild (H)     Adjustment disorder with depressed mood     Decreased hearing of both ears     BPV (benign positional vertigo), unspecified laterality     Advanced directives, counseling/discussion     Vitamin D deficiency     Coronary artery disease involving native coronary artery of native  heart without angina pectoris     Acute pain of left knee     Syncope         MEDICAL REVIEW OF SYSTEMS                                                               [2, 10]   A comprehensive review of systems was performed and is negative other than noted in the HPI.    MEDICATIONS        Current Outpatient Medications   Medication Sig Dispense Refill     albuterol (PROAIR HFA/PROVENTIL HFA/VENTOLIN HFA) 108 (90 Base) MCG/ACT inhaler Inhale 2 puffs into the lungs every 6 hours as needed for shortness of breath / dyspnea or wheezing 1 Inhaler 11     amphetamine-dextroamphetamine (ADDERALL XR) 25 MG 24 hr capsule Take 1 capsule (25 mg) by mouth daily 90 capsule 0     blood glucose monitoring (NO BRAND SPECIFIED) test strip Use to test blood sugar 1-2 times daily or as directed. 1 Box 2     calcium carbonate-vitamin D (CALTRATE 600+D) 600-400 MG-UNIT CHEW Take 1 chew tab by mouth 2 times daily 180 tablet 3     carvedilol (COREG) 25 MG tablet TAKE 1 TABLET(25 MG) BY MOUTH TWICE DAILY WITH MEALS 180 tablet 1     COMFORT LANCETS MISC 1 Device 2 times daily as needed 1 Box 1     Cyanocobalamin (VITAMIN B-12 SL) Place under the tongue every other day        cyclobenzaprine (FLEXERIL) 10 MG tablet Take 1 tablet (10 mg) by mouth nightly as needed for muscle spasms 30 tablet 0     hydrALAZINE (APRESOLINE) 10 MG tablet Take 1 tablet (10 mg) by mouth 2 times daily 180 tablet 3     ibuprofen (ADVIL/MOTRIN) 800 MG tablet Take 1 tablet (800 mg) by mouth every 8 hours as needed 30 tablet 0     isosorbide mononitrate (IMDUR) 30 MG 24 hr tablet TAKE 1 TABLET(30 MG) BY MOUTH DAILY 90 tablet 3     lisinopril (ZESTRIL) 5 MG tablet TAKE 1 TABLET(5 MG) BY MOUTH DAILY 90 tablet 3     meclizine (ANTIVERT) 25 MG tablet Take 1 tablet (25 mg) by mouth 3 times daily as needed 30 tablet 1     Multiple Vitamin (MULTI-VITAMIN) per tablet Take 1 tablet by mouth daily. 100 tablet 3     ondansetron (ZOFRAN ODT) 4 MG ODT tab Take 1 tablet (4 mg) by  "mouth every 8 hours as needed for nausea 10 tablet 0     oxymetazoline (AFRIN) 0.05 % nasal spray Spray 1 spray into both nostrils every evening       PREMPRO 0.45-1.5 MG tablet TAKE 1 TABLET BY MOUTH DAILY 84 tablet 1     sertraline (ZOLOFT) 100 MG tablet TAKE 2 TABLETS BY MOUTH DAILY 180 tablet 0     simvastatin (ZOCOR) 20 MG tablet TAKE 1 TABLET BY MOUTH AT BEDTIME 90 tablet 3     venlafaxine (EFFEXOR-ER) 225 MG 24 hr tablet Take 1 tablet (225 mg) by mouth daily 90 tablet 0     VITALS                                                                                                                              [3, 3]   LMP 07/16/2012       MENTAL STATUS EXAM                                                             [9, 14 cog gs]     Alertness: alert  and oriented  Appearance: well groomed  Behavior/Demeanor: cooperative, pleasant and calm, with good  eye contact   Speech: normal and regular rate and rhythm  Language: intact and no problems  Psychomotor: normal or unremarkable  Mood: \"pretty good\"  Affect: full range; congruent to: mood- yes, content- yes  Thought Process/Associations: unremarkable  Thought Content:  Reports none;  Denies suicidal & violent ideation and delusions  Perception:  Reports none;  Denies hallucinations  Insight: good  Judgment: good  Cognition: (6) oriented: time, person, and place  attention span: intact  concentration: intact  recent memory: fair  remote memory: intact  fund of knowledge: appropriate  Gait and Station: N/A ([facility])    LABS and DATA     PHQ 1/3/2020 1/16/2020 9/3/2020   PHQ-9 Total Score 10 7 15   Q9: Thoughts of better off dead/self-harm past 2 weeks Not at all Not at all Not at all       Recent Labs   Lab Test 01/08/20  0606 01/07/20  1304 01/07/20  0740   CR 0.71 0.69 0.83   GFRESTIMATED >90 >90 75     Recent Labs   Lab Test 01/07/20  0740 10/29/19  0804 06/07/18  0843   AST 12 14 17   ALT 24 23 27   ALKPHOS 83 94 86     ANTIPSYCHOTIC LABS  [glu, A1C, lipids " (rafa LDL), liver enzymes, WBC, ANEU, Hgb, plts]  q12 mo  Recent Labs   Lab Test 01/08/20  0606 01/07/20  0740 10/29/19  0804 06/07/18  0843 06/29/15  0940 06/29/15  0940 11/20/13  1240 11/20/13  1240 01/10/13  1406 01/10/13  1406   GLC 84 124* 85 84   < > 90   < > 82   < > 77   A1C  --   --   --   --   --  5.4  --  4.7  --  5.0    < > = values in this interval not displayed.     Recent Labs   Lab Test 10/29/19  0804 06/07/18  0843 06/22/17  0837 06/15/16  0948   CHOL 284* 271* 267* 272*   TRIG 95 109 73 98   * 134* 136* 145*    115 116 107     Recent Labs   Lab Test 01/07/20  0740 10/29/19  0804 06/07/18  0843 04/29/17  1704   AST 12 14 17 Unsatisfactory specimen - hemolyzed   Canceled, Test credited  NOTIFIED LINNETTE ABRAHAM RN UER 1830 4/29/2017 BY AA     ALT 24 23 27 27   ALKPHOS 83 94 86 72     Recent Labs   Lab Test 01/08/20  0606 01/07/20  0740 10/29/19  0804 06/07/18  0843 04/29/17  1704   WBC 5.5 6.0 4.9 6.1 12.2*   ANEU  --  4.3 2.5 3.1 9.7*   HGB 11.1* 10.1* 11.8 12.3 12.4    248 245 264 307       PSYCHOTROPIC DRUG INTERACTIONS     Concurrent use of AMPHETAMINES and SEROTONERGIC AGENTS may result in increased risk of serotonin syndrome  Concurrent use of AMPHETAMINES and SEROTONERGIC AGENTS THAT INHIBIT CYP2D6 may result in increased amphetamine exposure and increased risk of serotonin syndrome.     Concurrent use of ARIPIPRAZOLE and QT INTERVAL PROLONGING DRUGS may result in increased risk of QT interval prolongation.     MANAGEMENT:  Monitoring for adverse effects, routine labs and periodic EKGs    RISK STATEMENT for SAFETY     Patricia Perez did not appear to be an imminent safety risk to self or others.    DIAGNOSES                                                                                         [m2, h3]    - Major depressive disorder, recurrent, moderate  - ADHD, by history    ASSESSMENT                                                                                       [m2, h3]        Today, Patricia presents to our clinic for the first time for psychiatric evaluation.  She has historic diagnoses of MDD and ADHD.  She has concerns of depressed mood, anhedonia, distractibility, amotivation, memory difficulties, word-finding struggles, and anxiety.  She reports she has been on a combination of Adderall, Effexor, and Sertraline for a long period of time and it is no longer controlling her symptoms.  As her Zoloft is at max dose and not providing benefit we will start a slow taper of this medication and add Abilify to augment Effexor.  We will check in with patient's cardiology team as both Adderall and Effexor can increase blood pressure.  Will refer patient to neuropsych testing to help with diagnostic clarify for ADHD as she has never had formal testing.  This will also help to clarify source of memory difficulties.  Also referred patient for individual therapy which will be an important aspect of her treatment.      [place] was checked today:  Indicates no medication misuse .     PLAN                                                                                                               [m2, h3]          1) Meds  - Continue Adderall 25mg daily   - Decrease Zoloft to 100mg for 30 days, then decrease to 50mg for 30 days, then discontinue   - Continue Effexor 225mg daily  - Start Abilify 2mg daily    2) Therapy-  Referral placed for individual therapy         3) Next Due   Labs- Antipsychotic labs due 01/2021  EKG- PRN, will follow up with cardiology  Rating scales- N/A    4) Referrals  Therapy- see above   Neuropsych testing    5) RTC: 8 weeks    6) Crisis Numbers:   Provided routinely in AVS     After hours:  [phone]      TREATMENT RISK STATEMENT:  The risks, benefits, alternatives and potential adverse effects have been discussed and are understood by the pt. The pt understands the risks of using street drugs or alcohol. There are no medical contraindications,  the pt agrees to treatment with the ability to do so. The pt knows to call the clinic for any problems or to access emergency care if needed.  Medical and substance use concerns are documented above.  Psychotropic drug interaction check was done, including changes made today.    PROVIDER: Kori Martinez, DO    Patient staffed in clinic with Dr. Barrios who will sign the note.  Supervisor is Dr. Ruiz.  TELEHEALTH ATTENDING ATTESTATION  Following the ACGME guidelines on telemedicine and direct supervision due to COVID-19, I was concurrently participating in and/or monitoring the patient care through appropriate telecommunication technology.  I discussed the key portions of the service with the resident, including the mental status examination and developing the plan of care. I reviewed key portions of the history with the resident. I agree with the findings and plan as documented in this note.   Lv Barrios MD

## 2020-11-17 DIAGNOSIS — Z12.31 VISIT FOR SCREENING MAMMOGRAM: ICD-10-CM

## 2020-11-17 PROCEDURE — 77067 SCR MAMMO BI INCL CAD: CPT | Performed by: RADIOLOGY

## 2020-12-08 DIAGNOSIS — E78.5 HYPERLIPIDEMIA LDL GOAL <100: ICD-10-CM

## 2020-12-08 DIAGNOSIS — I10 BENIGN ESSENTIAL HYPERTENSION: ICD-10-CM

## 2020-12-08 DIAGNOSIS — I25.10 CORONARY ARTERY DISEASE INVOLVING NATIVE CORONARY ARTERY OF NATIVE HEART WITHOUT ANGINA PECTORIS: ICD-10-CM

## 2020-12-08 DIAGNOSIS — I42.9 IDIOPATHIC CARDIOMYOPATHY (H): ICD-10-CM

## 2020-12-08 LAB
ANION GAP SERPL CALCULATED.3IONS-SCNC: 3 MMOL/L (ref 3–14)
BUN SERPL-MCNC: 20 MG/DL (ref 7–30)
CALCIUM SERPL-MCNC: 8.7 MG/DL (ref 8.5–10.1)
CHLORIDE SERPL-SCNC: 109 MMOL/L (ref 94–109)
CHOLEST SERPL-MCNC: 274 MG/DL
CO2 SERPL-SCNC: 28 MMOL/L (ref 20–32)
CREAT SERPL-MCNC: 0.77 MG/DL (ref 0.52–1.04)
ERYTHROCYTE [DISTWIDTH] IN BLOOD BY AUTOMATED COUNT: 15 % (ref 10–15)
GFR SERPL CREATININE-BSD FRML MDRD: 83 ML/MIN/{1.73_M2}
GLUCOSE SERPL-MCNC: 82 MG/DL (ref 70–99)
HCT VFR BLD AUTO: 33.7 % (ref 35–47)
HDLC SERPL-MCNC: 113 MG/DL
HGB BLD-MCNC: 10.5 G/DL (ref 11.7–15.7)
LDLC SERPL CALC-MCNC: 147 MG/DL
MCH RBC QN AUTO: 27.9 PG (ref 26.5–33)
MCHC RBC AUTO-ENTMCNC: 31.2 G/DL (ref 31.5–36.5)
MCV RBC AUTO: 90 FL (ref 78–100)
NONHDLC SERPL-MCNC: 161 MG/DL
PLATELET # BLD AUTO: 273 10E9/L (ref 150–450)
POTASSIUM SERPL-SCNC: 4.2 MMOL/L (ref 3.4–5.3)
RBC # BLD AUTO: 3.76 10E12/L (ref 3.8–5.2)
SODIUM SERPL-SCNC: 140 MMOL/L (ref 133–144)
TRIGL SERPL-MCNC: 72 MG/DL
TSH SERPL DL<=0.005 MIU/L-ACNC: 1.42 MU/L (ref 0.4–4)
WBC # BLD AUTO: 4.3 10E9/L (ref 4–11)

## 2020-12-08 PROCEDURE — 80048 BASIC METABOLIC PNL TOTAL CA: CPT | Performed by: INTERNAL MEDICINE

## 2020-12-08 PROCEDURE — 84443 ASSAY THYROID STIM HORMONE: CPT | Performed by: INTERNAL MEDICINE

## 2020-12-08 PROCEDURE — 85027 COMPLETE CBC AUTOMATED: CPT | Performed by: INTERNAL MEDICINE

## 2020-12-08 PROCEDURE — 36415 COLL VENOUS BLD VENIPUNCTURE: CPT | Performed by: INTERNAL MEDICINE

## 2020-12-08 PROCEDURE — 80061 LIPID PANEL: CPT | Performed by: INTERNAL MEDICINE

## 2020-12-09 ENCOUNTER — TELEPHONE (OUTPATIENT)
Dept: CARDIOLOGY | Facility: CLINIC | Age: 60
End: 2020-12-09

## 2020-12-09 NOTE — TELEPHONE ENCOUNTER
Date: 12/9/2020    Time of Call: 2:52 PM     Diagnosis:  HLD, CAD, NICM     [ TORB ] Ordering provider: Dr Preston Cuadra  Order:   - Stop Simvastatin  - Start Atorvastatin 40 mg every day at bedtime  - F/U clinic 12/16 virtually     Order received by: Sofia Neely LPN     Follow-up/additional notes:  Called and left VM for Pt requesting return call to clinic to discuss results and F/U.  Dr Cuadra would like Pt to be seen 12/16.  Appt should be scheduled when she returns call.

## 2020-12-09 NOTE — TELEPHONE ENCOUNTER
[facility] Call Center    Phone Message    May a detailed message be left on voicemail: yes     Reason for Call: Other: Pt returning call to [place] about lab results. Pt was scheduled on 12/16 as requested. Please call back pt for results. Thank you     Action Taken: Message routed to:  [facility] ([facility]): Cardio    Travel Screening: Not Applicable

## 2020-12-10 NOTE — TELEPHONE ENCOUNTER
Called and spoke with Pt.  Discussed labs and recommendations.  Pt noted she was on lipitor before, dose unknown, and and experienced myalgia.  Noted given this that we will wait for her to discuss with Dr Cuadra.  Pt verbalize understanding, agreed to current plan and denied any further questions.    Sofia Neely LPN

## 2020-12-16 ENCOUNTER — VIRTUAL VISIT (OUTPATIENT)
Dept: CARDIOLOGY | Facility: CLINIC | Age: 60
End: 2020-12-16
Attending: INTERNAL MEDICINE
Payer: COMMERCIAL

## 2020-12-16 DIAGNOSIS — I10 HYPERTENSION, UNSPECIFIED TYPE: ICD-10-CM

## 2020-12-16 DIAGNOSIS — E78.5 HYPERLIPIDEMIA LDL GOAL <70: Primary | ICD-10-CM

## 2020-12-16 DIAGNOSIS — I42.9 CARDIOMYOPATHY, UNSPECIFIED TYPE (H): ICD-10-CM

## 2020-12-16 PROCEDURE — 99213 OFFICE O/P EST LOW 20 MIN: CPT | Mod: 95 | Performed by: INTERNAL MEDICINE

## 2020-12-16 RX ORDER — EZETIMIBE 10 MG/1
10 TABLET ORAL DAILY
Qty: 90 TABLET | Refills: 3 | Status: SHIPPED | OUTPATIENT
Start: 2020-12-16 | End: 2021-08-18

## 2020-12-16 NOTE — PROGRESS NOTES
"The patient has been notified of following:     \"This video visit will be conducted via a call between you and your physician/provider. We have found that certain health care needs can be provided without the need for an in-person physical exam.  This service lets us provide the care you need with a video conversation.  If a prescription is necessary we can send it directly to your pharmacy.  If lab work is needed we can place an order for that and you can then stop by our lab to have the test done at a later time.    Video visits are billed at different rates depending on your insurance coverage.  Please reach out to your insurance provider with any questions.    If during the course of the call the physician/provider feels a video visit is not appropriate, you will not be charged for this service.\"    Patient has given verbal consent for video visit? Yes    How would you like to obtain your AVS? Mail    Video-Visit Details    Type of service:  Video Visit    Platform used for Video Visit: [employer]    Video Start Time: 725am    Video End Time: 753am    Originating Location (pt. Location): Patient home    Distant Location (provider location):  home office    See dictation #:[id number]  "

## 2020-12-16 NOTE — LETTER
"2020      RE: Patricia Perez  [address]  [facility] [address]       Dear Colleague,    Thank you for the opportunity to participate in the care of your patient, Patricia Perez, at the [facility]. Please see a copy of my visit note below.    The patient has been notified of following:     \"This video visit will be conducted via a call between you and your physician/provider. We have found that certain health care needs can be provided without the need for an in-person physical exam.  This service lets us provide the care you need with a video conversation.  If a prescription is necessary we can send it directly to your pharmacy.  If lab work is needed we can place an order for that and you can then stop by our lab to have the test done at a later time.    Video visits are billed at different rates depending on your insurance coverage.  Please reach out to your insurance provider with any questions.    If during the course of the call the physician/provider feels a video visit is not appropriate, you will not be charged for this service.\"    Patient has given verbal consent for video visit? Yes    How would you like to obtain your AVS? Mail    Video-Visit Details    Type of service:  Video Visit    Platform used for Video Visit: Swarm[employer]    Video Start Time: 725am    Video End Time: 753am    Originating Location (pt. Location): Patient home    Distant Location (provider location):  home office    Service Date: 2020      RE:    Patricia Perez   MRN:  [id number]   :  1960      Patient:  To Whom It May Concern:      It was a pleasure participating in the care of your patient, Ms. Patricia Perez.  As you know, she is a 60-year-old lady who I saw today via virtual video visit via Sw[employer] for a mild nonischemic cardiomyopathy, hypertension and hyperlipidemia.      Her past medical history is significant for the " followin.  Hypertension.   2.  Hyperlipidemia.   3.  Sleep apnea, on CPAP.   4.  Depression.   5.  Gastroesophageal reflux disease.   6.  Asthma.   7.  Obesity.   8.  Status post gastric bypass surgery.      Her cardiac history is significant for a nonischemic cardiomyopathy that was mild to moderate degree back in .  She had a coronary angiogram back then that revealed only mild disease in the LAD and RCA territories.      I last saw her 2017 and that time she was doing adequately.        She did have an episode of syncope in 2020 which necessitated a coronary CTA on 2020 with the following results:     Left main okay.   LAD 25%-49% proximal to mid stenosis.   Circumflex 25%-49% proximal stenosis.   RCA 25%-49% proximal to mid stenosis.      Zio Patch monitor and echocardiogram were also relatively unremarkable.  Since then, she has not had any further episodes of syncope, rather she has had some episodes of tripping over her own feet and 3 falls due to clumsiness, but not losing consciousness.        She has had a very stressful year, not only with COVID, but with her job, where she was demoted, but not fired.        She does not exercise very much.  In fact, the only exercise, she does occasionally walk around her house and up and down stairs, which she can perform without symptoms or limitation.  If she is carrying something heavy, she may have to stop to rest.  She denies aissatou chest pain or exertional dyspnea, PND, orthopnea, edema, palpitations or near-syncope.      REVIEW OF SYSTEMS:  Positive for not taking her p.m. meds.  She forgets over half the time.  She for some reason also discontinued her aspirin.      CURRENT MEDICATIONS:     1.  Abilify.   2.  Carvedilol 25 mg twice daily.   3.  Hydralazine 10 mg twice daily.   4.  Imdur 30 mg a day.   5.  Lisinopril 5 mg a day.   6.  Sertraline.   7.  Zocor 20 mg a day.   8.  Venlafaxine.     9.  Adderall.      PHYSICAL EXAMINATION:      VITAL SIGNS:  Blood pressure at home generally runs in the 120s/70s.     GENERAL:  She appears comfortable and well groomed.   PSYCHIATRIC:  She is alert and oriented x3.   HEENT:  Her eyes do not appear grossly erythematous or have exudate.   RESPIRATORY:  She is breathing comfortably without gross cough.      The remainder of the comprehensive physical exam was deferred secondary to COVID-19 pandemic and secondary to video visit restrictions.      On 12/08/2020, LDL was 147, potassium 4.2, GFR normal, hemoglobin 10.5, TSH normal.      Echocardiogram 01/07/2020 reveals an ejection fraction of 50%-55% without significant valvular pathology, no change since 2016.      Zio Patch monitor 01/08/2020 reveals symptoms corresponded to ectopy.  She did have asymptomatic nonsustained VT and/or SVT less than or equal to 6 beats incidentally noted.        IMPRESSION:      Patricia is a 60-year-old lady with several active issues:     1.  Prior mild nonischemic cardiomyopathy.       Back in 2008, her ejection fraction was in the 40%-45% range.  With medical therapy, her ejection fraction improved to the 50% range in 2016.  Coronary angiography back then in 2008 revealed only mild nonobstructive disease, which is confirmed by her most recent coronary CTA of 01/07/2020.     She is currently asymptomatic at a low level of exertion.  We will continue to monitor clinically.     2.  Hypertension, well controlled.       She claims that her blood pressures at home have been running in the 120/70 range.  We will continue to follow.     3.  Hyperlipidemia.       Her LDL is currently 147 and her goal LDL is less than 70 in the context of mild nonobstructive coronary disease.  She has gotten significant leg myalgias on Lipitor and does not wish to switch to this and she has taken simvastatin less than half the time because she misses her evening meds.  She will attempt to take her Zocor earlier as well as carvedilol in an and attempt to  improve compliance.     4.  Mild nonobstructive coronary disease.      This was shown on coronary CTA 2020.  She has stopped taking her aspirin and denies having any significant bleeding side effects.  Will reinstitute.        PLAN:     1.  Restart aspirin 81 mg a day, enteric-coated.     2.  Add Zetia 10 mg a day in order to hopefully aid in bringing down her general cholesterol levels and her LDL in particular.     3.  She will improve compliance with simvastatin and take it every day at 6pm and we can titrate dosing accordingly.     4.  Recheck fasting lipid profile in 3 months and adjust the simvastatin dose in order to reach goal LDL of less than 70.     5.  Primary MD referral to help investigate mild anemia.     6.  Recheck fasting lipid profile in 3 months.     7.  Virtual video visit followup in 6 months with labs, earlier if needed.     8.  She also promises to increase her activity level and has suggested that she will turn on some music and try dancing each day.      Once again, it was a pleasure participating in the care of your patient, Ms. Patricia Perez.  Please feel free to contact me anytime if any questions regarding her care in the future.      Sincerely,           VALERIE YEAGER MD             D: 2020   T: 2020   MT: DAYAMI      Name:     PATRICIA PEREZ   MRN:      [id number]        Account:      [id number]   :      1960           Service Date: 2020      Document: [id number]        Please do not hesitate to contact me if you have any questions/concerns.     Sincerely,     Valerie Yeager MD

## 2020-12-16 NOTE — PATIENT INSTRUCTIONS
Patient Instructions:  It was a pleasure to see you in the cardiology clinic today.      If you have any questions, you can reach my nurse, Sofia RATLIFF LPN, at [phone].  Press Option #1 for the [facility], and then press Option #4 for nursing.    We are encouraging the use of [employer] to communicate with your HealthCare Provider    Medication Changes:    - Start Ezetimibe (Zetia) 10 mg every day.  - Start Aspirin 81 mg every day.    Recommendations:  - Establish care with a primary care provider.  Please call [phone] or visit [email or web address] to schedule.    - Fasting labs in 3 months and again in 6 months.    Studies Ordered: None.    The results from today include: Labs.    Please follow up: With Dr. Cuadra in 6 months with labs prior.    Sincerely,    Preston Cuadra MD     If you have an urgent need after hours (8:00 am to 4:30 pm) please call [phone] and ask for the cardiology fellow on call.          Patient Education     Ezetimibe Tablets  Brand Name: Zetia  What is this medicine?  EZETIMIBE (ez ET i mibe) blocks the absorption of cholesterol from the stomach. It can help lower blood cholesterol for patients who are at risk of getting heart disease or a stroke. It is only for patients whose cholesterol level is not controlled by diet.  How should I use this medicine?  Take this medicine by mouth with a glass of water. Follow the directions on the prescription label. This medicine can be taken with or without food. Take your doses at regular intervals. Do not take your medicine more often than directed.  Talk to your pediatrician regarding the use of this medicine in children. Special care may be needed.  What side effects may I notice from receiving this medicine?  Side effects that you should report to your doctor or health care professional as soon as possible:    allergic reactions like skin rash,  itching or hives, swelling of the face, lips, or tongue    dark yellow or brown urine    unusually weak or tired    yellowing of the skin or eyes  Side effects that usually do not require medical attention (report to your doctor or health care professional if they continue or are bothersome):    diarrhea    dizziness    headache    stomach upset or pain  What may interact with this medicine?  Do not take this medicine with any of the following medications:    fenofibrate    gemfibrozil  This medicine may also interact with the following medications:    antacids    cyclosporine    herbal medicines like red yeast rice    other medicines to lower cholesterol or triglycerides  What if I miss a dose?  If you miss a dose, take it as soon as you can. If it is almost time for your next dose, take only that dose. Do not take double or extra doses.  Where should I keep my medicine?  Keep out of the reach of children.  Store at room temperature between 15 and 30 degrees C (59 and 86 degrees F). Protect from moisture. Keep container tightly closed. Throw away any unused medicine after the expiration date.  What should I tell my health care provider before I take this medicine?  They need to know if you have any of these conditions:    liver disease    an unusual or allergic reaction to ezetimibe, medicines, foods, dyes, or preservatives    pregnant or trying to get pregnant    breast-feeding  What should I watch for while using this medicine?  Visit your doctor or health care professional for regular checks on your progress. You will need to have your cholesterol levels checked. If you are also taking some other cholesterol medicines, you will also need to have tests to make sure your liver is working properly.  Tell your doctor or health care professional if you get any unexplained muscle pain, tenderness, or weakness, especially if you also have a fever and tiredness.  You need to follow a low-cholesterol, low-fat diet while  you are taking this medicine. This will decrease your risk of getting heart and blood vessel disease. Exercising and avoiding alcohol and smoking can also help. Ask your doctor or dietician for advice.  NOTE:This sheet is a summary. It may not cover all possible information. If you have questions about this medicine, talk to your doctor, pharmacist, or health care provider. Copyright  2020 [employer]           Patient Education     Aspirin, ASA oral tablets  Brand Names: Aspirtab, Aspir-Lianet, Ugo Advanced Aspirin, Ugo Aspirin, Ugo Aspirin Extra Strength, Ugo Aspirin Plus, Ugo Extra Strength, Ugo Extra Strength Plus, Ugo Genuine Aspirin, Ugo Womens Aspirin, Bufferin, Bufferin Extra Strength  What is this medicine?  ASPIRIN (AS pir in) is a pain reliever. It is used to treat mild pain and fever. This medicine is also used as directed by a doctor to prevent and to treat heart attacks, to prevent strokes and blood clots, and to treat arthritis or inflammation.  How should I use this medicine?  Take this medicine by mouth with a glass of water. Follow the directions on the package or prescription label. You can take this medicine with or without food. If it upsets your stomach, take it with food. Do not take your medicine more often than directed.  Talk to your pediatrician regarding the use of this medicine in children. While this drug may be prescribed for children as young as 12 years of age for selected conditions, precautions do apply. Children and teenagers should not use this medicine to treat chicken pox or flu symptoms unless directed by a doctor.  Patients over 65 years old may have a stronger reaction and need a smaller dose.  What side effects may I notice from receiving this medicine?  Side effects that you should report to your doctor or health care professional as soon as possible:    allergic reactions like skin rash, itching or hives, swelling of the face, lips, or tongue    breathing  problems    changes in hearing, ringing in the ears    confusion    general ill feeling or flu-like symptoms    pain on swallowing    redness, blistering, peeling or loosening of the skin, including inside the mouth or nose    signs and symptoms of bleeding such as bloody or black, tarry stools; red or dark-brown urine; spitting up blood or brown material that looks like coffee grounds; red spots on the skin; unusual bruising or bleeding from the eye, gums, or nose    trouble passing urine or change in the amount of urine    unusually weak or tired    yellowing of the eyes or skin  Side effects that usually do not require medical attention (report to your doctor or health care professional if they continue or are bothersome):    diarrhea or constipation    headache    nausea, vomiting    stomach gas, heartburn  What may interact with this medicine?  Do not take this medicine with any of the following medications:    cidofovir    ketorolac    probenecid  This medicine may also interact with the following medications:    alcohol    alendronate    bismuth subsalicylate    flavocoxid    herbal supplements like feverfew, garlic, luc, ginkgo biloba, horse chestnut    medicines for diabetes or glaucoma like acetazolamide, methazolamide    medicines for gout    medicines that treat or prevent blood clots like enoxaparin, heparin, ticlopidine, warfarin    other aspirin and aspirin-like medicines    NSAIDs, medicines for pain and inflammation, like ibuprofen or naproxen    pemetrexed    sulfinpyrazone    varicella live vaccine  What if I miss a dose?  If you are taking this medicine on a regular schedule and miss a dose, take it as soon as you can. If it is almost time for your next dose, take only that dose. Do not take double or extra doses.  Where should I keep my medicine?  Keep out of the reach of children.  Store at room temperature between 15 and 30 degrees C (59 and 86 degrees F). Protect from heat and moisture. Do  not use this medicine if it has a strong vinegar smell. Throw away any unused medicine after the expiration date.  What should I tell my health care provider before I take this medicine?  They need to know if you have any of these conditions:    anemia    asthma    bleeding problems    child with chickenpox, the flu, or other viral infection    diabetes    gout    if you frequently drink alcohol containing drinks    kidney disease    liver disease    low level of vitamin K    lupus    smoke tobacco    stomach ulcers or other problems    an unusual or allergic reaction to aspirin, tartrazine dye, other medicines, dyes, or preservatives    pregnant or trying to get pregnant    breast-feeding  What should I watch for while using this medicine?  If you are treating yourself for pain, tell your doctor or health care professional if the pain lasts more than 10 days, if it gets worse, or if there is a new or different kind of pain. Tell your doctor if you see redness or swelling. Also, check with your doctor if you have a fever that lasts for more than 3 days. Only take this medicine to prevent heart attacks or blood clotting if prescribed by your doctor or health care professional.  Do not take aspirin or aspirin-like medicines with this medicine. Too much aspirin can be dangerous. Always read the labels carefully.  This medicine can irritate your stomach or cause bleeding problems. Do not smoke cigarettes or drink alcohol while taking this medicine. Do not lie down for 30 minutes after taking this medicine to prevent irritation to your throat.  If you are scheduled for any medical or dental procedure, tell your healthcare provider that you are taking this medicine. You may need to stop taking this medicine before the procedure.  This medicine may be used to treat migraines. If you take migraine medicines for 10 or more days a month, your migraines may get worse. Keep a diary of headache days and medicine use. Contact  your healthcare professional if your migraine attacks occur more frequently.  NOTE:This sheet is a summary. It may not cover all possible information. If you have questions about this medicine, talk to your doctor, pharmacist, or health care provider. Copyright  2020 [employer]

## 2020-12-16 NOTE — NURSING NOTE
"Chief Complaint   Patient presents with     Cardiomyopathy     Cardiomyopathy, no heart symptoms per patient        Initial LMP 07/16/2012  Estimated body mass index is 31 kg/m  as calculated from the following:    Height as of 1/16/20: 1.597 m (5' 2.89\").    Weight as of 10/8/20: 79.1 kg (174 lb 6.4 oz)..  BP completed using cuff size: large    Karen DOLAN.P.N.  Patricia Perez is a 60 year old female who is being evaluated via a billable video visit.      The patient has been notified of following:     \"This video visit will be conducted via a call between you and your physician/provider. We have found that certain health care needs can be provided without the need for an in-person physical exam.  This service lets us provide the care you need with a video conversation.  If a prescription is necessary we can send it directly to your pharmacy.  If lab work is needed we can place an order for that and you can then stop by our lab to have the test done at a later time.    Video visits are billed at different rates depending on your insurance coverage.  Please reach out to your insurance provider with any questions.    If during the course of the call the physician/provider feels a video visit is not appropriate, you will not be charged for this service.\"    Patient has given verbal consent for Video visit? Yes  How would you like to obtain your AVS? MyChart  If you are dropped from the video visit, the video invite should be resent to: Send to e-mail at: [email or web address]  Will anyone else be joining your video visit? No        Video-Visit Details    Type of service:  Video Visit      "

## 2020-12-29 ENCOUNTER — ANCILLARY PROCEDURE (OUTPATIENT)
Dept: GENERAL RADIOLOGY | Facility: CLINIC | Age: 60
End: 2020-12-29
Attending: PODIATRIST
Payer: COMMERCIAL

## 2020-12-29 ENCOUNTER — OFFICE VISIT (OUTPATIENT)
Dept: PODIATRY | Facility: CLINIC | Age: 60
End: 2020-12-29
Payer: COMMERCIAL

## 2020-12-29 VITALS — HEART RATE: 80 BPM | SYSTOLIC BLOOD PRESSURE: 140 MMHG | DIASTOLIC BLOOD PRESSURE: 80 MMHG

## 2020-12-29 DIAGNOSIS — M19.071 ARTHRITIS OF FOOT, RIGHT: ICD-10-CM

## 2020-12-29 DIAGNOSIS — M21.621 TAILOR'S BUNION OF RIGHT FOOT: ICD-10-CM

## 2020-12-29 DIAGNOSIS — M21.621 TAILOR'S BUNION OF RIGHT FOOT: Primary | ICD-10-CM

## 2020-12-29 PROCEDURE — 99203 OFFICE O/P NEW LOW 30 MIN: CPT | Performed by: PODIATRIST

## 2020-12-29 PROCEDURE — 73630 X-RAY EXAM OF FOOT: CPT | Mod: RT | Performed by: RADIOLOGY

## 2020-12-29 NOTE — LETTER
12/29/2020         RE: Patricia Perez  [address]  [place] [address]        Dear Colleague,    Thank you for referring your patient, Patricia Perez, to the [facility] [place]. Please see a copy of my visit note below.    Subjective:    Pt is seen today as a new pt referral with the c/c of painful rightfoot.  This has been symptomatic for the past  Several months.   Patient points to lateral fifth metatarsal head.    Describes this as a burning pain.  Aggravated by activity and releaved by rest.   Patient recently started working at home from Covid.  Wearing socks or slippers around the house all the time.  Gets occasional midfoot pain as well.  History of bariatric surgery.    ROS:  A 10-point review of systems was performed and is positive for that noted in the HPI and as seen above.  All other areas are negative.          Allergies   Allergen Reactions     Atorvastatin      Leg myalgias       Current Outpatient Medications   Medication Sig Dispense Refill     albuterol (PROAIR HFA/PROVENTIL HFA/VENTOLIN HFA) 108 (90 Base) MCG/ACT inhaler Inhale 2 puffs into the lungs every 6 hours as needed for shortness of breath / dyspnea or wheezing 1 Inhaler 11     amphetamine-dextroamphetamine (ADDERALL XR) 25 MG 24 hr capsule Take 1 capsule (25 mg) by mouth every morning 90 capsule 0     ARIPiprazole (ABILIFY) 2 MG tablet Take 1 tablet (2 mg) by mouth daily 30 tablet 3     aspirin (ASA) 81 MG EC tablet Take 1 tablet (81 mg) by mouth daily 90 tablet 3     blood glucose monitoring (NO BRAND SPECIFIED) test strip Use to test blood sugar 1-2 times daily or as directed. 1 Box 2     calcium carbonate-vitamin D (CALTRATE 600+D) 600-400 MG-UNIT CHEW Take 1 chew tab by mouth 2 times daily 180 tablet 3     carvedilol (COREG) 25 MG tablet TAKE 1 TABLET(25 MG) BY MOUTH TWICE DAILY WITH MEALS 180 tablet 1     COMFORT LANCETS MISC 1 Device 2 times daily as needed 1 Box 1     Cyanocobalamin  (VITAMIN B-12 SL) Place under the tongue every other day        cyclobenzaprine (FLEXERIL) 10 MG tablet Take 1 tablet (10 mg) by mouth nightly as needed for muscle spasms 30 tablet 0     estrogen conj-medroxyPROGESTERone (PREMPRO) 0.45-1.5 MG tablet Take 1 tablet by mouth daily 84 tablet 1     ezetimibe (ZETIA) 10 MG tablet Take 1 tablet (10 mg) by mouth daily 90 tablet 3     hydrALAZINE (APRESOLINE) 10 MG tablet Take 1 tablet (10 mg) by mouth 2 times daily 180 tablet 3     ibuprofen (ADVIL/MOTRIN) 800 MG tablet Take 1 tablet (800 mg) by mouth every 8 hours as needed 30 tablet 0     isosorbide mononitrate (IMDUR) 30 MG 24 hr tablet TAKE 1 TABLET(30 MG) BY MOUTH DAILY 90 tablet 3     lisinopril (ZESTRIL) 5 MG tablet TAKE 1 TABLET(5 MG) BY MOUTH DAILY 90 tablet 3     meclizine (ANTIVERT) 25 MG tablet Take 1 tablet (25 mg) by mouth 3 times daily as needed 30 tablet 1     Multiple Vitamin (MULTI-VITAMIN) per tablet Take 1 tablet by mouth daily. 100 tablet 3     ondansetron (ZOFRAN ODT) 4 MG ODT tab Take 1 tablet (4 mg) by mouth every 8 hours as needed for nausea 10 tablet 0     oxymetazoline (AFRIN) 0.05 % nasal spray Spray 1 spray into both nostrils every evening       sertraline (ZOLOFT) 100 MG tablet Take 1 tablet (100 mg) by mouth daily for 30 days, THEN 0.5 tablets (50 mg) daily. 45 tablet 0     simvastatin (ZOCOR) 20 MG tablet TAKE 1 TABLET BY MOUTH AT BEDTIME 90 tablet 3     venlafaxine (EFFEXOR-ER) 225 MG 24 hr tablet Take 1 tablet (225 mg) by mouth daily 90 tablet 1       Patient Active Problem List   Diagnosis     Major depression, recurrent (H)     Dizziness and giddiness     Motion sickness     GERD (gastroesophageal reflux disease)     Idiopathic cardiomyopathy (H)     Sleep apnea     Hyperlipidemia LDL goal <100     Allergic rhinitis     Hypertension goal BP (blood pressure) < 130/80     Pelvic pain in female     Health Care Home     24 hour contact handout given     Elevated PTHrP level     H/O bariatric  surgery     Hx of gastric bypass     Hypovitaminosis D     Allergic rhinitis     Attention deficit disorder of adult     Mild persistent asthma     Low back pain     Hand joint pain     Posterior vitreous detachment, left eye     Myopia, bilateral     Dumping syndrome     Benign essential hypertension     Nuclear sclerosis of both eyes     Adjustment disorder with depressed mood     Decreased hearing of both ears     BPV (benign positional vertigo), unspecified laterality     Advanced directives, counseling/discussion     Vitamin D deficiency     Coronary artery disease involving native coronary artery of native heart without angina pectoris     Acute pain of left knee     Syncope       Past Medical History:   Diagnosis Date     Anxiety      Arthritis      Asthma      Bursitis of shoulder 3/4/2010     Chronic rhinitis 3/25/2009     Coronary artery disease      Depression      Depression      Dyspnea on exertion      Gastro-oesophageal reflux disease      GERD (gastroesophageal reflux disease) 10/14/2008     Hypertension      Idiopathic cardiomyopathy (H) 1/8/2009     Indigestion      Itchy eyes      Itchy nose      Left leg pain 6/16/2011     Motion sickness 2/27/2008     Nasal congestion      Pneumonia      Problems related to lack of adequate sleep      Ringing in ears      Sleep apnea 3/25/2009     Sneezing        Past Surgical History:   Procedure Laterality Date     BARIATRIC SURGERY       DIABETES RESOURCES  As Child    Tonsillectomy     ENT SURGERY       HERNIA REPAIR       LAPAROSCOPIC BYPASS GASTRIC  10/16/2012    Procedure: LAPAROSCOPIC BYPASS GASTRIC;  laparoscopic reyna en y gastric bypass;  Surgeon: Nish Bradford MD;  Location: [facility] OR     TONSILLECTOMY       Uretural Sticture  As Child       Family History   Problem Relation Age of Onset     Hypertension Father      Lipids Father      Alcohol/Drug Father         Abuse     Depression Father      Respiratory Father         COPD     Cardiovascular  Father      Gastrointestinal Disease Mother         non cancerous pancreatic tumor     Hypertension Mother      Heart Disease Mother         A fib     Breast Cancer Maternal Grandmother      Glaucoma Paternal Grandmother      Depression Brother      Hypertension Brother      Cerebrovascular Disease Other      Macular Degeneration Other      Diabetes No family hx of      Cancer - colorectal No family hx of      Retinal detachment No family hx of      Amblyopia No family hx of      Thyroid Disease No family hx of        Social History     Tobacco Use     Smoking status: Never Smoker     Smokeless tobacco: Never Used   Substance Use Topics     Alcohol use: No         Exam:    Vitals: BP (!) 140/80   Pulse 80   LMP 07/16/2012   BMI: There is no height or weight on file to calculate BMI.  Height: Data Unavailable    Constitutional/ general:  Pt is in no apparent distress, appears well-nourished.  Cooperative with history and physical exam.     Psych:  The patient answered questions appropriately.  Normal affect.  Seems to have reasonable expectations, in terms of treatment.     Eyes:  Visual scanning/ tracking without deficit.     Ears:  Response to auditory stimuli is normal.  negative hearing aid devices.  Auricles in proper alignment.     Lymphatic:  Popliteal lymph nodes not enlarged.     Lungs:  Non labored breathing, non labored speech. No cough.  No audible wheezing. Even, quiet breathing.       Vascular:  positive pedal pulses bilaterally for both the DP and PT arteries.  CFT < 3 sec.  positive ankle edema.  positive pedal hair growth.    Neuro:  Alert and oriented x 3. Coordinated gait.  Light touch sensation is intact to the L4, L5, S1 distributions. No obvious deficits.  No evidence of neurological-based weakness, spasticity, or contracture in the lower extremities.      Derm: Normal texture and turgor.  No erythema, ecchymosis, or cyanosis.      Musculoskeletal:     Patient is ambulatory without an  assistive device or brace.  Normal arch with weightbearing.   MS 5/5 all compartments.  Normal ROM all forefoot and rearfoot joints.  No equinus. Patient has wide forefoot.  We note she has a long hallux that some slight varus.  Her second and third toes are hammered.  Slight pain medial and plantar fifth MTPJ.  No real prominence here.  No pain with range of motion.  No masses.  No bursa formation.    Radiographic Exam:  X-Ray consistent with above findings.  We note there is some second tarsometatarsal joint arthritis    A/P   Right fifth MTPJ pain  Tarsal arthritis    X-rays taken today.  Discussed with patient she has a long hallux that in varus.  In addition her wide forefoot but could be putting more pressure here.  Discussed good supportive house shoes at all times to offload this area and I made suggestions.  She can ice this.  Pointed out she has some arthritis in her midfoot.  Good how shoes will prevent this from becoming symptomatic.  She will reduce her hammertoes and keep the supple.  Instructed her on how to taper these down.  RETURN TO CLINIC PRN.    Chemo Rodriguez DPM DPM, FACFAS        Again, thank you for allowing me to participate in the care of your patient.        Sincerely,        Chemo Rodriguez DPM

## 2020-12-29 NOTE — PATIENT INSTRUCTIONS
We wish you continued good healing. If you have any questions or concerns, please do not hesitate to contact us at [phone]    [employer] (secure e-mail communication and access to your chart) to send a message or to make an appointment.    Please remember to call and schedule a follow up appointment if one was recommended at your earliest convenience.     +++OF MARCH 2020+++ LOCATION AND HOURS HAVE CHANGED    PLEASE CALL CLINICS TO VERIFY DAYS AND TIMES  PODIATRY CLINIC HOURS  TELEPHONE NUMBER    Dr. Chemo ALLENPSOO [facility]        Clinics:  [facility]   Tuesday 1PM-6PM  [place]  Wednesday 745AM-330PM  [place]  Thursday/Friday 745AM-230PM  [place] [facility] APPOINTMENTS  [phone]    [place] APPOINTMENTS  [phone]          If you need a medication refill, please contact us you may need lab work and/or a follow up visit prior to your refill (i.e. Antifungal medications).    If MRI needed please call Imaging at [phone] or [phone]    HOW DO I GET MY KNEE SCOOTER? Knee scooters can be picked up at ANY Medical Supply stores with your knee scooter Prescription.  OR    Bring your signed prescription to an [facility] showroom.

## 2020-12-30 NOTE — PROGRESS NOTES
Subjective:    Pt is seen today as a new pt referral with the c/c of painful rightfoot.  This has been symptomatic for the past  Several months.   Patient points to lateral fifth metatarsal head.    Describes this as a burning pain.  Aggravated by activity and releaved by rest.   Patient recently started working at home from Covid.  Wearing socks or slippers around the house all the time.  Gets occasional midfoot pain as well.  History of bariatric surgery.    ROS:  A 10-point review of systems was performed and is positive for that noted in the HPI and as seen above.  All other areas are negative.          Allergies   Allergen Reactions     Atorvastatin      Leg myalgias       Current Outpatient Medications   Medication Sig Dispense Refill     albuterol (PROAIR HFA/PROVENTIL HFA/VENTOLIN HFA) 108 (90 Base) MCG/ACT inhaler Inhale 2 puffs into the lungs every 6 hours as needed for shortness of breath / dyspnea or wheezing 1 Inhaler 11     amphetamine-dextroamphetamine (ADDERALL XR) 25 MG 24 hr capsule Take 1 capsule (25 mg) by mouth every morning 90 capsule 0     ARIPiprazole (ABILIFY) 2 MG tablet Take 1 tablet (2 mg) by mouth daily 30 tablet 3     aspirin (ASA) 81 MG EC tablet Take 1 tablet (81 mg) by mouth daily 90 tablet 3     blood glucose monitoring (NO BRAND SPECIFIED) test strip Use to test blood sugar 1-2 times daily or as directed. 1 Box 2     calcium carbonate-vitamin D (CALTRATE 600+D) 600-400 MG-UNIT CHEW Take 1 chew tab by mouth 2 times daily 180 tablet 3     carvedilol (COREG) 25 MG tablet TAKE 1 TABLET(25 MG) BY MOUTH TWICE DAILY WITH MEALS 180 tablet 1     COMFORT LANCETS MISC 1 Device 2 times daily as needed 1 Box 1     Cyanocobalamin (VITAMIN B-12 SL) Place under the tongue every other day        cyclobenzaprine (FLEXERIL) 10 MG tablet Take 1 tablet (10 mg) by mouth nightly as needed for muscle spasms 30 tablet 0     estrogen conj-medroxyPROGESTERone (PREMPRO) 0.45-1.5 MG tablet Take 1 tablet by  mouth daily 84 tablet 1     ezetimibe (ZETIA) 10 MG tablet Take 1 tablet (10 mg) by mouth daily 90 tablet 3     hydrALAZINE (APRESOLINE) 10 MG tablet Take 1 tablet (10 mg) by mouth 2 times daily 180 tablet 3     ibuprofen (ADVIL/MOTRIN) 800 MG tablet Take 1 tablet (800 mg) by mouth every 8 hours as needed 30 tablet 0     isosorbide mononitrate (IMDUR) 30 MG 24 hr tablet TAKE 1 TABLET(30 MG) BY MOUTH DAILY 90 tablet 3     lisinopril (ZESTRIL) 5 MG tablet TAKE 1 TABLET(5 MG) BY MOUTH DAILY 90 tablet 3     meclizine (ANTIVERT) 25 MG tablet Take 1 tablet (25 mg) by mouth 3 times daily as needed 30 tablet 1     Multiple Vitamin (MULTI-VITAMIN) per tablet Take 1 tablet by mouth daily. 100 tablet 3     ondansetron (ZOFRAN ODT) 4 MG ODT tab Take 1 tablet (4 mg) by mouth every 8 hours as needed for nausea 10 tablet 0     oxymetazoline (AFRIN) 0.05 % nasal spray Spray 1 spray into both nostrils every evening       sertraline (ZOLOFT) 100 MG tablet Take 1 tablet (100 mg) by mouth daily for 30 days, THEN 0.5 tablets (50 mg) daily. 45 tablet 0     simvastatin (ZOCOR) 20 MG tablet TAKE 1 TABLET BY MOUTH AT BEDTIME 90 tablet 3     venlafaxine (EFFEXOR-ER) 225 MG 24 hr tablet Take 1 tablet (225 mg) by mouth daily 90 tablet 1       Patient Active Problem List   Diagnosis     Major depression, recurrent (H)     Dizziness and giddiness     Motion sickness     GERD (gastroesophageal reflux disease)     Idiopathic cardiomyopathy (H)     Sleep apnea     Hyperlipidemia LDL goal <100     Allergic rhinitis     Hypertension goal BP (blood pressure) < 130/80     Pelvic pain in female     Health Care Home     24 hour contact handout given     Elevated PTHrP level     H/O bariatric surgery     Hx of gastric bypass     Hypovitaminosis D     Allergic rhinitis     Attention deficit disorder of adult     Mild persistent asthma     Low back pain     Hand joint pain     Posterior vitreous detachment, left eye     Myopia, bilateral     Dumping  syndrome     Benign essential hypertension     Nuclear sclerosis of both eyes     Adjustment disorder with depressed mood     Decreased hearing of both ears     BPV (benign positional vertigo), unspecified laterality     Advanced directives, counseling/discussion     Vitamin D deficiency     Coronary artery disease involving native coronary artery of native heart without angina pectoris     Acute pain of left knee     Syncope       Past Medical History:   Diagnosis Date     Anxiety      Arthritis      Asthma      Bursitis of shoulder 3/4/2010     Chronic rhinitis 3/25/2009     Coronary artery disease      Depression      Depression      Dyspnea on exertion      Gastro-oesophageal reflux disease      GERD (gastroesophageal reflux disease) 10/14/2008     Hypertension      Idiopathic cardiomyopathy (H) 1/8/2009     Indigestion      Itchy eyes      Itchy nose      Left leg pain 6/16/2011     Motion sickness 2/27/2008     Nasal congestion      Pneumonia      Problems related to lack of adequate sleep      Ringing in ears      Sleep apnea 3/25/2009     Sneezing        Past Surgical History:   Procedure Laterality Date     BARIATRIC SURGERY       DIABETES RESOURCES  As Child    Tonsillectomy     ENT SURGERY       HERNIA REPAIR       LAPAROSCOPIC BYPASS GASTRIC  10/16/2012    Procedure: LAPAROSCOPIC BYPASS GASTRIC;  laparoscopic reyna en y gastric bypass;  Surgeon: Nish Bradford MD;  Location: [facility] OR     TONSILLECTOMY       Uretural Sticture  As Child       Family History   Problem Relation Age of Onset     Hypertension Father      Lipids Father      Alcohol/Drug Father         Abuse     Depression Father      Respiratory Father         COPD     Cardiovascular Father      Gastrointestinal Disease Mother         non cancerous pancreatic tumor     Hypertension Mother      Heart Disease Mother         A fib     Breast Cancer Maternal Grandmother      Glaucoma Paternal Grandmother      Depression Brother      Hypertension  Brother      Cerebrovascular Disease Other      Macular Degeneration Other      Diabetes No family hx of      Cancer - colorectal No family hx of      Retinal detachment No family hx of      Amblyopia No family hx of      Thyroid Disease No family hx of        Social History     Tobacco Use     Smoking status: Never Smoker     Smokeless tobacco: Never Used   Substance Use Topics     Alcohol use: No         Exam:    Vitals: BP (!) 140/80   Pulse 80   LMP 07/16/2012   BMI: There is no height or weight on file to calculate BMI.  Height: Data Unavailable    Constitutional/ general:  Pt is in no apparent distress, appears well-nourished.  Cooperative with history and physical exam.     Psych:  The patient answered questions appropriately.  Normal affect.  Seems to have reasonable expectations, in terms of treatment.     Eyes:  Visual scanning/ tracking without deficit.     Ears:  Response to auditory stimuli is normal.  negative hearing aid devices.  Auricles in proper alignment.     Lymphatic:  Popliteal lymph nodes not enlarged.     Lungs:  Non labored breathing, non labored speech. No cough.  No audible wheezing. Even, quiet breathing.       Vascular:  positive pedal pulses bilaterally for both the DP and PT arteries.  CFT < 3 sec.  positive ankle edema.  positive pedal hair growth.    Neuro:  Alert and oriented x 3. Coordinated gait.  Light touch sensation is intact to the L4, L5, S1 distributions. No obvious deficits.  No evidence of neurological-based weakness, spasticity, or contracture in the lower extremities.      Derm: Normal texture and turgor.  No erythema, ecchymosis, or cyanosis.      Musculoskeletal:     Patient is ambulatory without an assistive device or brace.  Normal arch with weightbearing.   MS 5/5 all compartments.  Normal ROM all forefoot and rearfoot joints.  No equinus. Patient has wide forefoot.  We note she has a long hallux that some slight varus.  Her second and third toes are hammered.   Slight pain medial and plantar fifth MTPJ.  No real prominence here.  No pain with range of motion.  No masses.  No bursa formation.    Radiographic Exam:  X-Ray consistent with above findings.  We note there is some second tarsometatarsal joint arthritis    A/P   Right fifth MTPJ pain  Tarsal arthritis    X-rays taken today.  Discussed with patient she has a long hallux that in varus.  In addition her wide forefoot but could be putting more pressure here.  Discussed good supportive house shoes at all times to offload this area and I made suggestions.  She can ice this.  Pointed out she has some arthritis in her midfoot.  Good how shoes will prevent this from becoming symptomatic.  She will reduce her hammertoes and keep the supple.  Instructed her on how to taper these down.  RETURN TO CLINIC PRN.    Chemo Rodriguez DPM DPM, FACFAS

## 2021-01-15 ENCOUNTER — HEALTH MAINTENANCE LETTER (OUTPATIENT)
Age: 61
End: 2021-01-15

## 2021-01-18 ENCOUNTER — OFFICE VISIT (OUTPATIENT)
Dept: OPHTHALMOLOGY | Facility: CLINIC | Age: 61
End: 2021-01-18
Payer: COMMERCIAL

## 2021-01-18 DIAGNOSIS — H52.4 PRESBYOPIA: ICD-10-CM

## 2021-01-18 DIAGNOSIS — H25.813 COMBINED FORMS OF AGE-RELATED CATARACT OF BOTH EYES: Primary | ICD-10-CM

## 2021-01-18 DIAGNOSIS — Z01.01 ENCOUNTER FOR EXAMINATION OF EYES AND VISION WITH ABNORMAL FINDINGS: ICD-10-CM

## 2021-01-18 DIAGNOSIS — H43.813 POSTERIOR VITREOUS DETACHMENT OF BOTH EYES: ICD-10-CM

## 2021-01-18 DIAGNOSIS — H40.003 GLAUCOMA SUSPECT OF BOTH EYES: ICD-10-CM

## 2021-01-18 PROCEDURE — 92015 DETERMINE REFRACTIVE STATE: CPT | Performed by: OPHTHALMOLOGY

## 2021-01-18 PROCEDURE — 92004 COMPRE OPH EXAM NEW PT 1/>: CPT | Performed by: OPHTHALMOLOGY

## 2021-01-18 PROCEDURE — 92133 CPTRZD OPH DX IMG PST SGM ON: CPT | Performed by: OPHTHALMOLOGY

## 2021-01-18 ASSESSMENT — REFRACTION_MANIFEST
OS_AXIS: 075
OD_SPHERE: -6.00
OS_SPHERE: -7.25
OD_AXIS: 155
OD_CYLINDER: +0.50
OS_CYLINDER: +0.75
OD_ADD: +2.75
OS_ADD: +2.50

## 2021-01-18 ASSESSMENT — CONF VISUAL FIELD
OD_NORMAL: 1
OS_NORMAL: 1
METHOD: COUNTING FINGERS

## 2021-01-18 ASSESSMENT — REFRACTION_WEARINGRX
OS_SPHERE: -7.00
OD_ADD: +2.75
OS_CYLINDER: +0.75
SPECS_TYPE: PAL
OS_AXIS: 083
OD_SPHERE: -5.75
OS_ADD: +2.75

## 2021-01-18 ASSESSMENT — VISUAL ACUITY
OS_BAT_HIGH: 20/60
OS_BAT_MED: 20/30-2
OS_CC+: -2
OD_BAT_HIGH: 20/150
CORRECTION_TYPE: GLASSES
METHOD: SNELLEN - LINEAR
OD_BAT_MED: 20/60-1
OD_CC: 20/30
OS_BAT_LOW: 20/25-3
METHOD_MR: VD=12 MM
OS_CC: 20/40
OD_BAT_LOW: 20/60-1
OD_CC+: -2

## 2021-01-18 ASSESSMENT — TONOMETRY
OD_IOP_MMHG: 17
OS_IOP_MMHG: 17
IOP_METHOD: APPLANATION

## 2021-01-18 NOTE — PROGRESS NOTES
Current Eye Medications:  None now. Was taking some for dry eyes and allergies, hasn't used for at least a year.     Subjective:  Complete eye exam with cataract check. Vision comes and goes both eyes, little foggy for last 3-4 months. Doesn't see as well at night. Eyes get dry, in winter. Not having any eye pain either eye.     No near without glasses.      Objective:  See Ophthalmology Exam.       Assessment:  Baseline eye exam in patient with high myopia, mild cataracts, and posterior vitreous detachment both eyes.      ICD-10-CM    1. Combined forms of age-related cataract, mild, of both eyes  H25.813    2. Glaucoma suspect of both eyes  H40.003    3. Posterior vitreous detachment of both eyes  H43.813    4. Encounter for examination of eyes and vision with abnormal findings  Z01.01    5. Presbyopia  H52.4         Plan:  Glasses Rx given - optional  Use artificial tears up to 4 times daily both eyes as needed.  (Refresh Tears, Systane Ultra/Balance, or Theratears)   Will obtain baseline glaucoma OCT and retinal OCT today and call if any concerns.  Call in September 2021 for an appointment in January 2022 for Complete Exam    Dr. Mireles [phone]

## 2021-01-18 NOTE — PATIENT INSTRUCTIONS
Glasses Rx given - optional  Use artificial tears up to 4 times daily both eyes as needed.  (Refresh Tears, Systane Ultra/Balance, or Theratears)   Will obtain baseline glaucoma OCT and retinal OCT today and call if any concerns.  Call in September 2021 for an appointment in January 2022 for Complete Exam    Dr. Mireles [phone]

## 2021-01-19 DIAGNOSIS — F33.1 MODERATE EPISODE OF RECURRENT MAJOR DEPRESSIVE DISORDER (H): ICD-10-CM

## 2021-01-19 RX ORDER — ARIPIPRAZOLE 2 MG/1
4 TABLET ORAL DAILY
Qty: 60 TABLET | Refills: 1 | Status: SHIPPED | OUTPATIENT
Start: 2021-01-19 | End: 2021-03-31

## 2021-01-23 PROBLEM — H43.813 POSTERIOR VITREOUS DETACHMENT OF BOTH EYES: Status: ACTIVE | Noted: 2021-01-23

## 2021-01-23 PROBLEM — H25.13 NUCLEAR SCLEROSIS OF BOTH EYES: Status: RESOLVED | Noted: 2017-04-19 | Resolved: 2021-01-23

## 2021-01-23 PROBLEM — H25.813 COMBINED FORMS OF AGE-RELATED CATARACT OF BOTH EYES: Status: ACTIVE | Noted: 2021-01-23

## 2021-01-23 PROBLEM — H40.003 GLAUCOMA SUSPECT OF BOTH EYES: Status: ACTIVE | Noted: 2021-01-23

## 2021-01-23 ASSESSMENT — EXTERNAL EXAM - LEFT EYE: OS_EXAM: MILD TO MOD BROW

## 2021-01-23 ASSESSMENT — EXTERNAL EXAM - RIGHT EYE: OD_EXAM: MILD TO MOD BROW

## 2021-01-23 ASSESSMENT — CUP TO DISC RATIO
OS_RATIO: 0.4
OD_RATIO: 0.1

## 2021-01-23 ASSESSMENT — SLIT LAMP EXAM - LIDS
COMMENTS: 2+ DERMATOCHALASIS
COMMENTS: 2+ DERMATOCHALASIS

## 2021-02-04 ENCOUNTER — TELEPHONE (OUTPATIENT)
Dept: PSYCHIATRY | Facility: CLINIC | Age: 61
End: 2021-02-04

## 2021-02-04 NOTE — TELEPHONE ENCOUNTER
Prior Authorization Retail Medication Request    Medication/Dose: ARIPiprazole (ABILIFY) 2 MG tablet-  Take 2 tablets (4 mg) by mouth daily - Oral  ICD code (if different than what is on RX):  Moderate episode of recurrent major depressive disorder (H) [F33.1]   Previously Tried and Failed:  Wellbutrin, Zoloft, Lexapro   Rationale:  Patient has a long standing history of depressive disorder and is recommended to increase Abilify from 2 mg to 4 mg to target symptoms. Patricia presents to our clinic for the first time for psychiatric evaluation. She has historic diagnoses of MDD and ADHD. She has concerns of depressed mood, anhedonia, distractibility, amotivation, memory difficulties, word-finding struggles, and anxiety. She reports she has been on a combination of Adderall, Effexor, and Sertraline for a long period of time and it is no longer controlling her symptoms. As her Zoloft is at max dose and not providing benefit we will start a slow taper of this medication and add Abilify from 2 mg to 4 mg to augment Effexor. Patient is at a high risk for decompensation and possible hospitalization if she is not able to continue this trial of medication.     Insurance Name:  Not provided   Insurance ID:  Not provided       Pharmacy Information (if different than what is on RX)  Name:  [facility]   Phone:  [facility]

## 2021-02-04 NOTE — TELEPHONE ENCOUNTER
Central Prior Authorization Team   Phone: [phone]      PA Initiation    Medication: ARIPiprazole (ABILIFY) 2 MG tablet-  Take 2 tablets (4 mg) by mouth daily - Oral  Insurance Company: [employer] - Phone [phone] Fax [phone]  Pharmacy Filling the Rx: [id number] [place] [address] AT [facility]  Filling Pharmacy Phone: [phone]  Filling Pharmacy Fax:    Start Date: 2/4/2021

## 2021-02-04 NOTE — TELEPHONE ENCOUNTER
Prior Authorization Approval - for up to 2 tabs/day    Authorization Effective Date: 2/3/2021  Authorization Expiration Date: 2/3/2022  Medication: ARIPiprazole (ABILIFY) 2 MG tablet-  Take 2 tablets (4 mg) by mouth daily - Oral  Approved Dose/Quantity: 60  Reference #: up to 2 tabs/day   Insurance Company: [employer] - Phone [phone] Fax [phone]  Expected CoPay: $10.00  Which Pharmacy is filling the prescription (Not needed for infusion/clinic administered): [facility] #[id number] - [place] [id number] AT [facility]  Pharmacy Notified: Yes - spoke to female staff   Patient Notified: No

## 2021-03-01 ENCOUNTER — TELEPHONE (OUTPATIENT)
Dept: PSYCHIATRY | Facility: CLINIC | Age: 61
End: 2021-03-01

## 2021-03-01 ENCOUNTER — OFFICE VISIT (OUTPATIENT)
Dept: FAMILY MEDICINE | Facility: CLINIC | Age: 61
End: 2021-03-01
Payer: COMMERCIAL

## 2021-03-01 VITALS
WEIGHT: 184 LBS | DIASTOLIC BLOOD PRESSURE: 82 MMHG | RESPIRATION RATE: 16 BRPM | HEIGHT: 63 IN | SYSTOLIC BLOOD PRESSURE: 134 MMHG | HEART RATE: 74 BPM | BODY MASS INDEX: 32.6 KG/M2 | TEMPERATURE: 97.5 F

## 2021-03-01 DIAGNOSIS — R09.81 NASAL CONGESTION: ICD-10-CM

## 2021-03-01 DIAGNOSIS — H93.13 TINNITUS, BILATERAL: ICD-10-CM

## 2021-03-01 DIAGNOSIS — R42 DIZZINESS: Primary | ICD-10-CM

## 2021-03-01 DIAGNOSIS — J30.81 CAT ALLERGIES: ICD-10-CM

## 2021-03-01 PROCEDURE — 99214 OFFICE O/P EST MOD 30 MIN: CPT | Performed by: PHYSICIAN ASSISTANT

## 2021-03-01 RX ORDER — CETIRIZINE HYDROCHLORIDE 10 MG/1
10 TABLET ORAL DAILY
Qty: 30 TABLET | Refills: 3 | Status: SHIPPED | OUTPATIENT
Start: 2021-03-01 | End: 2021-10-07

## 2021-03-01 RX ORDER — FLUTICASONE PROPIONATE 50 MCG
1 SPRAY, SUSPENSION (ML) NASAL DAILY
Qty: 18.2 ML | Refills: 0 | Status: SHIPPED | OUTPATIENT
Start: 2021-03-01 | End: 2021-04-15

## 2021-03-01 RX ORDER — ONDANSETRON 4 MG/1
4 TABLET, ORALLY DISINTEGRATING ORAL EVERY 8 HOURS PRN
Qty: 12 TABLET | Refills: 1 | Status: SHIPPED | OUTPATIENT
Start: 2021-03-01 | End: 2022-03-28

## 2021-03-01 ASSESSMENT — ENCOUNTER SYMPTOMS
NUMBNESS: 0
SHORTNESS OF BREATH: 0
PARESTHESIAS: 0
NAUSEA: 1
NERVOUS/ANXIOUS: 0
WEAKNESS: 0
HEADACHES: 0
DIZZINESS: 1
SORE THROAT: 0
RHINORRHEA: 0
CHILLS: 0
LIGHT-HEADEDNESS: 0
VOMITING: 0
ABDOMINAL PAIN: 0
FEVER: 0

## 2021-03-01 ASSESSMENT — MIFFLIN-ST. JEOR: SCORE: 1362

## 2021-03-01 ASSESSMENT — PAIN SCALES - GENERAL: PAINLEVEL: NO PAIN (0)

## 2021-03-01 NOTE — PATIENT INSTRUCTIONS
Your symptoms are likely due to congestion and your allergies. For treatment:     -Stop Afrin and Benadryl  -Start Flonase and Zyrtec   -Schedule appointments with audiology (for ringing of your ears) and Allergy  -Follow up in April for primary care physical     Please reach out with any questions or concerns.         Patient Education     Dizziness (Uncertain Cause)  Dizziness is a common symptom. It may be described as lightheadedness, spinning, or feeling like you are going to faint. Dizziness can have many causes.   Tell the healthcare provider about:     All medicines you take, including prescription, over-the-counter, herbs, and supplements    Any other symptoms you have    Any health problems you are being treated for    Any past major health problems you've had, such as a heart attack, balance issues, hearing problems, or blood pressure problems    Anything that causes the dizziness to get worse or better  Today's exam did not show an exact cause for your dizziness . Other tests may be needed. Follow up with your healthcare provider.   Home care    Dizziness that occurs with sudden standing may be a sign of mild dehydration. Drink extra fluids for the next few days.    If you recently started a new medicine, stopped a medicine, or had the dose of a current medicine changed, talk with the prescribing healthcare provider. Your medicine plan may need adjustment.    If dizziness lasts more than a few seconds, sit or lie down until it passes. This may help prevent injury in case you pass out. Get up slowly when you feel better.    Don't drive or use power tools or dangerous equipment until you have had no dizziness for at least 48 hours.    Follow-up care  Follow up with your healthcare provider for further evaluation in the next 7 days, or as advised.   When to get medical advice  Call your healthcare provider for any of the following:     Worsening of symptoms or new symptoms    Repeated  vomiting    Headache    Vision or hearing changes  Call 911  Call 911, or get medical care right away if any of these occur:     Chest, arm, neck, back, or jaw pain    Weakness of an arm or leg or one side of the face    Blood in vomit or stool (black or red color)    Shortness of breath    Feeling that your heart is fluttering or beating fast or hard (palpitations)    Passing out or seizure    Trouble walking or speaking  [employer] last reviewed this educational content on 2/1/2020 2000-2020 [employer]. [address] [place] [address]. All rights reserved. This information is not intended as a substitute for professional medical care. Always follow your healthcare professional's instructions.

## 2021-03-01 NOTE — TELEPHONE ENCOUNTER
Last seen: 11/11/20  RTC: 8 weeks  Cancel: 3/1/21 by provider  No-show: none  Next appt: 3/25/21     Incoming refill from patient via telephone     Medication requested: ARIPiprazole (ABILIFY) 2 MG tablet  Directions: Sig - Route: Take 2 tablets (4 mg) by mouth daily - Oral  Qty: 60 tablet  Last refilled: 2/4/21, should have one refill remaining    Per chart review (encounter from 1/12/21) plan to increase Abilify to 4 mg.    - Called patient's pharmacy who verified there is one refill remaining  - Called patient and LVM with update

## 2021-03-01 NOTE — PROGRESS NOTES
Assessment & Plan     Tinnitus, bilateral  Patient has ongoing tinnitus bilaterally x3 years.  She states history of audiology evaluation, but did not follow-up.  Referral placed to audiology for further evaluation and management.  - AUDIOLOGY ADULT REFERRAL; Future    Dizziness  Cat allergies  Nasal congestion  Patient notes intermittent dizziness for many years that can occur with standing up to exercise movement.  Patient has been treating symptoms with Dramamine and Zofran successfully.  Patient also notes significant nasal congestion related to cat allergies and living with  x3 years who has a cat.  Patient treating symptoms with Benadryl and Afrin.  Vital signs normal.  Physical exam significant for nasal congestion and rhinorrhea.  Neurological exam without acute abnormalities.    Low suspicion for acute intracranial process as symptoms are intermittent, ongoing for years, and no neurological deficits on exam.  Symptoms are most likely due to significant congestion related to cat allergies.  Patient prescribed Zyrtec and Flonase.  Recommended discontinuing Benadryl and Afrin.  Referral placed to allergy for further management.  Recommended continued use of diphenhydramine and Zofran if needed for episodes of vertigo.  - fluticasone (FLONASE) 50 MCG/ACT nasal spray; Spray 1 spray into both nostrils daily  - cetirizine (ZYRTEC) 10 MG tablet; Take 1 tablet (10 mg) by mouth daily  - ALLERGY/ASTHMA ADULT REFERRAL  - ondansetron (ZOFRAN-ODT) 4 MG ODT tab; Take 1 tablet (4 mg) by mouth every 8 hours as needed for nausea      See Patient Instructions    Return in about 4 weeks (around 3/29/2021) for Physical Exam.    Suzi Solitario PA-C  [facility] JESUS Gomez is a 60 year old who presents for the following health issues:    HPI       Dizziness  Onset/Duration: intermittently x years, worse this past 1-2 months. Patient notes symptoms are intermittent and can occur with  standing up to fast or with sudden movement. Symptoms are related to a feeling of motion sickness and do not occur every time with these types of movements. Symptoms worsened over the last few months. Patient notes  has a cat. Patient has cat allergies it is experiencing significant congestion.  Patient has been using Afrin daily and Benadryl intermittently over the last 3 years.  Patient would like refill of Zofran for dizziness. She uses this very sparingly.   Description:   Do you feel faint: no  Does it feel like the surroundings (bed, room) are moving: no  Unsteady/off balance: YES, she notes this feels like motion sickness  Have you passed out or fallen: no  Intensity: moderate  Progression of Symptoms: worsening  Accompanying Signs & Symptoms:  Heart palpitations or chest pain: no  Nausea, vomiting: YES- Nausea   Weakness or lack of coordination in arms or legs: no  Vision or speech changes: no  Numbness or tingling: no  Ringing in ears (Tinnitus): YES-bilateral, persistent, no recent audiology follow up   Hearing Loss: YES  History:   Head trauma/concussion history: no  Previous similar symptoms: YES  Recent bleeding history: no  Any new medications (BP?): no  Precipitating factors:   Worse with activity: no  Worse with head movement: no  Alleviating factors:   Does staying in a fixed position give relief: no   Therapies tried and outcome: Zofran and Dramamine, Afrin every night for 3 years and OTC Benadryl PRN.     Review of Systems   Constitutional: Negative for chills and fever.   HENT: Positive for congestion and postnasal drip (chronic ). Negative for ear pain, rhinorrhea and sore throat.    Eyes: Negative for visual disturbance.   Respiratory: Negative for shortness of breath.    Cardiovascular: Negative for chest pain.   Gastrointestinal: Positive for nausea (with dizziness). Negative for abdominal pain and vomiting.   Skin: Negative for rash.   Neurological: Positive for dizziness. Negative  "for weakness, light-headedness, numbness, headaches and paresthesias.   Psychiatric/Behavioral: The patient is not nervous/anxious.             Objective    /82   Pulse 74   Temp 97.5  F (36.4  C) (Tympanic)   Resp 16   Ht 1.597 m (5' 2.89\")   Wt 83.5 kg (184 lb)   LMP 07/16/2012   BMI 32.71 kg/m    Body mass index is 32.71 kg/m .  Physical Exam  Vitals signs and nursing note reviewed.   Constitutional:       General: She is not in acute distress.     Appearance: Normal appearance.   HENT:      Head: Normocephalic and atraumatic.      Right Ear: Tympanic membrane, ear canal and external ear normal.      Left Ear: Tympanic membrane, ear canal and external ear normal.      Nose: Congestion and rhinorrhea present.      Mouth/Throat:      Mouth: Mucous membranes are moist.      Pharynx: Oropharynx is clear.   Eyes:      Extraocular Movements: Extraocular movements intact.      Pupils: Pupils are equal, round, and reactive to light.   Neck:      Musculoskeletal: Normal range of motion.   Cardiovascular:      Rate and Rhythm: Normal rate and regular rhythm.      Heart sounds: Normal heart sounds.   Pulmonary:      Effort: Pulmonary effort is normal.      Breath sounds: Normal breath sounds.   Musculoskeletal: Normal range of motion.   Skin:     General: Skin is warm and dry.   Neurological:      General: No focal deficit present.      Mental Status: She is alert.   Psychiatric:         Mood and Affect: Mood normal.         Behavior: Behavior normal.                      "

## 2021-03-01 NOTE — TELEPHONE ENCOUNTER
[facility] Call Center    Phone Message    May a detailed message be left on voicemail: yes     Reason for Call: Medication Refill Request    Has the patient contacted the pharmacy for the refill? Yes   Name of medication being requested: Aripiprazole  Provider who prescribed the medication: Dr. Martinez  Pharmacy: [id number] [place] [phone] [place]  [facility]  Date medication is needed: 3/8. Patient leaving to go out of town for 2 weeks on the 9th. Was set to see Dr. Martinez today, but appointment was cancelled on our end. (3/1)          Action Taken: Message routed to:  Other: [facility]    Travel Screening: Not Applicable

## 2021-03-16 DIAGNOSIS — I10 ESSENTIAL HYPERTENSION WITH GOAL BLOOD PRESSURE LESS THAN 130/80: ICD-10-CM

## 2021-03-16 DIAGNOSIS — I42.9 IDIOPATHIC CARDIOMYOPATHY (H): ICD-10-CM

## 2021-03-19 RX ORDER — CARVEDILOL 25 MG/1
TABLET ORAL
Qty: 60 TABLET | Refills: 0 | Status: SHIPPED | OUTPATIENT
Start: 2021-03-19 | End: 2021-04-27

## 2021-03-19 NOTE — TELEPHONE ENCOUNTER
Routing refill request to provider for review/approval because:  Due for Annual Physical/Establish Care.    Pended for 1 month supply with appointment reminder.     Previous patient of Blanche Cummins NP.      Radha Byrne RN BSN  [facility]

## 2021-03-27 ENCOUNTER — IMMUNIZATION (OUTPATIENT)
Dept: NURSING | Facility: CLINIC | Age: 61
End: 2021-03-27
Payer: COMMERCIAL

## 2021-03-27 PROCEDURE — 0011A PR COVID VAC MODERNA 100 MCG/0.5 ML IM: CPT

## 2021-03-27 PROCEDURE — 91301 PR COVID VAC MODERNA 100 MCG/0.5 ML IM: CPT

## 2021-03-31 DIAGNOSIS — F33.1 MODERATE EPISODE OF RECURRENT MAJOR DEPRESSIVE DISORDER (H): ICD-10-CM

## 2021-03-31 RX ORDER — ARIPIPRAZOLE 2 MG/1
4 TABLET ORAL DAILY
Qty: 60 TABLET | Refills: 1 | Status: SHIPPED | OUTPATIENT
Start: 2021-03-31 | End: 2021-05-24

## 2021-03-31 NOTE — TELEPHONE ENCOUNTER
Medication requested: ARIPiprazole (ABILIFY) 2 MG tablet  Take 2 tablets (4 mg) by mouth daily   Last refilled: 1/19/2021  Qty: 60/1     Last seen: 11/11/20  RTC: 8 weeks  Cancel: 3/1/21 by provider,3/25/21 Rescheduled/Provider not in clinic)  No-show: none  Next appt: 5/24/2021    Refill decision:   Refill pended and routed to the provider for review/determination due to outside of timeframe  ( cx x 2 -provider schedule). Appt now scheduled for 5/24/2021

## 2021-04-08 ENCOUNTER — OFFICE VISIT (OUTPATIENT)
Dept: AUDIOLOGY | Facility: CLINIC | Age: 61
End: 2021-04-08
Payer: COMMERCIAL

## 2021-04-08 DIAGNOSIS — H90.A31 MIXED CONDUCTIVE AND SENSORINEURAL HEARING LOSS OF RIGHT EAR WITH RESTRICTED HEARING OF LEFT EAR: Primary | ICD-10-CM

## 2021-04-08 PROCEDURE — 99207 PR NO CHARGE LOS: CPT | Performed by: AUDIOLOGIST

## 2021-04-08 PROCEDURE — 92550 TYMPANOMETRY & REFLEX THRESH: CPT | Performed by: AUDIOLOGIST

## 2021-04-08 PROCEDURE — 92557 COMPREHENSIVE HEARING TEST: CPT | Performed by: AUDIOLOGIST

## 2021-04-08 NOTE — PROGRESS NOTES
AUDIOLOGY REPORT    SUBJECTIVE:  Patricia Perez is a 60 year old female who was seen in the Audiology Clinic at the [facility] for audiologic evaluation, referred by Rafa Solitario PA-C .The patient has been seen previously in this clinic on 8/25/15 for assessment and results indicated bilateral sensorineural hearing loss. The patient reports a perceived decline in hearing sensitivity. The patient denies  bilateral otalgia, bilateral drainage, bilateral aural fullness, family history of hearing loss and history of noise exposure.  She does report constant bilateral tinnitus.  The patient notes difficulty with communication in a variety of listening situations.   OBJECTIVE:  Abuse Screening:  Do you feel unsafe at home or work/school? No  Do you feel threatened by someone? No  Does anyone try to keep you from having contact with others, or doing things outside of your home? No  Physical signs of abuse present? No       Otoscopic exam indicates ears are clear of cerumen in the right ear and partial occlusion with cerumen in the left ear.     Pure Tone Thresholds assessed using conventional audiometry with good  reliability from 250-8000 Hz bilaterally using insert earphones and circumaural headphones     RIGHT:  borderline-normal and mild sloping to moderate mixed hearing loss    LEFT:    normal and borderline-normal sloping to mild-moderate and severe sensorineural hearing loss    Tympanogram:    RIGHT: normal eardrum mobility    LEFT:   normal eardrum mobility    Reflexes (reported by stimulus ear):  RIGHT: Ipsilateral is present at normal levels  RIGHT: Contralateral is absent at frequencies tested  LEFT:   Ipsilateral is present at normal levels  LEFT:   Contralateral is present at normal levels      Speech Reception Threshold:    RIGHT: 35 dB HL    LEFT:   35 dB HL    Speech Reception Thresholds are in good agreement with pure tone thresholds.    Word Recognition Score:     RIGHT: 100% at 75  dB HL using NU-6 recorded word list.    LEFT:   100% at 75 dB HL using NU-6 recorded word list.      ASSESSMENT:     ICD-10-CM    1. Mixed conductive and sensorineural hearing loss of right ear with restricted hearing of left ear  H90.A31        Compared to patient's previous audiogram dated 8/25/15, hearing has declined slightly.  Today s results were discussed with the patient in detail.     PLAN:  Patient was counseled regarding hearing loss and impact on communication.  Patient is a good candidate for amplification at this time.  Handout on good communication strategies, and hearing aid use was given to patient. It is recommended that the patient see ENT for medical clearance and cerumen removal, then schedule a hearing aid consultation appointment.  Please call this clinic with questions regarding these results or recommendations.          Baudilio Winchester MA, CCC-A  [place] Licensed Audiologist #[id number]  4/8/2021

## 2021-04-15 ENCOUNTER — OFFICE VISIT (OUTPATIENT)
Dept: ALLERGY | Facility: CLINIC | Age: 61
End: 2021-04-15
Attending: PHYSICIAN ASSISTANT
Payer: COMMERCIAL

## 2021-04-15 VITALS
DIASTOLIC BLOOD PRESSURE: 88 MMHG | WEIGHT: 188.27 LBS | BODY MASS INDEX: 33.47 KG/M2 | TEMPERATURE: 98.2 F | SYSTOLIC BLOOD PRESSURE: 141 MMHG | OXYGEN SATURATION: 100 % | HEART RATE: 74 BPM

## 2021-04-15 DIAGNOSIS — J31.0 CHRONIC RHINITIS: Primary | ICD-10-CM

## 2021-04-15 DIAGNOSIS — J31.0 RHINITIS MEDICAMENTOSA: ICD-10-CM

## 2021-04-15 DIAGNOSIS — H10.89 OTHER CONJUNCTIVITIS OF BOTH EYES: ICD-10-CM

## 2021-04-15 DIAGNOSIS — J45.30 MILD PERSISTENT ASTHMA WITHOUT COMPLICATION: ICD-10-CM

## 2021-04-15 DIAGNOSIS — I10 ESSENTIAL HYPERTENSION WITH GOAL BLOOD PRESSURE LESS THAN 130/80: ICD-10-CM

## 2021-04-15 DIAGNOSIS — T16.2XXA FOREIGN BODY OF LEFT EAR, INITIAL ENCOUNTER: ICD-10-CM

## 2021-04-15 DIAGNOSIS — T48.5X5A RHINITIS MEDICAMENTOSA: ICD-10-CM

## 2021-04-15 DIAGNOSIS — I42.9 IDIOPATHIC CARDIOMYOPATHY (H): ICD-10-CM

## 2021-04-15 PROCEDURE — 99244 OFF/OP CNSLTJ NEW/EST MOD 40: CPT | Performed by: ALLERGY & IMMUNOLOGY

## 2021-04-15 RX ORDER — FLUTICASONE PROPIONATE 50 MCG
2 SPRAY, SUSPENSION (ML) NASAL DAILY
Qty: 18.2 ML | Refills: 3 | Status: SHIPPED | OUTPATIENT
Start: 2021-04-15 | End: 2021-10-21

## 2021-04-15 RX ORDER — ALBUTEROL SULFATE 90 UG/1
2 AEROSOL, METERED RESPIRATORY (INHALATION) EVERY 6 HOURS PRN
Qty: 18 G | Refills: 3 | Status: SHIPPED | OUTPATIENT
Start: 2021-04-15 | End: 2022-09-01

## 2021-04-15 RX ORDER — PREDNISONE 20 MG/1
40 TABLET ORAL DAILY
Qty: 10 TABLET | Refills: 0 | Status: SHIPPED | OUTPATIENT
Start: 2021-04-15 | End: 2021-04-20

## 2021-04-15 RX ORDER — AZELASTINE 1 MG/ML
2 SPRAY, METERED NASAL 2 TIMES DAILY PRN
Qty: 30 ML | Refills: 3 | Status: SHIPPED | OUTPATIENT
Start: 2021-04-15 | End: 2021-08-10

## 2021-04-15 RX ORDER — AZELASTINE HYDROCHLORIDE 0.5 MG/ML
1 SOLUTION/ DROPS OPHTHALMIC 2 TIMES DAILY PRN
Qty: 6 ML | Refills: 3 | Status: SHIPPED | OUTPATIENT
Start: 2021-04-15 | End: 2021-11-01

## 2021-04-15 ASSESSMENT — ENCOUNTER SYMPTOMS
DIARRHEA: 0
ARTHRALGIAS: 1
NERVOUS/ANXIOUS: 1
ACTIVITY CHANGE: 0
VOMITING: 0
SINUS PRESSURE: 1
FEVER: 0
CHILLS: 0
VOICE CHANGE: 1
EYE REDNESS: 0
EYE ITCHING: 1
RHINORRHEA: 0
COUGH: 0
CHEST TIGHTNESS: 0
JOINT SWELLING: 0
MYALGIAS: 0
WHEEZING: 0
ADENOPATHY: 0
FACIAL SWELLING: 0
NAUSEA: 1
DIZZINESS: 1
EYE DISCHARGE: 0
SHORTNESS OF BREATH: 1
HEADACHES: 1
UNEXPECTED WEIGHT CHANGE: 1

## 2021-04-15 NOTE — LETTER
4/15/2021         RE: Patricia Perez  [address]  [facility] [address]        Dear Colleague,    Thank you for referring your patient, Patricia Perez, to the [facility]. Please see a copy of my visit note below.    SUBJECTIVE:                                                               Patricia Perez  is a 61 year-old-female who presents today to our Allergy Clinic at [facility]; she is being seen in consultation at the request of Suzi Solitario PA-C, for nasal congestion evaluation.    The patient reports having intermittent episodes of dizziness, facial pressure, nasal congestion bilaterally for years. The symptoms have been worse for the last several months. She also has itchy and watery eyes, sneezing, and some shortness of breath. She does have a history of asthma. Shortness of breath is getting better often without using the inhaler within 1-2 hours. She does not feel that this shortness of breath is bad.     The pattern of her symptoms is perennial. She is not sure if there are seasonal exacerbations. Rain makes her feel worse.     She has been using Afrin for more than 1 year almost daily. She stopped it at the beginning of March. Started cetirizine 10 mg by mouth once daily and Flonase, 1 spray in each nostril once daily.     She was tested in 2010 by Dr. Ruiz. Sensitivity to cat, dog, d.mites, molds, tree, and weed pollen was noted at that time. I reviewed that test personally. She was on allergen immunotherapy which she find helpful, but last year symptoms got worse. She attributes that to more frequent cat exposure (spends more time at home in light of COVID-19 pandemic).       She was diagnosed with asthma more than 10 years ago. Mostly she has shortness of breath. Albuterol seems to help within 5-10 minutes.   These days, she has no wheezing. Shortness of breath is present once or twice a week.       Patient Active Problem  List   Diagnosis     Major depression, recurrent (H)     Dizziness and giddiness     Motion sickness     GERD (gastroesophageal reflux disease)     Idiopathic cardiomyopathy (H)     Sleep apnea     Hyperlipidemia LDL goal <100     Allergic rhinitis     Hypertension goal BP (blood pressure) < 130/80     Pelvic pain in female     Health Care Home     24 hour contact handout given     Elevated PTHrP level     H/O bariatric surgery     Hx of gastric bypass     Hypovitaminosis D     Allergic rhinitis     Attention deficit disorder of adult     Mild persistent asthma     Low back pain     Hand joint pain     Myopia, bilateral     Dumping syndrome     Benign essential hypertension     Adjustment disorder with depressed mood     Decreased hearing of both ears     BPV (benign positional vertigo), unspecified laterality     Advanced directives, counseling/discussion     Vitamin D deficiency     Coronary artery disease involving native coronary artery of native heart without angina pectoris     Acute pain of left knee     Syncope     Posterior vitreous detachment of both eyes     Combined forms of age-related cataract, mild, of both eyes     Glaucoma suspect of both eyes       Past Medical History:   Diagnosis Date     Anxiety      Arthritis      Asthma      Bursitis of shoulder 3/4/2010     Chronic rhinitis 3/25/2009     Coronary artery disease      Depression      Depression      Dyspnea on exertion      Gastro-oesophageal reflux disease      GERD (gastroesophageal reflux disease) 10/14/2008     Hypertension      Idiopathic cardiomyopathy (H) 1/8/2009     Indigestion      Itchy eyes      Itchy nose      Left leg pain 6/16/2011     Motion sickness 2/27/2008     Nasal congestion      Pneumonia      Problems related to lack of adequate sleep      Ringing in ears      Sleep apnea 3/25/2009     Sneezing       Problem (# of Occurrences) Relation (Name,Age of Onset)    Alcohol/Drug (1) Father: Abuse    Allergies (3) Mother, Sister:  Poison Joy, Brother: Enviromental/ Bees    Breast Cancer (1) Maternal Grandmother    Cardiovascular (1) Father    Cerebrovascular Disease (1) Other    Depression (2) Father, Brother    Gastrointestinal Disease (1) Mother: non cancerous pancreatic tumor    Glaucoma (2) Paternal Grandmother, Paternal Grandfather    Heart Disease (1) Mother: A fib    Hypertension (3) Father, Mother, Brother    Lipids (1) Father    Macular Degeneration (1) Other    Respiratory (1) Father: COPD       Negative family history of: Diabetes, Cancer - colorectal, Retinal detachment, Amblyopia, Thyroid Disease        Past Surgical History:   Procedure Laterality Date     BARIATRIC SURGERY       DIABETES RESOURCES  As Child    Tonsillectomy     ENT SURGERY       HERNIA REPAIR       LAPAROSCOPIC BYPASS GASTRIC  10/16/2012    Procedure: LAPAROSCOPIC BYPASS GASTRIC;  laparoscopic reyna en y gastric bypass;  Surgeon: Nish Bradford MD;  Location: [facility] OR     TONSILLECTOMY       Uretural Sticture  As Child     Social History     Socioeconomic History     Marital status:      Spouse name: None     Number of children: 0     Years of education: 12+     Highest education level: None   Occupational History     Employer: [employer]   Social Needs     Financial resource strain: None     Food insecurity     Worry: None     Inability: None     Transportation needs     Medical: None     Non-medical: None   Tobacco Use     Smoking status: Never Smoker     Smokeless tobacco: Never Used   Substance and Sexual Activity     Alcohol use: No     Drug use: No     Sexual activity: Not Currently     Partners: Male   Lifestyle     Physical activity     Days per week: None     Minutes per session: None     Stress: None   Relationships     Social connections     Talks on phone: None     Gets together: None     Attends Taoism service: None     Active member of club or organization: None     Attends meetings of clubs or organizations: None      Relationship status: None     Intimate partner violence     Fear of current or ex partner: None     Emotionally abused: None     Physically abused: None     Forced sexual activity: None   Other Topics Concern     Parent/sibling w/ CABG, MI or angioplasty before 65F 55M? No   Social History Narrative    Dairy/d 2 servings/d.     Caffeine 3 servings/d    Exercise 2 x week    Sunscreen used - Yes    Seatbelts used - Yes    Working smoke/CO detectors in the home - Yes    Guns stored in the home - No    Self Breast Exams - Yes    Self Testicular Exam - NA    Eye Exam up to date - Yes    Dental Exam up to date - No    Pap Smear up to date - Yes    Mammogram up to date - Yes    PSA up to date - NA    Dexa Scan up to date - NA    Flex Sig / Colonoscopy up to date - Yes    Immunizations up to date - Yes    Abuse: Current or Past(Physical, Sexual or Emotional)- No    Do you feel safe in your environment - Yes    LOWELL SMITH LPN.    02/05/2007        April 15, 2021    ENVIRONMENTAL HISTORY: The family [place]. The home is heated with a forced air. They do have central air conditioning. The patient's bedroom is furnished with carpeting in bedroom, allergen mattress cover, and animals sleep in bedroom.  Pets inside the house include 1 cat(s). There is no history of cockroach or mice infestation. There is/are 0 smokers in the house.  The house does not have a damp basement, it's a split level.            Review of Systems   Constitutional: Positive for unexpected weight change (weight gain). Negative for activity change, chills and fever.   HENT: Positive for congestion, sinus pressure and voice change. Negative for dental problem, ear pain, facial swelling, nosebleeds, postnasal drip, rhinorrhea and sneezing.    Eyes: Positive for itching. Negative for discharge and redness.   Respiratory: Positive for shortness of breath. Negative for cough, chest tightness and wheezing.    Cardiovascular:  Negative for chest pain.        Heartburn; reflux   Gastrointestinal: Positive for nausea. Negative for diarrhea and vomiting.   Musculoskeletal: Positive for arthralgias (stiff joints). Negative for joint swelling and myalgias.   Skin: Negative for rash.   Allergic/Immunologic: Positive for environmental allergies (Hx of AIT).   Neurological: Positive for dizziness and headaches.   Hematological: Negative for adenopathy.   Psychiatric/Behavioral: Negative for behavioral problems and self-injury. The patient is nervous/anxious.            Current Outpatient Medications:      albuterol (PROAIR HFA/PROVENTIL HFA/VENTOLIN HFA) 108 (90 Base) MCG/ACT inhaler, Inhale 2 puffs into the lungs every 6 hours as needed for shortness of breath / dyspnea or wheezing, Disp: 1 Inhaler, Rfl: 11     ARIPiprazole (ABILIFY) 2 MG tablet, Take 2 tablets (4 mg) by mouth daily, Disp: 60 tablet, Rfl: 1     aspirin (ASA) 81 MG EC tablet, Take 1 tablet (81 mg) by mouth daily, Disp: 90 tablet, Rfl: 3     azelastine (ASTELIN) 0.1 % nasal spray, Spray 2 sprays into both nostrils 2 times daily as needed for rhinitis, Disp: 30 mL, Rfl: 3     azelastine (OPTIVAR) 0.05 % ophthalmic solution, Apply 1 drop to eye 2 times daily as needed (itchy/watery eyes), Disp: 6 mL, Rfl: 3     blood glucose monitoring (NO BRAND SPECIFIED) test strip, Use to test blood sugar 1-2 times daily or as directed., Disp: 1 Box, Rfl: 2     calcium carbonate-vitamin D (CALTRATE 600+D) 600-400 MG-UNIT CHEW, Take 1 chew tab by mouth 2 times daily, Disp: 180 tablet, Rfl: 3     carvedilol (COREG) 25 MG tablet, TAKE 1 TABLET(25 MG) BY MOUTH TWICE DAILY WITH MEALS, Disp: 60 tablet, Rfl: 0     cetirizine (ZYRTEC) 10 MG tablet, Take 1 tablet (10 mg) by mouth daily, Disp: 30 tablet, Rfl: 3     COMFORT LANCETS MISC, 1 Device 2 times daily as needed, Disp: 1 Box, Rfl: 1     Cyanocobalamin (VITAMIN B-12 SL), Place under the tongue every other day , Disp: , Rfl:      estrogen  conj-medroxyPROGESTERone (PREMPRO) 0.45-1.5 MG tablet, Take 1 tablet by mouth daily, Disp: 84 tablet, Rfl: 1     ezetimibe (ZETIA) 10 MG tablet, Take 1 tablet (10 mg) by mouth daily, Disp: 90 tablet, Rfl: 3     fluticasone (FLONASE) 50 MCG/ACT nasal spray, Spray 2 sprays into both nostrils daily, Disp: 18.2 mL, Rfl: 3     hydrALAZINE (APRESOLINE) 10 MG tablet, Take 1 tablet (10 mg) by mouth 2 times daily, Disp: 180 tablet, Rfl: 3     Ibuprofen-diphenhydrAMINE Cit (IBUPROFEN PM PO), Take by mouth At Bedtime, Disp: , Rfl:      isosorbide mononitrate (IMDUR) 30 MG 24 hr tablet, TAKE 1 TABLET(30 MG) BY MOUTH DAILY, Disp: 90 tablet, Rfl: 3     lisinopril (ZESTRIL) 5 MG tablet, TAKE 1 TABLET(5 MG) BY MOUTH DAILY, Disp: 90 tablet, Rfl: 3     Multiple Vitamin (MULTI-VITAMIN) per tablet, Take 1 tablet by mouth daily., Disp: 100 tablet, Rfl: 3     predniSONE (DELTASONE) 20 MG tablet, Take 2 tablets (40 mg) by mouth daily for 5 days, Disp: 10 tablet, Rfl: 0     simvastatin (ZOCOR) 20 MG tablet, TAKE 1 TABLET BY MOUTH AT BEDTIME, Disp: 90 tablet, Rfl: 3     venlafaxine (EFFEXOR-ER) 225 MG 24 hr tablet, Take 1 tablet (225 mg) by mouth daily, Disp: 90 tablet, Rfl: 1     meclizine (ANTIVERT) 25 MG tablet, Take 1 tablet (25 mg) by mouth 3 times daily as needed (Patient not taking: Reported on 4/15/2021), Disp: 30 tablet, Rfl: 1     ondansetron (ZOFRAN-ODT) 4 MG ODT tab, Take 1 tablet (4 mg) by mouth every 8 hours as needed for nausea (Patient not taking: Reported on 4/15/2021), Disp: 12 tablet, Rfl: 1     oxymetazoline (AFRIN) 0.05 % nasal spray, Spray 1 spray into both nostrils every evening, Disp: , Rfl:   Immunization History   Administered Date(s) Administered     COVID-19,PF,Moderna 03/27/2021     Flu, Unspecified 09/25/2020     HEPA 05/06/2005, 05/29/2013     Influenza (IIV3) PF 01/07/2003, 11/08/2005, 01/05/2007, 12/17/2007, 12/08/2008, 09/21/2009, 10/07/2010, 10/18/2011, 10/18/2012     Influenza Quad, Recombinant, p-free  (RIV4) 11/16/2018     Influenza Vaccine IM > 6 months Valent IIV4 09/30/2013, 09/22/2015     Influenza Vaccine, 6+MO IM (QUADRIVALENT W/PRESERVATIVES) 10/01/2019     Pneumococcal 23 valent 07/28/2011     TD (ADULT, 7+) 01/15/2001     TDAP Vaccine (Adacel) 04/22/2011, 10/08/2020     Allergies   Allergen Reactions     Atorvastatin      Leg myalgias     OBJECTIVE:                                                                 BP (!) 141/88 (BP Location: Left arm, Patient Position: Sitting, Cuff Size: Adult Regular)   Pulse 74   Temp 98.2  F (36.8  C) (Tympanic)   Wt 85.4 kg (188 lb 4.4 oz)   LMP 07/16/2012   SpO2 100%   BMI 33.47 kg/m          Physical Exam  Vitals signs and nursing note reviewed.   Constitutional:       General: She is not in acute distress.     Appearance: She is not ill-appearing, toxic-appearing or diaphoretic.   HENT:      Head: Normocephalic and atraumatic.      Right Ear: Tympanic membrane, ear canal and external ear normal.      Left Ear: External ear normal.      Ears:      Comments: Foreign body in the left ear canal     Nose: Mucosal edema, congestion and rhinorrhea present.      Right Turbinates: Enlarged, swollen and pale.      Left Turbinates: Enlarged, swollen and pale.      Mouth/Throat:      Lips: Pink.      Mouth: Mucous membranes are moist.      Pharynx: Oropharynx is clear. No pharyngeal swelling, oropharyngeal exudate, posterior oropharyngeal erythema or uvula swelling.   Eyes:      General:         Right eye: No discharge.         Left eye: No discharge.      Conjunctiva/sclera: Conjunctivae normal.   Neck:      Musculoskeletal: Normal range of motion.   Cardiovascular:      Rate and Rhythm: Normal rate and regular rhythm.      Heart sounds: Normal heart sounds. No murmur.   Pulmonary:      Effort: Pulmonary effort is normal. No respiratory distress.      Breath sounds: Normal breath sounds and air entry. No stridor, decreased air movement or transmitted upper airway  sounds. No decreased breath sounds, wheezing, rhonchi or rales.   Musculoskeletal: Normal range of motion.   Lymphadenopathy:      Cervical: No cervical adenopathy.   Skin:     General: Skin is warm.      Capillary Refill: Capillary refill takes less than 2 seconds.      Findings: No rash.   Neurological:      Mental Status: She is alert and oriented to person, place, and time.   Psychiatric:         Mood and Affect: Mood normal.         Behavior: Behavior normal.           WORKUP:   ACT Score:  21    ASSESSMENT/PLAN:    Chronic rhinitis  Rhinitis medicamentosa  Other conjunctivitis of both eyes  While the patient has a history of allergic rhinitis, I believe that there is a significant component of rhinitis medicamentosa.  The patient has been using oxymetazoline for more than a year, almost daily.  -Start prednisone 40 mg by mouth once daily for 5 days.  -Increase intranasal fluticasone up to 2 sprays in each nostril twice daily.  Once she is better, she can decrease the dose to 2 sprays in each nostril once daily.  -Use azelastine nasal spray 2 sprays in each nostril twice daily as needed.  -Use Optivar 1 drop in each eye twice daily as needed.  -I cannot perform the skin test today because the patient has not stopped cetirizine; however, it would not have changed my management.  I am planning to repeat the skin test down the road.    - fluticasone (FLONASE) 50 MCG/ACT nasal spray  Dispense: 18.2 mL; Refill: 3  - azelastine (ASTELIN) 0.1 % nasal spray  Dispense: 30 mL; Refill: 3  - azelastine (OPTIVAR) 0.05 % ophthalmic solution  Dispense: 6 mL; Refill: 3  - predniSONE (DELTASONE) 20 MG tablet  Dispense: 10 tablet; Refill: 0    Mild persistent asthma without complication  The patient has symptoms once-twice a week, which is still within acceptable range.  -Continue as is.  -Ordered PFT to be done 1 week before the next visit.    - General PFT Lab (Please always keep checked)  - Pulmonary Function Test    Foreign  body of left ear, initial encounter  Is partially covered by wax, but appears to be a cottonball.  -I referred to ENT for removal.    - OTOLARYNGOLOGY REFERRAL       Return in about 6 weeks (around 5/27/2021), or if symptoms worsen or fail to improve.    Thank you for allowing us to participate in the care of this patient. Please feel free to contact us if there are any questions or concerns about the patient.    Disclaimer: This note consists of symbols derived from keyboarding, dictation and/or voice recognition software. As a result, there may be errors in the script that have gone undetected. Please consider this when interpreting information found in this chart.    Shimon Melgar MD, FAASUE, AMANDA  Allergy, Asthma and Immunology    [facility]         Again, thank you for allowing me to participate in the care of your patient.        Sincerely,        Shimon Melgar MD

## 2021-04-15 NOTE — PROGRESS NOTES
SUBJECTIVE:                                                               Patricia Perez  is a 61 year-old-female who presents today to our Allergy Clinic at [facility]; she is being seen in consultation at the request of Suzi Solitario PA-C, for nasal congestion evaluation.    The patient reports having intermittent episodes of dizziness, facial pressure, nasal congestion bilaterally for years. The symptoms have been worse for the last several months. She also has itchy and watery eyes, sneezing, and some shortness of breath. She does have a history of asthma. Shortness of breath is getting better often without using the inhaler within 1-2 hours. She does not feel that this shortness of breath is bad.     The pattern of her symptoms is perennial. She is not sure if there are seasonal exacerbations. Rain makes her feel worse.     She has been using Afrin for more than 1 year almost daily. She stopped it at the beginning of March. Started cetirizine 10 mg by mouth once daily and Flonase, 1 spray in each nostril once daily.     She was tested in 2010 by Dr. Ruiz. Sensitivity to cat, dog, d.mites, molds, tree, and weed pollen was noted at that time. I reviewed that test personally. She was on allergen immunotherapy which she find helpful, but last year symptoms got worse. She attributes that to more frequent cat exposure (spends more time at home in light of COVID-19 pandemic).       She was diagnosed with asthma more than 10 years ago. Mostly she has shortness of breath. Albuterol seems to help within 5-10 minutes.   These days, she has no wheezing. Shortness of breath is present once or twice a week.       Patient Active Problem List   Diagnosis     Major depression, recurrent (H)     Dizziness and giddiness     Motion sickness     GERD (gastroesophageal reflux disease)     Idiopathic cardiomyopathy (H)     Sleep apnea     Hyperlipidemia LDL goal <100     Allergic rhinitis      Hypertension goal BP (blood pressure) < 130/80     Pelvic pain in female     Health Care Home     24 hour contact handout given     Elevated PTHrP level     H/O bariatric surgery     Hx of gastric bypass     Hypovitaminosis D     Allergic rhinitis     Attention deficit disorder of adult     Mild persistent asthma     Low back pain     Hand joint pain     Myopia, bilateral     Dumping syndrome     Benign essential hypertension     Adjustment disorder with depressed mood     Decreased hearing of both ears     BPV (benign positional vertigo), unspecified laterality     Advanced directives, counseling/discussion     Vitamin D deficiency     Coronary artery disease involving native coronary artery of native heart without angina pectoris     Acute pain of left knee     Syncope     Posterior vitreous detachment of both eyes     Combined forms of age-related cataract, mild, of both eyes     Glaucoma suspect of both eyes       Past Medical History:   Diagnosis Date     Anxiety      Arthritis      Asthma      Bursitis of shoulder 3/4/2010     Chronic rhinitis 3/25/2009     Coronary artery disease      Depression      Depression      Dyspnea on exertion      Gastro-oesophageal reflux disease      GERD (gastroesophageal reflux disease) 10/14/2008     Hypertension      Idiopathic cardiomyopathy (H) 1/8/2009     Indigestion      Itchy eyes      Itchy nose      Left leg pain 6/16/2011     Motion sickness 2/27/2008     Nasal congestion      Pneumonia      Problems related to lack of adequate sleep      Ringing in ears      Sleep apnea 3/25/2009     Sneezing       Problem (# of Occurrences) Relation (Name,Age of Onset)    Alcohol/Drug (1) Father: Abuse    Allergies (3) Mother, Sister: Poison Joy, Brother: Enviromental/ Bees    Breast Cancer (1) Maternal Grandmother    Cardiovascular (1) Father    Cerebrovascular Disease (1) Other    Depression (2) Father, Brother    Gastrointestinal Disease (1) Mother: non cancerous pancreatic tumor     Glaucoma (2) Paternal Grandmother, Paternal Grandfather    Heart Disease (1) Mother: A fib    Hypertension (3) Father, Mother, Brother    Lipids (1) Father    Macular Degeneration (1) Other    Respiratory (1) Father: COPD       Negative family history of: Diabetes, Cancer - colorectal, Retinal detachment, Amblyopia, Thyroid Disease        Past Surgical History:   Procedure Laterality Date     BARIATRIC SURGERY       DIABETES RESOURCES  As Child    Tonsillectomy     ENT SURGERY       HERNIA REPAIR       LAPAROSCOPIC BYPASS GASTRIC  10/16/2012    Procedure: LAPAROSCOPIC BYPASS GASTRIC;  laparoscopic reyna en y gastric bypass;  Surgeon: Nish Bradford MD;  Location: [facility] OR     TONSILLECTOMY       Uretural Sticture  As Child     Social History     Socioeconomic History     Marital status:      Spouse name: None     Number of children: 0     Years of education: 12+     Highest education level: None   Occupational History     Employer: [employer]   Social Needs     Financial resource strain: None     Food insecurity     Worry: None     Inability: None     Transportation needs     Medical: None     Non-medical: None   Tobacco Use     Smoking status: Never Smoker     Smokeless tobacco: Never Used   Substance and Sexual Activity     Alcohol use: No     Drug use: No     Sexual activity: Not Currently     Partners: Male   Lifestyle     Physical activity     Days per week: None     Minutes per session: None     Stress: None   Relationships     Social connections     Talks on phone: None     Gets together: None     Attends Hinduism service: None     Active member of club or organization: None     Attends meetings of clubs or organizations: None     Relationship status: None     Intimate partner violence     Fear of current or ex partner: None     Emotionally abused: None     Physically abused: None     Forced sexual activity: None   Other Topics Concern     Parent/sibling w/ CABG, MI or angioplasty  before 65F 55M? No   Social History Narrative    Dairy/d 2 servings/d.     Caffeine 3 servings/d    Exercise 2 x week    Sunscreen used - Yes    Seatbelts used - Yes    Working smoke/CO detectors in the home - Yes    Guns stored in the home - No    Self Breast Exams - Yes    Self Testicular Exam - NA    Eye Exam up to date - Yes    Dental Exam up to date - No    Pap Smear up to date - Yes    Mammogram up to date - Yes    PSA up to date - NA    Dexa Scan up to date - NA    Flex Sig / Colonoscopy up to date - Yes    Immunizations up to date - Yes    Abuse: Current or Past(Physical, Sexual or Emotional)- No    Do you feel safe in your environment - Yes    LOWELL SMITH VIOLET.    02/05/2009        April 15, 2021    ENVIRONMENTAL HISTORY: [place]. The home is heated with a forced air. They do have central air conditioning. The patient's bedroom is furnished with carpeting in bedroom, allergen mattress cover, and animals sleep in bedroom.  Pets inside the house include 1 cat(s). There is no history of cockroach or mice infestation. There is/are 0 smokers in the house.  The house does not have a damp basement, it's a split level.            Review of Systems   Constitutional: Positive for unexpected weight change (weight gain). Negative for activity change, chills and fever.   HENT: Positive for congestion, sinus pressure and voice change. Negative for dental problem, ear pain, facial swelling, nosebleeds, postnasal drip, rhinorrhea and sneezing.    Eyes: Positive for itching. Negative for discharge and redness.   Respiratory: Positive for shortness of breath. Negative for cough, chest tightness and wheezing.    Cardiovascular: Negative for chest pain.        Heartburn; reflux   Gastrointestinal: Positive for nausea. Negative for diarrhea and vomiting.   Musculoskeletal: Positive for arthralgias (stiff joints). Negative for joint swelling and myalgias.   Skin: Negative for rash.    Allergic/Immunologic: Positive for environmental allergies (Hx of AIT).   Neurological: Positive for dizziness and headaches.   Hematological: Negative for adenopathy.   Psychiatric/Behavioral: Negative for behavioral problems and self-injury. The patient is nervous/anxious.            Current Outpatient Medications:      albuterol (PROAIR HFA/PROVENTIL HFA/VENTOLIN HFA) 108 (90 Base) MCG/ACT inhaler, Inhale 2 puffs into the lungs every 6 hours as needed for shortness of breath / dyspnea or wheezing, Disp: 1 Inhaler, Rfl: 11     ARIPiprazole (ABILIFY) 2 MG tablet, Take 2 tablets (4 mg) by mouth daily, Disp: 60 tablet, Rfl: 1     aspirin (ASA) 81 MG EC tablet, Take 1 tablet (81 mg) by mouth daily, Disp: 90 tablet, Rfl: 3     azelastine (ASTELIN) 0.1 % nasal spray, Spray 2 sprays into both nostrils 2 times daily as needed for rhinitis, Disp: 30 mL, Rfl: 3     azelastine (OPTIVAR) 0.05 % ophthalmic solution, Apply 1 drop to eye 2 times daily as needed (itchy/watery eyes), Disp: 6 mL, Rfl: 3     blood glucose monitoring (NO BRAND SPECIFIED) test strip, Use to test blood sugar 1-2 times daily or as directed., Disp: 1 Box, Rfl: 2     calcium carbonate-vitamin D (CALTRATE 600+D) 600-400 MG-UNIT CHEW, Take 1 chew tab by mouth 2 times daily, Disp: 180 tablet, Rfl: 3     carvedilol (COREG) 25 MG tablet, TAKE 1 TABLET(25 MG) BY MOUTH TWICE DAILY WITH MEALS, Disp: 60 tablet, Rfl: 0     cetirizine (ZYRTEC) 10 MG tablet, Take 1 tablet (10 mg) by mouth daily, Disp: 30 tablet, Rfl: 3     COMFORT LANCETS MISC, 1 Device 2 times daily as needed, Disp: 1 Box, Rfl: 1     Cyanocobalamin (VITAMIN B-12 SL), Place under the tongue every other day , Disp: , Rfl:      estrogen conj-medroxyPROGESTERone (PREMPRO) 0.45-1.5 MG tablet, Take 1 tablet by mouth daily, Disp: 84 tablet, Rfl: 1     ezetimibe (ZETIA) 10 MG tablet, Take 1 tablet (10 mg) by mouth daily, Disp: 90 tablet, Rfl: 3     fluticasone (FLONASE) 50 MCG/ACT nasal spray, Spray 2  sprays into both nostrils daily, Disp: 18.2 mL, Rfl: 3     hydrALAZINE (APRESOLINE) 10 MG tablet, Take 1 tablet (10 mg) by mouth 2 times daily, Disp: 180 tablet, Rfl: 3     Ibuprofen-diphenhydrAMINE Cit (IBUPROFEN PM PO), Take by mouth At Bedtime, Disp: , Rfl:      isosorbide mononitrate (IMDUR) 30 MG 24 hr tablet, TAKE 1 TABLET(30 MG) BY MOUTH DAILY, Disp: 90 tablet, Rfl: 3     lisinopril (ZESTRIL) 5 MG tablet, TAKE 1 TABLET(5 MG) BY MOUTH DAILY, Disp: 90 tablet, Rfl: 3     Multiple Vitamin (MULTI-VITAMIN) per tablet, Take 1 tablet by mouth daily., Disp: 100 tablet, Rfl: 3     predniSONE (DELTASONE) 20 MG tablet, Take 2 tablets (40 mg) by mouth daily for 5 days, Disp: 10 tablet, Rfl: 0     simvastatin (ZOCOR) 20 MG tablet, TAKE 1 TABLET BY MOUTH AT BEDTIME, Disp: 90 tablet, Rfl: 3     venlafaxine (EFFEXOR-ER) 225 MG 24 hr tablet, Take 1 tablet (225 mg) by mouth daily, Disp: 90 tablet, Rfl: 1     meclizine (ANTIVERT) 25 MG tablet, Take 1 tablet (25 mg) by mouth 3 times daily as needed (Patient not taking: Reported on 4/15/2021), Disp: 30 tablet, Rfl: 1     ondansetron (ZOFRAN-ODT) 4 MG ODT tab, Take 1 tablet (4 mg) by mouth every 8 hours as needed for nausea (Patient not taking: Reported on 4/15/2021), Disp: 12 tablet, Rfl: 1     oxymetazoline (AFRIN) 0.05 % nasal spray, Spray 1 spray into both nostrils every evening, Disp: , Rfl:   Immunization History   Administered Date(s) Administered     COVID-19,PF,Moderna 03/27/2021     Flu, Unspecified 09/25/2020     HEPA 05/06/2005, 05/29/2013     Influenza (IIV3) PF 01/07/2003, 11/08/2005, 01/05/2007, 12/17/2007, 12/08/2008, 09/21/2009, 10/07/2010, 10/18/2011, 10/18/2012     Influenza Quad, Recombinant, p-free (RIV4) 11/16/2018     Influenza Vaccine IM > 6 months Valent IIV4 09/30/2013, 09/22/2015     Influenza Vaccine, 6+MO IM (QUADRIVALENT W/PRESERVATIVES) 10/01/2019     Pneumococcal 23 valent 07/28/2011     TD (ADULT, 7+) 01/15/2001     TDAP Vaccine (Adacel) 04/22/2011,  10/08/2020     Allergies   Allergen Reactions     Atorvastatin      Leg myalgias     OBJECTIVE:                                                                 BP (!) 141/88 (BP Location: Left arm, Patient Position: Sitting, Cuff Size: Adult Regular)   Pulse 74   Temp 98.2  F (36.8  C) (Tympanic)   Wt 85.4 kg (188 lb 4.4 oz)   LMP 07/16/2012   SpO2 100%   BMI 33.47 kg/m          Physical Exam  Vitals signs and nursing note reviewed.   Constitutional:       General: She is not in acute distress.     Appearance: She is not ill-appearing, toxic-appearing or diaphoretic.   HENT:      Head: Normocephalic and atraumatic.      Right Ear: Tympanic membrane, ear canal and external ear normal.      Left Ear: External ear normal.      Ears:      Comments: Foreign body in the left ear canal     Nose: Mucosal edema, congestion and rhinorrhea present.      Right Turbinates: Enlarged, swollen and pale.      Left Turbinates: Enlarged, swollen and pale.      Mouth/Throat:      Lips: Pink.      Mouth: Mucous membranes are moist.      Pharynx: Oropharynx is clear. No pharyngeal swelling, oropharyngeal exudate, posterior oropharyngeal erythema or uvula swelling.   Eyes:      General:         Right eye: No discharge.         Left eye: No discharge.      Conjunctiva/sclera: Conjunctivae normal.   Neck:      Musculoskeletal: Normal range of motion.   Cardiovascular:      Rate and Rhythm: Normal rate and regular rhythm.      Heart sounds: Normal heart sounds. No murmur.   Pulmonary:      Effort: Pulmonary effort is normal. No respiratory distress.      Breath sounds: Normal breath sounds and air entry. No stridor, decreased air movement or transmitted upper airway sounds. No decreased breath sounds, wheezing, rhonchi or rales.   Musculoskeletal: Normal range of motion.   Lymphadenopathy:      Cervical: No cervical adenopathy.   Skin:     General: Skin is warm.      Capillary Refill: Capillary refill takes less than 2 seconds.       Findings: No rash.   Neurological:      Mental Status: She is alert and oriented to person, place, and time.   Psychiatric:         Mood and Affect: Mood normal.         Behavior: Behavior normal.           WORKUP:   ACT Score:  21    ASSESSMENT/PLAN:    Chronic rhinitis  Rhinitis medicamentosa  Other conjunctivitis of both eyes  While the patient has a history of allergic rhinitis, I believe that there is a significant component of rhinitis medicamentosa.  The patient has been using oxymetazoline for more than a year, almost daily.  -Start prednisone 40 mg by mouth once daily for 5 days.  -Increase intranasal fluticasone up to 2 sprays in each nostril twice daily.  Once she is better, she can decrease the dose to 2 sprays in each nostril once daily.  -Use azelastine nasal spray 2 sprays in each nostril twice daily as needed.  -Use Optivar 1 drop in each eye twice daily as needed.  -I cannot perform the skin test today because the patient has not stopped cetirizine; however, it would not have changed my management.  I am planning to repeat the skin test down the road.    - fluticasone (FLONASE) 50 MCG/ACT nasal spray  Dispense: 18.2 mL; Refill: 3  - azelastine (ASTELIN) 0.1 % nasal spray  Dispense: 30 mL; Refill: 3  - azelastine (OPTIVAR) 0.05 % ophthalmic solution  Dispense: 6 mL; Refill: 3  - predniSONE (DELTASONE) 20 MG tablet  Dispense: 10 tablet; Refill: 0    Mild persistent asthma without complication  The patient has symptoms once-twice a week, which is still within acceptable range.  -Continue as is.  -Ordered PFT to be done 1 week before the next visit.    - General PFT Lab (Please always keep checked)  - Pulmonary Function Test    Foreign body of left ear, initial encounter  Is partially covered by wax, but appears to be a cottonball.  -I referred to ENT for removal.    - OTOLARYNGOLOGY REFERRAL       Return in about 6 weeks (around 5/27/2021), or if symptoms worsen or fail to improve.    Thank you for  allowing us to participate in the care of this patient. Please feel free to contact us if there are any questions or concerns about the patient.    Disclaimer: This note consists of symbols derived from keyboarding, dictation and/or voice recognition software. As a result, there may be errors in the script that have gone undetected. Please consider this when interpreting information found in this chart.    Shimon Melgar MD, FAAAAI, FACAAI  Allergy, Asthma and Immunology    [facility]

## 2021-04-15 NOTE — PATIENT INSTRUCTIONS
--Start prednisone 40 mg by mouth once a day for 5 days.   --Increase Flonase to 2 sprays in each nostril twice daily. Once better, decrease to 2 sprays in each nostril once daily.   -Use azelastine 2 sprays in each nostril twice a day when necessary.    Optivar 1 drop in each eye twice daily as needed.    -Continue using albuterol inhaler 2-4 puffs every 4 hours as needed for chest tightness/wheezing/shortness of breath/persistent cough.      Call to schedule pulmonary function test, 1 week before the next appointment:    [phone]

## 2021-04-16 ASSESSMENT — ASTHMA QUESTIONNAIRES: ACT_TOTALSCORE: 21

## 2021-04-19 NOTE — TELEPHONE ENCOUNTER
Routing refill request to provider for review/approval because:  Migdalia given x1 and patient did not follow up, please advise

## 2021-04-24 ENCOUNTER — IMMUNIZATION (OUTPATIENT)
Dept: NURSING | Facility: CLINIC | Age: 61
End: 2021-04-24
Attending: INTERNAL MEDICINE
Payer: COMMERCIAL

## 2021-04-24 PROCEDURE — 91301 PR COVID VAC MODERNA 100 MCG/0.5 ML IM: CPT

## 2021-04-24 PROCEDURE — 0012A PR COVID VAC MODERNA 100 MCG/0.5 ML IM: CPT

## 2021-04-26 ENCOUNTER — OFFICE VISIT (OUTPATIENT)
Dept: FAMILY MEDICINE | Facility: CLINIC | Age: 61
End: 2021-04-26
Payer: COMMERCIAL

## 2021-04-26 VITALS
OXYGEN SATURATION: 100 % | SYSTOLIC BLOOD PRESSURE: 118 MMHG | DIASTOLIC BLOOD PRESSURE: 80 MMHG | HEIGHT: 63 IN | WEIGHT: 186.13 LBS | HEART RATE: 70 BPM | TEMPERATURE: 97.2 F | RESPIRATION RATE: 18 BRPM | BODY MASS INDEX: 32.98 KG/M2

## 2021-04-26 DIAGNOSIS — R35.0 URINARY FREQUENCY: Primary | ICD-10-CM

## 2021-04-26 DIAGNOSIS — B37.31 CANDIDIASIS OF VAGINA: ICD-10-CM

## 2021-04-26 LAB
ALBUMIN UR-MCNC: NEGATIVE MG/DL
APPEARANCE UR: CLEAR
BACTERIA #/AREA URNS HPF: ABNORMAL /HPF
BILIRUB UR QL STRIP: NEGATIVE
COLOR UR AUTO: YELLOW
GLUCOSE UR STRIP-MCNC: NEGATIVE MG/DL
HGB UR QL STRIP: ABNORMAL
KETONES UR STRIP-MCNC: NEGATIVE MG/DL
LEUKOCYTE ESTERASE UR QL STRIP: NEGATIVE
NITRATE UR QL: NEGATIVE
NON-SQ EPI CELLS #/AREA URNS LPF: ABNORMAL /LPF
PH UR STRIP: 5 PH (ref 5–7)
RBC #/AREA URNS AUTO: ABNORMAL /HPF
SOURCE: ABNORMAL
SP GR UR STRIP: 1.01 (ref 1–1.03)
SPECIMEN SOURCE: ABNORMAL
UROBILINOGEN UR STRIP-ACNC: 0.2 EU/DL (ref 0.2–1)
WBC #/AREA URNS AUTO: ABNORMAL /HPF
WET PREP SPEC: ABNORMAL
YEAST #/AREA URNS HPF: ABNORMAL /HPF

## 2021-04-26 PROCEDURE — 81001 URINALYSIS AUTO W/SCOPE: CPT | Performed by: PHYSICIAN ASSISTANT

## 2021-04-26 PROCEDURE — 87210 SMEAR WET MOUNT SALINE/INK: CPT | Performed by: PHYSICIAN ASSISTANT

## 2021-04-26 PROCEDURE — 99213 OFFICE O/P EST LOW 20 MIN: CPT | Performed by: PHYSICIAN ASSISTANT

## 2021-04-26 RX ORDER — FLUCONAZOLE 150 MG/1
150 TABLET ORAL ONCE
Qty: 1 TABLET | Refills: 0 | Status: SHIPPED | OUTPATIENT
Start: 2021-04-26 | End: 2021-04-26

## 2021-04-26 ASSESSMENT — MIFFLIN-ST. JEOR: SCORE: 1366.64

## 2021-04-26 NOTE — PROGRESS NOTES
"    Assessment & Plan     Urinary frequency  Candidiasis of vagina  Patient is a 61-year-old female who presents to clinic due to 1 week of urinary frequency and incontinence with pressure or coughing.  Vital signs normal.  Physical exam without acute abnormalities.    -Low suspicion for systemic infection or kidney stone as there is no abdominal tenderness, rebound, guarding, or CVA tenderness.  -Urinalysis negative for UTI.  Wet prep negative for bacterial vaginosis.  -Wet prep positive for vaginal candidiasis.  Patient prescribed fluconazole.  Discussed return precautions and symptoms that warrant urgent/emergent follow-up.    - UA reflex to Microscopic and Culture  - Wet prep  - Urine Microscopic  - fluconazole (DIFLUCAN) 150 MG tablet; Take 1 tablet (150 mg) by mouth once for 1 dose      See Patient Instructions    Return in about 1 week (around 5/3/2021), or if symptoms worsen or fail to improve.    YODIT Sherman [facility] JESUS Gomez is a 61 year old who presents for the following health issues     HPI     Genitourinary - Female  Onset/Duration: 1 week  Description:   Painful urination (Dysuria): no           Frequency: YES, small amount of incontinence with pressure/coughing   Blood in urine (Hematuria): no  Delay in urine (Hesitency): no  Intensity: moderate  Progression of Symptoms:  same  Accompanying Signs & Symptoms:  Fever/chills: no  Flank pain: no  Nausea and vomiting: no  Vaginal symptoms: none  Abdominal/Pelvic Pain: no, \"heaviness\" in lower abdomen   History:   History of frequent UTI s: YES  History of kidney stones: YES  Sexually Active: no  Possibility of pregnancy: No  Precipitating or alleviating factors: None  Therapies tried and outcome:  None             Objective    /80   Pulse 70   Temp 97.2  F (36.2  C) (Tympanic)   Resp 18   Ht 1.597 m (5' 2.89\")   Wt 84.4 kg (186 lb 2 oz)   LMP 07/16/2012   SpO2 100%   BMI 33.09 kg/m    Body mass " index is 33.09 kg/m .  Physical Exam  Vitals signs and nursing note reviewed.   Constitutional:       General: She is not in acute distress.     Appearance: Normal appearance.   HENT:      Head: Normocephalic and atraumatic.   Eyes:      Extraocular Movements: Extraocular movements intact.      Pupils: Pupils are equal, round, and reactive to light.   Neck:      Musculoskeletal: Normal range of motion.   Cardiovascular:      Rate and Rhythm: Normal rate and regular rhythm.      Heart sounds: Normal heart sounds.   Pulmonary:      Effort: Pulmonary effort is normal.      Breath sounds: Normal breath sounds.   Abdominal:      General: Bowel sounds are normal.      Palpations: Abdomen is soft.      Tenderness: There is no abdominal tenderness. There is no right CVA tenderness, left CVA tenderness, guarding or rebound.   Musculoskeletal: Normal range of motion.   Skin:     General: Skin is warm and dry.   Neurological:      General: No focal deficit present.      Mental Status: She is alert.   Psychiatric:         Mood and Affect: Mood normal.         Behavior: Behavior normal.            Results for orders placed or performed in visit on 04/26/21   UA reflex to Microscopic and Culture     Status: Abnormal    Specimen: Midstream Urine   Result Value Ref Range    Color Urine Yellow     Appearance Urine Clear     Glucose Urine Negative NEG^Negative mg/dL    Bilirubin Urine Negative NEG^Negative    Ketones Urine Negative NEG^Negative mg/dL    Specific Gravity Urine 1.010 1.003 - 1.035    Blood Urine Trace (A) NEG^Negative    pH Urine 5.0 5.0 - 7.0 pH    Protein Albumin Urine Negative NEG^Negative mg/dL    Urobilinogen Urine 0.2 0.2 - 1.0 EU/dL    Nitrite Urine Negative NEG^Negative    Leukocyte Esterase Urine Negative NEG^Negative    Source Midstream Urine    Urine Microscopic     Status: Abnormal   Result Value Ref Range    WBC Urine 0 - 5 OTO5^0 - 5 /HPF    RBC Urine O - 2 OTO2^O - 2 /HPF    Squamous Epithelial /LPF  Urine Few FEW^Few /LPF    Bacteria Urine Few (A) NEG^Negative /HPF    Yeast Urine Few (A) NEG^Negative /HPF   Wet prep     Status: Abnormal    Specimen: Vagina   Result Value Ref Range    Specimen Description Vagina     Wet Prep No Trichomonas seen     Wet Prep No clue cells seen     Wet Prep Moderate  Yeast seen   (A)     Wet Prep Moderate  WBC'S seen

## 2021-04-26 NOTE — PATIENT INSTRUCTIONS
Your lab work does not show any signs of urinary tract infection or bacterial vaginosis.  It does show yeast infection.  For treatment you been prescribed fluconazole.  Your symptoms should improve over the next week.  Please contact clinic if symptoms are worsening, or are not improving.  Patient Education     Vaginal Infection: Yeast (Candidiasis)  Yeast infection occurs when yeast in the vagina increase and attacks the vaginal tissues. Yeast is a type of fungus. These infections are often caused by a type of yeast called Candida albicans. Other species of yeast can also cause infections. Factors that may make infection more likely include recent antibiotic use, douching, or increased sex. Yeast infections are more common in women who have diabetes, or are obese or pregnant, or have a weak immune system.   Symptoms of yeast infection    Clumpy or thin, white discharge, which may look like cottage cheese    No odor or minimal odor    Severe vaginal itching or burning    Burning with urination    Swelling, redness of vulva    Pain during sex    Treating yeast infection  Yeast infection is treated with a vaginal antifungal cream. In some cases, antifungal pills are prescribed instead. During treatment:     Finish all of your medicine, even if your symptoms go away.    Apply the cream before going to bed. Lie flat after applying so that it doesn't drip out.    Don't douche or use tampons.    Don't rely on a diaphragm or condoms, since the cream may weaken them.    Avoid intercourse if advised by your healthcare provider.  Should I treat a yeast infection myself?  Discuss with your healthcare provider whether you should use over-the-counter medicines to treat a yeast infection. Self-treatment may depend on whether:     You've had a yeast infection in the past.    You're at risk for sexually transmitted infections (STIs).  Call your healthcare provider if symptoms don't go away or come back after treatment.   Dariel  last reviewed this educational content on 4/1/2020 2000-2021 [employer]. All rights reserved. This information is not intended as a substitute for professional medical care. Always follow your healthcare professional's instructions.

## 2021-04-27 RX ORDER — CARVEDILOL 25 MG/1
TABLET ORAL
Qty: 60 TABLET | Refills: 0 | Status: SHIPPED | OUTPATIENT
Start: 2021-04-27 | End: 2021-07-13

## 2021-04-27 NOTE — TELEPHONE ENCOUNTER
Rx approved.  Please call patient and schedule visit to establish new PCP and for further refills.    Suzi Solitario PA-C on 4/27/2021 at 2:41 PM

## 2021-04-28 ENCOUNTER — OFFICE VISIT (OUTPATIENT)
Dept: OTOLARYNGOLOGY | Facility: CLINIC | Age: 61
End: 2021-04-28
Payer: COMMERCIAL

## 2021-04-28 VITALS
DIASTOLIC BLOOD PRESSURE: 78 MMHG | BODY MASS INDEX: 32.96 KG/M2 | WEIGHT: 186 LBS | TEMPERATURE: 97.5 F | SYSTOLIC BLOOD PRESSURE: 124 MMHG | HEART RATE: 67 BPM | HEIGHT: 63 IN

## 2021-04-28 DIAGNOSIS — H61.23 BILATERAL IMPACTED CERUMEN: ICD-10-CM

## 2021-04-28 DIAGNOSIS — H90.6 MIXED HEARING LOSS, BILATERAL: Primary | ICD-10-CM

## 2021-04-28 DIAGNOSIS — T16.2XXA EAR FOREIGN BODY, LEFT, INITIAL ENCOUNTER: ICD-10-CM

## 2021-04-28 PROCEDURE — 69210 REMOVE IMPACTED EAR WAX UNI: CPT | Mod: 59 | Performed by: OTOLARYNGOLOGY

## 2021-04-28 PROCEDURE — 99243 OFF/OP CNSLTJ NEW/EST LOW 30: CPT | Mod: 25 | Performed by: OTOLARYNGOLOGY

## 2021-04-28 PROCEDURE — 69200 CLEAR OUTER EAR CANAL: CPT | Mod: LT | Performed by: OTOLARYNGOLOGY

## 2021-04-28 ASSESSMENT — MIFFLIN-ST. JEOR: SCORE: 1366.07

## 2021-04-28 NOTE — PROGRESS NOTES
Chief Complaint   Patient presents with     Cerumen Impaction     Needs ears cleaned/may have some cotton from a q-tip in left ear     History of Present Illness   Patricia Perez is a 61 year old female who presents to me today for ear evaluation.  I am seeing this patient in consultation for foreign body left ear canal at the request of the provider Dr. Melgar.  The patient is to be seen for hearing aid clearance and for bilateral excessive cerumen.  Recently the patient was seen by allergy and they thought they saw a piece of cotton or foreign body in the left ear.      She had problems with ear infections as a child.  She has noticed decreased hearing in both ears.  She has tinnitus in both ears slightly worse on the left-hand side.  She denies any otalgia, otorrhea, bloody otorrhea, or aissatou vertigo.  She does intermittently have some lightheadedness/dizzy sensation.  She might of had some ear surgery as a child with MI to perform myringotomy and aspiration in the 1960s.  Otherwise no history of ear surgery.      The patient underwent an audiogram performed 4/8/2021.  My review of the audiogram shows borderline normal sloping to moderate mixed hearing loss in the right ear and borderline normal sloping to moderately severe to severe mixed hearing loss in the left ear.  Pure-tone average is 38 dB on the right and 39 dB on the left.  Speech reception threshhold is 35 dB on the right and 35 dB on the left.  The patient had 100% word recognition on the right and 100% word recognition on the left.  The patient had a type A tympanogram on the right and a type A tympanogram on the left.    Past Medical History  Patient Active Problem List   Diagnosis     Major depression, recurrent (H)     Dizziness and giddiness     Motion sickness     GERD (gastroesophageal reflux disease)     Idiopathic cardiomyopathy (H)     Sleep apnea     Hyperlipidemia LDL goal <100     Allergic rhinitis     Hypertension goal BP (blood  pressure) < 130/80     Pelvic pain in female     Health Care Home     24 hour contact handout given     Elevated PTHrP level     H/O bariatric surgery     Hx of gastric bypass     Hypovitaminosis D     Allergic rhinitis     Attention deficit disorder of adult     Mild persistent asthma     Low back pain     Hand joint pain     Myopia, bilateral     Dumping syndrome     Benign essential hypertension     Adjustment disorder with depressed mood     Decreased hearing of both ears     BPV (benign positional vertigo), unspecified laterality     Advanced directives, counseling/discussion     Vitamin D deficiency     Coronary artery disease involving native coronary artery of native heart without angina pectoris     Acute pain of left knee     Syncope     Posterior vitreous detachment of both eyes     Combined forms of age-related cataract, mild, of both eyes     Glaucoma suspect of both eyes     Current Medications    Current Outpatient Medications:      albuterol (PROAIR HFA/PROVENTIL HFA/VENTOLIN HFA) 108 (90 Base) MCG/ACT inhaler, Inhale 2 puffs into the lungs every 6 hours as needed for shortness of breath / dyspnea or wheezing, Disp: 18 g, Rfl: 3     ARIPiprazole (ABILIFY) 2 MG tablet, Take 2 tablets (4 mg) by mouth daily, Disp: 60 tablet, Rfl: 1     aspirin (ASA) 81 MG EC tablet, Take 1 tablet (81 mg) by mouth daily, Disp: 90 tablet, Rfl: 3     azelastine (ASTELIN) 0.1 % nasal spray, Spray 2 sprays into both nostrils 2 times daily as needed for rhinitis, Disp: 30 mL, Rfl: 3     azelastine (OPTIVAR) 0.05 % ophthalmic solution, Apply 1 drop to eye 2 times daily as needed (itchy/watery eyes), Disp: 6 mL, Rfl: 3     calcium carbonate-vitamin D (CALTRATE 600+D) 600-400 MG-UNIT CHEW, Take 1 chew tab by mouth 2 times daily, Disp: 180 tablet, Rfl: 3     carvedilol (COREG) 25 MG tablet, TAKE 1 TABLET(25 MG) BY MOUTH TWICE DAILY WITH MEALS, Disp: 60 tablet, Rfl: 0     cetirizine (ZYRTEC) 10 MG tablet, Take 1 tablet (10 mg) by  mouth daily, Disp: 30 tablet, Rfl: 3     Cyanocobalamin (VITAMIN B-12 SL), Place under the tongue every other day , Disp: , Rfl:      estrogen conj-medroxyPROGESTERone (PREMPRO) 0.45-1.5 MG tablet, Take 1 tablet by mouth daily, Disp: 84 tablet, Rfl: 1     ezetimibe (ZETIA) 10 MG tablet, Take 1 tablet (10 mg) by mouth daily, Disp: 90 tablet, Rfl: 3     fluticasone (FLONASE) 50 MCG/ACT nasal spray, Spray 2 sprays into both nostrils daily, Disp: 18.2 mL, Rfl: 3     hydrALAZINE (APRESOLINE) 10 MG tablet, Take 1 tablet (10 mg) by mouth 2 times daily, Disp: 180 tablet, Rfl: 3     Ibuprofen-diphenhydrAMINE Cit (IBUPROFEN PM PO), Take by mouth At Bedtime, Disp: , Rfl:      isosorbide mononitrate (IMDUR) 30 MG 24 hr tablet, TAKE 1 TABLET(30 MG) BY MOUTH DAILY, Disp: 90 tablet, Rfl: 3     lisinopril (ZESTRIL) 5 MG tablet, TAKE 1 TABLET(5 MG) BY MOUTH DAILY, Disp: 90 tablet, Rfl: 3     Multiple Vitamin (MULTI-VITAMIN) per tablet, Take 1 tablet by mouth daily., Disp: 100 tablet, Rfl: 3     simvastatin (ZOCOR) 20 MG tablet, TAKE 1 TABLET BY MOUTH AT BEDTIME, Disp: 90 tablet, Rfl: 3     venlafaxine (EFFEXOR-ER) 225 MG 24 hr tablet, Take 1 tablet (225 mg) by mouth daily, Disp: 90 tablet, Rfl: 1     blood glucose monitoring (NO BRAND SPECIFIED) test strip, Use to test blood sugar 1-2 times daily or as directed. (Patient not taking: Reported on 4/28/2021), Disp: 1 Box, Rfl: 2     COMFORT LANCETS MISC, 1 Device 2 times daily as needed (Patient not taking: Reported on 4/28/2021), Disp: 1 Box, Rfl: 1     meclizine (ANTIVERT) 25 MG tablet, Take 1 tablet (25 mg) by mouth 3 times daily as needed (Patient not taking: Reported on 4/15/2021), Disp: 30 tablet, Rfl: 1     ondansetron (ZOFRAN-ODT) 4 MG ODT tab, Take 1 tablet (4 mg) by mouth every 8 hours as needed for nausea (Patient not taking: Reported on 4/15/2021), Disp: 12 tablet, Rfl: 1    Allergies  Allergies   Allergen Reactions     Atorvastatin      Leg myalgias       Social  History  Social History     Socioeconomic History     Marital status:      Spouse name: None     Number of children: 0     Years of education: 12+     Highest education level: None   Occupational History     Employer: [employer]   Social Needs     Financial resource strain: None     Food insecurity     Worry: None     Inability: None     Transportation needs     Medical: None     Non-medical: None   Tobacco Use     Smoking status: Never Smoker     Smokeless tobacco: Never Used   Substance and Sexual Activity     Alcohol use: No     Drug use: No     Sexual activity: Not Currently     Partners: Male   Lifestyle     Physical activity     Days per week: None     Minutes per session: None     Stress: None   Relationships     Social connections     Talks on phone: None     Gets together: None     Attends [employer] service: None     Active member of club or organization: None     Attends meetings of clubs or organizations: None     Relationship status: None     Intimate partner violence     Fear of current or ex partner: None     Emotionally abused: None     Physically abused: None     Forced sexual activity: None   Other Topics Concern     Parent/sibling w/ CABG, MI or angioplasty before 65F 55M? No   Social History Narrative    Dairy/d 2 servings/d.     Caffeine 3 servings/d    Exercise 2 x week    Sunscreen used - Yes    Seatbelts used - Yes    Working smoke/CO detectors in the home - Yes    Guns stored in the home - No    Self Breast Exams - Yes    Self Testicular Exam - NA    Eye Exam up to date - Yes    Dental Exam up to date - No    Pap Smear up to date - Yes    Mammogram up to date - Yes    PSA up to date - NA    Dexa Scan up to date - NA    Flex Sig / Colonoscopy up to date - Yes    Immunizations up to date - Yes    Abuse: Current or Past(Physical, Sexual or Emotional)- No    Do you feel safe in your environment - Yes    LOWELL SMITH LPN.    02/05/2009        April 15, 2021    ENVIRONMENTAL  "HISTORY: [place] The home is heated with a forced air. They do have central air conditioning. The patient's bedroom is furnished with carpeting in bedroom, allergen mattress cover, and animals sleep in bedroom.  Pets inside the house include 1 cat(s). There is no history of cockroach or mice infestation. There is/are 0 smokers in the house.  The house does not have a damp basement, it's a split level.        Family History  Family History   Problem Relation Age of Onset     Hypertension Father      Lipids Father      Alcohol/Drug Father         Abuse     Depression Father      Respiratory Father         COPD     Cardiovascular Father      Gastrointestinal Disease Mother         non cancerous pancreatic tumor     Hypertension Mother      Heart Disease Mother         A fib     Allergies Mother      Breast Cancer Maternal Grandmother      Glaucoma Paternal Grandmother      Allergies Sister         Poison Ivy     Glaucoma Paternal Grandfather      Depression Brother      Hypertension Brother      Allergies Brother         Enviromental/ Bees     Cerebrovascular Disease Other      Macular Degeneration Other      Diabetes No family hx of      Cancer - colorectal No family hx of      Retinal detachment No family hx of      Amblyopia No family hx of      Thyroid Disease No family hx of        Review of Systems  As per HPI and PMHx, otherwise 7 system review of the head and neck negative.    Physical Exam  /78 (BP Location: Right arm, Patient Position: Sitting, Cuff Size: Adult Regular)   Pulse 67   Temp 97.5  F (36.4  C) (Tympanic)   Ht 1.597 m (5' 2.89\")   Wt 84.4 kg (186 lb)   LMP 07/16/2012   BMI 33.06 kg/m    GENERAL: Patient is a pleasant, cooperative 61 year old female in no acute distress.  HEAD: Normocephalic, atraumatic.  Hair and scalp are normal.  EYES: Pupils are equal, round, reactive to light and accommodation.  Extraocular movements are intact.  The " sclera nonicteric without injection.  The extraocular structures are normal.  EARS: See below.  NEUROLOGIC: Cranial nerves II through XII are grossly intact.  Voice is strong.  Facial nerve examination incomplete due to the patient wearing a mask.  CARDIOVASCULAR: Extremities are warm and well-perfused.  No significant peripheral edema.  RESPIRATORY: Patient has nonlabored breathing without cough, wheeze, stridor.  PSYCHIATRIC: Patient is alert and oriented.  Mood and affect appear normal.  SKIN: Warm and dry.  No scalp, face, or neck lesions noted.    Physical Exam and Procedure    EARS: Normal shape and symmetry.  No tenderness when palpating the mastoid or tragal areas bilaterally.  The ears were then examined under the otic binocular microscope to perform cerumen removal.  Otoscopic exam on the right reveals impacted cerumen down to the level of the tympanic membrane.  The cerumen was removed with an alligator forceps.  The right tympanic membrane was round, intact without evidence of effusion.  No retraction, granulation, drainage, or evidence of cholesteatoma.      Attention was turned to the left ear.  Otoscopic exam on the left reveals impacted cerumen overlying a foreign body.  This appeared to be part of the head of a Q-tip.  The debris and the foreign body were impacted down to the level of the tympanic membrane.  The cerumen and foreign body were removed with an alligator forceps.  The left tympanic membrane was round, intact without evidence of effusion.  No retraction, granulation, drainage, or evidence of cholesteatoma.      Assessment and Plan     ICD-10-CM    1. Mixed hearing loss, bilateral  H90.6 Remove Cerumen     REMV EXT CANAL FOREIGN BODY   2. Bilateral impacted cerumen  H61.23 Remove Cerumen     REMV EXT CANAL FOREIGN BODY   3. Ear foreign body, left, initial encounter  T16.2XXA Remove Cerumen     REMV EXT CANAL FOREIGN BODY     It was my pleasure seeing Patricia Perez today in clinic.  The  patient had a left ear canal foreign body removed in office and bilateral impacted cerumen successfully removed in office.  We spent the remainder of today's visit on education including not putting any instrument in the ear.  The patient can use over-the-counter items such as Pro Ear, baby oil, or mineral oil for ear itching.    The patient presents today with bilateral next hearing loss.  This is most consistent with presbycusis and her previous ear infection history. I can find no evidence of serious CNS disorders or other complicating factors that could be causing this.  We spent the remainder of today's visit on education. We discussed hearing protection in noisy environments.    The patient is medically cleared for consideration of a hearing aid evaluation.    The patient will follow up as necessary for worsening symptoms or changes in symptoms. I have also recommended repeat audiogram in 1-3 years, or sooner if symptoms warrant.      Riccardo Blackburn MD  Department of Otolarygology-Head and Neck Surgery  [facility]

## 2021-04-28 NOTE — NURSING NOTE
"Initial /78 (BP Location: Right arm, Patient Position: Sitting, Cuff Size: Adult Regular)   Pulse 67   Temp 97.5  F (36.4  C) (Tympanic)   Ht 1.597 m (5' 2.89\")   Wt 84.4 kg (186 lb)   LMP 07/16/2012   BMI 33.06 kg/m   Estimated body mass index is 33.06 kg/m  as calculated from the following:    Height as of this encounter: 1.597 m (5' 2.89\").    Weight as of this encounter: 84.4 kg (186 lb). .    Sharon Barillas CMA    "

## 2021-04-28 NOTE — PATIENT INSTRUCTIONS
Per physician instructions      If you have questions or concerns on any instructions given to you by your provider today or if you need to schedule an appointment, you can reach us at [phone].

## 2021-05-12 ENCOUNTER — HOSPITAL ENCOUNTER (OUTPATIENT)
Dept: RESPIRATORY THERAPY | Facility: CLINIC | Age: 61
Discharge: HOME | End: 2021-05-12
Attending: INTERNAL MEDICINE | Admitting: INTERNAL MEDICINE
Payer: COMMERCIAL

## 2021-05-12 DIAGNOSIS — J45.30 MILD PERSISTENT ASTHMA WITHOUT COMPLICATION: ICD-10-CM

## 2021-05-12 PROCEDURE — 999N000105 HC STATISTIC NO DOCUMENTATION TO SUPPORT CHARGE

## 2021-05-12 PROCEDURE — 94060 EVALUATION OF WHEEZING: CPT

## 2021-05-12 PROCEDURE — 94640 AIRWAY INHALATION TREATMENT: CPT

## 2021-05-12 PROCEDURE — 250N000009 HC RX 250: Performed by: ALLERGY & IMMUNOLOGY

## 2021-05-12 PROCEDURE — 94726 PLETHYSMOGRAPHY LUNG VOLUMES: CPT

## 2021-05-12 PROCEDURE — 94729 DIFFUSING CAPACITY: CPT

## 2021-05-12 RX ORDER — ALBUTEROL SULFATE 0.83 MG/ML
2.5 SOLUTION RESPIRATORY (INHALATION) ONCE
Status: COMPLETED | OUTPATIENT
Start: 2021-05-12 | End: 2021-05-12

## 2021-05-12 RX ADMIN — ALBUTEROL SULFATE 2.5 MG: 2.5 SOLUTION RESPIRATORY (INHALATION) at 13:25

## 2021-05-13 LAB
DLCOUNC-%PRED-PRE: 86 %
DLCOUNC-PRE: 16.6 ML/MIN/MMHG
DLCOUNC-PRED: 19.24 ML/MIN/MMHG
ERV-%PRED-PRE: 104 %
ERV-PRE: 0.47 L
ERV-PRED: 0.45 L
EXPTIME-PRE: 6.36 SEC
FEF2575-%PRED-POST: 176 %
FEF2575-%PRED-PRE: 153 %
FEF2575-POST: 3.7 L/SEC
FEF2575-PRE: 3.21 L/SEC
FEF2575-PRED: 2.09 L/SEC
FEFMAX-%PRED-PRE: 119 %
FEFMAX-PRE: 7.13 L/SEC
FEFMAX-PRED: 5.95 L/SEC
FEV1-%PRED-PRE: 132 %
FEV1-PRE: 3.09 L
FEV1FEV6-PRE: 83 %
FEV1FEV6-PRED: 80 %
FEV1FVC-PRE: 83 %
FEV1FVC-PRED: 79 %
FEV1SVC-PRE: 81 %
FEV1SVC-PRED: 76 %
FIFMAX-PRE: 4.85 L/SEC
FRCPLETH-%PRED-PRE: 92 %
FRCPLETH-PRE: 2.45 L
FRCPLETH-PRED: 2.64 L
FVC-%PRED-PRE: 125 %
FVC-PRE: 3.7 L
FVC-PRED: 2.95 L
IC-%PRED-PRE: 127 %
IC-PRE: 3.34 L
IC-PRED: 2.62 L
RVPLETH-%PRED-PRE: 104 %
RVPLETH-PRE: 1.98 L
RVPLETH-PRED: 1.9 L
TLCPLETH-%PRED-PRE: 121 %
TLCPLETH-PRE: 5.79 L
TLCPLETH-PRED: 4.75 L
VA-%PRED-PRE: 112 %
VA-PRE: 5.11 L
VC-%PRED-PRE: 124 %
VC-PRE: 3.81 L
VC-PRED: 3.07 L

## 2021-05-14 NOTE — RESULT ENCOUNTER NOTE
[employer] message sent:  Pulmonary function test within normal limits.  No reversibility postbronchodilator.   No changes in the plan.

## 2021-05-19 ENCOUNTER — OFFICE VISIT (OUTPATIENT)
Dept: ALLERGY | Facility: CLINIC | Age: 61
End: 2021-05-19
Payer: COMMERCIAL

## 2021-05-19 VITALS
HEART RATE: 66 BPM | WEIGHT: 188.27 LBS | BODY MASS INDEX: 33.47 KG/M2 | TEMPERATURE: 97.7 F | SYSTOLIC BLOOD PRESSURE: 126 MMHG | OXYGEN SATURATION: 100 % | DIASTOLIC BLOOD PRESSURE: 76 MMHG

## 2021-05-19 DIAGNOSIS — J31.0 RHINITIS MEDICAMENTOSA: ICD-10-CM

## 2021-05-19 DIAGNOSIS — T48.5X5A RHINITIS MEDICAMENTOSA: ICD-10-CM

## 2021-05-19 DIAGNOSIS — J31.0 CHRONIC RHINITIS: ICD-10-CM

## 2021-05-19 DIAGNOSIS — J45.30 MILD PERSISTENT ASTHMA WITHOUT COMPLICATION: Primary | ICD-10-CM

## 2021-05-19 DIAGNOSIS — H10.89 OTHER CONJUNCTIVITIS OF BOTH EYES: ICD-10-CM

## 2021-05-19 PROCEDURE — 99214 OFFICE O/P EST MOD 30 MIN: CPT | Performed by: ALLERGY & IMMUNOLOGY

## 2021-05-19 RX ORDER — MONTELUKAST SODIUM 10 MG/1
10 TABLET ORAL AT BEDTIME
Qty: 30 TABLET | Refills: 3 | Status: SHIPPED | OUTPATIENT
Start: 2021-05-19 | End: 2021-09-15

## 2021-05-19 RX ORDER — BUDESONIDE AND FORMOTEROL FUMARATE DIHYDRATE 80; 4.5 UG/1; UG/1
2 AEROSOL RESPIRATORY (INHALATION) 2 TIMES DAILY
Qty: 10.2 G | Refills: 3 | Status: SHIPPED | OUTPATIENT
Start: 2021-05-19 | End: 2022-03-28

## 2021-05-19 ASSESSMENT — ENCOUNTER SYMPTOMS
EYE ITCHING: 1
HEADACHES: 1
RHINORRHEA: 1
SINUS PRESSURE: 0
VOICE CHANGE: 1
EYE REDNESS: 0

## 2021-05-19 NOTE — NURSING NOTE
Writer demonstrated how to use an MDI inhaler with a spacer for patient.  Patient instructed to shake the inhaler for 5 seconds, empty the lungs by exhaling away from inhaler, put the inhaler + spacer in her mouth, push down on the inhaler and breathe in at the same time and then hold breath for 10 seconds.  RN informed patient that if an audible whistle is heard from spacer, she needs to inhale more slowly.  Patient advised to wait 1-2 minutes between puffs.  Patient instructed on how to clean the MDI inhaler, take the medication canister out, wash it in warm soapy water, rinse it and then let it dry over night.  RN advised patient to refer to handout with spacer for cleaning instructions.  Patient informed the inhaler needs to be washed once a week or when he notices a powder buildup.  Patient verbalized understanding.     Digna CHILDS RN  Specialty/Allergy Clinics

## 2021-05-19 NOTE — PATIENT INSTRUCTIONS
Continue with Flonase 2 sprays in each nostril twice daily. Will switch to once daily, only when symptoms get better.     Restart azelastine 2 sprays in each nostril twice daily as needed.     Continue with eye drops and cetirizine.    If by the next office visit, you get better, stop azelastine 3 days before the skin test, and cetirizine 7 days before.     Start Symbicort 80/4.5 mcg 2 puffs twice daily. rinse/gargle/spit water after use.   Start montelukast 10 mg by mouth once daily at bedtime. Watch for behavior changes.     -Continue using albuterol inhaler 2-4 puffs every 4 hours as needed for chest tightness/wheezing/shortness of breath/persistent cough.  -Use both inhalers with spacer/chamber device.

## 2021-05-19 NOTE — PROGRESS NOTES
SUBJECTIVE:                                                                   Patricia Perez presents today to our Allergy Clinic at [facility] for a follow up visit.  She is a 61 year old female with mixed rhinitis (history of allergic rhinitis and rhinitis medicamentosa).  I saw the patient in April 2021.  At that time, she reported oxymetazoline use for about 1 year.  She was tested in 2010 by Dr. Ruiz. Sensitivity to cat, dog, d.mites, molds, tree, and weed pollen was noted at that time, She was on allergen immunotherapy which she find helpful, but last year symptoms got worse.   Diagnosed with asthma in her 50s.  Mainly manifested by shortness of breath.  Albuterol helps within 5 to 10 minutes.    She was on prednisone for 5 days, and hasn't noticed a major difference in the nasal congestion. She started intranasal fluticasone 2 sprays in each nostril twice daily this week. She was using azelastine only until last week.Patricia feels that intranasal fluticasone makes her feel better at night.   Besides nasal congestion, she reports rhinorrhea.   Optivar has been helpful with ocular pruritus. She continues taking cetirizine 10 mg by mouth once daily.     Patricia had PFT done last week.  I personally reviewed it.  No obstruction noted.  No reversibility after albuterol noted.  However, she continues experiencing dyspnea on exertion. When she was on prednisone, she felt better from dyspnea standpoint. She uses albuterol 2-3 times a week.     initial FVC 3.70L (125% of predicted); after bronchodilator 3.67L (+0% change)   initial FEV1 3.09L (132% of predicted); after bronchodilator 3.13L (+1% change)  initial FEV1/FVC 83%; after bronchodilator 85%       Patient Active Problem List   Diagnosis     Major depression, recurrent (H)     Dizziness and giddiness     Motion sickness     GERD (gastroesophageal reflux disease)     Idiopathic cardiomyopathy (H)     Sleep apnea     Hyperlipidemia LDL  goal <100     Allergic rhinitis     Hypertension goal BP (blood pressure) < 130/80     Pelvic pain in female     Health Care Home     24 hour contact handout given     Elevated PTHrP level     H/O bariatric surgery     Hx of gastric bypass     Hypovitaminosis D     Allergic rhinitis     Attention deficit disorder of adult     Mild persistent asthma     Low back pain     Hand joint pain     Myopia, bilateral     Dumping syndrome     Benign essential hypertension     Adjustment disorder with depressed mood     Decreased hearing of both ears     BPV (benign positional vertigo), unspecified laterality     Advanced directives, counseling/discussion     Vitamin D deficiency     Coronary artery disease involving native coronary artery of native heart without angina pectoris     Acute pain of left knee     Syncope     Posterior vitreous detachment of both eyes     Combined forms of age-related cataract, mild, of both eyes     Glaucoma suspect of both eyes       Past Medical History:   Diagnosis Date     Anxiety      Arthritis      Asthma      Bursitis of shoulder 3/4/2010     Chronic rhinitis 3/25/2009     Coronary artery disease      Depression      Depression      Dyspnea on exertion      Gastro-oesophageal reflux disease      GERD (gastroesophageal reflux disease) 10/14/2008     Hypertension      Idiopathic cardiomyopathy (H) 1/8/2009     Indigestion      Itchy eyes      Itchy nose      Left leg pain 6/16/2011     Motion sickness 2/27/2008     Nasal congestion      Pneumonia      Problems related to lack of adequate sleep      Ringing in ears      Sleep apnea 3/25/2009     Sneezing       Problem (# of Occurrences) Relation (Name,Age of Onset)    Alcohol/Drug (1) Father: Abuse    Allergies (3) Mother, Sister: Poison Joy, Brother: Enviromental/ Bees    Breast Cancer (1) Maternal Grandmother    Cardiovascular (1) Father    Cerebrovascular Disease (1) Other    Depression (2) Father, Brother    Gastrointestinal Disease (1)  Mother: non cancerous pancreatic tumor    Glaucoma (2) Paternal Grandmother, Paternal Grandfather    Heart Disease (1) Mother: A fib    Hypertension (3) Father, Mother, Brother    Lipids (1) Father    Macular Degeneration (1) Other    Respiratory (1) Father: COPD       Negative family history of: Diabetes, Cancer - colorectal, Retinal detachment, Amblyopia, Thyroid Disease        Past Surgical History:   Procedure Laterality Date     BARIATRIC SURGERY       DIABETES RESOURCES  As Child    Tonsillectomy     ENT SURGERY       HERNIA REPAIR       LAPAROSCOPIC BYPASS GASTRIC  10/16/2012    Procedure: LAPAROSCOPIC BYPASS GASTRIC;  laparoscopic reyna en y gastric bypass;  Surgeon: Nish Bradford MD;  Location: [facility] OR     TONSILLECTOMY       Uretural Sticture  As Child     Social History     Socioeconomic History     Marital status:      Spouse name: None     Number of children: 0     Years of education: 12+     Highest education level: None   Occupational History     Employer: [employer]   Social Needs     Financial resource strain: None     Food insecurity     Worry: None     Inability: None     Transportation needs     Medical: None     Non-medical: None   Tobacco Use     Smoking status: Never Smoker     Smokeless tobacco: Never Used   Substance and Sexual Activity     Alcohol use: No     Drug use: No     Sexual activity: Not Currently     Partners: Male   Lifestyle     Physical activity     Days per week: None     Minutes per session: None     Stress: None   Relationships     Social connections     Talks on phone: None     Gets together: None     Attends Religion service: None     Active member of club or organization: None     Attends meetings of clubs or organizations: None     Relationship status: None     Intimate partner violence     Fear of current or ex partner: None     Emotionally abused: None     Physically abused: None     Forced sexual activity: None   Other Topics Concern      Parent/sibling w/ CABG, MI or angioplasty before 65F 55M? No   Social History Narrative    Dairy/d 2 servings/d.     Caffeine 3 servings/d    Exercise 2 x week    Sunscreen used - Yes    Seatbelts used - Yes    Working smoke/CO detectors in the home - Yes    Guns stored in the home - No    Self Breast Exams - Yes    Self Testicular Exam - NA    Eye Exam up to date - Yes    Dental Exam up to date - No    Pap Smear up to date - Yes    Mammogram up to date - Yes    PSA up to date - NA    Dexa Scan up to date - NA    Flex Sig / Colonoscopy up to date - Yes    Immunizations up to date - Yes    Abuse: Current or Past(Physical, Sexual or Emotional)- No    Do you feel safe in your environment - Yes    LOWELL SMITH LPN.    02/05/2007        May 19, 2021    ENVIRONMENTAL HISTORY: [place]. The home is heated with a forced air. They do have central air conditioning. The patient's bedroom is furnished with carpeting in bedroom, allergen mattress cover, and animals sleep in bedroom.  Pets inside the house include 1 cat(s). There is no history of cockroach or mice infestation. There is/are 0 smokers in the house.  The house does not have a damp basement, it's a split level.            Review of Systems   HENT: Positive for congestion, rhinorrhea and voice change (hoarse). Negative for postnasal drip and sinus pressure.    Eyes: Positive for itching. Negative for redness.   Neurological: Positive for headaches.           Current Outpatient Medications:      ARIPiprazole (ABILIFY) 2 MG tablet, Take 2 tablets (4 mg) by mouth daily, Disp: 60 tablet, Rfl: 1     aspirin (ASA) 81 MG EC tablet, Take 1 tablet (81 mg) by mouth daily, Disp: 90 tablet, Rfl: 3     azelastine (ASTELIN) 0.1 % nasal spray, Spray 2 sprays into both nostrils 2 times daily as needed for rhinitis, Disp: 30 mL, Rfl: 3     azelastine (OPTIVAR) 0.05 % ophthalmic solution, Apply 1 drop to eye 2 times daily as needed  (itchy/watery eyes), Disp: 6 mL, Rfl: 3     budesonide-formoterol (SYMBICORT) 80-4.5 MCG/ACT Inhaler, Inhale 2 puffs into the lungs 2 times daily, Disp: 10.2 g, Rfl: 3     calcium carbonate-vitamin D (CALTRATE 600+D) 600-400 MG-UNIT CHEW, Take 1 chew tab by mouth 2 times daily, Disp: 180 tablet, Rfl: 3     carvedilol (COREG) 25 MG tablet, TAKE 1 TABLET(25 MG) BY MOUTH TWICE DAILY WITH MEALS, Disp: 60 tablet, Rfl: 0     cetirizine (ZYRTEC) 10 MG tablet, Take 1 tablet (10 mg) by mouth daily, Disp: 30 tablet, Rfl: 3     Cyanocobalamin (VITAMIN B-12 SL), Place under the tongue every other day , Disp: , Rfl:      estrogen conj-medroxyPROGESTERone (PREMPRO) 0.45-1.5 MG tablet, Take 1 tablet by mouth daily, Disp: 84 tablet, Rfl: 1     ezetimibe (ZETIA) 10 MG tablet, Take 1 tablet (10 mg) by mouth daily, Disp: 90 tablet, Rfl: 3     fluticasone (FLONASE) 50 MCG/ACT nasal spray, Spray 2 sprays into both nostrils daily, Disp: 18.2 mL, Rfl: 3     hydrALAZINE (APRESOLINE) 10 MG tablet, Take 1 tablet (10 mg) by mouth 2 times daily, Disp: 180 tablet, Rfl: 3     Ibuprofen-diphenhydrAMINE Cit (IBUPROFEN PM PO), Take by mouth At Bedtime, Disp: , Rfl:      isosorbide mononitrate (IMDUR) 30 MG 24 hr tablet, TAKE 1 TABLET(30 MG) BY MOUTH DAILY, Disp: 90 tablet, Rfl: 3     lisinopril (ZESTRIL) 5 MG tablet, TAKE 1 TABLET(5 MG) BY MOUTH DAILY, Disp: 90 tablet, Rfl: 3     meclizine (ANTIVERT) 25 MG tablet, Take 1 tablet (25 mg) by mouth 3 times daily as needed, Disp: 30 tablet, Rfl: 1     montelukast (SINGULAIR) 10 MG tablet, Take 1 tablet (10 mg) by mouth At Bedtime, Disp: 30 tablet, Rfl: 3     Multiple Vitamin (MULTI-VITAMIN) per tablet, Take 1 tablet by mouth daily., Disp: 100 tablet, Rfl: 3     simvastatin (ZOCOR) 20 MG tablet, TAKE 1 TABLET BY MOUTH AT BEDTIME, Disp: 90 tablet, Rfl: 3     venlafaxine (EFFEXOR-ER) 225 MG 24 hr tablet, Take 1 tablet (225 mg) by mouth daily, Disp: 90 tablet, Rfl: 1     albuterol (PROAIR HFA/PROVENTIL  HFA/VENTOLIN HFA) 108 (90 Base) MCG/ACT inhaler, Inhale 2 puffs into the lungs every 6 hours as needed for shortness of breath / dyspnea or wheezing (Patient not taking: Reported on 5/19/2021), Disp: 18 g, Rfl: 3     blood glucose monitoring (NO BRAND SPECIFIED) test strip, Use to test blood sugar 1-2 times daily or as directed. (Patient not taking: Reported on 4/28/2021), Disp: 1 Box, Rfl: 2     COMFORT LANCETS MISC, 1 Device 2 times daily as needed (Patient not taking: Reported on 4/28/2021), Disp: 1 Box, Rfl: 1     ondansetron (ZOFRAN-ODT) 4 MG ODT tab, Take 1 tablet (4 mg) by mouth every 8 hours as needed for nausea (Patient not taking: Reported on 4/15/2021), Disp: 12 tablet, Rfl: 1  Immunization History   Administered Date(s) Administered     COVID-19,PF,Moderna 03/27/2021, 04/24/2021     Flu, Unspecified 09/25/2020     HEPA 05/06/2005, 05/29/2013     Influenza (IIV3) PF 01/07/2003, 11/08/2005, 01/05/2007, 12/17/2007, 12/08/2008, 09/21/2009, 10/07/2010, 10/18/2011, 10/18/2012     Influenza Quad, Recombinant, p-free (RIV4) 11/16/2018     Influenza Vaccine IM > 6 months Valent IIV4 09/30/2013, 09/22/2015     Influenza Vaccine, 6+MO IM (QUADRIVALENT W/PRESERVATIVES) 10/01/2019     Pneumococcal 23 valent 07/28/2011     TD (ADULT, 7+) 01/15/2001     TDAP Vaccine (Adacel) 04/22/2011, 10/08/2020     Allergies   Allergen Reactions     Atorvastatin      Leg myalgias     OBJECTIVE:                                                                 /76 (BP Location: Left arm, Patient Position: Sitting, Cuff Size: Adult Regular)   Pulse 66   Temp 97.7  F (36.5  C) (Tympanic)   Wt 85.4 kg (188 lb 4.4 oz)   LMP 07/16/2012   SpO2 100%   BMI 33.47 kg/m          Physical Exam  Vitals signs and nursing note reviewed.   Constitutional:       General: She is not in acute distress.     Appearance: She is not ill-appearing, toxic-appearing or diaphoretic.   HENT:      Head: Normocephalic and atraumatic.      Right Ear:  Tympanic membrane, ear canal and external ear normal.      Left Ear: Tympanic membrane, ear canal and external ear normal.      Nose: Septal deviation (left), mucosal edema (Improved compared with the previous visit), congestion and rhinorrhea present.      Right Turbinates: Enlarged, swollen and pale.      Left Turbinates: Enlarged, swollen and pale.      Mouth/Throat:      Lips: Pink.      Mouth: Mucous membranes are moist.      Pharynx: Oropharynx is clear. No pharyngeal swelling, oropharyngeal exudate, posterior oropharyngeal erythema or uvula swelling.   Eyes:      General:         Right eye: No discharge.         Left eye: No discharge.      Conjunctiva/sclera: Conjunctivae normal.   Neck:      Musculoskeletal: Normal range of motion.   Cardiovascular:      Rate and Rhythm: Normal rate and regular rhythm.      Heart sounds: Normal heart sounds. No murmur.   Pulmonary:      Effort: Pulmonary effort is normal. No respiratory distress.      Breath sounds: Normal breath sounds and air entry. No stridor, decreased air movement or transmitted upper airway sounds. No decreased breath sounds, wheezing, rhonchi or rales.   Musculoskeletal: Normal range of motion.   Lymphadenopathy:      Cervical: No cervical adenopathy.   Skin:     General: Skin is warm.      Capillary Refill: Capillary refill takes less than 2 seconds.      Findings: No rash.   Neurological:      Mental Status: She is alert and oriented to person, place, and time.   Psychiatric:         Mood and Affect: Mood normal.         Behavior: Behavior normal.           WORKUP:   ACT Score:  16    ASSESSMENT/PLAN:    Mild persistent asthma without complication  Despite benign PFT, the patient still has dyspnea on exertion.  It improved when she was on prednisone, suggesting suboptimal asthma control.  -Start Symbicort 80/4.5 mcg 2 puffs twice daily. Rinse/gargle/spit water after use.  -Start montelukast 10 mg by mouth once daily at bedtime.  Blackbox warnings  reviewed.  -Continue using albuterol 2 to 4 puffs every 4 hours as needed for persistent cough/chest tightness/wheezing/redness of breath.  -Use both Symbicort and albuterol with a chamber device (provided and demonstrated how to use appropriately).      - budesonide-formoterol (SYMBICORT) 80-4.5 MCG/ACT Inhaler  Dispense: 10.2 g; Refill: 3  - montelukast (SINGULAIR) 10 MG tablet  Dispense: 30 tablet; Refill: 3    Chronic rhinitis  Rhinitis medicamentosa  Other conjunctivitis of both eyes    History of allergic rhinoconjunctivitis complicated by rhinitis medicamentosa.  She has not restarted oxymetazoline which is great.  Still suboptimally controlled.  Did not follow the instructions correctly.  -Resume azelastine 2 sprays in each nostril twice daily as needed.  -Continue intranasal fluticasone 2 sprays in each nostril twice daily.  -Continue cetirizine 10 mg by mouth once daily as needed.  -Continue Optivar on as-needed basis.  If her symptoms improve, the patient was advised to stop antihistamines per protocol for the next office visit.  May need SPT for aeroallergens.      - montelukast (SINGULAIR) 10 MG tablet  Dispense: 30 tablet; Refill: 3      Return in about 6 weeks (around 6/30/2021), or if symptoms worsen or fail to improve.    Thank you for allowing us to participate in the care of this patient. Please feel free to contact us if there are any questions or concerns about the patient.    Disclaimer: This note consists of symbols derived from keyboarding, dictation and/or voice recognition software. As a result, there may be errors in the script that have gone undetected. Please consider this when interpreting information found in this chart.    Shimon Melgar MD, FAAAAI, FACAAI  Allergy, Asthma and Immunology    [facility]

## 2021-05-19 NOTE — LETTER
5/19/2021         RE: Patricia Perez  [address]  [facility] [address]        Dear Colleague,    Thank you for referring your patient, Patricia Perez, to the [facility]. Please see a copy of my visit note below.    SUBJECTIVE:                                                                   Patricia Perez presents today to our Allergy Clinic at [facility] for a follow up visit.  She is a 61 year old female with mixed rhinitis (history of allergic rhinitis and rhinitis medicamentosa).  I saw the patient in April 2021.  At that time, she reported oxymetazoline use for about 1 year.  She was tested in 2010 by Dr. Ruiz. Sensitivity to cat, dog, d.mites, molds, tree, and weed pollen was noted at that time, She was on allergen immunotherapy which she find helpful, but last year symptoms got worse.   Diagnosed with asthma in her 50s.  Mainly manifested by shortness of breath.  Albuterol helps within 5 to 10 minutes.    She was on prednisone for 5 days, and hasn't noticed a major difference in the nasal congestion. She started intranasal fluticasone 2 sprays in each nostril twice daily this week. She was using azelastine only until last week.Patricia feels that intranasal fluticasone makes her feel better at night.   Besides nasal congestion, she reports rhinorrhea.   Optivar has been helpful with ocular pruritus. She continues taking cetirizine 10 mg by mouth once daily.     Patricia had PFT done last week.  I personally reviewed it.  No obstruction noted.  No reversibility after albuterol noted.  However, she continues experiencing dyspnea on exertion. When she was on prednisone, she felt better from dyspnea standpoint. She uses albuterol 2-3 times a week.     initial FVC 3.70L (125% of predicted); after bronchodilator 3.67L (+0% change)   initial FEV1 3.09L (132% of predicted); after bronchodilator 3.13L (+1% change)  initial FEV1/FVC 83%; after  bronchodilator 85%       Patient Active Problem List   Diagnosis     Major depression, recurrent (H)     Dizziness and giddiness     Motion sickness     GERD (gastroesophageal reflux disease)     Idiopathic cardiomyopathy (H)     Sleep apnea     Hyperlipidemia LDL goal <100     Allergic rhinitis     Hypertension goal BP (blood pressure) < 130/80     Pelvic pain in female     Health Care Home     24 hour contact handout given     Elevated PTHrP level     H/O bariatric surgery     Hx of gastric bypass     Hypovitaminosis D     Allergic rhinitis     Attention deficit disorder of adult     Mild persistent asthma     Low back pain     Hand joint pain     Myopia, bilateral     Dumping syndrome     Benign essential hypertension     Adjustment disorder with depressed mood     Decreased hearing of both ears     BPV (benign positional vertigo), unspecified laterality     Advanced directives, counseling/discussion     Vitamin D deficiency     Coronary artery disease involving native coronary artery of native heart without angina pectoris     Acute pain of left knee     Syncope     Posterior vitreous detachment of both eyes     Combined forms of age-related cataract, mild, of both eyes     Glaucoma suspect of both eyes       Past Medical History:   Diagnosis Date     Anxiety      Arthritis      Asthma      Bursitis of shoulder 3/4/2010     Chronic rhinitis 3/25/2009     Coronary artery disease      Depression      Depression      Dyspnea on exertion      Gastro-oesophageal reflux disease      GERD (gastroesophageal reflux disease) 10/14/2008     Hypertension      Idiopathic cardiomyopathy (H) 1/8/2009     Indigestion      Itchy eyes      Itchy nose      Left leg pain 6/16/2011     Motion sickness 2/27/2008     Nasal congestion      Pneumonia      Problems related to lack of adequate sleep      Ringing in ears      Sleep apnea 3/25/2009     Sneezing       Problem (# of Occurrences) Relation (Name,Age of Onset)    Alcohol/Drug  (1) Father: Abuse    Allergies (3) Mother, Sister: Poison Joy, Brother: Enviromental/ Bees    Breast Cancer (1) Maternal Grandmother    Cardiovascular (1) Father    Cerebrovascular Disease (1) Other    Depression (2) Father, Brother    Gastrointestinal Disease (1) Mother: non cancerous pancreatic tumor    Glaucoma (2) Paternal Grandmother, Paternal Grandfather    Heart Disease (1) Mother: A fib    Hypertension (3) Father, Mother, Brother    Lipids (1) Father    Macular Degeneration (1) Other    Respiratory (1) Father: COPD       Negative family history of: Diabetes, Cancer - colorectal, Retinal detachment, Amblyopia, Thyroid Disease        Past Surgical History:   Procedure Laterality Date     BARIATRIC SURGERY       DIABETES RESOURCES  As Child    Tonsillectomy     ENT SURGERY       HERNIA REPAIR       LAPAROSCOPIC BYPASS GASTRIC  10/16/2012    Procedure: LAPAROSCOPIC BYPASS GASTRIC;  laparoscopic reyna en y gastric bypass;  Surgeon: Nish Bradford MD;  Location: [facility] OR     TONSILLECTOMY       Uretural Sticture  As Child     Social History     Socioeconomic History     Marital status:      Spouse name: None     Number of children: 0     Years of education: 12+     Highest education level: None   Occupational History     Employer: [email or web address]   Social Needs     Financial resource strain: None     Food insecurity     Worry: None     Inability: None     Transportation needs     Medical: None     Non-medical: None   Tobacco Use     Smoking status: Never Smoker     Smokeless tobacco: Never Used   Substance and Sexual Activity     Alcohol use: No     Drug use: No     Sexual activity: Not Currently     Partners: Male   Lifestyle     Physical activity     Days per week: None     Minutes per session: None     Stress: None   Relationships     Social connections     Talks on phone: None     Gets together: None     Attends Druze service: None     Active member of club or organization: None     Attends  meetings of clubs or organizations: None     Relationship status: None     Intimate partner violence     Fear of current or ex partner: None     Emotionally abused: None     Physically abused: None     Forced sexual activity: None   Other Topics Concern     Parent/sibling w/ CABG, MI or angioplasty before 65F 55M? No   Social History Narrative    Dairy/d 2 servings/d.     Caffeine 3 servings/d    Exercise 2 x week    Sunscreen used - Yes    Seatbelts used - Yes    Working smoke/CO detectors in the home - Yes    Guns stored in the home - No    Self Breast Exams - Yes    Self Testicular Exam - NA    Eye Exam up to date - Yes    Dental Exam up to date - No    Pap Smear up to date - Yes    Mammogram up to date - Yes    PSA up to date - NA    Dexa Scan up to date - NA    Flex Sig / Colonoscopy up to date - Yes    Immunizations up to date - Yes    Abuse: Current or Past(Physical, Sexual or Emotional)- No    Do you feel safe in your environment - Yes    LOWELL ANDRESSly LAZO.    02/05/2009        May 19, 2021    ENVIRONMENTAL HISTORY: The family lives in a [facility] home [place]. The home is heated with a forced air. They do have central air conditioning. The patient's bedroom is furnished with carpeting in bedroom, allergen mattress cover, and animals sleep in bedroom.  Pets inside the house include 1 cat(s). There is no history of cockroach or mice infestation. There is/are 0 smokers in the house.  The house does not have a damp basement, it's a split level.            Review of Systems   HENT: Positive for congestion, rhinorrhea and voice change (hoarse). Negative for postnasal drip and sinus pressure.    Eyes: Positive for itching. Negative for redness.   Neurological: Positive for headaches.           Current Outpatient Medications:      ARIPiprazole (ABILIFY) 2 MG tablet, Take 2 tablets (4 mg) by mouth daily, Disp: 60 tablet, Rfl: 1     aspirin (ASA) 81 MG EC tablet, Take 1 tablet (81 mg) by mouth daily, Disp: 90  tablet, Rfl: 3     azelastine (ASTELIN) 0.1 % nasal spray, Spray 2 sprays into both nostrils 2 times daily as needed for rhinitis, Disp: 30 mL, Rfl: 3     azelastine (OPTIVAR) 0.05 % ophthalmic solution, Apply 1 drop to eye 2 times daily as needed (itchy/watery eyes), Disp: 6 mL, Rfl: 3     budesonide-formoterol (SYMBICORT) 80-4.5 MCG/ACT Inhaler, Inhale 2 puffs into the lungs 2 times daily, Disp: 10.2 g, Rfl: 3     calcium carbonate-vitamin D (CALTRATE 600+D) 600-400 MG-UNIT CHEW, Take 1 chew tab by mouth 2 times daily, Disp: 180 tablet, Rfl: 3     carvedilol (COREG) 25 MG tablet, TAKE 1 TABLET(25 MG) BY MOUTH TWICE DAILY WITH MEALS, Disp: 60 tablet, Rfl: 0     cetirizine (ZYRTEC) 10 MG tablet, Take 1 tablet (10 mg) by mouth daily, Disp: 30 tablet, Rfl: 3     Cyanocobalamin (VITAMIN B-12 SL), Place under the tongue every other day , Disp: , Rfl:      estrogen conj-medroxyPROGESTERone (PREMPRO) 0.45-1.5 MG tablet, Take 1 tablet by mouth daily, Disp: 84 tablet, Rfl: 1     ezetimibe (ZETIA) 10 MG tablet, Take 1 tablet (10 mg) by mouth daily, Disp: 90 tablet, Rfl: 3     fluticasone (FLONASE) 50 MCG/ACT nasal spray, Spray 2 sprays into both nostrils daily, Disp: 18.2 mL, Rfl: 3     hydrALAZINE (APRESOLINE) 10 MG tablet, Take 1 tablet (10 mg) by mouth 2 times daily, Disp: 180 tablet, Rfl: 3     Ibuprofen-diphenhydrAMINE Cit (IBUPROFEN PM PO), Take by mouth At Bedtime, Disp: , Rfl:      isosorbide mononitrate (IMDUR) 30 MG 24 hr tablet, TAKE 1 TABLET(30 MG) BY MOUTH DAILY, Disp: 90 tablet, Rfl: 3     lisinopril (ZESTRIL) 5 MG tablet, TAKE 1 TABLET(5 MG) BY MOUTH DAILY, Disp: 90 tablet, Rfl: 3     meclizine (ANTIVERT) 25 MG tablet, Take 1 tablet (25 mg) by mouth 3 times daily as needed, Disp: 30 tablet, Rfl: 1     montelukast (SINGULAIR) 10 MG tablet, Take 1 tablet (10 mg) by mouth At Bedtime, Disp: 30 tablet, Rfl: 3     Multiple Vitamin (MULTI-VITAMIN) per tablet, Take 1 tablet by mouth daily., Disp: 100 tablet, Rfl: 3      simvastatin (ZOCOR) 20 MG tablet, TAKE 1 TABLET BY MOUTH AT BEDTIME, Disp: 90 tablet, Rfl: 3     venlafaxine (EFFEXOR-ER) 225 MG 24 hr tablet, Take 1 tablet (225 mg) by mouth daily, Disp: 90 tablet, Rfl: 1     albuterol (PROAIR HFA/PROVENTIL HFA/VENTOLIN HFA) 108 (90 Base) MCG/ACT inhaler, Inhale 2 puffs into the lungs every 6 hours as needed for shortness of breath / dyspnea or wheezing (Patient not taking: Reported on 5/19/2021), Disp: 18 g, Rfl: 3     blood glucose monitoring (NO BRAND SPECIFIED) test strip, Use to test blood sugar 1-2 times daily or as directed. (Patient not taking: Reported on 4/28/2021), Disp: 1 Box, Rfl: 2     COMFORT LANCETS MISC, 1 Device 2 times daily as needed (Patient not taking: Reported on 4/28/2021), Disp: 1 Box, Rfl: 1     ondansetron (ZOFRAN-ODT) 4 MG ODT tab, Take 1 tablet (4 mg) by mouth every 8 hours as needed for nausea (Patient not taking: Reported on 4/15/2021), Disp: 12 tablet, Rfl: 1  Immunization History   Administered Date(s) Administered     COVID-19,PF,Moderna 03/27/2021, 04/24/2021     Flu, Unspecified 09/25/2020     HEPA 05/06/2005, 05/29/2013     Influenza (IIV3) PF 01/07/2003, 11/08/2005, 01/05/2007, 12/17/2007, 12/08/2008, 09/21/2009, 10/07/2010, 10/18/2011, 10/18/2012     Influenza Quad, Recombinant, p-free (RIV4) 11/16/2018     Influenza Vaccine IM > 6 months Valent IIV4 09/30/2013, 09/22/2015     Influenza Vaccine, 6+MO IM (QUADRIVALENT W/PRESERVATIVES) 10/01/2019     Pneumococcal 23 valent 07/28/2011     TD (ADULT, 7+) 01/15/2001     TDAP Vaccine (Adacel) 04/22/2011, 10/08/2020     Allergies   Allergen Reactions     Atorvastatin      Leg myalgias     OBJECTIVE:                                                                 /76 (BP Location: Left arm, Patient Position: Sitting, Cuff Size: Adult Regular)   Pulse 66   Temp 97.7  F (36.5  C) (Tympanic)   Wt 85.4 kg (188 lb 4.4 oz)   LMP 07/16/2012   SpO2 100%   BMI 33.47 kg/m          Physical  Exam  Vitals signs and nursing note reviewed.   Constitutional:       General: She is not in acute distress.     Appearance: She is not ill-appearing, toxic-appearing or diaphoretic.   HENT:      Head: Normocephalic and atraumatic.      Right Ear: Tympanic membrane, ear canal and external ear normal.      Left Ear: Tympanic membrane, ear canal and external ear normal.      Nose: Septal deviation (left), mucosal edema (Improved compared with the previous visit), congestion and rhinorrhea present.      Right Turbinates: Enlarged, swollen and pale.      Left Turbinates: Enlarged, swollen and pale.      Mouth/Throat:      Lips: Pink.      Mouth: Mucous membranes are moist.      Pharynx: Oropharynx is clear. No pharyngeal swelling, oropharyngeal exudate, posterior oropharyngeal erythema or uvula swelling.   Eyes:      General:         Right eye: No discharge.         Left eye: No discharge.      Conjunctiva/sclera: Conjunctivae normal.   Neck:      Musculoskeletal: Normal range of motion.   Cardiovascular:      Rate and Rhythm: Normal rate and regular rhythm.      Heart sounds: Normal heart sounds. No murmur.   Pulmonary:      Effort: Pulmonary effort is normal. No respiratory distress.      Breath sounds: Normal breath sounds and air entry. No stridor, decreased air movement or transmitted upper airway sounds. No decreased breath sounds, wheezing, rhonchi or rales.   Musculoskeletal: Normal range of motion.   Lymphadenopathy:      Cervical: No cervical adenopathy.   Skin:     General: Skin is warm.      Capillary Refill: Capillary refill takes less than 2 seconds.      Findings: No rash.   Neurological:      Mental Status: She is alert and oriented to person, place, and time.   Psychiatric:         Mood and Affect: Mood normal.         Behavior: Behavior normal.           WORKUP:   ACT Score:  16    ASSESSMENT/PLAN:    Mild persistent asthma without complication  Despite benign PFT, the patient still has dyspnea on  exertion.  It improved when she was on prednisone, suggesting suboptimal asthma control.  -Start Symbicort 80/4.5 mcg 2 puffs twice daily. Rinse/gargle/spit water after use.  -Start montelukast 10 mg by mouth once daily at bedtime.  Blackbox warnings reviewed.  -Continue using albuterol 2 to 4 puffs every 4 hours as needed for persistent cough/chest tightness/wheezing/redness of breath.  -Use both Symbicort and albuterol with a chamber device (provided and demonstrated how to use appropriately).      - budesonide-formoterol (SYMBICORT) 80-4.5 MCG/ACT Inhaler  Dispense: 10.2 g; Refill: 3  - montelukast (SINGULAIR) 10 MG tablet  Dispense: 30 tablet; Refill: 3    Chronic rhinitis  Rhinitis medicamentosa  Other conjunctivitis of both eyes    History of allergic rhinoconjunctivitis complicated by rhinitis medicamentosa.  She has not restarted oxymetazoline which is great.  Still suboptimally controlled.  Did not follow the instructions correctly.  -Resume azelastine 2 sprays in each nostril twice daily as needed.  -Continue intranasal fluticasone 2 sprays in each nostril twice daily.  -Continue cetirizine 10 mg by mouth once daily as needed.  -Continue Optivar on as-needed basis.  If her symptoms improve, the patient was advised to stop antihistamines per protocol for the next office visit.  May need SPT for aeroallergens.      - montelukast (SINGULAIR) 10 MG tablet  Dispense: 30 tablet; Refill: 3      Return in about 6 weeks (around 6/30/2021), or if symptoms worsen or fail to improve.    Thank you for allowing us to participate in the care of this patient. Please feel free to contact us if there are any questions or concerns about the patient.    Disclaimer: This note consists of symbols derived from keyboarding, dictation and/or voice recognition software. As a result, there may be errors in the script that have gone undetected. Please consider this when interpreting information found in this chart.    Shimon Melgar,  MD, [facility]ANDA [facility]OPER  Allergy, Asthma and Immunology    [facility]         Again, thank you for allowing me to participate in the care of your patient.        Sincerely,        Shimon Melgar MD

## 2021-05-20 ASSESSMENT — ASTHMA QUESTIONNAIRES: ACT_TOTALSCORE: 16

## 2021-05-21 DIAGNOSIS — N95.1 MENOPAUSAL SYNDROME (HOT FLASHES): ICD-10-CM

## 2021-05-21 RX ORDER — ESTROGEN,CON/M-PROGEST ACET 0.45-1.5MG
1 TABLET ORAL DAILY
Qty: 84 TABLET | Refills: 1 | Status: SHIPPED | OUTPATIENT
Start: 2021-05-21 | End: 2021-11-30

## 2021-05-24 ENCOUNTER — VIRTUAL VISIT (OUTPATIENT)
Dept: PSYCHIATRY | Facility: CLINIC | Age: 61
End: 2021-05-24
Attending: PSYCHIATRY & NEUROLOGY
Payer: COMMERCIAL

## 2021-05-24 DIAGNOSIS — F33.1 MODERATE EPISODE OF RECURRENT MAJOR DEPRESSIVE DISORDER (H): ICD-10-CM

## 2021-05-24 PROCEDURE — 99214 OFFICE O/P EST MOD 30 MIN: CPT | Mod: GT | Performed by: STUDENT IN AN ORGANIZED HEALTH CARE EDUCATION/TRAINING PROGRAM

## 2021-05-24 RX ORDER — ARIPIPRAZOLE 2 MG/1
4 TABLET ORAL DAILY
Qty: 60 TABLET | Refills: 3 | Status: SHIPPED | OUTPATIENT
Start: 2021-05-24 | End: 2021-09-28

## 2021-05-24 RX ORDER — VENLAFAXINE HYDROCHLORIDE 225 MG/1
225 TABLET, EXTENDED RELEASE ORAL DAILY
Qty: 90 TABLET | Refills: 3 | Status: SHIPPED | OUTPATIENT
Start: 2021-05-24 | End: 2021-10-22

## 2021-05-24 ASSESSMENT — PAIN SCALES - GENERAL: PAINLEVEL: NO PAIN (0)

## 2021-05-24 NOTE — PATIENT INSTRUCTIONS
**For crisis resources, please see the information at the end of this document**     Patient Education      Thank you for coming to the [facility] [place] [facility].    Lab Testing:  If you had lab testing today and your results are reassuring or normal they will be mailed to you or sent through Marvin within 7 days. If the lab tests need quick action we will call you with the results. The phone number we will call with results is # [phone] (home) [phone] (work). If this is not the best number please call our clinic and change the number.    Medication Refills:  If you need any refills please call your pharmacy and they will contact us. Our fax number for refills is [phone]. Please allow three business for refill processing. If you need to  your refill at a new pharmacy, please contact the new pharmacy directly. The new pharmacy will help you get your medications transferred.     Scheduling:  If you have any concerns about today's visit or wish to schedule another appointment please call our office during normal business hours [phone] (8-5:00 M-F)    Contact Us:  Please call [phone] during business hours (8-5:00 M-F).  If after clinic hours, or on the weekend, please call  [phone].    Financial Assistance [phone]  [facility] Billing [phone]  Central Billing Office, [facility]: [phone]  [facility] Billing [phone]  Medical Records [phone]  [facility] Patient Bill of Rights [email or web address]       MENTAL HEALTH CRISIS NUMBERS:  For a medical emergency please call  911 or go to the nearest ER.     [facility]:   [facility] -[phone]   Crisis Residence [facility] -[phone]   Walk-In Counseling Center \[facility] [place] [facility]\"" -[phone]   COPE 24/7 [place] Mobile Team -[phone] (adults)/[phone] (child)  CHILD: [facility]  needs assessment team - [phone]      [facility]:   [facility] - [phone]   Walk-in counseling [facility] - [phone]   Walk-in counseling [facility] - [phone]   Crisis Residence [facility] - [phone]  Urgent Care Adult Mental Snnsqg-[phone] mobile unit/ 24/7 crisis line    National Crisis Numbers:   National Suicide Prevention Lifeline: 3-290-742-TALK (752-971-7049)  Poison Control Center - 2-462-529-2379  Dailysingle/resources for a list of additional resources (SOS)  Trans Lifeline a hotline for transgender people 1-224-099-0276  The Ricardo Project a hotline for LGBT youth 8-024-718-5864  Crisis Text Line: For any crisis 24/7   To: 372898  see www.crisistextline.org  - IF MAKING A CALL FEELS TOO HARD, send a text!         Again thank you for choosing [facility] M[facility]AL HEALTH & ADDICTION [facility] and please let us know how we can best partner with you to improve you and your family's health.    You may be receiving a survey regarding this appointment. We would love to have your feedback, both positive and negative. The survey is done by an external company, so your answers are anonymous.

## 2021-05-24 NOTE — PROGRESS NOTES
"VIDEO VISIT  Patricia Perez is a 61 year old patient who is being evaluated via a billable video visit.      The patient has been notified of following:   \"This video visit will be conducted via a call between you and your physician/provider. We have found that certain health care needs can be provided without the need for an in-person physical exam. This service lets us provide the care you need with a video conversation. If a prescription is necessary we can send it directly to your pharmacy. If lab work is needed we can place an order for that and you can then stop by our lab to have the test done at a later time. Insurers are generally covering virtual visits as they would in-office visits so billing should not be different than normal.  If for some reason you do get billed incorrectly, you should contact the billing office to correct it and that number is in the AVS .    Video Conference to be completed via:  Anibal    Patient has given verbal consent for video visit?:  Yes    Patient would prefer that any video invitations be sent by: Send to e-mail at: [email or web address]      How would patient like to obtain AVS?:  Teddy    AVS SmartPhrase [PsychAVS] has been placed in 'Patient Instructions':  Yes    "

## 2021-05-24 NOTE — PROGRESS NOTES
"Video- Visit Details  Type of service:  video visit for medication management  Time of service:    Date:  05/24/2021    Video Start Time:  8:30am      Video End Time:  9:00am    Reason for video visit:  Services only offered telehealth  Originating Site (patient location):  [facility]   Location- Patient's home  Distant Site (provider location):  Remote location  Mode of Communication:  Video Conference via AmWell  Consent:  Patient has given verbal consent for video visit?: Yes         [facility]  Psychiatry Clinic  Progress Note     CARE TEAM:  PCP- Blanche Cummins, Cardiologist,  Therapist: None     Patricia Perez is a 61 year old patient who prefers the name Patricia and uses pronouns she, her, hers, herself.     PERTINENT BACKGROUND                           [most recent eval 11/11/20]     Psych pertinent item history includes trauma hx and eating disorder (binge eating)    INTERIM HISTORY                                                                                    [4, 4]   History was provided by the patient who was a good historian. Treatment adherence is good.  Since the last visit:   - Patient reports she has been doing \"much better\" over the past few months than she was when I first saw her.   - She reports her mood has been stable and she feels her medication are helpful.   - She continues to work from home but is planning on returning to the office when it reopens in August.  She has some anxiety to see her colleagues again after her \"demotion\" at work.   - She denies any acute mental health symptoms.  - She is not interested in making medication changes today.   - She saw two different therapists for a total of 8 visits and did find it helpful, however she stopped and now feels she is doing so well she no longer needs therapy.   - She denies acute safety concerns including SI, SIB, HI and reports feeling safe at home.     RECENT PSYCH ROS:   Depression:  " none  Elevated:  none  Psychosis:  none  Anxiety:  excessive worry and nervous/overwhelmed at times  Trauma Related:  none  Eating Disorder: N/A       Adverse Effects:  Denies adverse effects of medications   Pertinent Negative Symptoms: No suicidal ideation, self-injurious behavior/urges, violent ideation, aggression, psychosis, delusions and yuko  Recent Substance Use:     None reported     FAMILY and SOCIAL HISTORY                                   [1ea, 1ea]       pt reported        Family Hx:  Father had alcoholism. Depression in mother, father and two sisters.     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     Partner/ - , 2.5 years to her first , Mitchell, relationship feels very strong   Children- no      Living situation- Rent a townhouse      Social/ Spiritual Support- Mitchell, three friends       Feels Safe at Home- Safe  Guns in the home: No   Legal- No  Trauma History (self-report)- Younger brother  of Leukemia when patient was in 8th grade.    Early History/Education-  Grew up in [place], [place]. Family is still in [place]. Grad school in [place].  Reports her childhood was jackie, father was an alcoholic.  Lived in a trailer with 4 siblings.  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   Younger brother  from Leukemia at 4 y/o       PSYCH Crit Items History                                                       - Transfer visit: Discontinue Zoloft, start Abilify   - Interim: Increase Abilify   - 19: no med changes      PAST MED TRIALS                                                              Medication  Dose (mg) Effect  Dates of Use   Wellbutrin   Tried it and it didn't work                  SUBSTANCE USE HISTORY     Past Use- Reports history of occasional alcohol use, tried marijuana occasionally In the 1970s   Treatment- #, most recent- no  Medical Consequences- no  HIV/Hepatitis- no  Legal  Consequences- no   Other- no    MEDICAL HISTORY and ALLERGY     ALLERGIES: Atorvastatin     Patient Active Problem List   Diagnosis     Major depression, recurrent (H)     Dizziness and giddiness     Motion sickness     GERD (gastroesophageal reflux disease)     Idiopathic cardiomyopathy (H)     Sleep apnea     Hyperlipidemia LDL goal <100     Allergic rhinitis     Hypertension goal BP (blood pressure) < 130/80     Pelvic pain in female     Health Care Home     24 hour contact handout given     Elevated PTHrP level     H/O bariatric surgery     Hx of gastric bypass     Hypovitaminosis D     Allergic rhinitis     Attention deficit disorder of adult     Mild persistent asthma     Low back pain     Hand joint pain     Myopia, bilateral     Dumping syndrome     Benign essential hypertension     Adjustment disorder with depressed mood     Decreased hearing of both ears     BPV (benign positional vertigo), unspecified laterality     Advanced directives, counseling/discussion     Vitamin D deficiency     Coronary artery disease involving native coronary artery of native heart without angina pectoris     Acute pain of left knee     Syncope     Posterior vitreous detachment of both eyes     Combined forms of age-related cataract, mild, of both eyes     Glaucoma suspect of both eyes         MEDICAL REVIEW OF SYSTEMS                                                               [2, 10]   A comprehensive review of systems was performed and is negative other than noted in the HPI.    MEDICATIONS        Current Outpatient Medications   Medication Sig Dispense Refill     albuterol (PROAIR HFA/PROVENTIL HFA/VENTOLIN HFA) 108 (90 Base) MCG/ACT inhaler Inhale 2 puffs into the lungs every 6 hours as needed for shortness of breath / dyspnea or wheezing 18 g 3     ARIPiprazole (ABILIFY) 2 MG tablet Take 2 tablets (4 mg) by mouth daily 60 tablet 1     aspirin (ASA) 81 MG EC tablet Take 1 tablet (81 mg) by mouth daily 90 tablet 3      azelastine (ASTELIN) 0.1 % nasal spray Spray 2 sprays into both nostrils 2 times daily as needed for rhinitis 30 mL 3     azelastine (OPTIVAR) 0.05 % ophthalmic solution Apply 1 drop to eye 2 times daily as needed (itchy/watery eyes) 6 mL 3     budesonide-formoterol (SYMBICORT) 80-4.5 MCG/ACT Inhaler Inhale 2 puffs into the lungs 2 times daily 10.2 g 3     calcium carbonate-vitamin D (CALTRATE 600+D) 600-400 MG-UNIT CHEW Take 1 chew tab by mouth 2 times daily 180 tablet 3     carvedilol (COREG) 25 MG tablet TAKE 1 TABLET(25 MG) BY MOUTH TWICE DAILY WITH MEALS 60 tablet 0     cetirizine (ZYRTEC) 10 MG tablet Take 1 tablet (10 mg) by mouth daily 30 tablet 3     Cyanocobalamin (VITAMIN B-12 SL) Place under the tongue every other day        ezetimibe (ZETIA) 10 MG tablet Take 1 tablet (10 mg) by mouth daily 90 tablet 3     fluticasone (FLONASE) 50 MCG/ACT nasal spray Spray 2 sprays into both nostrils daily 18.2 mL 3     hydrALAZINE (APRESOLINE) 10 MG tablet Take 1 tablet (10 mg) by mouth 2 times daily 180 tablet 3     Ibuprofen-diphenhydrAMINE Cit (IBUPROFEN PM PO) Take by mouth At Bedtime       isosorbide mononitrate (IMDUR) 30 MG 24 hr tablet TAKE 1 TABLET(30 MG) BY MOUTH DAILY 90 tablet 3     lisinopril (ZESTRIL) 5 MG tablet TAKE 1 TABLET(5 MG) BY MOUTH DAILY 90 tablet 3     meclizine (ANTIVERT) 25 MG tablet Take 1 tablet (25 mg) by mouth 3 times daily as needed 30 tablet 1     montelukast (SINGULAIR) 10 MG tablet Take 1 tablet (10 mg) by mouth At Bedtime 30 tablet 3     Multiple Vitamin (MULTI-VITAMIN) per tablet Take 1 tablet by mouth daily. 100 tablet 3     ondansetron (ZOFRAN-ODT) 4 MG ODT tab Take 1 tablet (4 mg) by mouth every 8 hours as needed for nausea 12 tablet 1     PREMPRO 0.45-1.5 MG tablet TAKE 1 TABLET BY MOUTH DAILY 84 tablet 1     simvastatin (ZOCOR) 20 MG tablet TAKE 1 TABLET BY MOUTH AT BEDTIME 90 tablet 3     venlafaxine (EFFEXOR-ER) 225 MG 24 hr tablet Take 1 tablet (225 mg) by mouth daily 90  "tablet 1     blood glucose monitoring (NO BRAND SPECIFIED) test strip Use to test blood sugar 1-2 times daily or as directed. (Patient not taking: Reported on 4/28/2021) 1 Box 2     COMFORT LANCETS MISC 1 Device 2 times daily as needed (Patient not taking: Reported on 4/28/2021) 1 Box 1     VITALS                                                                                                                              [3, 3]   LMP 07/16/2012       MENTAL STATUS EXAM                                                             [9, 14 cog gs]     Alertness: alert  and oriented  Appearance: well groomed  Behavior/Demeanor: cooperative, pleasant and calm, with good  eye contact   Speech: normal and regular rate and rhythm  Language: intact and no problems  Psychomotor: normal or unremarkable  Mood: \"better\"  Affect: full range; congruent to: mood- yes, content- yes  Thought Process/Associations: unremarkable  Thought Content:  Reports none;  Denies suicidal & violent ideation and delusions  Perception:  Reports none;  Denies hallucinations  Insight: good  Judgment: good  Cognition: (6) oriented: time, person, and place  attention span: intact  concentration: intact  recent memory: fair  remote memory: intact  fund of knowledge: appropriate  Gait and Station: N/A (telehealth)    LABS and DATA     PHQ 1/3/2020 1/16/2020 9/3/2020   PHQ-9 Total Score 10 7 15   Q9: Thoughts of better off dead/self-harm past 2 weeks Not at all Not at all Not at all       Recent Labs   Lab Test 12/08/20  0700 01/08/20  0606 01/07/20  1304   CR 0.77 0.71 0.69   GFRESTIMATED 83 >90 >90     Recent Labs   Lab Test 01/07/20  0740 10/29/19  0804 06/07/18  0843   AST 12 14 17   ALT 24 23 27   ALKPHOS 83 94 86     ANTIPSYCHOTIC LABS  [glu, A1C, lipids (rafa LDL), liver enzymes, WBC, ANEU, Hgb, plts]  q12 mo  Recent Labs   Lab Test 12/08/20  0700 01/08/20  0606 01/07/20  0740 10/29/19  0804 06/29/15  0940 06/29/15  0940 11/20/13  1240 11/20/13  1240 "   GLC 82 84 124* 85   < > 90   < > 82   A1C  --   --   --   --   --  5.4  --  4.7    < > = values in this interval not displayed.     Recent Labs   Lab Test 12/08/20  0700 10/29/19  0804 06/07/18  0843 06/22/17  0837   CHOL 274* 284* 271* 267*   TRIG 72 95 109 73   * 138* 134* 136*    127 115 116     Recent Labs   Lab Test 01/07/20  0740 10/29/19  0804 06/07/18  0843 04/29/17  1704   AST 12 14 17 Unsatisfactory specimen - hemolyzed   Canceled, Test credited  NOTIFIED LINNETTE ABRAHAM RN UER 1830 4/29/2017 BY AA     ALT 24 23 27 27   ALKPHOS 83 94 86 72     Recent Labs   Lab Test 12/08/20 0700 01/08/20  0606 01/07/20  0740 10/29/19  0804 06/07/18  0843 04/29/17  1704   WBC 4.3 5.5 6.0 4.9 6.1 12.2*   ANEU  --   --  4.3 2.5 3.1 9.7*   HGB 10.5* 11.1* 10.1* 11.8 12.3 12.4    276 248 245 264 307       PSYCHOTROPIC DRUG INTERACTIONS     Concurrent use of AMPHETAMINES and SEROTONERGIC AGENTS may result in increased risk of serotonin syndrome  Concurrent use of AMPHETAMINES and SEROTONERGIC AGENTS THAT INHIBIT CYP2D6 may result in increased amphetamine exposure and increased risk of serotonin syndrome.     Concurrent use of ARIPIPRAZOLE and QT INTERVAL PROLONGING DRUGS may result in increased risk of QT interval prolongation.     MANAGEMENT:  Monitoring for adverse effects, routine labs and periodic EKGs    RISK STATEMENT for SAFETY     Patricia Perez did not appear to be an imminent safety risk to self or others.    DIAGNOSES                                                                                         [m2, h3]    - Major depressive disorder, recurrent, in remission  - ADHD, by history    ASSESSMENT                                                                                      [m2, h3]        Today, Patricia presents for continued medication management for her MDD and possible ADHD, by history.  She reports general mood stability with improvement after discontinuing Zoloft  and starting Abilify to augment her Effexor several months ago.  She continues to experience some shame and low self-worth related to her work-performance and this causes some situational anxiety. She plans on going back to work in person in August.  It will be important to monitor her mood with that transition for worsening depression.  More diagnostic clarity is needed to determine if she has ADHD, it is possible that her depression was contributing to decreased focus.  She denies acute safety concerns including SI, SIB, HI and she feels safe at home.  She will follow up in 3 months with the new [facility] resident.      MNPMP was checked today:  Indicates no medication misuse .     PLAN                                                                                                               [m2, h3]          1) Meds  - Continue Adderall 25mg daily   - Decrease Zoloft to 100mg for 30 days, then decrease to 50mg for 30 days, then discontinue   - Continue Effexor 225mg daily  - Start Abilify 2mg daily    2) Therapy-  Referral placed for individual therapy         3) Next Due   Labs- Atypical neuroleptic (Abilify) monitoring labs due at next apt  EKG- PRN, will follow up with cardiology  Rating scales- N/A    4) Referrals  No referrals needed at this time    5) RTC: 3 months with new G[facility] resident    6) Crisis Numbers:   Provided routinely in AVS     After hours:  [phone]      TREATMENT RISK STATEMENT:  The risks, benefits, alternatives and potential adverse effects have been discussed and are understood by the pt. The pt understands the risks of using street drugs or alcohol. There are no medical contraindications, the pt agrees to treatment with the ability to do so. The pt knows to call the clinic for any problems or to access emergency care if needed.  Medical and substance use concerns are documented above.  Psychotropic drug interaction check was done, including changes made today.    PROVIDER: Kori Martinze,  DO    Patient not staffed in clinic.  Supervisor is Dr. Ruiz.     Attestation:  I did not see Patricia Perez directly. I have reviewed the documentation and I agree with the resident's plan of care.   John Ruiz MD

## 2021-06-30 ENCOUNTER — OFFICE VISIT (OUTPATIENT)
Dept: FAMILY MEDICINE | Facility: CLINIC | Age: 61
End: 2021-06-30
Payer: COMMERCIAL

## 2021-06-30 ENCOUNTER — ANCILLARY PROCEDURE (OUTPATIENT)
Dept: GENERAL RADIOLOGY | Facility: CLINIC | Age: 61
End: 2021-06-30
Attending: PHYSICIAN ASSISTANT
Payer: COMMERCIAL

## 2021-06-30 VITALS
TEMPERATURE: 98.2 F | DIASTOLIC BLOOD PRESSURE: 74 MMHG | OXYGEN SATURATION: 97 % | RESPIRATION RATE: 18 BRPM | HEIGHT: 63 IN | WEIGHT: 190.5 LBS | SYSTOLIC BLOOD PRESSURE: 123 MMHG | HEART RATE: 80 BPM | BODY MASS INDEX: 33.75 KG/M2

## 2021-06-30 DIAGNOSIS — Z00.00 ROUTINE GENERAL MEDICAL EXAMINATION AT A HEALTH CARE FACILITY: Primary | ICD-10-CM

## 2021-06-30 DIAGNOSIS — Z12.4 SCREENING FOR MALIGNANT NEOPLASM OF CERVIX: ICD-10-CM

## 2021-06-30 DIAGNOSIS — M79.641 PAIN OF RIGHT HAND: ICD-10-CM

## 2021-06-30 DIAGNOSIS — E78.5 HYPERLIPIDEMIA LDL GOAL <100: ICD-10-CM

## 2021-06-30 DIAGNOSIS — R26.89 BALANCE PROBLEM: ICD-10-CM

## 2021-06-30 DIAGNOSIS — I42.9 CARDIOMYOPATHY, UNSPECIFIED TYPE (H): ICD-10-CM

## 2021-06-30 DIAGNOSIS — I10 BENIGN ESSENTIAL HYPERTENSION: ICD-10-CM

## 2021-06-30 DIAGNOSIS — R41.3 MEMORY CHANGE: ICD-10-CM

## 2021-06-30 DIAGNOSIS — Z12.11 COLON CANCER SCREENING: ICD-10-CM

## 2021-06-30 LAB
ANION GAP SERPL CALCULATED.3IONS-SCNC: 4 MMOL/L (ref 3–14)
BUN SERPL-MCNC: 17 MG/DL (ref 7–30)
CALCIUM SERPL-MCNC: 8.7 MG/DL (ref 8.5–10.1)
CHLORIDE SERPL-SCNC: 109 MMOL/L (ref 94–109)
CHOLEST SERPL-MCNC: 205 MG/DL
CO2 SERPL-SCNC: 25 MMOL/L (ref 20–32)
CREAT SERPL-MCNC: 0.77 MG/DL (ref 0.52–1.04)
CREAT UR-MCNC: 30 MG/DL
GFR SERPL CREATININE-BSD FRML MDRD: 82 ML/MIN/{1.73_M2}
GLUCOSE SERPL-MCNC: 91 MG/DL (ref 70–99)
HDLC SERPL-MCNC: 125 MG/DL
LDLC SERPL CALC-MCNC: 64 MG/DL
MICROALBUMIN UR-MCNC: <5 MG/L
MICROALBUMIN/CREAT UR: NORMAL MG/G CR (ref 0–25)
NONHDLC SERPL-MCNC: 80 MG/DL
POTASSIUM SERPL-SCNC: 4.2 MMOL/L (ref 3.4–5.3)
SODIUM SERPL-SCNC: 138 MMOL/L (ref 133–144)
TRIGL SERPL-MCNC: 80 MG/DL

## 2021-06-30 PROCEDURE — 99214 OFFICE O/P EST MOD 30 MIN: CPT | Mod: 25 | Performed by: PHYSICIAN ASSISTANT

## 2021-06-30 PROCEDURE — 87624 HPV HI-RISK TYP POOLED RSLT: CPT | Performed by: PHYSICIAN ASSISTANT

## 2021-06-30 PROCEDURE — 36415 COLL VENOUS BLD VENIPUNCTURE: CPT | Performed by: PHYSICIAN ASSISTANT

## 2021-06-30 PROCEDURE — 82043 UR ALBUMIN QUANTITATIVE: CPT | Performed by: PHYSICIAN ASSISTANT

## 2021-06-30 PROCEDURE — 80048 BASIC METABOLIC PNL TOTAL CA: CPT | Performed by: PHYSICIAN ASSISTANT

## 2021-06-30 PROCEDURE — 99396 PREV VISIT EST AGE 40-64: CPT | Performed by: PHYSICIAN ASSISTANT

## 2021-06-30 PROCEDURE — 73130 X-RAY EXAM OF HAND: CPT | Mod: RT | Performed by: RADIOLOGY

## 2021-06-30 PROCEDURE — 80061 LIPID PANEL: CPT | Performed by: PHYSICIAN ASSISTANT

## 2021-06-30 PROCEDURE — G0145 SCR C/V CYTO,THINLAYER,RESCR: HCPCS | Performed by: PHYSICIAN ASSISTANT

## 2021-06-30 ASSESSMENT — ANXIETY QUESTIONNAIRES
6. BECOMING EASILY ANNOYED OR IRRITABLE: NOT AT ALL
2. NOT BEING ABLE TO STOP OR CONTROL WORRYING: NOT AT ALL
7. FEELING AFRAID AS IF SOMETHING AWFUL MIGHT HAPPEN: NOT AT ALL
3. WORRYING TOO MUCH ABOUT DIFFERENT THINGS: NOT AT ALL
GAD7 TOTAL SCORE: 3
IF YOU CHECKED OFF ANY PROBLEMS ON THIS QUESTIONNAIRE, HOW DIFFICULT HAVE THESE PROBLEMS MADE IT FOR YOU TO DO YOUR WORK, TAKE CARE OF THINGS AT HOME, OR GET ALONG WITH OTHER PEOPLE: NOT DIFFICULT AT ALL
5. BEING SO RESTLESS THAT IT IS HARD TO SIT STILL: SEVERAL DAYS
1. FEELING NERVOUS, ANXIOUS, OR ON EDGE: SEVERAL DAYS

## 2021-06-30 ASSESSMENT — MIFFLIN-ST. JEOR: SCORE: 1388.23

## 2021-06-30 ASSESSMENT — PATIENT HEALTH QUESTIONNAIRE - PHQ9
SUM OF ALL RESPONSES TO PHQ QUESTIONS 1-9: 4
5. POOR APPETITE OR OVEREATING: SEVERAL DAYS

## 2021-06-30 NOTE — PATIENT INSTRUCTIONS
Reduce your Prempro dose to half tab per day x1 month - if your irritability doesn't return then stop it.      Preventive Health Recommendations  Female Ages 50 - 64    Yearly exam: See your health care provider every year in order to  o Review health changes.   o Discuss preventive care.    o Review your medicines if your doctor has prescribed any.      Get a Pap test every three years (unless you have an abnormal result and your provider advises testing more often).    If you get Pap tests with HPV test, you only need to test every 5 years, unless you have an abnormal result.     You do not need a Pap test if your uterus was removed (hysterectomy) and you have not had cancer.    You should be tested each year for STDs (sexually transmitted diseases) if you're at risk.     Have a mammogram every 1 to 2 years.    Have a colonoscopy at age 50, or have a yearly FIT test (stool test). These exams screen for colon cancer.      Have a cholesterol test every 5 years, or more often if advised.    Have a diabetes test (fasting glucose) every three years. If you are at risk for diabetes, you should have this test more often.     If you are at risk for osteoporosis (brittle bone disease), think about having a bone density scan (DEXA).    Shots: Get a flu shot each year. Get a tetanus shot every 10 years.    Nutrition:     Eat at least 5 servings of fruits and vegetables each day.    Eat whole-grain bread, whole-wheat pasta and brown rice instead of white grains and rice.    Get adequate Calcium and Vitamin D.     Lifestyle    Exercise at least 150 minutes a week (30 minutes a day, 5 days a week). This will help you control your weight and prevent disease.    Limit alcohol to one drink per day.    No smoking.     Wear sunscreen to prevent skin cancer.     See your dentist every six months for an exam and cleaning.    See your eye doctor every 1 to 2 years.

## 2021-06-30 NOTE — PROGRESS NOTES
SUBJECTIVE:   CC: Patricia Perez is an 61 year old woman who presents for preventive health visit.       Patient has been advised of split billing requirements and indicates understanding: Yes  Healthy Habits:    Do you get at least three servings of calcium containing foods daily (dairy, green leafy vegetables, etc.)? yes    Amount of exercise or daily activities, outside of work: 4 day(s) per week    Problems taking medications regularly No    Medication side effects: No    Have you had an eye exam in the past two years? yes    Do you see a dentist twice per year? yes    Do you have sleep apnea, excessive snoring or daytime drowsiness?no      PROBLEMS TO ADD ON...  R hand pain/stiffness  x3-4 months no known injury   Always feels stiff  Slight swelling  No TTP  Mother had knobby knuckles     Balance is getting worse  Sometimes feels like she's going to fall over  No dizziness or room spinning  Intermittent lightheadedness when getting up too fast   Short term memory changes    Follow up for:   HTN  high cholesterol  depression    Today's PHQ-2 Score:   PHQ-2 ( 1999 Pf[employer]) 6/30/2021 6/30/2021   Q1: Little interest or pleasure in doing things 1 0   Q2: Feeling down, depressed or hopeless 1 0   PHQ-2 Score 2 0   Q1: Little interest or pleasure in doing things - -   Q2: Feeling down, depressed or hopeless - -   PHQ-2 Score - -       Abuse: Current or Past(Physical, Sexual or Emotional)- No  Do you feel safe in your environment? Yes        Social History     Tobacco Use     Smoking status: Never Smoker     Smokeless tobacco: Never Used   Substance Use Topics     Alcohol use: No     If you drink alcohol do you typically have >3 drinks per day or >7 drinks per week? No                     Reviewed orders with patient.  Reviewed health maintenance and updated orders accordingly - Yes  Lab work is in process    FHS-7: No flowsheet data found.    Mammogram Screening: Recommended mammography every 1-2 years with  "patient discussion and risk factor consideration  Pertinent mammograms are reviewed under the imaging tab.    Pertinent mammograms are reviewed under the imaging tab.  History of abnormal Pap smear: NO - age 30-65 PAP every 5 years with negative HPV co-testing recommended  PAP / HPV Latest Ref Rng & Units 6/15/2016 5/29/2013 5/9/2012   PAP - NIL NIL NIL   HPV 16 DNA NEG Negative - -   HPV 18 DNA NEG Negative - -   OTHER HR HPV NEG Negative - -     Reviewed and updated as needed this visit by clinical staff  Tobacco  Allergies  Meds              Reviewed and updated as needed this visit by Provider                Past Medical History:   Diagnosis Date     Anxiety      Arthritis      Asthma      Bursitis of shoulder 3/4/2010     Chronic rhinitis 3/25/2009     Coronary artery disease      Depression      Depression      Dyspnea on exertion      Gastro-oesophageal reflux disease      GERD (gastroesophageal reflux disease) 10/14/2008     Hypertension      Idiopathic cardiomyopathy (H) 1/8/2009     Indigestion      Itchy eyes      Itchy nose      Left leg pain 6/16/2011     Motion sickness 2/27/2008     Nasal congestion      Pneumonia      Problems related to lack of adequate sleep      Ringing in ears      Sleep apnea 3/25/2009     Sneezing         ROS:  Other than what is noted in the HPI and PMH a complete review of systems is otherwise negative including: Constitutional, HEENT, endocrine, cardiovascular, respiratory, GI/, musculoskeletal, neuro, and psychiatric.     OBJECTIVE:   /74   Pulse 80   Temp 98.2  F (36.8  C) (Tympanic)   Resp 18   Ht 1.6 m (5' 3\")   Wt 86.4 kg (190 lb 8 oz)   LMP 07/16/2012   SpO2 97%   Breastfeeding No   BMI 33.75 kg/m    EXAM:  GENERAL: healthy, alert and no distress  EYES: Eyes grossly normal to inspection, PERRL and conjunctivae and sclerae normal  HENT: ear canals and TM's normal, nose and mouth without ulcers or lesions  NECK: no adenopathy, no asymmetry, masses, or " scars and thyroid normal to palpation  RESP: lungs clear to auscultation - no rales, rhonchi or wheezes  BREAST: normal without masses, tenderness or nipple discharge and no palpable axillary masses or adenopathy  CV: regular rates and rhythm, normal S1 S2, no S3 or S4 and no murmur, click or rub  ABDOMEN: soft, nontender, no hepatosplenomegaly, no masses and bowel sounds normal   (female): normal female external genitalia, normal urethral meatus, vaginal mucosa pink, moist, well rugated, and normal cervix  MS: mild osteophytic enlargement of right third MCP, minimal edema, non tender  SKIN: no suspicious lesions or rashes  NEURO: Normal strength and tone, mentation intact and speech normal  PSYCH: mentation appears normal, affect normal/bright    ASSESSMENT/PLAN:       ICD-10-CM    1. Routine general medical examination at a health care facility  Z00.00 Pap imaged thin layer screen with HPV - recommended age 30 - 65 years (select HPV order below)     HPV High Risk Types DNA Cervical   2. Screening for malignant neoplasm of cervix  Z12.4    3. Colon cancer screening  Z12.11 Fecal colorectal cancer screen (FIT)   4. Balance problem  R26.89 NEUROLOGY ADULT REFERRAL   5. Memory change  R41.3 NEUROLOGY ADULT REFERRAL   6. Benign essential hypertension  I10 Albumin Random Urine Quantitative with Creat Ratio     Basic metabolic panel   7. Hyperlipidemia LDL goal <100  E78.5 Lipid panel reflex to direct LDL Fasting   8. Cardiomyopathy, unspecified type (H)  I42.9    9. Pain of right hand  M79.641 XR Hand Right G/E 3 Views       1-3) Screenings discussed    4,5) Referral to neurology for further eval/tx.    6,7) Routine labs obtained today. Blood pressure at goal. No med changes.     8) Follows cardiology    9) XR of hand today. Likely OA.      Patient has been advised of split billing requirements and indicates understanding: Yes  COUNSELING:   Reviewed preventive health counseling, as reflected in patient  "instructions    Estimated body mass index is 33.75 kg/m  as calculated from the following:    Height as of this encounter: 1.6 m (5' 3\").    Weight as of this encounter: 86.4 kg (190 lb 8 oz).    She reports that she has never smoked. She has never used smokeless tobacco.      Counseling Resources:  ATP IV Guidelines  Pooled Cohorts Equation Calculator  Breast Cancer Risk Calculator  BRCA-Related Cancer Risk Assessment: FHS-7 Tool  FRAX Risk Assessment  ICSI Preventive Guidelines  Dietary Guidelines for Americans, 2010  USDA's MyPlate  ASA Prophylaxis  Lung CA Screening    YODIT [facility] JESUS  "

## 2021-07-01 ASSESSMENT — ANXIETY QUESTIONNAIRES: GAD7 TOTAL SCORE: 3

## 2021-07-02 LAB
COPATH REPORT: NORMAL
PAP: NORMAL

## 2021-07-05 LAB
FINAL DIAGNOSIS: NORMAL
HPV HR 12 DNA CVX QL NAA+PROBE: NEGATIVE
HPV16 DNA SPEC QL NAA+PROBE: NEGATIVE
HPV18 DNA SPEC QL NAA+PROBE: NEGATIVE
SPECIMEN DESCRIPTION: NORMAL
SPECIMEN SOURCE CVX/VAG CYTO: NORMAL

## 2021-07-13 ENCOUNTER — MYC REFILL (OUTPATIENT)
Dept: FAMILY MEDICINE | Facility: CLINIC | Age: 61
End: 2021-07-13

## 2021-07-13 DIAGNOSIS — I10 ESSENTIAL HYPERTENSION WITH GOAL BLOOD PRESSURE LESS THAN 130/80: ICD-10-CM

## 2021-07-13 DIAGNOSIS — I42.9 IDIOPATHIC CARDIOMYOPATHY (H): ICD-10-CM

## 2021-07-14 RX ORDER — CARVEDILOL 25 MG/1
25 TABLET ORAL 2 TIMES DAILY WITH MEALS
Qty: 180 TABLET | Refills: 0 | Status: SHIPPED | OUTPATIENT
Start: 2021-07-14 | End: 2021-08-18

## 2021-07-14 NOTE — TELEPHONE ENCOUNTER
Prescription approved per [facility] Refill Protocol.    Radha Byrne RN BSN  [facility]

## 2021-07-24 DIAGNOSIS — F33.0 MAJOR DEPRESSIVE DISORDER, RECURRENT EPISODE, MILD (H): ICD-10-CM

## 2021-07-27 ENCOUNTER — MYC MEDICAL ADVICE (OUTPATIENT)
Dept: NURSING | Facility: CLINIC | Age: 61
End: 2021-07-27

## 2021-07-27 RX ORDER — SERTRALINE HYDROCHLORIDE 100 MG/1
TABLET, FILM COATED ORAL
Qty: 90 TABLET | Refills: 1 | OUTPATIENT
Start: 2021-07-27

## 2021-08-10 DIAGNOSIS — J31.0 CHRONIC RHINITIS: ICD-10-CM

## 2021-08-10 RX ORDER — AZELASTINE 1 MG/ML
2 SPRAY, METERED NASAL 2 TIMES DAILY PRN
Qty: 30 ML | Refills: 3 | Status: SHIPPED | OUTPATIENT
Start: 2021-08-10 | End: 2023-12-08

## 2021-08-10 NOTE — TELEPHONE ENCOUNTER
Prescription approved per [facility] Refill Protocol.    Digna CHILDS RN  Specialty/All[facility] Clinics

## 2021-08-17 ENCOUNTER — OFFICE VISIT (OUTPATIENT)
Dept: FAMILY MEDICINE | Facility: CLINIC | Age: 61
End: 2021-08-17
Payer: COMMERCIAL

## 2021-08-17 VITALS
TEMPERATURE: 97 F | HEIGHT: 63 IN | RESPIRATION RATE: 14 BRPM | WEIGHT: 193.5 LBS | SYSTOLIC BLOOD PRESSURE: 112 MMHG | OXYGEN SATURATION: 100 % | BODY MASS INDEX: 34.29 KG/M2 | DIASTOLIC BLOOD PRESSURE: 70 MMHG | HEART RATE: 64 BPM

## 2021-08-17 DIAGNOSIS — M54.42 CHRONIC LEFT-SIDED LOW BACK PAIN WITH LEFT-SIDED SCIATICA: Primary | ICD-10-CM

## 2021-08-17 DIAGNOSIS — G89.29 CHRONIC LEFT-SIDED LOW BACK PAIN WITH LEFT-SIDED SCIATICA: Primary | ICD-10-CM

## 2021-08-17 PROCEDURE — 99214 OFFICE O/P EST MOD 30 MIN: CPT | Performed by: PHYSICIAN ASSISTANT

## 2021-08-17 ASSESSMENT — ENCOUNTER SYMPTOMS
FEVER: 0
COUGH: 0
ABDOMINAL PAIN: 0
NERVOUS/ANXIOUS: 0
WEAKNESS: 0

## 2021-08-17 ASSESSMENT — PAIN SCALES - GENERAL: PAINLEVEL: SEVERE PAIN (7)

## 2021-08-17 ASSESSMENT — MIFFLIN-ST. JEOR: SCORE: 1401.84

## 2021-08-17 NOTE — PROGRESS NOTES
Assessment & Plan     Chronic left-sided low back pain with left-sided sciatica  Patient is a 61-year-old female presents clinic due to low back pain that radiates into the buttock and down left leg.  Symptoms are intermittent and have been going on for 1 month.  Vital signs normal.  Physical exam findings are most consistent with lumbar radiculopathy/sciatica.  Recommended conservative measures including physical therapy, heat/ice, Tylenol.  Recommended follow-up with patient develops new or worsening symptoms and would consider lumbar MRI at that time.  - [facility]; Future    See Patient Instructions    Return in about 4 weeks (around 9/14/2021).    [facility] JESUS Gomez is a 61 year old who presents for the following health issues     HPI     Musculoskeletal problem/pain  Onset/Duration: 1 month of pain that starts in the the low back/buttock and travels down the posterior leg. Patient notes intermittent numbness in foot with sitting. Standing worsens hip pain and improves numbness in foot. laying can improve symptoms.   Description  Location: Hip - left  Joint Swelling: no  Redness: no  Pain: YES- She notes that pain is worse in hip area but does radiate to knee and there is sometimes shooting pain down to calf  Warmth: no  Intensity:  moderate  Progression of Symptoms:  worsening and same  Accompanying signs and symptoms:   Fevers: no  Numbness/tingling/weakness: YES- Numbness and tingling  History  Trauma to the area: no  Recent illness:  no  Previous similar problem: no  Previous evaluation:  no  Precipitating or alleviating factors:  Aggravating factors include: none  Therapies tried and outcome: heat, ibuprofen        Review of Systems   Constitutional: Negative for fever.   HENT: Negative for congestion.    Respiratory: Negative for cough.    Cardiovascular: Negative for chest pain.   Gastrointestinal: Negative for abdominal pain.  "  Skin: Negative for rash.   Neurological: Negative for weakness.   Psychiatric/Behavioral: The patient is not nervous/anxious.             Objective    /70   Pulse 64   Temp 97  F (36.1  C) (Tympanic)   Resp 14   Ht 1.6 m (5' 3\")   Wt 87.8 kg (193 lb 8 oz)   LMP 07/16/2012   SpO2 100%   BMI 34.28 kg/m    Body mass index is 34.28 kg/m .  Physical Exam  Vitals and nursing note reviewed.   Constitutional:       General: She is not in acute distress.     Appearance: Normal appearance.   HENT:      Head: Normocephalic and atraumatic.   Eyes:      Extraocular Movements: Extraocular movements intact.      Pupils: Pupils are equal, round, and reactive to light.   Cardiovascular:      Rate and Rhythm: Normal rate and regular rhythm.      Heart sounds: Normal heart sounds.   Pulmonary:      Effort: Pulmonary effort is normal.      Breath sounds: Normal breath sounds.   Musculoskeletal:         General: Normal range of motion.      Cervical back: Normal range of motion.      Comments:  normal gait  Tenderness palpation of left-sided lumbar paraspinal musculature.  No tenderness to palpation of lumbar spine.  Mildly positive straight leg raise on left, negative straight leg raise on right.  Plantar flexion, dorsiflexion, knee extension, knee flexion 5/5 bilaterally.  Sensation subjectively decreased over left lower leg as compared to right.   Skin:     General: Skin is warm and dry.   Neurological:      General: No focal deficit present.      Mental Status: She is alert.   Psychiatric:         Mood and Affect: Mood normal.         Behavior: Behavior normal.                        "

## 2021-08-17 NOTE — PATIENT INSTRUCTIONS
Your symptoms are likely due to nerve compression at your low back/pelvis.  This is called sciatica or lumbar radiculopathy.  For treatment I would recommend starting with physical therapy, Tylenol, and heat/ice.  You can also go ahead with chiropractics if you wish.  It may take 4-6 weeks for symptoms to improve with our treatment plan.  Please reach out if you develop any new or worsening symptoms and we would consider an MRI at that time.  Return to clinic if your symptoms are persisting.  Please reach out with questions or concerns.  Patient Education     Understanding Lumbar Radiculopathy    Lumbar radiculopathy is irritation or inflammation of a nerve root in the low back. It causes symptoms that spread out from the back down one or both legs. To understand this condition, it helps to understand the parts of the spine:    Vertebrae. These are bones that stack to form the spine. The lumbar spine contains 5 vertebrae near the bottom of your spine.    Disks. These are soft pads of tissue between the vertebrae. They act as shock absorbers for the spine.    Spinal canal. This is a tunnel formed within the stacked vertebrae. In the lumbar spine, nerves run through this canal.    Nerves. These branch off and leave the spinal canal, traveling out to parts of the body. As they leave the spinal canal, nerves pass through openings between the vertebrae. The nerve root is the part of the nerve that is closest to the spinal canal.    Sciatic nerve. This is a large nerve formed from several nerve roots in the low back. This nerve extends down the back of the leg to the foot.  With lumbar radiculopathy, nerve roots in the low back become irritated. This leads to pain and symptoms. The sciatic nerve is commonly involved, so the condition is often called sciatica.  What causes lumbar radiculopathy?  Aging, injury, poor posture, extra body weight, and other issues can lead to problems in the low back. These problems may then  irritate nerve roots. They include:    Damage to a disk in the lumbar spine. The damaged disk may then press on nearby nerve roots.    Degeneration from wear and tear, and aging. This can lead to narrowing (stenosis) of the openings between the vertebrae. The narrowed openings press on nerve roots as they leave the spinal canal.    Unstable spine. This is when a vertebra slips forward. It can then press on a nerve root.  Other, less common things can put pressure on nerves in the low back. These include diabetes, infection, or a tumor.  Symptoms of lumbar radiculopathy  These include:    Pain in the low back    Pain, numbness, tingling, or weakness that travels into the buttocks, hip, groin, or leg    Muscle spasms in the low back, or leg  Treatment for lumbar radiculopathy  In most cases, your healthcare provider will first try treatments that help relieve symptoms. These may include:    Prescription and over-the-counter pain medicines. These help relieve pain, swelling, and irritation.    Limits on positions and activities that increase pain. But lying in bed or avoiding all movement is only recommended for a short period of time.    Physical therapy, including exercises and stretches. This helps decrease pain and increase movement and function.    Steroid shots into the lower back. This may help relieve symptoms for a time.    Weight-loss program. If you are overweight, losing extra pounds (kilograms) may help relieve symptoms.  In some cases, you may need surgery to fix the underlying problem. This depends on the cause, the symptoms, and how long the pain has lasted.  Possible complications  Over time, an irritated and inflamed nerve may become damaged. This may lead to long-lasting (permanent) numbness or weakness in your legs and feet. If symptoms change suddenly or get worse, be sure to let your healthcare provider know.  When to call your healthcare provider  Call your healthcare provider right away if you  have any of these:    New pain or pain that gets worse    New or increasing weakness, tingling, or numbness in your leg or foot    Problems controlling your bladder or bowel  Dariel last reviewed this educational content on 2/1/2020 2000-2021 [employer]. All rights reserved. This information is not intended as a substitute for professional medical care. Always follow your healthcare professional's instructions.

## 2021-08-18 ENCOUNTER — MYC MEDICAL ADVICE (OUTPATIENT)
Dept: FAMILY MEDICINE | Facility: CLINIC | Age: 61
End: 2021-08-18

## 2021-08-18 ENCOUNTER — VIRTUAL VISIT (OUTPATIENT)
Dept: CARDIOLOGY | Facility: CLINIC | Age: 61
End: 2021-08-18
Attending: INTERNAL MEDICINE
Payer: COMMERCIAL

## 2021-08-18 ENCOUNTER — THERAPY VISIT (OUTPATIENT)
Dept: PHYSICAL THERAPY | Facility: CLINIC | Age: 61
End: 2021-08-18
Attending: PHYSICIAN ASSISTANT
Payer: COMMERCIAL

## 2021-08-18 DIAGNOSIS — G89.29 CHRONIC LEFT-SIDED LOW BACK PAIN WITH LEFT-SIDED SCIATICA: ICD-10-CM

## 2021-08-18 DIAGNOSIS — E78.5 HYPERLIPIDEMIA LDL GOAL <70: ICD-10-CM

## 2021-08-18 DIAGNOSIS — M54.42 CHRONIC LEFT-SIDED LOW BACK PAIN WITH LEFT-SIDED SCIATICA: ICD-10-CM

## 2021-08-18 DIAGNOSIS — I25.10 CORONARY ARTERY DISEASE INVOLVING NATIVE CORONARY ARTERY OF NATIVE HEART WITHOUT ANGINA PECTORIS: ICD-10-CM

## 2021-08-18 DIAGNOSIS — I10 ESSENTIAL HYPERTENSION WITH GOAL BLOOD PRESSURE LESS THAN 130/80: ICD-10-CM

## 2021-08-18 DIAGNOSIS — R53.83 FATIGUE, UNSPECIFIED TYPE: Primary | ICD-10-CM

## 2021-08-18 DIAGNOSIS — I42.9 IDIOPATHIC CARDIOMYOPATHY (H): ICD-10-CM

## 2021-08-18 DIAGNOSIS — E78.5 HYPERLIPIDEMIA LDL GOAL <100: ICD-10-CM

## 2021-08-18 PROCEDURE — 97161 PT EVAL LOW COMPLEX 20 MIN: CPT | Mod: GP | Performed by: PHYSICAL THERAPIST

## 2021-08-18 PROCEDURE — 99214 OFFICE O/P EST MOD 30 MIN: CPT | Mod: 95 | Performed by: INTERNAL MEDICINE

## 2021-08-18 PROCEDURE — 97110 THERAPEUTIC EXERCISES: CPT | Mod: GP | Performed by: PHYSICAL THERAPIST

## 2021-08-18 RX ORDER — EZETIMIBE 10 MG/1
10 TABLET ORAL DAILY
Qty: 90 TABLET | Refills: 3 | Status: SHIPPED | OUTPATIENT
Start: 2021-08-18 | End: 2022-02-09

## 2021-08-18 RX ORDER — LISINOPRIL 5 MG/1
TABLET ORAL
Qty: 90 TABLET | Refills: 3 | Status: SHIPPED | OUTPATIENT
Start: 2021-08-18 | End: 2022-02-09

## 2021-08-18 RX ORDER — SIMVASTATIN 20 MG
20 TABLET ORAL AT BEDTIME
Qty: 90 TABLET | Refills: 3 | Status: SHIPPED | OUTPATIENT
Start: 2021-08-18 | End: 2022-02-09

## 2021-08-18 RX ORDER — HYDRALAZINE HYDROCHLORIDE 10 MG/1
10 TABLET, FILM COATED ORAL 3 TIMES DAILY
Qty: 270 TABLET | Refills: 3 | Status: SHIPPED | OUTPATIENT
Start: 2021-08-18 | End: 2022-02-09

## 2021-08-18 RX ORDER — ISOSORBIDE MONONITRATE 30 MG/1
30 TABLET, EXTENDED RELEASE ORAL DAILY
Qty: 90 TABLET | Refills: 3 | Status: SHIPPED | OUTPATIENT
Start: 2021-08-18 | End: 2022-02-09

## 2021-08-18 RX ORDER — CARVEDILOL 25 MG/1
25 TABLET ORAL 2 TIMES DAILY WITH MEALS
Qty: 180 TABLET | Refills: 3 | Status: SHIPPED | OUTPATIENT
Start: 2021-08-18 | End: 2022-02-09

## 2021-08-18 NOTE — PROGRESS NOTES
"Service Date: 2021    Funmilayo Grubbs PA-C  [facility]  [address] [place] [address]     RE:  Patricia Perez  MRN: [id number]  : 1960    Dear Ms. Lor:    It was a pleasure participating in the care of your patient, Ms. Patricia Perez.  As you know, she is a 61-year-old lady who I saw over virtual video visit via [employer] for a history of nonischemic cardiomyopathy, hypertension, hyperlipidemia and mild nonobstructive coronary artery disease.    Her past medical history is significant for the followin.  Hypertension.  2.  Hyperlipidemia.  3.  Sleep apnea, on CPAP.  4.  Depression.  5.  Gastroesophageal reflux disease.  6.  Asthma.  7.  Obesity.  8.  Status post gastric bypass surgery.    Her cardiac history is significant for nonischemic cardiomyopathy that was mild to moderate degree in .  She had a coronary angiogram back then that revealed only mild disease in the LAD and RCA territories.    She had an episode of syncope in 2020 that necessitated coronary CTA. On 2020, CTA revealed the following:  Left main okay.  LAD less than 50% proximal to mid stenosis.  Circumflex less than 50% proximal stenosis.  RCA less than 30% proximal to mid stenosis.    Zio patch monitor and echo were relatively unremarkable.  Since then, she has had no further episodes of syncope, rather some episodes of tripping on her overall and feet.    I last saw her 2020.  At that time, she was doing adequately.  She presents today for continuing care.    Since our last visit, she has fallen off her exercise routine a bit.  She still walks for 20-30 minutes 3-4 times a week and does some chair yoga on , but has felt slightly fatigued.  Her weight has gone up a bit due to lack of exercise and lack of dieting.  She hurt her back and is going for PT for this, and wishes to get back into her vigorous exercise routine and wants to be the woman that \"goes to the [facility].\"    She feels " like she needs a 2-week health retreat to get reset in terms of her health.    She has complained of infrequent rapid palpitations in which when she gets them, her blood pressure goes down to the 100/50 range.  She does not get dizzy, but says she gets mildly shaky.  She is not syncopal or near-syncopal.  It lasts for an hour and she may get it once every 2-3 weeks.  She has not had any for the last few weeks.  She does drink quite a few cups of coffee in the morning, but does not drink alcohol.    Her [id number] score is 3 for congestive heart failure, hypertension, and gender.    She otherwise denies aissatou PND, orthopnea, edema, syncope or near syncope.  She denies chest pain or shortness of breath as well.    REVIEW OF SYSTEMS:  A 10-point review of systems is positive for some back pain and occasional palpitations.    CURRENT MEDICATIONS:      1.  Carvedilol 25 mg twice daily.  2.  Zetia 10 mg a day.  3.  Hydralazine 10 mg prescribed 3 times a day, but she is taking twice.  4.  Isosorbide mononitrate 30 mg a day.  5.  Lisinopril 5 mg daily.  6.  Simvastatin 20 mg a day.  7.  Aspirin 81 mg a day.    PHYSICAL EXAMINATION:      VITAL SIGNS:  Her blood pressure is 112/70 with a pulse of 64.  Her weight is 193 pounds.  GENERAL:  She appears comfortable, well groomed.  PSYCHIATRIC:  She is alert and oriented x3.  HEENT:  Eyes do not appear grossly erythematous or have exudate.  RESPIRATORY:  She is breathing comfortably without gross cough.    The remainder of the comprehensive physical exam was deferred secondary to the COVID-19 pandemic and secondary to video visit restrictions.    Labs on 06/30/2021, LDL was 64, potassium 4.2, GFR normal.  On 12/08/2020, TSH 1.4.  Hemoglobin 10.5.    Echocardiogram 01/07/2020 revealed low-normal ventricular systolic function, ejection fraction 50%-55%.  No gross valvular pathology, unchanged from 2016.      IMPRESSION:      Patricia is a 61-year-old lady with several active cardiac  issues:    1.  Prior mild nonischemic cardiomyopathy.    In 2008, her ejection fraction was in the 40%-45% range.  With medical therapy, her ejection fraction improved to the 50% range in 2016.  She has only mild nonobstructive coronary artery disease by coronary CTA 01/07/2020.  She is currently asymptomatic at a relatively low to moderate level of exertion.  Continue to monitor clinically.    2.  Hypertension, well controlled.      Blood pressures have been running in the 112-115 mmHg range.  Continue to follow.    3.  Hyperlipidemia, well controlled.    Goal LDL less than 70.  Currently, LDL is 64 on her present regimen of ezetimibe and simvastatin 20 mg a day.  Continue to follow.    4.  Mild to moderate nonobstructive coronary disease.    This was displayed on coronary CTA 01/07/2020.  She is currently compliant with her aspirin and statin without gross bleeding or other symptomatology.  Continue to follow.      PLAN:      1.  Continue present medications at present doses.    2.  She would like to simply cut down her coffee intake for now to see if this helps her infrequent rapid palpitations.  If she cuts down or cuts out her coffee and she has recurrent rapid palpitations that have an effect on her blood pressure or symptoms should be experienced, she will MyChart us and we would then pursue an echocardiogram and a Zio patch monitor and perhaps some lab work as well.    Obviously, if she should have serious lightheadedness or long lasting palpitations, she was instructed to report to the Emergency Department for more urgent medical care at that time.    3.  Otherwise, she will follow up with me on 02/09/2021 and she will be contacting you in the near future regarding her mild anemia.    Once again, it was a pleasure participating in the care of your patient, Ms. Patricia Perez.  Please feel free to contact me at any time if any questions regarding her care in the future.    Sincerely,            Preston GRIMES  MD Khalif        D: 2021   T: 2021   MT: DAYAMI    Name:     DONNA OLIVA  MRN:      [id number]        Account:      [id number]   :      1960           Service Date: 2021       Document: [id number]

## 2021-08-18 NOTE — PROGRESS NOTES
Physical Therapy Initial Evaluation  Subjective:  The history is provided by the patient. No  was used.   Patient Health History  Patricia Perez being seen for LBP.     Date of Onset: exacerbation June.   Problem occurred: ?   Pain is reported as 5/10 (increases to 7/10) on pain scale.  General health as reported by patient is good.  Pertinent medical history includes: asthma, depression, heart problems, high blood pressure, numbness/tingling, overweight and sleep disorder/apnea.   Red flags:  None as reported by patient.  Medical allergies: other (a statin).   Surgeries include:  Other (wt loss surgery).    Current medications:  Anti-depressants, anti-inflammatory, cardiac medication, high blood pressure medication and hormone replacement therapy.    Current occupation is .   Primary job tasks include:  Computer work and prolonged sitting.                  Therapist Generated HPI Evaluation  Problem details: Date of MD order for this visit was 8-17-21. Patricia c/o L LBP which sometimes radiates to the butt/thigh/knee. No known injury and started about June. Does have a long hx of back pain. End of work day is sore with PL 6/10   Exercise-has been trying to exercise, treadmill 20' and yoga 1x/week-started in June but has not been able to do past 1.5 wks due to her back.         Type of problem:  Lumbar.    This is a chronic condition.  Condition occurred with:  Insidious onset.  Where condition occurred: for unknown reasons.  Patient reports pain:  Lumbar spine left.  Pain is described as aching and is constant.  Pain radiates to:  Gluteals left, thigh left and knee left (not often to thigh/knee). Pain is the same all the time.  Since onset symptoms are gradually worsening.  Associated symptoms:  Loss of motion/stiffness, numbness and tingling. Symptoms are exacerbated by walking and standing  and relieved by other (sitting and laying down).      Restrictions due to condition  include:  Working in normal job without restrictions.  Barriers include:  None as reported by patient.                        Objective:  Standing Alignment:    Cervical/Thoracic:  Thoracic kyphosis increased                Gait:    Gait Type:  Normal         Flexibility/Screens:       Lower Extremity:  Decreased left lower extremity flexibility:Hip Flexors and Quadriceps    Decreased right lower extremity flexibility:  Hip Flexors and Quadriceps               Lumbar/SI Evaluation  ROM:    AROM Lumbar:   Flexion:            50% rep 10x  increased ROM  and butt pain  Ext:                    80% rep 10x increased ROM  less buttock pain   Side Bend:        Left:  75%    Right:  75%  Rotation:           Left:  75%    Right:  75%  Side Glide:        Left:     Right:           Lumbar Myotomes:    T12-L3 (Hip Flex):  Left: 5    Right: 5  L2-4 (Quads):  Left:  5    Right:  5  L4 (Ankle DF):  Left:  5    Right:  5  L5 (Great Toe Ext): Left: 5    Right: 5     Lumbar DTR's:  Lumbar dtr's: patellar equal and active, L achilles hypo, R wnl.        Lumbar Dermtomes:  Lumbar dermatomes: decreased to light touch L l/e.                Neural Tension/Mobility:      Left side:SLR or SLR w/DF  negative.     Right side:   SLR w/DF or SLR  negative.   Lumbar Palpation:    Tenderness present at Left:    Quadratus Lumborum; Erector Spinae and PSIS  Tenderness not present at Left:    Piriformis; ASIS; Gluteus Medius or Greater Trochanter  Tenderness present at Right: Quadratus Lumborum; Erector Spinae and PSIS  Tenderness not present at Right:  Piriformis; ASIS; Gluteus Medius or Greater Trochanter    Lumbar Provocation:      Left negative with:  PROM hip    Right negative with:  PROM hip  Spinal Segmental Conclusions: P/a lumbar spine stiff and painful, stiffness sacral base          SI joint/Sacrum:    Posterior pelvic pain provocation neg B                                                       General     ROS    Assessment/Plan:     Patient is a 61 year old female with lumbar complaints.    Patient has the following significant findings with corresponding treatment plan.                Diagnosis 1:   LBP with L radiculopathy  Pain -  manual therapy, self management, education, directional preference exercise and home program  Decreased ROM/flexibility - manual therapy, therapeutic exercise and home program  Decreased joint mobility - manual therapy, therapeutic exercise and home program  Decreased strength - therapeutic exercise, therapeutic activities and home program  Impaired muscle performance - neuro re-education and home program  Decreased function - therapeutic activities and home program  Impaired posture - neuro re-education and home program    Therapy Evaluation Codes:   1) History comprised of:   Personal factors that impact the plan of care:      Age, Past/current experiences, Profession and Time since onset of symptoms.    Comorbidity factors that impact the plan of care are:      Overweight.     Medications impacting care: Anti-depressant and Anti-inflammatory.  2) Examination of Body Systems comprised of:   Body structures and functions that impact the plan of care:      Lumbar spine.   Activity limitations that impact the plan of care are:      Bending, Driving, Lifting, Sitting, Standing, Walking and Sleeping.  3) Clinical presentation characteristics are:   Stable/Uncomplicated.  4) Decision-Making    Low complexity using standardized patient assessment instrument and/or measureable assessment of functional outcome.  Cumulative Therapy Evaluation is: Low complexity.    Previous and current functional limitations:  (See Goal Flow Sheet for this information)    Short term and Long term goals: (See Goal Flow Sheet for this information)     Communication ability:  Patient appears to be able to clearly communicate and understand verbal and written communication and follow directions correctly.  Treatment Explanation - The  following has been discussed with the patient:   RX ordered/plan of care  Anticipated outcomes  Possible risks and side effects  This patient would benefit from PT intervention to resume normal activities.   Rehab potential is good.    Frequency:  1 X week, once daily  Duration:  for 6 weeks  Discharge Plan:  Achieve all LTG.  Independent in home treatment program.  Reach maximal therapeutic benefit.    Please refer to the daily flowsheet for treatment today, total treatment time and time spent performing 1:1 timed codes.

## 2021-08-18 NOTE — LETTER
2021      RE: Patricia Perez  [address] [facility] [address]       Service Date: 2021    Funmilayo Grubbs PA-C  [facility]  [address] [facility] [place] [address]     RE:  Patricia Perez  MRN: [id number]  : 1960    Dear Ms. Lor:    It was a pleasure participating in the care of your patient, Ms. Patricia Perez.  As you know, she is a 61-year-old lady who I saw over virtual video visit via [employer] for a history of nonischemic cardiomyopathy, hypertension, hyperlipidemia and mild nonobstructive coronary artery disease.    Her past medical history is significant for the followin.  Hypertension.  2.  Hyperlipidemia.  3.  Sleep apnea, on CPAP.  4.  Depression.  5.  Gastroesophageal reflux disease.  6.  Asthma.  7.  Obesity.  8.  Status post gastric bypass surgery.    Her cardiac history is significant for nonischemic cardiomyopathy that was mild to moderate degree in .  She had a coronary angiogram back then that revealed only mild disease in the LAD and RCA territories.    She had an episode of syncope in 2020 that necessitated coronary CTA. On 2020, CTA revealed the following:  Left main okay.  LAD less than 50% proximal to mid stenosis.  Circumflex less than 50% proximal stenosis.  RCA less than 30% proximal to mid stenosis.    Zio patch monitor and echo were relatively unremarkable.  Since then, she has had no further episodes of syncope, rather some episodes of tripping on her overall and feet.    I last saw her 2020.  At that time, she was doing adequately.  She presents today for continuing care.    Since our last visit, she has fallen off her exercise routine a bit.  She still walks for 20-30 minutes 3-4 times a week and does some chair yoga on , but has felt slightly fatigued.  Her weight has gone up a bit due to lack of exercise and lack of dieting.  She hurt her back and is going for PT for this, and wishes to get back into her  "vigorous exercise routine and wants to be the woman that \"goes to the [facility].\"    She feels like she needs a 2-week health retreat to get reset in terms of her health.    She has complained of infrequent rapid palpitations in which when she gets them, her blood pressure goes down to the 100/50 range.  She does not get dizzy, but says she gets mildly shaky.  She is not syncopal or near-syncopal.  It lasts for an hour and she may get it once every 2-3 weeks.  She has not had any for the last few weeks.  She does drink quite a few cups of coffee in the morning, but does not drink alcohol.    Her [id number]-[id number] score is 3 for congestive heart failure, hypertension, and gender.    She otherwise denies aissatou PND, orthopnea, edema, syncope or near syncope.  She denies chest pain or shortness of breath as well.    REVIEW OF SYSTEMS:  A 10-point review of systems is positive for some back pain and occasional palpitations.    CURRENT MEDICATIONS:      1.  Carvedilol 25 mg twice daily.  2.  Zetia 10 mg a day.  3.  Hydralazine 10 mg prescribed 3 times a day, but she is taking twice.  4.  Isosorbide mononitrate 30 mg a day.  5.  Lisinopril 5 mg daily.  6.  Simvastatin 20 mg a day.  7.  Aspirin 81 mg a day.    PHYSICAL EXAMINATION:      VITAL SIGNS:  Her blood pressure is 112/70 with a pulse of 64.  Her weight is 193 pounds.  GENERAL:  She appears comfortable, well groomed.  PSYCHIATRIC:  She is alert and oriented x3.  HEENT:  Eyes do not appear grossly erythematous or have exudate.  RESPIRATORY:  She is breathing comfortably without gross cough.    The remainder of the comprehensive physical exam was deferred secondary to the COVID-19 pandemic and secondary to video visit restrictions.    Labs on 06/30/2021, LDL was 64, potassium 4.2, GFR normal.  On 12/08/2020, TSH 1.4.  Hemoglobin 10.5.    Echocardiogram 01/07/2020 revealed low-normal ventricular systolic function, ejection fraction 50%-55%.  No gross valvular pathology, unchanged " from 2016.      IMPRESSION:      Patricia is a 61-year-old lady with several active cardiac issues:    1.  Prior mild nonischemic cardiomyopathy.    In 2008, her ejection fraction was in the 40%-45% range.  With medical therapy, her ejection fraction improved to the 50% range in 2016.  She has only mild nonobstructive coronary artery disease by coronary CTA 01/07/2020.  She is currently asymptomatic at a relatively low to moderate level of exertion.  Continue to monitor clinically.    2.  Hypertension, well controlled.      Blood pressures have been running in the 112-115 mmHg range.  Continue to follow.    3.  Hyperlipidemia, well controlled.    Goal LDL less than 70.  Currently, LDL is 64 on her present regimen of ezetimibe and simvastatin 20 mg a day.  Continue to follow.    4.  Mild to moderate nonobstructive coronary disease.    This was displayed on coronary CTA 01/07/2020.  She is currently compliant with her aspirin and statin without gross bleeding or other symptomatology.  Continue to follow.      PLAN:      1.  Continue present medications at present doses.    2.  She would like to simply cut down her coffee intake for now to see if this helps her infrequent rapid palpitations.  If she cuts down or cuts out her coffee and she has recurrent rapid palpitations that have an effect on her blood pressure or symptoms should be experienced, she will MyChart us and we would then pursue an echocardiogram and a Zio patch monitor and perhaps some lab work as well.    Obviously, if she should have serious lightheadedness or long lasting palpitations, she was instructed to report to the Emergency Department for more urgent medical care at that time.    3.  Otherwise, she will follow up with me on 02/09/2021 and she will be contacting you in the near future regarding her mild anemia.    Once again, it was a pleasure participating in the care of your patient, Ms. Patricia Perez.  Please feel free to contact me at any time  if any questions regarding her care in the future.    Sincerely,      Preston Cuadra MD        D: 2021   T: 2021   MT: DAYAMI    Name:     DONNA OLIVA  MRN:      [id number]        Account:      [id number]   :      1960           Service Date: 2021       Document: [id number]

## 2021-08-18 NOTE — PATIENT INSTRUCTIONS
1.  Continue present medications at present doses.    2.  Reduce or eliminate coffee intake.   If you still have recurrent rapid palpitations that have an effect on blood pressure or symptoms should be experienced, please MyChart us and we will then pursue an echocardiogram and a Zio patch monitor and perhaps some lab work.    3.  Follow up on 02/09/2021 with Dr. Cuadra    If you should have serious lightheadedness or long lasting palpitations, report to the Emergency Department for more urgent medical care at that time.

## 2021-08-18 NOTE — LETTER
"8/18/2021      RE: Patricia Perez  [address]  [facility] [address]       Dear Colleague,    Thank you for the opportunity to participate in the care of your patient, Patricia Perez, at the [facility] [place] [facility]. Please see a copy of my visit note below.    The patient has been notified of following:     \"This video visit will be conducted via a call between you and your physician/provider. We have found that certain health care needs can be provided without the need for an in-person physical exam.  This service lets us provide the care you need with a video conversation.  If a prescription is necessary we can send it directly to your pharmacy.  If lab work is needed we can place an order for that and you can then stop by our lab to have the test done at a later time.    Video visits are billed at different rates depending on your insurance coverage.  Please reach out to your insurance provider with any questions.    If during the course of the call the physician/provider feels a video visit is not appropriate, you will not be charged for this service.\"    Patient has given verbal consent for video visit? Yes    How would you like to obtain your AVS? Mail    Video-Visit Details    Type of service:  Video Visit    Video Start Time:820am    Video End Time:846am    Total visit time including video visit, chart review, charting, coordination of care =43min    Originating Location (pt. Location):patient home      Distant Location (provider location):  home office    Platform used for Video Visit: Anibal    See dictation #[id number]    [facility]#:[id number]    Patriica is a 61 year old who is being evaluated via a billable video visit.      How would you like to obtain your AVS? MyChart  If the video visit is dropped, the invitation should be resent by: Text to cell phone: [phone]  Will anyone else be joining your video visit? " "No      Vitals - Patient Reported  Weight (Patient Reported): 88 kg (194 lb)  Height (Patient Reported): 160 cm (5' 3\")  BMI (Based on Pt Reported Ht/Wt): 34.37  Pain Score: No Pain (0) (No SOB)      Vitals were taken and medications where reconciled.   PETTY Gallo  9:38 AM      Service Date: 2021    Funmilayo Grubbs PA-C  [address] [address]     RE:  Patricia Perez  MRN: [id number]  : 1960    Dear Ms. Richeyle:    It was a pleasure participating in the care of your patient, Ms. Patricia Perez.  As you know, she is a 61-year-old lady who I saw over virtual video visit via [employer] for a history of nonischemic cardiomyopathy, hypertension, hyperlipidemia and mild nonobstructive coronary artery disease.    Her past medical history is significant for the followin.  Hypertension.  2.  Hyperlipidemia.  3.  Sleep apnea, on CPAP.  4.  Depression.  5.  Gastroesophageal reflux disease.  6.  Asthma.  7.  Obesity.  8.  Status post gastric bypass surgery.    Her cardiac history is significant for nonischemic cardiomyopathy that was mild to moderate degree in .  She had a coronary angiogram back then that revealed only mild disease in the LAD and RCA territories.    She had an episode of syncope in 2020 that necessitated coronary CTA. On 2020, CTA revealed the following:  Left main okay.  LAD less than 50% proximal to mid stenosis.  Circumflex less than 50% proximal stenosis.  RCA less than 30% proximal to mid stenosis.    Zio patch monitor and echo were relatively unremarkable.  Since then, she has had no further episodes of syncope, rather some episodes of tripping on her overall and feet.    I last saw her 2020.  At that time, she was doing adequately.  She presents today for continuing care.    Since our last visit, she has fallen off her exercise routine a bit.  She still walks for 20-30 minutes 3-4 times a week and does some chair yoga on " "Mondays, but has felt slightly fatigued.  Her weight has gone up a bit due to lack of exercise and lack of dieting.  She hurt her back and is going for PT for this, and wishes to get back into her vigorous exercise routine and wants to be the woman that \"goes to the [facility].\"    She feels like she needs a 2-week health retreat to get reset in terms of her health.    She has complained of infrequent rapid palpitations in which when she gets them, her blood pressure goes down to the 100/50 range.  She does not get dizzy, but says she gets mildly shaky.  She is not syncopal or near-syncopal.  It lasts for an hour and she may get it once every 2-3 weeks.  She has not had any for the last few weeks.  She does drink quite a few cups of coffee in the morning, but does not drink alcohol.    Her [id number] score is 3 for congestive heart failure, hypertension, and gender.    She otherwise denies aissatou PND, orthopnea, edema, syncope or near syncope.  She denies chest pain or shortness of breath as well.    REVIEW OF SYSTEMS:  A 10-point review of systems is positive for some back pain and occasional palpitations.    CURRENT MEDICATIONS:      1.  Carvedilol 25 mg twice daily.  2.  Zetia 10 mg a day.  3.  Hydralazine 10 mg prescribed 3 times a day, but she is taking twice.  4.  Isosorbide mononitrate 30 mg a day.  5.  Lisinopril 5 mg daily.  6.  Simvastatin 20 mg a day.  7.  Aspirin 81 mg a day.    PHYSICAL EXAMINATION:      VITAL SIGNS:  Her blood pressure is 112/70 with a pulse of 64.  Her weight is 193 pounds.  GENERAL:  She appears comfortable, well groomed.  PSYCHIATRIC:  She is alert and oriented x3.  HEENT:  Eyes do not appear grossly erythematous or have exudate.  RESPIRATORY:  She is breathing comfortably without gross cough.    The remainder of the comprehensive physical exam was deferred secondary to the COVID-19 pandemic and secondary to video visit restrictions.    Labs on 06/30/2021, LDL was 64, potassium 4.2, GFR " normal.  On 12/08/2020, TSH 1.4.  Hemoglobin 10.5.    Echocardiogram 01/07/2020 revealed low-normal ventricular systolic function, ejection fraction 50%-55%.  No gross valvular pathology, unchanged from 2016.      IMPRESSION:      Patricia is a 61-year-old lady with several active cardiac issues:    1.  Prior mild nonischemic cardiomyopathy.    In 2008, her ejection fraction was in the 40%-45% range.  With medical therapy, her ejection fraction improved to the 50% range in 2016.  She has only mild nonobstructive coronary artery disease by coronary CTA 01/07/2020.  She is currently asymptomatic at a relatively low to moderate level of exertion.  Continue to monitor clinically.    2.  Hypertension, well controlled.      Blood pressures have been running in the 112-115 mmHg range.  Continue to follow.    3.  Hyperlipidemia, well controlled.    Goal LDL less than 70.  Currently, LDL is 64 on her present regimen of ezetimibe and simvastatin 20 mg a day.  Continue to follow.    4.  Mild to moderate nonobstructive coronary disease.    This was displayed on coronary CTA 01/07/2020.  She is currently compliant with her aspirin and statin without gross bleeding or other symptomatology.  Continue to follow.      PLAN:      1.  Continue present medications at present doses.    2.  She would like to simply cut down her coffee intake for now to see if this helps her infrequent rapid palpitations.  If she cuts down or cuts out her coffee and she has recurrent rapid palpitations that have an effect on her blood pressure or symptoms should be experienced, she will MyChart us and we would then pursue an echocardiogram and a Zio patch monitor and perhaps some lab work as well.    Obviously, if she should have serious lightheadedness or long lasting palpitations, she was instructed to report to the Emergency Department for more urgent medical care at that time.    3.  Otherwise, she will follow up with me on 02/09/2021 and she will be  contacting you in the near future regarding her mild anemia.    Once again, it was a pleasure participating in the care of your patient, Ms. Patricia Perez.  Please feel free to contact me at any time if any questions regarding her care in the future.    Sincerely,            Preston Cuadra MD        D: 2021   T: 2021   MT: DAYAMI    Name:     PATRICIA PEREZ  MRN:      [id number]        Account:      [id number]   :      1960           Service Date: 2021       Document: [id number]        Please do not hesitate to contact me if you have any questions/concerns.     Sincerely,     Preston Cuadra MD

## 2021-08-18 NOTE — PROGRESS NOTES
"Patricia is a 61 year old who is being evaluated via a billable video visit.      How would you like to obtain your AVS? MyChart  If the video visit is dropped, the invitation should be resent by: Text to cell phone: [phone]  Will anyone else be joining your video visit? No      Vitals - Patient Reported  Weight (Patient Reported): 88 kg (194 lb)  Height (Patient Reported): 160 cm (5' 3\")  BMI (Based on Pt Reported Ht/Wt): 34.37  Pain Score: No Pain (0) (No SOB)      Vitals were taken and medications where reconciled.   Pelon Zelaya, EMT  9:38 AM    "

## 2021-08-18 NOTE — PROGRESS NOTES
"The patient has been notified of following:     \"This video visit will be conducted via a call between you and your physician/provider. We have found that certain health care needs can be provided without the need for an in-person physical exam.  This service lets us provide the care you need with a video conversation.  If a prescription is necessary we can send it directly to your pharmacy.  If lab work is needed we can place an order for that and you can then stop by our lab to have the test done at a later time.    Video visits are billed at different rates depending on your insurance coverage.  Please reach out to your insurance provider with any questions.    If during the course of the call the physician/provider feels a video visit is not appropriate, you will not be charged for this service.\"    Patient has given verbal consent for video visit? Yes    How would you like to obtain your AVS? Mail    Video-Visit Details    Type of service:  Video Visit    Video Start Time:820am    Video End Time:846am    Total visit time including video visit, chart review, charting, coordination of care =43min    Originating Location (pt. Location):patient home      Distant Location (provider location):  home office    Platform used for Video Visit: Anibal    See dictation #[id number]    CSN#:[id number]  "

## 2021-08-26 ENCOUNTER — LAB (OUTPATIENT)
Dept: LAB | Facility: CLINIC | Age: 61
End: 2021-08-26

## 2021-08-26 ENCOUNTER — THERAPY VISIT (OUTPATIENT)
Dept: PHYSICAL THERAPY | Facility: CLINIC | Age: 61
End: 2021-08-26
Payer: COMMERCIAL

## 2021-08-26 DIAGNOSIS — M54.42 CHRONIC LEFT-SIDED LOW BACK PAIN WITH LEFT-SIDED SCIATICA: ICD-10-CM

## 2021-08-26 DIAGNOSIS — R53.83 FATIGUE, UNSPECIFIED TYPE: ICD-10-CM

## 2021-08-26 DIAGNOSIS — G89.29 CHRONIC LEFT-SIDED LOW BACK PAIN WITH LEFT-SIDED SCIATICA: ICD-10-CM

## 2021-08-26 LAB
BASOPHILS # BLD AUTO: 0 10E3/UL (ref 0–0.2)
BASOPHILS NFR BLD AUTO: 1 %
EOSINOPHIL # BLD AUTO: 0.1 10E3/UL (ref 0–0.7)
EOSINOPHIL NFR BLD AUTO: 1 %
ERYTHROCYTE [DISTWIDTH] IN BLOOD BY AUTOMATED COUNT: 15.7 % (ref 10–15)
HCT VFR BLD AUTO: 33.4 % (ref 35–47)
HGB BLD-MCNC: 10.5 G/DL (ref 11.7–15.7)
LYMPHOCYTES # BLD AUTO: 2.6 10E3/UL (ref 0.8–5.3)
LYMPHOCYTES NFR BLD AUTO: 44 %
MCH RBC QN AUTO: 26.8 PG (ref 26.5–33)
MCHC RBC AUTO-ENTMCNC: 31.4 G/DL (ref 31.5–36.5)
MCV RBC AUTO: 85 FL (ref 78–100)
MONOCYTES # BLD AUTO: 0.5 10E3/UL (ref 0–1.3)
MONOCYTES NFR BLD AUTO: 8 %
NEUTROPHILS # BLD AUTO: 2.8 10E3/UL (ref 1.6–8.3)
NEUTROPHILS NFR BLD AUTO: 47 %
PLATELET # BLD AUTO: 315 10E3/UL (ref 150–450)
RBC # BLD AUTO: 3.92 10E6/UL (ref 3.8–5.2)
VIT B12 SERPL-MCNC: 1859 PG/ML (ref 193–986)
WBC # BLD AUTO: 6 10E3/UL (ref 4–11)

## 2021-08-26 PROCEDURE — 85025 COMPLETE CBC W/AUTO DIFF WBC: CPT

## 2021-08-26 PROCEDURE — 97140 MANUAL THERAPY 1/> REGIONS: CPT | Mod: GP | Performed by: PHYSICAL THERAPIST

## 2021-08-26 PROCEDURE — 36415 COLL VENOUS BLD VENIPUNCTURE: CPT

## 2021-08-26 PROCEDURE — 82607 VITAMIN B-12: CPT

## 2021-08-26 PROCEDURE — 97110 THERAPEUTIC EXERCISES: CPT | Mod: GP | Performed by: PHYSICAL THERAPIST

## 2021-08-26 PROCEDURE — 84443 ASSAY THYROID STIM HORMONE: CPT

## 2021-08-26 PROCEDURE — 82728 ASSAY OF FERRITIN: CPT

## 2021-08-27 LAB
FERRITIN SERPL-MCNC: 6 NG/ML (ref 8–252)
TSH SERPL DL<=0.005 MIU/L-ACNC: 1.33 MU/L (ref 0.4–4)

## 2021-09-01 ENCOUNTER — OFFICE VISIT (OUTPATIENT)
Dept: AUDIOLOGY | Facility: CLINIC | Age: 61
End: 2021-09-01
Payer: COMMERCIAL

## 2021-09-01 DIAGNOSIS — H93.13 TINNITUS, BILATERAL: ICD-10-CM

## 2021-09-01 DIAGNOSIS — H90.A31 MIXED CONDUCTIVE AND SENSORINEURAL HEARING LOSS OF RIGHT EAR WITH RESTRICTED HEARING OF LEFT EAR: Primary | ICD-10-CM

## 2021-09-01 PROCEDURE — 99207 PR NO CHARGE LOS: CPT | Performed by: AUDIOLOGIST

## 2021-09-01 PROCEDURE — 92591 PR HEARING AID EXAM BINAURAL: CPT | Performed by: AUDIOLOGIST

## 2021-09-01 NOTE — PROGRESS NOTES
AUDIOLOGY REPORT    SUBJECTIVE: Patricia Perez is a 61 year old female was seen in the Audiology Clinic at  [facility] on 9/01/21 to discuss concerns with hearing and functional communication difficulties. The patient was unaccompanied. Patricia has been seen previously on 4/8/21, and results revealed a mixed hearing loss in the right  ear and sensorineural loss in the left  ear.  The patient was medically evaluated and determined to be cleared for binaural hearing aids by Riccardo Blackburn MD.} Patricia notes difficulty with communication in a variety of listening situations.    OBJECTIVE:  Patient is a hearing aid candidate. Patient would like to move forward with a hearing aid evaluation today. Therefore, the patient was presented with different options for amplification to help aid in communication. Discussed styles, levels of technology and monaural vs. binaural fitting.     The hearing aid(s) mutually chosen were:  Binaural: Phonak Audeo P70R or P50R  COLOR: P 6 silver  BATTERY SIZE: rechargeable  EARMOLD/TIPS: Active vent dome  CANAL/ LENGTH: 0 Active Vent     Otoscopy revealed ears are clear of cerumen bilaterally.  ASSESSMENT:     ICD-10-CM    1. Mixed conductive and sensorineural hearing loss of right ear with restricted hearing of left ear  H90.A31    2. Tinnitus, bilateral  H93.13 AUDIOLOGY ADULT REFERRAL       Reviewed purchase information and warranty information with patient. The 45 day trial period was explained to patient. The patient was given a copy of the [place] Department of Health consumer brochure on purchasing hearing instruments. Patient risk factors have been provided to the patient in writing prior to the sale of the hearing aid per FDA regulation. The risk factors are also available in the User Instructional Booklet to be presented on the day of the hearing aid fitting. Hearing aid(s) ordered. Hearing aid evaluation completed.    PLAN: Patricia will talk to her   and let me know in the next week which model to order for her, and then will be scheduled to return in 2-3 weeks for a hearing aid fitting and programming. Purchase agreement will be completed on that date. Please contact this clinic with any questions or concerns.      Baudilio Winchester MA, CCC-A  [place] Licensed Audiologist #[id number]  [facility]

## 2021-09-02 ENCOUNTER — THERAPY VISIT (OUTPATIENT)
Dept: PHYSICAL THERAPY | Facility: CLINIC | Age: 61
End: 2021-09-02
Payer: COMMERCIAL

## 2021-09-02 DIAGNOSIS — M54.42 CHRONIC LEFT-SIDED LOW BACK PAIN WITH LEFT-SIDED SCIATICA: ICD-10-CM

## 2021-09-02 DIAGNOSIS — G89.29 CHRONIC LEFT-SIDED LOW BACK PAIN WITH LEFT-SIDED SCIATICA: ICD-10-CM

## 2021-09-02 PROCEDURE — 97110 THERAPEUTIC EXERCISES: CPT | Mod: GP | Performed by: PHYSICAL THERAPIST

## 2021-09-04 ENCOUNTER — HEALTH MAINTENANCE LETTER (OUTPATIENT)
Age: 61
End: 2021-09-04

## 2021-09-09 ENCOUNTER — VIRTUAL VISIT (OUTPATIENT)
Dept: FAMILY MEDICINE | Facility: CLINIC | Age: 61
End: 2021-09-09
Payer: COMMERCIAL

## 2021-09-09 DIAGNOSIS — D50.9 IRON DEFICIENCY ANEMIA, UNSPECIFIED IRON DEFICIENCY ANEMIA TYPE: ICD-10-CM

## 2021-09-09 DIAGNOSIS — Z12.11 COLON CANCER SCREENING: Primary | ICD-10-CM

## 2021-09-09 PROCEDURE — 99212 OFFICE O/P EST SF 10 MIN: CPT | Mod: 95 | Performed by: PHYSICIAN ASSISTANT

## 2021-09-09 NOTE — PROGRESS NOTES
"Patricia is a 61 year old who is being evaluated via a billable video visit.      How would you like to obtain your AVS? MyChart  If the video visit is dropped, the invitation should be resent by: Text to cell phone: [phone]  Will anyone else be joining your video visit? No      Video Start Time: converted to telephone, Dox[employer]ity not loading    Assessment & Plan     1. Colon cancer screening    2. Iron deficiency anemia, unspecified iron deficiency anemia type        1,2) Labs from 8/26/21 reviewed with patient. Will proceed with colonoscopy and EGD for further evaluation.    Ordering of each unique test         BMI:   Estimated body mass index is 34.28 kg/m  as calculated from the following:    Height as of 8/17/21: 1.6 m (5' 3\").    Weight as of 8/17/21: 87.8 kg (193 lb 8 oz).       Return for follow up pending lab and/or imaging results.    YODIT Atkins [facility] JESUS      Jesusita Gomez is a 61 year old who presents for the following health issues     HPI     Concern - lab results from 08/26/21  Anemia is new since 10/2019  Colonoscopy in 2010, normal  Has started to have GERD again      Review of Systems   Constitutional, GI, endocrine systems are negative, except as otherwise noted.      Objective           Vitals:  No vitals were obtained today due to virtual visit.    Physical Exam   GENERAL: Healthy, alert and no distress  RESP: No audible wheeze, cough.  No increased work of breathing.    PSYCH: Mentation appears normal, affect normal/bright, judgement and insight intact, normal speech           Video-Visit Details    Telephone visit, 6 mins  "

## 2021-09-15 DIAGNOSIS — J45.30 MILD PERSISTENT ASTHMA WITHOUT COMPLICATION: ICD-10-CM

## 2021-09-15 DIAGNOSIS — J31.0 CHRONIC RHINITIS: ICD-10-CM

## 2021-09-15 RX ORDER — MONTELUKAST SODIUM 10 MG/1
10 TABLET ORAL AT BEDTIME
Qty: 30 TABLET | Refills: 0 | Status: SHIPPED | OUTPATIENT
Start: 2021-09-15 | End: 2022-03-28

## 2021-09-15 NOTE — TELEPHONE ENCOUNTER
Routing refill request to provider for review/approval because:  Failed Asthma control assessment score within normal limits in last 6 months  ACT: 16 on 5/19/21    LOV: 5/19/2021, recommended 6 week follow-up  No future appointment scheduled    Ifrah CAMPOS RN  Specialty/Allergy Clinic

## 2021-09-27 DIAGNOSIS — F33.1 MODERATE EPISODE OF RECURRENT MAJOR DEPRESSIVE DISORDER (H): ICD-10-CM

## 2021-09-28 RX ORDER — ARIPIPRAZOLE 2 MG/1
4 TABLET ORAL DAILY
Qty: 60 TABLET | Refills: 3 | Status: SHIPPED | OUTPATIENT
Start: 2021-09-28 | End: 2021-10-22

## 2021-09-28 NOTE — TELEPHONE ENCOUNTER
ARIPiprazole (ABILIFY) 2 MG   Last refilled: 5/24/21  Qty: 60:3    Last seen: 5/24/21 DANIELLE ESPINOSA  RTC: 3  MOS  Cancel: 0  No-show: 0  Next appt: NONE  Scheduling has been notified to contact the pt for appointment.  WITH NEW JUNIOR MAGUIRE  30 DAY LINDY Refill pended and routed to the provider for review/determination due to  * NEW MD / RES

## 2021-09-29 ENCOUNTER — OFFICE VISIT (OUTPATIENT)
Dept: NEUROLOGY | Facility: CLINIC | Age: 61
End: 2021-09-29
Attending: PHYSICIAN ASSISTANT
Payer: COMMERCIAL

## 2021-09-29 VITALS — SYSTOLIC BLOOD PRESSURE: 157 MMHG | DIASTOLIC BLOOD PRESSURE: 94 MMHG | HEART RATE: 65 BPM | RESPIRATION RATE: 16 BRPM

## 2021-09-29 DIAGNOSIS — R26.89 BALANCE PROBLEM: Primary | ICD-10-CM

## 2021-09-29 DIAGNOSIS — R41.3 MEMORY CHANGE: ICD-10-CM

## 2021-09-29 PROCEDURE — 99204 OFFICE O/P NEW MOD 45 MIN: CPT | Performed by: STUDENT IN AN ORGANIZED HEALTH CARE EDUCATION/TRAINING PROGRAM

## 2021-09-29 NOTE — PROGRESS NOTES
"[facility]  Section of General Neurology  New Patient Visit      Patricia Perez MRN# [id number]   Age: 61 year old YOB: 1960     Requesting physician: Funmilayo Will     Reason for Consultation: Balance problems, intermittent naming issues.      History of Presenting Symptoms:   Patricia Perez is a 61 year old female who presents today for evaluation of balance problems.  She has a PMH as below notable for depression (follows with psychiatry), dizziness, FABIANA, CAD, idiopathic cardiomyopathy, H/O gastric bypass ~2013, HTN among other problems.    She lives in [place].   She works at the [employer], she is the  of records.      She has been having increasing balance problems.  She feels \"off kilter\" She feels less \"certain\" on her feet. She has had a few falls in the last few years.  She notes she veers often times to the left in this regard.  She does not feel dizzy.  She feels unsteady more than clumsy.  She denies pre syncopal feelings.  She feels like she has a \"heavy head\" She hasn't tried much for her balance issues.     She has problems with naming at times as well.  This happens about once a day, often in the evening.    Sleep: Sleep is \"OK\" ---went away after bypass.  She would prefer to try losing weight first.  Mood: She just moved into a new house.  Thinks her mood is good.          Past Medical History:     Patient Active Problem List   Diagnosis     Major depression, recurrent (H)     Dizziness and giddiness     Motion sickness     GERD (gastroesophageal reflux disease)     Idiopathic cardiomyopathy (H)     Sleep apnea     Hyperlipidemia LDL goal <100     Allergic rhinitis     Hypertension goal BP (blood pressure) < 130/80     Pelvic pain in female     Health Care Home     24 hour contact handout given     Elevated PTHrP level     H/O bariatric surgery     Hx of gastric bypass     Hypovitaminosis D     Allergic rhinitis     " Attention deficit disorder of adult     Mild persistent asthma     Low back pain     Hand joint pain     Myopia, bilateral     Dumping syndrome     Benign essential hypertension     Adjustment disorder with depressed mood     Decreased hearing of both ears     BPV (benign positional vertigo), unspecified laterality     Advanced directives, counseling/discussion     Vitamin D deficiency     Coronary artery disease involving native coronary artery of native heart without angina pectoris     Acute pain of left knee     Syncope     Posterior vitreous detachment of both eyes     Combined forms of age-related cataract, mild, of both eyes     Glaucoma suspect of both eyes     Chronic left-sided low back pain with left-sided sciatica     Past Medical History:   Diagnosis Date     Anxiety      Arthritis      ASCUS of cervix with negative high risk HPV 01/23/2006     Asthma      Bursitis of shoulder 03/04/2010     Chronic rhinitis 03/25/2009     Coronary artery disease      Depression      Depression      Dyspnea on exertion      Gastro-oesophageal reflux disease      GERD (gastroesophageal reflux disease) 10/14/2008     Hypertension      Idiopathic cardiomyopathy (H) 01/08/2009     Indigestion      Itchy eyes      Itchy nose      Left leg pain 06/16/2011     Motion sickness 02/27/2008     Nasal congestion      Pneumonia      Problems related to lack of adequate sleep      Ringing in ears      Sleep apnea 03/25/2009     Sneezing         Past Surgical History:     Past Surgical History:   Procedure Laterality Date     BARIATRIC SURGERY       DIABETES RESOURCES  As Child    Tonsillectomy     ENT SURGERY       HERNIA REPAIR       LAPAROSCOPIC BYPASS GASTRIC  10/16/2012    Procedure: LAPAROSCOPIC BYPASS GASTRIC;  laparoscopic reyna en y gastric bypass;  Surgeon: Nish Bradford MD;  Location: [facility] OR     TONSILLECTOMY       Uretural Sticture  As Child        Social History:     Social History     Tobacco Use     Smoking status:  Never Smoker     Smokeless tobacco: Never Used   Substance Use Topics     Alcohol use: No     Drug use: No        Family History:     Family History   Problem Relation Age of Onset     Hypertension Father      Lipids Father      Alcohol/Drug Father         Abuse     Depression Father      Respiratory Father         COPD     Cardiovascular Father      Gastrointestinal Disease Mother         non cancerous pancreatic tumor     Hypertension Mother      Heart Disease Mother         A fib     Allergies Mother      Breast Cancer Maternal Grandmother      Glaucoma Paternal Grandmother      Allergies Sister         Poison Ivy     Glaucoma Paternal Grandfather      Depression Brother      Hypertension Brother      Allergies Brother         Enviromental/ Bees     Cerebrovascular Disease Other      Macular Degeneration Other      Diabetes No family hx of      Cancer - colorectal No family hx of      Retinal detachment No family hx of      Amblyopia No family hx of      Thyroid Disease No family hx of         Medications:     Current Outpatient Medications   Medication Sig     albuterol (PROAIR HFA/PROVENTIL HFA/VENTOLIN HFA) 108 (90 Base) MCG/ACT inhaler Inhale 2 puffs into the lungs every 6 hours as needed for shortness of breath / dyspnea or wheezing     ARIPiprazole (ABILIFY) 2 MG tablet Take 2 tablets (4 mg) by mouth daily *PLEASE SCHEDULE APPT.FOR REFILLS [phone]*     aspirin (ASA) 81 MG EC tablet Take 1 tablet (81 mg) by mouth daily     azelastine (ASTELIN) 0.1 % nasal spray Spray 2 sprays into both nostrils 2 times daily as needed for rhinitis     azelastine (OPTIVAR) 0.05 % ophthalmic solution Apply 1 drop to eye 2 times daily as needed (itchy/watery eyes)     budesonide-formoterol (SYMBICORT) 80-4.5 MCG/ACT Inhaler Inhale 2 puffs into the lungs 2 times daily     calcium carbonate-vitamin D (CALTRATE 600+D) 600-400 MG-UNIT CHEW Take 1 chew tab by mouth 2 times daily     carvedilol (COREG) 25 MG tablet Take 1 tablet  (25 mg) by mouth 2 times daily (with meals)     cetirizine (ZYRTEC) 10 MG tablet Take 1 tablet (10 mg) by mouth daily     Cyanocobalamin (VITAMIN B-12 SL) Place under the tongue every other day      ezetimibe (ZETIA) 10 MG tablet Take 1 tablet (10 mg) by mouth daily     ferrous sulfate (FEROSUL) 325 (65 Fe) MG tablet Take 1 tablet (325 mg) by mouth 2 times daily     fluticasone (FLONASE) 50 MCG/ACT nasal spray Spray 2 sprays into both nostrils daily     hydrALAZINE (APRESOLINE) 10 MG tablet Take 1 tablet (10 mg) by mouth 3 times daily     Ibuprofen-diphenhydrAMINE Cit (IBUPROFEN PM PO) Take by mouth At Bedtime     isosorbide mononitrate (IMDUR) 30 MG 24 hr tablet Take 1 tablet (30 mg) by mouth daily     lisinopril (ZESTRIL) 5 MG tablet TAKE 1 TABLET(5 MG) BY MOUTH DAILY     meclizine (ANTIVERT) 25 MG tablet Take 1 tablet (25 mg) by mouth 3 times daily as needed     montelukast (SINGULAIR) 10 MG tablet Take 1 tablet (10 mg) by mouth At Bedtime     Multiple Vitamin (MULTI-VITAMIN) per tablet Take 1 tablet by mouth daily.     ondansetron (ZOFRAN-ODT) 4 MG ODT tab Take 1 tablet (4 mg) by mouth every 8 hours as needed for nausea     PREMPRO 0.45-1.5 MG tablet TAKE 1 TABLET BY MOUTH DAILY     simvastatin (ZOCOR) 20 MG tablet Take 1 tablet (20 mg) by mouth At Bedtime     venlafaxine (EFFEXOR-ER) 225 MG 24 hr tablet Take 1 tablet (225 mg) by mouth daily     No current facility-administered medications for this visit.        Allergies:     Allergies   Allergen Reactions     Atorvastatin      Leg myalgias        Review of Systems:   As noted above     Physical Exam:   Vitals: BP (!) 157/94   Pulse 65   Resp 16   LMP 07/16/2012    HEENT: Neck supple with normal range of motion.   CV: peripheral pulse appreciated  Lungs: breathing comfortably  Extremities: no edema  Skin: No rashes    Neuro:   General Appearance: No apparent distress, well-nourished, well-groomed, pleasant     Mental Status: Alert and oriented to person,  place, and time. Speech fluent and comprehension intact. No dysarthria. 25/30 to testing with biggest issue with delayed recall.      Cranial Nerves:   II: Visual fields: normal  III: Pupils: 3 mm, equal, round, reactive to light   III,IV,VI: Extraocular Movements: intact   V: Facial sensation: intact to light touch  VII: Facial strength: intact without asymmetry  VIII: Hearing: intact grossly  IX: Palate: intact   XI: Shoulder shrug: intact  XII: Tongue movement: normal     Motor Exam:   Upper Extremities  Deltoid  Bicep  Tricep  Wrist Extensors  strength Intrinsic Muscles    Right  5  5  5  5 5 5    Left  5  5  5  5 5 5      Lower Extremities  Hip Flexors  Knee Extensors  Knee   Flexors  Dorsi Flexion  Plantar   Flexion    Right  5  5  5  5  5    Left  5  5  5  5  5        No drift is present. No abnormal movements. Tone is normal throughout.    Sensory: intact to light touch, vibration  throughout     Coordination: no dysmetria with finger-to-nose bilaterally    Reflexes: biceps, triceps, brachioradialis, patellar, and ankle jerks 1+ and symmetric. Toes are downgoing bilaterally    Gait: normal casual gait, normal stride length, tandem gait intact         Data: Pertinent prior to visit       Laboratory:  B12 1859   TSH   Date Value Ref Range Status   08/26/2021 1.33 0.40 - 4.00 mU/L Final   12/08/2020 1.42 0.40 - 4.00 mU/L Final     Lab Results   Component Value Date    WBC 6.0 08/26/2021    WBC 4.3 12/08/2020     Lab Results   Component Value Date    RBC 3.92 08/26/2021    RBC 3.76 12/08/2020     Lab Results   Component Value Date    HGB 10.5 08/26/2021    HGB 10.5 12/08/2020     Lab Results   Component Value Date    HCT 33.4 08/26/2021    HCT 33.7 12/08/2020     Lab Results   Component Value Date    MCV 85 08/26/2021    MCV 90 12/08/2020     Lab Results   Component Value Date    MCH 26.8 08/26/2021    MCH 27.9 12/08/2020     Lab Results   Component Value Date    MCHC 31.4 08/26/2021    MCHC 31.2 12/08/2020      Lab Results   Component Value Date    RDW 15.7 08/26/2021    RDW 15.0 12/08/2020     Lab Results   Component Value Date     08/26/2021     12/08/2020     Last Comprehensive Metabolic Panel:  Sodium   Date Value Ref Range Status   06/30/2021 138 133 - 144 mmol/L Final     Potassium   Date Value Ref Range Status   06/30/2021 4.2 3.4 - 5.3 mmol/L Final     Chloride   Date Value Ref Range Status   06/30/2021 109 94 - 109 mmol/L Final     Carbon Dioxide   Date Value Ref Range Status   06/30/2021 25 20 - 32 mmol/L Final     Anion Gap   Date Value Ref Range Status   06/30/2021 4 3 - 14 mmol/L Final     Glucose   Date Value Ref Range Status   06/30/2021 91 70 - 99 mg/dL Final     Comment:     Fasting specimen     Urea Nitrogen   Date Value Ref Range Status   06/30/2021 17 7 - 30 mg/dL Final     Creatinine   Date Value Ref Range Status   06/30/2021 0.77 0.52 - 1.04 mg/dL Final     GFR Estimate   Date Value Ref Range Status   06/30/2021 82 >60 mL/min/[1.73_m2] Final     Comment:     Non  GFR Calc  Starting 12/18/2018, serum creatinine based estimated GFR (eGFR) will be   calculated using the Chronic Kidney Disease Epidemiology Collaboration   (CKD-EPI) equation.       Calcium   Date Value Ref Range Status   06/30/2021 8.7 8.5 - 10.1 mg/dL Final                Assessment and Plan:   Assessment:  Patricia Perez is a 61 year old female who presents today for evaluation of balance problems.  She has a PMH as above notable for depression (follows with psychiatry), dizziness, FABIANA, CAD, idiopathic cardiomyopathy, H/O gastric bypass ~2013, HTN among other history.  She notes balance problems as described above.  These are non specific and appear to be improving with yoga.  Her mental changes seem to best be explained by non restorative sleep.  Her FABIANA was cured post bypass/with weight loss but she has gained some weight back.  I am overall reassured by her exam that I think she can improve her  cognition with better sleep and balance with continued yoga/other exercises but I would like to continue to follow up with her. Could consider PT for balance issues, further working up cognitive complaints that seem subtle to me currently depending on her progression.  She will try weight loss for now but discussed sleep referral for repeat sleep study pending her progress in this regard.   Follow up in 5 months, to mychart or call with questions in the interim.         Armando Vazquez MD   of Neurology   [facility]      The total time of this encounter today amounted to 48 minutes. This time included time spent with the patient, prep work, ordering tests, and performing post visit documentation.

## 2021-09-29 NOTE — PATIENT INSTRUCTIONS
Keep doing yoga and working on home balance exercises-if things worsen reach out to me we can try PT  Work on weight loss, if this is hard bc pandemic let me know I am thinking the nighttime naming issues/cognitive fatigue is possibly sleep related/FABIANA we can pursue this.    Overall I am reassured I think you look good. Lets just keep an eye on you.

## 2021-09-29 NOTE — LETTER
"    9/29/2021         RE: Patricia Perez  [address]  [facility] [address]        Dear Colleague,    Thank you for referring your patient, Patricia Perez, to the [facility] [place]. Please see a copy of my visit note below.    [facility]  Section of General Neurology  New Patient Visit      Patricia Perez MRN# [id number]   Age: 61 year old YOB: 1960     Requesting physician: Funmilayo Will     Reason for Consultation: Balance problems, intermittent naming issues.      History of Presenting Symptoms:   Patricia Perez is a 61 year old female who presents today for evaluation of balance problems.  She has a PMH as below notable for depression (follows with psychiatry), dizziness, FABIANA, CAD, idiopathic cardiomyopathy, H/O gastric bypass ~2013, HTN among other problems.    She lives in [place].   She works at the [employer], she is the  of [employer].      She has been having increasing balance problems.  She feels \"off kilter\" She feels less \"certain\" on her feet. She has had a few falls in the last few years.  She notes she veers often times to the left in this regard.  She does not feel dizzy.  She feels unsteady more than clumsy.  She denies pre syncopal feelings.  She feels like she has a \"heavy head\" She hasn't tried much for her balance issues.     She has problems with naming at times as well.  This happens about once a day, often in the evening.    Sleep: Sleep is \"OK\" ---went away after bypass.  She would prefer to try losing weight first.  Mood: She just moved into a new house.  Thinks her mood is good.          Past Medical History:     Patient Active Problem List   Diagnosis     Major depression, recurrent (H)     Dizziness and giddiness     Motion sickness     GERD (gastroesophageal reflux disease)     Idiopathic cardiomyopathy (H)     Sleep apnea     Hyperlipidemia LDL goal <100     " Allergic rhinitis     Hypertension goal BP (blood pressure) < 130/80     Pelvic pain in female     Health Care Home     24 hour contact handout given     Elevated PTHrP level     H/O bariatric surgery     Hx of gastric bypass     Hypovitaminosis D     Allergic rhinitis     Attention deficit disorder of adult     Mild persistent asthma     Low back pain     Hand joint pain     Myopia, bilateral     Dumping syndrome     Benign essential hypertension     Adjustment disorder with depressed mood     Decreased hearing of both ears     BPV (benign positional vertigo), unspecified laterality     Advanced directives, counseling/discussion     Vitamin D deficiency     Coronary artery disease involving native coronary artery of native heart without angina pectoris     Acute pain of left knee     Syncope     Posterior vitreous detachment of both eyes     Combined forms of age-related cataract, mild, of both eyes     Glaucoma suspect of both eyes     Chronic left-sided low back pain with left-sided sciatica     Past Medical History:   Diagnosis Date     Anxiety      Arthritis      ASCUS of cervix with negative high risk HPV 01/23/2006     Asthma      Bursitis of shoulder 03/04/2010     Chronic rhinitis 03/25/2009     Coronary artery disease      Depression      Depression      Dyspnea on exertion      Gastro-oesophageal reflux disease      GERD (gastroesophageal reflux disease) 10/14/2008     Hypertension      Idiopathic cardiomyopathy (H) 01/08/2009     Indigestion      Itchy eyes      Itchy nose      Left leg pain 06/16/2011     Motion sickness 02/27/2008     Nasal congestion      Pneumonia      Problems related to lack of adequate sleep      Ringing in ears      Sleep apnea 03/25/2009     Sneezing         Past Surgical History:     Past Surgical History:   Procedure Laterality Date     BARIATRIC SURGERY       DIABETES RESOURCES  As Child    Tonsillectomy     ENT SURGERY       HERNIA REPAIR       LAPAROSCOPIC BYPASS GASTRIC   10/16/2012    Procedure: LAPAROSCOPIC BYPASS GASTRIC;  laparoscopic reyna en y gastric bypass;  Surgeon: Nish Bradford MD;  Location: [facility] OR     TONSILLECTOMY       Uretural Sticture  As Child        Social History:     Social History     Tobacco Use     Smoking status: Never Smoker     Smokeless tobacco: Never Used   Substance Use Topics     Alcohol use: No     Drug use: No        Family History:     Family History   Problem Relation Age of Onset     Hypertension Father      Lipids Father      Alcohol/Drug Father         Abuse     Depression Father      Respiratory Father         COPD     Cardiovascular Father      Gastrointestinal Disease Mother         non cancerous pancreatic tumor     Hypertension Mother      Heart Disease Mother         A fib     Allergies Mother      Breast Cancer Maternal Grandmother      Glaucoma Paternal Grandmother      Allergies Sister         Poison Ivy     Glaucoma Paternal Grandfather      Depression Brother      Hypertension Brother      Allergies Brother         Enviromental/ Bees     Cerebrovascular Disease Other      Macular Degeneration Other      Diabetes No family hx of      Cancer - colorectal No family hx of      Retinal detachment No family hx of      Amblyopia No family hx of      Thyroid Disease No family hx of         Medications:     Current Outpatient Medications   Medication Sig     albuterol (PROAIR HFA/PROVENTIL HFA/VENTOLIN HFA) 108 (90 Base) MCG/ACT inhaler Inhale 2 puffs into the lungs every 6 hours as needed for shortness of breath / dyspnea or wheezing     ARIPiprazole (ABILIFY) 2 MG tablet Take 2 tablets (4 mg) by mouth daily *PLEASE SCHEDULE APPT.FOR REFILLS [phone]*     aspirin (ASA) 81 MG EC tablet Take 1 tablet (81 mg) by mouth daily     azelastine (ASTELIN) 0.1 % nasal spray Spray 2 sprays into both nostrils 2 times daily as needed for rhinitis     azelastine (OPTIVAR) 0.05 % ophthalmic solution Apply 1 drop to eye 2 times daily as needed  (itchy/watery eyes)     budesonide-formoterol (SYMBICORT) 80-4.5 MCG/ACT Inhaler Inhale 2 puffs into the lungs 2 times daily     calcium carbonate-vitamin D (CALTRATE 600+D) 600-400 MG-UNIT CHEW Take 1 chew tab by mouth 2 times daily     carvedilol (COREG) 25 MG tablet Take 1 tablet (25 mg) by mouth 2 times daily (with meals)     cetirizine (ZYRTEC) 10 MG tablet Take 1 tablet (10 mg) by mouth daily     Cyanocobalamin (VITAMIN B-12 SL) Place under the tongue every other day      ezetimibe (ZETIA) 10 MG tablet Take 1 tablet (10 mg) by mouth daily     ferrous sulfate (FEROSUL) 325 (65 Fe) MG tablet Take 1 tablet (325 mg) by mouth 2 times daily     fluticasone (FLONASE) 50 MCG/ACT nasal spray Spray 2 sprays into both nostrils daily     hydrALAZINE (APRESOLINE) 10 MG tablet Take 1 tablet (10 mg) by mouth 3 times daily     Ibuprofen-diphenhydrAMINE Cit (IBUPROFEN PM PO) Take by mouth At Bedtime     isosorbide mononitrate (IMDUR) 30 MG 24 hr tablet Take 1 tablet (30 mg) by mouth daily     lisinopril (ZESTRIL) 5 MG tablet TAKE 1 TABLET(5 MG) BY MOUTH DAILY     meclizine (ANTIVERT) 25 MG tablet Take 1 tablet (25 mg) by mouth 3 times daily as needed     montelukast (SINGULAIR) 10 MG tablet Take 1 tablet (10 mg) by mouth At Bedtime     Multiple Vitamin (MULTI-VITAMIN) per tablet Take 1 tablet by mouth daily.     ondansetron (ZOFRAN-ODT) 4 MG ODT tab Take 1 tablet (4 mg) by mouth every 8 hours as needed for nausea     PREMPRO 0.45-1.5 MG tablet TAKE 1 TABLET BY MOUTH DAILY     simvastatin (ZOCOR) 20 MG tablet Take 1 tablet (20 mg) by mouth At Bedtime     venlafaxine (EFFEXOR-ER) 225 MG 24 hr tablet Take 1 tablet (225 mg) by mouth daily     No current facility-administered medications for this visit.        Allergies:     Allergies   Allergen Reactions     Atorvastatin      Leg myalgias        Review of Systems:   As noted above     Physical Exam:   Vitals: BP (!) 157/94   Pulse 65   Resp 16   LMP 07/16/2012    HEENT: Neck  supple with normal range of motion.   CV: peripheral pulse appreciated  Lungs: breathing comfortably  Extremities: no edema  Skin: No rashes    Neuro:   General Appearance: No apparent distress, well-nourished, well-groomed, pleasant     Mental Status: Alert and oriented to person, place, and time. Speech fluent and comprehension intact. No dysarthria. 25/30 to testing with biggest issue with delayed recall.      Cranial Nerves:   II: Visual fields: normal  III: Pupils: 3 mm, equal, round, reactive to light   III,IV,VI: Extraocular Movements: intact   V: Facial sensation: intact to light touch  VII: Facial strength: intact without asymmetry  VIII: Hearing: intact grossly  IX: Palate: intact   XI: Shoulder shrug: intact  XII: Tongue movement: normal     Motor Exam:   Upper Extremities  Deltoid  Bicep  Tricep  Wrist Extensors  strength Intrinsic Muscles    Right  5  5  5  5 5 5    Left  5  5  5  5 5 5      Lower Extremities  Hip Flexors  Knee Extensors  Knee   Flexors  Dorsi Flexion  Plantar   Flexion    Right  5  5  5  5  5    Left  5  5  5  5  5        No drift is present. No abnormal movements. Tone is normal throughout.    Sensory: intact to light touch, vibration  throughout     Coordination: no dysmetria with finger-to-nose bilaterally    Reflexes: biceps, triceps, brachioradialis, patellar, and ankle jerks 1+ and symmetric. Toes are downgoing bilaterally    Gait: normal casual gait, normal stride length, tandem gait intact         Data: Pertinent prior to visit       Laboratory:  B12 1859   TSH   Date Value Ref Range Status   08/26/2021 1.33 0.40 - 4.00 mU/L Final   12/08/2020 1.42 0.40 - 4.00 mU/L Final     Lab Results   Component Value Date    WBC 6.0 08/26/2021    WBC 4.3 12/08/2020     Lab Results   Component Value Date    RBC 3.92 08/26/2021    RBC 3.76 12/08/2020     Lab Results   Component Value Date    HGB 10.5 08/26/2021    HGB 10.5 12/08/2020     Lab Results   Component Value Date    HCT 33.4  08/26/2021    HCT 33.7 12/08/2020     Lab Results   Component Value Date    MCV 85 08/26/2021    MCV 90 12/08/2020     Lab Results   Component Value Date    MCH 26.8 08/26/2021    MCH 27.9 12/08/2020     Lab Results   Component Value Date    MCHC 31.4 08/26/2021    MCHC 31.2 12/08/2020     Lab Results   Component Value Date    RDW 15.7 08/26/2021    RDW 15.0 12/08/2020     Lab Results   Component Value Date     08/26/2021     12/08/2020     Last Comprehensive Metabolic Panel:  Sodium   Date Value Ref Range Status   06/30/2021 138 133 - 144 mmol/L Final     Potassium   Date Value Ref Range Status   06/30/2021 4.2 3.4 - 5.3 mmol/L Final     Chloride   Date Value Ref Range Status   06/30/2021 109 94 - 109 mmol/L Final     Carbon Dioxide   Date Value Ref Range Status   06/30/2021 25 20 - 32 mmol/L Final     Anion Gap   Date Value Ref Range Status   06/30/2021 4 3 - 14 mmol/L Final     Glucose   Date Value Ref Range Status   06/30/2021 91 70 - 99 mg/dL Final     Comment:     Fasting specimen     Urea Nitrogen   Date Value Ref Range Status   06/30/2021 17 7 - 30 mg/dL Final     Creatinine   Date Value Ref Range Status   06/30/2021 0.77 0.52 - 1.04 mg/dL Final     GFR Estimate   Date Value Ref Range Status   06/30/2021 82 >60 mL/min/[1.73_m2] Final     Comment:     Non  GFR Calc  Starting 12/18/2018, serum creatinine based estimated GFR (eGFR) will be   calculated using the Chronic Kidney Disease Epidemiology Collaboration   (CKD-EPI) equation.       Calcium   Date Value Ref Range Status   06/30/2021 8.7 8.5 - 10.1 mg/dL Final                Assessment and Plan:   Assessment:  Patricia Perez is a 61 year old female who presents today for evaluation of balance problems.  She has a PMH as above notable for depression (follows with psychiatry), dizziness, FABIANA, CAD, idiopathic cardiomyopathy, H/O gastric bypass ~2013, HTN among other history.  She notes balance problems as described above.   These are non specific and appear to be improving with yoga.  Her mental changes seem to best be explained by non restorative sleep.  Her FABIANA was cured post bypass/with weight loss but she has gained some weight back.  I am overall reassured by her exam that I think she can improve her cognition with better sleep and balance with continued yoga/other exercises but I would like to continue to follow up with her. Could consider PT for balance issues, further working up cognitive complaints that seem subtle to me currently depending on her progression.  She will try weight loss for now but discussed sleep referral for repeat sleep study pending her progress in this regard.   Follow up in 5 months, to mychart or call with questions in the interim.         Armando Vazquez MD   of Neurology   [facility]      The total time of this encounter today amounted to 48 minutes. This time included time spent with the patient, prep work, ordering tests, and performing post visit documentation.        Again, thank you for allowing me to participate in the care of your patient.        Sincerely,        Cody Vazquez MD

## 2021-09-30 ENCOUNTER — THERAPY VISIT (OUTPATIENT)
Dept: PHYSICAL THERAPY | Facility: CLINIC | Age: 61
End: 2021-09-30
Payer: COMMERCIAL

## 2021-09-30 DIAGNOSIS — G89.29 CHRONIC LEFT-SIDED LOW BACK PAIN WITH LEFT-SIDED SCIATICA: ICD-10-CM

## 2021-09-30 DIAGNOSIS — M54.42 CHRONIC LEFT-SIDED LOW BACK PAIN WITH LEFT-SIDED SCIATICA: ICD-10-CM

## 2021-09-30 PROCEDURE — 97140 MANUAL THERAPY 1/> REGIONS: CPT | Mod: 59 | Performed by: PHYSICAL THERAPIST

## 2021-09-30 PROCEDURE — 97530 THERAPEUTIC ACTIVITIES: CPT | Mod: GP | Performed by: PHYSICAL THERAPIST

## 2021-09-30 PROCEDURE — 97110 THERAPEUTIC EXERCISES: CPT | Mod: GP | Performed by: PHYSICAL THERAPIST

## 2021-10-05 DIAGNOSIS — J30.81 CAT ALLERGIES: ICD-10-CM

## 2021-10-05 DIAGNOSIS — R09.81 NASAL CONGESTION: ICD-10-CM

## 2021-10-07 RX ORDER — CETIRIZINE HYDROCHLORIDE 10 MG/1
10 TABLET ORAL DAILY
Qty: 30 TABLET | Refills: 3 | Status: SHIPPED | OUTPATIENT
Start: 2021-10-07

## 2021-10-21 ENCOUNTER — TELEPHONE (OUTPATIENT)
Dept: GASTROENTEROLOGY | Facility: CLINIC | Age: 61
End: 2021-10-21

## 2021-10-21 ENCOUNTER — LAB (OUTPATIENT)
Dept: LAB | Facility: CLINIC | Age: 61
End: 2021-10-21
Attending: PHYSICIAN ASSISTANT
Payer: COMMERCIAL

## 2021-10-21 DIAGNOSIS — Z11.59 ENCOUNTER FOR SCREENING FOR OTHER VIRAL DISEASES: ICD-10-CM

## 2021-10-21 DIAGNOSIS — Z20.822 EXPOSURE TO 2019 NOVEL CORONAVIRUS: ICD-10-CM

## 2021-10-21 DIAGNOSIS — J31.0 CHRONIC RHINITIS: ICD-10-CM

## 2021-10-21 PROCEDURE — U0003 INFECTIOUS AGENT DETECTION BY NUCLEIC ACID (DNA OR RNA); SEVERE ACUTE RESPIRATORY SYNDROME CORONAVIRUS 2 (SARS-COV-2) (CORONAVIRUS DISEASE [COVID-19]), AMPLIFIED PROBE TECHNIQUE, MAKING USE OF HIGH THROUGHPUT TECHNOLOGIES AS DESCRIBED BY CMS-2020-01-R: HCPCS

## 2021-10-21 PROCEDURE — U0005 INFEC AGEN DETEC AMPLI PROBE: HCPCS

## 2021-10-21 RX ORDER — FLUTICASONE PROPIONATE 50 MCG
2 SPRAY, SUSPENSION (ML) NASAL DAILY
Qty: 18.2 ML | Refills: 3 | Status: SHIPPED | OUTPATIENT
Start: 2021-10-21 | End: 2022-02-02

## 2021-10-21 NOTE — TELEPHONE ENCOUNTER
Screening Questions  1. Are you active on mychart? YES    2. What insurance is in the chart? MEDICA     2.  Ordering/Referring Provider: Funmilayo Grubbs PA-C in  FAMILY PRACTICE    3. BMI 33.6    4. Do you have any Lung issues?  YES If yes continue:   Do you use daily home oxygen? NO   Do you have Pulmonary Hypertension? NO   Do you have SEVERE asthma? NO    5. Have you had a heart, lung, or liver transplant? NO    6. Are you currently on dialysis or have chronic kidney disease? NO    7. Have you had a stroke or Transient ischemic attack (TIA) within 6 months? NO    8. In the past 6 months, have you had any heart related issues including cardiomyopathy or heart attack? NO      If yes, did it require cardiac stenting or other implantable device?NO      9. Do you have any implantable devices in your body (pacemaker, defib, LVAD)? NO    10. Do you take nitroglycerin? If yes, how often? NO    11. Are you currently taking any blood thinners?NO    12. Are you a diabetic? NO    13. (Females) Are you currently pregnant? NO  If yes, how many weeks?      15. Are you taking any prescription pain medications on a routine schedule? NO If yes, MAC sedation.    16. Do you have any chemical dependencies such as alcohol, street drugs, or methadone? NOIf yes, MAC sedation.    17. Do you have any history of post-traumatic stress syndrome, severe anxiety or history of psychosis? DEPRESSION AND ANXIETY     18. Do you transfer independently? YES    19.  Do you have any issues with constipation? Occassionally     20. Preferred Pharmacy for Pre Prescription [facility] in [place] on [address]    Scheduling Details    Which Colonoscopy Prep was Sent?: MIRALAX  Procedure Scheduled: COLONOSCOPY   Provider/Surgeon: DR. OAKLEY  Date of Procedure: 11/11/2021  Location: [facility]  Caller (Please ask for phone number if not scheduled by patient): DONNA      Sedation Type: CS  Conscious Sedation- Needs  for 6 hours after the  procedure  MAC/General-Needs  for 24 hours after procedure    Pre-op Required at [facility] [place] and OR for MAC sedation:   (if yes advise patient they will need a pre-op prior to procedure)      Is patient on blood thinners? -NO (If yes- inform patient to follow up with PCP or provider for follow up instructions)     Informed patient they will need an adult  YES  Cannot take any type of public or medical transportation alone    Pre-Procedure Covid test to be completed at [facility] or Externally: SCHEDULED    Confirmed Nurse will call to complete assessment YES    Additional comments: NO

## 2021-10-21 NOTE — TELEPHONE ENCOUNTER
Prescription approved per [facility] Refill Protocol.    Digna CHILDS RN  Specialty/Allergy Clinics

## 2021-10-22 ENCOUNTER — VIRTUAL VISIT (OUTPATIENT)
Dept: PSYCHIATRY | Facility: CLINIC | Age: 61
End: 2021-10-22
Attending: PSYCHIATRY & NEUROLOGY
Payer: COMMERCIAL

## 2021-10-22 DIAGNOSIS — F33.1 MODERATE EPISODE OF RECURRENT MAJOR DEPRESSIVE DISORDER (H): Primary | ICD-10-CM

## 2021-10-22 LAB — SARS-COV-2 RNA RESP QL NAA+PROBE: NEGATIVE

## 2021-10-22 PROCEDURE — 90792 PSYCH DIAG EVAL W/MED SRVCS: CPT | Mod: GT | Performed by: STUDENT IN AN ORGANIZED HEALTH CARE EDUCATION/TRAINING PROGRAM

## 2021-10-22 RX ORDER — VENLAFAXINE HYDROCHLORIDE 225 MG/1
225 TABLET, EXTENDED RELEASE ORAL DAILY
Qty: 30 TABLET | Refills: 3 | Status: SHIPPED | OUTPATIENT
Start: 2021-10-22 | End: 2022-01-14

## 2021-10-22 RX ORDER — ARIPIPRAZOLE 2 MG/1
4 TABLET ORAL DAILY
Qty: 60 TABLET | Refills: 3 | Status: SHIPPED | OUTPATIENT
Start: 2021-10-22 | End: 2022-01-14

## 2021-10-22 ASSESSMENT — PAIN SCALES - GENERAL: PAINLEVEL: NO PAIN (0)

## 2021-10-22 NOTE — PROGRESS NOTES
"VIDEO VISIT  Patricia Perez is a 61 year old patient that has consented to receive services via billable video visit.      The patient has been notified of following:   \"This video visit will be conducted via a call between you and your physician/provider. We have found that certain health care needs can be provided without the need for an in-person physical exam. This service lets us provide the care you need with a video conversation. If a prescription is necessary we can send it directly to your pharmacy. If lab work is needed we can place an order for that and you can then stop by our lab to have the test done at a later time. Insurers are generally covering virtual visits as they would in-office visits so billing should not be different than normal.  If for some reason you do get billed incorrectly, you should contact the billing office to correct it and that number is in the AVS .    Patient will join video visit via:  [facility] (Patient / guardian confirmed to join via [place])    If patient attempts to join the video via [place] at appointment start time, but is unable to, they would prefer that the provider send them a video invitation via:   Send to preferred e-mail: [email or web address]      How would patient like to obtain AVS?:  MyChart    "

## 2021-10-22 NOTE — PATIENT INSTRUCTIONS
**For crisis resources, please see the information at the end of this document**     Patient Education      Thank you for coming to the [facility] MENTAL HEALTH & ADDICTION [place] CLINIC.    Lab Testing:  If you had lab testing today and your results are reassuring or normal they will be mailed to you or sent through [employer] within 7 days. If the lab tests need quick action we will call you with the results. The phone number we will call with results is # [phone] (home) [phone] (work). If this is not the best number please call our clinic and change the number.    Medication Refills:  If you need any refills please call your pharmacy and they will contact us. Our fax number for refills is [phone]. Please allow three business for refill processing. If you need to  your refill at a new pharmacy, please contact the new pharmacy directly. The new pharmacy will help you get your medications transferred.     Scheduling:  If you have any concerns about today's visit or wish to schedule another appointment please call our office during normal business hours [phone] (8-5:00 M-F)    Contact Us:  Please call [phone] during business hours (8-5:00 M-F).  If after clinic hours, or on the weekend, please call  [phone].    Financial Assistance [phone]  [facility] Billing [phone]  Central Billing Office, [facility]: [phone]  [facility] Billing [phone]  Medical Records [phone]  [facility] Patient Bill of Rights [email or web address]       MENTAL HEALTH CRISIS NUMBERS:  For a medical emergency please call  911 or go to the nearest ER.     [facility]:    [phone]   Crisis Residence [facility] -[phone]   Walk-In Counseling Center \[facility]\"" -[phone]   COPE 24/7 [place] Mobile Team -[phone] (adults)/[phone] (child)  CHILD: [facility]  needs assessment team - [phone]      [facility]:   [facility] - [phone]   Walk-in counseling [facility] - [phone]   Walk-in counseling [facility] - [phone]   Crisis Residence [facility] - [phone]  Urgent Care Adult Mental [facility]-[phone] mobile unit/ 24/7 crisis line    National Crisis Numbers:   National Suicide Prevention Lifeline: 7-372-504-TALK (412-588-0100)  Poison Control Center - 2-329-053-4878  [employer]/resources for a list of additional resources (SOS)  Trans Lifeline a hotline for transgender people 3-529-621-4589  The [employer] Project a hotline for LGBT youth 2-140-195-9128  Crisis Text Line: For any crisis 24/7   To: 688501  see www.crisistextline.org  - IF MAKING A CALL FEELS TOO HARD, send a text!         Again thank you for choosing [facility] MENTAL HEALTH & ADDICTION [facility] and please let us know how we can best partner with you to improve you and your family's health.    You may be receiving a survey regarding this appointment. We would love to have your feedback, both positive and negative. The survey is done by an external company, so your answers are anonymous.         Treatment Plan Today:     1) Medications-no changes, continue current medications as prescribed    2) Follow-up appt with Dr Carcamo in 12 weeks    3) Crisis numbers are below and clinic after hours number is [phone]

## 2021-10-22 NOTE — PROGRESS NOTES
"VIDEO VISIT  Patricia Perez is a 61 year old patient who is being evaluated via a billable video visit.      The patient has been notified of following:   \"We have found that certain health care needs can be provided without the need for an in-person physical exam. This service lets us provide the care you need with a video conversation. If a prescription is necessary we can send it directly to your pharmacy. If lab work is needed we can place an order for that and you can then stop by our lab to have the test done at a later time. Insurers are generally covering virtual visits as they would in-office visits so billing should not be different than normal.  If for some reason you do get billed incorrectly, you should contact the billing office to correct it and that number is in the AVS .    Patient has given verbal consent for video visit?: Yes   How would you like to obtain your AVS?: Mag[employer] SmartPhrase [PsychAVS] has been placed in 'Patient Instructions': Yes      Video- Visit Details  Type of service:  video visit for diagnostic assessment  Time of service:    Date:  10/22/2021    Video Start Time:  8:15 am        Video End Time:  8:58 am    Reason for video visit:  Patient has requested telehealth visit  Originating Site (patient location):  [facility]   Location- Patient's home  Distant Site (provider location):  [facility]  Mode of Communication:  Video Conference via AmWell  Consent:  Patient has given verbal consent for video visit?: Yes           [facility]  Psychiatry Clinic  TRANSFER of CARE DIAGNOSTIC ASSESSMENT     CARE TEAM:  PCP- Funmilayo Grubbs, Psychotherapist- None,   Patricia Perez is a 61 year old who uses the name Patricia and pronouns she, her.      DIAGNOSIS   Major depressive disorder, recurrent, in remission  ADHD by history     ASSESSMENT   Today: Patricia reports that she is doing better overall and that her current medications are working " well.  She notes after starting aripiprazole and discontinuing Adderall and sertraline that she does not feel as down or discouraged as before and is better able to cope and adjust to difficulty.  She endorses some difficulty sleeping getting approximately 6 hours per night and frequently waking at night.  She reports working with her PCP regarding a possible anemia diagnosis and working with her neurologist regarding balance issues.  She endorses increased psychosocial stressors from a recent move and buying a home but feels she is coping well to the stressors.  She notes approximately 30 pounds of weight gain in the last 1.5 years which she feels may be interfering with her sleep and she is considering resuming CPAP use and getting a sleep study.  She denies significant side effects from the medications and expresses she is not interested in medication changes today.  Given Patricia's improved mood, absence of side effects, and desire to continue her current medication regimen no changes are indicated at this time.    Future considerations: Continue to assess if a sleep study is indicated and encourage CPAP use if appropriate.    [place] was [place]:  Indicates no controlled substance prescriptions.     PLAN                                                                                                                1) Meds-  - Continue venlafaxine  mg daily  - Continue aripiprazole 4 mg daily    2) Psychotherapy- Not interested at this time    3) Next due-  Labs- AP labs due (A1C due), CBC on 8/26, Lipid on 6/30,   EKG- 1/7/20: 447 ms @ 54 bpm  Rating scales- PHQ9    4) Referrals-  None    5) Dispo- Return to clinic in 12 weeks      PERTINENT BACKGROUND                           [most recent eval 10/22/21]   Onset of mental health problems at age 13-14 years old when younger brother passed away. Denies memory of significant trauma or abuse and poor memory childhood. Did well in school and no discpline  "issues or legal issues during childhood. Father with alcohol use disorder.  Started mental health treatment in 1995. Niece passed away in MVA in 2020. History gastric bypass in 2018.     Psych pertinent item history includes trauma hx and eating disorder (binge eating)     SUBJECTIVE   Since the last visit: Patricia states that she has been doing better overall since starting aripiprazole and discontinuing Adderall and sertraline and that she just does not feel as down or discouraged as before.  Overall she feels more stable and more optimistic day-to-day.  She notes feeling better able to adjust and cope with difficult situations.  She describes her sleep as \"okay\" getting about 6 hours per night with difficulty staying asleep.  She denies any nightmares and reports taking Advil PM each night.  She notes increased stress regarding her move and buying a home recently but feels she is coping well with the stressors.  She endorses some health issues including putting on 30 pounds in the last 1.5 years, concern for anemia for which her PCP is working up and concern for balance issues for which she is seeing her neurologist about.  She denies significant side effects from medications and states she is considering doing a sleep study again due to her recent weight gain and concern for recurrence of her previously diagnosed sleep apnea.  She expresses her current medications are working well and she is not interested in medication changes today.    RECENT PSYCH ROS:   Depression:  depressed mood, low energy, insomnia, weight changes, poor concentration /memory, psychomotor changes [some unsteadiness during ambulation, no falls, is being evaluated by a neurologist] and overwhelmed  Elevated:  none  Psychosis:  none  Anxiety:  excessive worry and nervous/overwhelmed  Trauma Related:  intrusive memories and startle response  Sleep: dysregulation  Other: N/A    Adverse Effects: Denies  Pertinent Negative Symptoms: No suicidal " and violent ideation, psychosis, hallucinations or yuko  Recent Substance Use:     Alcohol- Minimal use, 1 drink every 1-2 months  Tobacco- None   Caffeine- 4 cups of coffee per day and 1-2 sodas with caffeine. Try not to drink after 5 pm.    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    Partner/ -  in 2017 to her first , Mitchell, relationship feels very strong   Children- None      Living situation- Lives in a home in [place], [place] with  and cat (Ms. Queen)  Social/ Spiritual Support-  Mitchell, three close friends       Feels Safe at Home- yes   Legal- None     Trauma History (self-report)- Younger brother  of Leukemia when patient was in 8th grade. Niece  in MVA on 2020  Early History/Education- Born and raised in [place], [place]. Family is still in [place]. Grad school in [place].  Reports her childhood was jackie, father was an alcoholic.  Lived in a trailer with 4 siblings.    PSYCHIATRIC HISTORY     SIB- None  Suicide Attempt [#, most recent]- None  Suicidal Ideation Hx- None    Violence/Aggression Hx- None  Psychosis Hx- None  Eating Disorder Hx- Binge eating disorder history. Endorses ongoing stress eating.   Other- None    Psych Hosp [#, most recent]- None  Commitment- None  ECT- None  Outpatient Programs - None  Other - N/A     PAST MED TRIALS   21: Started aripiprazole  3/31/21: Increased aripiprazole to 4 mg daily  21: Discontinued sertraline  10/22/21: No changes.   Medication Max Dose (mg) Dates / Duration Helpful? DC Reason / Adverse Effects?   bupropion 300 3703-1132 N Initially  helpful then lost efficacy   Adderall 25 2758-5671 N Lack of efficacy   sertraline 100 3059-9754 N Initially helpful then lost efficacy   venlafaxine 225 2008-current Y    aripiprazole 4 2021-current Y                                          SUBSTANCE USE HISTORY     Past Use- Reports history of occasional alcohol use from teens to 30's, tried marijuana occasionally In the 1970s   Treatment- #, most recent- no  Medical Consequences- no  Legal Consequences- no  Other- N/A     MEDICAL HISTORY and ALLERGY     ALLERGIES: Atorvastatin    Patient Active Problem List   Diagnosis     Major depression, recurrent (H)     Dizziness and giddiness     Motion sickness     GERD (gastroesophageal reflux disease)     Idiopathic cardiomyopathy (H)     Sleep apnea     Hyperlipidemia LDL goal <100     Allergic rhinitis     Hypertension goal BP (blood pressure) < 130/80     Pelvic pain in female     Health Care Home     24 hour contact handout given     Elevated PTHrP level     H/O bariatric surgery     Hx of gastric bypass     Hypovitaminosis D     Allergic rhinitis     Attention deficit disorder of adult     Mild persistent asthma     Low back pain     Hand joint pain     Myopia, bilateral     Dumping syndrome     Benign essential hypertension     Adjustment disorder with depressed mood     Decreased hearing of both ears     BPV (benign positional vertigo), unspecified laterality     Advanced directives, counseling/discussion     Vitamin D deficiency     Coronary artery disease involving native coronary artery of native heart without angina pectoris     Acute pain of left knee     Syncope     Posterior vitreous detachment of both eyes     Combined forms of age-related cataract, mild, of both eyes     Glaucoma suspect of both eyes     Chronic left-sided low back pain with left-sided sciatica        MEDICAL REVIEW OF SYSTEMS   Contraception- not needed  A comprehensive review of systems was performed and is negative other than noted in  the HPI.     MEDICATIONS     Current Outpatient Medications   Medication Sig Dispense Refill     albuterol (PROAIR HFA/PROVENTIL HFA/VENTOLIN HFA) 108 (90 Base) MCG/ACT inhaler Inhale 2 puffs into the lungs every 6 hours as needed for shortness of breath / dyspnea or wheezing 18 g 3     ARIPiprazole (ABILIFY) 2 MG tablet Take 2 tablets (4 mg) by mouth daily *PLEASE SCHEDULE APPT.FOR REFILLS [phone]* 60 tablet 3     aspirin (ASA) 81 MG EC tablet Take 1 tablet (81 mg) by mouth daily 90 tablet 3     azelastine (ASTELIN) 0.1 % nasal spray Spray 2 sprays into both nostrils 2 times daily as needed for rhinitis 30 mL 3     azelastine (OPTIVAR) 0.05 % ophthalmic solution Apply 1 drop to eye 2 times daily as needed (itchy/watery eyes) 6 mL 3     budesonide-formoterol (SYMBICORT) 80-4.5 MCG/ACT Inhaler Inhale 2 puffs into the lungs 2 times daily 10.2 g 3     calcium carbonate-vitamin D (CALTRATE 600+D) 600-400 MG-UNIT CHEW Take 1 chew tab by mouth 2 times daily 180 tablet 3     carvedilol (COREG) 25 MG tablet Take 1 tablet (25 mg) by mouth 2 times daily (with meals) 180 tablet 3     cetirizine (ZYRTEC) 10 MG tablet Take 1 tablet (10 mg) by mouth daily 30 tablet 3     Cyanocobalamin (VITAMIN B-12 SL) Place under the tongue every other day        ezetimibe (ZETIA) 10 MG tablet Take 1 tablet (10 mg) by mouth daily 90 tablet 3     ferrous sulfate (FEROSUL) 325 (65 Fe) MG tablet Take 1 tablet (325 mg) by mouth 2 times daily 180 tablet 3     fluticasone (FLONASE) 50 MCG/ACT nasal spray Spray 2 sprays into both nostrils daily 18.2 mL 3     hydrALAZINE (APRESOLINE) 10 MG tablet Take 1 tablet (10 mg) by mouth 3 times daily 270 tablet 3     Ibuprofen-diphenhydrAMINE Cit (IBUPROFEN PM PO) Take by mouth At Bedtime       isosorbide mononitrate (IMDUR) 30 MG 24 hr tablet Take 1 tablet (30 mg) by mouth daily 90 tablet 3     lisinopril (ZESTRIL) 5 MG tablet TAKE 1 TABLET(5 MG) BY MOUTH DAILY 90 tablet 3     meclizine (ANTIVERT) 25 MG  "tablet Take 1 tablet (25 mg) by mouth 3 times daily as needed 30 tablet 1     montelukast (SINGULAIR) 10 MG tablet Take 1 tablet (10 mg) by mouth At Bedtime 30 tablet 0     Multiple Vitamin (MULTI-VITAMIN) per tablet Take 1 tablet by mouth daily. 100 tablet 3     ondansetron (ZOFRAN-ODT) 4 MG ODT tab Take 1 tablet (4 mg) by mouth every 8 hours as needed for nausea 12 tablet 1     PREMPRO 0.45-1.5 MG tablet TAKE 1 TABLET BY MOUTH DAILY 84 tablet 1     simvastatin (ZOCOR) 20 MG tablet Take 1 tablet (20 mg) by mouth At Bedtime 90 tablet 3     venlafaxine (EFFEXOR-ER) 225 MG 24 hr tablet Take 1 tablet (225 mg) by mouth daily 90 tablet 3      VITALS   LMP 07/16/2012     MENTAL STATUS EXAM     Alertness: alert   Appearance: adequately groomed  Behavior/Demeanor: cooperative and pleasant, with good  eye contact   Speech: normal and regular rate and rhythm  Language: intact and no problems  Psychomotor: normal or unremarkable  Mood: \"pretty good\"  Affect: full range; congruent to: mood- yes, content- yes  Thought Process/Associations: unremarkable  Thought Content:  Reports none;  Denies suicidal & violent ideation and delusions  Perception:  Reports none;  Denies hallucinations  Insight: good  Judgment: good  Cognition: does  appear grossly intact; formal cognitive testing was not done  Gait and Station: N/A ([facility])     LABS and DATA     PHQ9 TODAY = Not assessed  PHQ 1/16/2020 9/3/2020 6/30/2021   PHQ-9 Total Score 7 15 4   Q9: Thoughts of better off dead/self-harm past 2 weeks Not at all Not at all Not at all       Recent Labs   Lab Test 06/30/21  0812 12/08/20  0700   CR 0.77 0.77   GFRESTIMATED 82 83       Recent Labs   Lab Test 06/30/21  0812 12/08/20  0700 06/15/16  0948 06/29/15  0940 06/24/14  0945 11/20/13  1240   GLC 91 82   < > 90   < > 82   A1C  --   --   --  5.4  --  4.7    < > = values in this interval not displayed.     Recent Labs   Lab Test 06/30/21  0812 12/08/20  0700   CHOL 205* 274*   TRIG 80 72 "   LDL 64 147*    113     Recent Labs   Lab Test 01/07/20  0740 10/29/19  0804   AST 12 14   ALT 24 23   ALKPHOS 83 94     Recent Labs   Lab Test 08/26/21  1529 12/08/20  0700 01/08/20  0606 01/07/20  0740 10/29/19  0804 10/29/19  0804   WBC 6.0 4.3   < > 6.0   < > 4.9   ANEU  --   --   --  4.3  --  2.5   HGB 10.5* 10.5*   < > 10.1*   < > 11.8    273   < > 248   < > 245    < > = values in this interval not displayed.       ECG on 1/7/20 QTc = 447 ms @ 54 bpm. Sinus bradycardia     PSYCHOTROPIC DRUG INTERACTIONS     ARIPIPRAZOLE -- CETIRIZINE HYDROCHLORIDE may result in increased risk of CNS depression.  ARIPIPRAZOLE -- ONDANSETRON may result in increased risk of QT interval prolongation.  ARIPIPRAZOLE -- BUDESONIDE/ FORMOTEROL FUMARATE may result in increased risk of QT interval prolongation.  ARIPIPRAZOLE -- VENLAFAXINE HYDROCHLORIDE may result in increased risk of QT interval prolongation.  ASPIRIN -- VENLAFAXINE HYDROCHLORIDE may result in an increased risk of bleeding. may result in increased risk of QT interval prolongation.  DIPHENHYDRAMINE CITRATE/ IBUPROFEN -- VENLAFAXINE HYDROCHLORIDE may result in an increased risk of bleeding.  ONDANSETRON -- VENLAFAXINE HYDROCHLORIDE may result in an increased risk of serotonin syndrome and QT interval prolongation.  AMPHETAMINE ASPARTATE/ AMPHETAMINE SULFATE/ DEXTROAMPHETAMINE SACCHARATE/ DEXTROAMPHETAMINE SULFATE -- CALCIUM CARBONATE/ VITAMIN D may result in increased exposure to amphetamine.  AMPHETAMINE ASPARTATE/ AMPHETAMINE SULFATE/ DEXTROAMPHETAMINE SACCHARATE/ DEXTROAMPHETAMINE SULFATE -- ONDANSETRON may result in increased risk of serotonin syndrome.  AMPHETAMINE ASPARTATE/ AMPHETAMINE SULFATE/ DEXTROAMPHETAMINE SACCHARATE/ DEXTROAMPHETAMINE SULFATE -- VENLAFAXINE HYDROCHLORIDE may result in increased risk of serotonin syndrome.    MANAGEMENT:  Monitoring for adverse effects, routine vitals, routine labs and periodic EKGs     RISK STATEMENT for  SAFETY     Patricia Perez did not appear to be an imminent safety risk to self or others.    TREATMENT RISK STATEMENT: The risks, benefits, alternatives and potential adverse effects have been discussed and are understood by the pt. The pt understands the risks of using street drugs or alcohol. There are no medical contraindications, the pt agrees to treatment with the ability to do so. The pt knows to call the clinic for any problems or to access emergency care if needed.  Medical and substance use concerns are documented above.  Psychotropic drug interaction check was done, including changes made today.     PROVIDER: Gregorio Carcamo MD    Patient not staffed in clinic.  Note will be reviewed and signed by supervisor Dr. Ruiz.      Attestation:  I did not see Patricia Perez directly. I have reviewed the documentation and I agree with the resident's plan of care.   John Ruiz MD

## 2021-11-01 DIAGNOSIS — H10.89 OTHER CONJUNCTIVITIS OF BOTH EYES: ICD-10-CM

## 2021-11-01 DIAGNOSIS — J31.0 RHINITIS MEDICAMENTOSA: ICD-10-CM

## 2021-11-01 DIAGNOSIS — J31.0 CHRONIC RHINITIS: ICD-10-CM

## 2021-11-01 DIAGNOSIS — T48.5X5A RHINITIS MEDICAMENTOSA: ICD-10-CM

## 2021-11-01 RX ORDER — AZELASTINE HYDROCHLORIDE 0.5 MG/ML
1 SOLUTION/ DROPS OPHTHALMIC 2 TIMES DAILY PRN
Qty: 6 ML | Refills: 3 | Status: SHIPPED | OUTPATIENT
Start: 2021-11-01 | End: 2023-12-08

## 2021-11-01 NOTE — TELEPHONE ENCOUNTER
Prescription approved per [facility] Refill Protocol.    Digna CHILDS RN  Specialty/Allergy Clinics

## 2021-11-02 ENCOUNTER — TELEPHONE (OUTPATIENT)
Dept: GASTROENTEROLOGY | Facility: CLINIC | Age: 61
End: 2021-11-02
Payer: COMMERCIAL

## 2021-11-02 NOTE — TELEPHONE ENCOUNTER
Attempted to contact patient regarding upcoming colonoscopy procedure on 11/11/21 for pre assessment questions. No answer.     Left message to return call to [phone] #2    Covid test scheduled: 11/8/21    Arrival time: 0820    Facility location: [facility]    Sedation type: CS    Indication for procedure: reflux/GERD, anemia    Anticoagulants: ASA 81mg.     Bowel prep recommendation: Miralax/Magnesium citrate/Dulcolax       Bibiana Ortiz RN

## 2021-11-08 ENCOUNTER — LAB (OUTPATIENT)
Dept: LAB | Facility: CLINIC | Age: 61
End: 2021-11-08
Payer: COMMERCIAL

## 2021-11-08 DIAGNOSIS — Z11.59 ENCOUNTER FOR SCREENING FOR OTHER VIRAL DISEASES: ICD-10-CM

## 2021-11-08 PROCEDURE — U0003 INFECTIOUS AGENT DETECTION BY NUCLEIC ACID (DNA OR RNA); SEVERE ACUTE RESPIRATORY SYNDROME CORONAVIRUS 2 (SARS-COV-2) (CORONAVIRUS DISEASE [COVID-19]), AMPLIFIED PROBE TECHNIQUE, MAKING USE OF HIGH THROUGHPUT TECHNOLOGIES AS DESCRIBED BY CMS-2020-01-R: HCPCS

## 2021-11-08 PROCEDURE — U0005 INFEC AGEN DETEC AMPLI PROBE: HCPCS

## 2021-11-08 NOTE — TELEPHONE ENCOUNTER
Writer reviewed pre-assessment questions with patient prior to upcoming colonoscopy/EGD on 11.11.2021.      Covid test scheduled: 11.8.2021    Arrival time: 0820    Facility location: [facility]    Sedation type: CS    Electronic Implantable devices? No    Blood thinners/Antiplatelet medication? No    Reviewed miralax and magnesium citrate prep instructions with patient.      Designated  policy reviewed with patient.     Patient verbalized understanding.  No further questions or concerns.    Ann-Marie Chan RN

## 2021-11-09 LAB — SARS-COV-2 RNA RESP QL NAA+PROBE: NEGATIVE

## 2021-11-10 ENCOUNTER — ANESTHESIA EVENT (OUTPATIENT)
Dept: SURGERY | Facility: AMBULATORY SURGERY CENTER | Age: 61
End: 2021-11-10
Payer: COMMERCIAL

## 2021-11-10 RX ORDER — SODIUM CHLORIDE, SODIUM LACTATE, POTASSIUM CHLORIDE, CALCIUM CHLORIDE 600; 310; 30; 20 MG/100ML; MG/100ML; MG/100ML; MG/100ML
INJECTION, SOLUTION INTRAVENOUS CONTINUOUS
Status: CANCELLED | OUTPATIENT
Start: 2021-11-10

## 2021-11-10 RX ORDER — ONDANSETRON 2 MG/ML
4 INJECTION INTRAMUSCULAR; INTRAVENOUS
Status: CANCELLED | OUTPATIENT
Start: 2021-11-10

## 2021-11-10 RX ORDER — LIDOCAINE 40 MG/G
CREAM TOPICAL
Status: CANCELLED | OUTPATIENT
Start: 2021-11-10

## 2021-11-10 NOTE — ANESTHESIA PREPROCEDURE EVALUATION
Anesthesia Pre-Procedure Evaluation    Patient: Patricia Perez   MRN: [id number] : 1960        Preoperative Diagnosis: Colon cancer screening [Z12.11]    Procedure : Procedure(s):  COLONOSCOPY  ESOPHAGOGASTRODUODENOSCOPY (EGD)          Past Medical History:   Diagnosis Date     Anxiety      Arthritis      ASCUS of cervix with negative high risk HPV 2006     Asthma      Bursitis of shoulder 2010     Chronic rhinitis 2009     Coronary artery disease      Depression      Depression      Dyspnea on exertion      Gastro-oesophageal reflux disease      GERD (gastroesophageal reflux disease) 10/14/2008     Hypertension      Idiopathic cardiomyopathy (H) 2009     Indigestion      Itchy eyes      Itchy nose      Left leg pain 2011     Motion sickness 2008     Nasal congestion      Pneumonia      Problems related to lack of adequate sleep      Ringing in ears      Sleep apnea 2009     Sneezing       Past Surgical History:   Procedure Laterality Date     BARIATRIC SURGERY       DIABETES RESOURCES  As Child    Tonsillectomy     ENT SURGERY       HERNIA REPAIR       LAPAROSCOPIC BYPASS GASTRIC  10/16/2012    Procedure: LAPAROSCOPIC BYPASS GASTRIC;  laparoscopic reyna en y gastric bypass;  Surgeon: Nish Bradford MD;  Location: [facility] OR     TONSILLECTOMY       Uretural Sticture  As Child      Allergies   Allergen Reactions     Atorvastatin      Leg myalgias      Social History     Tobacco Use     Smoking status: Never Smoker     Smokeless tobacco: Never Used   Substance Use Topics     Alcohol use: No      Wt Readings from Last 1 Encounters:   21 87.8 kg (193 lb 8 oz)           Physical Exam    Airway        Mallampati: II   TM distance: > 3 FB   Neck ROM: full   Mouth opening: > 3 cm    Respiratory Devices and Support         Dental       (+) missing and partials      Cardiovascular   cardiovascular exam normal          Pulmonary   pulmonary exam normal                 OUTSIDE LABS:  CBC:   Lab Results   Component Value Date    WBC 6.0 08/26/2021    WBC 4.3 12/08/2020    HGB 10.5 (L) 08/26/2021    HGB 10.5 (L) 12/08/2020    HCT 33.4 (L) 08/26/2021    HCT 33.7 (L) 12/08/2020     08/26/2021     12/08/2020     BMP:   Lab Results   Component Value Date     06/30/2021     12/08/2020    POTASSIUM 4.2 06/30/2021    POTASSIUM 4.2 12/08/2020    CHLORIDE 109 06/30/2021    CHLORIDE 109 12/08/2020    CO2 25 06/30/2021    CO2 28 12/08/2020    BUN 17 06/30/2021    BUN 20 12/08/2020    CR 0.77 06/30/2021    CR 0.77 12/08/2020    GLC 91 06/30/2021    GLC 82 12/08/2020     COAGS: No results found for: PTT, INR, FIBR  POC:   Lab Results   Component Value Date    HCG Negative 07/26/2005     HEPATIC:   Lab Results   Component Value Date    ALBUMIN 3.1 (L) 01/07/2020    PROTTOTAL 5.9 (L) 01/07/2020    ALT 24 01/07/2020    AST 12 01/07/2020    ALKPHOS 83 01/07/2020    BILITOTAL 0.3 01/07/2020     OTHER:   Lab Results   Component Value Date    PH 7.40 11/21/2008    A1C 5.4 06/29/2015    TREVOR 8.7 06/30/2021    PHOS 3.7 04/28/2009    MAG 2.0 01/07/2020    LIPASE 153 04/29/2017    TSH 1.33 08/26/2021    T4 0.97 05/09/2012       Anesthesia Plan    ASA Status:  3   NPO Status:  NPO Appropriate    Anesthesia Type: MAC.     - Reason for MAC: straight local not clinically adequate   Induction: Intravenous, Propofol.   Maintenance: TIVA.        Consents    Anesthesia Plan(s) and associated risks, benefits, and realistic alternatives discussed. Questions answered and patient/representative(s) expressed understanding.     - Discussed with:  Patient      - Extended Intubation/Ventilatory Support Discussed: No.      - Patient is DNR/DNI Status: No    Use of blood products discussed: No .     Postoperative Care    Pain management: Multi-modal analgesia.   PONV prophylaxis: Ondansetron (or other 5HT-3)     Comments:                Andres Seymour MD

## 2021-11-11 ENCOUNTER — HOSPITAL ENCOUNTER (OUTPATIENT)
Facility: AMBULATORY SURGERY CENTER | Age: 61
End: 2021-11-11
Attending: STUDENT IN AN ORGANIZED HEALTH CARE EDUCATION/TRAINING PROGRAM
Payer: COMMERCIAL

## 2021-11-11 ENCOUNTER — ANESTHESIA (OUTPATIENT)
Dept: SURGERY | Facility: AMBULATORY SURGERY CENTER | Age: 61
End: 2021-11-11
Payer: COMMERCIAL

## 2021-11-11 VITALS
HEART RATE: 59 BPM | DIASTOLIC BLOOD PRESSURE: 66 MMHG | TEMPERATURE: 97 F | RESPIRATION RATE: 16 BRPM | OXYGEN SATURATION: 99 % | SYSTOLIC BLOOD PRESSURE: 144 MMHG | HEIGHT: 64 IN | BODY MASS INDEX: 32.44 KG/M2 | WEIGHT: 190 LBS

## 2021-11-11 VITALS — HEART RATE: 88 BPM

## 2021-11-11 LAB
COLONOSCOPY: NORMAL
UPPER GI ENDOSCOPY: NORMAL

## 2021-11-11 PROCEDURE — 45380 COLONOSCOPY AND BIOPSY: CPT

## 2021-11-11 PROCEDURE — 88305 TISSUE EXAM BY PATHOLOGIST: CPT | Mod: 26 | Performed by: PATHOLOGY

## 2021-11-11 PROCEDURE — 43239 EGD BIOPSY SINGLE/MULTIPLE: CPT

## 2021-11-11 PROCEDURE — 88305 TISSUE EXAM BY PATHOLOGIST: CPT | Mod: TC | Performed by: STUDENT IN AN ORGANIZED HEALTH CARE EDUCATION/TRAINING PROGRAM

## 2021-11-11 RX ORDER — NALOXONE HYDROCHLORIDE 0.4 MG/ML
0.4 INJECTION, SOLUTION INTRAMUSCULAR; INTRAVENOUS; SUBCUTANEOUS
Status: CANCELLED | OUTPATIENT
Start: 2021-11-11

## 2021-11-11 RX ORDER — PROCHLORPERAZINE MALEATE 10 MG
10 TABLET ORAL EVERY 6 HOURS PRN
Status: CANCELLED | OUTPATIENT
Start: 2021-11-11

## 2021-11-11 RX ORDER — SIMETHICONE
LIQUID (ML) MISCELLANEOUS PRN
Status: DISCONTINUED | OUTPATIENT
Start: 2021-11-11 | End: 2021-11-11 | Stop reason: HOSPADM

## 2021-11-11 RX ORDER — ONDANSETRON 2 MG/ML
INJECTION INTRAMUSCULAR; INTRAVENOUS PRN
Status: DISCONTINUED | OUTPATIENT
Start: 2021-11-11 | End: 2021-11-11

## 2021-11-11 RX ORDER — NALOXONE HYDROCHLORIDE 0.4 MG/ML
0.2 INJECTION, SOLUTION INTRAMUSCULAR; INTRAVENOUS; SUBCUTANEOUS
Status: CANCELLED | OUTPATIENT
Start: 2021-11-11

## 2021-11-11 RX ORDER — ONDANSETRON 4 MG/1
4 TABLET, ORALLY DISINTEGRATING ORAL EVERY 6 HOURS PRN
Status: CANCELLED | OUTPATIENT
Start: 2021-11-11

## 2021-11-11 RX ORDER — FLUMAZENIL 0.1 MG/ML
0.2 INJECTION, SOLUTION INTRAVENOUS
Status: CANCELLED | OUTPATIENT
Start: 2021-11-11 | End: 2021-11-11

## 2021-11-11 RX ORDER — KETAMINE HYDROCHLORIDE 10 MG/ML
INJECTION, SOLUTION INTRAMUSCULAR; INTRAVENOUS PRN
Status: DISCONTINUED | OUTPATIENT
Start: 2021-11-11 | End: 2021-11-11

## 2021-11-11 RX ORDER — ONDANSETRON 2 MG/ML
4 INJECTION INTRAMUSCULAR; INTRAVENOUS EVERY 6 HOURS PRN
Status: CANCELLED | OUTPATIENT
Start: 2021-11-11

## 2021-11-11 RX ORDER — PROPOFOL 10 MG/ML
INJECTION, EMULSION INTRAVENOUS CONTINUOUS PRN
Status: DISCONTINUED | OUTPATIENT
Start: 2021-11-11 | End: 2021-11-11

## 2021-11-11 RX ORDER — SODIUM CHLORIDE, SODIUM LACTATE, POTASSIUM CHLORIDE, CALCIUM CHLORIDE 600; 310; 30; 20 MG/100ML; MG/100ML; MG/100ML; MG/100ML
INJECTION, SOLUTION INTRAVENOUS CONTINUOUS PRN
Status: DISCONTINUED | OUTPATIENT
Start: 2021-11-11 | End: 2021-11-11

## 2021-11-11 RX ORDER — LIDOCAINE HYDROCHLORIDE 20 MG/ML
INJECTION, SOLUTION INFILTRATION; PERINEURAL PRN
Status: DISCONTINUED | OUTPATIENT
Start: 2021-11-11 | End: 2021-11-11

## 2021-11-11 RX ORDER — PROPOFOL 10 MG/ML
INJECTION, EMULSION INTRAVENOUS PRN
Status: DISCONTINUED | OUTPATIENT
Start: 2021-11-11 | End: 2021-11-11

## 2021-11-11 RX ORDER — GLYCOPYRROLATE 0.2 MG/ML
INJECTION, SOLUTION INTRAMUSCULAR; INTRAVENOUS PRN
Status: DISCONTINUED | OUTPATIENT
Start: 2021-11-11 | End: 2021-11-11

## 2021-11-11 RX ADMIN — SODIUM CHLORIDE, SODIUM LACTATE, POTASSIUM CHLORIDE, CALCIUM CHLORIDE: 600; 310; 30; 20 INJECTION, SOLUTION INTRAVENOUS at 09:51

## 2021-11-11 RX ADMIN — PROPOFOL 200 MCG/KG/MIN: 10 INJECTION, EMULSION INTRAVENOUS at 09:54

## 2021-11-11 RX ADMIN — PROPOFOL 20 MG: 10 INJECTION, EMULSION INTRAVENOUS at 11:16

## 2021-11-11 RX ADMIN — ONDANSETRON 4 MG: 2 INJECTION INTRAMUSCULAR; INTRAVENOUS at 09:53

## 2021-11-11 RX ADMIN — GLYCOPYRROLATE 0.2 MG: 0.2 INJECTION, SOLUTION INTRAMUSCULAR; INTRAVENOUS at 09:53

## 2021-11-11 RX ADMIN — PROPOFOL 100 MG: 10 INJECTION, EMULSION INTRAVENOUS at 09:54

## 2021-11-11 RX ADMIN — LIDOCAINE HYDROCHLORIDE 60 MG: 20 INJECTION, SOLUTION INFILTRATION; PERINEURAL at 09:53

## 2021-11-11 RX ADMIN — KETAMINE HYDROCHLORIDE 20 MG: 10 INJECTION, SOLUTION INTRAMUSCULAR; INTRAVENOUS at 09:55

## 2021-11-11 RX ADMIN — PROPOFOL 100 MCG/KG/MIN: 10 INJECTION, EMULSION INTRAVENOUS at 10:56

## 2021-11-11 ASSESSMENT — MIFFLIN-ST. JEOR: SCORE: 1393.89

## 2021-11-11 NOTE — LETTER
November 14, 2021      Patricia BAER Chris  [address] [facility] [address]        Dear ,    We are writing to inform you of your test results.    The biopsies from your upper endoscopy did not reveal a source of your iron deficiency. I feel your iron deficiency is potentially related to your former bypass surgery and the mild ulceration at your previous surgical site.     The polyps removed during your colonoscopy returned as colonic mucosa without evidence of a polyp. Given this finding, I recommend that you undergo a repeat colonoscopy in 10 years.  However, a sooner examination might be necessary if you start developing any symptoms such as rectal bleeding, change in bowel habits, anemia, etc.  Please discuss this with your primary physician at the time of your next office appointment.  Your primary physician will schedule this repeat colonoscopy at the appropriate time.    It has been a pleasure to participate in your care.  Please call our clinic if you have any questions or concerns.    Resulted Orders   Surgical Pathology Exam   Result Value Ref Range    Case Report       Surgical Pathology Report                         Case: [id number]                                  Authorizing Provider:  Stephanie Meneses MD          Collected:           11/11/2021 09:55 AM          Ordering Location:     [facility] OR  Received:            11/11/2021 11:49 AM                                 [place]                                                                  Pathologist:           Ray Miranda MD                                                           Specimens:   A) - Other, duodenum biopsies                                                                       B) - Other, gastric biopsies                                                                        C) - Other, transverse colon polyp                                                                  D) - Other, ascending  "colon polyp                                                          Final Diagnosis       A. DUODENUM, BIOPSY:  Duodenal mucosa with preserved villi and no significant histologic abnormality; features celiac sprue or peptic duodenitis are not identified     B. STOMACH, BIOPSY:  Gastric body type mucosa with no significant histologic abnormality; negative for intestinal metaplasia or dysplasia; no H. Pylori like organisms identified on routine staining     C. TRANSVERSE COLON, POLYP:  Colonic mucosa with surface serrated change, likely hyperplastic; no neoplastic polyp or other histologic abnormality identified    D. ASCENDING COLON, POLYP:  Colonic mucosa with no significant histologic abnormality; no neoplastic or hyperplastic polyp identified          Clinical Information       Patient is a 61-year-old woman with a history of iron deficiency anemia, s/p prior Basilio-en-Y gastrojejunostomy.       Gross Description       A(1). Other, duodenum biopsies:  The specimen is received in formalin with proper patient identification, labeled \"duodenum biopsies\".  The specimen consists of 5 pieces of pink-tan soft tissue ranging in size from 0.1 to 0.2 cm in greatest dimension, which are entirely submitted in cassette A1.     B(2). Other, gastric biopsies:  The specimen is received in formalin with proper patient identification, labeled \"gastric biopsies\".  The specimen consists of 4 pieces of pink-tan soft tissue ranging in size from 0.1 to 0.2 cm in greatest dimension, which are entirely submitted in cassette B1.     C(3). Other, transverse colon polyp:  The specimen is received in formalin with proper patient identification, labeled \"transverse colon polyp\".  The specimen consists of a 0.2 cm piece of pink-tan soft tissue, which is entirely submitted in cassette C1.     D(4). Other, ascending colon polyp:  The specimen is received in formalin with proper patient identification, labeled \"ascending colon polyp\".  The specimen " consists of a 0.1 cm piece of pink-tan soft tissue, which is entirely submitted in cassette D1.       Microscopic Description       Microscopic examination has been performed.        Performing Labs       The technical component of this testing was completed at [facility]      Case Images         If you have any questions or concerns, please call the clinic at the number listed above.       Sincerely,      Stephanie Meneses MD

## 2021-11-11 NOTE — ANESTHESIA CARE TRANSFER NOTE
Patient: Patricia Perez    Procedure: Procedure(s):  COLONOSCOPY, WITH POLYPECTOMY  ESOPHAGOGASTRODUODENOSCOPY, WITH BIOPSY       Diagnosis: Colon cancer screening [Z12.11]  Diagnosis Additional Information: No value filed.    Anesthesia Type:   MAC     Note:    Oropharynx: oropharynx clear of all foreign objects and spontaneously breathing  Level of Consciousness: awake  Oxygen Supplementation: room air    Independent Airway: airway patency satisfactory and stable  Dentition: dentition unchanged  Vital Signs Stable: post-procedure vital signs reviewed and stable  Report to RN Given: handoff report given  Patient transferred to: Phase II    Handoff Report: Identifed the Patient, Identified the Reponsible Provider, Reviewed the pertinent medical history, Discussed the surgical course, Reviewed Intra-OP anesthesia mangement and issues during anesthesia, Set expectations for post-procedure period and Allowed opportunity for questions and acknowledgement of understanding      Vitals:  Vitals Value Taken Time   BP     Temp     Pulse 88 11/11/21 1125   Resp     SpO2         Electronically Signed By: ELIOT Umaña CRNA  November 11, 2021  11:30 AM

## 2021-11-11 NOTE — ADDENDUM NOTE
Addendum  created 11/11/21 1157 by Andres Seymour MD    Attestation recorded in Intraprocedure, Clinical Note Signed, Intraprocedure Attestations filed

## 2021-11-11 NOTE — ANESTHESIA POSTPROCEDURE EVALUATION
Patient: Patricia Perez    Procedure: Procedure(s):  COLONOSCOPY, WITH POLYPECTOMY  ESOPHAGOGASTRODUODENOSCOPY, WITH BIOPSY       Diagnosis:Colon cancer screening [Z12.11]  Diagnosis Additional Information: No value filed.    Anesthesia Type:  MAC    Note:  Disposition: Outpatient   Postop Pain Control: Uneventful            Sign Out: Well controlled pain   PONV:    Neuro/Psych: Uneventful            Sign Out: Acceptable/Baseline neuro status   Airway/Respiratory: Uneventful            Sign Out: AIRWAY IN SITU/Resp. Support   CV/Hemodynamics: Uneventful            Sign Out: Acceptable CV status; No obvious hypovolemia; No obvious fluid overload   Other NRE:    DID A NON-ROUTINE EVENT OCCUR?            Last vitals:  Vitals Value Taken Time   /66 11/11/21 1150   Temp 36.1  C (97  F) 11/11/21 1150   Pulse 59 11/11/21 1150   Resp 16 11/11/21 1150   SpO2 99 % 11/11/21 1150       Electronically Signed By: Andres Seymour MD  November 11, 2021  11:55 AM

## 2021-11-11 NOTE — H&P
Patricia Perez  [id number]  female  61 year old      Reason for procedure/surgery: EGD and colonoscopy for CODY    Patient Active Problem List   Diagnosis     Major depression, recurrent (H)     Dizziness and giddiness     Motion sickness     GERD (gastroesophageal reflux disease)     Idiopathic cardiomyopathy (H)     Sleep apnea     Hyperlipidemia LDL goal <100     Allergic rhinitis     Hypertension goal BP (blood pressure) < 130/80     Pelvic pain in female     Health Care Home     24 hour contact handout given     Elevated PTHrP level     H/O bariatric surgery     Hx of gastric bypass     Hypovitaminosis D     Allergic rhinitis     Attention deficit disorder of adult     Mild persistent asthma     Low back pain     Hand joint pain     Myopia, bilateral     Dumping syndrome     Benign essential hypertension     Adjustment disorder with depressed mood     Decreased hearing of both ears     BPV (benign positional vertigo), unspecified laterality     Advanced directives, counseling/discussion     Vitamin D deficiency     Coronary artery disease involving native coronary artery of native heart without angina pectoris     Acute pain of left knee     Syncope     Posterior vitreous detachment of both eyes     Combined forms of age-related cataract, mild, of both eyes     Glaucoma suspect of both eyes     Chronic left-sided low back pain with left-sided sciatica       Past Surgical History:    Past Surgical History:   Procedure Laterality Date     BARIATRIC SURGERY       DIABETES RESOURCES  As Child    Tonsillectomy     ENT SURGERY       HERNIA REPAIR       LAPAROSCOPIC BYPASS GASTRIC  10/16/2012    Procedure: LAPAROSCOPIC BYPASS GASTRIC;  laparoscopic reyna en y gastric bypass;  Surgeon: Nish Bradford MD;  Location: [facility] OR     TONSILLECTOMY       Uretural Sticture  As Child       Past Medical History:   Past Medical History:   Diagnosis Date     Anxiety      Arthritis      ASCUS of cervix with negative high risk HPV  01/23/2006     Asthma      Bursitis of shoulder 03/04/2010     Chronic rhinitis 03/25/2009     Coronary artery disease      Depression      Depression      Dyspnea on exertion      Gastro-oesophageal reflux disease      GERD (gastroesophageal reflux disease) 10/14/2008     Hypertension      Idiopathic cardiomyopathy (H) 01/08/2009     Indigestion      Itchy eyes      Itchy nose      Left leg pain 06/16/2011     Motion sickness 02/27/2008     Nasal congestion      Pneumonia      Problems related to lack of adequate sleep      Ringing in ears      Sleep apnea 03/25/2009     Sneezing        Social History:   Social History     Tobacco Use     Smoking status: Never Smoker     Smokeless tobacco: Never Used   Substance Use Topics     Alcohol use: No       Family History:   Family History   Problem Relation Age of Onset     Hypertension Father      Lipids Father      Alcohol/Drug Father         Abuse     Depression Father      Respiratory Father         COPD     Cardiovascular Father      Gastrointestinal Disease Mother         non cancerous pancreatic tumor     Hypertension Mother      Heart Disease Mother         A fib     Allergies Mother      Breast Cancer Maternal Grandmother      Glaucoma Paternal Grandmother      Allergies Sister         Poison Ivy     Glaucoma Paternal Grandfather      Depression Brother      Hypertension Brother      Allergies Brother         Enviromental/ Bees     Cerebrovascular Disease Other      Macular Degeneration Other      Diabetes No family hx of      Cancer - colorectal No family hx of      Retinal detachment No family hx of      Amblyopia No family hx of      Thyroid Disease No family hx of        Allergies:   Allergies   Allergen Reactions     Atorvastatin      Leg myalgias       Active Medications:   Current Outpatient Medications   Medication Sig Dispense Refill     albuterol (PROAIR HFA/PROVENTIL HFA/VENTOLIN HFA) 108 (90 Base) MCG/ACT inhaler Inhale 2 puffs into the lungs every 6  hours as needed for shortness of breath / dyspnea or wheezing 18 g 3     ARIPiprazole (ABILIFY) 2 MG tablet Take 2 tablets (4 mg) by mouth daily 60 tablet 3     aspirin (ASA) 81 MG EC tablet Take 1 tablet (81 mg) by mouth daily 90 tablet 3     azelastine (OPTIVAR) 0.05 % ophthalmic solution Apply 1 drop to eye 2 times daily as needed (itchy/watery eyes) 6 mL 3     budesonide-formoterol (SYMBICORT) 80-4.5 MCG/ACT Inhaler Inhale 2 puffs into the lungs 2 times daily 10.2 g 3     calcium carbonate-vitamin D (CALTRATE 600+D) 600-400 MG-UNIT CHEW Take 1 chew tab by mouth 2 times daily 180 tablet 3     carvedilol (COREG) 25 MG tablet Take 1 tablet (25 mg) by mouth 2 times daily (with meals) 180 tablet 3     cetirizine (ZYRTEC) 10 MG tablet Take 1 tablet (10 mg) by mouth daily 30 tablet 3     Cyanocobalamin (VITAMIN B-12 SL) Place under the tongue every other day        ezetimibe (ZETIA) 10 MG tablet Take 1 tablet (10 mg) by mouth daily 90 tablet 3     ferrous sulfate (FEROSUL) 325 (65 Fe) MG tablet Take 1 tablet (325 mg) by mouth 2 times daily 180 tablet 3     hydrALAZINE (APRESOLINE) 10 MG tablet Take 1 tablet (10 mg) by mouth 3 times daily 270 tablet 3     Ibuprofen-diphenhydrAMINE Cit (IBUPROFEN PM PO) Take by mouth At Bedtime       isosorbide mononitrate (IMDUR) 30 MG 24 hr tablet Take 1 tablet (30 mg) by mouth daily 90 tablet 3     lisinopril (ZESTRIL) 5 MG tablet TAKE 1 TABLET(5 MG) BY MOUTH DAILY 90 tablet 3     meclizine (ANTIVERT) 25 MG tablet Take 1 tablet (25 mg) by mouth 3 times daily as needed 30 tablet 1     montelukast (SINGULAIR) 10 MG tablet Take 1 tablet (10 mg) by mouth At Bedtime 30 tablet 0     Multiple Vitamin (MULTI-VITAMIN) per tablet Take 1 tablet by mouth daily. 100 tablet 3     ondansetron (ZOFRAN-ODT) 4 MG ODT tab Take 1 tablet (4 mg) by mouth every 8 hours as needed for nausea 12 tablet 1     PREMPRO 0.45-1.5 MG tablet TAKE 1 TABLET BY MOUTH DAILY 84 tablet 1     simvastatin (ZOCOR) 20 MG  "tablet Take 1 tablet (20 mg) by mouth At Bedtime 90 tablet 3     venlafaxine (EFFEXOR-ER) 225 MG 24 hr tablet Take 1 tablet (225 mg) by mouth daily 30 tablet 3     azelastine (ASTELIN) 0.1 % nasal spray Spray 2 sprays into both nostrils 2 times daily as needed for rhinitis 30 mL 3     fluticasone (FLONASE) 50 MCG/ACT nasal spray Spray 2 sprays into both nostrils daily 18.2 mL 3       Systemic Review:   CONSTITUTIONAL: NEGATIVE for fever, chills, change in weight  ENT/MOUTH: NEGATIVE for ear, mouth and throat problems  RESP: NEGATIVE for significant cough or SOB  CV: NEGATIVE for chest pain, palpitations or peripheral edema    Physical Examination:   Vital Signs: BP (!) 145/88   Pulse 89   Temp 97.6  F (36.4  C) (Temporal)   Resp 18   Ht 1.613 m (5' 3.5\")   Wt 86.2 kg (190 lb)   LMP 07/16/2012   SpO2 99%   BMI 33.13 kg/m    GENERAL: healthy, alert and no distress  NECK: no adenopathy, no asymmetry, masses, or scars  RESP: lungs clear to auscultation - no rales, rhonchi or wheezes  CV: regular rate and rhythm, normal S1 S2, no S3 or S4, no murmur, click or rub, no peripheral edema and peripheral pulses strong  ABDOMEN: soft, nontender, no hepatosplenomegaly, no masses and bowel sounds normal  MS: no gross musculoskeletal defects noted, no edema      Plan: Appropriate to proceed as scheduled.      Stephanie Meneses MD  11/11/2021    PCP:  Funmilayo Grubbs    "

## 2021-11-13 LAB
PATH REPORT.COMMENTS IMP SPEC: NORMAL
PATH REPORT.COMMENTS IMP SPEC: NORMAL
PATH REPORT.FINAL DX SPEC: NORMAL
PATH REPORT.GROSS SPEC: NORMAL
PATH REPORT.MICROSCOPIC SPEC OTHER STN: NORMAL
PATH REPORT.RELEVANT HX SPEC: NORMAL
PHOTO IMAGE: NORMAL

## 2021-11-26 DIAGNOSIS — N95.1 MENOPAUSAL SYNDROME (HOT FLASHES): ICD-10-CM

## 2021-11-30 RX ORDER — ESTROGEN,CON/M-PROGEST ACET 0.45-1.5MG
1 TABLET ORAL DAILY
Qty: 90 TABLET | Refills: 1 | Status: SHIPPED | OUTPATIENT
Start: 2021-11-30 | End: 2022-03-28

## 2021-11-30 NOTE — TELEPHONE ENCOUNTER
Routing refill request to provider for review/approval because:  Drug not on the [facility] refill protocol   BP Readings from Last 3 Encounters:   11/11/21 (!) 144/66   09/29/21 (!) 157/94   08/17/21 112/70

## 2021-12-08 ENCOUNTER — IMMUNIZATION (OUTPATIENT)
Dept: NURSING | Facility: CLINIC | Age: 61
End: 2021-12-08
Payer: COMMERCIAL

## 2021-12-08 PROCEDURE — 0064A COVID-19,PF,MODERNA (18+ YRS BOOSTER .25ML): CPT

## 2021-12-08 PROCEDURE — 91306 COVID-19,PF,MODERNA (18+ YRS BOOSTER .25ML): CPT

## 2022-01-13 ASSESSMENT — PATIENT HEALTH QUESTIONNAIRE - PHQ9
SUM OF ALL RESPONSES TO PHQ QUESTIONS 1-9: 4
10. IF YOU CHECKED OFF ANY PROBLEMS, HOW DIFFICULT HAVE THESE PROBLEMS MADE IT FOR YOU TO DO YOUR WORK, TAKE CARE OF THINGS AT HOME, OR GET ALONG WITH OTHER PEOPLE: NOT DIFFICULT AT ALL
SUM OF ALL RESPONSES TO PHQ QUESTIONS 1-9: 4

## 2022-01-14 ENCOUNTER — TELEPHONE (OUTPATIENT)
Dept: PSYCHIATRY | Facility: CLINIC | Age: 62
End: 2022-01-14
Payer: COMMERCIAL

## 2022-01-14 ENCOUNTER — VIRTUAL VISIT (OUTPATIENT)
Dept: PSYCHIATRY | Facility: CLINIC | Age: 62
End: 2022-01-14
Attending: PSYCHIATRY & NEUROLOGY
Payer: COMMERCIAL

## 2022-01-14 DIAGNOSIS — R41.840 ATTENTION DEFICIT: ICD-10-CM

## 2022-01-14 DIAGNOSIS — F33.1 MODERATE EPISODE OF RECURRENT MAJOR DEPRESSIVE DISORDER (H): Primary | ICD-10-CM

## 2022-01-14 DIAGNOSIS — F33.9 MAJOR DEPRESSIVE DISORDER, RECURRENT EPISODE WITH SEASONAL PATTERN (H): ICD-10-CM

## 2022-01-14 PROCEDURE — 99214 OFFICE O/P EST MOD 30 MIN: CPT | Mod: HN | Performed by: STUDENT IN AN ORGANIZED HEALTH CARE EDUCATION/TRAINING PROGRAM

## 2022-01-14 RX ORDER — ARIPIPRAZOLE 2 MG/1
4 TABLET ORAL DAILY
Qty: 60 TABLET | Refills: 3 | Status: SHIPPED | OUTPATIENT
Start: 2022-01-14 | End: 2022-08-15

## 2022-01-14 RX ORDER — VENLAFAXINE HYDROCHLORIDE 225 MG/1
225 TABLET, EXTENDED RELEASE ORAL DAILY
Qty: 30 TABLET | Refills: 3 | Status: SHIPPED | OUTPATIENT
Start: 2022-01-14 | End: 2022-08-03

## 2022-01-14 ASSESSMENT — PAIN SCALES - GENERAL: PAINLEVEL: NO PAIN (0)

## 2022-01-14 ASSESSMENT — PATIENT HEALTH QUESTIONNAIRE - PHQ9: SUM OF ALL RESPONSES TO PHQ QUESTIONS 1-9: 4

## 2022-01-14 NOTE — PROGRESS NOTES
"VIDEO VISIT  Patricia Perez is a 61 year old patient who is being evaluated via a billable video visit.      The patient has been notified of following:   \"We have found that certain health care needs can be provided without the need for an in-person physical exam. This service lets us provide the care you need with a video conversation. If a prescription is necessary we can send it directly to your pharmacy. If lab work is needed we can place an order for that and you can then stop by our lab to have the test done at a later time. Insurers are generally covering virtual visits as they would in-office visits so billing should not be different than normal.  If for some reason you do get billed incorrectly, you should contact the billing office to correct it and that number is in the AVS .    Patient has given verbal consent for video visit?: Yes   How would you like to obtain your AVS?: Tupalo  AVS SmartPhrase [PsychAVS] has been placed in 'Patient Instructions': Yes      Video- Visit Details  Type of service:  video visit for medication management  Time of service:    Date:  01/14/2022    Video Start Time:  10:18 am        Video End Time:  10:45 am    Reason for video visit:  Patient has requested telehealth visit  Originating Site (patient location):  [facility]   Location- Patient's home  Distant Site (provider location):  [facility]  Mode of Communication:  Video Conference via [employer]Well  Consent:  Patient has given verbal consent for video visit?: Yes           [facility]  Psychiatry Clinic  MEDICAL PROGRESS NOTE     CARE TEAM:  PCP- Funmilayo Grubbs, Psychotherapist- None,   Patricia Perez is a 61 year old who uses the name Patricia and pronouns she, her.      DIAGNOSIS   Major depressive disorder, recurrent, in partial remission, with seasonal component  ADHD by history     ASSESSMENT   Today: Patricia reports her mood as \"okay\" with some increased depression and " anxiety symptoms.  She reports her decline in mood has been gradual and may be related to seasonal changes.  She reports good adherence to her medications, denies side effects and reports taking Advil PM every night.  Regarding sleep she reports sleeping 6-1/2 to 7 hours per night and feels well rested upon waking.  She notes taking Advil PM for pain management which can interfere with her sleep.  She reports some increase psychosocial stressors including her to do list, frequent self-criticisms and stress regarding mice in her home for which she recently hired an .  She denies health concerns and reports her weight is stable.    Given Patricia's declining mood with possible seasonal components starting bright light therapy is indicated in a SAD will be ordered today.  Given Patricia's increased stress principally attributed to self-critical thoughts and difficulty in identifying why her mood is low a psychotherapy referral is indicated and Patricia agreed to this referral.  Additionally antipsychotic monitoring labs will be ordered today.    Future considerations: Continue to assess if a sleep study is indicated and encourage CPAP use if appropriate.    [place] was [place] today:  Indicates no controlled substance prescriptions.     PLAN                                                                                                                1) Meds-  - Continue aripiprazole 4 mg daily  - Continue venlafaxine  mg daily  - Start bright light therapy    2) Psychotherapy- Interested in psychotherapy, [facility] referral placed today    3) Next due-  Labs- AP labs due. A1C, CBC, Lipids pending. CMP ordered today  EKG- 1/7/20: 447 ms @ 54 bpm  Rating scales- PHQ9    4) Referrals-  None    5) Dispo- Return to clinic in 12 weeks      PERTINENT BACKGROUND                           [most recent eval 10/22/21]   Onset of mental health problems at age 13-14 years old when younger brother passed away. Denies memory of  "significant trauma or abuse and poor memory childhood. Did well in school and no discpline issues or legal issues during childhood. Father with alcohol use disorder.  Started mental health treatment in 1995. Niece passed away in MVA in 2020. History gastric bypass in 2018.     Psych pertinent item history includes trauma hx and eating disorder (binge eating)     SUBJECTIVE   Since the last visit:  Mood: Not doing as well. Attributes it to COVID and winter. \"okay\" not sure why it isn't better. May be related to the winter, happening gradually   Meds: No side effects. Taking Advil PM every night. Recommend regular ibuproen  Sleep: \"okay\" 6.5-7 hrs of sleep per night. Feels well rested upon waking. Wakes with dry mouth.   Stress: Some increased stress. Stressed about finding mice in the house and recently hired a . Has a long to do list things to good and more self criticism.   Health: \"Pretty good\". Denies current health problems. Weight is stable.     RECENT PSYCH ROS:   Depression: low energy,poor concentration /memory, psychomotor changes [some unsteadiness during ambulation, no falls, is being evaluated by a neurologist]   Elevated:  none  Psychosis:  none  Anxiety:  nervous/overwhelmed  Trauma Related:  intrusive memories and startle response  Sleep: yes  Other: N/A    Adverse Effects: Denies  Pertinent Negative Symptoms: No suicidal and violent ideation, psychosis, hallucinations or yuko  Recent Substance Use:     Alcohol- Minimal use, 1 drink every 1-2 months  Tobacco- None   Caffeine- 2 cups of coffee per day and 1 sodas with caffeine. Try not to drink after 5 pm.    Cannabis- None     Opioids- None     Narcan Kit- N/A  Other illicit drugs- none  PSYCH and SUBSTANCE USE Critical Summary Points since July 2021 1/19/21: Started aripiprazole  3/31/21: Increased aripiprazole to 4 mg daily  5/24/21: Discontinued sertraline  10/22/21: No changes.  1/14/22: No medication changes. [facility] referral " placed. SAD light ordered.      PAST MED TRIALS      Medication Max Dose (mg) Dates / Duration Helpful? DC Reason / Adverse Effects?   bupropion 300 3682-8598 N Initially helpful then lost efficacy   Adderall 25 1143-4408 N Lack of efficacy   sertraline 100 8811-2184 N Initially helpful then lost efficacy   venlafaxine 225 2008-current Y    aripiprazole 4 2021-current Y                                          MEDICAL HISTORY and ALLERGY     ALLERGIES: Atorvastatin    Patient Active Problem List   Diagnosis     Major depression, recurrent (H)     Dizziness and giddiness     Motion sickness     GERD (gastroesophageal reflux disease)     Idiopathic cardiomyopathy (H)     Sleep apnea     Hyperlipidemia LDL goal <100     Allergic rhinitis     Hypertension goal BP (blood pressure) < 130/80     Pelvic pain in female     Health Care Home     24 hour contact handout given     Elevated PTHrP level     H/O bariatric surgery     Hx of gastric bypass     Hypovitaminosis D     Allergic rhinitis     Attention deficit disorder of adult     Mild persistent asthma     Low back pain     Hand joint pain     Myopia, bilateral     Dumping syndrome     Benign essential hypertension     Adjustment disorder with depressed mood     Decreased hearing of both ears     BPV (benign positional vertigo), unspecified laterality     Advanced directives, counseling/discussion     Vitamin D deficiency     Coronary artery disease involving native coronary artery of native heart without angina pectoris     Acute pain of left knee     Syncope     Posterior vitreous detachment of both eyes     Combined forms of age-related cataract, mild, of both eyes     Glaucoma suspect of both eyes     Chronic left-sided low back pain with left-sided sciatica        MEDICAL REVIEW OF SYSTEMS   Contraception- not needed  A comprehensive review of systems was performed and is negative other than noted in the HPI.     MEDICATIONS     Current Outpatient Medications    Medication Sig Dispense Refill     albuterol (PROAIR HFA/PROVENTIL HFA/VENTOLIN HFA) 108 (90 Base) MCG/ACT inhaler Inhale 2 puffs into the lungs every 6 hours as needed for shortness of breath / dyspnea or wheezing 18 g 3     ARIPiprazole (ABILIFY) 2 MG tablet Take 2 tablets (4 mg) by mouth daily 60 tablet 3     aspirin (ASA) 81 MG EC tablet Take 1 tablet (81 mg) by mouth daily 90 tablet 3     azelastine (ASTELIN) 0.1 % nasal spray Spray 2 sprays into both nostrils 2 times daily as needed for rhinitis 30 mL 3     azelastine (OPTIVAR) 0.05 % ophthalmic solution Apply 1 drop to eye 2 times daily as needed (itchy/watery eyes) 6 mL 3     budesonide-formoterol (SYMBICORT) 80-4.5 MCG/ACT Inhaler Inhale 2 puffs into the lungs 2 times daily 10.2 g 3     calcium carbonate-vitamin D (CALTRATE 600+D) 600-400 MG-UNIT CHEW Take 1 chew tab by mouth 2 times daily 180 tablet 3     carvedilol (COREG) 25 MG tablet Take 1 tablet (25 mg) by mouth 2 times daily (with meals) 180 tablet 3     cetirizine (ZYRTEC) 10 MG tablet Take 1 tablet (10 mg) by mouth daily 30 tablet 3     Cyanocobalamin (VITAMIN B-12 SL) Place under the tongue every other day        estrogen conj-medroxyPROGESTERone (PREMPRO) 0.45-1.5 MG tablet Take 1 tablet by mouth daily 90 tablet 1     ezetimibe (ZETIA) 10 MG tablet Take 1 tablet (10 mg) by mouth daily 90 tablet 3     ferrous sulfate (FEROSUL) 325 (65 Fe) MG tablet Take 1 tablet (325 mg) by mouth 2 times daily 180 tablet 3     fluticasone (FLONASE) 50 MCG/ACT nasal spray Spray 2 sprays into both nostrils daily 18.2 mL 3     hydrALAZINE (APRESOLINE) 10 MG tablet Take 1 tablet (10 mg) by mouth 3 times daily 270 tablet 3     Ibuprofen-diphenhydrAMINE Cit (IBUPROFEN PM PO) Take by mouth At Bedtime       isosorbide mononitrate (IMDUR) 30 MG 24 hr tablet Take 1 tablet (30 mg) by mouth daily 90 tablet 3     lisinopril (ZESTRIL) 5 MG tablet TAKE 1 TABLET(5 MG) BY MOUTH DAILY 90 tablet 3     meclizine (ANTIVERT) 25 MG  "tablet Take 1 tablet (25 mg) by mouth 3 times daily as needed 30 tablet 1     montelukast (SINGULAIR) 10 MG tablet Take 1 tablet (10 mg) by mouth At Bedtime 30 tablet 0     Multiple Vitamin (MULTI-VITAMIN) per tablet Take 1 tablet by mouth daily. 100 tablet 3     ondansetron (ZOFRAN-ODT) 4 MG ODT tab Take 1 tablet (4 mg) by mouth every 8 hours as needed for nausea 12 tablet 1     simvastatin (ZOCOR) 20 MG tablet Take 1 tablet (20 mg) by mouth At Bedtime 90 tablet 3     venlafaxine (EFFEXOR-ER) 225 MG 24 hr tablet Take 1 tablet (225 mg) by mouth daily 30 tablet 3      VITALS   LMP 07/16/2012     MENTAL STATUS EXAM   Alertness: alert   Appearance: adequately groomed  Behavior/Demeanor: cooperative, with good  eye contact   Speech: normal and regular rate and rhythm  Language: intact and no problems  Psychomotor: normal or unremarkable  Mood: \"okay\"  Affect: full range; congruent to: mood- yes, content- yes  Thought Process/Associations: unremarkable  Thought Content:  Reports none;  Denies suicidal & violent ideation and delusions  Perception:  Reports none;  Denies hallucinations  Insight: good  Judgment: good  Cognition: does  appear grossly intact; formal cognitive testing was not done  Gait and Station: N/A (telehealth)     LABS and DATA     PHQ9 TODAY = Not assessed  PHQ 9/3/2020 6/30/2021 1/13/2022   PHQ-9 Total Score 15 4 4   Q9: Thoughts of better off dead/self-harm past 2 weeks Not at all Not at all Not at all       Recent Labs   Lab Test 06/30/21  0812 12/08/20  0700   CR 0.77 0.77   GFRESTIMATED 82 83       Recent Labs   Lab Test 06/30/21  0812 12/08/20  0700 06/15/16  0948 06/29/15  0940 06/24/14  0945 11/20/13  1240   GLC 91 82   < > 90   < > 82   A1C  --   --   --  5.4  --  4.7    < > = values in this interval not displayed.     Recent Labs   Lab Test 06/30/21  0812 12/08/20  0700   CHOL 205* 274*   TRIG 80 72   LDL 64 147*    113     Recent Labs   Lab Test 01/07/20  0740 10/29/19  0804   AST 12 " 14   ALT 24 23   ALKPHOS 83 94     Recent Labs   Lab Test 08/26/21  1529 12/08/20  0700 01/08/20  0606 01/07/20  0740 10/29/19  0804   WBC 6.0 4.3   < > 6.0 4.9   ANEU  --   --   --  4.3 2.5   HGB 10.5* 10.5*   < > 10.1* 11.8    273   < > 248 245    < > = values in this interval not displayed.       ECG on 1/7/20 QTc = 447 ms @ 54 bpm. Sinus bradycardia     PSYCHOTROPIC DRUG INTERACTIONS     ARIPIPRAZOLE -- CETIRIZINE HYDROCHLORIDE may result in increased risk of CNS depression.  ARIPIPRAZOLE -- ONDANSETRON may result in increased risk of QT interval prolongation.  ARIPIPRAZOLE -- BUDESONIDE/ FORMOTEROL FUMARATE may result in increased risk of QT interval prolongation.  ARIPIPRAZOLE -- VENLAFAXINE HYDROCHLORIDE may result in increased risk of QT interval prolongation.  ASPIRIN -- VENLAFAXINE HYDROCHLORIDE may result in an increased risk of bleeding. may result in increased risk of QT interval prolongation.  DIPHENHYDRAMINE CITRATE/ IBUPROFEN -- VENLAFAXINE HYDROCHLORIDE may result in an increased risk of bleeding.  ONDANSETRON -- VENLAFAXINE HYDROCHLORIDE may result in an increased risk of serotonin syndrome and QT interval prolongation.  AMPHETAMINE ASPARTATE/ AMPHETAMINE SULFATE/ DEXTROAMPHETAMINE SACCHARATE/ DEXTROAMPHETAMINE SULFATE -- CALCIUM CARBONATE/ VITAMIN D may result in increased exposure to amphetamine.  AMPHETAMINE ASPARTATE/ AMPHETAMINE SULFATE/ DEXTROAMPHETAMINE SACCHARATE/ DEXTROAMPHETAMINE SULFATE -- ONDANSETRON may result in increased risk of serotonin syndrome.  AMPHETAMINE ASPARTATE/ AMPHETAMINE SULFATE/ DEXTROAMPHETAMINE SACCHARATE/ DEXTROAMPHETAMINE SULFATE -- VENLAFAXINE HYDROCHLORIDE may result in increased risk of serotonin syndrome.    MANAGEMENT:  Monitoring for adverse effects, routine vitals, routine labs and periodic EKGs     RISK STATEMENT for SAFETY     Patricia BAER Perez did not appear to be an imminent safety risk to self or others.    TREATMENT RISK STATEMENT: The risks,  benefits, alternatives and potential adverse effects have been discussed and are understood by the pt. The pt understands the risks of using street drugs or alcohol. There are no medical contraindications, the pt agrees to treatment with the ability to do so. The pt knows to call the clinic for any problems or to access emergency care if needed.  Medical and substance use concerns are documented above.  Psychotropic drug interaction check was done, including changes made today.     PROVIDER: Gregorio Carcamo MD    Patient not staffed in clinic.  Note will be reviewed and signed by supervisor Dr. Ruiz.      Level of Medical Decision Making:   - At least 2 stable chronic problems  - Engaged in prescription drug management during visit (discussed any medication benefits, side effects, alternatives, etc.)

## 2022-01-14 NOTE — PROGRESS NOTES
"VIDEO VISIT  Patricia Perez is a 61 year old patient that has consented to receive services via billable video visit.      The patient has been notified of following:   \"This video visit will be conducted via a call between you and your physician/provider. We have found that certain health care needs can be provided without the need for an in-person physical exam. This service lets us provide the care you need with a video conversation. If a prescription is necessary we can send it directly to your pharmacy. If lab work is needed we can place an order for that and you can then stop by our lab to have the test done at a later time. Insurers are generally covering virtual visits as they would in-office visits so billing should not be different than normal.  If for some reason you do get billed incorrectly, you should contact the billing office to correct it and that number is in the AVS .    Patient will join video visit via:  [employer] (Patient / guardian confirmed to join via [employer])    If patient attempts to join the video via [employer] at appointment start time, but is unable to, they would prefer that the provider send them a video invitation via:   Send to preferred e-mail: [email or web address]      How would patient like to obtain AVS?:  MyChart    "

## 2022-01-14 NOTE — TELEPHONE ENCOUNTER
On January 14, 2022, at 9:44 AM, writer called patient at home to confirm Virtual Visit. Writer unable to make contact with patient. Writer left detailed voice message for call back, with [phone] left as callback number. Link for [employer] was provided via: Send to preferred e-mail: [email or web address]. Susie Linares EMT

## 2022-01-14 NOTE — PATIENT INSTRUCTIONS
**For crisis resources, please see the information at the end of this document**   Patient Education    Thank you for coming to the [facility] [place] [facility].    Lab Testing:  If you had lab testing today and your results are reassuring or normal they will be mailed to you or sent through [employer] within 7 days. If the lab tests need quick action we will call you with the results. The phone number we will call with results is # [phone] (home) [phone] (work). If this is not the best number please call our clinic and change the number.    Medication Refills:  If you need any refills please call your pharmacy and they will contact us. Our fax number for refills is [phone]. Please allow three business for refill processing. If you need to  your refill at a new pharmacy, please contact the new pharmacy directly. The new pharmacy will help you get your medications transferred.     Scheduling:  If you have any concerns about today's visit or wish to schedule another appointment please call our office during normal business hours [phone] (8-5:00 M-F)    Contact Us:  Please call [phone] during business hours (8-5:00 M-F).  If after clinic hours, or on the weekend, please call  [phone].    Financial Assistance [phone]  [facility] Billing [phone]  Central Billing Office, [facility]: [phone]  [facility] Billing [phone]  Medical Records [phone]  [facility] Patient Bill of Rights [email or web address]       MENTAL HEALTH CRISIS NUMBERS:  For a medical emergency please call  911 or go to the nearest ER.     [facility]:   [facility] -[phone]   Crisis Residence [facility] -[phone]   Walk-In Counseling Center [facility] -[phone]   COPE 24/7 [place] Mobile Team -[phone] (adults)/[phone] (child)  CHILD: [facility] needs  assessment team - [phone]      [facility]:   [facility] - [phone]   Walk-in counseling [facility] - [phone]   Walk-in counseling [facility] - [phone]   Crisis Residence [facility] - [phone]  Urgent Care Adult Mental [facility]-[phone] mobile unit/ 24/7 crisis line    National Crisis Numbers:   National Suicide Prevention Lifeline: 4-371-999-TALK (232-471-7026)  Poison Control Center - 0-371-349-0031  easy2map/resources for a list of additional resources (SOS)  Trans Lifeline a hotline for transgender people 5-447-327-9307  [employer] a hotline for LGBT youth 1-640-528-8736  Crisis Text Line: For any crisis 24/7   To: 198759  see www.crisistextline.org  - IF MAKING A CALL FEELS TOO HARD, send a text!         Again thank you for choosing [facility] and please let us know how we can best partner with you to improve you and your family's health.    You may be receiving a survey regarding this appointment. We would love to have your feedback, both positive and negative. The survey is done by an external company, so your answers are anonymous.   Treatment Plan Today:     1) Medications-no medication changes.  Light box for bright light therapy ordered and psychotherapy referral placed.    2) Follow-up appt with Dr Carcamo in 12 weeks.  Obtaining lab draws before next visit is recommended.    3) Crisis numbers are below and clinic after hours number is [phone]

## 2022-01-28 ENCOUNTER — LAB (OUTPATIENT)
Dept: LAB | Facility: CLINIC | Age: 62
End: 2022-01-28
Payer: COMMERCIAL

## 2022-01-28 DIAGNOSIS — D50.9 IRON DEFICIENCY ANEMIA, UNSPECIFIED IRON DEFICIENCY ANEMIA TYPE: ICD-10-CM

## 2022-01-28 DIAGNOSIS — F33.1 MODERATE EPISODE OF RECURRENT MAJOR DEPRESSIVE DISORDER (H): ICD-10-CM

## 2022-01-28 LAB
ERYTHROCYTE [DISTWIDTH] IN BLOOD BY AUTOMATED COUNT: 14.2 % (ref 10–15)
HBA1C MFR BLD: 5.6 % (ref 0–5.6)
HCT VFR BLD AUTO: 40 % (ref 35–47)
HGB BLD-MCNC: 13.1 G/DL (ref 11.7–15.7)
MCH RBC QN AUTO: 31.6 PG (ref 26.5–33)
MCHC RBC AUTO-ENTMCNC: 32.8 G/DL (ref 31.5–36.5)
MCV RBC AUTO: 96 FL (ref 78–100)
PLATELET # BLD AUTO: 249 10E3/UL (ref 150–450)
RBC # BLD AUTO: 4.15 10E6/UL (ref 3.8–5.2)
WBC # BLD AUTO: 5.5 10E3/UL (ref 4–11)

## 2022-01-28 PROCEDURE — 80053 COMPREHEN METABOLIC PANEL: CPT

## 2022-01-28 PROCEDURE — 83036 HEMOGLOBIN GLYCOSYLATED A1C: CPT

## 2022-01-28 PROCEDURE — 36415 COLL VENOUS BLD VENIPUNCTURE: CPT

## 2022-01-28 PROCEDURE — 82728 ASSAY OF FERRITIN: CPT

## 2022-01-28 PROCEDURE — 85027 COMPLETE CBC AUTOMATED: CPT

## 2022-01-29 LAB
ALBUMIN SERPL-MCNC: 3.6 G/DL (ref 3.4–5)
ALP SERPL-CCNC: 78 U/L (ref 40–150)
ALT SERPL W P-5'-P-CCNC: 36 U/L (ref 0–50)
ANION GAP SERPL CALCULATED.3IONS-SCNC: 4 MMOL/L (ref 3–14)
AST SERPL W P-5'-P-CCNC: 12 U/L (ref 0–45)
BILIRUB SERPL-MCNC: 0.4 MG/DL (ref 0.2–1.3)
BUN SERPL-MCNC: 19 MG/DL (ref 7–30)
CALCIUM SERPL-MCNC: 8.8 MG/DL (ref 8.5–10.1)
CHLORIDE BLD-SCNC: 111 MMOL/L (ref 94–109)
CO2 SERPL-SCNC: 25 MMOL/L (ref 20–32)
CREAT SERPL-MCNC: 0.84 MG/DL (ref 0.52–1.04)
FERRITIN SERPL-MCNC: 38 NG/ML (ref 8–252)
GFR SERPL CREATININE-BSD FRML MDRD: 78 ML/MIN/1.73M2
GLUCOSE BLD-MCNC: 91 MG/DL (ref 70–99)
POTASSIUM BLD-SCNC: 4 MMOL/L (ref 3.4–5.3)
PROT SERPL-MCNC: 6.5 G/DL (ref 6.8–8.8)
SODIUM SERPL-SCNC: 140 MMOL/L (ref 133–144)

## 2022-02-02 ENCOUNTER — OFFICE VISIT (OUTPATIENT)
Dept: NEUROLOGY | Facility: CLINIC | Age: 62
End: 2022-02-02
Payer: COMMERCIAL

## 2022-02-02 VITALS
HEIGHT: 63 IN | WEIGHT: 194 LBS | SYSTOLIC BLOOD PRESSURE: 147 MMHG | DIASTOLIC BLOOD PRESSURE: 85 MMHG | BODY MASS INDEX: 34.38 KG/M2 | HEART RATE: 79 BPM

## 2022-02-02 DIAGNOSIS — R41.3 MEMORY CHANGE: ICD-10-CM

## 2022-02-02 DIAGNOSIS — R26.89 BALANCE PROBLEM: Primary | ICD-10-CM

## 2022-02-02 PROCEDURE — 99213 OFFICE O/P EST LOW 20 MIN: CPT | Performed by: STUDENT IN AN ORGANIZED HEALTH CARE EDUCATION/TRAINING PROGRAM

## 2022-02-02 ASSESSMENT — MIFFLIN-ST. JEOR: SCORE: 1404.11

## 2022-02-02 ASSESSMENT — PAIN SCALES - GENERAL: PAINLEVEL: NO PAIN (0)

## 2022-02-02 NOTE — NURSING NOTE
"Patricia Perez's goals for this visit include: return  She requests these members of her care team be copied on today's visit information:     PCP: Funmilayo Grubbs    Referring Provider:  No referring provider defined for this encounter.    BP (!) 147/85   Pulse 79   Ht 1.6 m (5' 3\")   Wt 88 kg (194 lb)   LMP 07/16/2012   BMI 34.37 kg/m      Do you need any medication refills at today's visit? n  "

## 2022-02-02 NOTE — PROGRESS NOTES
[facility]/Busy  Section of General Neurology  Return Patient Visit    Patricia Perez MRN# [id number]   Age: 61 year old YOB: 1960     Brief history of symptoms: The patient was initially seen in neurologic consultation on 9/29/21 for evaluation of balance issues. Please see the comprehensive neurologic consultation note from that date in the Epic records for details.     From initial visit:  Patricia Perez is a 61 year old female who presents today for evaluation of balance problems.  She has a PMH as above notable for depression (follows with psychiatry), dizziness, FABIANA, CAD, idiopathic cardiomyopathy, H/O gastric bypass ~2013, HTN among other history.  She notes balance problems as described above.  These are non specific and appear to be improving with yoga.  Her mental changes seem to best be explained by non restorative sleep.  Her FABIANA was cured post bypass/with weight loss but she has gained some weight back.  I am overall reassured by her exam that I think she can improve her cognition with better sleep and balance with continued yoga/other exercises but I would like to continue to follow up with her. Could consider PT for balance issues, further working up cognitive complaints that seem subtle to me currently depending on her progression.  She will try weight loss for now but discussed sleep referral for repeat sleep study pending her progress in this regard.   Follow up in 5 months, to mychart or call with questions in the interim.      Interval history:   Current medications for mood from last psych visit:  - Continue aripiprazole 4 mg daily  - Continue venlafaxine  mg daily  She continues her work in [employer].  She thinks she is doing well without major changes since our last visit. We discussed that I do not see or hear evidence of a neurodegenerative disorder currently to history or exam, which she was glad to hear.        Past Medical History:      Patient Active Problem List   Diagnosis     Major depression, recurrent (H)     Dizziness and giddiness     Motion sickness     GERD (gastroesophageal reflux disease)     Idiopathic cardiomyopathy (H)     Sleep apnea     Hyperlipidemia LDL goal <100     Allergic rhinitis     Hypertension goal BP (blood pressure) < 130/80     Pelvic pain in female     Health Care Home     24 hour contact handout given     Elevated PTHrP level     H/O bariatric surgery     Hx of gastric bypass     Hypovitaminosis D     Allergic rhinitis     Attention deficit disorder of adult     Mild persistent asthma     Low back pain     Hand joint pain     Myopia, bilateral     Dumping syndrome     Benign essential hypertension     Adjustment disorder with depressed mood     Decreased hearing of both ears     BPV (benign positional vertigo), unspecified laterality     Advanced directives, counseling/discussion     Vitamin D deficiency     Coronary artery disease involving native coronary artery of native heart without angina pectoris     Acute pain of left knee     Syncope     Posterior vitreous detachment of both eyes     Combined forms of age-related cataract, mild, of both eyes     Glaucoma suspect of both eyes     Chronic left-sided low back pain with left-sided sciatica     Past Medical History:   Diagnosis Date     Anxiety      Arthritis      ASCUS of cervix with negative high risk HPV 01/23/2006     Asthma      Bursitis of shoulder 03/04/2010     Chronic rhinitis 03/25/2009     Coronary artery disease      Depression      Depression      Dyspnea on exertion      Gastro-oesophageal reflux disease      GERD (gastroesophageal reflux disease) 10/14/2008     Hypertension      Idiopathic cardiomyopathy (H) 01/08/2009     Indigestion      Itchy eyes      Itchy nose      Left leg pain 06/16/2011     Motion sickness 02/27/2008     Nasal congestion      Pneumonia      Problems related to lack of adequate sleep      Ringing in ears      Sleep  apnea 03/25/2009     Sneezing         Past Surgical History:     Past Surgical History:   Procedure Laterality Date     BARIATRIC SURGERY       COLONOSCOPY N/A 11/11/2021    Procedure: COLONOSCOPY, WITH POLYPECTOMY;  Surgeon: Stephanie Meneses MD;  [place] [facility] [place]     DIABETES RESOURCES  As Child    Tonsillectomy     ENT SURGERY       ESOPHAGOSCOPY, GASTROSCOPY, DUODENOSCOPY (EGD), COMBINED N/A 11/11/2021    Procedure: ESOPHAGOGASTRODUODENOSCOPY, WITH BIOPSY;  Surgeon: Stephanie Meneses MD;  [place] [facility] [place]     HERNIA REPAIR       LAPAROSCOPIC BYPASS GASTRIC  10/16/2012    Procedure: LAPAROSCOPIC BYPASS GASTRIC;  laparoscopic reyna en y gastric bypass;  Surgeon: Nish Bradford MD;  Location: [facility] OR     TONSILLECTOMY       Uretural Sticture  As Child        Social History:     Social History     Tobacco Use     Smoking status: Never Smoker     Smokeless tobacco: Never Used   Substance Use Topics     Alcohol use: No     Drug use: No        Family History:     Family History   Problem Relation Age of Onset     Hypertension Father      Lipids Father      Alcohol/Drug Father         Abuse     Depression Father      Respiratory Father         COPD     Cardiovascular Father      Gastrointestinal Disease Mother         non cancerous pancreatic tumor     Hypertension Mother      Heart Disease Mother         A fib     Allergies Mother      Breast Cancer Maternal Grandmother      Glaucoma Paternal Grandmother      Allergies Sister         Poison Ivy     Glaucoma Paternal Grandfather      Depression Brother      Hypertension Brother      Allergies Brother         Enviromental/ Bees     Cerebrovascular Disease Other      Macular Degeneration Other      Diabetes No family hx of      Cancer - colorectal No family hx of      Retinal detachment No family hx of      Amblyopia No family hx of      Thyroid Disease No family hx of         Medications:     Current Outpatient Medications   Medication Sig     albuterol (PROAIR  HFA/PROVENTIL HFA/VENTOLIN HFA) 108 (90 Base) MCG/ACT inhaler Inhale 2 puffs into the lungs every 6 hours as needed for shortness of breath / dyspnea or wheezing     ARIPiprazole (ABILIFY) 2 MG tablet Take 2 tablets (4 mg) by mouth daily     aspirin (ASA) 81 MG EC tablet Take 1 tablet (81 mg) by mouth daily     azelastine (ASTELIN) 0.1 % nasal spray Spray 2 sprays into both nostrils 2 times daily as needed for rhinitis     azelastine (OPTIVAR) 0.05 % ophthalmic solution Apply 1 drop to eye 2 times daily as needed (itchy/watery eyes)     budesonide-formoterol (SYMBICORT) 80-4.5 MCG/ACT Inhaler Inhale 2 puffs into the lungs 2 times daily     calcium carbonate-vitamin D (CALTRATE 600+D) 600-400 MG-UNIT CHEW Take 1 chew tab by mouth 2 times daily     carvedilol (COREG) 25 MG tablet Take 1 tablet (25 mg) by mouth 2 times daily (with meals)     cetirizine (ZYRTEC) 10 MG tablet Take 1 tablet (10 mg) by mouth daily     Cyanocobalamin (VITAMIN B-12 SL) Place under the tongue every other day      estrogen conj-medroxyPROGESTERone (PREMPRO) 0.45-1.5 MG tablet Take 1 tablet by mouth daily     ezetimibe (ZETIA) 10 MG tablet Take 1 tablet (10 mg) by mouth daily     ferrous sulfate (FEROSUL) 325 (65 Fe) MG tablet Take 1 tablet (325 mg) by mouth 2 times daily     fluticasone (FLONASE) 50 MCG/ACT nasal spray Spray 2 sprays into both nostrils daily     hydrALAZINE (APRESOLINE) 10 MG tablet Take 1 tablet (10 mg) by mouth 3 times daily     Ibuprofen-diphenhydrAMINE Cit (IBUPROFEN PM PO) Take by mouth At Bedtime     isosorbide mononitrate (IMDUR) 30 MG 24 hr tablet Take 1 tablet (30 mg) by mouth daily     lisinopril (ZESTRIL) 5 MG tablet TAKE 1 TABLET(5 MG) BY MOUTH DAILY     meclizine (ANTIVERT) 25 MG tablet Take 1 tablet (25 mg) by mouth 3 times daily as needed     montelukast (SINGULAIR) 10 MG tablet Take 1 tablet (10 mg) by mouth At Bedtime     Multiple Vitamin (MULTI-VITAMIN) per tablet Take 1 tablet by mouth daily.     ondansetron  "(ZOFRAN-ODT) 4 MG ODT tab Take 1 tablet (4 mg) by mouth every 8 hours as needed for nausea     simvastatin (ZOCOR) 20 MG tablet Take 1 tablet (20 mg) by mouth At Bedtime     venlafaxine (EFFEXOR-ER) 225 MG 24 hr tablet Take 1 tablet (225 mg) by mouth daily     No current facility-administered medications for this visit.        Allergies:     Allergies   Allergen Reactions     Atorvastatin      Leg myalgias          Physical Exam:   Vitals: BP (!) 147/85   Pulse 79   Ht 1.6 m (5' 3\")   Wt 88 kg (194 lb)   LMP 07/16/2012   BMI 34.37 kg/m        Neuro:   General Appearance: No apparent distress, well-nourished, well-groomed, pleasant      Mental Status: Alert and oriented to person, place, and time. Speech fluent and comprehension intact. No dysarthria. Very appropriate conversationally.       Cranial Nerves:   II: Visual fields: normal  III: Pupils: 3 mm, equal, round, reactive to light   III,IV,VI: Extraocular Movements: intact   V: Facial sensation: intact to light touch  VII: Facial strength: intact without asymmetry  VIII: Hearing: intact grossly  IX: Palate: intact   XI: Shoulder shrug: intact  XII: Tongue movement: normal     Motor Exam:   5/5 diffusely      Sensory: intact to light touch, vibration  throughout      Coordination: no dysmetria with finger-to-nose bilaterally     Reflexes: biceps, triceps, brachioradialis, patellar, and ankle jerks 1+ and symmetric. Toes are downgoing bilaterally     Gait: normal casual gait, normal stride length, tandem gait a tad wobbly at times.            Data: Pertinent prior to visit   Last b12 1859  Lab Results   Component Value Date    TSH 1.33 08/26/2021    TSH 1.42 12/08/2020     Lab Results   Component Value Date    WBC 5.5 01/28/2022    WBC 4.3 12/08/2020     Lab Results   Component Value Date    RBC 4.15 01/28/2022    RBC 3.76 12/08/2020     Lab Results   Component Value Date    HGB 13.1 01/28/2022    HGB 10.5 12/08/2020     Lab Results   Component Value Date    " HCT 40.0 01/28/2022    HCT 33.7 12/08/2020     No components found for: MCT  Lab Results   Component Value Date    MCV 96 01/28/2022    MCV 90 12/08/2020     Lab Results   Component Value Date    MCH 31.6 01/28/2022    MCH 27.9 12/08/2020     Lab Results   Component Value Date    MCHC 32.8 01/28/2022    MCHC 31.2 12/08/2020     Lab Results   Component Value Date    RDW 14.2 01/28/2022    RDW 15.0 12/08/2020     Lab Results   Component Value Date     01/28/2022     12/08/2020     Last Comprehensive Metabolic Panel:  Sodium   Date Value Ref Range Status   01/28/2022 140 133 - 144 mmol/L Final   06/30/2021 138 133 - 144 mmol/L Final     Potassium   Date Value Ref Range Status   01/28/2022 4.0 3.4 - 5.3 mmol/L Final   06/30/2021 4.2 3.4 - 5.3 mmol/L Final     Chloride   Date Value Ref Range Status   01/28/2022 111 (H) 94 - 109 mmol/L Final   06/30/2021 109 94 - 109 mmol/L Final     Carbon Dioxide   Date Value Ref Range Status   06/30/2021 25 20 - 32 mmol/L Final     Carbon Dioxide (CO2)   Date Value Ref Range Status   01/28/2022 25 20 - 32 mmol/L Final     Anion Gap   Date Value Ref Range Status   01/28/2022 4 3 - 14 mmol/L Final   06/30/2021 4 3 - 14 mmol/L Final     Glucose   Date Value Ref Range Status   01/28/2022 91 70 - 99 mg/dL Final   06/30/2021 91 70 - 99 mg/dL Final     Comment:     Fasting specimen     Urea Nitrogen   Date Value Ref Range Status   01/28/2022 19 7 - 30 mg/dL Final   06/30/2021 17 7 - 30 mg/dL Final     Creatinine   Date Value Ref Range Status   01/28/2022 0.84 0.52 - 1.04 mg/dL Final   06/30/2021 0.77 0.52 - 1.04 mg/dL Final     GFR Estimate   Date Value Ref Range Status   01/28/2022 78 >60 mL/min/1.73m2 Final     Comment:     Effective December 21, 2021 eGFRcr in adults is calculated using the 2021 CKD-EPI creatinine equation which includes age and gender (Kanchan et al., NEJM, DOI: 10.1056/OTQFis3766330)   06/30/2021 82 >60 mL/min/[1.73_m2] Final     Comment:     Non   American GFR Calc  Starting 12/18/2018, serum creatinine based estimated GFR (eGFR) will be   calculated using the Chronic Kidney Disease Epidemiology Collaboration   (CKD-EPI) equation.       Calcium   Date Value Ref Range Status   01/28/2022 8.8 8.5 - 10.1 mg/dL Final   06/30/2021 8.7 8.5 - 10.1 mg/dL Final     Bilirubin Total   Date Value Ref Range Status   01/28/2022 0.4 0.2 - 1.3 mg/dL Final   01/07/2020 0.3 0.2 - 1.3 mg/dL Final     Alkaline Phosphatase   Date Value Ref Range Status   01/28/2022 78 40 - 150 U/L Final   01/07/2020 83 40 - 150 U/L Final     ALT   Date Value Ref Range Status   01/28/2022 36 0 - 50 U/L Final   01/07/2020 24 0 - 50 U/L Final     AST   Date Value Ref Range Status   01/28/2022 12 0 - 45 U/L Final   01/07/2020 12 0 - 45 U/L Final                        Assessment and Plan:   Patricia Perez is a pleasant 61 year old female who presents today in follow up for evaluation for balance problems, memory changes.  She has a PMH as above notable for depression (follows with psychiatry), dizziness, FABIANA, CAD, idiopathic cardiomyopathy, H/O gastric bypass ~2013, HTN among other history.  These issues remain non specific and appear to be improved.  She is still able to perform a cognitively challenging job well. Discussed it would be a good idea to continue yoga/other exercises that have been helpful for balance for her in the past. Discussed that I see no evidence of a neurodegenerative condition at this time, good news.  I am glad that she has been doing well since our last visit.  She can follow up with neurology on an as needed basis.             Armando Vazquez MD   of Neurology   [facility]

## 2022-02-02 NOTE — LETTER
2/2/2022         RE: Patricia Perez  [address] [address]        Dear Colleague,    Thank you for referring your patient, Patricia Perez, to the [facility] [place]. Please see a copy of my visit note below.    [facility]  Section of General Neurology  Return Patient Visit    Patricia Perez MRN# [id number]   Age: 61 year old YOB: 1960     Brief history of symptoms: The patient was initially seen in neurologic consultation on 9/29/21 for evaluation of balance issues. Please see the comprehensive neurologic consultation note from that date in the Epic records for details.     From initial visit:  Patricia Perez is a 61 year old female who presents today for evaluation of balance problems.  She has a PMH as above notable for depression (follows with psychiatry), dizziness, FABIANA, CAD, idiopathic cardiomyopathy, H/O gastric bypass ~2013, HTN among other history.  She notes balance problems as described above.  These are non specific and appear to be improving with yoga.  Her mental changes seem to best be explained by non restorative sleep.  Her FABIANA was cured post bypass/with weight loss but she has gained some weight back.  I am overall reassured by her exam that I think she can improve her cognition with better sleep and balance with continued yoga/other exercises but I would like to continue to follow up with her. Could consider PT for balance issues, further working up cognitive complaints that seem subtle to me currently depending on her progression.  She will try weight loss for now but discussed sleep referral for repeat sleep study pending her progress in this regard.   Follow up in 5 months, to johniet or call with questions in the interim.      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  She thinks she is doing well without major changes since our last visit. We discussed that I do not see or hear evidence of a neurodegenerative disorder currently to history or exam, which she was glad to hear.        Past Medical History:     Patient Active Problem List   Diagnosis     Major depression, recurrent (H)     Dizziness and giddiness     Motion sickness     GERD (gastroesophageal reflux disease)     Idiopathic cardiomyopathy (H)     Sleep apnea     Hyperlipidemia LDL goal <100     Allergic rhinitis     Hypertension goal BP (blood pressure) < 130/80     Pelvic pain in female     Health Care Home     24 hour contact handout given     Elevated PTHrP level     H/O bariatric surgery     Hx of gastric bypass     Hypovitaminosis D     Allergic rhinitis     Attention deficit disorder of adult     Mild persistent asthma     Low back pain     Hand joint pain     Myopia, bilateral     Dumping syndrome     Benign essential hypertension     Adjustment disorder with depressed mood     Decreased hearing of both ears     BPV (benign positional vertigo), unspecified laterality     Advanced directives, counseling/discussion     Vitamin D deficiency     Coronary artery disease involving native coronary artery of native heart without angina pectoris     Acute pain of left knee     Syncope     Posterior vitreous detachment of both eyes     Combined forms of age-related cataract, mild, of both eyes     Glaucoma suspect of both eyes     Chronic left-sided low back pain with left-sided sciatica     Past Medical History:   Diagnosis Date     Anxiety      Arthritis      ASCUS of cervix with negative high risk HPV 01/23/2006     Asthma      Bursitis of shoulder 03/04/2010     Chronic rhinitis 03/25/2009     Coronary artery disease      Depression      Depression      Dyspnea on exertion      Gastro-oesophageal reflux disease      GERD (gastroesophageal reflux disease) 10/14/2008     Hypertension      Idiopathic  cardiomyopathy (H) 01/08/2009     Indigestion      Itchy eyes      Itchy nose      Left leg pain 06/16/2011     Motion sickness 02/27/2008     Nasal congestion      Pneumonia      Problems related to lack of adequate sleep      Ringing in ears      Sleep apnea 03/25/2009     Sneezing         Past Surgical History:     Past Surgical History:   Procedure Laterality Date     BARIATRIC SURGERY       COLONOSCOPY N/A 11/11/2021    Procedure: COLONOSCOPY, WITH POLYPECTOMY;  Surgeon: Stephanie Meneses MD;  [facility]     DIABETES RESOURCES  As Child    Tonsillectomy     ENT SURGERY       ESOPHAGOSCOPY, GASTROSCOPY, DUODENOSCOPY (EGD), COMBINED N/A 11/11/2021    Procedure: ESOPHAGOGASTRODUODENOSCOPY, WITH BIOPSY;  Surgeon: Stephanie Meneses MD;  [facility]     HERNIA REPAIR       LAPAROSCOPIC BYPASS GASTRIC  10/16/2012    Procedure: LAPAROSCOPIC BYPASS GASTRIC;  laparoscopic reyna en y gastric bypass;  Surgeon: Nish Bradford MD;  Location: [facility] OR     TONSILLECTOMY       Uretural Sticture  As Child        Social History:     Social History     Tobacco Use     Smoking status: Never Smoker     Smokeless tobacco: Never Used   Substance Use Topics     Alcohol use: No     Drug use: No        Family History:     Family History   Problem Relation Age of Onset     Hypertension Father      Lipids Father      Alcohol/Drug Father         Abuse     Depression Father      Respiratory Father         COPD     Cardiovascular Father      Gastrointestinal Disease Mother         non cancerous pancreatic tumor     Hypertension Mother      Heart Disease Mother         A fib     Allergies Mother      Breast Cancer Maternal Grandmother      Glaucoma Paternal Grandmother      Allergies Sister         Poison Ivy     Glaucoma Paternal Grandfather      Depression Brother      Hypertension Brother      Allergies Brother         Enviromental/ Bees     Cerebrovascular Disease Other      Macular Degeneration Other      Diabetes No family hx of       Cancer - colorectal No family hx of      Retinal detachment No family hx of      Amblyopia No family hx of      Thyroid Disease No family hx of         Medications:     Current Outpatient Medications   Medication Sig     albuterol (PROAIR HFA/PROVENTIL HFA/VENTOLIN HFA) 108 (90 Base) MCG/ACT inhaler Inhale 2 puffs into the lungs every 6 hours as needed for shortness of breath / dyspnea or wheezing     ARIPiprazole (ABILIFY) 2 MG tablet Take 2 tablets (4 mg) by mouth daily     aspirin (ASA) 81 MG EC tablet Take 1 tablet (81 mg) by mouth daily     azelastine (ASTELIN) 0.1 % nasal spray Spray 2 sprays into both nostrils 2 times daily as needed for rhinitis     azelastine (OPTIVAR) 0.05 % ophthalmic solution Apply 1 drop to eye 2 times daily as needed (itchy/watery eyes)     budesonide-formoterol (SYMBICORT) 80-4.5 MCG/ACT Inhaler Inhale 2 puffs into the lungs 2 times daily     calcium carbonate-vitamin D (CALTRATE 600+D) 600-400 MG-UNIT CHEW Take 1 chew tab by mouth 2 times daily     carvedilol (COREG) 25 MG tablet Take 1 tablet (25 mg) by mouth 2 times daily (with meals)     cetirizine (ZYRTEC) 10 MG tablet Take 1 tablet (10 mg) by mouth daily     Cyanocobalamin (VITAMIN B-12 SL) Place under the tongue every other day      estrogen conj-medroxyPROGESTERone (PREMPRO) 0.45-1.5 MG tablet Take 1 tablet by mouth daily     ezetimibe (ZETIA) 10 MG tablet Take 1 tablet (10 mg) by mouth daily     ferrous sulfate (FEROSUL) 325 (65 Fe) MG tablet Take 1 tablet (325 mg) by mouth 2 times daily     fluticasone (FLONASE) 50 MCG/ACT nasal spray Spray 2 sprays into both nostrils daily     hydrALAZINE (APRESOLINE) 10 MG tablet Take 1 tablet (10 mg) by mouth 3 times daily     Ibuprofen-diphenhydrAMINE Cit (IBUPROFEN PM PO) Take by mouth At Bedtime     isosorbide mononitrate (IMDUR) 30 MG 24 hr tablet Take 1 tablet (30 mg) by mouth daily     lisinopril (ZESTRIL) 5 MG tablet TAKE 1 TABLET(5 MG) BY MOUTH DAILY     meclizine (ANTIVERT)  "25 MG tablet Take 1 tablet (25 mg) by mouth 3 times daily as needed     montelukast (SINGULAIR) 10 MG tablet Take 1 tablet (10 mg) by mouth At Bedtime     Multiple Vitamin (MULTI-VITAMIN) per tablet Take 1 tablet by mouth daily.     ondansetron (ZOFRAN-ODT) 4 MG ODT tab Take 1 tablet (4 mg) by mouth every 8 hours as needed for nausea     simvastatin (ZOCOR) 20 MG tablet Take 1 tablet (20 mg) by mouth At Bedtime     venlafaxine (EFFEXOR-ER) 225 MG 24 hr tablet Take 1 tablet (225 mg) by mouth daily     No current facility-administered medications for this visit.        Allergies:     Allergies   Allergen Reactions     Atorvastatin      Leg myalgias          Physical Exam:   Vitals: BP (!) 147/85   Pulse 79   Ht 1.6 m (5' 3\")   Wt 88 kg (194 lb)   LMP 07/16/2012   BMI 34.37 kg/m        Neuro:   General Appearance: No apparent distress, well-nourished, well-groomed, pleasant      Mental Status: Alert and oriented to person, place, and time. Speech fluent and comprehension intact. No dysarthria. Very appropriate conversationally.       Cranial Nerves:   II: Visual fields: normal  III: Pupils: 3 mm, equal, round, reactive to light   III,IV,VI: Extraocular Movements: intact   V: Facial sensation: intact to light touch  VII: Facial strength: intact without asymmetry  VIII: Hearing: intact grossly  IX: Palate: intact   XI: Shoulder shrug: intact  XII: Tongue movement: normal     Motor Exam:   5/5 diffusely      Sensory: intact to light touch, vibration  throughout      Coordination: no dysmetria with finger-to-nose bilaterally     Reflexes: biceps, triceps, brachioradialis, patellar, and ankle jerks 1+ and symmetric. Toes are downgoing bilaterally     Gait: normal casual gait, normal stride length, tandem gait a tad wobbly at times.            Data: Pertinent prior to visit   Last b12 1859  Lab Results   Component Value Date    TSH 1.33 08/26/2021    TSH 1.42 12/08/2020     Lab Results   Component Value Date    WBC 5.5 " 01/28/2022    WBC 4.3 12/08/2020     Lab Results   Component Value Date    RBC 4.15 01/28/2022    RBC 3.76 12/08/2020     Lab Results   Component Value Date    HGB 13.1 01/28/2022    HGB 10.5 12/08/2020     Lab Results   Component Value Date    HCT 40.0 01/28/2022    HCT 33.7 12/08/2020     No components found for: MCT  Lab Results   Component Value Date    MCV 96 01/28/2022    MCV 90 12/08/2020     Lab Results   Component Value Date    MCH 31.6 01/28/2022    MCH 27.9 12/08/2020     Lab Results   Component Value Date    MCHC 32.8 01/28/2022    MCHC 31.2 12/08/2020     Lab Results   Component Value Date    RDW 14.2 01/28/2022    RDW 15.0 12/08/2020     Lab Results   Component Value Date     01/28/2022     12/08/2020     Last Comprehensive Metabolic Panel:  Sodium   Date Value Ref Range Status   01/28/2022 140 133 - 144 mmol/L Final   06/30/2021 138 133 - 144 mmol/L Final     Potassium   Date Value Ref Range Status   01/28/2022 4.0 3.4 - 5.3 mmol/L Final   06/30/2021 4.2 3.4 - 5.3 mmol/L Final     Chloride   Date Value Ref Range Status   01/28/2022 111 (H) 94 - 109 mmol/L Final   06/30/2021 109 94 - 109 mmol/L Final     Carbon Dioxide   Date Value Ref Range Status   06/30/2021 25 20 - 32 mmol/L Final     Carbon Dioxide (CO2)   Date Value Ref Range Status   01/28/2022 25 20 - 32 mmol/L Final     Anion Gap   Date Value Ref Range Status   01/28/2022 4 3 - 14 mmol/L Final   06/30/2021 4 3 - 14 mmol/L Final     Glucose   Date Value Ref Range Status   01/28/2022 91 70 - 99 mg/dL Final   06/30/2021 91 70 - 99 mg/dL Final     Comment:     Fasting specimen     Urea Nitrogen   Date Value Ref Range Status   01/28/2022 19 7 - 30 mg/dL Final   06/30/2021 17 7 - 30 mg/dL Final     Creatinine   Date Value Ref Range Status   01/28/2022 0.84 0.52 - 1.04 mg/dL Final   06/30/2021 0.77 0.52 - 1.04 mg/dL Final     GFR Estimate   Date Value Ref Range Status   01/28/2022 78 >60 mL/min/1.73m2 Final     Comment:     Effective  December 21, 2021 eGFRcr in adults is calculated using the 2021 CKD-EPI creatinine equation which includes age and gender (Kanchan et al., NEJ, DOI: 10.1056/FTTOel0338802)   06/30/2021 82 >60 mL/min/[1.73_m2] Final     Comment:     Non  GFR Calc  Starting 12/18/2018, serum creatinine based estimated GFR (eGFR) will be   calculated using the Chronic Kidney Disease Epidemiology Collaboration   (CKD-EPI) equation.       Calcium   Date Value Ref Range Status   01/28/2022 8.8 8.5 - 10.1 mg/dL Final   06/30/2021 8.7 8.5 - 10.1 mg/dL Final     Bilirubin Total   Date Value Ref Range Status   01/28/2022 0.4 0.2 - 1.3 mg/dL Final   01/07/2020 0.3 0.2 - 1.3 mg/dL Final     Alkaline Phosphatase   Date Value Ref Range Status   01/28/2022 78 40 - 150 U/L Final   01/07/2020 83 40 - 150 U/L Final     ALT   Date Value Ref Range Status   01/28/2022 36 0 - 50 U/L Final   01/07/2020 24 0 - 50 U/L Final     AST   Date Value Ref Range Status   01/28/2022 12 0 - 45 U/L Final   01/07/2020 12 0 - 45 U/L Final                        Assessment and Plan:   Patricia Perez is a pleasant 61 year old female who presents today in follow up for evaluation for balance problems, memory changes.  She has a PMH as above notable for depression (follows with psychiatry), dizziness, FABIANA, CAD, idiopathic cardiomyopathy, H/O gastric bypass ~2013, HTN among other history.  These issues remain non specific and appear to be improved.  She is still able to perform a cognitively challenging job well. Discussed it would be a good idea to continue yoga/other exercises that have been helpful for balance for her in the past. Discussed that I see no evidence of a neurodegenerative condition at this time, good news.  I am glad that she has been doing well since our last visit.  She can follow up with neurology on an as needed basis.             Armando Vazquez MD   of Neurology   [facility]                  Again, thank you for allowing me to participate in the care of your patient.        Sincerely,        Cody Vazquez MD

## 2022-02-04 ENCOUNTER — TELEPHONE (OUTPATIENT)
Dept: PSYCHIATRY | Facility: CLINIC | Age: 62
End: 2022-02-04
Payer: COMMERCIAL

## 2022-02-04 NOTE — TELEPHONE ENCOUNTER
Prior Authorization Retail Medication Request    Medication/Dose: ARIPiprazole (ABILIFY) 2 MG tablet - Renewal  ICD code (if different than what is on RX):  Moderate episode of recurrent major depressive disorder (H) [F33.1]  - Primary   Previously Tried and Failed:  Wellbutrin, Zoloft, Lexapro   Rationale:  Patient has a long standing history of depressive disorder. She has historic diagnoses of MDD and ADHD. She has concerns of depressed mood, anhedonia, distractibility, amotivation, memory difficulties, word-finding struggles, and anxiety. Patient is at a high risk for decompensation and possible hospitalization if she is not able to continue this trial of medication.    Insurance Name:  [employer]  Insurance ID:  [id number]      Pharmacy Information (if different than what is on RX)  Name:  [id number] - [address]  Phone:  [phone]

## 2022-02-05 NOTE — TELEPHONE ENCOUNTER
Central Prior Authorization Team   Phone: [phone]      PA Initiation    Medication: ARIPiprazole (ABILIFY) 2 MG tablet - PA initiated  Insurance Company: [employer] Phone [phone] Fax [phone]  Pharmacy [facility]illing the Rx: [id number] - [address] [facility]   Filling Pharmacy Phone: [phone]  Filling Pharmacy Fax:    Start Date: 2/5/2022

## 2022-02-07 NOTE — TELEPHONE ENCOUNTER
Prior Authorization Approval    Authorization Effective Date: 2/5/2022  Authorization Expiration Date: 2/5/2023  Medication: ARIPiprazole (ABILIFY) 2 MG tablet - PA approved  Approved Dose/Quantity:   Reference #: [id number]   Insurance Company: [employer] - Phone [phone] Fax [phone]  Expected CoPay:       CoPay Card Available:      Foundation Assistance Needed:    Which Pharmacy is filling the prescription (Not needed for infusion/clinic administered): [id number] - [address] [facility]   Pharmacy Notified: Yes  Patient Notified: No

## 2022-02-09 ENCOUNTER — VIRTUAL VISIT (OUTPATIENT)
Dept: CARDIOLOGY | Facility: CLINIC | Age: 62
End: 2022-02-09
Attending: INTERNAL MEDICINE
Payer: COMMERCIAL

## 2022-02-09 DIAGNOSIS — E78.5 HYPERLIPIDEMIA LDL GOAL <100: ICD-10-CM

## 2022-02-09 DIAGNOSIS — I10 ESSENTIAL HYPERTENSION WITH GOAL BLOOD PRESSURE LESS THAN 130/80: ICD-10-CM

## 2022-02-09 DIAGNOSIS — I42.9 IDIOPATHIC CARDIOMYOPATHY (H): ICD-10-CM

## 2022-02-09 DIAGNOSIS — I25.10 CORONARY ARTERY DISEASE INVOLVING NATIVE CORONARY ARTERY OF NATIVE HEART WITHOUT ANGINA PECTORIS: ICD-10-CM

## 2022-02-09 DIAGNOSIS — I42.9 CARDIOMYOPATHY, UNSPECIFIED TYPE (H): ICD-10-CM

## 2022-02-09 DIAGNOSIS — I10 HYPERTENSION, UNSPECIFIED TYPE: ICD-10-CM

## 2022-02-09 DIAGNOSIS — E78.5 HYPERLIPIDEMIA LDL GOAL <70: ICD-10-CM

## 2022-02-09 PROCEDURE — 99214 OFFICE O/P EST MOD 30 MIN: CPT | Mod: 95 | Performed by: INTERNAL MEDICINE

## 2022-02-09 RX ORDER — LISINOPRIL 5 MG/1
TABLET ORAL
Qty: 90 TABLET | Refills: 3 | Status: SHIPPED | OUTPATIENT
Start: 2022-02-09 | End: 2023-02-08

## 2022-02-09 RX ORDER — CARVEDILOL 25 MG/1
25 TABLET ORAL 2 TIMES DAILY WITH MEALS
Qty: 180 TABLET | Refills: 3 | Status: SHIPPED | OUTPATIENT
Start: 2022-02-09 | End: 2023-02-08

## 2022-02-09 RX ORDER — EZETIMIBE 10 MG/1
10 TABLET ORAL DAILY
Qty: 90 TABLET | Refills: 3 | Status: SHIPPED | OUTPATIENT
Start: 2022-02-09 | End: 2023-02-08

## 2022-02-09 RX ORDER — ISOSORBIDE MONONITRATE 30 MG/1
30 TABLET, EXTENDED RELEASE ORAL DAILY
Qty: 90 TABLET | Refills: 3 | Status: SHIPPED | OUTPATIENT
Start: 2022-02-09 | End: 2023-02-08

## 2022-02-09 RX ORDER — SIMVASTATIN 20 MG
20 TABLET ORAL AT BEDTIME
Qty: 90 TABLET | Refills: 3 | Status: SHIPPED | OUTPATIENT
Start: 2022-02-09 | End: 2022-09-27

## 2022-02-09 RX ORDER — HYDRALAZINE HYDROCHLORIDE 10 MG/1
10 TABLET, FILM COATED ORAL 3 TIMES DAILY
Qty: 270 TABLET | Refills: 3 | Status: SHIPPED | OUTPATIENT
Start: 2022-02-09 | End: 2023-02-08

## 2022-02-09 NOTE — PATIENT INSTRUCTIONS
Follow up in one year, with lab appointment prior. You will need to fast for 12 hours before the blood draw.  You will receive a scheduling reminder in advance.

## 2022-02-09 NOTE — NURSING NOTE
Chief Complaint   Patient presents with     Follow Up     6 month f/u palpitations, no updates. Pt reviewed medicaitons via [employer]. NO updates/changes.      Ida Correa, Visit Facilitator/MA.

## 2022-02-09 NOTE — PROGRESS NOTES
Service Date: 2022    Funmilayo Grubbs PA-C  [facility]  [address] [place]  [address]    RE:  Patricia Perez  MRN:  [id number]  :  1960    Dear Ms. Romie:    It was a pleasure participating in the care of your patient, Ms. Patricia Perez.  As you know, she is a 61-year-old lady who I saw over virtual video visit via [employer] for history of nonischemic cardiomyopathy, hypertension, hyperlipidemia and nonobstructive coronary disease.    PAST MEDICAL HISTORY:      1.  Hypertension.  2.  Hyperlipidemia.  3.  Sleep apnea, on CPAP.  4.  Depression.  5.  Gastroesophageal reflux disease.  6.  Asthma.  7.  Obesity.  8.  Status post gastric bypass surgery.    Her cardiac history is significant for nonischemic cardiomyopathy that was mild to moderate in degree in .  Coronary angiogram revealed only mild disease in the LAD and RCA territories.    She had an episode of syncope 2020 that necessitated coronary CTA. Coronary CTA on 2020 revealed the following:      Left main okay.  LAD less than 50% proximal to mid stenosis.  Circumflex less than 50% proximal stenosis.  RCA less than 30% proximal to mid stenosis.    Zio Patch monitor and echo were unremarkable.  Since then, she has had no further episodes of syncope, rather some episodes of tripping over her feet.    I last saw her 2021.  At that time, she was doing well.  She presents today for continuing care.    Since our last visit, she has been busy moving from her house in [place] to [place].  She has decreased her coffee intake and increased her water intake and her palpitations have completely resolved.  Her blood pressures at home have been stable in the 120s/70s and her weight has been stable without edema at 194 pounds.  She does try taking walks and her watch records about an average of 2500 steps a day.  She denies new chest pain or shortness of breath or exertional limitation.  She denies other PND,  orthopnea, edema, palpitations, syncope or near syncope.    Again, her CHADS-VASc2 score is 3 for congestive heart failure, hypertension and gender.    A 10-point review of systems is otherwise unremarkable.    CURRENT MEDICATIONS:  Include carvedilol 25 twice daily, ezetimibe 10 mg a day, hydralazine 10 mg 3 times a day, isosorbide mononitrate 30 mg a day, lisinopril 5 mg a day, simvastatin 20 mg at bedtime, inhalers.    PHYSICAL EXAMINATION:      VITAL SIGNS:  Her blood pressure is 125/75.  Weight 194.  GENERAL:  She appears comfortable, well-groomed.  PSYCHIATRIC:  She is alert and oriented x3.  HEENT:  Eyes do not appear grossly erythematous or have exudate.  RESPIRATORY:  She is breathing comfortably without gross cough.    The remainder of the comprehensive physical exam was deferred secondary to the COVID-19 pandemic and secondary to video visit restrictions.    LABORATORY:  Labs 06/30/2021, LDL 64.  01/28/2022, potassium 4.0, GFR normal, hemoglobin normal.      IMPRESSION:      Patricia is a 61-year-old lady who has several active cardiac issues:    1.  Prior mild ischemic cardiomyopathy.    In 2008, her ejection fraction was in the 40%-45% range.  With medical therapy, her ejection fraction improved to the 50% range in 2016.  She has only mild nonobstructive coronary artery disease by coronary CTA 01/07/2020.    She is currently asymptomatic at a low to at most moderate level of exertion.  Continue to monitor clinically.    2.  Hypertension, well controlled.      Blood pressure is running 120s/70s.  Continue to follow.    3.  Hyperlipidemia, well controlled.      She is already at her goal of less than 70 on her regimen of ezetimibe and simvastatin 20 mg a day.  Continue to follow.    4.  Mild to moderate nonobstructive coronary disease.    This was displayed on coronary CTA 01/07/2020.  She will continue aggressive secondary prevention measures.      PLAN:      1.  Continue present medications at present  doses.    2.  Virtual video visit followup in 1 year with lab work prior, earlier if needed.    Once again, it was a pleasure participating in the care of your patient, Ms. Patricia Perez.  Please feel free to contact me at any time if there any questions regarding her care in the future.    Sincerely,          Preston Cuadra MD        D: 2022   T: 2022   MT: juliana    Name:     PATRICIA PEREZ  MRN:      [id number]        Account:      [id number]   :      1960           Service Date: 2022       Document: [id number]

## 2022-02-09 NOTE — LETTER
2022      RE: Patricia Perez  [address]  [place] [address]       Dear Colleague,    Thank you for the opportunity to participate in the care of your patient, Patricia Perez, at the [facility]. Please see a copy of my visit note below.    Service Date: 2022    Funmilayo Grubbs PA-C  [facility] [place]  [address] [place]  [address]    RE:  Patricia Perez  MRN:  [id number]  :  1960    Dear MsSly Grubbs:    It was a pleasure participating in the care of your patient, Ms. Patricia Perez.  As you know, she is a 61-year-old lady who I saw over virtual video visit via Skydeck for history of nonischemic cardiomyopathy, hypertension, hyperlipidemia and nonobstructive coronary disease.    PAST MEDICAL HISTORY:      1.  Hypertension.  2.  Hyperlipidemia.  3.  Sleep apnea, on CPAP.  4.  Depression.  5.  Gastroesophageal reflux disease.  6.  Asthma.  7.  Obesity.  8.  Status post gastric bypass surgery.    Her cardiac history is significant for nonischemic cardiomyopathy that was mild to moderate in degree in .  Coronary angiogram revealed only mild disease in the LAD and RCA territories.    She had an episode of syncope 2020 that necessitated coronary CTA. Coronary CTA on 2020 revealed the following:      Left main okay.  LAD less than 50% proximal to mid stenosis.  Circumflex less than 50% proximal stenosis.  RCA less than 30% proximal to mid stenosis.    Zio Patch monitor and echo were unremarkable.  Since then, she has had no further episodes of syncope, rather some episodes of tripping over her feet.    I last saw her 2021.  At that time, she was doing well.  She presents today for continuing care.    Since our last visit, she has been busy moving from her house in [place] to [place].  She has decreased her coffee intake and increased her water intake and her  palpitations have completely resolved.  Her blood pressures at home have been stable in the 120s/70s and her weight has been stable without edema at 194 pounds.  She does try taking walks and her watch records about an average of 2500 steps a day.  She denies new chest pain or shortness of breath or exertional limitation.  She denies other PND, orthopnea, edema, palpitations, syncope or near syncope.    Again, her CHADS-VASc2 score is 3 for congestive heart failure, hypertension and gender.    A 10-point review of systems is otherwise unremarkable.    CURRENT MEDICATIONS:  Include carvedilol 25 twice daily, ezetimibe 10 mg a day, hydralazine 10 mg 3 times a day, isosorbide mononitrate 30 mg a day, lisinopril 5 mg a day, simvastatin 20 mg at bedtime, inhalers.    PHYSICAL EXAMINATION:      VITAL SIGNS:  Her blood pressure is 125/75.  Weight 194.  GENERAL:  She appears comfortable, well-groomed.  PSYCHIATRIC:  She is alert and oriented x3.  HEENT:  Eyes do not appear grossly erythematous or have exudate.  RESPIRATORY:  She is breathing comfortably without gross cough.    The remainder of the comprehensive physical exam was deferred secondary to the COVID-19 pandemic and secondary to video visit restrictions.    LABORATORY:  Labs 06/30/2021, LDL 64.  01/28/2022, potassium 4.0, GFR normal, hemoglobin normal.      IMPRESSION:      Patricia is a 61-year-old lady who has several active cardiac issues:    1.  Prior mild ischemic cardiomyopathy.    In 2008, her ejection fraction was in the 40%-45% range.  With medical therapy, her ejection fraction improved to the 50% range in 2016.  She has only mild nonobstructive coronary artery disease by coronary CTA 01/07/2020.    She is currently asymptomatic at a low to at most moderate level of exertion.  Continue to monitor clinically.    2.  Hypertension, well controlled.      Blood pressure is running 120s/70s.  Continue to follow.    3.  Hyperlipidemia, well controlled.      She is  already at her goal of less than 70 on her regimen of ezetimibe and simvastatin 20 mg a day.  Continue to follow.    4.  Mild to moderate nonobstructive coronary disease.    This was displayed on coronary CTA 2020.  She will continue aggressive secondary prevention measures.      PLAN:      1.  Continue present medications at present doses.    2.  Virtual video visit followup in 1 year with lab work prior, earlier if needed.    Once again, it was a pleasure participating in the care of your patient, Ms. Patricia Perez.  Please feel free to contact me at any time if there any questions regarding her care in the future.    Sincerely,      Preston Cuadra MD        D: 2022   T: 2022   MT: juliana    Name:     PATRICIA PEREZ  MRN:      [id number]        Account:      [id number]   :      1960           Service Date: 2022       Document: [id number]

## 2022-02-09 NOTE — PROGRESS NOTES
"Patricia is a 61 year old who is being evaluated via a billable video visit.      How would you like to obtain your AVS? MyChart  If the video visit is dropped, the invitation should be resent by: Send to e-mail at: [email or web address]  Will anyone else be joining your video visit? No    Ida Correa, Visit Facilitator/MA.    The patient has been notified of following:     \"This video visit will be conducted via a call between you and your physician/provider. We have found that certain health care needs can be provided without the need for an in-person physical exam.  This service lets us provide the care you need with a video conversation.  If a prescription is necessary we can send it directly to your pharmacy.  If lab work is needed we can place an order for that and you can then stop by our lab to have the test done at a later time.    Video visits are billed at different rates depending on your insurance coverage.  Please reach out to your insurance provider with any questions.    If during the course of the call the physician/provider feels a video visit is not appropriate, you will not be charged for this service.\"    Patient has given verbal consent for video visit? Yes    How would you like to obtain your AVS? Mail    Video-Visit Details    Type of service:  Video Visit    Video Start Time:745am  Video End Time:754am    Total visit time including video visit, chart review, charting, coordination of care =31min    Originating Location (pt. Location):patient home      Distant Location (provider location):  home office    Platform used for Video Visit: Anibal    See dictation #[id number]    [facility][id number]  "

## 2022-02-19 ENCOUNTER — HEALTH MAINTENANCE LETTER (OUTPATIENT)
Age: 62
End: 2022-02-19

## 2022-03-25 NOTE — PROGRESS NOTES
{PROVIDER CHARTING PREFERENCE:232402}    Jesusita Gomez is a 61 year old who presents for the following health issues {ACCOMPANIED BY STATEMENT (Optional):901585}    HPI     {SUPERLIST (Optional):960118}  {additonal problems for provider to add (Optional):120182}    Review of Systems   {ROS COMP (Optional):380504}      Objective    LMP 07/16/2012   There is no height or weight on file to calculate BMI.  Physical Exam   {Exam List (Optional):414564}    {Diagnostic Test Results (Optional):912807}    {AMBULATORY ATTESTATION (Optional):307784}

## 2022-03-28 ENCOUNTER — OFFICE VISIT (OUTPATIENT)
Dept: FAMILY MEDICINE | Facility: CLINIC | Age: 62
End: 2022-03-28
Payer: COMMERCIAL

## 2022-03-28 ENCOUNTER — ANCILLARY PROCEDURE (OUTPATIENT)
Dept: GENERAL RADIOLOGY | Facility: CLINIC | Age: 62
End: 2022-03-28
Attending: PHYSICIAN ASSISTANT
Payer: COMMERCIAL

## 2022-03-28 VITALS
BODY MASS INDEX: 35.4 KG/M2 | HEIGHT: 63 IN | OXYGEN SATURATION: 95 % | WEIGHT: 199.8 LBS | RESPIRATION RATE: 18 BRPM | SYSTOLIC BLOOD PRESSURE: 130 MMHG | TEMPERATURE: 98 F | HEART RATE: 73 BPM | DIASTOLIC BLOOD PRESSURE: 83 MMHG

## 2022-03-28 DIAGNOSIS — Z00.00 ROUTINE GENERAL MEDICAL EXAMINATION AT A HEALTH CARE FACILITY: Primary | ICD-10-CM

## 2022-03-28 DIAGNOSIS — E66.01 SEVERE OBESITY (BMI 35.0-35.9 WITH COMORBIDITY) (H): ICD-10-CM

## 2022-03-28 DIAGNOSIS — N95.1 MENOPAUSAL SYNDROME (HOT FLASHES): ICD-10-CM

## 2022-03-28 DIAGNOSIS — R20.0 BILATERAL HAND NUMBNESS: ICD-10-CM

## 2022-03-28 DIAGNOSIS — J45.30 MILD PERSISTENT ASTHMA WITHOUT COMPLICATION: ICD-10-CM

## 2022-03-28 DIAGNOSIS — J31.0 CHRONIC RHINITIS: ICD-10-CM

## 2022-03-28 DIAGNOSIS — R42 DIZZINESS: ICD-10-CM

## 2022-03-28 DIAGNOSIS — Z12.31 VISIT FOR SCREENING MAMMOGRAM: ICD-10-CM

## 2022-03-28 DIAGNOSIS — M54.31 SCIATICA OF RIGHT SIDE: ICD-10-CM

## 2022-03-28 DIAGNOSIS — E16.2 LOW BLOOD SUGAR READING: ICD-10-CM

## 2022-03-28 DIAGNOSIS — Z78.0 POSTMENOPAUSAL STATUS: ICD-10-CM

## 2022-03-28 PROCEDURE — 99396 PREV VISIT EST AGE 40-64: CPT | Performed by: PHYSICIAN ASSISTANT

## 2022-03-28 PROCEDURE — 99214 OFFICE O/P EST MOD 30 MIN: CPT | Mod: 25 | Performed by: PHYSICIAN ASSISTANT

## 2022-03-28 PROCEDURE — 72100 X-RAY EXAM L-S SPINE 2/3 VWS: CPT | Performed by: RADIOLOGY

## 2022-03-28 PROCEDURE — 72040 X-RAY EXAM NECK SPINE 2-3 VW: CPT | Performed by: RADIOLOGY

## 2022-03-28 RX ORDER — FLASH GLUCOSE SENSOR
1 KIT MISCELLANEOUS
Qty: 2 EACH | Refills: 5 | Status: SHIPPED | OUTPATIENT
Start: 2022-03-28 | End: 2023-09-12

## 2022-03-28 RX ORDER — ONDANSETRON 4 MG/1
4 TABLET, ORALLY DISINTEGRATING ORAL EVERY 8 HOURS PRN
Qty: 15 TABLET | Refills: 1 | Status: SHIPPED | OUTPATIENT
Start: 2022-03-28 | End: 2023-06-27

## 2022-03-28 RX ORDER — FLASH GLUCOSE SCANNING READER
1 EACH MISCELLANEOUS ONCE
Qty: 1 EACH | Refills: 0 | Status: SHIPPED | OUTPATIENT
Start: 2022-03-28 | End: 2022-03-28

## 2022-03-28 RX ORDER — MONTELUKAST SODIUM 10 MG/1
10 TABLET ORAL AT BEDTIME
Qty: 90 TABLET | Refills: 3 | Status: SHIPPED | OUTPATIENT
Start: 2022-03-28 | End: 2023-04-14

## 2022-03-28 RX ORDER — ESTROGEN,CON/M-PROGEST ACET 0.45-1.5MG
1 TABLET ORAL DAILY
Qty: 90 TABLET | Refills: 3 | Status: SHIPPED | OUTPATIENT
Start: 2022-03-28 | End: 2023-06-27

## 2022-03-28 RX ORDER — PREDNISONE 20 MG/1
40 TABLET ORAL DAILY
Qty: 10 TABLET | Refills: 0 | Status: SHIPPED | OUTPATIENT
Start: 2022-03-28 | End: 2022-04-02

## 2022-03-28 ASSESSMENT — PATIENT HEALTH QUESTIONNAIRE - PHQ9
10. IF YOU CHECKED OFF ANY PROBLEMS, HOW DIFFICULT HAVE THESE PROBLEMS MADE IT FOR YOU TO DO YOUR WORK, TAKE CARE OF THINGS AT HOME, OR GET ALONG WITH OTHER PEOPLE: SOMEWHAT DIFFICULT
SUM OF ALL RESPONSES TO PHQ QUESTIONS 1-9: 10
SUM OF ALL RESPONSES TO PHQ QUESTIONS 1-9: 10

## 2022-03-28 ASSESSMENT — ENCOUNTER SYMPTOMS
CONSTIPATION: 0
SHORTNESS OF BREATH: 0
DYSURIA: 0
FEVER: 0
ABDOMINAL PAIN: 0
EYE PAIN: 0
WEAKNESS: 0
COUGH: 0
HEMATOCHEZIA: 0
PARESTHESIAS: 0
NERVOUS/ANXIOUS: 1
CHILLS: 0
PALPITATIONS: 0
DIZZINESS: 0
NAUSEA: 0
BREAST MASS: 0
MYALGIAS: 1
SORE THROAT: 0
DIARRHEA: 0
HEMATURIA: 0
FREQUENCY: 0
HEARTBURN: 0
JOINT SWELLING: 0
ARTHRALGIAS: 1
HEADACHES: 0

## 2022-03-28 ASSESSMENT — PAIN SCALES - GENERAL: PAINLEVEL: NO PAIN (0)

## 2022-03-28 ASSESSMENT — ANXIETY QUESTIONNAIRES
2. NOT BEING ABLE TO STOP OR CONTROL WORRYING: SEVERAL DAYS
GAD7 TOTAL SCORE: 7
7. FEELING AFRAID AS IF SOMETHING AWFUL MIGHT HAPPEN: NOT AT ALL
GAD7 TOTAL SCORE: 7
1. FEELING NERVOUS, ANXIOUS, OR ON EDGE: SEVERAL DAYS
4. TROUBLE RELAXING: SEVERAL DAYS
5. BEING SO RESTLESS THAT IT IS HARD TO SIT STILL: MORE THAN HALF THE DAYS
3. WORRYING TOO MUCH ABOUT DIFFERENT THINGS: SEVERAL DAYS
6. BECOMING EASILY ANNOYED OR IRRITABLE: SEVERAL DAYS
GAD7 TOTAL SCORE: 7
7. FEELING AFRAID AS IF SOMETHING AWFUL MIGHT HAPPEN: NOT AT ALL

## 2022-03-28 ASSESSMENT — ASTHMA QUESTIONNAIRES: ACT_TOTALSCORE: 23

## 2022-03-28 NOTE — PATIENT INSTRUCTIONS
Ask your insurance about the Shingles vaccine:  1) Is it covered?  2) Where should I get it?      Preventive Health Recommendations  Female Ages 50 - 64    Yearly exam: See your health care provider every year in order to  o Review health changes.   o Discuss preventive care.    o Review your medicines if your doctor has prescribed any.      Get a Pap test every three years (unless you have an abnormal result and your provider advises testing more often).    If you get Pap tests with HPV test, you only need to test every 5 years, unless you have an abnormal result.     You do not need a Pap test if your uterus was removed (hysterectomy) and you have not had cancer.    You should be tested each year for STDs (sexually transmitted diseases) if you're at risk.     Have a mammogram every 1 to 2 years.    Have a colonoscopy at age 50, or have a yearly FIT test (stool test). These exams screen for colon cancer.      Have a cholesterol test every 5 years, or more often if advised.    Have a diabetes test (fasting glucose) every three years. If you are at risk for diabetes, you should have this test more often.     If you are at risk for osteoporosis (brittle bone disease), think about having a bone density scan (DEXA).    Shots: Get a flu shot each year. Get a tetanus shot every 10 years.    Nutrition:     Eat at least 5 servings of fruits and vegetables each day.    Eat whole-grain bread, whole-wheat pasta and brown rice instead of white grains and rice.    Get adequate Calcium and Vitamin D.     Lifestyle    Exercise at least 150 minutes a week (30 minutes a day, 5 days a week). This will help you control your weight and prevent disease.    Limit alcohol to one drink per day.    No smoking.     Wear sunscreen to prevent skin cancer.     See your dentist every six months for an exam and cleaning.    See your eye doctor every 1 to 2 years.

## 2022-03-28 NOTE — PROGRESS NOTES
SUBJECTIVE:   CC: Patricia Perez is an 61 year old woman who presents for preventive health visit.       Patient has been advised of split billing requirements and indicates understanding: Yes  Healthy Habits:     Getting at least 3 servings of Calcium per day:  Yes    Bi-annual eye exam:  Yes    Dental care twice a year:  Yes    Sleep apnea or symptoms of sleep apnea:  Daytime drowsiness    Diet:  Regular (no restrictions)    Frequency of exercise:  2-3 days/week    Duration of exercise:  15-30 minutes    Taking medications regularly:  Yes    Medication side effects:  None    PHQ-2 Total Score: 4    Additional concerns today:  Yes      PROBLEMS TO ADD ON...  -------------------------------------  Low back x1 month, right side, radiates down right leg , using heating pad  Lightning type pains    Numbness in fingers, bilateral hands, since fall 2021  Occurs randomly, comes and goes  1 finger on each hand    Low blood sugars, ongoing, readings between 55-65, some shaking and weakness when sugars are low   Sugars getting down to low 60's and high 50's    Needing refills and/or labs for:  Hypertension  Is blood pressure being checked at home? No  Medication side effects? No    Hyperlipidemia  Following a low fat diet? No  Medication side effects? No    Asthma  Update ACT    Depression/Anxiety  Update PHQ/PRISCILLA      Today's PHQ-2 Score:   PHQ-2 ( 1999 Pfizer) 3/28/2022   Q1: Little interest or pleasure in doing things 2   Q2: Feeling down, depressed or hopeless 2   PHQ-2 Score 4   PHQ-2 Total Score (12-17 Years)- Positive if 3 or more points; Administer PHQ-A if positive -   Q1: Little interest or pleasure in doing things More than half the days   Q2: Feeling down, depressed or hopeless More than half the days   PHQ-2 Score 4       Abuse: Current or Past (Physical, Sexual or Emotional) - No  Do you feel safe in your environment? Yes        Social History     Tobacco Use     Smoking status: Never Smoker     Smokeless  tobacco: Never Used   Substance Use Topics     Alcohol use: No         Alcohol Use 3/28/2022   Prescreen: >3 drinks/day or >7 drinks/week? Not Applicable   Prescreen: >3 drinks/day or >7 drinks/week? -       Reviewed orders with patient.  Reviewed health maintenance and updated orders accordingly - Yes  Lab work is in process    Breast Cancer Screening:    FHS-7:   Breast CA Risk Assessment (FHS-7) 3/28/2022   Did any of your first-degree relatives have breast or ovarian cancer? No   Did any of your relatives have bilateral breast cancer? No   Did any man in your family have breast cancer? No   Did any woman in your family have breast and ovarian cancer? No   Did any woman in your family have breast cancer before age 50 y? Yes   Do you have 2 or more relatives with breast and/or ovarian cancer? Yes   Do you have 2 or more relatives with breast and/or bowel cancer? No       Mammogram Screening: Recommended mammography every 1-2 years with patient discussion and risk factor consideration  Pertinent mammograms are reviewed under the imaging tab.    History of abnormal Pap smear: NO - age 30-65 PAP every 5 years with negative HPV co-testing recommended  PAP / HPV Latest Ref Rng & Units 6/30/2021 6/15/2016 5/29/2013   PAP (Historical) - NIL NIL NIL   HPV16 NEG:Negative Negative Negative -   HPV18 NEG:Negative Negative Negative -   HRHPV NEG:Negative Negative Negative -     Reviewed and updated as needed this visit by clinical staff   Tobacco  Allergies  Meds   Med Hx  Surg Hx  Fam Hx  Soc Hx        Reviewed and updated as needed this visit by Provider                 Past Medical History:   Diagnosis Date     Anxiety      Arthritis      ASCUS of cervix with negative high risk HPV 01/23/2006     Asthma      Bursitis of shoulder 03/04/2010     Chronic rhinitis 03/25/2009     Coronary artery disease      Depression      Depression      Dyspnea on exertion      Gastro-oesophageal reflux disease      GERD  "(gastroesophageal reflux disease) 10/14/2008     Hypertension      Idiopathic cardiomyopathy (H) 01/08/2009     Indigestion      Itchy eyes      Itchy nose      Left leg pain 06/16/2011     Motion sickness 02/27/2008     Nasal congestion      Pneumonia      Problems related to lack of adequate sleep      Ringing in ears      Sleep apnea 03/25/2009     Sneezing         Review of Systems   Constitutional: Negative for chills and fever.   HENT: Positive for hearing loss. Negative for congestion, ear pain and sore throat.    Eyes: Negative for pain and visual disturbance.   Respiratory: Negative for cough and shortness of breath.    Cardiovascular: Positive for peripheral edema. Negative for chest pain and palpitations.   Gastrointestinal: Negative for abdominal pain, constipation, diarrhea, heartburn, hematochezia and nausea.   Breasts:  Negative for tenderness, breast mass and discharge.   Genitourinary: Negative for dysuria, frequency, genital sores, hematuria, pelvic pain, urgency, vaginal bleeding and vaginal discharge.   Musculoskeletal: Positive for arthralgias and myalgias. Negative for joint swelling.   Skin: Negative for rash.   Neurological: Negative for dizziness, weakness, headaches and paresthesias.   Psychiatric/Behavioral: Positive for mood changes. The patient is nervous/anxious.         OBJECTIVE:   /83   Pulse 73   Temp 98  F (36.7  C) (Tympanic)   Resp 18   Ht 1.6 m (5' 3\")   Wt 90.6 kg (199 lb 12.8 oz)   LMP 07/16/2012   SpO2 95%   Breastfeeding No   BMI 35.39 kg/m    Physical Exam  GENERAL: healthy, alert and no distress  EYES: Eyes grossly normal to inspection, PERRL and conjunctivae and sclerae normal  HENT: ear canals and TM's normal, nose and mouth without ulcers or lesions  NECK: no adenopathy, no asymmetry, masses, or scars and thyroid normal to palpation  RESP: lungs clear to auscultation - no rales, rhonchi or wheezes  BREAST: normal without masses, tenderness or nipple " discharge and no palpable axillary masses or adenopathy  CV: regular rates and rhythm, normal S1 S2, no S3 or S4 and no murmur, click or rub  ABDOMEN: soft, nontender, no hepatosplenomegaly, no masses and bowel sounds normal  MS: no gross musculoskeletal defects noted, no edema  SKIN: no suspicious lesions or rashes  NEURO: Normal strength and tone, mentation intact and speech normal  PSYCH: mentation appears normal, affect normal/bright    ASSESSMENT/PLAN:       ICD-10-CM    1. Routine general medical examination at a health care facility  Z00.00    2. Visit for screening mammogram  Z12.31 MA SCREENING DIGITAL BILAT - Future  (s+30)   3. Postmenopausal status  Z78.0 DX Hip/Pelvis/Spine   4. Menopausal syndrome (hot flashes)  N95.1 estrogen conj-medroxyPROGESTERone (PREMPRO) 0.45-1.5 MG tablet   5. Mild persistent asthma without complication  J45.30 montelukast (SINGULAIR) 10 MG tablet   6. Chronic rhinitis  J31.0 montelukast (SINGULAIR) 10 MG tablet   7. Dizziness  R42 ondansetron (ZOFRAN-ODT) 4 MG ODT tab   8. Low blood sugar reading  E16.2 Continuous Blood Gluc  (FREESTYLE JOHN 14 DAY READER) CHRISTINA     Continuous Blood Gluc Sensor (FREESTYLE JOHN 14 DAY SENSOR) MISC   9. Sciatica of right side  M54.31 Physical Therapy Referral     predniSONE (DELTASONE) 20 MG tablet     XR Lumbar Spine 2/3 Views   10. Bilateral hand numbness  R20.0 XR Cervical Spine 2/3 Views   11. Severe obesity (BMI 35.0-35.9 with comorbidity) (H)  E66.01     Z68.35        1-3) Screenings discussed    4-7) Meds renewed, no changes    8) Will have her monitor her sugars with the John    9) Prednisone x5 days. Referral back to physical therapy    10) X-ray of the neck - likely cervical radiculopathy    11) Comorbid to HTN      COUNSELING:  Reviewed preventive health counseling, as reflected in patient instructions    Estimated body mass index is 35.39 kg/m  as calculated from the following:    Height as of this encounter: 1.6 m (5'  "3\").    Weight as of this encounter: 90.6 kg (199 lb 12.8 oz).    She reports that she has never smoked. She has never used smokeless tobacco.      Counseling Resources:  ATP IV Guidelines  Pooled Cohorts Equation Calculator  Breast Cancer Risk Calculator  BRCA-Related Cancer Risk Assessment: FHS-7 Tool  FRAX Risk Assessment  ICSI Preventive Guidelines  Dietary Guidelines for Americans, 2010  USDA's MyPlate  ASA Prophylaxis  Lung CA Screening    Funmilayo Grubbs PA-C  [facility]  "

## 2022-03-29 ASSESSMENT — ANXIETY QUESTIONNAIRES: GAD7 TOTAL SCORE: 7

## 2022-04-12 ENCOUNTER — TELEPHONE (OUTPATIENT)
Dept: BEHAVIORAL HEALTH | Facility: CLINIC | Age: 62
End: 2022-04-12
Payer: COMMERCIAL

## 2022-04-12 NOTE — TELEPHONE ENCOUNTER
Left a VM to remind pt about their Thursday 4/14/22 video appt at 9 am.     Writer mentioned, pt will need to connect virtual/video appt via My Chart and finish e-check in question 15-30 min prior to appt time.

## 2022-04-13 ASSESSMENT — PATIENT HEALTH QUESTIONNAIRE - PHQ9
SUM OF ALL RESPONSES TO PHQ QUESTIONS 1-9: 6
10. IF YOU CHECKED OFF ANY PROBLEMS, HOW DIFFICULT HAVE THESE PROBLEMS MADE IT FOR YOU TO DO YOUR WORK, TAKE CARE OF THINGS AT HOME, OR GET ALONG WITH OTHER PEOPLE: SOMEWHAT DIFFICULT
SUM OF ALL RESPONSES TO PHQ QUESTIONS 1-9: 6

## 2022-04-14 ENCOUNTER — VIRTUAL VISIT (OUTPATIENT)
Dept: PSYCHOLOGY | Facility: CLINIC | Age: 62
End: 2022-04-14
Payer: COMMERCIAL

## 2022-04-14 DIAGNOSIS — F33.1 MODERATE EPISODE OF RECURRENT MAJOR DEPRESSIVE DISORDER (H): ICD-10-CM

## 2022-04-14 DIAGNOSIS — F98.8 ATTENTION DEFICIT DISORDER OF ADULT: Primary | ICD-10-CM

## 2022-04-14 PROCEDURE — 90834 PSYTX W PT 45 MINUTES: CPT | Mod: GT | Performed by: MARRIAGE & FAMILY THERAPIST

## 2022-04-14 ASSESSMENT — COLUMBIA-SUICIDE SEVERITY RATING SCALE - C-SSRS
6. HAVE YOU EVER DONE ANYTHING, STARTED TO DO ANYTHING, OR PREPARED TO DO ANYTHING TO END YOUR LIFE?: NO
TOTAL  NUMBER OF INTERRUPTED ATTEMPTS LIFETIME: NO
ATTEMPT LIFETIME: NO
TOTAL  NUMBER OF ABORTED OR SELF INTERRUPTED ATTEMPTS LIFETIME: NO
1. HAVE YOU WISHED YOU WERE DEAD OR WISHED YOU COULD GO TO SLEEP AND NOT WAKE UP?: NO
2. HAVE YOU ACTUALLY HAD ANY THOUGHTS OF KILLING YOURSELF?: NO

## 2022-04-14 ASSESSMENT — PATIENT HEALTH QUESTIONNAIRE - PHQ9: SUM OF ALL RESPONSES TO PHQ QUESTIONS 1-9: 6
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
## 2022-04-18 ENCOUNTER — THERAPY VISIT (OUTPATIENT)
Dept: PHYSICAL THERAPY | Facility: CLINIC | Age: 62
End: 2022-04-18
Attending: PHYSICIAN ASSISTANT
Payer: COMMERCIAL

## 2022-04-18 DIAGNOSIS — G89.29 CHRONIC RIGHT-SIDED LOW BACK PAIN WITHOUT SCIATICA: Primary | ICD-10-CM

## 2022-04-18 DIAGNOSIS — M54.31 SCIATICA OF RIGHT SIDE: ICD-10-CM

## 2022-04-18 DIAGNOSIS — M54.50 CHRONIC RIGHT-SIDED LOW BACK PAIN WITHOUT SCIATICA: Primary | ICD-10-CM

## 2022-04-18 PROCEDURE — 97530 THERAPEUTIC ACTIVITIES: CPT | Mod: GP | Performed by: PHYSICAL THERAPIST

## 2022-04-18 PROCEDURE — 97140 MANUAL THERAPY 1/> REGIONS: CPT | Mod: GP | Performed by: PHYSICAL THERAPIST

## 2022-04-18 PROCEDURE — 97163 PT EVAL HIGH COMPLEX 45 MIN: CPT | Mod: GP | Performed by: PHYSICAL THERAPIST

## 2022-04-18 NOTE — PROGRESS NOTES
Physical Therapy Initial Evaluation  Physical Therapy Initial Examination/Evaluation    April 18, 2022    Patricia Perez  is a 62 year old  female referred to physical therapy by Funmilayo Grubbs PA-C or treatment of R sided sciatica.      DOI/onset 2/2022  Mechanism of injury no injury  Prior treatment different shoes and it has not seemed to improve. Effect of prior treatment fair    Chief Complaint:   R leg pain, all the way down the back of the leg.   Pain location: R LE  Quality: sharp, aching  Constant/Intermittent: constant  Time of day: evening  Symptoms have worsened since onset.    Current pain 6/20.  Pain at best 0/10.  Pain at worst 8-9/10.    Symptoms aggravated by siting (1-2 hours, prior to as long as desired).    Symptoms improved with heat.     Social history:  Exercise-has been trying to exercise, treadmill 20' and yoga 1x/week-started in June but has not been able to do past 1.5 wks due to her back.         Type of problem:  Lumbar.    General health as reported by patient is good.  Pt reports she has done a lot of stretching, not the exercises I was given.    Pertinent medical history includes: asthma, depression, heart problems, high blood pressure, numbness/tingling, overweight and sleep disorder/apnea.   Red flags:  None as reported by patient.  Medical allergies: other (a statin).   Surgeries include:  Other (wt loss surgery).    Current medications:  Anti-depressants, anti-inflammatory, cardiac medication, high blood pressure medication and hormone replacement therapy.    Current occupation is .   Primary job tasks include:  Computer work and prolonged sitting.  Sits a lot at work.                    Outcome measure:   NEXT- did not see them today  Return to MD:  As needed.      Clinical Impression: Patricia presents to [facility] [place] with primary complaint of R sided sciatic pain.  Per clinical examination, pt with loss of ROM, joint mobility and core strength.  Pt with slight R  illial rotation noted with joint hypomobility today.  Trial of manual therapy with increased extension positioning throughout the day.  Pt will benefit from skilled physical therapy intervention to improve pain and overall function.                              Objective:  Standing Alignment:    Cervical/Thoracic:  Thoracic kyphosis increased    Transfers:   Sit to stand: use of UE, femoral IR/adduction with use of UE    Palpation;   TTP through R PSIS, piriformis, greater trocantaric region, hamstring and into gastroc     Pt with positive tenderness through R gastroc, absent redness and edema.  .    HPI                  System         Lumbar/SI Evaluation  ROM:    AROM Lumbar:   Flexion:            75% no pain into LE   Ext:                    Feels good - has not helped the leg    Side Bend:        Left:  WFL no incr in radicular pain     Right:  WFL no incr in radicular pain   Rotation:           Left:  100%     Right:  75% pain L end range   Side Glide:        Left:     Right:           Lumbar Myotomes:    T12-L3 (Hip Flex):  Left: 4+    Right: 4+  L2-4 (Quads):  Left:  5    Right:  5  L4 (Ankle DF):  Left:  5    Right:  5  L5 (Great Toe Ext): Left: 5    Right: 5   S1 (Toe Raise):  Left: 5    Right: 5  Lumbar DTR's:    L4 (Quad):  Left:  2   Right:  1  S1 (Achilles):  Left:  2   Right:  1  Cord Signs:  normal    Lumbar Dermtomes:                S1 Right:  Hypo-light touch  Neural Tension/Mobility:  Neural tension wnl lumbar: +R Hamstring             Lumbar Provocation:  Lumbar provocation: (+) CHELA B for posterior pain, + L FADIR anterior pain     Left negative with:  PROM hip    Right negative with:  PROM hip    SI joint/Sacrum:          Left negative at:    Thigh thrust    Right negative at:  Thigh thrust    Sacral conclusion right:  Anterior inominate                                             General     ROS    Assessment/Plan:    Patient is a 62 year old female with lumbar complaints.    Patient has the  following significant findings with corresponding treatment plan.                Diagnosis 1:  R lumbar radiculopathy    Pain -  hot/cold therapy, US, manual therapy, self management, education and home program  Decreased ROM/flexibility - manual therapy and therapeutic exercise  Decreased joint mobility - manual therapy and therapeutic exercise  Decreased strength - therapeutic exercise and therapeutic activities  Impaired muscle performance - neuro re-education  Decreased function - therapeutic activities  Impaired posture - neuro re-education    Therapy Evaluation Codes:   1) History comprised of:   Personal factors that impact the plan of care:      Past/current experiences.    Comorbidity factors that impact the plan of care are:       asthma, depression, heart problems, high blood pressure, numbness/tingling, overweight and sleep disorder/apnea..     Medications impacting care: Anti-depressants, anti-inflammatory, cardiac medication, high blood pressure medication and hormone replacement therapy.  .  2) Examination of Body Systems comprised of:   Body structures and functions that impact the plan of care:      Lumbar spine, Pelvis and Sacral illiac joint.   Activity limitations that impact the plan of care are:      Sitting and Squatting/kneeling.  3) Clinical presentation characteristics are:   Evolving/Changing.  4) Decision-Making    High complexity using standardized patient assessment instrument and/or measureable assessment of functional outcome.  Cumulative Therapy Evaluation is: High complexity.    Previous and current functional limitations:  (See Goal Flow Sheet for this information)    Short term and Long term goals: (See Goal Flow Sheet for this information)     Communication ability:  Patient appears to be able to clearly communicate and understand verbal and written communication and follow directions correctly.  Treatment Explanation - The following has been discussed with the patient:   RX  ordered/plan of care  Anticipated outcomes  Possible risks and side effects  This patient would benefit from PT intervention to resume normal activities.   Rehab potential is good.    Frequency:  1 X week, once daily  Duration:  for 4 weeks tapering to 2 X a month over 1 months  Discharge Plan:  Achieve all LTG.  Independent in home treatment program.  Reach maximal therapeutic benefit.    Please refer to the daily flowsheet for treatment today, total treatment time and time spent performing 1:1 timed codes.

## 2022-04-25 ENCOUNTER — THERAPY VISIT (OUTPATIENT)
Dept: PHYSICAL THERAPY | Facility: CLINIC | Age: 62
End: 2022-04-25
Attending: PHYSICIAN ASSISTANT
Payer: COMMERCIAL

## 2022-04-25 ENCOUNTER — IMMUNIZATION (OUTPATIENT)
Dept: NURSING | Facility: CLINIC | Age: 62
End: 2022-04-25

## 2022-04-25 DIAGNOSIS — M54.50 LOW BACK PAIN: Primary | ICD-10-CM

## 2022-04-25 DIAGNOSIS — M54.42 CHRONIC LEFT-SIDED LOW BACK PAIN WITH LEFT-SIDED SCIATICA: ICD-10-CM

## 2022-04-25 DIAGNOSIS — G89.29 CHRONIC LEFT-SIDED LOW BACK PAIN WITH LEFT-SIDED SCIATICA: ICD-10-CM

## 2022-04-25 PROCEDURE — 97035 APP MDLTY 1+ULTRASOUND EA 15: CPT | Mod: GP | Performed by: PHYSICAL THERAPIST

## 2022-04-25 PROCEDURE — 97110 THERAPEUTIC EXERCISES: CPT | Mod: GP | Performed by: PHYSICAL THERAPIST

## 2022-04-25 PROCEDURE — 97140 MANUAL THERAPY 1/> REGIONS: CPT | Mod: GP | Performed by: PHYSICAL THERAPIST

## 2022-04-25 PROCEDURE — 0064A COVID-19,PF,MODERNA (18+ YRS BOOSTER .25ML): CPT

## 2022-04-25 PROCEDURE — 91306 COVID-19,PF,MODERNA (18+ YRS BOOSTER .25ML): CPT

## 2022-04-28 ENCOUNTER — TELEPHONE (OUTPATIENT)
Dept: FAMILY MEDICINE | Facility: CLINIC | Age: 62
End: 2022-04-28
Payer: COMMERCIAL

## 2022-04-28 DIAGNOSIS — E16.2 LOW BLOOD SUGAR: ICD-10-CM

## 2022-04-28 NOTE — TELEPHONE ENCOUNTER
[facility] calling stating they need clarification on test strips. Per patient's insurance can not be 2-3.  Need to specify 2 or 3.  Please update rx and resend.

## 2022-05-12 ENCOUNTER — VIRTUAL VISIT (OUTPATIENT)
Dept: PSYCHOLOGY | Facility: CLINIC | Age: 62
End: 2022-05-12
Payer: COMMERCIAL

## 2022-05-12 DIAGNOSIS — F33.1 MODERATE EPISODE OF RECURRENT MAJOR DEPRESSIVE DISORDER (H): Primary | ICD-10-CM

## 2022-05-12 PROCEDURE — 90834 PSYTX W PT 45 MINUTES: CPT | Mod: GT | Performed by: MARRIAGE & FAMILY THERAPIST

## 2022-05-12 NOTE — PROGRESS NOTES
[facility]                                     Progress Note    Patient Name: Patricia Perez  Date: 22         Service Type: Individual      Session Start Time: 8:01a  Session End Time: 8:40a     Session Length: 39    Session #: 2    Attendees: Client attended alone    Service Modality:  Video Visit:      Provider verified identity through the following two step process.  Patient provided:  Patient     Telemedicine Visit: The patient's condition can be safely assessed and treated via synchronous audio and visual telemedicine encounter.      Reason for Telemedicine Visit: Services only offered telehealth    Originating Site (Patient Location): Patient's home    Distant Site (Provider Location): Provider Remote Setting- Home Office    Consent:  The patient/guardian has verbally consented to: the potential risks and benefits of telemedicine (video visit) versus in person care; bill my insurance or make self-payment for services provided; and responsibility for payment of non-covered services.     Patient would like the video invitation sent by:  Send to e-mail at: [email or web address]    Mode of Communication:  Video Conference via Amwell    As the provider I attest to compliance with applicable laws and regulations related to telemedicine.    DATA  Interactive Complexity: No  Crisis: No        Progress Since Last Session (Related to Symptoms / Goals / Homework):   Symptoms: Improving Mood at 8/10    Homework: Achieved / completed to satisfaction      Episode of Care Goals: Satisfactory progress - ACTION (Actively working towards change); Intervened by reinforcing change plan / affirming steps taken     Current / Ongoing Stressors and Concerns:   Last session , pt reports the past few weeks have overall been going very well and she notes not having many difficult thoughts at all. She did purchase a notebook to use as a tool but did not need to use it due to not having significant  ruminative issues. Since the last appt and being able to speak out her issues out loud, she is feeling much more relief.   PT was able to take some time to see her mother in [place] which was a positive experience. PT notes that her behavior of turning to food as a source of support when she is feeling down has slightly decreased.      Treatment Objective(s) Addressed in This Session:   Increase interest, engagement, and pleasure in doing things       Intervention:   Motivational Interviewing    MI Intervention: Supported Autonomy, Collaboration, Evocation, Permission to raise concern or advise, Open-ended questions and Reflections: simple and complex     Change Talk Expressed by the Patient: Reasons to change Need to change Committment to change    Provider Response to Change Talk: E - Evoked more info from patient about behavior change, A - Affirmed patient's thoughts, decisions, or attempts at behavior change and R - Reflected patient's change talk        ASSESSMENT: Current Emotional / Mental Status (status of significant symptoms):   Risk status (Self / Other harm or suicidal ideation)   Patient denies current fears or concerns for personal safety.   Patient denies current or recent suicidal ideation or behaviors.   Patient denies current or recent homicidal ideation or behaviors.   Patient denies current or recent self injurious behavior or ideation.   Patient denies other safety concerns.   Patient reports there has been no change in risk factors since their last session.     Patient reports there has been no change in protective factors since their last session.     Recommended that patient call 911 or go to the local ED should there be a change in any of these risk factors.     Appearance:   Appropriate    Eye Contact:   Good    Psychomotor Behavior: Normal    Attitude:   Cooperative    Orientation:   All   Speech    Rate / Production: Normal     Volume:  Normal    Mood:    Normal   Affect:    Appropriate     Thought Content:  Clear    Thought Form:  Coherent  Logical    Insight:    Good      Medication Review:   No changes to current psychiatric medication(s)     Medication Compliance:   Yes     Changes in Health Issues:   None reported     Chemical Use Review:   Substance Use: Chemical use reviewed, no active concerns identified      Tobacco Use: No current tobacco use.      Diagnosis:  MDD, moderate, recurrent    Collateral Reports Completed:   Not Applicable    PLAN: (Patient Tasks / Therapist Tasks / Other)  PT will engage in 1x daily healthy coping skills        JENNYFER Dash   5/12/22                                                         ______________________________________________________________________    Individual Treatment Plan    Patient's Name: Patricia Perez  YOB: 1960    Date of Creation: 5/12/22  Date Treatment Plan Last Reviewed/Revised: 8/12/22    DSM5 Diagnoses: 296.32 (F33.1) Major Depressive Disorder, Recurrent Episode, Moderate _  Psychosocial / Contextual Factors: PT working full time  PROMIS (reviewed every 90 days):     Referral / Collaboration:  Referral to another professional/service is not indicated at this time..    Anticipated number of session for this episode of care:   Anticipation frequency of session: Monthly  Anticipated Duration of each session: 38-52 minutes  Treatment plan will be reviewed in 90 days or when goals have been changed.       MeasurableTreatment Goal(s) related to diagnosis / functional impairment(s)  Goal 1: Patient will engage in 1x daily healthy coping skills for mood improvement      Objective #A (Patient Action)    Patient will Increase interest, engagement, and pleasure in doing things.  Status: New - Date: 5/12/22     Intervention(s)  Therapist will teach distraction skills. Coping skills.    Patient has reviewed and agreed to the above plan.      JENNYFER Dash  May 12, 2022

## 2022-06-13 ENCOUNTER — VIRTUAL VISIT (OUTPATIENT)
Dept: PSYCHOLOGY | Facility: CLINIC | Age: 62
End: 2022-06-13
Payer: COMMERCIAL

## 2022-06-13 DIAGNOSIS — F33.1 MODERATE EPISODE OF RECURRENT MAJOR DEPRESSIVE DISORDER (H): Primary | ICD-10-CM

## 2022-06-13 PROCEDURE — 90834 PSYTX W PT 45 MINUTES: CPT | Mod: GT | Performed by: MARRIAGE & FAMILY THERAPIST

## 2022-06-13 NOTE — PROGRESS NOTES
Answers for HPI/ROS submitted by the patient on 2022  If you checked off any problems, how difficult have these problems made it for you to do your work, take care of things at home, or get along with other people?: Somewhat difficult  PHQ9 TOTAL SCORE: 6        [facility]                                     Progress Note    Patient Name: Patricia Perez  Date: 22         Service Type: Individual      Session Start Time: 8:06a  Session End Time: 8:46a     Session Length: 40    Session #: 3    Attendees: Client attended alone    Service Modality:  Video Visit:      Provider verified identity through the following two step process.  Patient provided:  Patient     Telemedicine Visit: The patient's condition can be safely assessed and treated via synchronous audio and visual telemedicine encounter.      Reason for Telemedicine Visit: Services only offered telehealth    Originating Site (Patient Location): Patient's home    Distant Site (Provider Location): Provider Remote Setting- Home Office    Consent:  The patient/guardian has verbally consented to: the potential risks and benefits of telemedicine (video visit) versus in person care; bill my insurance or make self-payment for services provided; and responsibility for payment of non-covered services.     Patient would like the video invitation sent by:  Send to e-mail at: [email or web address]    Mode of Communication:  Video Conference via Amwell    As the provider I attest to compliance with applicable laws and regulations related to telemedicine.    DATA  Interactive Complexity: No  Crisis: No        Progress Since Last Session (Related to Symptoms / Goals / Homework):   Symptoms: Worsening MOod at 5/10    Homework: Partially completed      Episode of Care Goals: Minimal progress - ACTION (Actively working towards change); Intervened by reinforcing change plan / affirming steps taken     Current / Ongoing Stressors and Concerns:   Last  session 5/12, pt reports the past few weeks have been positive in her engaging in positive activities however is still having some down periods of time. PT notes that her depressive periods are lasting multiple days and she feels the likely causes are some of the larger society topics.      Treatment Objective(s) Addressed in This Session:   Identify negative self-talk and behaviors: challenge core beliefs, myths, and actions       Intervention:   Motivational Interviewing    MI Intervention: Permission to raise concern or advise, Open-ended questions and Reflections: simple and complex     Change Talk Expressed by the Patient: Reasons to change Need to change Committment to change    Provider Response to Change Talk: E - Evoked more info from patient about behavior change, A - Affirmed patient's thoughts, decisions, or attempts at behavior change and R - Reflected patient's change talk      ASSESSMENT: Current Emotional / Mental Status (status of significant symptoms):   Risk status (Self / Other harm or suicidal ideation)   Patient denies current fears or concerns for personal safety.   Patient denies current or recent suicidal ideation or behaviors.   Patient denies current or recent homicidal ideation or behaviors.   Patient denies current or recent self injurious behavior or ideation.   Patient denies other safety concerns.   Patient reports there has been no change in risk factors since their last session.     Patient reports there has been no change in protective factors since their last session.     Recommended that patient call 911 or go to the local ED should there be a change in any of these risk factors.     Appearance:   Appropriate    Eye Contact:   Good    Psychomotor Behavior: Normal    Attitude:   Cooperative    Orientation:   All   Speech    Rate / Production: Normal     Volume:  Normal    Mood:    Normal   Affect:    Appropriate    Thought Content:  Clear    Thought Form:  Coherent  Logical     Insight:    Good      Medication Review:   No changes to current psychiatric medication(s)     Medication Compliance:   Yes     Changes in Health Issues:   None reported     Chemical Use Review:   Substance Use: Chemical use reviewed, no active concerns identified      Tobacco Use: No current tobacco use.      Diagnosis:  MDD, recurrent moderate    Collateral Reports Completed:   Not Applicable    PLAN: (Patient Tasks / Therapist Tasks / Other)  PT will engage in exercise at least 3x weekly for at least 30 minutes        JENNYFER Dash  6/13/22                                                         ______________________________________________________________________  Individual Treatment Plan     Patient's Name: Patricia Perez             YOB: 1960     Date of Creation: 5/12/22  Date Treatment Plan Last Reviewed/Revised: 8/12/22     DSM5 Diagnoses: 296.32 (F33.1) Major Depressive Disorder, Recurrent Episode, Moderate _  Psychosocial / Contextual Factors: PT working full time  PROMIS (reviewed every 90 days):      Referral / Collaboration:  Referral to another professional/service is not indicated at this time..     Anticipated number of session for this episode of care:   Anticipation frequency of session: Monthly  Anticipated Duration of each session: 38-52 minutes  Treatment plan will be reviewed in 90 days or when goals have been changed.         MeasurableTreatment Goal(s) related to diagnosis / functional impairment(s)  Goal 1: Patient will engage in 1x daily healthy coping skills for mood improvement        Objective #A (Patient Action)                          Patient will Increase interest, engagement, and pleasure in doing things.  Status: New - Date: 5/12/22      Intervention(s)  Therapist will teach distraction skills. Coping skills.     Patient has reviewed and agreed to the above plan.        JENNYFER Dash                       May 12, 2022

## 2022-06-29 ENCOUNTER — TELEPHONE (OUTPATIENT)
Dept: ENDOCRINOLOGY | Facility: CLINIC | Age: 62
End: 2022-06-29

## 2022-06-29 NOTE — TELEPHONE ENCOUNTER
[facility]    Phone Message    May a detailed message be left on voicemail: yes     Reason for Call: Appointment Intake    Referring Provider Name: Funmilayo Grubbs  Diagnosis and/or Symptoms: Low glucose levels    Per referral needs to be seen 1-2 weeks. Pt wondering if okay that it is 1 month out.     Pt also wanting to know if she should continue her Madeleine device or not. Please advise.       Action Taken: Message routed to:  Other: endo    Travel Screening: Not Applicable

## 2022-06-30 NOTE — TELEPHONE ENCOUNTER
RECORDS RECEIVED FROM: internal    DATE RECEIVED: 8/2/22    NOTES (FOR ALL VISITS) STATUS DETAILS   OFFICE NOTES from referring provider internal  Funmilayo Grubbs   OFFICE NOTES from other specialist internal       MEDICATION LIST internal       LABS     DIABETES: HBGA1C, CREATININE, FASTING LIPIDS, MICROALBUMIN URINE, POTASSIUM, TSH, T4, THYROID: TSH, T4, CBC, THYRODLONULIN, TOTAL T3, FREE T4, CALCITONIN, CEA internal   HBGA1C- 1.28.22   CMP- 1/28/22  Cbc- 1/28/22  TSH/T4- 8.26.21  Lipid- 6.30.21

## 2022-07-02 NOTE — TELEPHONE ENCOUNTER
To schedulers : please schedule with me on  consult service for 7/5/2022 AM at 0900. This is a  a virtual visit.      Ingrid Serrato MD  Endocrine triage

## 2022-07-05 NOTE — TELEPHONE ENCOUNTER
Patient returning call to discuss below appointment request, Per patient she was not able to schedule appointment time but is interested in another time if able. Please call to discuss thank you.

## 2022-07-06 ENCOUNTER — TELEPHONE (OUTPATIENT)
Dept: ENDOCRINOLOGY | Facility: CLINIC | Age: 62
End: 2022-07-06

## 2022-07-06 NOTE — TELEPHONE ENCOUNTER
ARYA 07/06/2022 with preferred number to schedule pt for virtual appointment with Dr. Serrato on 07/07/2022 at 0730. Appointment will need to be manually scheduled.

## 2022-07-06 NOTE — TELEPHONE ENCOUNTER
"Accidentally left message instead of document, message contents from self:    \"LVM 07/06/2022 to schedule appointment on 07/07/2022 with Dr. Serrato at 0730. Appointment will need to be manually scheduled.\"  "

## 2022-07-06 NOTE — TELEPHONE ENCOUNTER
To schedulers : please schedule with met at 0730 AM 7/7/2022 virtual appt.  Make sure we have her BS download in advance.  Thanks    Ingrid Serrato MD  Endocrine triage

## 2022-07-06 NOTE — TELEPHONE ENCOUNTER
Dr. Serrato,  It looks like your ANKIT spots are booked for this week, however, I can schedule this patient in a ANKIT/consult spot with Dr. Solis early next week. Would this be appropriate?  Thanks,  Chemo

## 2022-07-07 ENCOUNTER — VIRTUAL VISIT (OUTPATIENT)
Dept: ENDOCRINOLOGY | Facility: CLINIC | Age: 62
End: 2022-07-07
Payer: COMMERCIAL

## 2022-07-07 DIAGNOSIS — E16.2 HYPOGLYCEMIA: Primary | ICD-10-CM

## 2022-07-07 DIAGNOSIS — Z98.84 H/O BARIATRIC SURGERY: ICD-10-CM

## 2022-07-07 PROCEDURE — 99204 OFFICE O/P NEW MOD 45 MIN: CPT | Mod: 95

## 2022-07-07 NOTE — LETTER
7/7/2022       RE: Patricia Perez  [address] [facility] [address]     Dear Colleague,    Thank you for referring your patient, Patricia Perez, to the [facility] [place] at [facility]. Please see a copy of my visit note below.    Outcome for 07/07/22 7:10 AM: Madeleine emailed to provider  Bibiana Valladares, JAMIE     Patricia Perez  is being evaluated via a billable video visit.      How would you like to obtain your AVS? MyChart  For the video visit, send the invitation by: Send to e-mail at: [email or web address]  Will anyone else be joining your video visit? No    Endocrine Consult Video visit note-     Attending Assessment/Plan :     Hypoglycemia in patient with history of RYGB.  She notes the low glucose follows high glucose  Try hypoglycemia bypass diet  Get another madeleine  And let us know how it goes.     Bariatric surgery 2012 RYGB     193# (BMI 34.2) by report and wants to lose weight .    Kidney stone on CT 2017.  History of high PTH .  Not addressed.      Post menopausal status     Due to the COVID 19 pandemic this visit was a video visit in order to help prevent spread of infection in this patient and the general population. The patient gave verbal consent for the visit today. I have independently reviewed and interpreted labs, imaging as indicated.     Chart review/prep time 1  0825-0849  Visit Start time  0838 AM   Visit Stop time 0847   _38_ minutes spent on the date of the encounter doing chart review, history and exam, documentation and further activities as noted above.      Chief complaint:  Patricia is a 62 year old female seen in consultation at the request of Funmilayo Grubbs for low glucose   I have reviewed Care Everywhere including [facility],lab reports, imaging reports and provider notes as indicated.  This has no relevant data     HISTORY OF PRESENT ILLNESS  Patricia states she was never treated for DM before the bariatric  "surgery.  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      She notes that if she eats sugar she has high glucose followed by low glucose \"crash\".  She used to get very hot , feel it coming, shaky.  She would drink waer and eat protein.  In the past it helped.  Now she doesn't feel the crashes anymore.  She has cut way back on sugar.  She gained weight during the pandemic (estimated 30-35#, currently at 193#).    She is trying to lose weight again.     She been using a CGM.  She last had a low glucose   Specific dates  6/19 glucose 61 around  0915 AM  6/21/2022 49 around 0630 AM followed ahigh glucose around 0500  6/22 49 around 1730 pM  6/23/2022 glucose 62 around 830 AM  6/24/22 erick glucose 52 around 8 PM  6/26/22 erick glucose 63 around 9 PM   7/2/2022 erick 62 around 0800 followed a high glucose   She has a glucose meter.  She used to test the BS with symptoms. Lowest Was 59 .      Endocrine relevant labs are as follows:  3/25/09 cortisol 10.6  11/30/12 TSH 0.97  5/29/13 25OHD 27  3/1/18 25OHD 38  8/26/2021 ferritin 6, TSH 1.33  1/28/2022 HgbA1c 5.6, glucose 91, creatinine 0.84            She has seen Dr Perez in the past, most recently 2013 for her parathyroid. She saw Dr Mckay in the past in the [facility] clinic.     Relevant imaging is as follows: (as read by me as it pertains to endocrine relevant organs)  4/29/17 CT abdomen: kidney stone    REVIEW OF SYSTEMS  10 system ROS otherwise as per the HPI or negative    Past Medical History  Past Medical History:   Diagnosis Date     Anxiety      Arthritis      ASCUS of cervix with negative high risk HPV 01/23/2006     Asthma      Bursitis of shoulder 03/04/2010     Chronic rhinitis 03/25/2009     Coronary artery disease      Depression      Dyspnea on exertion      GERD (gastroesophageal reflux disease) 10/14/2008     Hypertension      Idiopathic cardiomyopathy (H) 01/08/2009     Indigestion      Itchy eyes      Left leg " pain 06/16/2011     Motion sickness 02/27/2008     Nasal congestion      Pneumonia      Problems related to lack of adequate sleep      Ringing in ears      Sleep apnea 03/25/2009     Sneezing      Past Surgical History:   Procedure Laterality Date     BARIATRIC SURGERY       COLONOSCOPY N/A 11/11/2021    Procedure: COLONOSCOPY, WITH POLYPECTOMY;  Surgeon: Stephanie Meneses MD;  [place] [facility] [place]     DIABETES RESOURCES  As Child    Tonsillectomy     ENT SURGERY       ESOPHAGOSCOPY, GASTROSCOPY, DUODENOSCOPY (EGD), COMBINED N/A 11/11/2021    Procedure: ESOPHAGOGASTRODUODENOSCOPY, WITH BIOPSY;  Surgeon: Stephanie Meneses MD;  [place] [facility] [place]     HERNIA REPAIR       LAPAROSCOPIC BYPASS GASTRIC  10/16/2012    Procedure: LAPAROSCOPIC BYPASS GASTRIC;  laparoscopic reyna en y gastric bypass;  Surgeon: Nish Bradford MD;  Location: [facility] OR     TONSILLECTOMY       Uretural Sticture  As Child       Medications  Current Outpatient Medications   Medication Sig Dispense Refill     albuterol (PROAIR HFA/PROVENTIL HFA/VENTOLIN HFA) 108 (90 Base) MCG/ACT inhaler Inhale 2 puffs into the lungs every 6 hours as needed for shortness of breath / dyspnea or wheezing 18 g 3     aspirin (ASA) 81 MG EC tablet Take 1 tablet (81 mg) by mouth daily 90 tablet 3     azelastine (ASTELIN) 0.1 % nasal spray Spray 2 sprays into both nostrils 2 times daily as needed for rhinitis 30 mL 3     azelastine (OPTIVAR) 0.05 % ophthalmic solution Apply 1 drop to eye 2 times daily as needed (itchy/watery eyes) 6 mL 3     blood glucose (NO BRAND SPECIFIED) lancing device Device to be used with lancets. 3 each 3     blood glucose (NO BRAND SPECIFIED) test strip Use to test blood sugar 3 times daily 300 strip 3     calcium carbonate-vitamin D (CALTRATE 600+D) 600-400 MG-UNIT CHEW Take 1 chew tab by mouth 2 times daily 180 tablet 3     carvedilol (COREG) 25 MG tablet Take 1 tablet (25 mg) by mouth 2 times daily (with meals) 180 tablet 3     cetirizine (ZYRTEC) 10 MG  tablet Take 1 tablet (10 mg) by mouth daily 30 tablet 3     Continuous Blood Gluc Sensor (FREESTYLE JOHN 14 DAY SENSOR) [facility] 1 each every 14 days Use 1 Sensor every 14 days. Use to read blood sugars per 's instructions. 2 each 5     Cyanocobalamin (VITAMIN B-12 SL) Place under the tongue every other day        estrogen conj-medroxyPROGESTERone (PREMPRO) 0.45-1.5 MG tablet Take 1 tablet by mouth daily 90 tablet 3     ezetimibe (ZETIA) 10 MG tablet Take 1 tablet (10 mg) by mouth daily 90 tablet 3     ferrous sulfate (FEROSUL) 325 (65 Fe) MG tablet Take 1 tablet (325 mg) by mouth 2 times daily 180 tablet 3     hydrALAZINE (APRESOLINE) 10 MG tablet Take 1 tablet (10 mg) by mouth 3 times daily (Patient taking differently: Take 10 mg by mouth 2 times daily) 270 tablet 3     Ibuprofen-diphenhydrAMINE Cit (IBUPROFEN PM PO) Take by mouth At Bedtime       isosorbide mononitrate (IMDUR) 30 MG 24 hr tablet Take 1 tablet (30 mg) by mouth daily 90 tablet 3     lisinopril (ZESTRIL) 5 MG tablet TAKE 1 TABLET(5 MG) BY MOUTH DAILY 90 tablet 3     meclizine (ANTIVERT) 25 MG tablet Take 1 tablet (25 mg) by mouth 3 times daily as needed 30 tablet 1     montelukast (SINGULAIR) 10 MG tablet Take 1 tablet (10 mg) by mouth At Bedtime 90 tablet 3     Multiple Vitamin (MULTI-VITAMIN) per tablet Take 1 tablet by mouth daily. 100 tablet 3     ondansetron (ZOFRAN-ODT) 4 MG ODT tab Take 1 tablet (4 mg) by mouth every 8 hours as needed for nausea 15 tablet 1     simvastatin (ZOCOR) 20 MG tablet Take 1 tablet (20 mg) by mouth At Bedtime 90 tablet 3     ARIPiprazole (ABILIFY) 2 MG tablet Take 2 tablets (4 mg) by mouth daily 60 tablet 3     venlafaxine (EFFEXOR-ER) 225 MG 24 hr tablet Take 1 tablet (225 mg) by mouth daily 30 tablet 3       Allergies  Allergies   Allergen Reactions     Atorvastatin      Leg myalgias       Family History  Family History   Problem Relation Age of Onset     Gastrointestinal Disease Mother         non  "cancerous pancreatic tumor     Hypertension Mother      Heart Disease Mother         A fib     Allergies Mother      Hypertension Father      Lipids Father      Alcohol/Drug Father         Abuse     Depression Father      Respiratory Father         COPD     Cardiovascular Father      Allergies Sister         Poison Ivy     Depression Brother      Hypertension Brother      Allergies Brother         Enviromental/ Bees     Breast Cancer Maternal Grandmother      Glaucoma Paternal Grandmother      Glaucoma Paternal Grandfather      Cerebrovascular Disease Other      Macular Degeneration Other      Diabetes No family hx of      Cancer - colorectal No family hx of      Retinal detachment No family hx of      Amblyopia No family hx of      Thyroid Disease No family hx of      Adrenal Disorder No family hx of      Social History  Social History     Tobacco Use     Smoking status: Never Smoker     Smokeless tobacco: Never Used   Vaping Use     Vaping Use: Never used   Substance Use Topics     Alcohol use: No     Drug use: No     Works at [employer]    Physical Exam  There is no height or weight on file to calculate BMI.   BP Readings from Last 1 Encounters:   03/28/22 130/83      Pulse Readings from Last 1 Encounters:   03/28/22 73      Resp Readings from Last 1 Encounters:   03/28/22 18      Temp Readings from Last 1 Encounters:   03/28/22 98  F (36.7  C) (Tympanic)      SpO2 Readings from Last 1 Encounters:   03/28/22 95%      Wt Readings from Last 1 Encounters:   03/28/22 90.6 kg (199 lb 12.8 oz)      Ht Readings from Last 1 Encounters:   03/28/22 1.6 m (5' 3\")     GENERAL: pleasant woman in no distress. I can see from mid trunk up .   SKIN: Visible skin clear. No significant rash, abnormal pigmentation or lesions.  EYES: Eyes grossly normal to inspection.  No discharge or erythema, or obvious scleral/conjunctival abnormalities.  NECK: visible goiter is not present; no visible neck masses  RESP: No audible wheeze, cough, or " visible cyanosis.  No visible retractions or increased work of breathing.    NEURO: Awake, alert, responds appropriately to questions.  Mentation and speech fluent.  PSYCH:affect normal and appearance well-groomed.      DATA REVIEW     Latest Reference Range & Units 01/28/22 07:26   Sodium 133 - 144 mmol/L 140   Potassium 3.4 - 5.3 mmol/L 4.0   Chloride 94 - 109 mmol/L 111 (H)   Carbon Dioxide 20 - 32 mmol/L 25   Urea Nitrogen 7 - 30 mg/dL 19   Creatinine 0.52 - 1.04 mg/dL 0.84   GFR Estimate >60 mL/min/1.73m2 78   Calcium 8.5 - 10.1 mg/dL 8.8   Anion Gap 3 - 14 mmol/L 4   Albumin 3.4 - 5.0 g/dL 3.6   Protein Total 6.8 - 8.8 g/dL 6.5 (L)   Alkaline Phosphatase 40 - 150 U/L 78   ALT 0 - 50 U/L 36   AST 0 - 45 U/L 12   Bilirubin Total 0.2 - 1.3 mg/dL 0.4   Ferritin 8 - 252 ng/mL 38   Hemoglobin A1C 0.0 - 5.6 % 5.6   Glucose 70 - 99 mg/dL 91   WBC 4.0 - 11.0 10e3/uL 5.5   Hemoglobin 11.7 - 15.7 g/dL 13.1   Hematocrit 35.0 - 47.0 % 40.0   Platelet Count 150 - 450 10e3/uL 249   RBC Count 3.80 - 5.20 10e6/uL 4.15   MCV 78 - 100 fL 96   MCH 26.5 - 33.0 pg 31.6   MCHC 31.5 - 36.5 g/dL 32.8   RDW 10.0 - 15.0 % 14.2   (H): Data is abnormally high  (L): Data is abnormally low      Ingrid Serrato MD

## 2022-07-07 NOTE — PATIENT INSTRUCTIONS
I believe you already have refills for the ofe freestyle continuous glucose sensor  Try the attached diet and let us know how it goes       Post -Gastric Bypass Anti-Hypoglycemia Diet    Low Carbohydrate Foods  Which are unlikely to produce Hypoglycemia  (Eat these foods)    Beef, Pork, Lloyd, Sausage, Chicken and Turkey  Fish and Shellfish  Eggs and Cheese    Peanut butter and nuts    Non-starchy vegetables like Broccoli, Asparagus, spinach ,Mustard Greens, Salad, Cauliflower, Green Beens, Brusel Sprouts and Celery  (avoid potatoes, corn and peas)    Butter, margarine, oil, nonstick cooking spray, Mayonnaise, Salad Dressings, Cream, Cream Cheese, Sour Cream and Whipped Cream    Sugar free Gelatin, Gum and soft drinkgs    Coffee, Tea, Club Soda, Mineral or carbonated water, Bouillon, Broth and Consomme     Condiments and Seasonings    You should also add a daily multivitamin and calcium supplement like calcium carbonate 1250 mg twice daily to your diet.    Food Sources of Carbohydrate to be Avoided  (Don t eat these foods)    Milk, Yogurt, Ice Cream, Ice Milk and Frozen Yogurt    Dried Beans and Peas    Potatoes, Corn, Peas and Squash    Fruits and Fruit Juices    Bread, Potatoes, Cereals, Pasta, Rice, Grains and Flour Products    Desserts, Candy, Sweetened Beverages, Chips and Crackers    Sugar, Syrups, Honey, Jam, Jelly and Molasses

## 2022-07-07 NOTE — PROGRESS NOTES
"Endocrine Consult Video visit note-     Attending Assessment/Plan :     Hypoglycemia in patient with history of RYGB.  She notes the low glucose follows high glucose  Try hypoglycemia bypass diet  Get another ofe  And let us know how it goes.     Bariatric surgery 2012 RYGB     193# (BMI 34.2) by report and wants to lose weight .    Kidney stone on CT 2017.  History of high PTH .  Not addressed.      Post menopausal status     Due to the COVID 19 pandemic this visit was a video visit in order to help prevent spread of infection in this patient and the general population. The patient gave verbal consent for the visit today. I have independently reviewed and interpreted labs, imaging as indicated.     Chart review/prep time 1  [id number]  Visit Start time  0838 AM   Visit Stop time 0847 _38_ minutes spent on the date of the encounter doing chart review, history and exam, documentation and further activities as noted above.      Chief complaint:  Patricia is a 62 year old female seen in consultation at the request of Funmilayo Grubbs for low glucose   I have reviewed Care Everywhere including [facility],lab reports, imaging reports and provider notes as indicated.  This has no relevant data     HISTORY OF PRESENT ILLNESS  Patricia states she was never treated for DM before the bariatric surgery.  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      She notes that if she eats sugar she has high glucose followed by low glucose \"crash\".  She used to get very hot , feel it coming, shaky.  She would drink waer and eat protein.  In the past it helped.  Now she doesn't feel the crashes anymore.  She has cut way back on sugar.  She gained weight during the pandemic (estimated 30-35#, currently at 193#).    She is trying to lose weight again.     She been using a CGM.  She last had a low glucose   Specific dates  6/19 glucose 61 around  0915 AM  6/21/2022 49 around 0630 AM followed андрей glucose around " 0500  6/22 49 around 1730 pM  6/23/2022 glucose 62 around 830 AM  6/24/22 erick glucose 52 around 8 PM  6/26/22 erick glucose 63 around 9 PM   7/2/2022 erick 62 around 0800 followed a high glucose   She has a glucose meter.  She used to test the BS with symptoms. Lowest Was 59 .      Endocrine relevant labs are as follows:  3/25/09 cortisol 10.6  11/30/12 TSH 0.97  5/29/13 25OHD 27  3/1/18 25OHD 38  8/26/2021 ferritin 6, TSH 1.33  1/28/2022 HgbA1c 5.6, glucose 91, creatinine 0.84            She has seen Dr Perez in the past, most recently 2013 for her parathyroid. She saw Dr Mckay in the past in the [facility] clinic.     Relevant imaging is as follows: (as read by me as it pertains to endocrine relevant organs)  4/29/17 CT abdomen: kidney stone    REVIEW OF SYSTEMS  10 system ROS otherwise as per the HPI or negative    Past Medical History  Past Medical History:   Diagnosis Date     Anxiety      Arthritis      ASCUS of cervix with negative high risk HPV 01/23/2006     Asthma      Bursitis of shoulder 03/04/2010     Chronic rhinitis 03/25/2009     Coronary artery disease      Depression      Dyspnea on exertion      GERD (gastroesophageal reflux disease) 10/14/2008     Hypertension      Idiopathic cardiomyopathy (H) 01/08/2009     Indigestion      Itchy eyes      Left leg pain 06/16/2011     Motion sickness 02/27/2008     Nasal congestion      Pneumonia      Problems related to lack of adequate sleep      Ringing in ears      Sleep apnea 03/25/2009     Sneezing      Past Surgical History:   Procedure Laterality Date     BARIATRIC SURGERY       COLONOSCOPY N/A 11/11/2021    Procedure: COLONOSCOPY, WITH POLYPECTOMY;  Surgeon: Stephanie Meneses MD;  [place] [facility] [place]     DIABETES RESOURCES  As Child    Tonsillectomy     ENT SURGERY       ESOPHAGOSCOPY, GASTROSCOPY, DUODENOSCOPY (EGD), COMBINED N/A 11/11/2021    Procedure: ESOPHAGOGASTRODUODENOSCOPY, WITH BIOPSY;  Surgeon: Stephanie Meneses MD;  Location: [facility] [place] [facility]     HERNIA  REPAIR       LAPAROSCOPIC BYPASS GASTRIC  10/16/2012    Procedure: LAPAROSCOPIC BYPASS GASTRIC;  laparoscopic reyna en y gastric bypass;  Surgeon: Nish Bradford MD;  Location: [facility] OR     TONSILLECTOMY       Uretural Sticture  As Child       Medications  Current Outpatient Medications   Medication Sig Dispense Refill     albuterol (PROAIR HFA/PROVENTIL HFA/VENTOLIN HFA) 108 (90 Base) MCG/ACT inhaler Inhale 2 puffs into the lungs every 6 hours as needed for shortness of breath / dyspnea or wheezing 18 g 3     aspirin (ASA) 81 MG EC tablet Take 1 tablet (81 mg) by mouth daily 90 tablet 3     azelastine (ASTELIN) 0.1 % nasal spray Spray 2 sprays into both nostrils 2 times daily as needed for rhinitis 30 mL 3     azelastine (OPTIVAR) 0.05 % ophthalmic solution Apply 1 drop to eye 2 times daily as needed (itchy/watery eyes) 6 mL 3     blood glucose (NO BRAND SPECIFIED) lancing device Device to be used with lancets. 3 each 3     blood glucose (NO BRAND SPECIFIED) test strip Use to test blood sugar 3 times daily 300 strip 3     calcium carbonate-vitamin D (CALTRATE 600+D) 600-400 MG-UNIT CHEW Take 1 chew tab by mouth 2 times daily 180 tablet 3     carvedilol (COREG) 25 MG tablet Take 1 tablet (25 mg) by mouth 2 times daily (with meals) 180 tablet 3     cetirizine (ZYRTEC) 10 MG tablet Take 1 tablet (10 mg) by mouth daily 30 tablet 3     Continuous Blood Gluc Sensor (FREESTYLE JOHN 14 DAY SENSOR) [facility] 1 each every 14 days Use 1 Sensor every 14 days. Use to read blood sugars per 's instructions. 2 each 5     Cyanocobalamin (VITAMIN B-12 SL) Place under the tongue every other day        estrogen conj-medroxyPROGESTERone (PREMPRO) 0.45-1.5 MG tablet Take 1 tablet by mouth daily 90 tablet 3     ezetimibe (ZETIA) 10 MG tablet Take 1 tablet (10 mg) by mouth daily 90 tablet 3     ferrous sulfate (FEROSUL) 325 (65 Fe) MG tablet Take 1 tablet (325 mg) by mouth 2 times daily 180 tablet 3     hydrALAZINE (APRESOLINE)  10 MG tablet Take 1 tablet (10 mg) by mouth 3 times daily (Patient taking differently: Take 10 mg by mouth 2 times daily) 270 tablet 3     Ibuprofen-diphenhydrAMINE Cit (IBUPROFEN PM PO) Take by mouth At Bedtime       isosorbide mononitrate (IMDUR) 30 MG 24 hr tablet Take 1 tablet (30 mg) by mouth daily 90 tablet 3     lisinopril (ZESTRIL) 5 MG tablet TAKE 1 TABLET(5 MG) BY MOUTH DAILY 90 tablet 3     meclizine (ANTIVERT) 25 MG tablet Take 1 tablet (25 mg) by mouth 3 times daily as needed 30 tablet 1     montelukast (SINGULAIR) 10 MG tablet Take 1 tablet (10 mg) by mouth At Bedtime 90 tablet 3     Multiple Vitamin (MULTI-VITAMIN) per tablet Take 1 tablet by mouth daily. 100 tablet 3     ondansetron (ZOFRAN-ODT) 4 MG ODT tab Take 1 tablet (4 mg) by mouth every 8 hours as needed for nausea 15 tablet 1     simvastatin (ZOCOR) 20 MG tablet Take 1 tablet (20 mg) by mouth At Bedtime 90 tablet 3     ARIPiprazole (ABILIFY) 2 MG tablet Take 2 tablets (4 mg) by mouth daily 60 tablet 3     venlafaxine (EFFEXOR-ER) 225 MG 24 hr tablet Take 1 tablet (225 mg) by mouth daily 30 tablet 3       Allergies  Allergies   Allergen Reactions     Atorvastatin      Leg myalgias       Family History  Family History   Problem Relation Age of Onset     Gastrointestinal Disease Mother         non cancerous pancreatic tumor     Hypertension Mother      Heart Disease Mother         A fib     Allergies Mother      Hypertension Father      Lipids Father      Alcohol/Drug Father         Abuse     Depression Father      Respiratory Father         COPD     Cardiovascular Father      Allergies Sister         Poison Ivy     Depression Brother      Hypertension Brother      Allergies Brother         Enviromental/ Bees     Breast Cancer Maternal Grandmother      Glaucoma Paternal Grandmother      Glaucoma Paternal Grandfather      Cerebrovascular Disease Other      Macular Degeneration Other      Diabetes No family hx of      Cancer - colorectal No family  "hx of      Retinal detachment No family hx of      Amblyopia No family hx of      Thyroid Disease No family hx of      Adrenal Disorder No family hx of      Social History  Social History     Tobacco Use     Smoking status: Never Smoker     Smokeless tobacco: Never Used   Vaping Use     Vaping Use: Never used   Substance Use Topics     Alcohol use: No     Drug use: No     Works at [employer]    Physical Exam  There is no height or weight on file to calculate BMI.   BP Readings from Last 1 Encounters:   03/28/22 130/83      Pulse Readings from Last 1 Encounters:   03/28/22 73      Resp Readings from Last 1 Encounters:   03/28/22 18      Temp Readings from Last 1 Encounters:   03/28/22 98  F (36.7  C) (Tympanic)      SpO2 Readings from Last 1 Encounters:   03/28/22 95%      Wt Readings from Last 1 Encounters:   03/28/22 90.6 kg (199 lb 12.8 oz)      Ht Readings from Last 1 Encounters:   03/28/22 1.6 m (5' 3\")     GENERAL: pleasant woman in no distress. I can see from mid trunk up .   SKIN: Visible skin clear. No significant rash, abnormal pigmentation or lesions.  EYES: Eyes grossly normal to inspection.  No discharge or erythema, or obvious scleral/conjunctival abnormalities.  NECK: visible goiter is not present; no visible neck masses  RESP: No audible wheeze, cough, or visible cyanosis.  No visible retractions or increased work of breathing.    NEURO: Awake, alert, responds appropriately to questions.  Mentation and speech fluent.  PSYCH:affect normal and appearance well-groomed.      DATA REVIEW     Latest Reference Range & Units 01/28/22 07:26   Sodium 133 - 144 mmol/L 140   Potassium 3.4 - 5.3 mmol/L 4.0   Chloride 94 - 109 mmol/L 111 (H)   Carbon Dioxide 20 - 32 mmol/L 25   Urea Nitrogen 7 - 30 mg/dL 19   Creatinine 0.52 - 1.04 mg/dL 0.84   GFR Estimate >60 mL/min/1.73m2 78   Calcium 8.5 - 10.1 mg/dL 8.8   Anion Gap 3 - 14 mmol/L 4   Albumin 3.4 - 5.0 g/dL 3.6   Protein Total 6.8 - 8.8 g/dL 6.5 (L)   Alkaline " Phosphatase 40 - 150 U/L 78   ALT 0 - 50 U/L 36   AST 0 - 45 U/L 12   Bilirubin Total 0.2 - 1.3 mg/dL 0.4   Ferritin 8 - 252 ng/mL 38   Hemoglobin A1C 0.0 - 5.6 % 5.6   Glucose 70 - 99 mg/dL 91   WBC 4.0 - 11.0 10e3/uL 5.5   Hemoglobin 11.7 - 15.7 g/dL 13.1   Hematocrit 35.0 - 47.0 % 40.0   Platelet Count 150 - 450 10e3/uL 249   RBC Count 3.80 - 5.20 10e6/uL 4.15   MCV 78 - 100 fL 96   MCH 26.5 - 33.0 pg 31.6   MCHC 31.5 - 36.5 g/dL 32.8   RDW 10.0 - 15.0 % 14.2   (H): Data is abnormally high  (L): Data is abnormally low

## 2022-07-07 NOTE — PROGRESS NOTES
Outcome for 07/07/22 7:10 AM: Madeleine emailed to provider  Bibiana Valladares, JAMIE Perez  is being evaluated via a billable video visit.      How would you like to obtain your AVS? MyChart  For the video visit, send the invitation by: Send to e-mail at: [email or web address]  Will anyone else be joining your video visit? No

## 2022-07-13 ENCOUNTER — VIRTUAL VISIT (OUTPATIENT)
Dept: PSYCHOLOGY | Facility: CLINIC | Age: 62
End: 2022-07-13
Payer: COMMERCIAL

## 2022-07-13 DIAGNOSIS — F33.1 MODERATE EPISODE OF RECURRENT MAJOR DEPRESSIVE DISORDER (H): Primary | ICD-10-CM

## 2022-07-13 PROCEDURE — 90832 PSYTX W PT 30 MINUTES: CPT | Mod: GT | Performed by: MARRIAGE & FAMILY THERAPIST

## 2022-07-13 NOTE — PROGRESS NOTES
[facility]                                     Progress Note    Patient Name: Patricia Perez  Date: 22         Service Type: Individual      Session Start Time: 2:00p  Session End Time: 2:26p     Session Length: 26    Session #: 4    Attendees: Client attended alone    Service Modality:  Video Visit:      Provider verified identity through the following two step process.  Patient provided:  Patient     Telemedicine Visit: The patient's condition can be safely assessed and treated via synchronous audio and visual telemedicine encounter.      Reason for Telemedicine Visit: Services only offered telehealth    Originating Site (Patient Location): Patient's home    Distant Site (Provider Location): Provider Remote Setting- Home Office    Consent:  The patient/guardian has verbally consented to: the potential risks and benefits of telemedicine (video visit) versus in person care; bill my insurance or make self-payment for services provided; and responsibility for payment of non-covered services.     Patient would like the video invitation sent by:  Send to e-mail at: [email or web address]    Mode of Communication:  Video Conference via Amwell    As the provider I attest to compliance with applicable laws and regulations related to telemedicine.    DATA  Interactive Complexity: No  Crisis: No        Progress Since Last Session (Related to Symptoms / Goals / Homework):   Symptoms: Improving Mood at 8/10    Homework: Completed in session      Episode of Care Goals: Satisfactory progress - ACTION (Actively working towards change); Intervened by reinforcing change plan / affirming steps taken     Current / Ongoing Stressors and Concerns:   Last session , pt reports the past month has been much better with improved healthy choices including walking, eating and activities. The depressive periods have become fewer and she is able to balance out her absorption of negative information.    Goal:  Circles of control exercise     Treatment Objective(s) Addressed in This Session:   Increase interest, engagement, and pleasure in doing things       Intervention:   Motivational Interviewing    MI Intervention: Supported Autonomy, Collaboration, Evocation, Permission to raise concern or advise and Open-ended questions     Change Talk Expressed by the Patient: Ability to change Need to change Committment to change    Provider Response to Change Talk: E - Evoked more info from patient about behavior change, A - Affirmed patient's thoughts, decisions, or attempts at behavior change and R - Reflected patient's change talk      ASSESSMENT: Current Emotional / Mental Status (status of significant symptoms):   Risk status (Self / Other harm or suicidal ideation)   Patient denies current fears or concerns for personal safety.   Patient denies current or recent suicidal ideation or behaviors.   Patient denies current or recent homicidal ideation or behaviors.   Patient denies current or recent self injurious behavior or ideation.   Patient denies other safety concerns.   Patient reports there has been no change in risk factors since their last session.     Patient reports there has been no change in protective factors since their last session.     Recommended that patient call 911 or go to the local ED should there be a change in any of these risk factors.     Appearance:   Appropriate    Eye Contact:   Good    Psychomotor Behavior: Normal    Attitude:   Cooperative    Orientation:   All   Speech    Rate / Production: Normal     Volume:  Normal    Mood:    Normal   Affect:    Appropriate    Thought Content:  Clear    Thought Form:  Coherent  Logical    Insight:    Good      Medication Review:   No changes to current psychiatric medication(s)     Medication Compliance:   Yes     Changes in Health Issues:   None reported     Chemical Use Review:   Substance Use: Chemical use reviewed, no active concerns identified       Tobacco Use: No current tobacco use.      Diagnosis:  MDD, recurrent, moderate    Collateral Reports Completed:   Not Applicable    PLAN: (Patient Tasks / Therapist Tasks / Other)  PT will complete circles of control exercise        JENNYFER Dash  7/13/22                                                         ______________________________________________________________________  Individual Treatment Plan     Patient's Name: Patricia Perez             YOB: 1960     Date of Creation: 5/12/22  Date Treatment Plan Last Reviewed/Revised: 8/12/22     DSM5 Diagnoses: 296.32 (F33.1) Major Depressive Disorder, Recurrent Episode, Moderate _  Psychosocial / Contextual Factors: PT working full time  PROMIS (reviewed every 90 days):      Referral / Collaboration:  Referral to another professional/service is not indicated at this time..     Anticipated number of session for this episode of care:   Anticipation frequency of session: Monthly  Anticipated Duration of each session: 38-52 minutes  Treatment plan will be reviewed in 90 days or when goals have been changed.         MeasurableTreatment Goal(s) related to diagnosis / functional impairment(s)  Goal 1: Patient will engage in 1x daily healthy coping skills for mood improvement        Objective #A (Patient Action)                          Patient will Increase interest, engagement, and pleasure in doing things.  Status: New - Date: 5/12/22      Intervention(s)  Therapist will teach distraction skills. Coping skills.     Patient has reviewed and agreed to the above plan.        JENNYFER Dash                       May 12, 2022

## 2022-07-31 ENCOUNTER — MYC MEDICAL ADVICE (OUTPATIENT)
Dept: ENDOCRINOLOGY | Facility: CLINIC | Age: 62
End: 2022-07-31

## 2022-08-02 ENCOUNTER — PRE VISIT (OUTPATIENT)
Dept: ENDOCRINOLOGY | Facility: CLINIC | Age: 62
End: 2022-08-02

## 2022-08-03 DIAGNOSIS — F33.1 MODERATE EPISODE OF RECURRENT MAJOR DEPRESSIVE DISORDER (H): ICD-10-CM

## 2022-08-03 NOTE — TELEPHONE ENCOUNTER
venlafaxine (EFFEXOR-ER) 225 MG    Last refilled: 1/14/22  Qty: 30  : 3    Last seen: 1/14/22  RTC: 3 MOS  Cancel: 0  No-show: 0  Next appt: NONE  Scheduling has been notified to contact the pt for appointment.  30 DAY LINDY Refill pended and routed to the provider for review/determination due to : Pt outside of RTC timeframe   ( April ) /  NO APPT

## 2022-08-04 RX ORDER — VENLAFAXINE HYDROCHLORIDE 225 MG/1
225 TABLET, EXTENDED RELEASE ORAL DAILY
Qty: 30 TABLET | Refills: 0 | Status: SHIPPED | OUTPATIENT
Start: 2022-08-04 | End: 2022-08-15

## 2022-08-15 ENCOUNTER — MYC MEDICAL ADVICE (OUTPATIENT)
Dept: CARDIOLOGY | Facility: CLINIC | Age: 62
End: 2022-08-15

## 2022-08-15 ENCOUNTER — VIRTUAL VISIT (OUTPATIENT)
Dept: PSYCHIATRY | Facility: CLINIC | Age: 62
End: 2022-08-15
Attending: PSYCHIATRY & NEUROLOGY
Payer: COMMERCIAL

## 2022-08-15 DIAGNOSIS — E66.01 SEVERE OBESITY (BMI 35.0-35.9 WITH COMORBIDITY) (H): ICD-10-CM

## 2022-08-15 DIAGNOSIS — Z98.84 HX OF GASTRIC BYPASS: Primary | ICD-10-CM

## 2022-08-15 DIAGNOSIS — F33.1 MODERATE EPISODE OF RECURRENT MAJOR DEPRESSIVE DISORDER (H): ICD-10-CM

## 2022-08-15 PROCEDURE — 90792 PSYCH DIAG EVAL W/MED SRVCS: CPT | Mod: GT | Performed by: STUDENT IN AN ORGANIZED HEALTH CARE EDUCATION/TRAINING PROGRAM

## 2022-08-15 RX ORDER — VENLAFAXINE HYDROCHLORIDE 225 MG/1
225 TABLET, EXTENDED RELEASE ORAL DAILY
Qty: 30 TABLET | Refills: 3 | Status: SHIPPED | OUTPATIENT
Start: 2022-09-02 | End: 2022-11-17 | Stop reason: DRUGHIGH

## 2022-08-15 RX ORDER — ARIPIPRAZOLE 2 MG/1
4 TABLET ORAL DAILY
Qty: 60 TABLET | Refills: 3 | Status: SHIPPED | OUTPATIENT
Start: 2022-09-02 | End: 2023-01-04

## 2022-08-15 ASSESSMENT — PATIENT HEALTH QUESTIONNAIRE - PHQ9
SUM OF ALL RESPONSES TO PHQ QUESTIONS 1-9: 4
SUM OF ALL RESPONSES TO PHQ QUESTIONS 1-9: 4
10. IF YOU CHECKED OFF ANY PROBLEMS, HOW DIFFICULT HAVE THESE PROBLEMS MADE IT FOR YOU TO DO YOUR WORK, TAKE CARE OF THINGS AT HOME, OR GET ALONG WITH OTHER PEOPLE: NOT DIFFICULT AT ALL

## 2022-08-15 NOTE — PATIENT INSTRUCTIONS
Please call [phone] to schedule your weight management appointment.     ~Dr Benítez    **For crisis resources, please see the information at the end of this document**   Patient Education    Thank you for coming to the [facility] [place] [facility].     Lab Testing:  If you had lab testing today and your results are reassuring or normal they will be mailed to you or sent through Podio within 7 days. If the lab tests need quick action we will call you with the results. The phone number we will call with results is # [phone]. If this is not the best number please call our clinic and change the number.     Medication Refills:  If you need any refills please call your pharmacy and they will contact us. Our fax number for refills is [phone].   Three business days of notice are needed for general medication refill requests.   Five business days of notice are needed for controlled substance refill requests.   If you need to change to a different pharmacy, please contact the new pharmacy directly. The new pharmacy will help you get your medications transferred.     Contact Us:  Please call [phone] during business hours (8-5:00 M-F).   If you have medication related questions after clinic hours, or on the weekend, please call [phone].     Financial Assistance [phone]   Medical Records [phone]       MENTAL HEALTH CRISIS RESOURCES:  For a emergency help, please call 911 or go to the nearest Emergency Department.     Emergency Walk-In Options:   EmPATH Unit @ [facility] ([place]): [phone] - Specialized mental health emergency area designed to be calming  [facility] ([place]): [phone]  [facility] ([place]): [phone]  [facility]): [phone]    [facility] Crisis Information:   [place]: [phone]  [place]: [phone]  [place] ([place]) - Adult: [phone]     Child:  [phone]  [place] - Adult: [phone]     Child: [phone]  [place]: [phone]  List of all [facility] resources:   https://[place].gov/dhs/people-we-serve/adults/health-care/mental-health/resources/crisis-contacts.jsp    National Crisis Information:   Crisis Text Line: Text  [place]  to 670916  Suicide & Crisis Lifeline: 988  National Suicide Prevention Lifeline: 0-017-157-TALK (1-443.517.9557)       For online chat options, visit https://suicidepreventionlifeline.org/chat/  Poison Control Center: 9-756-160-6164  Trans Lifeline: 1-366.819.5664 - Hotline for transgender people of all ages  The [employer] Project: 4-922-436-9867 - Hotline for LGBT youth     For Non-Emergency Support:   Fast Tracker: Mental Health & Substance Use Disorder Resources -   https://www.[employer].org/

## 2022-08-15 NOTE — PROGRESS NOTES
Patricia Perez is a 62 year old who has consented to receive services via billable video visit.      Pt will join video visit via: Shruthi  If there are problems joining the visit, send backup video invite via: Send to preferred e-mail: [email or web address]      Originating Location (patient location): Patient's home  Distant Location (provider location): [facility] [place] [facility]    Will anyone else be joining the video visit? No  Answers for HPI/ROS submitted by the patient on 8/15/2022  If you checked off any problems, how difficult have these problems made it for you to do your work, take care of things at home, or get along with other people?: Not difficult at all  PHQ9 TOTAL SCORE: 4

## 2022-08-15 NOTE — PROGRESS NOTES
Video- Visit Details  Type of service:  video visit for diagnostic assessment  Time of service:    Video Start Time:  0810      Video End Time:  0845  Reason for video visit:  Patient convenience   Originating Site (patient location):  [facility] [place]   Location- Patient's home  Distant Site (provider location):  [facility]  Mode of Communication:  Video Conference via [employer]       [facility]  Psychiatry Clinic  TRANSFER of CARE DIAGNOSTIC ASSESSMENT     CARE TEAM:  PCP- Funmilayo Grubbs    Psychotherapist- Delvis Lynch This person is a 62 year old who uses the name Patricia and pronouns she, her.      DIAGNOSIS     Major depressive disorder, recurrent, in partial remission, with seasonal component  ADHD by history     ASSESSMENT   Patricia is doing well overall. Feels medications have been helpful for mood and has also found therapy to be very helpful for coping skills and anxiety management. We also discussed how the increased daylight since last being seen was likely contributing to her improved mood.     Physically, continues to work on exercising and dietary changes though would appreciate more assistance in this.     Plan to continue medications today and place weight management referral. We discussed returning for in-person follow-up appointment in 3 months to complete AIMS exam and reassess mood during period of decreased daylight, she is understanding of and agreeable to coming in-person.     MNPMP review was not needed today.     PLAN                                                                                                                1) Meds-  - Continue aripiprazole 4 mg daily  - Continue venlafaxine  mg daily     -otc vitamin D    2) Psychotherapy- continue individual psychotherapy     3) Next due-  Labs- last AP labs done Feb 2022  EKG- 1/7/20: 447 ms @ 54 bpm  Rating scales- PHQ9, AIMS due at next in-person appointment     4) Referrals-  Weight  "management referral    5) Dispo- RTC in-person 3 months     PERTINENT BACKGROUND                                                    [most recent eval 08/15/22]   Components copy-forwarded from prior assessment. Last reviewed with patient and updated with patient on 08/15/22.     Onset of mental health problems at age 13-14 years old when younger brother passed away. Denies memory of significant trauma or abuse and poor memory childhood. Did well in school and no discpline issues or legal issues during childhood. Father with alcohol use disorder.  Started mental health treatment in . Niece passed away in MVA in , COVID in  both were disruptive to mental health (worsened depression). History gastric bypass in 2018.      Psych pertinent item history includes trauma hx and eating disorder (binge eating)     SUBJECTIVE   Last seen in clinic 8 months ago, at which time medications were continued and patient was encouraged to start bright light therapy. Since last being seen, they report...    Mood: overall good. \"I have down days but they aren't as often or as difficult.\" Niece  in a car accident 2 years ago \"and that really did me in.\" Describes down days as \"I hate my life and I have to try to work up from that idea.\" Just wants to be in bed and \"woe is me.\" Happens ~0-1x per week. These are hours to 1 day in duration. States did not get light therapy which had previously been discussed, though may need when winter returns.     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 Got  3 years ago (first time), recently bought a house within the year. \"pandemic was not good to me. Gained a lot of weight\" working from home, trying to lose weight now.  Hopes to work until age 64. Going to [employer] 2-3x per week, walking when able. She is \"very liberal, politically\" fitness goal is to go to [employer] this fall. Work now blended " "model, going in to the office more frequently than working from home. For fun will yardwork, reading, cooking, thrifting, gave up baking due to weight gain.     Sleep: \"okay\" goes to bed early, up around 4-5 in the AM. Work around 7am.     Stress: moderate    Health: \"trying to get a little healthier\" going to gym, currently getting ~4000 steps per day. Tries to walk a few times per day. Denies fevers/chills, aches/pains. No headaches. Adds \"heart acting up a little\" thinks fluttering more than before (watch says sinus rhytm, suspects she is in flutter). Wt loss surgery then re-gained weight during pandemic. Enjoys sweets.     Substance use: no smoking, no alcohol, no other recreational substances    Meds: \"the meds have been doing a great job.\" Denies side effects, states mostly takes consistently, if she doesn't take them she will have stomach issues.     Therapy: seeing individual therapist \"he's helped immensely\" giving her tools to work through anxieties. States this is first time seeing therapist.     SI: denies     Recent Social History: above    Recent Psych Symptoms:   Depression:  Transient mood changes with hopelessness and low energy <1 day in duration. No SI, no appetite changes   Elevated:  none  Psychosis:  denies  Anxiety:  \"sometimes\"   Sleep: good    Recent Substance Use:  above       FAMILY and SOCIAL HISTORY                                 pt reported   Components copy-forwarded from prior assessment. Last reviewed with patient and updated with patient on 08/15/22.     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 Hopes to retire ~age 64.   Partner/ -  in 2017 to her first , Mitchell, relationship feels very strong   Children- None    "   Living situation- Lives in a home in [place], [place] with  and cat (Ms. Queen), recently purchased house  Social/ Spiritual Support-  Mitchell, three close friends        Feels Safe at Home- yes   Legal- None     Trauma History (self-report)- Younger brother  of Leukemia when patient was in 8th grade. Niece  in MVA on 2020  Early History/Education- Born and raised in [place], [place]. Family is still in [place]. Grad school in [place].  Reports her childhood was jackie, father was an alcoholic.  Lived in a trailer with 4 siblings.        PAST PSYCHIATRIC HISTORY   Components copy-forwarded from prior assessment. Last reviewed with patient and updated with patient on 08/15/22.     SIB- None  Suicide Attempt [#, most recent]- None  Suicidal Ideation Hx- None     Violence/Aggression Hx- None  Psychosis Hx- None  Eating Disorder Hx- Binge eating disorder history. Endorses ongoing stress eating.   Other- None     Psych Hosp [#, most recent]- None  Commitment- None  ECT- None  Outpatient Programs - None   Other - N/A     PAST MED TRIALS      Medication Max Dose (mg) Dates / Duration Helpful? DC Reason / Adverse Effects?   bupropion 300 7326-5350 N Initially helpful then lost efficacy   Adderall 25 1496-5882 N Lack of efficacy   sertraline 100 1367-6511 N Initially helpful then lost efficacy   venlafaxine 225 -current Y     aripiprazole 4 -current Y                                                                   Denies history of allergies     21: Started aripiprazole  3/31/21: Increased aripiprazole to 4 mg daily  21: Discontinued sertraline  10/22/21: No changes.  22: No medication changes. [facility] referral placed. SAD light ordered.   8/15/22: transfer visit, no changes      PAST SUBSTANCE USE HISTORY   Components copy-forwarded from prior assessment. Last reviewed with patient and updated with patient on 08/15/22.     Past Use- Reports history of occasional alcohol use from teens to  "30's \"never drank to the point where I was overembibing\" (does not drink now d/t  being recovering alcoholic), tried marijuana occasionally In the 1970s   Treatment- #, most recent- no  Medical Consequences- no  Legal Consequences- no  Other- N/A     MEDICAL HISTORY and ALLERGY     ALLERGIES: Atorvastatin    Patient Active Problem List   Diagnosis     Major depression, recurrent (H)     Dizziness and giddiness     Motion sickness     GERD (gastroesophageal reflux disease)     Idiopathic cardiomyopathy (H)     Sleep apnea     Hyperlipidemia LDL goal <100     Allergic rhinitis     Hypertension goal BP (blood pressure) < 130/80     Pelvic pain in female     Health Care Home     24 hour contact handout given     Severe obesity (BMI 35.0-35.9 with comorbidity) (H)     Elevated PTHrP level     H/O bariatric surgery     Hx of gastric bypass     Hypovitaminosis D     Allergic rhinitis     Attention deficit disorder of adult     Mild persistent asthma     Low back pain     Hand joint pain     Myopia, bilateral     Dumping syndrome     Benign essential hypertension     Adjustment disorder with depressed mood     Decreased hearing of both ears     BPV (benign positional vertigo), unspecified laterality     Advanced directives, counseling/discussion     Vitamin D deficiency     Coronary artery disease involving native coronary artery of native heart without angina pectoris     Acute pain of left knee     Syncope     Posterior vitreous detachment of both eyes     Combined forms of age-related cataract, mild, of both eyes     Glaucoma suspect of both eyes     Chronic left-sided low back pain with left-sided sciatica     Sciatica of right side     Hypoglycemia        MEDICAL REVIEW OF SYSTEMS     A comprehensive review of systems was performed and is negative other than noted in the HPI.     MEDICATIONS     Current Outpatient Medications   Medication Sig Dispense Refill     albuterol (PROAIR HFA/PROVENTIL HFA/VENTOLIN HFA) " 108 (90 Base) MCG/ACT inhaler Inhale 2 puffs into the lungs every 6 hours as needed for shortness of breath / dyspnea or wheezing 18 g 3     ARIPiprazole (ABILIFY) 2 MG tablet Take 2 tablets (4 mg) by mouth daily 60 tablet 3     aspirin (ASA) 81 MG EC tablet Take 1 tablet (81 mg) by mouth daily 90 tablet 3     azelastine (ASTELIN) 0.1 % nasal spray Spray 2 sprays into both nostrils 2 times daily as needed for rhinitis 30 mL 3     azelastine (OPTIVAR) 0.05 % ophthalmic solution Apply 1 drop to eye 2 times daily as needed (itchy/watery eyes) 6 mL 3     blood glucose (NO BRAND SPECIFIED) lancing device Device to be used with lancets. 3 each 3     blood glucose (NO BRAND SPECIFIED) test strip Use to test blood sugar 3 times daily 300 strip 3     calcium carbonate-vitamin D (CALTRATE 600+D) 600-400 MG-UNIT CHEW Take 1 chew tab by mouth 2 times daily 180 tablet 3     carvedilol (COREG) 25 MG tablet Take 1 tablet (25 mg) by mouth 2 times daily (with meals) 180 tablet 3     cetirizine (ZYRTEC) 10 MG tablet Take 1 tablet (10 mg) by mouth daily 30 tablet 3     Continuous Blood Gluc Sensor (sMedioSTYLE JOHN 14 DAY SENSOR) [facility] 1 each every 14 days Use 1 Sensor every 14 days. Use to read blood sugars per 's instructions. 2 each 5     Cyanocobalamin (VITAMIN B-12 SL) Place under the tongue every other day        estrogen conj-medroxyPROGESTERone (PREMPRO) 0.45-1.5 MG tablet Take 1 tablet by mouth daily 90 tablet 3     ezetimibe (ZETIA) 10 MG tablet Take 1 tablet (10 mg) by mouth daily 90 tablet 3     ferrous sulfate (FEROSUL) 325 (65 Fe) MG tablet Take 1 tablet (325 mg) by mouth 2 times daily 180 tablet 3     hydrALAZINE (APRESOLINE) 10 MG tablet Take 1 tablet (10 mg) by mouth 3 times daily (Patient taking differently: Take 10 mg by mouth 2 times daily) 270 tablet 3     Ibuprofen-diphenhydrAMINE Cit (IBUPROFEN PM PO) Take by mouth At Bedtime       isosorbide mononitrate (IMDUR) 30 MG 24 hr tablet Take 1 tablet (30 mg)  "by mouth daily 90 tablet 3     lisinopril (ZESTRIL) 5 MG tablet TAKE 1 TABLET(5 MG) BY MOUTH DAILY 90 tablet 3     meclizine (ANTIVERT) 25 MG tablet Take 1 tablet (25 mg) by mouth 3 times daily as needed 30 tablet 1     montelukast (SINGULAIR) 10 MG tablet Take 1 tablet (10 mg) by mouth At Bedtime 90 tablet 3     Multiple Vitamin (MULTI-VITAMIN) per tablet Take 1 tablet by mouth daily. 100 tablet 3     ondansetron (ZOFRAN-ODT) 4 MG ODT tab Take 1 tablet (4 mg) by mouth every 8 hours as needed for nausea 15 tablet 1     simvastatin (ZOCOR) 20 MG tablet Take 1 tablet (20 mg) by mouth At Bedtime 90 tablet 3     venlafaxine (EFFEXOR-ER) 225 MG 24 hr tablet Take 1 tablet (225 mg) by mouth daily *PLEASE SCHEDULE APPT. FOR REFILLS  [phone] 30 tablet 0      VITALS   LMP 07/16/2012     MENTAL STATUS EXAM     Alertness: alert  and oriented  Appearance: well groomed  Behavior/Demeanor: cooperative, pleasant and calm, with good  eye contact   Speech: regular rate and rhythm  Language: intact  Psychomotor: normal or unremarkable  Mood: \"good\"  Affect: full range; congruent to: mood- yes, content- yes  Thought Process/Associations: unremarkable  Thought Content:  Reports none;  Denies suicidal & violent ideation and delusions  Perception:  Reports none;  Denies auditory hallucinations and visual hallucinations  Insight: good - acknowledges and describes mental health symptoms  Judgment: good  Cognition: not formally assessed, adequate for brief interview  Gait and Station: N/A (telehealth)     LABS and DATA     PHQ 4/13/2022 6/13/2022 8/15/2022   PHQ-9 Total Score 6 6 4   Q9: Thoughts of better off dead/self-harm past 2 weeks Not at all Not at all Not at all       Recent Labs   Lab Test 01/28/22  0726 06/30/21  0812   CR 0.84 0.77   GFRESTIMATED 78 82     Recent Labs   Lab Test 01/28/22  0726 01/07/20  0740   AST 12 12   ALT 36 24   ALKPHOS 78 83         ECG on 1/7/20 QTc = 447 ms @ 54 bpm. Sinus bradycardia     PSYCHOTROPIC " DRUG INTERACTIONS                                                       PSYCHCLINICDDI   Per [employer]    Concurrent use of ARIPIPRAZOLE and QT INTERVAL PROLONGING DRUGS may result in increased risk of QT interval prolongation.    MANAGEMENT:  Monitoring for adverse effects and periodic EKGs     RISK STATEMENT for SAFETY     Patricia did not appear to be an imminent safety risk to self or others.    TREATMENT RISK STATEMENT: The risks, benefits, alternatives and potential adverse effects have been discussed and are understood by the pt. The pt understands the risks of using street drugs or alcohol. There are no medical contraindications, the pt agrees to treatment with the ability to do so. The pt knows to call the clinic for any problems or to access emergency care if needed.  Medical and substance use concerns are documented above.  Psychotropic drug interaction check was done, including changes made today.     PROVIDER: Allyson Benítez MD    Patient not staffed in clinic.  Note will be reviewed and signed by supervisor Dr. Javier.

## 2022-08-31 ENCOUNTER — VIRTUAL VISIT (OUTPATIENT)
Dept: CARDIOLOGY | Facility: CLINIC | Age: 62
End: 2022-08-31
Attending: INTERNAL MEDICINE
Payer: COMMERCIAL

## 2022-08-31 DIAGNOSIS — R00.2 PALPITATIONS: Primary | ICD-10-CM

## 2022-08-31 DIAGNOSIS — I10 HYPERTENSION, UNSPECIFIED TYPE: ICD-10-CM

## 2022-08-31 DIAGNOSIS — E78.5 DYSLIPIDEMIA: ICD-10-CM

## 2022-08-31 PROCEDURE — 99215 OFFICE O/P EST HI 40 MIN: CPT | Mod: 95 | Performed by: INTERNAL MEDICINE

## 2022-08-31 NOTE — LETTER
2022      RE: Patricia Perez  [address] [place] [address]       Dear Colleague,    Thank you for the opportunity to participate in the care of your patient, Patricia Perez, at the [facility]. Please see a copy of my visit note below.      Service Date: 2022    JOHN Yun  [facility]  [address]  [place] [address]     RE: Patricia Perez  [id number]  : 1960    Dear Ms. Grubbs:      It was a pleasure participating in the care of your patient, Ms. Patricia Perez.  As you know, she is a 62-year-old lady who I saw over virtual video visit via [employer] for history of nonischemic cardiomyopathy, hypertension and hyperlipidemia as well as palpitations.    Her past medical history is significant for the followin.  Hypertension.  2.  Hyperlipidemia.  3.  Sleep apnea, on CPAP.  4.  Depression.  5.  Gastroesophageal reflux disease.  6.  Asthma.  7.  Obesity.    8.  Status post gastric bypass surgery.    Her cardiac history is significant for a nonischemic cardiomyopathy that was mild to moderate in degree in .  Coronary angiogram revealed only mild disease in the LAD and right coronary territories.    She had an episode of syncope 2020.  At that time, coronary CTA 2020 revealed the following:    Left main okay.  LAD less than 50% proximal to mid stenosis.  Circumflex less than 50% proximal stenosis.  RCA less than 30% proximal stenosis.    Zio patch monitor and echo were unremarkable at that time.    I last saw her 2022.  At that time, she was doing well.  She presents today for continuing care.    Since our last visit, she said that over the past 3 weeks, she has had some palpitations.  She feels that her heart experiences an extra beat and skipped beats here and there and she feels it more in the mornings before her pills.  It  makes her short of  breath, but not dizzy or lightheaded.  She feels the beat go up into her throat without racing.  She often feels it is before her 2 cups of coffee and they can last for up to 2 hours coming and going.  Again, they are not a constant in terms of racing, but rather isolated skipped beats that occur again and again.    Her blood pressures at home; however, have been steady at 118-120/68 with a pulse in the 60s.      She is exercising, walking for 30 minutes a day, getting over 4000 steps a day and also working with a  for some light weights.    She otherwise denies gross chest pain, PND, orthopnea, edema, syncope or near syncope.    CURRENT MEDICATIONS:      1.  Aspirin 81 mg a day.  2.  Carvedilol 25 mg twice daily.  3.  Prempro.  4.  Ezetimibe 10 mg a day.  5.  Hydralazine 10 mg twice daily.  6.  Isosorbide mononitrate 30 mg a day.  7.  Lisinopril 5 mg a day.  8.  Meclizine.  9.  Ondansetron.  10.  Simvastatin 20 mg a day.  11.  Venlafaxine.    PHYSICAL EXAMINATION:      VITAL SIGNS:  Her blood pressure is 119/68 with a pulse of 62.  GENERAL:  She appears comfortable, well groomed.  PSYCHIATRIC:  She is alert and oriented x3.  HEENT:  Eyes do not appear grossly erythematous or have exudate.  RESPIRATORY:  She is breathing comfortably without gross cough.    The remainder of the comprehensive physical exam was deferred secondary to the COVID-19 pandemic and secondary to video visit restrictions.    LABORATORY:  On 06/30/2021, LDL 64.  On 01/28/2022, potassium 4.0, GFR normal.  Hemoglobin normal.    Echocardiogram 01/07/2020 reveals ejection fraction of 50%-55%.  No significant valvular pathology identified.  No significant change since 2016.      IMPRESSION:      Patricia is a 62-year-old lady who has several active cardiac issues:    1.  Palpitations.    She gives a 3-week history of palpitations described as skipped beats that come and go, that usually happen in the mornings that make her feel short of  breath, but not dizzy or lightheaded.  They are not racing or sustained in nature and she cannot identify a particular pattern to them.  She is very concerned about them and further noninvasive evaluation would be indicated.    2.  Prior prior mild nonischemic cardiomyopathy.    In , her ejection fraction was in the 40%-45% range.  With medical therapy, her ejection fraction improved to the 50% range as of echo 2020.  She had only mild nonobstructive coronary artery disease by coronary CTA 2020.    She is currently asymptomatic with low moderate exertion.  Continue to monitor clinically.    3.  Hypertension, well controlled.      Blood pressures are running 119/68.  Continue to follow.    4.  Hyperlipidemia.    Goal LDL of less than 70 has been achieved on her ezetimibe and simvastatin.  We will continue to follow.    5.  Mild to moderate nonobstructive coronary disease.    This was displayed on coronary CTA 2020.  We will continue aggressive secondary prevention measures.      PLAN:      1.  Routine lab work along with echo and 14-day Zio patch monitor.    2.  Followup to be determined pending above test results.    Once again, it was a pleasure participating in the care of your patient, Ms. Patricia Perez.  Please feel free to contact me at any time if any questions regarding her care in the future.    Sincerely,          Preston Cuadra MD        D: 2022   T: 2022   MT: DAYAMI    Name:     PATRICIA PEREZ  MRN:      [id number]        Account:      [id number]   :      1960           Service Date: 2022       Document: [id number]

## 2022-08-31 NOTE — PATIENT INSTRUCTIONS
Please schedule appointments to complete the following :    Labs (fasting for 12 hours prior)  Echocardiogram  3.   Cardiac ZIOpatch monitor hookup (after the echo)    As soon as results are compiled and reviewed, you will be notified.

## 2022-08-31 NOTE — PROGRESS NOTES
Service Date: 2022    JOHN Yun  [facility]  [address] [address] [place] [address]     RE: Patricia Perez  [id number]  : 1960    Dear Ms. Grubbs:      It was a pleasure participating in the care of your patient, Ms. Patricia Perez.  As you know, she is a 62-year-old lady who I saw over virtual video visit via [employer] for history of nonischemic cardiomyopathy, hypertension and hyperlipidemia as well as palpitations.    Her past medical history is significant for the followin.  Hypertension.  2.  Hyperlipidemia.  3.  Sleep apnea, on CPAP.  4.  Depression.  5.  Gastroesophageal reflux disease.  6.  Asthma.  7.  Obesity.    8.  Status post gastric bypass surgery.    Her cardiac history is significant for a nonischemic cardiomyopathy that was mild to moderate in degree in .  Coronary angiogram revealed only mild disease in the LAD and right coronary territories.    She had an episode of syncope 2020.  At that time, coronary CTA 2020 revealed the following:    Left main okay.  LAD less than 50% proximal to mid stenosis.  Circumflex less than 50% proximal stenosis.  RCA less than 30% proximal stenosis.    Zio patch monitor and echo were unremarkable at that time.    I last saw her 2022.  At that time, she was doing well.  She presents today for continuing care.    Since our last visit, she said that over the past 3 weeks, she has had some palpitations.  She feels that her heart experiences an extra beat and skipped beats here and there and she feels it more in the mornings before her pills.  It  makes her short of breath, but not dizzy or lightheaded.  She feels the beat go up into her throat without racing.  She often feels it is before her 2 cups of coffee and they can last for up to 2 hours coming and going.  Again, they are not a constant in terms of racing, but rather isolated skipped beats that occur again and again.    Her blood pressures at home;  however, have been steady at 118-120/68 with a pulse in the 60s.      She is exercising, walking for 30 minutes a day, getting over 4000 steps a day and also working with a  for some light weights.    She otherwise denies gross chest pain, PND, orthopnea, edema, syncope or near syncope.    CURRENT MEDICATIONS:      1.  Aspirin 81 mg a day.  2.  Carvedilol 25 mg twice daily.  3.  Prempro.  4.  Ezetimibe 10 mg a day.  5.  Hydralazine 10 mg twice daily.  6.  Isosorbide mononitrate 30 mg a day.  7.  Lisinopril 5 mg a day.  8.  Meclizine.  9.  Ondansetron.  10.  Simvastatin 20 mg a day.  11.  Venlafaxine.    PHYSICAL EXAMINATION:      VITAL SIGNS:  Her blood pressure is 119/68 with a pulse of 62.  GENERAL:  She appears comfortable, well groomed.  PSYCHIATRIC:  She is alert and oriented x3.  HEENT:  Eyes do not appear grossly erythematous or have exudate.  RESPIRATORY:  She is breathing comfortably without gross cough.    The remainder of the comprehensive physical exam was deferred secondary to the COVID-19 pandemic and secondary to video visit restrictions.    LABORATORY:  On 06/30/2021, LDL 64.  On 01/28/2022, potassium 4.0, GFR normal.  Hemoglobin normal.    Echocardiogram 01/07/2020 reveals ejection fraction of 50%-55%.  No significant valvular pathology identified.  No significant change since 2016.      IMPRESSION:      Patricia is a 62-year-old lady who has several active cardiac issues:    1.  Palpitations.    She gives a 3-week history of palpitations described as skipped beats that come and go, that usually happen in the mornings that make her feel short of breath, but not dizzy or lightheaded.  They are not racing or sustained in nature and she cannot identify a particular pattern to them.  She is very concerned about them and further noninvasive evaluation would be indicated.    2.  Prior prior mild nonischemic cardiomyopathy.    In 2008, her ejection fraction was in the 40%-45% range.  With  medical therapy, her ejection fraction improved to the 50% range as of echo 2020.  She had only mild nonobstructive coronary artery disease by coronary CTA 2020.    She is currently asymptomatic with low moderate exertion.  Continue to monitor clinically.    3.  Hypertension, well controlled.      Blood pressures are running 119/68.  Continue to follow.    4.  Hyperlipidemia.    Goal LDL of less than 70 has been achieved on her ezetimibe and simvastatin.  We will continue to follow.    5.  Mild to moderate nonobstructive coronary disease.    This was displayed on coronary CTA 2020.  We will continue aggressive secondary prevention measures.      PLAN:      1.  Routine lab work along with echo and 14-day Zio patch monitor.    2.  Followup to be determined pending above test results.    Once again, it was a pleasure participating in the care of your patient, Ms. Patricia Perez.  Please feel free to contact me at any time if any questions regarding her care in the future.    Sincerely,          Preston Cuadra MD      Addendum 22:    LDL 91 on simvistatin 20 and zetia 10    Echo reveals normal LV systolic function EF 55-60% without gross valvular pathology    Impression:    1.  Hyperlipidemia, goal LDL <70 due to CAD    2.  Palpitations    Plan:    1.  Increase simvistatin to 30mg po at bedtime if possible/tolerated    2.  Await monitor results      Addendum 10/3/22:    Ziopatch monitor reveals 3 episodes NSVT up to 9 beats  2 episodes of SVT, longest 8 beats    Symptoms correspond to NSR +/- PACs (3% burden)  No sustained malignant arrythmias identified      Plan:    1.  Continue present course    2.  Virtual video visit followup in 3mo with labs prior (fasting lipids, BMP)      D: 2022   T: 2022   MT: DAYAMI    Name:     PATRICIA PEREZ  MRN:      [id number]        Account:      [id number]   :      1960           Service Date: 2022       Document: [id number]

## 2022-08-31 NOTE — PROGRESS NOTES
"Patricia is a 62 year old who is being evaluated via a billable video visit.      How would you like to obtain your AVS? MyChart  If the video visit is dropped, the invitation should be resent by: Send to e-mail at: [email or web address]  Will anyone else be joining your video visit? JAMIE Brooke/JUSTIN    The patient has been notified of following:     \"This video visit will be conducted via a call between you and your physician/provider. We have found that certain health care needs can be provided without the need for an in-person physical exam.  This service lets us provide the care you need with a video conversation.  If a prescription is necessary we can send it directly to your pharmacy.  If lab work is needed we can place an order for that and you can then stop by our lab to have the test done at a later time.    Video visits are billed at different rates depending on your insurance coverage.  Please reach out to your insurance provider with any questions.    If during the course of the call the physician/provider feels a video visit is not appropriate, you will not be charged for this service.\"    Patient has given verbal consent for video visit? Yes    How would you like to obtain your AVS? Mail    Video-Visit Details    Type of service:  Video Visit    Video Start Time:1026am    Video End Time:1039am    Total visit time including video visit, chart review, charting, coordination of care =41min    Originating Location (pt. Location):patient home      Distant Location (provider location):  home office    Platform used for Video Visit: Anibal    See dictation #[id number]    [facility]#:[id number]  "

## 2022-08-31 NOTE — NURSING NOTE
"Chief Complaint   Patient presents with     Follow Up     To discuss recent \"flutter\" symptoms, mychart message sent 8/15 regarding this, pt does not remember exact BP readings but state they have been around 120/70's     Patient declined individual allergy and medication review by support staff because patient denies any changes since echeck-in completion and states all information entered during echeck-in remains accurate.    Cameron Plascencia, VF/CMA    "

## 2022-09-01 DIAGNOSIS — J45.30 MILD PERSISTENT ASTHMA WITHOUT COMPLICATION: ICD-10-CM

## 2022-09-01 RX ORDER — ALBUTEROL SULFATE 90 UG/1
2 AEROSOL, METERED RESPIRATORY (INHALATION) EVERY 4 HOURS PRN
Qty: 18 G | Refills: 0 | Status: SHIPPED | OUTPATIENT
Start: 2022-09-01 | End: 2023-09-19

## 2022-09-01 NOTE — LETTER
[facility]  [address] [address]  [phone]      Patricia Perez  [address] [address]        September 1, 2022      Dear Patricia Perez,      We received a request to refill your albuterol inhaler.  This medication request has been filled for a 1 month supply.  Our records indicate that you are due for a follow up with Dr. Melgar for your allergy care. Please contact our office at [phone], to schedule this appointment at your earliest convenience.        Sincerely,      Your [facility] care team

## 2022-09-01 NOTE — TELEPHONE ENCOUNTER
Patient active in [employer]. Sent [employer] message notifying her of 1 refill and need to make appointment for further ones.  Vero Herman RN on 9/1/2022 at 10:12 AM

## 2022-09-01 NOTE — TELEPHONE ENCOUNTER
Routing refill request to provider for review/approval because:  Patient needs to be seen because it has been more than 1 year since last office visit.  Last OV 5/19/21. No f/u appointment scheduled currently.  Please advise.  Vero Herman RN on 9/1/2022 at 7:57 AM

## 2022-09-08 ENCOUNTER — ANCILLARY PROCEDURE (OUTPATIENT)
Dept: CARDIOLOGY | Facility: CLINIC | Age: 62
End: 2022-09-08
Attending: INTERNAL MEDICINE
Payer: COMMERCIAL

## 2022-09-08 DIAGNOSIS — R00.2 PALPITATIONS: ICD-10-CM

## 2022-09-08 LAB — LVEF ECHO: NORMAL

## 2022-09-08 PROCEDURE — 93306 TTE W/DOPPLER COMPLETE: CPT | Performed by: INTERNAL MEDICINE

## 2022-09-09 ENCOUNTER — ANCILLARY PROCEDURE (OUTPATIENT)
Dept: CARDIOLOGY | Facility: CLINIC | Age: 62
End: 2022-09-09
Attending: INTERNAL MEDICINE
Payer: COMMERCIAL

## 2022-09-09 DIAGNOSIS — R00.2 PALPITATIONS: ICD-10-CM

## 2022-09-09 PROCEDURE — 93248 EXT ECG>7D<15D REV&INTERPJ: CPT | Performed by: INTERNAL MEDICINE

## 2022-09-09 PROCEDURE — 93246 EXT ECG>7D<15D RECORDING: CPT

## 2022-09-09 NOTE — PROGRESS NOTES
Per Dr. Cuadra, patient to have 14- day zio monitor placed.  Diagnosis: Palpitations   Monitor placed: Yes  Patient Instructed: Yes  Patient verbalized understanding: Yes  Holter # [id number]    Monitor was placed by PETTY Nogueira   10:20 AM    
2 = assistive person

## 2022-09-12 ENCOUNTER — LAB (OUTPATIENT)
Dept: LAB | Facility: CLINIC | Age: 62
End: 2022-09-12
Payer: COMMERCIAL

## 2022-09-12 DIAGNOSIS — E78.5 DYSLIPIDEMIA: ICD-10-CM

## 2022-09-12 DIAGNOSIS — I10 HYPERTENSION, UNSPECIFIED TYPE: ICD-10-CM

## 2022-09-12 LAB
ANION GAP SERPL CALCULATED.3IONS-SCNC: 3 MMOL/L (ref 3–14)
BUN SERPL-MCNC: 14 MG/DL (ref 7–30)
CALCIUM SERPL-MCNC: 8.7 MG/DL (ref 8.5–10.1)
CHLORIDE BLD-SCNC: 110 MMOL/L (ref 94–109)
CHOLEST SERPL-MCNC: 207 MG/DL
CO2 SERPL-SCNC: 26 MMOL/L (ref 20–32)
CREAT SERPL-MCNC: 0.74 MG/DL (ref 0.52–1.04)
ERYTHROCYTE [DISTWIDTH] IN BLOOD BY AUTOMATED COUNT: 13.7 % (ref 10–15)
FASTING STATUS PATIENT QL REPORTED: YES
GFR SERPL CREATININE-BSD FRML MDRD: 90 ML/MIN/1.73M2
GLUCOSE BLD-MCNC: 94 MG/DL (ref 70–99)
HCT VFR BLD AUTO: 39.1 % (ref 35–47)
HDLC SERPL-MCNC: 106 MG/DL
HGB BLD-MCNC: 12.8 G/DL (ref 11.7–15.7)
LDLC SERPL CALC-MCNC: 91 MG/DL
MCH RBC QN AUTO: 32 PG (ref 26.5–33)
MCHC RBC AUTO-ENTMCNC: 32.7 G/DL (ref 31.5–36.5)
MCV RBC AUTO: 98 FL (ref 78–100)
NONHDLC SERPL-MCNC: 101 MG/DL
PLATELET # BLD AUTO: 265 10E3/UL (ref 150–450)
POTASSIUM BLD-SCNC: 4.8 MMOL/L (ref 3.4–5.3)
RBC # BLD AUTO: 4 10E6/UL (ref 3.8–5.2)
SODIUM SERPL-SCNC: 139 MMOL/L (ref 133–144)
TRIGL SERPL-MCNC: 49 MG/DL
TSH SERPL DL<=0.005 MIU/L-ACNC: 1.74 MU/L (ref 0.4–4)
WBC # BLD AUTO: 5.6 10E3/UL (ref 4–11)

## 2022-09-12 PROCEDURE — 85027 COMPLETE CBC AUTOMATED: CPT

## 2022-09-12 PROCEDURE — 80061 LIPID PANEL: CPT

## 2022-09-12 PROCEDURE — 80048 BASIC METABOLIC PNL TOTAL CA: CPT

## 2022-09-12 PROCEDURE — 36415 COLL VENOUS BLD VENIPUNCTURE: CPT

## 2022-09-12 PROCEDURE — 84443 ASSAY THYROID STIM HORMONE: CPT

## 2022-09-21 PROBLEM — G89.29 CHRONIC LEFT-SIDED LOW BACK PAIN WITH LEFT-SIDED SCIATICA: Status: RESOLVED | Noted: 2021-08-18 | Resolved: 2022-09-21

## 2022-09-21 PROBLEM — M54.42 CHRONIC LEFT-SIDED LOW BACK PAIN WITH LEFT-SIDED SCIATICA: Status: RESOLVED | Noted: 2021-08-18 | Resolved: 2022-09-21

## 2022-09-27 DIAGNOSIS — E78.5 HYPERLIPIDEMIA LDL GOAL <100: ICD-10-CM

## 2022-09-27 RX ORDER — SIMVASTATIN 10 MG
30 TABLET ORAL AT BEDTIME
Qty: 270 TABLET | Refills: 3 | Status: SHIPPED | OUTPATIENT
Start: 2022-09-27 | End: 2022-10-07

## 2022-10-03 DIAGNOSIS — I25.10 CORONARY ARTERY DISEASE INVOLVING NATIVE CORONARY ARTERY OF NATIVE HEART WITHOUT ANGINA PECTORIS: Primary | ICD-10-CM

## 2022-10-03 DIAGNOSIS — E78.5 DYSLIPIDEMIA: ICD-10-CM

## 2022-10-06 DIAGNOSIS — E78.5 HYPERLIPIDEMIA LDL GOAL <100: ICD-10-CM

## 2022-10-07 RX ORDER — SIMVASTATIN 10 MG
30 TABLET ORAL AT BEDTIME
Qty: 270 TABLET | Refills: 3 | Status: SHIPPED | OUTPATIENT
Start: 2022-10-07 | End: 2023-02-08

## 2022-10-07 NOTE — TELEPHONE ENCOUNTER
simvastatin (ZOCOR) 10 MG tablet    Resent order to pharmacy for Pt care.   First order not received  270 Tabs, 3 Refills sent to pharm 10/7/2022      Ingrid Cobian RN  Central Triage Red Flags/Med Refills    Warnings Override History for simvastatin (ZOCOR) 10 MG tablet [[id number]]    Overridden by Kehinde Thomas LPN on Sep 27, 2022 1:07 PM   Allergy/Contraindication   1. ATORVASTATIN [Level: Drug Class Match]

## 2022-10-20 ENCOUNTER — TELEPHONE (OUTPATIENT)
Dept: CARDIOLOGY | Facility: CLINIC | Age: 62
End: 2022-10-20

## 2022-10-20 NOTE — TELEPHONE ENCOUNTER
Left Voicemail (1st Attempt) and Sent Mychart (1st Attempt) for the patient to call back and schedule the following:    Appointment type: Cardiology- Return  Provider: Khalif  Return date: 01/03/23  Specialty phone number: [phone]  Additional appointment(s) needed: Labs prior  Additonal Notes: None    Ida Correa, Visit Facilitator/MA.

## 2022-10-22 ENCOUNTER — HEALTH MAINTENANCE LETTER (OUTPATIENT)
Age: 62
End: 2022-10-22

## 2022-11-16 NOTE — PROGRESS NOTES
[facility]  Psychiatry Clinic  MEDICAL PROGRESS NOTE     CARE TEAM:  PCP- Funmilayo Grubbs    Psychotherapist- Delvis Lynch This person is a 62 year old who uses the name Patricia and pronouns she, her.      DIAGNOSIS     Major depressive disorder, recurrent, in partial remission, with seasonal component  ADHD by history     ASSESSMENT   Patricia has been doing worse since we last met. Most bothersome symptoms at present are lack of leo and teeth grinding. Psychosocial functioning well as work and most relationships unaffected at present. There have been no major psychosocial changes in her life over this time. No other significant changes to physical health, medications, or substance use. I suspect her symptoms are due to a worsening of her depression in the context of season change.     We discussed multiple treatment options today including lightbox therapy as well as medication changes. Reports tolerating current meds well and AIMS is 0 today.     As she is s/p Basilio-en-Y surgery she is likely not benefiting from extended formulation of venlafaxine; IR formulation would have more bioavailability and she states she would be able to be complaint with BID dosing. Will change from ER to IR formulation today. Will also order lightbox therapy. Also encouraged her to follow-up with individual therapist and with weight management clinic. She was in agreement with this plan. She is scheduled to follow-up with me again in 2 months.     Future considerations  - consider increasing venlafaxine to max dose as-tolerated  - consider stimulant for ADHD symptoms (not very burdensome as of 11/18/22) and/or antidepressant augmentation     MNPMP review was not needed today.     PLAN                                                                                                                1) Meds-   - Continue aripiprazole 4 mg daily  - STOP venlafaxine  mg daily   - START venlafaxine IR  "112.5 mg BID       Other medications:  1.  Aspirin 81 mg a day.  2.  Carvedilol 25 mg twice daily.  3.  Prempro.  4.  Ezetimibe 10 mg a day.  5.  Hydralazine 10 mg twice daily.  6.  Isosorbide mononitrate 30 mg a day.  7.  Lisinopril 5 mg a day.  8.  Meclizine.  9.  Ondansetron.  10.  Simvastatin 20 mg a day.  11. otc Vitamin D     2) Psychotherapy- continue individual psychotherapy     3) Next due-  Labs- last AP labs done Feb 2022  EKG- 1/7/20: 447 ms @ 54 bpm  Rating scales- PHQ9, AIMS due at next in-person appointment     4) Referrals-  Weight management referral placed 8/15/22 callback #[phone]    5) Dispo- RTC in-person 3 months     PERTINENT BACKGROUND                                                    [most recent eval 08/15/22]   Components copy-forwarded from prior assessment. Last reviewed with patient and updated with patient on 08/15/22.     Onset of mental health problems at age 13-14 years old when younger brother passed away. Denies memory of significant trauma or abuse and poor memory childhood. Did well in school and no discpline issues or legal issues during childhood. Father with alcohol use disorder.  Started mental health treatment in 1995. Niece passed away in MVA in 2020, COVID in 2020 both were disruptive to mental health (worsened depression). History gastric bypass in 2018.      Psych pertinent item history includes trauma hx and eating disorder (binge eating)     SUBJECTIVE   Last seen in clinic 3 months ago, at which time reported good mood and no medication changes were made. Outside labs (BMP, CBC, TSH, lipid panel) in the interim were unremarkable. Since last being seen, they report:    Mood:   \"not well and I don't know why. But I seemed to figure it out and I just don't have any leo. I've lost my leo, I don't know where it went. I'm not sure why. I'm also grinding my teeth more.\"    teeth griding \"probably for a coule of months. It seems to have gotten more pronounced. Used to " "be just when I was stressed. Cuca-thing has been the last couple of months. I used to be really big on Myron and nothing about it intrigues me.\"    Have you felt like this before? \"not like this. I've had down moments.\" Mostly now indifferent, apathetic.     Has not started using light box.     Memory --> \"I am forgetful but that may be age. Short term memory comes and goes. I'll go into a room and forget why I'm there. I'll sometimes have a problem with a word it's just not coming to me.\" no forgetting names, no getting lost.     Saw neurologist for forgetfulness and balance \"and he said I was fine.\" MoCA \"flunked it completely\" forgot the 5 words.     Social update:   No major changes. Her mom \"doing well\" in [place], [place], visited her recently     Sleep:   \"pretty well. We have a cat that likes to wake me up about 2.\" Bed at 8/8:30, up at 3/4 AM.     Energy during the day \"wanes.\" No snoring,  says no. No nightmares  \"I very seldom dream.\"     Stress:   Bathroom renovation \"other than that, no.\"     Health:   Physical health \"I've gained weight again. I think part of that is that I don't think that I care.\" Still seeing  \"I make myself do that.\"     S/p Basilio-en-Y. Weight \"everything was going well until the pandemic hit.\"     Heart palpitations \"off and on. The cardiologist seems to think it's too much caffeine.\" ziopatch \"wasn't anything to worry about\"      Substance use: no changes    Caffeine \"couple cups in the morning.\" 2 in the AM. 1/2 cup when she gets to work ~10:00.    Meds:  Takes daily in the morning, uses pillbox. No side effects.      Not using light box \"the weather got bad and I never got over there to pick it up.\"     Therapy:   Hasn't seen them for a while \"I had to cancel last time because I was going out of town to check on my mother.\"     SI:   \"no, I have never really gone down that far.\"      FAMILY and SOCIAL HISTORY                                 pt reported "   Components copy-forwarded from prior assessment. Last reviewed with patient and updated with patient on 08/15/22.     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 Hopes to retire ~age 64.   Partner/ -  in 2017 to her first , Mitchell, relationship feels very strong   Children- None      Living situation- Lives in a home in [place], [place] with  and cat (Ms. Queen), recently purchased house  Social/ Spiritual Support-  Mitchell, three close friends        Feels Safe at Home- yes   Legal- None     Trauma History (self-report)- Younger brother  of Leukemia when patient was in 8th grade. Niece  in MVA on 2020  Early History/Education- Born and raised in [place], [place]. Family is still in [place]. Grad school in [place].  Reports her childhood was jackie, father was an alcoholic.  Lived in a trailer with 4 siblings.      PAST MED TRIALS      Medication Max Dose (mg) Dates / Duration Helpful? DC Reason / Adverse Effects?   bupropion 300 6221-2858 N Initially helpful then lost efficacy   Adderall 25 3210-6519 N Lack of efficacy   sertraline 100 2120-3183 N Initially helpful then lost efficacy   venlafaxine 225 -current Y     aripiprazole 4 -current Y                                                                   Denies history of allergies     21: Started aripiprazole  3/31/21: Increased aripiprazole to 4 mg daily  21: Discontinued sertraline  10/22/21: No changes.  22: No medication changes. [facility] referral placed. SAD light ordered.   8/15/22: transfer visit, no changes   22: worse mood. venlafaxine changed to IR BID     PAST SUBSTANCE USE HISTORY   Components copy-forwarded from prior assessment. Last reviewed  "with patient and updated with patient on 08/15/22.     Past Use- Reports history of occasional alcohol use from teens to 30's \"never drank to the point where I was overembibing\" (does not drink now d/t  being recovering alcoholic), tried marijuana occasionally In the 1970s   Treatment- #, most recent- no  Medical Consequences- no  Legal Consequences- no  Other- N/A     MEDICAL HISTORY and ALLERGY     ALLERGIES: Atorvastatin    Patient Active Problem List   Diagnosis     Major depression, recurrent (H)     Dizziness and giddiness     Motion sickness     GERD (gastroesophageal reflux disease)     Idiopathic cardiomyopathy (H)     Sleep apnea     Hyperlipidemia LDL goal <100     Allergic rhinitis     Hypertension goal BP (blood pressure) < 130/80     Pelvic pain in female     Health Care Home     24 hour contact handout given     Severe obesity (BMI 35.0-35.9 with comorbidity) (H)     Elevated PTHrP level     H/O bariatric surgery     Hx of gastric bypass     Hypovitaminosis D     Allergic rhinitis     Attention deficit disorder of adult     Mild persistent asthma     Hand joint pain     Myopia, bilateral     Dumping syndrome     Benign essential hypertension     Adjustment disorder with depressed mood     Decreased hearing of both ears     BPV (benign positional vertigo), unspecified laterality     Advanced directives, counseling/discussion     Vitamin D deficiency     Coronary artery disease involving native coronary artery of native heart without angina pectoris     Acute pain of left knee     Syncope     Posterior vitreous detachment of both eyes     Combined forms of age-related cataract, mild, of both eyes     Glaucoma suspect of both eyes     Sciatica of right side     Hypoglycemia        MEDICAL REVIEW OF SYSTEMS     A comprehensive review of systems was performed and is negative other than noted in the HPI.     MEDICATIONS     Current Outpatient Medications   Medication Sig Dispense Refill     albuterol " (PROAIR HFA/PROVENTIL HFA/VENTOLIN HFA) 108 (90 Base) MCG/ACT inhaler Inhale 2 puffs into the lungs every 4 hours as needed for shortness of breath / dyspnea or wheezing 18 g 0     ARIPiprazole (ABILIFY) 2 MG tablet Take 2 tablets (4 mg) by mouth daily 60 tablet 3     aspirin (ASA) 81 MG EC tablet Take 1 tablet (81 mg) by mouth daily 90 tablet 3     azelastine (ASTELIN) 0.1 % nasal spray Spray 2 sprays into both nostrils 2 times daily as needed for rhinitis 30 mL 3     azelastine (OPTIVAR) 0.05 % ophthalmic solution Apply 1 drop to eye 2 times daily as needed (itchy/watery eyes) 6 mL 3     blood glucose (NO BRAND SPECIFIED) lancing device Device to be used with lancets. 3 each 3     blood glucose (NO BRAND SPECIFIED) test strip Use to test blood sugar 3 times daily 300 strip 3     calcium carbonate-vitamin D (CALTRATE 600+D) 600-400 MG-UNIT CHEW Take 1 chew tab by mouth 2 times daily 180 tablet 3     carvedilol (COREG) 25 MG tablet Take 1 tablet (25 mg) by mouth 2 times daily (with meals) 180 tablet 3     cetirizine (ZYRTEC) 10 MG tablet Take 1 tablet (10 mg) by mouth daily 30 tablet 3     Continuous Blood Gluc Sensor (SOAK (Smart Operational Agricultural toolKit)STYLE JOHN 14 DAY SENSOR) [facility] 1 each every 14 days Use 1 Sensor every 14 days. Use to read blood sugars per 's instructions. 2 each 5     Cyanocobalamin (VITAMIN B-12 SL) Place under the tongue every other day        estrogen conj-medroxyPROGESTERone (PREMPRO) 0.45-1.5 MG tablet Take 1 tablet by mouth daily 90 tablet 3     ezetimibe (ZETIA) 10 MG tablet Take 1 tablet (10 mg) by mouth daily 90 tablet 3     FEROSUL 325 (65 Fe) MG tablet TAKE 1 TABLET(325 MG) BY MOUTH TWICE DAILY 180 tablet 0     hydrALAZINE (APRESOLINE) 10 MG tablet Take 1 tablet (10 mg) by mouth 3 times daily (Patient taking differently: Take 10 mg by mouth 2 times daily) 270 tablet 3     Ibuprofen-diphenhydrAMINE Cit (IBUPROFEN PM PO) Take by mouth At Bedtime       isosorbide mononitrate (IMDUR) 30 MG 24 hr tablet  "Take 1 tablet (30 mg) by mouth daily 90 tablet 3     lisinopril (ZESTRIL) 5 MG tablet TAKE 1 TABLET(5 MG) BY MOUTH DAILY 90 tablet 3     meclizine (ANTIVERT) 25 MG tablet Take 1 tablet (25 mg) by mouth 3 times daily as needed 30 tablet 1     montelukast (SINGULAIR) 10 MG tablet Take 1 tablet (10 mg) by mouth At Bedtime 90 tablet 3     Multiple Vitamin (MULTI-VITAMIN) per tablet Take 1 tablet by mouth daily. 100 tablet 3     ondansetron (ZOFRAN-ODT) 4 MG ODT tab Take 1 tablet (4 mg) by mouth every 8 hours as needed for nausea 15 tablet 1     simvastatin (ZOCOR) 10 MG tablet Take 3 tablets (30 mg) by mouth At Bedtime 270 tablet 3     venlafaxine (EFFEXOR-ER) 225 MG 24 hr tablet Take 1 tablet (225 mg) by mouth daily *PLEASE SCHEDULE APPT. FOR REFILLS  [phone] 30 tablet 3      VITALS   LMP 07/16/2012     MENTAL STATUS EXAM     Alertness: alert  and oriented  Appearance: well groomed  Behavior/Demeanor: cooperative, pleasant and calm, with good  eye contact   Speech: regular rate and rhythm  Language: intact  Psychomotor: normal or unremarkable  Mood: \"good\"  Affect: full range; congruent to: mood- yes, content- yes  Thought Process/Associations: unremarkable  Thought Content:  Reports none;  Denies suicidal & violent ideation and delusions  Perception:  Reports none;  Denies auditory hallucinations and visual hallucinations  Insight: good - acknowledges and describes mental health symptoms  Judgment: good  Cognition: not formally assessed, adequate for brief interview  Gait and Station: [facility]    AIMS = 0      LABS and DATA     PHQ 4/13/2022 6/13/2022 8/15/2022   PHQ-9 Total Score 6 6 4   Q9: Thoughts of better off dead/self-harm past 2 weeks Not at all Not at all Not at all       Recent Labs   Lab Test 09/12/22  0712 01/28/22  0726   CR 0.74 0.84   GFRESTIMATED 90 78     Recent Labs   Lab Test 01/28/22  0726 01/07/20  0740   AST 12 12   ALT 36 24   ALKPHOS 78 83         ECG on 1/7/20 QTc = 447 ms @ 54 " bpm. Sinus bradycardia     PSYCHOTROPIC DRUG INTERACTIONS                                                       PSYCHCLINICDDI   Per Micromedex    Concurrent use of ARIPIPRAZOLE and QT INTERVAL PROLONGING DRUGS may result in increased risk of QT interval prolongation.    MANAGEMENT:  Monitoring for adverse effects and periodic EKGs     RISK STATEMENT for SAFETY     Patricia did not appear to be an imminent safety risk to self or others.     TREATMENT RISK STATEMENT: The risks, benefits, alternatives and potential adverse effects have been discussed and are understood by the pt. The pt understands the risks of using street drugs or alcohol. There are no medical contraindications, the pt agrees to treatment with the ability to do so. The pt knows to call the clinic for any problems or to access emergency care if needed.  Medical and substance use concerns are documented above.  Psychotropic drug interaction check was done, including changes made today.     PROVIDER: Allyson Benítez MD    Patient not staffed in clinic.  Note will be reviewed and signed by supervisor Dr. Javier.      Level of Medical Decision Making:   - At least 1 chronic problem that is not stable  - Engaged in prescription drug management during visit (discussed any medication benefits, side effects, alternatives, etc.)

## 2022-11-17 ENCOUNTER — OFFICE VISIT (OUTPATIENT)
Dept: PSYCHIATRY | Facility: CLINIC | Age: 62
End: 2022-11-17
Attending: PSYCHIATRY & NEUROLOGY
Payer: COMMERCIAL

## 2022-11-17 VITALS
HEART RATE: 75 BPM | WEIGHT: 196.4 LBS | BODY MASS INDEX: 34.79 KG/M2 | DIASTOLIC BLOOD PRESSURE: 84 MMHG | SYSTOLIC BLOOD PRESSURE: 132 MMHG

## 2022-11-17 DIAGNOSIS — F33.9 MAJOR DEPRESSIVE DISORDER, RECURRENT EPISODE WITH SEASONAL PATTERN (H): Primary | ICD-10-CM

## 2022-11-17 DIAGNOSIS — F33.1 MODERATE EPISODE OF RECURRENT MAJOR DEPRESSIVE DISORDER (H): ICD-10-CM

## 2022-11-17 PROCEDURE — 99214 OFFICE O/P EST MOD 30 MIN: CPT | Mod: HN | Performed by: STUDENT IN AN ORGANIZED HEALTH CARE EDUCATION/TRAINING PROGRAM

## 2022-11-17 PROCEDURE — G0463 HOSPITAL OUTPT CLINIC VISIT: HCPCS

## 2022-11-17 RX ORDER — VENLAFAXINE 75 MG/1
112.5 TABLET ORAL 2 TIMES DAILY
Qty: 45 TABLET | Refills: 2 | Status: SHIPPED | OUTPATIENT
Start: 2022-11-17 | End: 2023-01-17

## 2022-11-17 ASSESSMENT — PAIN SCALES - GENERAL: PAINLEVEL: NO PAIN (0)

## 2022-11-17 NOTE — PATIENT INSTRUCTIONS
New prescription sent to your pharmacy.     Reconnect with your therapist.     Weight management clinic #[phone]

## 2022-11-18 ASSESSMENT — ABNORMAL INVOLUNTARY MOVEMENT SCALE (AIMS)
AIMS_PATIENT_AWARENESS: NO AWARENESS
TONGUE: 0
PATIENT_WEARS_DENTURES: YES
CURRENT_PROBLEMS_TEETH_DENTURES: NO

## 2023-01-03 DIAGNOSIS — F33.1 MODERATE EPISODE OF RECURRENT MAJOR DEPRESSIVE DISORDER (H): ICD-10-CM

## 2023-01-04 RX ORDER — ARIPIPRAZOLE 2 MG/1
TABLET ORAL
Qty: 60 TABLET | Refills: 3 | OUTPATIENT
Start: 2023-01-04

## 2023-01-04 RX ORDER — ARIPIPRAZOLE 2 MG/1
TABLET ORAL
Qty: 60 TABLET | Refills: 0 | Status: SHIPPED | OUTPATIENT
Start: 2023-01-04 | End: 2023-02-02

## 2023-01-04 NOTE — TELEPHONE ENCOUNTER
ARIPiprazole (ABILIFY) 2 MG   Last refilled: 9/2/22  Qty: 60   : 3    Last seen: 11/17/22  RTC: 3 mos  Cancel: 0  No-show: 0  Next appt: 1/19

## 2023-01-07 ENCOUNTER — TELEPHONE (OUTPATIENT)
Dept: CARDIOLOGY | Facility: CLINIC | Age: 63
End: 2023-01-07

## 2023-01-07 NOTE — TELEPHONE ENCOUNTER
Patient Contacted for the patient to call back and schedule the following:    Appointment type: Cardiology- Return General  Provider: Khalif  Return date: due 1/3/23, next availabl  Specialty phone number: [phone]  Additional appointment(s) needed: labs prior  Additonal Notes: 1/7: Spoke with pt, pt states she will call back on Monday to sched virtual f/u with [facility].     Ida Correa, Visit Facilitator/MA.

## 2023-01-09 ENCOUNTER — TELEPHONE (OUTPATIENT)
Dept: CARDIOLOGY | Facility: CLINIC | Age: 63
End: 2023-01-09

## 2023-01-17 DIAGNOSIS — F33.1 MODERATE EPISODE OF RECURRENT MAJOR DEPRESSIVE DISORDER (H): ICD-10-CM

## 2023-01-17 RX ORDER — VENLAFAXINE 75 MG/1
112.5 TABLET ORAL 2 TIMES DAILY
Qty: 45 TABLET | Refills: 2 | Status: SHIPPED | OUTPATIENT
Start: 2023-01-17 | End: 2023-01-19

## 2023-01-17 NOTE — TELEPHONE ENCOUNTER
Last Seen: 11-  RTC: 5) Dispo- RTC in-person 3 months  Cancel: none   No-Show:  None   Next Appt: 1-    Incoming Refill From [facility] on  [address]  via faxed     Medication Requested: venlafaxine (EFFEXOR) 75 MG tablet  Directions: Sig - Route: Take 1.5 tablets (112.5 mg) by mouth 2 times daily - Oral  Qty:  45  Last Refill: 12-    Medication Refill pended  Per Refill Protocol     Becky Smith on 1/17/2023 at 7:58 AM

## 2023-01-19 ENCOUNTER — VIRTUAL VISIT (OUTPATIENT)
Dept: PSYCHIATRY | Facility: CLINIC | Age: 63
End: 2023-01-19
Attending: PSYCHIATRY & NEUROLOGY
Payer: COMMERCIAL

## 2023-01-19 DIAGNOSIS — F33.1 MODERATE EPISODE OF RECURRENT MAJOR DEPRESSIVE DISORDER (H): Primary | ICD-10-CM

## 2023-01-19 DIAGNOSIS — R41.840 ATTENTION DEFICIT: ICD-10-CM

## 2023-01-19 PROCEDURE — 99214 OFFICE O/P EST MOD 30 MIN: CPT | Mod: GT | Performed by: STUDENT IN AN ORGANIZED HEALTH CARE EDUCATION/TRAINING PROGRAM

## 2023-01-19 RX ORDER — VENLAFAXINE 75 MG/1
75 TABLET ORAL 3 TIMES DAILY
Qty: 45 TABLET | Refills: 2 | COMMUNITY
Start: 2023-01-19 | End: 2023-02-01

## 2023-01-19 ASSESSMENT — PATIENT HEALTH QUESTIONNAIRE - PHQ9
10. IF YOU CHECKED OFF ANY PROBLEMS, HOW DIFFICULT HAVE THESE PROBLEMS MADE IT FOR YOU TO DO YOUR WORK, TAKE CARE OF THINGS AT HOME, OR GET ALONG WITH OTHER PEOPLE: SOMEWHAT DIFFICULT
SUM OF ALL RESPONSES TO PHQ QUESTIONS 1-9: 7
SUM OF ALL RESPONSES TO PHQ QUESTIONS 1-9: 7

## 2023-01-19 NOTE — PATIENT INSTRUCTIONS
**For crisis resources, please see the information at the end of this document**   Patient Education    Thank you for coming to the [facility] [place] [facility].     Lab Testing:  If you had lab testing today and your results are reassuring or normal they will be mailed to you or sent through Billdesk within 7 days. If the lab tests need quick action we will call you with the results. The phone number we will call with results is # [phone]. If this is not the best number please call our clinic and change the number.     Medication Refills:  If you need any refills please call your pharmacy and they will contact us. Our fax number for refills is [phone].   Three business days of notice are needed for general medication refill requests.   Five business days of notice are needed for controlled substance refill requests.   If you need to change to a different pharmacy, please contact the new pharmacy directly. The new pharmacy will help you get your medications transferred.     Contact Us:  Please call [phone] during business hours (8-5:00 M-F).   If you have medication related questions after clinic hours, or on the weekend, please call [phone].     Financial Assistance [phone]   Medical Records [phone]       MENTAL HEALTH CRISIS RESOURCES:  For a emergency help, please call 911 or go to the nearest Emergency Department.     Emergency Walk-In Options:   EmPATH Unit @ [facility] ([place]): [phone] - Specialized mental health emergency area designed to be calming  [facility] ([place]): [phone]  [facility] ([place]): [phone]  [facility]): [phone]    [facility] Crisis Information:   [place]: [phone]  [place]: [phone]  [place] ([place]) - Adult: [phone]     Child: [phone]  [place] - Adult: [phone]     Child: [phone]  [place]:  [phone]  List of all [facility] resources:   https://[place].gov/dhs/people-we-serve/adults/health-care/mental-health/resources/crisis-contacts.jsp    National Crisis Information:   Crisis Text Line: Text  [place]  to 156233  Suicide & Crisis Lifeline: 988  National Suicide Prevention Lifeline: 3-419-012-TALK (1-560.675.4823)       For online chat options, visit https://suicidepreventionlifeline.org/chat/  Poison Control Center: 5-391-779-7011  Trans Lifeline: 6-474-806-2123 - Hotline for transgender people of all ages  The Ricardo Project: 5-106-833-7655 - Hotline for LGBT youth     For Non-Emergency Support:   Fast Tracker: Mental Health & Substance Use Disorder Resources -   https://www.[employer].org/

## 2023-01-19 NOTE — PROGRESS NOTES
Patricia Perez is a 62 year old who is being evaluated via a billable video visit.      Pt will join video visit via: [email or web address]  If there are problems joining the visit, send backup video invite via: Send to preferred e-mail: [email or web address]    Reason for telehealth visit: Patient has requested telehealth visit    Originating location (patient location): Patient's home    Will anyone else be joining the visit? No      Answers for HPI/ROS submitted by the patient on 1/19/2023  If you checked off any problems, how difficult have these problems made it for you to do your work, take care of things at home, or get along with other people?: Somewhat difficult  PHQ9 TOTAL SCORE: 7

## 2023-01-19 NOTE — PROGRESS NOTES
"Video- Visit Details  Type of service:  video visit for medication management  Time of service:    Date:  01/19/2023    Video Start Time:  8:07      Video End Time:  8:32    Patient location: home    Provider location: on-site    Platform: [employer]       [facility]  Psychiatry Clinic  MEDICAL PROGRESS NOTE     CARE TEAM:  PCP- Funmilayo Grubbs    Psychotherapist- Delvis Lynch This person is a 62 year old who uses the name Patricia and pronouns she, her.      DIAGNOSIS     Major depressive disorder, recurrent, in partial remission, with seasonal component  ADHD by history     ASSESSMENT   Patricia Perez is a 62 year old female with history of recurrent depression as well as reported history of ADHD who presents today for medication follow-up; she reports doing modestly improved mood and continued inattention since we last met. She attributes these improvements to getting through bathroom renovation and holiday season. States unsure if changing from extended-release to immediate-release venlafaxine has been significantly impactful. Referring to our previous discussion, she is interested in changing to TID dosing at this time.     Does have some \"teeth clicking\" (not grinding) worsened over preceding several months. No tongue, lip involvement. AIMS = 0 at November appointment. Most likely due to anxiety. Unlikely TD giving she has only been on abilify for a short amount of time and recent negative AIMS exam. Other side effect such as EPS reaction unlikely though will continue to monitor.      We agreed to change venlafaxine to TID dosing today. No changes to abilify. I reiterated integrative strategies to support her mental health including using lightbox (plans to pick this up soon) and starting individual therapy (messaged intake 01/20/23), which she was in agreement with. No other questions nor concerns.     Future considerations  - consider increasing venlafaxine to max dose " as-tolerated, monitor BP  - consider stimulant for ADHD symptoms (not very burdensome as of 11/18/22) and/or antidepressant augmentation     [place]NPMP review was not needed today.     PLAN                                                                                                                1) Meds-   - Continue aripiprazole 4 mg daily  - CHANGE venlafaxine to 75 mg TID     Other medications:  1.  Aspirin 81 mg a day.  2.  Carvedilol 25 mg twice daily.  3.  Prempro.  4.  Ezetimibe 10 mg a day.  5.  Hydralazine 10 mg twice daily.  6.  Isosorbide mononitrate 30 mg a day.  7.  Lisinopril 5 mg a day.  8.  Meclizine.  9.  Ondansetron.  10.  Simvastatin 20 mg a day.  11. otc Vitamin D     2) Psychotherapy- continue individual psychotherapy     3) Next due-  Labs- last AP labs done Feb 2022, due at next appointment   EKG- 1/7/20: 447 ms @ 54 bpm  Rating scales- PHQ9, AIMS due Nov 2023    4) Referrals-  Weight management referral placed 8/15/22 callback #[phone]    5) Dispo- RTC in-person 3 months     PERTINENT BACKGROUND                                                    [most recent eval 08/15/22]   Components copy-forwarded from prior assessment. Last reviewed with patient and updated with patient on 08/15/22.     Onset of mental health problems at age 13-14 years old when younger brother passed away. Denies memory of significant trauma or abuse and poor memory childhood. Did well in school and no discpline issues or legal issues during childhood. Father with alcohol use disorder.  Started mental health treatment in 1995. Niece passed away in MVA in 2020, COVID in 2020 both were disruptive to mental health (worsened depression). History gastric bypass in 2018.      Psych pertinent item history includes trauma hx and eating disorder (binge eating)     SUBJECTIVE   Last seen in clinic 2 months ago, at which time lack of leo in her life and increased teeth grinding; changed venlafaxine from XR to IR formulation  "(history of gastric bypass) and recommended light box therapy . Since last being seen, they report:    Mood:   \"better\" and \"the bathroom is fixed, it's done so that helped. [place] is over so that helped.\"    And \"I think I'm doing better... I can't sit still and I can't focus when I'm trying to focus. My maths aren't so good.\"    \"clicking of teeth\" \"I don't grind them, that's what the dentis keeps telling me.\" going on 6-8 months, worse in the meantime.  No new tongue or lip movements \"I don't think it's noticeable and I don't think they can hear it.\" no other tics, tremors.     \"what I really wanna do is focus a little more.\"     \"i'm trying to feel.\"     Social update:  work going well    Sleep:  \"okay, the cat still gets me up.... I don't know if she's having a bad dream or if she's in pain.\"     Physical Health:   Recent echo \"everything came out fine\" cardiology appt coming up, palpitations reduced since cutting back on caffeine.  No new aches/pains/fevers/chills. Recently recovered from mild URI.      Last BP \"I do it every one in a while and it seems to be doing fine.\"     Has appointment with nutritionist, coming up in a few weeks.     Substance use: minimizing caffeine use    Meds:   Takes daily. Denies side effects. \"I don't feel like going from 1 a day to 1.5 twice a day didn't make that much difference... I was wondering bout the three times a day.\"    Never picked up SAD lamp. Plans to  tomorrow    Therapy: none \"I haven't had the chance to do that\"    SI:  \"no I've never had that... when I'm worse I don't think about killing myself or suicide or anything but I do think about what would happen if I wasn't there... but no where I would do that myself.\"  no HI.      FAMILY and SOCIAL HISTORY                                 pt reported   Components copy-forwarded from prior assessment. Last reviewed with patient and updated with patient on 08/15/22.     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 Hopes to retire ~age 64.   Partner/ -  in 2017 to her first , Mitchell, relationship feels very strong   Children- None      Living situation- Lives in a home in [place], [place] with  and cat (Ms. Queen), recently purchased house  Social/ Spiritual Support-  Mitchell, three close friends        Feels Safe at Home- yes   Legal- None     Trauma History (self-report)- Younger brother  of Leukemia when patient was in 8th grade. Niece  in MVA on 2020  Early History/Education- Born and raised in [place], [place]. Family is still in [place]. Grad school in [place].  Reports her childhood was jackie, father was an alcoholic.  Lived in a trailer with 4 siblings.      PAST MED TRIALS      Medication Max Dose (mg) Dates / Duration Helpful? DC Reason / Adverse Effects?   bupropion 300 8151-3469 N Initially helpful then lost efficacy   Adderall 25 1704-6072 N Lack of efficacy   sertraline 100 7614-2117 N Initially helpful then lost efficacy   venlafaxine 225 -current Y     aripiprazole 4 -current Y                                                                   Denies history of allergies     21: Started aripiprazole  3/31/21: Increased aripiprazole to 4 mg daily  21: Discontinued sertraline  10/22/21: No changes.  22: No medication changes. [facility] referral placed. SAD light ordered.   8/15/22: transfer visit, no changes   22: worse mood. venlafaxine changed to IR BID. SAD light re-ordered.  23: venlafaxine changed to TID dosing, no other changes      PAST SUBSTANCE USE HISTORY   Components copy-forwarded from prior assessment. Last reviewed with patient and updated with patient on 08/15/22.     Past  "Use- Reports history of occasional alcohol use from teens to 30's \"never drank to the point where I was overembibing\" (does not drink now d/t  being recovering alcoholic), tried marijuana occasionally In the 1970s   Treatment- #, most recent- no  Medical Consequences- no  Legal Consequences- no  Other- N/A     MEDICAL HISTORY and ALLERGY     ALLERGIES: Atorvastatin    Patient Active Problem List   Diagnosis     Major depression, recurrent (H)     Dizziness and giddiness     Motion sickness     GERD (gastroesophageal reflux disease)     Idiopathic cardiomyopathy (H)     Sleep apnea     Hyperlipidemia LDL goal <100     Allergic rhinitis     Hypertension goal BP (blood pressure) < 130/80     Pelvic pain in female     Health Care Home     24 hour contact handout given     Severe obesity (BMI 35.0-35.9 with comorbidity) (H)     Elevated PTHrP level     H/O bariatric surgery     Hx of gastric bypass     Hypovitaminosis D     Allergic rhinitis     Attention deficit disorder of adult     Mild persistent asthma     Hand joint pain     Myopia, bilateral     Dumping syndrome     Benign essential hypertension     Adjustment disorder with depressed mood     Decreased hearing of both ears     BPV (benign positional vertigo), unspecified laterality     Advanced directives, counseling/discussion     Vitamin D deficiency     Coronary artery disease involving native coronary artery of native heart without angina pectoris     Acute pain of left knee     Syncope     Posterior vitreous detachment of both eyes     Combined forms of age-related cataract, mild, of both eyes     Glaucoma suspect of both eyes     Sciatica of right side     Hypoglycemia        MEDICAL REVIEW OF SYSTEMS     A comprehensive review of systems was performed and is negative other than noted in the HPI.     MEDICATIONS     Current Outpatient Medications   Medication Sig Dispense Refill     venlafaxine (EFFEXOR) 75 MG tablet Take 1.5 tablets (112.5 mg) by " mouth 2 times daily 45 tablet 2     albuterol (PROAIR HFA/PROVENTIL HFA/VENTOLIN HFA) 108 (90 Base) MCG/ACT inhaler Inhale 2 puffs into the lungs every 4 hours as needed for shortness of breath / dyspnea or wheezing 18 g 0     ARIPiprazole (ABILIFY) 2 MG tablet TAKE 2 TABLETS(4 MG) BY MOUTH DAILY 60 tablet 0     aspirin (ASA) 81 MG EC tablet Take 1 tablet (81 mg) by mouth daily 90 tablet 3     azelastine (ASTELIN) 0.1 % nasal spray Spray 2 sprays into both nostrils 2 times daily as needed for rhinitis 30 mL 3     azelastine (OPTIVAR) 0.05 % ophthalmic solution Apply 1 drop to eye 2 times daily as needed (itchy/watery eyes) 6 mL 3     blood glucose (NO BRAND SPECIFIED) lancing device Device to be used with lancets. 3 each 3     blood glucose (NO BRAND SPECIFIED) test strip Use to test blood sugar 3 times daily 300 strip 3     calcium carbonate-vitamin D (CALTRATE 600+D) 600-400 MG-UNIT CHEW Take 1 chew tab by mouth 2 times daily 180 tablet 3     carvedilol (COREG) 25 MG tablet Take 1 tablet (25 mg) by mouth 2 times daily (with meals) 180 tablet 3     cetirizine (ZYRTEC) 10 MG tablet Take 1 tablet (10 mg) by mouth daily 30 tablet 3     Continuous Blood Gluc Sensor (FREESTYLE JOHN 14 DAY SENSOR) [facility] [place] each every 14 days Use 1 Sensor every 14 days. Use to read blood sugars per 's instructions. 2 each 5     Cyanocobalamin (VITAMIN B-12 SL) Place under the tongue every other day        estrogen conj-medroxyPROGESTERone (PREMPRO) 0.45-1.5 MG tablet Take 1 tablet by mouth daily 90 tablet 3     ezetimibe (ZETIA) 10 MG tablet Take 1 tablet (10 mg) by mouth daily 90 tablet 3     FEROSUL 325 (65 Fe) MG tablet TAKE 1 TABLET(325 MG) BY MOUTH TWICE DAILY 180 tablet 0     hydrALAZINE (APRESOLINE) 10 MG tablet Take 1 tablet (10 mg) by mouth 3 times daily (Patient taking differently: Take 10 mg by mouth 2 times daily) 270 tablet 3     Ibuprofen-diphenhydrAMINE Cit (IBUPROFEN PM PO) Take by mouth At Bedtime        "isosorbide mononitrate (IMDUR) 30 MG 24 hr tablet Take 1 tablet (30 mg) by mouth daily 90 tablet 3     lisinopril (ZESTRIL) 5 MG tablet TAKE 1 TABLET(5 MG) BY MOUTH DAILY 90 tablet 3     meclizine (ANTIVERT) 25 MG tablet Take 1 tablet (25 mg) by mouth 3 times daily as needed 30 tablet 1     montelukast (SINGULAIR) 10 MG tablet Take 1 tablet (10 mg) by mouth At Bedtime 90 tablet 3     Multiple Vitamin (MULTI-VITAMIN) per tablet Take 1 tablet by mouth daily. 100 tablet 3     ondansetron (ZOFRAN-ODT) 4 MG ODT tab Take 1 tablet (4 mg) by mouth every 8 hours as needed for nausea 15 tablet 1     simvastatin (ZOCOR) 10 MG tablet Take 3 tablets (30 mg) by mouth At Bedtime 270 tablet 3      VITALS   LMP 07/16/2012     MENTAL STATUS EXAM     Alertness: alert  and oriented  Appearance: well groomed  Behavior/Demeanor: cooperative, pleasant and calm, with good  eye contact   Speech: regular rate and rhythm  Language: intact  Psychomotor: normal or unremarkable  Mood: \"better\"  Affect: full range; congruent to: mood- yes, content- yes  Thought Process/Associations: unremarkable  Thought Content:  Reports none;  Denies suicidal & violent ideation and delusions  Perception:  Reports none;  Denies auditory hallucinations and visual hallucinations  Insight: good - acknowledges and describes mental health symptoms  Judgment: good  Cognition: not formally assessed, adequate for brief interview  Gait and Station: N/A (telehealth)    AIMS = 0      LABS and DATA     PHQ 6/13/2022 8/15/2022 1/19/2023   PHQ-9 Total Score 6 4 7   Q9: Thoughts of better off dead/self-harm past 2 weeks Not at all Not at all Not at all       Recent Labs   Lab Test 09/12/22  0712 01/28/22  0726   CR 0.74 0.84   GFRESTIMATED 90 78     Recent Labs   Lab Test 01/28/22  0726 01/07/20  0740   AST 12 12   ALT 36 24   ALKPHOS 78 83         ECG on 1/7/20 QTc = 447 ms @ 54 bpm. Sinus bradycardia     PSYCHOTROPIC DRUG INTERACTIONS                                          "              PSYCHCLINICDDI   Per [employer]    Concurrent use of ARIPIPRAZOLE and QT INTERVAL PROLONGING DRUGS may result in increased risk of QT interval prolongation.    MANAGEMENT:  Monitoring for adverse effects and periodic EKGs     RISK STATEMENT for SAFETY     Patricia did not appear to be an imminent safety risk to self or others.     TREATMENT RISK STATEMENT: The risks, benefits, alternatives and potential adverse effects have been discussed and are understood by the pt. The pt understands the risks of using street drugs or alcohol. There are no medical contraindications, the pt agrees to treatment with the ability to do so. The pt knows to call the clinic for any problems or to access emergency care if needed.  Medical and substance use concerns are documented above.  Psychotropic drug interaction check was done, including changes made today.     PROVIDER: Allyson Benítez MD    Patient not staffed in clinic.  Note will be reviewed and signed by supervisor Dr. Javier.      Level of Medical Decision Making:   - At least 1 chronic problem that is not stable  - Engaged in prescription drug management during visit (discussed any medication benefits, side effects, alternatives, etc.)

## 2023-01-27 ENCOUNTER — VIRTUAL VISIT (OUTPATIENT)
Dept: PSYCHIATRY | Facility: CLINIC | Age: 63
End: 2023-01-27
Attending: PSYCHOLOGIST
Payer: COMMERCIAL

## 2023-01-27 DIAGNOSIS — F33.1 MODERATE EPISODE OF RECURRENT MAJOR DEPRESSIVE DISORDER (H): Primary | ICD-10-CM

## 2023-01-27 PROCEDURE — 90834 PSYTX W PT 45 MINUTES: CPT | Mod: GT | Performed by: STUDENT IN AN ORGANIZED HEALTH CARE EDUCATION/TRAINING PROGRAM

## 2023-01-27 NOTE — PROGRESS NOTES
VIDEO VISIT  Patricia Perez is a 62 year old patient who is being evaluated via a billable video visit.      Patient has given verbal consent for video visit?: Yes   How would you like to obtain your AVS?: MyChart  AVS SmartPhrase [PsychAVS] has been placed in 'Patient Instructions': Yes      Video- Visit Details  Type of service:  video visit for mental health treatment.  Time of service:    Date:  01/27/2023    Video Start Time:  10:09 AM        Video End Time:  10:59    Reason for video visit: COVID-19  Originating Site (patient location):  Patient's home  Distant Site (provider location):  Remote location  Mode of Communication:  Video Conference via [facility] Diagnostic Assessment                                                   Patricia Perez is a 62 year old female who prefers the name Patricia and pronouns she/her.   Psychiatrist: None  PCP: Funmilayo Grubbs    History was provided by the patient who is a good historian.    History of Present Illness                                                                                 4, 4      - Was in therapy earlier, had to cancel because had to take care of mother, didn't get back to rescheduling, Dr Benítez suggesting starting again  - Wants to gain tools to use, finds herself not being able to focus, easily distracted, not sure why all of sudden, in her mind laziness, will get down on self, want to figure out   - Whether at work or office at home, can just sit and look at email and will not know what to do next, hard to get the next task started, used to be easier to get stuff done, does not have a list anymore of things that she's need to do  - Used to use lists, they were long but atleast she could do and cross things off   - Will think that she doesn't want to even make a list, loves what she does when she does it, doesn't seem to be able to get started     - the [employer] , works with all of [employer], 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, anything people create or receive, 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   - Very interesting, not boring, 27 years, looking at snf in 2-3 years, enjoys what she does  - About 3 years ago when pandemic started, 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, brought another person in after a couple mistakes that she made (not big, few pages out of thousands of pages), brought someone else in to do what she was doing, interviewed the women in person, someone she knows and likes and this lady became director, she lost her title \"restructure\", kept pay and benefits, hurt her ego badly, working on getting over that, pandemic not helpful, missed about 3 sentences to redact information   -  Since then work performance: doesn't think getting much done, performance is fine, gets good reviews, they have gone above and beyond to make her happy    - What's like to do things outside of work? Not getting as much as that done either, got  first time 4 years ago, still learning to live with eachother, bathroom put in basement and took 9 months, too long, that set her off, lost her leo   - Fun: cooks, likes to bake, crossword puzzles, dont do a lot of things, never had too many hobbies, used to go to a lot of museums and shopping, less overall, needs to get out more, thinks it started around pandemic timing as well    - Relationship is fine, tries to go to sleep by counting blessings, grateful that she has a  who takes good care of her, not sure he has noticed the difference, he noticed that she didn't decorate at all for [place], will usually have 6 trees up, just not into it this past year  - Energy is lagging, when she sleeps she sleeps, older cat that likes to wake her up, she is problematic, wants them to be up, sleep at 8 and 5-6 hrs before she starts to get woken up   - Thinks she has been feeling more down, not like she used to, not as " bad as during [place] time when had no leo, not back where she should be, finally got bathroom done and one stress less    - Short term memory not as good as used to, long term good     - Lost her niece in a bad car weck 3 years ago, was very close, that really hurt her worse then she imagined, 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 Seemed to be working well, learned tools, had 4-5 meetings, given her some tools, keeping a journal (hasn't been doing it as often), has done a lot of things in life that not proud of, gets down on self for things she did as an adult that maybe wasn't right thing to do- was told to write down what I did and what I would do different and then try to let it go - very hard on self       Substance Use History                                                               No recent substance use, used to have wine at night,  is a recovering alcoholic, sober a long time, dont have wine/alcohol in the house        Psychiatric History     Diagnoses: MDD and ADHD   Suicidal ideation: No   Suicide attempts: No   Psych Hosp: No  Medications: Venlafaxine and Abilify     Social/ Family History               [per patient report]                                                 1ea, 1ea     FINANCIAL SUPPORT- Works full time  RELATIONSHIPS-    LIVING SITUATION- Lives with    LEGAL- None  EARLY HISTORY/ EDUCATION- Father was an alcoholic, mother an enabler, lived on a farm, mobile home, grandparents owned  farm, it was tough, didn't have money, father was verbally abusive, 1 brother and 2 sis living, 1 brother passed in 1972 from [facility] at 3, very hard on family, she was 10 or 11 at the time,she handled it and looks back and thinks they did the best they did with high school education and no help, [place], [place]   SOCIAL/ SPIRITUAL SUPPORT- [place] her , 2 very good friends, family there if she needs them      TRAUMA HISTORY (self-report)- Previous partner she was living with before , he was an alcoholic and took a while to figure that out, he was never physically mean to her, could be manipulative, in debt because he took the money for drugs, she stepped carefully around him, together 1.5 to 2 years, late 50's, out of that debt now, now own a house   FAMILY HISTORY- father alcoholic, mother has depression, sisters all have depression and brother, father passed away 7-8 years ago, mother still living, she lives in a small twown in [place], 1 sister and brother live near mom    Medical / Surgical History                                                                                                                     Patient Active Problem List   Diagnosis     Major depression, recurrent (H)     Dizziness and giddiness     Motion sickness     GERD (gastroesophageal reflux disease)     Idiopathic cardiomyopathy (H)     Sleep apnea     Hyperlipidemia LDL goal <100     Allergic rhinitis     Hypertension goal BP (blood pressure) < 130/80     Pelvic pain in female     Health Care Home     24 hour contact handout given     Severe obesity (BMI 35.0-35.9 with comorbidity) (H)     Elevated PTHrP level     H/O bariatric surgery     Hx of gastric bypass     Hypovitaminosis D     Allergic rhinitis     Attention deficit disorder of adult     Mild persistent asthma     Hand joint pain     Myopia, bilateral     Dumping syndrome     Benign essential hypertension     Adjustment disorder with depressed mood      Decreased hearing of both ears     BPV (benign positional vertigo), unspecified laterality     Advanced directives, counseling/discussion     Vitamin D deficiency     Coronary artery disease involving native coronary artery of native heart without angina pectoris     Acute pain of left knee     Syncope     Posterior vitreous detachment of both eyes     Combined forms of age-related cataract, mild, of both eyes     Glaucoma suspect of both eyes     Sciatica of right side     Hypoglycemia       Allergy                                Atorvastatin  Current Medications                                                                                                         Current Outpatient Medications   Medication Sig Dispense Refill     albuterol (PROAIR HFA/PROVENTIL HFA/VENTOLIN HFA) 108 (90 Base) MCG/ACT inhaler Inhale 2 puffs into the lungs every 4 hours as needed for shortness of breath / dyspnea or wheezing 18 g 0     ARIPiprazole (ABILIFY) 2 MG tablet TAKE 2 TABLETS(4 MG) BY MOUTH DAILY 60 tablet 0     aspirin (ASA) 81 MG EC tablet Take 1 tablet (81 mg) by mouth daily 90 tablet 3     azelastine (ASTELIN) 0.1 % nasal spray Spray 2 sprays into both nostrils 2 times daily as needed for rhinitis 30 mL 3     azelastine (OPTIVAR) 0.05 % ophthalmic solution Apply 1 drop to eye 2 times daily as needed (itchy/watery eyes) 6 mL 3     blood glucose (NO BRAND SPECIFIED) lancing device Device to be used with lancets. 3 each 3     blood glucose (NO BRAND SPECIFIED) test strip Use to test blood sugar 3 times daily 300 strip 3     calcium carbonate-vitamin D (CALTRATE 600+D) 600-400 MG-UNIT CHEW Take 1 chew tab by mouth 2 times daily 180 tablet 3     carvedilol (COREG) 25 MG tablet Take 1 tablet (25 mg) by mouth 2 times daily (with meals) 180 tablet 3     cetirizine (ZYRTEC) 10 MG tablet Take 1 tablet (10 mg) by mouth daily 30 tablet 3     Continuous Blood Gluc Sensor (FREESTYLE JOHN 14 DAY SENSOR) MISC 1 each every 14 days Use  1 Sensor every 14 days. Use to read blood sugars per 's instructions. 2 each 5     Cyanocobalamin (VITAMIN B-12 SL) Place under the tongue every other day        estrogen conj-medroxyPROGESTERone (PREMPRO) 0.45-1.5 MG tablet Take 1 tablet by mouth daily 90 tablet 3     ezetimibe (ZETIA) 10 MG tablet Take 1 tablet (10 mg) by mouth daily 90 tablet 3     FEROSUL 325 (65 Fe) MG tablet TAKE 1 TABLET(325 MG) BY MOUTH TWICE DAILY 180 tablet 0     hydrALAZINE (APRESOLINE) 10 MG tablet Take 1 tablet (10 mg) by mouth 3 times daily (Patient taking differently: Take 10 mg by mouth 2 times daily) 270 tablet 3     Ibuprofen-diphenhydrAMINE Cit (IBUPROFEN PM PO) Take by mouth At Bedtime       isosorbide mononitrate (IMDUR) 30 MG 24 hr tablet Take 1 tablet (30 mg) by mouth daily 90 tablet 3     lisinopril (ZESTRIL) 5 MG tablet TAKE 1 TABLET(5 MG) BY MOUTH DAILY 90 tablet 3     meclizine (ANTIVERT) 25 MG tablet Take 1 tablet (25 mg) by mouth 3 times daily as needed 30 tablet 1     montelukast (SINGULAIR) 10 MG tablet Take 1 tablet (10 mg) by mouth At Bedtime 90 tablet 3     Multiple Vitamin (MULTI-VITAMIN) per tablet Take 1 tablet by mouth daily. 100 tablet 3     ondansetron (ZOFRAN-ODT) 4 MG ODT tab Take 1 tablet (4 mg) by mouth every 8 hours as needed for nausea 15 tablet 1     simvastatin (ZOCOR) 10 MG tablet Take 3 tablets (30 mg) by mouth At Bedtime 270 tablet 3     venlafaxine (EFFEXOR) 75 MG tablet Take 1 tablet (75 mg) by mouth 3 times daily 45 tablet 2     Mental Status Exam                                                                                     9, 14 cog gs     Appearance:  awake, alert and adequately groomed  Attitude:  cooperative  Eye Contact:  good  Mood:  good  Affect:  appropriate and in normal range  Speech:  normal prosody  Psychomotor Behavior:  no evidence of tardive dyskinesia, dystonia, or tics  Thought Process:  logical and linear  Associations:  no loose associations  Thought Content:   no evidence of suicidal ideation or homicidal ideation and no evidence of psychotic thought  Insight:  good  Judgment:  intact  Oriented to:  time, person, and place  Attention Span and Concentration:  intact  Recent and Remote Memory:  intact  Language: Speaks with appropriate vocabulary and syntax  Fund of Knowledge: appropriate  Gait and Station: N/a; telehealth    PHQ9                                                                                                                    PHQ9 Today:   PHQ 6/13/2022 8/15/2022 1/19/2023   PHQ-9 Total Score 6 4 7   Q9: Thoughts of better off dead/self-harm past 2 weeks Not at all Not at all Not at all       Diagnosis and Assessment                                                                             m2, h3     Today the following issues were addressed:    Patricia is a 62 year old women with diagnoses of recurrent major depressive disorder and ADHD by history. She presents for therapy with the goals to improve her leo, focus/concentration and motivation.    Presenting problem: Difficulties starting tasks and completing them, low energy, low motivation, low mood, lack of leo, grief regarding niece   Predisposing factors: Father was an alcoholic, family history of depression   Precipitating factors:  Mistake was made at work and a new lady was hired, work was restructured, loss niece in car crash  Perpetuating factors: Loss of niece greatly effecting her, lack of motivation seen in work, home and personal life   Protective factors: No substance use history, no prior suicide attempts, supportive  and friends     Suicide risk assessment: Low  Risk factors: Age, mental health condition, and recent life stressors  Protective risk factors: Family, community support, engagement in treatment, no current SI, no previous suicide attempts     Patient is at minimal risk for suicide given history, risk factors, and protective factors. The person currently has no suicidal  thoughts or plan.      Plan                                                                                                                     m2, h3     1) Therapy- Start, supportive/psychodynamic therapy, 1 x a week for 4-6 sessions or more.     2) Continue medication management with psychiatrist.       Treatment Plan      SYMPTOMS; PROBLEMS   MEASURABLE GOALS;    FUNCTIONAL IMPROVEMENT INTERVENTIONS;   GAINS MADE DISCHARGE CRITERIA   Wants to get her leo back   Glass half full vs empty, seeing the good in everybody and things Supportive / psychodynamic Symptom resolution   Improve focus and concentration   Able to start and complete tasks as she previous did  Supportive / psychodynamic Symptom resolution   Motivation and drive   Resume her fun activities and hobbies Supportive / psychodynamic Symptom resolution     RTC: 1 Week     CRISIS NUMBERS:   Provided routinely in AVS.    Treatment Risk Statement:  The patient understands the risks, benefits and alternatives. Agrees to treatment with the capacity to do so and agrees to call clinic for any problems. The patient understands to call 911 or go to the nearest ED if life threatening or urgent symptoms occur.     PSYCHIATRIST:  Kay Tirado MD    Patient will be reviewed by supervisors Dr. Britney Aaron and Dr. Belinda Ayoub

## 2023-02-01 ENCOUNTER — MYC MEDICAL ADVICE (OUTPATIENT)
Dept: PSYCHIATRY | Facility: CLINIC | Age: 63
End: 2023-02-01
Payer: COMMERCIAL

## 2023-02-01 DIAGNOSIS — F33.1 MODERATE EPISODE OF RECURRENT MAJOR DEPRESSIVE DISORDER (H): ICD-10-CM

## 2023-02-01 RX ORDER — VENLAFAXINE 75 MG/1
75 TABLET ORAL 3 TIMES DAILY
Qty: 90 TABLET | Refills: 1 | Status: SHIPPED | OUTPATIENT
Start: 2023-02-01 | End: 2023-03-09

## 2023-02-01 NOTE — TELEPHONE ENCOUNTER
Last seen: 1/19/23  RTC: 3 months  Cancel: none  No-show: none  Next appt: 3/9/23     Incoming refill from Patient via [employer] message. Last refills send for quantity 45 erroneously. Writer confirmed with Dr Benítez.     Medication requested:   Pending Prescriptions:                       Disp   Refills    venlafaxine (EFFEXOR) 75 MG tablet        90 tab*1            Sig: Take 1 tablet (75 mg) by mouth 3 times daily        From chart note:   - CHANGE venlafaxine to 75 mg TID      Medication refill approved per refill protocol.

## 2023-02-02 DIAGNOSIS — F33.1 MODERATE EPISODE OF RECURRENT MAJOR DEPRESSIVE DISORDER (H): ICD-10-CM

## 2023-02-02 RX ORDER — ARIPIPRAZOLE 2 MG/1
4 TABLET ORAL DAILY
Qty: 60 TABLET | Refills: 1 | Status: SHIPPED | OUTPATIENT
Start: 2023-02-02 | End: 2023-03-09

## 2023-02-02 NOTE — TELEPHONE ENCOUNTER
Medication requested: ARIPIPRAZOLE 2MG TABLETS  Last refilled: 1/4/23  Qty: 30      Last seen: 1/19/23  RTC: 3 months  Cancel: 0  No-show: 0  Next appt: 3/9/23    Refill decision:   Refilled for 30 days per protocol.

## 2023-02-03 ENCOUNTER — VIRTUAL VISIT (OUTPATIENT)
Dept: PSYCHIATRY | Facility: CLINIC | Age: 63
End: 2023-02-03
Attending: PSYCHOLOGIST
Payer: COMMERCIAL

## 2023-02-03 DIAGNOSIS — F33.1 MODERATE EPISODE OF RECURRENT MAJOR DEPRESSIVE DISORDER (H): Primary | ICD-10-CM

## 2023-02-03 PROCEDURE — 90837 PSYTX W PT 60 MINUTES: CPT | Mod: VID | Performed by: STUDENT IN AN ORGANIZED HEALTH CARE EDUCATION/TRAINING PROGRAM

## 2023-02-03 NOTE — PROGRESS NOTES
"Patricia Perez is a 62 year old who is being evaluated via a billable video visit.      Pt will join video visit via: Gl[employer]el [employer]  If there are problems joining the visit, send backup video invite via: Text to preferred phone: [phone]    Reason for telehealth visit: Patient has requested telehealth visit    Originating location (patient location): Patient's home    Will anyone else be joining the visit? No    Video- Visit Details  Type of service:  video visit for mental health treatment.  Time of service:    Date:  02/03/2023    Video Start Time:  12:07 PM        Video End Time:  11:02    Reason for video visit: COVID-19  Originating Site (patient location):  Patient's home  Distant Site (provider location):  Remote location  Mode of Communication:  Video Conference via Trip4real    OUTPATIENT PSYCHOTHERAPY PROGRESS NOTE    Client Name: Patricia Perez   YOB: 1960 (62 year old)   Date of Service:  Feb 3, 2023  Time of Service: 10:09 AM to 11:02 AM (53 minutes)  Service Type(s):67857 psychotherapy (53-60 min. with patient and/or family) / Individual psychotherapy session  Patient MRN: [id number]  Provider: Kay Tirado MD      Individuals Present:   Patient    Session # with this psychiatrist: 2     Session content:  This supportive psychotherapy session addressed issues related to goals of therapy and current psychosocial stressors. Psychotherapy techniques or interventions utilized throughout the session include: Establishing rapport, active listening, date gathering, reassurance, normalizing, encouragement, re-framing, advice and teaching.     Today we completed a genogram     - Childhood described as \"[place]\": lived on grandparents farm, lived in trailer on their farm, father was an alcoholic, when fathers dad passed, they inherited the parents paper route, that sustained them, didn't have much money, grandmother was not a happy women, she didn't have nice things to say about her mom, fathers " "mom  - Her dads grandma and her: until 7th grade she would go up and read and listen to ball games with her, finally it just got to her and she realized it wasn't a good relationship, she would tell her negative things about her parents  - Parents had kids young  - Grandparents grew up during the depression, scraping by, making things work, thinks grandparents resented that they had to pay for them to live there   - Grandpa was also mean     - Support: Friend Addis and her parents when she was in high school, not really much support else where growing up, Addis lived in [facility] over, Patricia would go to her house after school, parents would feed her, she didn't realize that ppl sat down and ate meals together, her parents really nurtured her, she felt CALM, LOVED, PEACEFUL, kept in touch a long time and lost track of her, she lives on a farm in [place], politics different   - Her and her brother raised themselves   - Mom busy with paper route and two younger sisters, father doing farm stuff or drinking  - Had another brother that passed from [facility], family struggled with that (she was 11)    - Father was verbally abusive: not really a trigger or certain things, family stepped very lightly around him and tried not to upset him, if loud when watching tv, he had a quick temper, always yelling at her mom and even after he got off the alcohol, her mom would act like nothing happened \"that's just your father\"   - Didn't go out as a family in public, didn't have the money, mom did a federal program where she went and got GED and LPN   - Then her mom told her dad that she would leave him if he didn't straighten up, he stopped drinking as much and trained through same program as , both working and they bought a house and moved on their own  - Two younger sisters had a different childhood experience  - Her goal was to always get out and leave, once she did that didn't look back    - Mom: now pretty close, doesn't " tell her too much of what happens in her life, sees her 2-3 times a year, will call her weekly, says it's a good relationship     - Siblings: good with all siblings now, did not growing up, growing up her and her brother fought a lot, not sure why, that's what they saw, when sisters where born she was always the one taking care of them, not knowing any better always yelling at them, tells them sorry to this day and they say they don't remember    - Left and went to college, grad school in [employer], had a roommate second year and had really good friends and tight during grad school  - First job in ' in [employer], and their 1-2 years, had really good friends, really good friends with boss' who where  and wife, survey  and collected data for book    - Then moved to [place] for a job for two years, small group of friends and very tight, worked on a watson to find historical resources    - Then moved to [place] for 10 years, couple of friends that she keeps up with on Facebook,  and setting up a programs   - Then moved to [place] - job was perfect timing and felt meant to be, closer to family    - Describes family as dysfunctional and as they got older we are more functional   - She was never a farm girl, wanted to wear dresses   - Brother kept food on table, 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, would always give him money when needed as they were older      - Children: no and didn't want any, already helped raise her sisters and did not need more   - Always a large girl and never skinny, doesn't think people expected her to get , had a boyfriend in grad school - kind of hard on herself, mother was not supportive and always thought they needed to lose weight and would tell her that she would be so pretty if she lost weight     Behavioral observations/mental status:     Appearance:  casually dressed  Muscle Strength and Tone: normal  Gait and Station: N/A: Telehealth  Behavior (Psychomotor):   no evidence of tardive dyskinesia, dystonia, or tics  Eye Contact:  good  Speech:  clear, coherent and normal prosody  Mood:  good  Affect:  appropriate and in normal range  Attitude:  cooperative  Thought Process:  logical, linear and goal oriented  Thought Content:  no evidence of suicidal ideation or homicidal ideation, no evidence of psychotic thought, no auditory hallucinations present and no visual hallucinations present  Associations:  no loose associations  Insight:  good  Judgment:  intact  Oriented to:  time, person, and place  Attention Span and Concentration:  intact  Recent and Remote Memory:  intact  Language: Fluent in English with appropriate syntax and vocabulary.  Fund of Knowledge: appropriate         Assessment   Patricia Perez is a 62 year old female previously diagnosed with Major Depressive Disorder who presents to psychotherapy clinic on 02/03/2023. Patient is currently being treated for Major Depressive Disorder with weekly supportive therapy sessions with this psychiatrist. The patient's progress is great, based upon ability and willingness to complete family genogram.     We will continue to work on skills to address this patient's treatment plan.     Suicide risk assessment: Low  Risk factors: Age, mental health condition, and recent life stressors  Protective risk factors: Family, community support, engagement in treatment, no current SI, no previous suicide attempts     Patient is at minimal risk for suicide given history, risk factors, and protective factors. The person currently has no suicidal thoughts or plan.      Diagnoses treated:  Encounter Diagnosis   Name Primary?     Moderate episode of recurrent major depressive disorder (H) Yes       Plan:   - Continue weekly individual psychodynamic psychotherapy for the above diagnoses.   - Next therapy appointment has been scheduled for 2/10/2023 to continue work on treatment goals.     Treatment Plan         SYMPTOMS; PROBLEMS     MEASURABLE GOALS;    FUNCTIONAL IMPROVEMENT INTERVENTIONS;   GAINS MADE DISCHARGE CRITERIA   Wants to get her leo back    Glass half full vs empty, seeing the good in everybody and things Supportive / psychodynamic Symptom resolution   Improve focus and concentration    Able to start and complete tasks as she previous did  Supportive / psychodynamic Symptom resolution   Motivation and drive    Resume her fun activities and hobbies Supportive / psychodynamic Symptom resolution      Date of most recent treatment plan: 1/27/2023  Date next treatment plan due: 3 months    CRISIS NUMBERS:   Provided routinely in AVS.     Treatment Risk Statement:  The patient understands the risks, benefits and alternatives. Agrees to treatment with the capacity to do so and agrees to call clinic for any problems. The patient understands to call 911 or go to the nearest ED if life threatening or urgent symptoms occur.      PSYCHIATRIST:  Kay Tirado MD     Patient will be reviewed by supervisors Dr. Britney Aaron and Dr. Belinda Ayoub

## 2023-02-03 NOTE — PATIENT INSTRUCTIONS
**For crisis resources, please see the information at the end of this document**   Patient Education    Thank you for coming to the [facility] [place] [facility].     Lab Testing:  If you had lab testing today and your results are reassuring or normal they will be mailed to you or sent through [employer] within 7 days. If the lab tests need quick action we will call you with the results. The phone number we will call with results is # [phone]. If this is not the best number please call our clinic and change the number.     Medication Refills:  If you need any refills please call your pharmacy and they will contact us. Our fax number for refills is [phone].   Three business days of notice are needed for general medication refill requests.   Five business days of notice are needed for controlled substance refill requests.   If you need to change to a different pharmacy, please contact the new pharmacy directly. The new pharmacy will help you get your medications transferred.     Contact Us:  Please call [phone] during business hours (8-5:00 M-F).   If you have medication related questions after clinic hours, or on the weekend, please call [phone].     Financial Assistance [phone]   Medical Records [phone]       MENTAL HEALTH CRISIS RESOURCES:  For a emergency help, please call 911 or go to the nearest Emergency Department.     Emergency Walk-In Options:   EmPATH Unit @ [facility] ([place]): [phone] - Specialized mental health emergency area designed to be calming  [facility] ([facility]): [phone]  [facility] ([facility]): [phone]  [facility]): [phone]    [facility] Crisis Information:   [place]: [phone]  [place]: [phone]  [place] ([place]) - Adult: [phone]     Child: [phone]  [place] - Adult: [phone]     Child: [phone]  [place]:  [phone]  List of all [facility] resources:   https://[place].gov/dhs/people-we-serve/adults/health-care/mental-health/resources/crisis-contacts.jsp    National Crisis Information:   Crisis Text Line: Text  [place]  to 612657  Suicide & Crisis Lifeline: 988  National Suicide Prevention Lifeline: 7-610-602-TALK (1-443.796.3060)       For online chat options, visit https://suicidepreventionlifeline.org/chat/  Poison Control Center: 1-950-774-5527  Trans Lifeline: 6-497-033-5544 - Hotline for transgender people of all ages  The Ricardo Project: 4-625-110-5450 - Hotline for LGBT youth     For Non-Emergency Support:   Fast Tracker: Mental Health & Substance Use Disorder Resources -   https://www.Cyphomackklinifyn.org/

## 2023-02-06 ENCOUNTER — LAB (OUTPATIENT)
Dept: LAB | Facility: CLINIC | Age: 63
End: 2023-02-06
Payer: COMMERCIAL

## 2023-02-06 DIAGNOSIS — I25.10 CORONARY ARTERY DISEASE INVOLVING NATIVE CORONARY ARTERY OF NATIVE HEART WITHOUT ANGINA PECTORIS: ICD-10-CM

## 2023-02-06 DIAGNOSIS — E78.5 DYSLIPIDEMIA: ICD-10-CM

## 2023-02-06 DIAGNOSIS — I10 ESSENTIAL HYPERTENSION WITH GOAL BLOOD PRESSURE LESS THAN 130/80: ICD-10-CM

## 2023-02-06 LAB
ANION GAP SERPL CALCULATED.3IONS-SCNC: 4 MMOL/L (ref 3–14)
BUN SERPL-MCNC: 15 MG/DL (ref 7–30)
CALCIUM SERPL-MCNC: 9 MG/DL (ref 8.5–10.1)
CHLORIDE BLD-SCNC: 109 MMOL/L (ref 94–109)
CHOLEST SERPL-MCNC: 187 MG/DL
CO2 SERPL-SCNC: 26 MMOL/L (ref 20–32)
CREAT SERPL-MCNC: 0.74 MG/DL (ref 0.52–1.04)
ERYTHROCYTE [DISTWIDTH] IN BLOOD BY AUTOMATED COUNT: 13.7 % (ref 10–15)
FASTING STATUS PATIENT QL REPORTED: YES
GFR SERPL CREATININE-BSD FRML MDRD: 90 ML/MIN/1.73M2
GLUCOSE BLD-MCNC: 93 MG/DL (ref 70–99)
HCT VFR BLD AUTO: 38.6 % (ref 35–47)
HDLC SERPL-MCNC: 97 MG/DL
HGB BLD-MCNC: 12.7 G/DL (ref 11.7–15.7)
LDLC SERPL CALC-MCNC: 75 MG/DL
MCH RBC QN AUTO: 32.6 PG (ref 26.5–33)
MCHC RBC AUTO-ENTMCNC: 32.9 G/DL (ref 31.5–36.5)
MCV RBC AUTO: 99 FL (ref 78–100)
NONHDLC SERPL-MCNC: 90 MG/DL
PLATELET # BLD AUTO: 259 10E3/UL (ref 150–450)
POTASSIUM BLD-SCNC: 4.6 MMOL/L (ref 3.4–5.3)
RBC # BLD AUTO: 3.9 10E6/UL (ref 3.8–5.2)
SODIUM SERPL-SCNC: 139 MMOL/L (ref 133–144)
TRIGL SERPL-MCNC: 74 MG/DL
TSH SERPL DL<=0.005 MIU/L-ACNC: 1.95 MU/L (ref 0.4–4)
WBC # BLD AUTO: 5.5 10E3/UL (ref 4–11)

## 2023-02-06 PROCEDURE — 36415 COLL VENOUS BLD VENIPUNCTURE: CPT

## 2023-02-06 PROCEDURE — 85027 COMPLETE CBC AUTOMATED: CPT

## 2023-02-06 PROCEDURE — 84443 ASSAY THYROID STIM HORMONE: CPT

## 2023-02-06 PROCEDURE — 80061 LIPID PANEL: CPT

## 2023-02-06 PROCEDURE — 80048 BASIC METABOLIC PNL TOTAL CA: CPT

## 2023-02-07 DIAGNOSIS — I25.10 CORONARY ARTERY DISEASE INVOLVING NATIVE CORONARY ARTERY OF NATIVE HEART WITHOUT ANGINA PECTORIS: ICD-10-CM

## 2023-02-07 RX ORDER — LISINOPRIL 5 MG/1
TABLET ORAL
Qty: 90 TABLET | Refills: 3 | Status: CANCELLED | OUTPATIENT
Start: 2023-02-07

## 2023-02-08 ENCOUNTER — VIRTUAL VISIT (OUTPATIENT)
Dept: CARDIOLOGY | Facility: CLINIC | Age: 63
End: 2023-02-08
Attending: INTERNAL MEDICINE
Payer: COMMERCIAL

## 2023-02-08 DIAGNOSIS — I10 ESSENTIAL HYPERTENSION WITH GOAL BLOOD PRESSURE LESS THAN 130/80: ICD-10-CM

## 2023-02-08 DIAGNOSIS — I42.9 IDIOPATHIC CARDIOMYOPATHY (H): ICD-10-CM

## 2023-02-08 DIAGNOSIS — I42.9 CARDIOMYOPATHY, UNSPECIFIED TYPE (H): ICD-10-CM

## 2023-02-08 DIAGNOSIS — I25.10 CORONARY ARTERY DISEASE INVOLVING NATIVE CORONARY ARTERY OF NATIVE HEART WITHOUT ANGINA PECTORIS: ICD-10-CM

## 2023-02-08 DIAGNOSIS — E78.5 HYPERLIPIDEMIA LDL GOAL <70: ICD-10-CM

## 2023-02-08 DIAGNOSIS — E78.5 HYPERLIPIDEMIA LDL GOAL <100: ICD-10-CM

## 2023-02-08 DIAGNOSIS — I10 HYPERTENSION, UNSPECIFIED TYPE: ICD-10-CM

## 2023-02-08 PROCEDURE — 99214 OFFICE O/P EST MOD 30 MIN: CPT | Mod: VID | Performed by: INTERNAL MEDICINE

## 2023-02-08 PROCEDURE — G0463 HOSPITAL OUTPT CLINIC VISIT: HCPCS | Mod: GT | Performed by: INTERNAL MEDICINE

## 2023-02-08 RX ORDER — ISOSORBIDE MONONITRATE 30 MG/1
30 TABLET, EXTENDED RELEASE ORAL DAILY
Qty: 90 TABLET | Refills: 3 | Status: SHIPPED | OUTPATIENT
Start: 2023-02-08 | End: 2024-01-22

## 2023-02-08 RX ORDER — SIMVASTATIN 40 MG
40 TABLET ORAL AT BEDTIME
Qty: 90 TABLET | Refills: 3 | Status: SHIPPED | OUTPATIENT
Start: 2023-02-08 | End: 2024-01-22

## 2023-02-08 RX ORDER — LISINOPRIL 5 MG/1
TABLET ORAL
Qty: 90 TABLET | Refills: 3 | Status: SHIPPED | OUTPATIENT
Start: 2023-02-08 | End: 2024-02-12

## 2023-02-08 RX ORDER — HYDRALAZINE HYDROCHLORIDE 10 MG/1
10 TABLET, FILM COATED ORAL 3 TIMES DAILY
Qty: 270 TABLET | Refills: 3 | Status: SHIPPED | OUTPATIENT
Start: 2023-02-08 | End: 2024-01-22

## 2023-02-08 RX ORDER — CARVEDILOL 25 MG/1
25 TABLET ORAL 2 TIMES DAILY WITH MEALS
Qty: 180 TABLET | Refills: 3 | Status: SHIPPED | OUTPATIENT
Start: 2023-02-08 | End: 2024-01-22

## 2023-02-08 RX ORDER — EZETIMIBE 10 MG/1
10 TABLET ORAL DAILY
Qty: 90 TABLET | Refills: 3 | Status: SHIPPED | OUTPATIENT
Start: 2023-02-08 | End: 2024-01-22

## 2023-02-08 NOTE — NURSING NOTE
Chief Complaint   Patient presents with     Follow Up     6 month follow up, no updates per pt. Medications/allergies reviewed with pt, pt states she is going to need refills on all of her cardiac medications. She stated that she also no longer uses the Freestyle Madeleine       Is the patient currently in the state of [place]? YES    Visit mode:VIDEO    If the visit is dropped, the patient can be reconnected by: VIDEO VISIT: Send to e-mail at: [email or web address]    Will anyone else be joining the visit? NO      How would you like to obtain your AVS? MyChart    Are changes needed to the allergy or medication list? YES: Pt needs refill on all Cardiac medications. She stated that she also no longer uses the Freestyle Madeleine    Comments or concerns related to today's visit: None    Patient denies any changes since echeck-in regarding medication and allergies and states all information entered during echeck-in remains accurate.    Ida Correa, Visit Facilitator/MA.

## 2023-02-08 NOTE — PROGRESS NOTES
"The patient has been notified of following:     \"This video visit will be conducted via a call between you and your physician/provider. We have found that certain health care needs can be provided without the need for an in-person physical exam.  This service lets us provide the care you need with a video conversation.  If a prescription is necessary we can send it directly to your pharmacy.  If lab work is needed we can place an order for that and you can then stop by our lab to have the test done at a later time.    Video visits are billed at different rates depending on your insurance coverage.  Please reach out to your insurance provider with any questions.    If during the course of the call the physician/provider feels a video visit is not appropriate, you will not be charged for this service.\"    Patient has given verbal consent for video visit? Yes    How would you like to obtain your AVS? Mail    Video-Visit Details    Type of service:  Video Visit    Video Start Time:257pm    Video End Time:308pm    Total visit time including video visit, chart review, charting, coordination of care =33min    Originating Location (pt. Location):patient home      Distant Location (provider location):   Off site home office    Platform used for Video Visit: Anibal    See dictation #[id number]    CSN#:[id number]        "

## 2023-02-08 NOTE — PROGRESS NOTES
Service Date: 2023    Funmilayo Grubbs PA-C   [facility]  [address] [place] [address]     RE:  Patricia Perez  MRN: [id number]  : 1960    Dear Ms. Eastrandall:    It was a pleasure participating in the care of your patient, Ms. Patricia Perez.  As you know, she is a 62-year-old lady who I saw over virtual video visit via Dashride for history of palpitations, prior cardiomyopathy, hypertension, hyperlipidemia and nonobstructive coronary disease.    Her past medical history is significant for the followin.  Hypertension.  2.  Hyperlipidemia.  3.  Sleep apnea, on CPAP.  4.  Depression.  5.  Gastroesophageal reflux disease.  6.  Asthma.  7.  Obesity.  8.  Status post gastric bypass surgery.    Her cardiac history is significant for a nonischemic cardiomyopathy that is mild to moderate degree in .  Coronary angiogram revealed only mild disease in the LAD and right coronary territories.    She had an episode of syncope in 2020 and coronary CTA 2020 revealed the following:      Left main okay.  LAD less than 50% proximal to mid stenosis.  Circumflex less than 50% proximal stenosis.  RCA less than 30% proximal stenosis.    Zio patch monitor and echo were unremarkable at that time.    I last saw her 2022.  At that time, she was complaining of some palpitations.  We performed an echocardiogram and a Zio patch monitor.  Her echo was unremarkable and her Zio patch monitor revealed 3 episodes of nonsustained VT up to 9 beats, 2 episodes of SVT, longest 8 beats.  Symptoms corresponded to normal sinus rhythm, plus or minus PVCs with a 3% burden.  No sustained malignant arrhythmias were identified.    Since our last visit, the patient's palpitations have completely resolved.  She has not had any palpitations whatsoever in the last 3 months.  Her weight has been stable at 187 pounds and no edema.  Her blood pressures at home have been stable in the 120/70  range.    She currently walks 20-30 minutes 3 times a week and also works out with a  doing circuit weight training twice a week.  She denies any new chest pain, shortness of breath or exertional limitation.  She denies other PND, orthopnea, edema, palpitations, syncope or near syncope.    REVIEW OF SYSTEMS:  A 10-point review of systems is otherwise unremarkable.  She has no other complaints.    CURRENT MEDICATIONS:      1.  Abilify.    2.  Albuterol.    3.  Azelastine.    4.  Carvedilol 25 mg twice daily.  5.  Estrogen.  6.  Ezetimibe 10 mg a day.  7.  Hydralazine 10 mg 3 times a day.  8.  Imdur 30 mg a day.  9.  Lisinopril 5 mg a day.  10.  Simvastatin 30 mg a day.    PHYSICAL EXAMINATION:      VITAL SIGNS:  Her blood pressure is 120/70, her weight is 187 pounds.  GENERAL:  She appears comfortable, well groomed.  PSYCHIATRIC:  She is alert and oriented x3.  HEENT:  Eyes do not appear grossly erythematous or have exudate.  RESPIRATORY:  She is breathing comfortably without gross cough.    The remainder of the comprehensive physical exam was deferred secondary to the COVID-19 pandemic and secondary to video visit restrictions.    Labs 02/06/2023, LDL 75, potassium 4.6, GFR normal, hemoglobin and thyroid normal.    Echocardiogram 09/08/2022 reveals normal LV systolic function, no significant valvular pathology identified.     Zio patch monitor 09/09/2022 reveals symptoms reported with ectopy, but no sustained malignant arrhythmias identified.      IMPRESSION:      Patricia is a 62-year-old lady with several active cardiac issues:    1.  History of palpitations.    On our last visit, she was complaining of some occasional palpitations.  However, echocardiogram was unremarkable and Zio patch monitor revealed 3 episodes of nonsustained VT up to 9 beats with 2 episodes of SVT, longest 8 beats.      Symptoms corresponded to normal sinus rhythm, plus or minus PVCs with a 3% burden.  No sustained malignant  arrhythmias were identified.  Fortunately, from a clinical standpoint, her symptoms have completely resolved and have not recurred.  Continue to follow.    2.  Prior history of mild nonischemic cardiomyopathy.    In , ejection fraction was 40%-45%.  With medical therapy, her ejection fraction improved to the normal range as of most recent echo 2022.      She is currently doing well from this standpoint and she has only mild nonobstructive disease by coronary CTA 2020.      Continue to monitor clinically.    3.  Hypertension, well controlled.      Blood pressure is running 120/70.  Continue to follow.    4.  Hyperlipidemia.    Goal LDL less than 70.  Currently 75.  We will attempt to optimize.    5.  History of mild to moderate nonobstructive coronary disease.    This was displayed on coronary CTA 2020.  We will continue aggressive secondary prevention.      PLAN:      1.  Increase simvastatin from 30 mg to 40 mg a day.    2.  Virtual video visit followup 1 year with labs prior, earlier if needed.    Once again, it was a pleasure participating in the care of your patient, Ms. Patricia Perez.  Please feel free to contact me at any time if any questions regarding her care in the future.      Sincerely,          Preston Cuadra MD        D: 2023   T: 2023   MT: DAYAMI    Name:     PATRICIA PEREZ  MRN:      [id number]        Account:      [id number]   :      1960           Service Date: 2023       Document: [id number]

## 2023-02-08 NOTE — LETTER
2023      RE: Patricia Perez  [address]  [place] [address]       Dear Colleague,    Thank you for the opportunity to participate in the care of your patient, Patricia Perez, at the [facility]. Please see a copy of my visit note below.      Service Date: 2023    Funmilayo Grubbs PA-C   [facility]  [address] [place] [address]     RE:  Patricia Perez  MRN: [id number]  : 1960    Dear Ms. Grubbs:    It was a pleasure participating in the care of your patient, Ms. Patricia Perez.  As you know, she is a 62-year-old lady who I saw over virtual video visit via [employer] for history of palpitations, prior cardiomyopathy, hypertension, hyperlipidemia and nonobstructive coronary disease.    Her past medical history is significant for the followin.  Hypertension.  2.  Hyperlipidemia.  3.  Sleep apnea, on CPAP.  4.  Depression.  5.  Gastroesophageal reflux disease.  6.  Asthma.  7.  Obesity.  8.  Status post gastric bypass surgery.    Her cardiac history is significant for a nonischemic cardiomyopathy that is mild to moderate degree in .  Coronary angiogram revealed only mild disease in the LAD and right coronary territories.    She had an episode of syncope in 2020 and coronary CTA 2020 revealed the following:    Left main okay.  LAD less than 50% proximal to mid stenosis.  Circumflex less than 50% proximal stenosis.  RCA less than 30% proximal stenosis.    Zio patch monitor and echo were unremarkable at that time.    I last saw her 2022.  At that time, she was complaining of some palpitations.  We performed an echocardiogram and a Zio patch monitor.  Her echo was unremarkable and her Zio patch monitor revealed 3 episodes of nonsustained VT up to 9 beats, 2 episodes of SVT, longest 8 beats.  Symptoms corresponded to normal sinus rhythm, plus or minus PVCs  with a 3% burden.  No sustained malignant arrhythmias were identified.    Since our last visit, the patient's palpitations have completely resolved.  She has not had any palpitations whatsoever in the last 3 months.  Her weight has been stable at 187 pounds and no edema.  Her blood pressures at home have been stable in the 120/70 range.    She currently walks 20-30 minutes 3 times a week and also works out with a  doing circuit weight training twice a week.  She denies any new chest pain, shortness of breath or exertional limitation.  She denies other PND, orthopnea, edema, palpitations, syncope or near syncope.    REVIEW OF SYSTEMS:  A 10-point review of systems is otherwise unremarkable.  She has no other complaints.    CURRENT MEDICATIONS:    1.  Abilify.    2.  Albuterol.    3.  Azelastine.    4.  Carvedilol 25 mg twice daily.  5.  Estrogen.  6.  Ezetimibe 10 mg a day.  7.  Hydralazine 10 mg 3 times a day.  8.  Imdur 30 mg a day.  9.  Lisinopril 5 mg a day.  10.  Simvastatin 30 mg a day.    PHYSICAL EXAMINATION:    VITAL SIGNS:  Her blood pressure is 120/70, her weight is 187 pounds.  GENERAL:  She appears comfortable, well groomed.  PSYCHIATRIC:  She is alert and oriented x3.  HEENT:  Eyes do not appear grossly erythematous or have exudate.  RESPIRATORY:  She is breathing comfortably without gross cough.    The remainder of the comprehensive physical exam was deferred secondary to the COVID-19 pandemic and secondary to video visit restrictions.    Labs 02/06/2023, LDL 75, potassium 4.6, GFR normal, hemoglobin and thyroid normal.    Echocardiogram 09/08/2022 reveals normal LV systolic function, no significant valvular pathology identified.     Zio patch monitor 09/09/2022 reveals symptoms reported with ectopy, but no sustained malignant arrhythmias identified.    IMPRESSION:  Patricia is a 62-year-old lady with several active cardiac issues:  1.  History of palpitations.  On our last visit, she was  complaining of some occasional palpitations.  However, echocardiogram was unremarkable and Zio patch monitor revealed 3 episodes of nonsustained VT up to 9 beats with 2 episodes of SVT, longest 8 beats.  Symptoms corresponded to normal sinus rhythm, plus or minus PVCs with a 3% burden.  No sustained malignant arrhythmias were identified.  Fortunately, from a clinical standpoint, her symptoms have completely resolved and have not recurred.  Continue to follow.  2.  Prior history of mild nonischemic cardiomyopathy.  In , ejection fraction was 40%-45%.  With medical therapy, her ejection fraction improved to the normal range as of most recent echo 2022.  She is currently doing well from this standpoint and she has only mild nonobstructive disease by coronary CTA 2020.  Continue to monitor clinically.  3.  Hypertension, well controlled.    Blood pressure is running 120/70.  Continue to follow.  4.  Hyperlipidemia.  Goal LDL less than 70.  Currently 75.  We will attempt to optimize.  5.  History of mild to moderate nonobstructive coronary disease.  This was displayed on coronary CTA 2020.  We will continue aggressive secondary prevention.    PLAN:    1.  Increase simvastatin from 30 mg to 40 mg a day.  2.  Virtual video visit followup 1 year with labs prior, earlier if needed.    Once again, it was a pleasure participating in the care of your patient, Ms. Patricia Perez.  Please feel free to contact me at any time if any questions regarding her care in the future.    Sincerely,    Preston Cuadra MD        D: 2023   T: 2023   MT: DAYAMI    Name:     PATRICIA PEREZ  MRN:      [id number]        Account:      [id number]   :      1960           Service Date: 2023       Document: [id number]

## 2023-02-10 ENCOUNTER — VIRTUAL VISIT (OUTPATIENT)
Dept: PSYCHIATRY | Facility: CLINIC | Age: 63
End: 2023-02-10
Attending: PSYCHOLOGIST
Payer: COMMERCIAL

## 2023-02-10 DIAGNOSIS — F33.1 MODERATE EPISODE OF RECURRENT MAJOR DEPRESSIVE DISORDER (H): Primary | ICD-10-CM

## 2023-02-10 PROCEDURE — 90834 PSYTX W PT 45 MINUTES: CPT | Mod: HN | Performed by: STUDENT IN AN ORGANIZED HEALTH CARE EDUCATION/TRAINING PROGRAM

## 2023-02-10 NOTE — PROGRESS NOTES
"Video- Visit Details  Type of service:  video visit for mental health treatment.  Time of service:    Date:  02/10/2023    Video Start Time:  10:10 AM        Video End Time:  11:01    Reason for video visit: COVID-19  Originating Site (patient location):  Patient's home  Distant Site (provider location):  Remote location  Mode of Communication:  Video Conference via [facility]    OUTPATIENT PSYCHOTHERAPY PROGRESS NOTE    Client Name: Patricia Perez   YOB: 1960 (62 year old)   Date of Service:  Feb 10, 2023  Time of Service: 10:10 AM to 11:01 AM (51 minutes)  Service Type(s):1567384 psychotherapy (38-52 min. with patient and/or family) / Individual psychotherapy session  Patient MRN: [id number]  Psychiatrist: Kay Tirado MD      Individuals Present:   Patient    Session # with this psychiatrist: 3     Session content:  This supportive psychotherapy session addressed issues related to goals of therapy and current psychosocial stressors. Psychotherapy techniques or interventions utilized throughout the session include: Establishing rapport, active listening, date gathering, reassurance, normalizing, encouragement, re-framing, advice and teaching.     \"Good\"  - Week has been good, struggled on Mon getting things done, better when going in to office   - How do you feel thinngs are going? Going well, not sure where we are heading, interested in the travel   - Anything to add or reactions to last session? Noticed that she is thinking back more, have had dreams about friends from past, not bad things   - Couple of friends that she has known for many years, on Facebook, dreams about both, why all of a sudden, past, not sure what it means, doesn't remember dreams   - Positive interactions in the past  - No other past relationships  - Have been thinking about brother a little more, surprisingly close even tho they weren't when kids   - Doesn't want to bring up things to sisters     - Have thought about asking " mom more questions, why was no one helping them when her brother was sick - help with food, baby sitting (Amilcar Finn)   - Didn't get a sense of how her parents were feeling   - Didn't talk to anyone about it, felt a sense of loss, not sure she remembers, she knows that she cried, he passed at home in an oxygen tent, ambulance came, she wanted to say good bye and she couldn't (mentally couldn't do it), it wasn't how her family acted, she wishes she could have gave him more love, she regrets that - felt like she had to be strong for everybody - went to school that whole week - didn't want to stay at home     - I am the first person she told about amilcar finn, hoping that maybe now she wont feel so bad    - Didn't want to show her feelings   - Now better at showing feelings, when Brook  she cried, tried to always be logical   - Father had such a temper she tried to watch hers, as she got older she tried to not show anything   - Control emotions important to her  - she does math when she starts feeling like she is going out of control, bad at math   - Lost it when her brother passed away, was sobbing, next  was for paster - felt embarrassed, felt like she was sobbing too much and making a scene    - Not sure if she has ever been made to feel bad about showing emotions     - Compartmentalized her life so well, thinks she has unresolved things, when ppl ask how she grew up she will say complicated - has not talked about this very much with    - Moved and could become a new person, haven't had a lot of relationships, thought because she was fat, never thought she would get  because she was fat, relationship with an [place] man, he was larger and wanted children so ended quickly  - Pretty much whole childhood and life always overweight, called it chubby     - Branden her past didn't bring her down, determined   - Personal life still wonder why she doesn't have more fiends,  Lots of  acquaintances, moved around several times, no friends left from [place], lived her 28 yrs and have 4 friends, some of her friends have friends that they've known for years, sometimes bothers her that she doesn't have those relationships   - Separate relationships   - Thinks maybe she doesn't share enough to people, thinks she connects well with people, shows an interest in them, friendly   - Husbands social [place] is smaller than hers, 2 , come over for football games, introvert  - Patricia more of an extrovert, not as much as used to be because of feeling tired, comes and goes     - Met  through [email or web address], first meeting at a coffee shop, told her her about his past, she felt very comfortable with him, adjustment seems so natural      - Therapy once in [place] - 2-3 times and it wasn't what she was looking for or didn't click, doesn't remember why - maybe wasn't ready yet  - Off and on since Whitneys death, [facility] program, first couple of people were not right, didn't click, one was horrible and stopped there, wasn't really doing much, mostly therapist talked about her self  - One that was too much into spirutal and [place] side   - Then the [place], therapist for few months and then a new one because we change over, mostly trying to help in current point of time instead of going back like me, tools to help with current problems   - Most recent fine, every couple of months, she had to cancel one to see about her mom and never picked it back up       Behavioral observations/mental status:     Appearance:  casually dressed  Muscle Strength and Tone: normal  Gait and Station: N/A: Telehealth  Behavior (Psychomotor):  no evidence of tardive dyskinesia, dystonia, or tics  Eye Contact:  good  Speech:  clear, coherent and normal prosody  Mood:  good  Affect:  appropriate and in normal range  Attitude:  cooperative  Thought Process:  logical, linear and goal oriented  Thought Content:  no evidence of suicidal ideation  or homicidal ideation, no evidence of psychotic thought, no auditory hallucinations present and no visual hallucinations present  Associations:  no loose associations  Insight:  good  Judgment:  intact  Oriented to:  time, person, and place  Attention Span and Concentration:  intact  Recent and Remote Memory:  intact  Language: Fluent in English with appropriate syntax and vocabulary.  Fund of Knowledge: appropriate         Assessment   Patricia Perez is a 62 year old female previously diagnosed with Major Depressive Disorder who presents to psychotherapy clinic on 02/10/2023. Patient is currently being treated for Major Depressive Disorder with weekly supportive therapy sessions with this psychiatrist. The patient's progress is great, based upon ability and willingness to discuss past conflicts and working at how this has impacted her through life and currently.     We will continue to work on skills to address this patient's treatment plan.     Suicide risk assessment: Low  Risk factors: Age, mental health condition, and recent life stressors  Protective risk factors: Family, community support, engagement in treatment, no current SI, no previous suicide attempts     Patient is at minimal risk for suicide given history, risk factors, and protective factors. The person currently has no suicidal thoughts or plan.      Diagnoses treated:  Encounter Diagnosis   Name Primary?     Moderate episode of recurrent major depressive disorder (H) Yes       Plan:   - Continue weekly individual psychodynamic psychotherapy for the above diagnoses.   - Next therapy appointment has been scheduled for 2/17/2023 to continue work on treatment goals.     Treatment Plan         SYMPTOMS; PROBLEMS    MEASURABLE GOALS;    FUNCTIONAL IMPROVEMENT INTERVENTIONS;   GAINS MADE DISCHARGE CRITERIA   Wants to get her leo back    Glass half full vs empty, seeing the good in everybody and things Supportive / psychodynamic Symptom resolution    Improve focus and concentration    Able to start and complete tasks as she previous did  Supportive / psychodynamic Symptom resolution   Motivation and drive    Resume her fun activities and hobbies Supportive / psychodynamic Symptom resolution      Date of most recent treatment plan: 1/27/2023  Date next treatment plan due: 3 months    CRISIS NUMBERS:   Provided routinely in AVS.     Treatment Risk Statement:  The patient understands the risks, benefits and alternatives. Agrees to treatment with the capacity to do so and agrees to call clinic for any problems. The patient understands to call 911 or go to the nearest ED if life threatening or urgent symptoms occur.      PSYCHIATRIST:  Kay Tirado MD     Patient will be reviewed by supervisors Dr. Britney Aaron and Dr. Belinda Ayoub

## 2023-02-17 ENCOUNTER — VIRTUAL VISIT (OUTPATIENT)
Dept: PSYCHIATRY | Facility: CLINIC | Age: 63
End: 2023-02-17
Attending: PSYCHOLOGIST
Payer: COMMERCIAL

## 2023-02-17 DIAGNOSIS — F33.1 MODERATE EPISODE OF RECURRENT MAJOR DEPRESSIVE DISORDER (H): Primary | ICD-10-CM

## 2023-02-17 PROCEDURE — 90834 PSYTX W PT 45 MINUTES: CPT | Mod: VID | Performed by: STUDENT IN AN ORGANIZED HEALTH CARE EDUCATION/TRAINING PROGRAM

## 2023-02-17 NOTE — PROGRESS NOTES
Video- Visit Details  Type of service:  video visit for mental health treatment.  Time of service:    Date:  02/17/2023    Video Start Time:  10:09 AM        Video End Time:  11:00    Reason for video visit: COVID-19  Originating Site (patient location):  Patient's home  Distant Site (provider location):  Remote location  Mode of Communication:  Video Conference via [facility]    OUTPATIENT PSYCHOTHERAPY PROGRESS NOTE    Client Name: Patricia Perez   YOB: 1960 (62 year old)   Date of Service:  Feb 17, 2023  Time of Service: 10:09 AM to 11:00 AM (51 minutes)  Service Type(s):5804305 psychotherapy (38-52 min. with patient and/or family) / Individual psychotherapy session  Patient MRN: [id number]  Psychiatrist: Kay Tirado MD      Individuals Present:   Patient    Session # with this psychiatrist: 4     Session content:  This supportive psychotherapy session addressed issues related to goals of therapy and current psychosocial stressors. Psychotherapy techniques or interventions utilized throughout the session include: Establishing rapport, active listening, date gathering, reassurance, normalizing, encouragement, re-framing, advice and teaching.     - Last week was alright, after our meeting a lot to think about, all day and part of Saturday  - Just brings things up that she tends to put away, compartmentalizing   - Think and contemplate, not sure how she is feeling, maybe uncomfortable, process what happened, need to get over it, able to verbalize it first time   - A little bit talking with , just talking about things from past and bringing about memories - general    - What more thoughts have come up? busy week, dealing with local stuff  - Kept thinking about Juanito Finn, thinking that he has forgiven her, she feels better about it, not as guilty     - Not many more thoughts about older brother, not super close but friends now, worked out she thinks   - He turned 65 a couple years ago, party  at their mothers house, all of his kids came in, they gave him money, then he gave it to her for the party, she put it in an envelope and letter expressing her thoughts     - Why therapy now:  - Think because I've tried everything else, medications, they are helping but still too much there that is wrong, not getting better, when she figure out she didn't have leo in [place], used to be in to [place] more, would want to decorate in Oct if she could, decorate a lot, cookie baking, baking - makes her very happy   - As a kid: they would go out and get a tree at last min, all the good trees would be gone, family thing putting tree up, Dad loved [place], they always had a big [place] even if they didn't have money, On [place] morning parents would do paper route, she would entertain little siblings until parents came back home  - Music and then have [place], [place] dinner at grandparents - [place] was a big deal   - The year Juanito Finn and after he  then [place] wasn't the same, tried to make it the same but spirit wasn't there, might have got better   - Highlight, pretty special holiday growing up  - Always into [place] through adulthood      - Drive in the mornings, trying to get that back   - Thinking about things could be doing, will look at clock, used to have a lot, not to point of making list again  - Haven't been able to put head around wholeness of it and breaking it into pieces, all seems to big   - Somewhat to do the work desire has changed, still likes her profession  - Questioning her abilities, not sure where to take it in this new environment   - Now people want charts and forms, fancy stuff that she isn't able to do   - Not getting feedback, thinking about leaving in 2 years, wants to leave it in best possible place   - Creating new program, not getting support she needs, needs to sit down and map it out, waiting for interruption   - Asked for a professional development leave and  it was denied, almost like they are humoring her until she leaves, feels they wont be unhappy when she leaves - asked for it again   - More that she is doing something people don't typically understand necessity, working with a lot of attorneys   - Excited about trying to figure it out, not able to put it together yet, worried not able to get it done     - Work is basically her identity, very important, going back to when she lost her job as  still really stuck with her, doesn't think they take her as serious, feels like she has lost some of her status   - Wants to retire and do other things,  year younger and on her health plan, getting him to 65 and then her waiting until getting full social security   - Not in great detail telling people this plan at work, hesitant to tell them too much   - Maybe subconsciously doesn't want to retire, thinks she will do better retired, do what she enjoys, cooking and others   - A little bit of hope still      Behavioral observations/mental status:     Appearance:  casually dressed  Muscle Strength and Tone: normal  Gait and Station: N/A: Telehealth  Behavior (Psychomotor):  no evidence of tardive dyskinesia, dystonia, or tics  Eye Contact:  good  Speech:  clear, coherent and normal prosody  Mood:  good  Affect:  appropriate and in normal range  Attitude:  cooperative  Thought Process:  logical, linear and goal oriented  Thought Content:  no evidence of suicidal ideation or homicidal ideation, no evidence of psychotic thought, no auditory hallucinations present and no visual hallucinations present  Associations:  no loose associations  Insight:  good  Judgment:  intact  Oriented to:  time, person, and place  Attention Span and Concentration:  intact  Recent and Remote Memory:  intact  Language: Fluent in English with appropriate syntax and vocabulary.  Fund of Knowledge: appropriate         Assessment   Patricia Perez is a 62 year old female previously  diagnosed with Major Depressive Disorder who presents to psychotherapy clinic on 02/17/2023. Patient is currently being treated for Major Depressive Disorder with weekly supportive therapy sessions with this psychiatrist. The patient's progress is great, based upon ability and willingness to discuss past conflicts and working at how this has impacted her through life and currently.     We will continue to work on skills to address this patient's treatment plan.     Suicide risk assessment: Low  Risk factors: Age, mental health condition, and recent life stressors  Protective risk factors: Family, community support, engagement in treatment, no current SI, no previous suicide attempts     Patient is at minimal risk for suicide given history, risk factors, and protective factors. The person currently has no suicidal thoughts or plan.      Diagnoses treated:  Encounter Diagnosis   Name Primary?     Moderate episode of recurrent major depressive disorder (H) Yes       Plan:   - Continue weekly individual psychodynamic psychotherapy for the above diagnoses.   - Next therapy appointment has been scheduled for 2/24/2023 to continue work on treatment goals.     Treatment Plan         SYMPTOMS; PROBLEMS    MEASURABLE GOALS;    FUNCTIONAL IMPROVEMENT INTERVENTIONS;   GAINS MADE DISCHARGE CRITERIA   Wants to get her leo back    Glass half full vs empty, seeing the good in everybody and things Supportive / psychodynamic Symptom resolution   Improve focus and concentration    Able to start and complete tasks as she previous did  Supportive / psychodynamic Symptom resolution   Motivation and drive    Resume her fun activities and hobbies Supportive / psychodynamic Symptom resolution      Date of most recent treatment plan: 1/27/2023  Date next treatment plan due: 3 months    CRISIS NUMBERS:   Provided routinely in AVS.     Treatment Risk Statement:  The patient understands the risks, benefits and alternatives. Agrees to treatment  with the capacity to do so and agrees to call clinic for any problems. The patient understands to call 911 or go to the nearest ED if life threatening or urgent symptoms occur.      PSYCHIATRIST:  Kay Tirado MD     Patient will be reviewed by supervisors Dr. Britney Aaron and Dr. Belinda Ayoub

## 2023-02-24 ENCOUNTER — VIRTUAL VISIT (OUTPATIENT)
Dept: PSYCHIATRY | Facility: CLINIC | Age: 63
End: 2023-02-24
Attending: PSYCHOLOGIST
Payer: COMMERCIAL

## 2023-02-24 DIAGNOSIS — F33.1 MODERATE EPISODE OF RECURRENT MAJOR DEPRESSIVE DISORDER (H): Primary | ICD-10-CM

## 2023-02-24 PROCEDURE — 90834 PSYTX W PT 45 MINUTES: CPT | Mod: HN | Performed by: STUDENT IN AN ORGANIZED HEALTH CARE EDUCATION/TRAINING PROGRAM

## 2023-02-24 NOTE — PROGRESS NOTES
"Video- Visit Details  Type of service:  video visit for mental health treatment.  Time of service:    Date:  2023    Video Start Time:  10:10 AM        Video End Time:  11:00    Reason for video visit: COVID-19  Originating Site (patient location):  Patient's home  Distant Site (provider location):  Remote location  Mode of Communication:  Video Conference via [facility]    OUTPATIENT PSYCHOTHERAPY PROGRESS NOTE    Client Name: Patricia Perez   YOB: 1960 (62 year old)   Date of Service:  2023  Time of Service: 10:10 AM to 11:00 AM (50 minutes)  Service Type(s):8026031 psychotherapy (38-52 min. with patient and/or family) / Individual psychotherapy session  Patient MRN: [id number]  Psychiatrist: Kay Tirado MD      Individuals Present:   Patient    Session # with this psychiatrist: 5     Session content:  This supportive psychotherapy session addressed issues related to goals of therapy and current psychosocial stressors. Psychotherapy techniques or interventions utilized throughout the session include: Establishing rapport, active listening, date gathering, reassurance, normalizing, encouragement, re-framing, advice and teaching.     - \"Good\"  - Working from home this week   - A little more difficult, maybe from the snow, a lot of stuff about her dad comes up every once in while  - When he passed, there was a , got up and talked a few minutes, always regretted that she didn't put more time into it, did it off the top of her head, keeps coming back and not sure why   - She said he was a complicated man, loved his family, loved [place], it was short, didn't go into detail, said he could fix anything, probably not as bad as she thinks, she knows she didn't say anything negative   - Thought about writing it all down   - Would have talked more about him being reliable, give the shirt off his back for someone in need, she thinks he tried and did the best he could  - Other family " talked about things he did and she didn't know or remember   - Coming up more last couple of weeks, coming up in the past before, passed 6-8 years ago (bad with dates, bad with remember how long she has been )    - Work hasn't looked at new form request yet, trying to take project and move ahead herself  - Worried they might just hire a consultant when she retires  - Co-workers are younger, she is oldest one in office, other 65 year old retiring, her boss is 15 years younger and others are younger   - Wants to refresh program   - The new lady that came in, she likes her, doesn't trust her, works with her closely (sent her proposal and she immediately went to boss) - never asked her about it, no opportunity, not sure if she will   - 2 other people in her office, 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   - Mostly works with information technology people  - Still trouble with getting started and getting going   - Not sure if she had time to feel like this in her other jobs     - RE project: worried that she doesn't know what she doesn't know, bigger concern is there wont be a next person, that it wont come to fruition if she doesn't get it done   - Doesn't want someone to say this program is a mess - sees it as her baby and professional standing  - Thinks it comes from fact that she hasn't been able to put enough work in to it, othes have stronger programs - feeling embarassed where program is right now  - Working on a plan   - Might need to extend until intermediate, would be open to extending, nothing forcing her to retire   - 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   - Thinks project could give her the confidence    - Not sure she has tried to learn new things like this   - Short term memory crap, forgets things all the time   - Yes has concerns cognitive, went to a neurologist, a couple of years ago and couldn't remember the 5 things   - Possibly open to  cognitive screen in the future    - Yes has worries about her ability to do it, people want charts and graphs and not the bet at that, when she starts trying starts feeling like she doesn't know what she is doing, knows that she understand not sure if has tools to put in place      - Using a word document, writing questions for two meetings with friends   - Fear, something is blocking     Behavioral observations/mental status:     Appearance:  casually dressed  Muscle Strength and Tone: normal  Gait and Station: N/A: Telehealth  Behavior (Psychomotor):  no evidence of tardive dyskinesia, dystonia, or tics  Eye Contact:  good  Speech:  clear, coherent and normal prosody  Mood:  good  Affect:  appropriate and in normal range  Attitude:  cooperative  Thought Process:  logical, linear and goal oriented  Thought Content:  no evidence of suicidal ideation or homicidal ideation, no evidence of psychotic thought, no auditory hallucinations present and no visual hallucinations present  Associations:  no loose associations  Insight:  good  Judgment:  intact  Oriented to:  time, person, and place  Attention Span and Concentration:  intact  Recent and Remote Memory:  intact  Language: Fluent in English with appropriate syntax and vocabulary.  Fund of Knowledge: appropriate         Assessment   Patricia Perez is a 62 year old female previously diagnosed with Major Depressive Disorder who presents to psychotherapy clinic on 02/24/2023. Patient is currently being treated for Major Depressive Disorder with weekly supportive therapy sessions with this psychiatrist. The patient's progress is great, based upon ability and willingness to discuss current and past conflicts and working at how this has impacted her through life and currently.     We will continue to work on skills to address this patient's treatment plan.     Suicide risk assessment: Low  Risk factors: Age, mental health condition, and recent life stressors  Protective  risk factors: Family, community support, engagement in treatment, no current SI, no previous suicide attempts     Patient is at minimal risk for suicide given history, risk factors, and protective factors. The person currently has no suicidal thoughts or plan.      Diagnoses treated:  Encounter Diagnosis   Name Primary?     Moderate episode of recurrent major depressive disorder (H) Yes     Answers for HPI/ROS submitted by the patient on 2/24/2023  If you checked off any problems, how difficult have these problems made it for you to do your work, take care of things at home, or get along with other people?: Somewhat difficult  PHQ9 TOTAL SCORE: 6    Plan:   - Continue weekly individual psychodynamic psychotherapy for the above diagnoses.   - Next therapy appointment has been scheduled for 3/17/2023 to continue work on treatment goals.     Treatment Plan         SYMPTOMS; PROBLEMS    MEASURABLE GOALS;    FUNCTIONAL IMPROVEMENT INTERVENTIONS;   GAINS MADE DISCHARGE CRITERIA   Wants to get her leo back    Glass half full vs empty, seeing the good in everybody and things Supportive / psychodynamic Symptom resolution   Improve focus and concentration    Able to start and complete tasks as she previous did  Supportive / psychodynamic Symptom resolution   Motivation and drive    Resume her fun activities and hobbies Supportive / psychodynamic Symptom resolution      Date of most recent treatment plan: 1/27/2023  Date next treatment plan due: 3 months    CRISIS NUMBERS:   Provided routinely in AVS.     Treatment Risk Statement:  The patient understands the risks, benefits and alternatives. Agrees to treatment with the capacity to do so and agrees to call clinic for any problems. The patient understands to call 911 or go to the nearest ED if life threatening or urgent symptoms occur.      PSYCHIATRIST:  Kay Tirado MD     Patient will be reviewed by supervisors Dr. Britney Aaron and Dr. Belinda Ayoub

## 2023-03-06 ENCOUNTER — VIRTUAL VISIT (OUTPATIENT)
Dept: ENDOCRINOLOGY | Facility: CLINIC | Age: 63
End: 2023-03-06
Payer: COMMERCIAL

## 2023-03-06 VITALS — WEIGHT: 186.5 LBS | HEIGHT: 64 IN | BODY MASS INDEX: 31.84 KG/M2

## 2023-03-06 DIAGNOSIS — Z98.84 HX OF GASTRIC BYPASS: ICD-10-CM

## 2023-03-06 DIAGNOSIS — E66.01 SEVERE OBESITY (BMI 35.0-35.9 WITH COMORBIDITY) (H): ICD-10-CM

## 2023-03-06 PROCEDURE — 99205 OFFICE O/P NEW HI 60 MIN: CPT | Mod: VID

## 2023-03-06 ASSESSMENT — PAIN SCALES - GENERAL: PAINLEVEL: NO PAIN (0)

## 2023-03-06 NOTE — LETTER
"3/6/2023       RE: Patricia Perez  [address] [address]     Dear Colleague,    Thank you for referring your patient, Patricia Perez, to the [facility] [place] at [facility]. Please see a copy of my visit note below.    Patricia Perez is a 62 year old who is being evaluated via a billable video visit.      How would you like to obtain your AVS? MyChart  If the video visit is dropped, the invitation should be resent by: Text to cell phone: [phone]  Will anyone else be joining your video visit? No  If patient encounters technical issues they should call [phone]    During this virtual visit the patient is located in [place], patient verifies this as the location during the entirety of this visit.     Video-Visit Details  Video Start Time: 2:04PM    Type of service:  Video Visit    Video End Time:2:51PM    Originating Location (pt. Location): Home    Distant Location (provider location):  On-site    Platform used for Video Visit: Well    [facility] Bariatric Surgery Consultation Note    2023    RE: Patricia Perez  MR#: [id number]  : 1960      Referring provider:       3/6/2023   Who referred you? Dr. Benítez       Chief Complaint/Reason for visit: re-establish bariatric care    Dear Funmilayo Grubbs PA-C (General),    I had the pleasure of seeing your patient, Patricia Perez, to re-establish bariatric care. As you know, Patricia ePrez is 62 year old.  She has a height of 5' 3.5\", a weight of 186 lbs 8 oz, and calculated Body mass index is 32.52 kg/m .. She has a history of RYBP in  with Dr. Bradford.     Assessment & Plan   Problem List Items Addressed This Visit        Digestive    Class 1 obesity with serious comorbidity and body mass index (BMI) of 32.0 to 32.9 in adult     S/p RYBP in 2012 with Dr. Bradford. No post op complications. Reestablishing care today to " discuss weight regain. Was able to lose around 140lbs and was weight stable at 160lbs for many years. Weight regain began during he pandemic with decrease in activity and easier access to food. Recently has lost 7lbs due to diet changes. Struggles most with emotional and boredom eating. Has a history of depression that historically influences her weight and hunger.     Pre surgery weight - 292lbs   Reynaldo - 150lbs   Weight today 186lbs    No ETOH or Nicotine. Caffeine - 2-3 servings daily.     NSAID - uses Advil PM 3-4xweek to help with pain att night. Discuss trying Tylenol. If no relief, can start Omeprazole to prevent ulcers.     Discussed AOMS. Not to be started. Would like to continue with weight loss through diet and exercise. Can consider in the future.   - Phenermine - contraindicated due to history of cardiomyopathy and CAD.   - Topiramate- concern for brain fog  - Naltrexone - can consider in the future. No history of liver disease. Not taking opioids.   - GLP-1 - can consider in the future. Concern for self injections.                 Other    Hx of gastric bypass    Relevant Orders    Vitamin A    Vitamin B12    Vitamin D Deficiency    Parathyroid Hormone Intact    Hemoglobin A1c    Ferritin      1. Labs ordered   2. Can consider starting Naltrexone   3. Decrease in Advil PM use. Trying switching to Tylenol. If not, can start Omeprazole to help ulcer prevention.    4. Follow up with Stephanie Morel in 2 months     60 minutes spent on the date of the encounter doing chart review, history and exam, documentation and further activities per the note      HISTORY OF PRESENT ILLNESS:  Weight Loss History Reviewed with Patient 3/6/2023   How long have you been overweight? Since early childhood   What is the most that you have ever weighed? 220   What is the most weight you have lost? 65   I have tried the following methods to lose weight Watching portions or calories, Exercise, Weight Watchers, Weight Loss Surgery   I  have tried the following weight loss medications? (Check all that apply) None   Have you ever had weight loss surgery? Yes   Please select the type of weight loss surgery you had (select all that apply): gastric bypass / Basilio-en-Y   S/p RYBP in 2012 with Dr. Bradford. No post op complications.     Pre surgery weight - 292lbs   Reynaldo - 150lbs   Weight today 186lbs    Was able to lose around 140lbs total and weight stable around 160lbs for many years. Weight regain through the pandemic with decrease in activity and easier access to food. Has been starting to lose weight through increase in activity and changes in diet - has lost around 7lbs recently.      Eating 3 meals a day, with a snack at times. Portion sizes around 2 cups with each serving. No increase hunger. Frequently eats out of boredom and emotionally. Can get full and stay full. Tolerates food well. Craves sugar at times.   Breakfast - toast with butter   Lunch - sandwich, frozen meal   Dinner - protein + potato + vegetable   Snack - low sugar protein bar, peanuts    Activity - 3xweek goes to the gym. Walks other days as able.     Has been struggling with hypoglycemia, tries to avoid sugar.     Denies dysphagia, nausea, vomiting, diarrhea. No concerns with dumping syndrome     Constipation - at times around 1x2-3month. Well controlled with fiber. Laxatives as needed.     GERD - Symptoms include abdominal pain. Triggered by larger portion sizes. Treated with tums prn. Happens 1x2-3weeks. Denies vomiting, melena, or blood in stool.     No ETOH or nicotine    NSAIDs - Advil PM 3-4xweek.   Caffeine - 2.5 cups of coffee every morning, diet soda 1xday.     CO-MORBIDITIES OF OBESITY INCLUDE:     3/6/2023   I have the following health issues associated with obesity: None of the above     BPPV - usually well controlled. Followed by PCP     FABIANA - went away after surgery. No longer using CPAP    Asthma - well controlled     HLD - well controlled on mediations.  Followed by cardiology     Idiopathic cardiomyopathy - Followed by cardiology.     HTN - well controlled on medications. Checks at home - 130/70. Followed by cardiology.     CAD - followed by cardiology     Depression - Followed by therapist and psychiatrist. Mood has been harder recently, but has improvement. Feels like it really controls her hunger and weight.     ADD - Not on stimulant. Followed by therapist and psychiatrist    Glaucoma, suspected - followed by optometrist.     Vitamins - calcium 1xday, Fe, MV, B12 SL     PAST MEDICAL HISTORY:  Past Medical History:   Diagnosis Date     Anxiety      Arthritis      ASCUS of cervix with negative high risk HPV 01/23/2006     Asthma      Bursitis of shoulder 03/04/2010     Chronic rhinitis 03/25/2009     Coronary artery disease      Depression      Dyspnea on exertion      GERD (gastroesophageal reflux disease) 10/14/2008     Hypertension      Idiopathic cardiomyopathy (H) 01/08/2009     Indigestion      Itchy eyes      Left leg pain 06/16/2011     Motion sickness 02/27/2008     Nasal congestion      Pneumonia      Problems related to lack of adequate sleep      Ringing in ears      Sleep apnea 03/25/2009     Sneezing        PAST SURGICAL HISTORY:  Past Surgical History:   Procedure Laterality Date     BARIATRIC SURGERY       COLONOSCOPY N/A 11/11/2021    Procedure: COLONOSCOPY, WITH POLYPECTOMY;  Surgeon: Stephanie Meneses MD;  [place] [facility] [place]     DIABETES RESOURCES  As Child    Tonsillectomy     ENT SURGERY       ESOPHAGOSCOPY, GASTROSCOPY, DUODENOSCOPY (EGD), COMBINED N/A 11/11/2021    Procedure: ESOPHAGOGASTRODUODENOSCOPY, WITH BIOPSY;  Surgeon: Stephanie Meneses MD;  [place] [facility] [place]     HERNIA REPAIR       LAPAROSCOPIC BYPASS GASTRIC  10/16/2012    Procedure: LAPAROSCOPIC BYPASS GASTRIC;  laparoscopic reyna en y gastric bypass;  Surgeon: Nish Bradford MD;  Location:  OR     TONSILLECTOMY       Uretural Sticture  As Child       FAMILY HISTORY:   Family  History   Problem Relation Age of Onset     Gastrointestinal Disease Mother         non cancerous pancreatic tumor     Hypertension Mother      Heart Disease Mother         A fib     Allergies Mother      Hypertension Father      Lipids Father      Alcohol/Drug Father         Abuse     Depression Father      Respiratory Father         COPD     Cardiovascular Father      Allergies Sister         Poison Ivy     Depression Brother      Hypertension Brother      Allergies Brother         Enviromental/ Bees     Breast Cancer Maternal Grandmother      Glaucoma Paternal Grandmother      Glaucoma Paternal Grandfather      Cerebrovascular Disease Other      Macular Degeneration Other      Diabetes No family hx of      Cancer - colorectal No family hx of      Retinal detachment No family hx of      Amblyopia No family hx of      Thyroid Disease No family hx of      Adrenal Disorder No family hx of        SOCIAL HISTORY:   Social History Questions Reviewed With Patient 3/6/2023   Which best describes your employment status (select all that apply) I work full-time   If you work, what is your occupation?    Which best describes your marital status:    Do you have children? No   Who do you have in your support network that can be available to help you for the first 2 weeks after surgery? , friends   Who can you count on for support throughout your weight loss surgery journey? , friends   Can you afford 3 meals a day?  Yes   Can you afford 50-60 dollars a month for vitamins? Yes     Works full time [employer] . 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     HABITS:     3/6/2023   How often do you drink alcohol? Monthly or less   If you do drink alcohol, how many drinks might you have in a day? (one drink = 5 oz. wine, 1 can/bottle of beer, 1 shot liquor) 1 or 2   Have you ever used any of the following nicotine products? No   Have you or are you currently using  street drugs or prescription strength medication for which you do not have a prescription for? No   Do you have a history of chemical dependency (alcohol or drug abuse)? No       PSYCHOLOGICAL HISTORY:   Psychological History Reviewed With Patient 3/6/2023   Have you ever attempted suicide? Never.   Have you had thoughts of suicide in the past year? No   Have you ever been hospitalized for mental illness or a suicide attempt? Never.   Do you have a history of chronic pain? No   Have you ever been diagnosed with fibromyalgia? No   Are you currently being treated for any of the following? (select all that apply) Depression, Anxiety   Are you currently seeing a therapist or counselor?  Yes   Are you currently seeing a psychiatrist? Yes       ROS:     3/6/2023   Skin:  None of the above   HEENT: Dizziness/lightheadedness   Musculoskeletal: Joint Pain, Arthritis   Cardiovascular: None of the above   Pulmonary: None of the above   Gastrointestinal: Heartburn, Constipation   Genitourinary: Stress incontinence (losing urine when coughing, sneezing, etc.)   Hematological: Family history of blood clots   Neurological: None of the above   Female only: None of the above       EATING BEHAVIORS:     3/6/2023   Have you or anyone else thought that you had an eating disorder? No   Do you currently binge eat (eat a large amount of food in a short time)? No   Are you an emotional eater? Yes   Do you get up to eat after falling asleep? No       EXERCISE:     3/6/2023   How often do you exercise? 3 to 4 times per week   What is the duration of your exercise (in minutes)? 30 Minutes   What types of exercise do you do? walking, gym membership, weightlifting   What keeps you from being more active?  I should be more active but I just have not gotten around to it, Too tired       MEDICATIONS:  Current Outpatient Medications   Medication Sig Dispense Refill     albuterol (PROAIR HFA/PROVENTIL HFA/VENTOLIN HFA) 108 (90 Base) MCG/ACT inhaler  Inhale 2 puffs into the lungs every 4 hours as needed for shortness of breath / dyspnea or wheezing 18 g 0     ARIPiprazole (ABILIFY) 2 MG tablet Take 2 tablets (4 mg) by mouth daily 60 tablet 1     aspirin (ASA) 81 MG EC tablet Take 1 tablet (81 mg) by mouth daily 90 tablet 3     azelastine (ASTELIN) 0.1 % nasal spray Spray 2 sprays into both nostrils 2 times daily as needed for rhinitis 30 mL 3     azelastine (OPTIVAR) 0.05 % ophthalmic solution Apply 1 drop to eye 2 times daily as needed (itchy/watery eyes) 6 mL 3     blood glucose (NO BRAND SPECIFIED) lancing device Device to be used with lancets. 3 each 3     blood glucose (NO BRAND SPECIFIED) test strip Use to test blood sugar 3 times daily 300 strip 3     calcium carbonate-vitamin D (CALTRATE 600+D) 600-400 MG-UNIT CHEW Take 1 chew tab by mouth 2 times daily 180 tablet 3     carvedilol (COREG) 25 MG tablet Take 1 tablet (25 mg) by mouth 2 times daily (with meals) 180 tablet 3     cetirizine (ZYRTEC) 10 MG tablet Take 1 tablet (10 mg) by mouth daily 30 tablet 3     Cyanocobalamin (VITAMIN B-12 SL) Place under the tongue every other day        estrogen conj-medroxyPROGESTERone (PREMPRO) 0.45-1.5 MG tablet Take 1 tablet by mouth daily 90 tablet 3     ezetimibe (ZETIA) 10 MG tablet Take 1 tablet (10 mg) by mouth daily 90 tablet 3     FEROSUL 325 (65 Fe) MG tablet TAKE 1 TABLET(325 MG) BY MOUTH TWICE DAILY 180 tablet 0     hydrALAZINE (APRESOLINE) 10 MG tablet Take 1 tablet (10 mg) by mouth 3 times daily 270 tablet 3     Ibuprofen-diphenhydrAMINE Cit (IBUPROFEN PM PO) Take by mouth At Bedtime       isosorbide mononitrate (IMDUR) 30 MG 24 hr tablet Take 1 tablet (30 mg) by mouth daily 90 tablet 3     lisinopril (ZESTRIL) 5 MG tablet TAKE 1 TABLET(5 MG) BY MOUTH DAILY 90 tablet 3     meclizine (ANTIVERT) 25 MG tablet Take 1 tablet (25 mg) by mouth 3 times daily as needed 30 tablet 1     montelukast (SINGULAIR) 10 MG tablet Take 1 tablet (10 mg) by mouth At Bedtime  "90 tablet 3     Multiple Vitamin (MULTI-VITAMIN) per tablet Take 1 tablet by mouth daily. 100 tablet 3     ondansetron (ZOFRAN-ODT) 4 MG ODT tab Take 1 tablet (4 mg) by mouth every 8 hours as needed for nausea 15 tablet 1     simvastatin (ZOCOR) 40 MG tablet Take 1 tablet (40 mg) by mouth At Bedtime 90 tablet 3     venlafaxine (EFFEXOR) 75 MG tablet Take 1 tablet (75 mg) by mouth 3 times daily 90 tablet 1     Continuous Blood Gluc Sensor (FREESTYLE JOHN 14 DAY SENSOR) MISC 1 each every 14 days Use 1 Sensor every 14 days. Use to read blood sugars per 's instructions. 2 each 5       ALLERGIES:  Allergies   Allergen Reactions     Atorvastatin      Leg myalgias           Objective     Ht 1.613 m (5' 3.5\")   Wt 84.6 kg (186 lb 8 oz)   LMP 06/01/2012   BMI 32.52 kg/m    Vitals - Patient Reported  Pain Score: No Pain (0)        Physical Exam   GENERAL: Healthy, alert and no distress  EYES: Eyes grossly normal to inspection.  No discharge or erythema, or obvious scleral/conjunctival abnormalities.  RESP: No audible wheeze, cough, or visible cyanosis.  No visible retractions or increased work of breathing.    SKIN: Visible skin clear. No significant rash, abnormal pigmentation or lesions.  NEURO: Cranial nerves grossly intact.  Mentation and speech appropriate for age.  PSYCH: Mentation appears normal, affect normal/bright, judgement and insight intact, normal speech and appearance well-groomed.      Anti-obesity medication ROS:    HEENT  Hx of glaucoma: Yes    Cardiovascular  CAD:Yes  HTN:Yes    Gastrointestinal  GERD:Yes  Constipation:Yes  Liver Dz:No  H/O Pancreatitis:No    Psychiatric  Bipolar: No  Anxiety:Yes  Depression:Yes  History of alcohol/drug abuse: No  Hx of eating disorder:No    Endocrine  Personal or family hx of MTC or MEN2:No  Diabetes/prediabetes: No    Neurologic:  Hx of seizures: No  Hx of migraines: No  Memory Impairment: No      History of kidney stones: Yes  Kidney disease: " No  Current birth control: Post-menopausal         In summary, Patricia Perez has Class I obesity with a body mass index of Body mass index is 32.52 kg/m . kg/m2 and the comorbidities stated above. She has a history of RYBP in 2012 and is here to re-establish bariatric care and discuss weight regain.        Sincerely,     ZELALEM VIVAS PA-C

## 2023-03-06 NOTE — NURSING NOTE
"(   Chief Complaint   Patient presents with     New Patient     Re-establish care, BMI of 33.1    )    ( Weight: 84.6 kg (186 lb 8 oz) (Pt reported) )  ( Height: 161.3 cm (5' 3.5\") )  ( BMI (Calculated): 32.52 )  (   )  (   )  (   )  (   )  (   )  (   )    (   )  (   )  (   )  (   )  (   )  (   )  (   )    (   Patient Active Problem List   Diagnosis     Major depression, recurrent (H)     Dizziness and giddiness     Motion sickness     GERD (gastroesophageal reflux disease)     Idiopathic cardiomyopathy (H)     Sleep apnea     Hyperlipidemia LDL goal <100     Allergic rhinitis     Hypertension goal BP (blood pressure) < 130/80     Pelvic pain in female     Health Care Home     24 hour contact handout given     Severe obesity (BMI 35.0-35.9 with comorbidity) (H)     Elevated PTHrP level     H/O bariatric surgery     Hx of gastric bypass     Hypovitaminosis D     Allergic rhinitis     Attention deficit disorder of adult     Mild persistent asthma     Hand joint pain     Myopia, bilateral     Dumping syndrome     Benign essential hypertension     Adjustment disorder with depressed mood     Decreased hearing of both ears     BPV (benign positional vertigo), unspecified laterality     Advanced directives, counseling/discussion     Vitamin D deficiency     Coronary artery disease involving native coronary artery of native heart without angina pectoris     Acute pain of left knee     Syncope     Posterior vitreous detachment of both eyes     Combined forms of age-related cataract, mild, of both eyes     Glaucoma suspect of both eyes     Sciatica of right side     Hypoglycemia    )  (   Current Outpatient Medications   Medication Sig Dispense Refill     albuterol (PROAIR HFA/PROVENTIL HFA/VENTOLIN HFA) 108 (90 Base) MCG/ACT inhaler Inhale 2 puffs into the lungs every 4 hours as needed for shortness of breath / dyspnea or wheezing 18 g 0     ARIPiprazole (ABILIFY) 2 MG tablet Take 2 tablets (4 mg) by mouth daily 60 tablet 1 "     aspirin (ASA) 81 MG EC tablet Take 1 tablet (81 mg) by mouth daily 90 tablet 3     azelastine (ASTELIN) 0.1 % nasal spray Spray 2 sprays into both nostrils 2 times daily as needed for rhinitis 30 mL 3     azelastine (OPTIVAR) 0.05 % ophthalmic solution Apply 1 drop to eye 2 times daily as needed (itchy/watery eyes) 6 mL 3     blood glucose (NO BRAND SPECIFIED) lancing device Device to be used with lancets. 3 each 3     blood glucose (NO BRAND SPECIFIED) test strip Use to test blood sugar 3 times daily 300 strip 3     calcium carbonate-vitamin D (CALTRATE 600+D) 600-400 MG-UNIT CHEW Take 1 chew tab by mouth 2 times daily 180 tablet 3     carvedilol (COREG) 25 MG tablet Take 1 tablet (25 mg) by mouth 2 times daily (with meals) 180 tablet 3     cetirizine (ZYRTEC) 10 MG tablet Take 1 tablet (10 mg) by mouth daily 30 tablet 3     Continuous Blood Gluc Sensor (Polarion SoftwareYLE JOHN 14 DAY SENSOR) [facility] 1 each every 14 days Use 1 Sensor every 14 days. Use to read blood sugars per 's instructions. 2 each 5     Cyanocobalamin (VITAMIN B-12 SL) Place under the tongue every other day        estrogen conj-medroxyPROGESTERone (PREMPRO) 0.45-1.5 MG tablet Take 1 tablet by mouth daily 90 tablet 3     ezetimibe (ZETIA) 10 MG tablet Take 1 tablet (10 mg) by mouth daily 90 tablet 3     FEROSUL 325 (65 Fe) MG tablet TAKE 1 TABLET(325 MG) BY MOUTH TWICE DAILY 180 tablet 0     hydrALAZINE (APRESOLINE) 10 MG tablet Take 1 tablet (10 mg) by mouth 3 times daily 270 tablet 3     Ibuprofen-diphenhydrAMINE Cit (IBUPROFEN PM PO) Take by mouth At Bedtime       isosorbide mononitrate (IMDUR) 30 MG 24 hr tablet Take 1 tablet (30 mg) by mouth daily 90 tablet 3     lisinopril (ZESTRIL) 5 MG tablet TAKE 1 TABLET(5 MG) BY MOUTH DAILY 90 tablet 3     meclizine (ANTIVERT) 25 MG tablet Take 1 tablet (25 mg) by mouth 3 times daily as needed 30 tablet 1     montelukast (SINGULAIR) 10 MG tablet Take 1 tablet (10 mg) by mouth At Bedtime 90 tablet  3     Multiple Vitamin (MULTI-VITAMIN) per tablet Take 1 tablet by mouth daily. 100 tablet 3     ondansetron (ZOFRAN-ODT) 4 MG ODT tab Take 1 tablet (4 mg) by mouth every 8 hours as needed for nausea 15 tablet 1     simvastatin (ZOCOR) 40 MG tablet Take 1 tablet (40 mg) by mouth At Bedtime 90 tablet 3     venlafaxine (EFFEXOR) 75 MG tablet Take 1 tablet (75 mg) by mouth 3 times daily 90 tablet 1    )  ( Diabetes Eval:    )    ( Pain Eval:  No Pain (0) )    ( Wound Eval:       )    (   History   Smoking Status     Never   Smokeless Tobacco     Never    )    ( Signed By:  Heber Smith, EMT; March 6, 2023; 1:35 PM )

## 2023-03-06 NOTE — PROGRESS NOTES
"Patricia Perez is a 62 year old who is being evaluated via a billable video visit.      How would you like to obtain your AVS? MyChart  If the video visit is dropped, the invitation should be resent by: Text to cell phone: [phone]  Will anyone else be joining your video visit? No  If patient encounters technical issues they should call [phone]    During this virtual visit the patient is located in [place], patient verifies this as the location during the entirety of this visit.     Video-Visit Details  Video Start Time: 2:04PM    Type of service:  Video Visit    Video End Time:2:51PM    Originating Location (pt. Location): Home    Distant Location (provider location):  On-site    Platform used for Video Visit: AmWell    New Re-establish Bariatric Surgery Consultation Note    2023    RE: Patricia Perez  MR#: [id number]  : 1960      Referring provider:       3/6/2023   Who referred you? Dr. Benítez       Chief Complaint/Reason for visit: re-establish bariatric care    Dear Funmilayo Grubbs PA-C (General),    I had the pleasure of seeing your patient, Patricia Perez, to re-establish bariatric care. As you know, Patricia Perez is 62 year old.  She has a height of 5' 3.5\", a weight of 186 lbs 8 oz, and calculated Body mass index is 32.52 kg/m .. She has a history of RYBP in 2012 with Dr. Bradford.     Assessment & Plan   Problem List Items Addressed This Visit        Digestive    Class 1 obesity with serious comorbidity and body mass index (BMI) of 32.0 to 32.9 in adult     S/p RYBP in 2012 with Dr. Bradford. No post op complications. Reestablishing care today to discuss weight regain. Was able to lose around 140lbs and was weight stable at 160lbs for many years. Weight regain began during he pandemic with decrease in activity and easier access to food. Recently has lost 7lbs due to diet changes. Struggles most with emotional and boredom eating. Has a history of depression that " historically influences her weight and hunger.     Pre surgery weight - 292lbs   Reynaldo - 150lbs   Weight today 186lbs    No ETOH or Nicotine. Caffeine - 2-3 servings daily.     NSAID - uses Advil PM 3-4xweek to help with pain att night. Discuss trying Tylenol. If no relief, can start Omeprazole to prevent ulcers.     Discussed AOMS. Not to be started. Would like to continue with weight loss through diet and exercise. Can consider in the future.   - Phenermine - contraindicated due to history of cardiomyopathy and CAD.   - Topiramate- concern for brain fog  - Naltrexone - can consider in the future. No history of liver disease. Not taking opioids.   - GLP-1 - can consider in the future. Concern for self injections.                 Other    Hx of gastric bypass    Relevant Orders    Vitamin A    Vitamin B12    Vitamin D Deficiency    Parathyroid Hormone Intact    Hemoglobin A1c    Ferritin      1. Labs ordered   2. Can consider starting Naltrexone   3. Decrease in Advil PM use. Trying switching to Tylenol. If not, can start Omeprazole to help ulcer prevention.    4. Follow up with Stephanie Morel in 2 months     60 minutes spent on the date of the encounter doing chart review, history and exam, documentation and further activities per the note      HISTORY OF PRESENT ILLNESS:  Weight Loss History Reviewed with Patient 3/6/2023   How long have you been overweight? Since early childhood   What is the most that you have ever weighed? 220   What is the most weight you have lost? 65   I have tried the following methods to lose weight Watching portions or calories, Exercise, Weight Watchers, Weight Loss Surgery   I have tried the following weight loss medications? (Check all that apply) None   Have you ever had weight loss surgery? Yes   Please select the type of weight loss surgery you had (select all that apply): gastric bypass / Basilio-en-Y   S/p RYBP in 2012 with Dr. Bradford. No post op complications.     Pre surgery weight  - 292lbs   Reynaldo - 150lbs   Weight today 186lbs    Was able to lose around 140lbs total and weight stable around 160lbs for many years. Weight regain through the pandemic with decrease in activity and easier access to food. Has been starting to lose weight through increase in activity and changes in diet - has lost around 7lbs recently.      Eating 3 meals a day, with a snack at times. Portion sizes around 2 cups with each serving. No increase hunger. Frequently eats out of boredom and emotionally. Can get full and stay full. Tolerates food well. Craves sugar at times.   Breakfast - toast with butter   Lunch - sandwich, frozen meal   Dinner - protein + potato + vegetable   Snack - low sugar protein bar, peanuts    Activity - 3xweek goes to the gym. Walks other days as able.     Has been struggling with hypoglycemia, tries to avoid sugar.     Denies dysphagia, nausea, vomiting, diarrhea. No concerns with dumping syndrome     Constipation - at times around 1x2-3month. Well controlled with fiber. Laxatives as needed.     GERD - Symptoms include abdominal pain. Triggered by larger portion sizes. Treated with tums prn. Happens 1x2-3weeks. Denies vomiting, melena, or blood in stool.     No ETOH or nicotine    NSAIDs - Advil PM 3-4xweek.   Caffeine - 2.5 cups of coffee every morning, diet soda 1xday.     CO-MORBIDITIES OF OBESITY INCLUDE:     3/6/2023   I have the following health issues associated with obesity: None of the above     BPPV - usually well controlled. Followed by PCP     FABIANA - went away after surgery. No longer using CPAP    Asthma - well controlled     HLD - well controlled on mediations. Followed by cardiology     Idiopathic cardiomyopathy - Followed by cardiology.     HTN - well controlled on medications. Checks at home - 130/70. Followed by cardiology.     CAD - followed by cardiology     Depression - Followed by therapist and psychiatrist. Mood has been harder recently, but has improvement. Feels like it  really controls her hunger and weight.     ADD - Not on stimulant. Followed by therapist and psychiatrist    Glaucoma, suspected - followed by optometrist.     Vitamins - calcium 1xday, Fe, MV, B12 SL     PAST MEDICAL HISTORY:  Past Medical History:   Diagnosis Date     Anxiety      Arthritis      ASCUS of cervix with negative high risk HPV 01/23/2006     Asthma      Bursitis of shoulder 03/04/2010     Chronic rhinitis 03/25/2009     Coronary artery disease      Depression      Dyspnea on exertion      GERD (gastroesophageal reflux disease) 10/14/2008     Hypertension      Idiopathic cardiomyopathy (H) 01/08/2009     Indigestion      Itchy eyes      Left leg pain 06/16/2011     Motion sickness 02/27/2008     Nasal congestion      Pneumonia      Problems related to lack of adequate sleep      Ringing in ears      Sleep apnea 03/25/2009     Sneezing        PAST SURGICAL HISTORY:  Past Surgical History:   Procedure Laterality Date     BARIATRIC SURGERY       COLONOSCOPY N/A 11/11/2021    Procedure: COLONOSCOPY, WITH POLYPECTOMY;  Surgeon: Stephanie Meneses MD;  [facility]     DIABETES RESOURCES  As Child    Tonsillectomy     ENT SURGERY       ESOPHAGOSCOPY, GASTROSCOPY, DUODENOSCOPY (EGD), COMBINED N/A 11/11/2021    Procedure: ESOPHAGOGASTRODUODENOSCOPY, WITH BIOPSY;  Surgeon: Stephanie Meneses MD;  [facility]     HERNIA REPAIR       LAPAROSCOPIC BYPASS GASTRIC  10/16/2012    Procedure: LAPAROSCOPIC BYPASS GASTRIC;  laparoscopic reyna en y gastric bypass;  Surgeon: Nish Bradford MD;  Location:  OR     TONSILLECTOMY       Uretural Sticture  As Child       FAMILY HISTORY:   Family History   Problem Relation Age of Onset     Gastrointestinal Disease Mother         non cancerous pancreatic tumor     Hypertension Mother      Heart Disease Mother         A fib     Allergies Mother      Hypertension Father      Lipids Father      Alcohol/Drug Father         Abuse     Depression Father      Respiratory Father          COPD     Cardiovascular Father      Allergies Sister         Poison Ivy     Depression Brother      Hypertension Brother      Allergies Brother         Enviromental/ Bees     Breast Cancer Maternal Grandmother      Glaucoma Paternal Grandmother      Glaucoma Paternal Grandfather      Cerebrovascular Disease Other      Macular Degeneration Other      Diabetes No family hx of      Cancer - colorectal No family hx of      Retinal detachment No family hx of      Amblyopia No family hx of      Thyroid Disease No family hx of      Adrenal Disorder No family hx of        SOCIAL HISTORY:   Social History Questions Reviewed With Patient 3/6/2023   Which best describes your employment status (select all that apply) I work full-time   If you work, what is your occupation?    Which best describes your marital status:    Do you have children? No   Who do you have in your support network that can be available to help you for the first 2 weeks after surgery? , friends   Who can you count on for support throughout your weight loss surgery journey? , friends   Can you afford 3 meals a day?  Yes   Can you afford 50-60 dollars a month for vitamins? Yes     Works full time [employer] . 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     HABITS:     3/6/2023   How often do you drink alcohol? Monthly or less   If you do drink alcohol, how many drinks might you have in a day? (one drink = 5 oz. wine, 1 can/bottle of beer, 1 shot liquor) 1 or 2   Have you ever used any of the following nicotine products? No   Have you or are you currently using street drugs or prescription strength medication for which you do not have a prescription for? No   Do you have a history of chemical dependency (alcohol or drug abuse)? No       PSYCHOLOGICAL HISTORY:   Psychological History Reviewed With Patient 3/6/2023   Have you ever attempted suicide? Never.   Have you had thoughts of  suicide in the past year? No   Have you ever been hospitalized for mental illness or a suicide attempt? Never.   Do you have a history of chronic pain? No   Have you ever been diagnosed with fibromyalgia? No   Are you currently being treated for any of the following? (select all that apply) Depression, Anxiety   Are you currently seeing a therapist or counselor?  Yes   Are you currently seeing a psychiatrist? Yes       ROS:     3/6/2023   Skin:  None of the above   HEENT: Dizziness/lightheadedness   Musculoskeletal: Joint Pain, Arthritis   Cardiovascular: None of the above   Pulmonary: None of the above   Gastrointestinal: Heartburn, Constipation   Genitourinary: Stress incontinence (losing urine when coughing, sneezing, etc.)   Hematological: Family history of blood clots   Neurological: None of the above   Female only: None of the above       EATING BEHAVIORS:     3/6/2023   Have you or anyone else thought that you had an eating disorder? No   Do you currently binge eat (eat a large amount of food in a short time)? No   Are you an emotional eater? Yes   Do you get up to eat after falling asleep? No       EXERCISE:     3/6/2023   How often do you exercise? 3 to 4 times per week   What is the duration of your exercise (in minutes)? 30 Minutes   What types of exercise do you do? walking, gym membership, weightlifting   What keeps you from being more active?  I should be more active but I just have not gotten around to it, Too tired       MEDICATIONS:  Current Outpatient Medications   Medication Sig Dispense Refill     albuterol (PROAIR HFA/PROVENTIL HFA/VENTOLIN HFA) 108 (90 Base) MCG/ACT inhaler Inhale 2 puffs into the lungs every 4 hours as needed for shortness of breath / dyspnea or wheezing 18 g 0     ARIPiprazole (ABILIFY) 2 MG tablet Take 2 tablets (4 mg) by mouth daily 60 tablet 1     aspirin (ASA) 81 MG EC tablet Take 1 tablet (81 mg) by mouth daily 90 tablet 3     azelastine (ASTELIN) 0.1 % nasal spray Spray  2 sprays into both nostrils 2 times daily as needed for rhinitis 30 mL 3     azelastine (OPTIVAR) 0.05 % ophthalmic solution Apply 1 drop to eye 2 times daily as needed (itchy/watery eyes) 6 mL 3     blood glucose (NO BRAND SPECIFIED) lancing device Device to be used with lancets. 3 each 3     blood glucose (NO BRAND SPECIFIED) test strip Use to test blood sugar 3 times daily 300 strip 3     calcium carbonate-vitamin D (CALTRATE 600+D) 600-400 MG-UNIT CHEW Take 1 chew tab by mouth 2 times daily 180 tablet 3     carvedilol (COREG) 25 MG tablet Take 1 tablet (25 mg) by mouth 2 times daily (with meals) 180 tablet 3     cetirizine (ZYRTEC) 10 MG tablet Take 1 tablet (10 mg) by mouth daily 30 tablet 3     Cyanocobalamin (VITAMIN B-12 SL) Place under the tongue every other day        estrogen conj-medroxyPROGESTERone (PREMPRO) 0.45-1.5 MG tablet Take 1 tablet by mouth daily 90 tablet 3     ezetimibe (ZETIA) 10 MG tablet Take 1 tablet (10 mg) by mouth daily 90 tablet 3     FEROSUL 325 (65 Fe) MG tablet TAKE 1 TABLET(325 MG) BY MOUTH TWICE DAILY 180 tablet 0     hydrALAZINE (APRESOLINE) 10 MG tablet Take 1 tablet (10 mg) by mouth 3 times daily 270 tablet 3     Ibuprofen-diphenhydrAMINE Cit (IBUPROFEN PM PO) Take by mouth At Bedtime       isosorbide mononitrate (IMDUR) 30 MG 24 hr tablet Take 1 tablet (30 mg) by mouth daily 90 tablet 3     lisinopril (ZESTRIL) 5 MG tablet TAKE 1 TABLET(5 MG) BY MOUTH DAILY 90 tablet 3     meclizine (ANTIVERT) 25 MG tablet Take 1 tablet (25 mg) by mouth 3 times daily as needed 30 tablet 1     montelukast (SINGULAIR) 10 MG tablet Take 1 tablet (10 mg) by mouth At Bedtime 90 tablet 3     Multiple Vitamin (MULTI-VITAMIN) per tablet Take 1 tablet by mouth daily. 100 tablet 3     ondansetron (ZOFRAN-ODT) 4 MG ODT tab Take 1 tablet (4 mg) by mouth every 8 hours as needed for nausea 15 tablet 1     simvastatin (ZOCOR) 40 MG tablet Take 1 tablet (40 mg) by mouth At Bedtime 90 tablet 3     venlafaxine  "(EFFEXOR) 75 MG tablet Take 1 tablet (75 mg) by mouth 3 times daily 90 tablet 1     Continuous Blood Gluc Sensor (FREESTYLE JOHN 14 DAY SENSOR) MISC 1 each every 14 days Use 1 Sensor every 14 days. Use to read blood sugars per 's instructions. 2 each 5       ALLERGIES:  Allergies   Allergen Reactions     Atorvastatin      Leg myalgias           Objective    Ht 1.613 m (5' 3.5\")   Wt 84.6 kg (186 lb 8 oz)   LMP 06/01/2012   BMI 32.52 kg/m    Vitals - Patient Reported  Pain Score: No Pain (0)        Physical Exam   GENERAL: Healthy, alert and no distress  EYES: Eyes grossly normal to inspection.  No discharge or erythema, or obvious scleral/conjunctival abnormalities.  RESP: No audible wheeze, cough, or visible cyanosis.  No visible retractions or increased work of breathing.    SKIN: Visible skin clear. No significant rash, abnormal pigmentation or lesions.  NEURO: Cranial nerves grossly intact.  Mentation and speech appropriate for age.  PSYCH: Mentation appears normal, affect normal/bright, judgement and insight intact, normal speech and appearance well-groomed.      Anti-obesity medication ROS:    HEENT  Hx of glaucoma: Yes    Cardiovascular  CAD:Yes  HTN:Yes    Gastrointestinal  GERD:Yes  Constipation:Yes  Liver Dz:No  H/O Pancreatitis:No    Psychiatric  Bipolar: No  Anxiety:Yes  Depression:Yes  History of alcohol/drug abuse: No  Hx of eating disorder:No    Endocrine  Personal or family hx of MTC or MEN2:No  Diabetes/prediabetes: No    Neurologic:  Hx of seizures: No  Hx of migraines: No  Memory Impairment: No      History of kidney stones: Yes  Kidney disease: No  Current birth control: Post-menopausal         In summary, Patricia Perez has Class I obesity with a body mass index of Body mass index is 32.52 kg/m . kg/m2 and the comorbidities stated above. She has a history of RYBP in 2012 and is here to re-establish bariatric care and discuss weight regain.        Sincerely,     ZELALEM VIVAS, " YODIT           [facility]

## 2023-03-07 NOTE — PATIENT INSTRUCTIONS
"Thank you for allowing us the privilege of caring for you. We hope we provided you with the excellent service you deserve.   Please let us know if there is anything else we can do for you so that we can be sure you are completely satisfied with your care experience.    To ensure the quality of our services you may be receiving a patient satisfaction survey from an independent patient satisfaction monitoring company.    The greatest compliment you can give is a \"Likely to Recommend\"    Your visit was with ZELALEM HICKS YODIT VIVAS today.    Instructions per today's visit:     Attila Perez, it was great to visit with you today.  Here is a review of our visit.  If our clinic scheduler is not able to reach you please call [phone] to schedule your next appointments.    1. Labs ordered   2. Can consider starting Naltrexone   3. Decrease in Advil PM use. Trying switching to Tylenol. If not, can start Omeprazole to help ulcer prevention.    4. Follow up with Zelalem Morel in 2 months       Information about Video Visits with [facility]: video visit information  _________________________________________________________________________________________________________________________________________________________  If you are asked by your clinic team to have your blood pressure checked:  [facility] do offer several locations for blood pressure checks. Please follow the below link to schedule an appointment. Scheduling an appointment at the pharmacy for a blood pressure check is now preferred.    Appointment Plus (appointment-plus.Tapatalk)  _________________________________________________________________________________________________________________________________________________________  Important contact and scheduling information:  Please call our contact center at [phone] to schedule your next appointments.  To find a lab location near you, please call [phone].  For any nursing questions or " concerns call Aziza Wilson LPN at [phone] or Oksana Pierce RN at [phone]  Please call during clinic hours Monday through Friday 8:00a - 4:00p if you have questions or you can contact us via Ge[employer] at anytime and we will reply during clinic hours.    Lab results will be communicated through My Chart or letter (if My Chart not used). Please call the clinic if you have not received communication after 1 week or if you have any questions.?  Clinic Fax: [phone]    _________________________________________________________________________________________________________________________________________________________  Meal Replacement Products:    Here is the link to our new e-store where you can purchase our meal replacement products    [facility] E-Store  [email or web address]    The one week starter kit is a great way to sample a variety of products and see what works for you.    If you want more information about the product go to: [email or web address]    If you are an employee or [facility] or [facility] please contact your care team for a 10% estore discount    Free Shipping for orders over $75     Benefits of meal replacements products:    Portion and calorie control  Improved nutrition  Structured eating  Simplified food choices  Avoid contact with trigger foods  _________________________________________________________________________________________________________________________________________________________  Interested in working with a health ?  Health coaches work with you to improve your overall health and wellbeing.  They look at the whole person, and may involve discussion of different areas of life, including, but not limited to the four pillars of health (sleep, exercise, nutrition, and stress management). Discuss with your care team if you would like to start working a health .  Health Coaching-3 Pack: Schedule by  calling [phone]    $99 for three health coaching visits    Visits may be done in person or via phone    Coaching is a partnership between the  and the client; Coaches do not prescribe or diagnose    Coaching helps inspire the client to reach his/her personal goals   _________________________________________________________________________________________________________________________________________________________  24 Week Healthy Lifestyle Plan:    Our mission in the 24-week Healthy Lifestyle Plan is to provide you with individualized care by giving you the tools, education and support you need to lose weight and maintain a healthy lifestyle. In your 24-week journey, you ll be supported by a dedicated weight loss team that includes registered dietitians, medical weight management providers, health coaches, and nurses -- all with special expertise in weight loss -- to help you every step of the way.     Monthly meetings with your registered dietician or medical weight management provider help to review your progress, update your care plan, and make any adjustments needed to ensure success. Between these visits, weekly and bi-weekly health  visits will help you focus on the four pillars of weight loss -- stress, sleep, nutrition, and exercise -- and how you can best adapt each to achieve sustainable weight loss results.    In addition, you will be given exclusive access to online wellbeing classes through [employer].  Your initial visit will be with a medical weight management provider who will help to understand your weight loss goals and ensure this program is the right fit for you. Please let our team know if you are interested in the 24 week plan by sending a message to your care team or calling [phone] to schedule.  _________________________________________________________________________________________________________________________________________________________    COMPREHENSIVE WEIGHT  "MANAGEMENT PROGRAM  VIRTUAL SUPPORT GROUPS    For Support Group Information:      We offer support groups for patients who are working on weight loss and considering, preparing for or have had weight loss surgery.   There is no cost for this opportunity.  You are invited to attend the?Virtual Support Groups?provided by any of the following locations:    [facility] via Microsoft Teams with Josefa Gu RN  2.   [place] via Bizo Teams with Mitchell Friend, PhD, LP  3.   [place] via Metaset with Funmilayo Mares RN  4.   [facility] via Bizo Teams with Funmilayo Elliott [facility]    The following Support Group information can also be found on our website:  [email or web address]    [facility] Weight Loss Surgery Support Group    [facility] Weight Loss Surgery Support Group  The support group is a patient-lead forum that meets monthly to share experiences, encouragement and education. It is open to those who have had weight loss surgery, are scheduled for surgery, and those who are considering surgery.   WHEN: This group meets on the 3rd Wednesday of each month from 5:00PM - 6:00PM virtually using Microsoft Teams.   FACILITATOR: Led by Josefa Gu RD, LD, RN, the program's Clinical Coordinator.   TO REGISTER: Please contact the clinic via [employer] or call the nurse line directly at [phone] to inform our staff that you would like an invite sent to you and to let us know the email you would like the invite sent to. Prior to the meeting, a link with directions on how to join the meeting will be sent to you.    2022 Meetings  Colleen 15: \"Let's Talk\" a time for the group to share.  July 20: \"Let's Talk\" a time for the group to share.  August 17: \"Let's Talk\" a time for the group to share.  September 21: \"Let's Talk\" a time for the group to share.  October 19: Guest Speaker: Dr Antonio Li MD Pulmonologist and Sleep Medicine " "Physician, \"Getting a Good Night's Sleep\".  November 16: \"Let's Talk\" a time for the group to share.  December 21: \"Let's Talk\" a time for the group to share.    [facility] Support Groups    Connections: Bariatric Care Support Group?  This is open to all [facility] (and those external to this program) pre- and post- operative bariatric surgery patients as well as their support system.   WHEN: This group meets the 2nd Tuesday of each month from 6:30 PM - 8:00 PM virtually using Microsoft Teams.   FACILITATOR: Led by Mitchell Friend, Ph.D who is a Licensed Psychologist with the [facility] Comprehensive Weight Management Program.   TO REGISTER: Please send an email to Mitchell Friend, Ph.D., LP at?[email or web address]?if you would like an invitation to the group and to learn about using Microsoft Teams.    2022 Meetings  June 14: Taime Watts, SUMI, LD at [facility], \"Nutritional Labeling\"  July 12 August 2 (Please Note Date Change)  September 13 October 11 November 8 December 13    Connections: Post-Operative Bariatric Surgery Support Group  This is a support group for [facility] bariatric patients (and those external to [facility]) who have had bariatric surgery and are at least 3 months post-surgery.  WHEN: This support group meets the 4th Wednesday of the month from 11:00 AM - 12:00 PM virtually using Microsoft Teams.   FACILITATOR: Led by Certified Bariatric Nurse, Funmilayo Mares RN.   TO REGISTER: Please send an email to Funmilayo at [email or web address] if you would like an invitation to the group and to learn about using Microsoft Teams.    2022 Meetings June 22 July 27 August 24 September 28 October 26 November 23 December 28      [facility] Healthy Lifestyle Virtual Support Group    Healthy Lifestyle Virtual Support Group?  This is 60 minutes of small group guided " "discussion, support and resources. All are welcome who want a healthy lifestyle.  WHEN: This group meets monthly on a Friday from 12:30 PM - 1:30 PM virtually using Microsoft Teams.   FACILITATOR: Led by National Board Certified Health and , Funmilayo Elliott [facility].   TO REGISTER: Please send an email to Funmilayo [email or web address] to receive monthly invites to the group or if you have any questions about having a health .  Prior to the meeting, a link with directions on how to join the meeting will be sent to you.    2022 Meetings  June 24: Funmilayo Elliott [facility], \"Setting Limits and Boundaries\".  Jul 29: Open Forum  August 26: Guest Speaker: Loreta Fraser Registered Dietitian  September 30: Open Forum  October 28th: Guest Speaker: Becky Mann [facility], Health , \"Gratitude Practices\".  November 18: Guest Speaker: Kathia Bledsoe RD Registered Dietitian, \"Navigating How to Eat around the Holidays\".  December 16: Guest Speaker: Maxine Saucedo [facility], \"Changing Your Relationship with Movement\".    ____________________________________________________________________________________________________________________________________________________________________________  [facility] Get Moving Program  Our team of physical therapists is trained to help you understand and take control of your condition. They will perform a thorough evaluation to determine your ability for activity and develop a customized plan to fit your goals and physical ability.  Scheduling: Unsure if the Get Moving program is right for you? Discuss the program with your medical provider or diabetes educator. You can also call us at [phone] to ask questions or schedule an appointment.   [facility] Get Moving Program  ____________________________________________________________________________________________________________________________________________________________________________  [facility] " Diabetes Prevention Program (DPP)  If you have prediabetes and Medicare please contact us via [employer] to learn more about the Diabetes Prevention Program (DPP)  Program Details:  [facility] offers the year-long Diabetes Prevention Program (DPP). The program helps you to make lifestyle changes that prevent or delay type 2 diabetes by supporting healthy eating, increased physical activity, stress reduction and use of coping skills.   On average, previous [facility] DPP cohorts have lost and maintained at least 5% of their starting weight throughout the program and averaged more than 150 minutes of physical activity per week.  Participants meet weekly for one-hour group sessions over sixteen weeks, every other week for the next 8 weeks, and monthly for the last six months.   A year-long maintenance program is also available for participants who complete the first year.   Location & Cost:   During the COVID-19 Public Health Emergency, the program is offered virtually. When in-person classes can resume, they will be held at [facility].  For people with Medicare, the program is covered in full. A self-pay option will also be available for those with non-Medicare insurance plans.   _________________________________________________________________________________________________________________________________________________________  Bluetooth Scale:    We hope to provide you with high quality virtual healthcare visits while social distancing for COVID-19 is necessary, as well as in the future when virtual visits may be more convenient for you.     Our technology team made it possible for Bluetooth scales to send weight measurements to our electronic medical record. This allows weights from you weighing at home to securely flow into the medical record, which will improve telephone and virtual visits.   Additionally, studies have shown that adults actually lose more weight when  their weights are automatically sent to someone else, and also that this process is not stressful for those adults.    Below is a link for purchasing the scale, with a discount code for our patients. You may call your insurance company to see if they will reimburse you for the cost of the scale, as a piece of durable medical equipment. The scales only go up to a weight of 400 pounds. This is an issue and we are working with the developer on increasing this. We found no scales that go over 400lb that have blue-tooth for connecting to [employer].    Scale to purchase: the [employer]  Body  Scale: [email or web address]/[email or web address]=aw.ds    Discount Code: We have a discount code for our patients to bring the cost down to $50, Discount code is: [place]_Scale_20%off  _______________________________________________________________________________________________________________________________________________________________________________    To work with a Behavioral Health Psychologist:    Call to schedule:    Vincent Shetty - [phone]  Fidel Vogel - [phone]  Melissa Mensah - [phone]  Florence Jimenes - [phone]   Allyson Chavez PhD (cannot accept Medicare) [phone]        Thank you,   [facility] Comprehensive Weight Management Team    NALTREXONE    We are considering starting Naltrexone. Start with 1/2 tab 1-2 hours prior to the time you have the most trouble with cravings or extra hunger. If you are doing well you may switch to a whole tablet taken at the same time period.     WARNING: This medication blocks the action of opioid type pain medications. If you routinely take any medication like Codeine, Oxycontin,Percocet,Morphine,Dilaudid or Methodone, do not take this until you have talked with weight management staff. If you are planning surgery you should stop Naltrexone 4 days prior to the surgery. If you have an  "injury that requires pain medication, make sure the health care staff knows you take Naltrexone.     Naltrexone is a medication that is used most often to help people who are troubled by dependence on prescription pain killers or alcohol. It has also been found to help with weight loss. Although it's not currently FDA approved for weight loss, it has been used safely for a number of years to help people who are carrying extra weight.     Just how Naltrexone helps with weight loss has not been exactly determined.  It seems to work by quieting down brain signals related to strong food cravings. Many of our patients use the word \"addiction\" to describe their feelings and constant thoughts about food. It makes sense then to treat the feeling of dependence on food, outside of real hunger, with a medication designed to help with other sorts of dependence.     Our patients on Naltrexone find that they:    >feel less interest in food   >think less about food and eating and have more time to think of other things   >find it easier to push the plate away   >have an easier time eating less    For some of our patients, these feelings are very immediate. Other patients, don't feel much of a change but find they've lost weight. Like all weight loss medications, Naltrexone works best when you help it work. This means:  1. Having less tempting high calorie (fattening) food around the house or office. (For people with strong cravings this is very important.)   2. Staying away from situations or people that may trigger your cravings .   3. Eating out only one time or less each week.  4. Eating your meals at a table with the TV or computer off.    Side-effects. Naltrexone is generally well tolerated. The main side-effect we see is  nausea or a woozy feeling. A small number of people feel quite ill. Most people have a mild reaction and some people have no reaction at all.  The good news is that this feeling does go away.     In order " to avoid nausea, please start the medication with half a pill for the first few days. Go on to a full pill if you are feeling well.      If you  are nauseated on 1/2 a pill it is okay to cut back to 1/4 pill ( a very small amount). Take this for a couple of days and work your way back up to a 1/2 pill and then a whole pill. Taking the medication at night or with food  to start also may help prevent the feeling of nausea.       For any questions or concerns please send a [employer] message to our team or call our weight management call center at [phone] during regular business hours. For questions during evenings or weekends your messages will be addressed during the next business day.  For emergencies please call 911 or seek immediate medical care.     (Do not stop taking it if you don't think it's working. For some people it works without them knowing it.)      Please refer to the pharmacy insert for more information on side-effects. Since many pharmacists are not familiar with the use of naltrexone in weight loss, calling the nurse at [phone] will get you the most accurate information.  In order to get refills of this or any medication we prescribe you must be seen in the medical weight mgmt clinic every 2-3 months. Please have your pharmacy fax a refill request to [phone].

## 2023-03-07 NOTE — ASSESSMENT & PLAN NOTE
S/p RYBP in 2012 with Dr. Bradford. No post op complications. Reestablishing care today to discuss weight regain. Was able to lose around 140lbs and was weight stable at 160lbs for many years. Weight regain began during he pandemic with decrease in activity and easier access to food. Recently has lost 7lbs due to diet changes. Struggles most with emotional and boredom eating. Has a history of depression that historically influences her weight and hunger.     Pre surgery weight - 292lbs   Reynaldo - 150lbs   Weight today 186lbs    No ETOH or Nicotine. Caffeine - 2-3 servings daily.     NSAID - uses Advil PM 3-4xweek to help with pain att night. Discuss trying Tylenol. If no relief, can start Omeprazole to prevent ulcers.     Discussed AOMS. Not to be started. Would like to continue with weight loss through diet and exercise. Can consider in the future.   - Phenermine - contraindicated due to history of cardiomyopathy and CAD.   - Topiramate- concern for brain fog  - Naltrexone - can consider in the future. No history of liver disease. Not taking opioids.   - GLP-1 - can consider in the future. Concern for self injections.

## 2023-03-08 ENCOUNTER — VIRTUAL VISIT (OUTPATIENT)
Dept: ENDOCRINOLOGY | Facility: CLINIC | Age: 63
End: 2023-03-08

## 2023-03-08 DIAGNOSIS — E66.9 OBESITY: ICD-10-CM

## 2023-03-08 DIAGNOSIS — Z71.3 NUTRITIONAL COUNSELING: Primary | ICD-10-CM

## 2023-03-08 DIAGNOSIS — Z98.84 H/O BARIATRIC SURGERY: ICD-10-CM

## 2023-03-08 PROCEDURE — 97802 MEDICAL NUTRITION INDIV IN: CPT | Mod: VID | Performed by: DIETITIAN, REGISTERED

## 2023-03-08 PROCEDURE — 99207 PR NO CHARGE LOS: CPT | Mod: VID | Performed by: DIETITIAN, REGISTERED

## 2023-03-08 NOTE — PATIENT INSTRUCTIONS
"Attila Gomez!    Follow-up with RD as needed    Thank you,    Loreta Fraser, RD, LD  If you would like to schedule or reschedule an appointment with the RD, please call [phone]    Nutrition Goals  1) 9\" Plate method (1/2 plate non-starchy vegetables/fruit, 1/4 plate lean protein, 1/4 plate whole grain starch - no more than 1/2 cup carb/meal)  2) Preventing Low Blood Sugar after Weight Loss Surgery  [email or web address]   3) Non-starchy vegetables  Asparagus, beets, bell peppers, broccoli, cauliflower,   carrots, celery, cucumbers, kale, lettuce, mushrooms,   onions, spinach, tomatoes, zucchini.  Starchy vegetables  Potatoes with skins, green peas, corn, dried beans,   pumpkin and lentils. They can be fresh, frozen,   cooked or raw  4) Protein Sources for Weight Loss  [email or web address]       Preventing Dumping Syndrome after Weight Loss Surgery  [email or web address]       After Weight Loss Surgery    Keeping Up Your Diet after Weight Loss Surgery  [email or web address]      Interested in working with a health ? Health coaches work with you to improve your overall health and wellbeing. They look at the whole person, and may involve discussion of different areas of life, including, but not limited to the four pillars of health (sleep, exercise, nutrition, and stress management). Discuss with your care team if you would like to start working a health .    Health Coaching-3 Pack:    $99 for three health coaching visits    Visits may be done in person or via phone    Coaching is a partnership between the  and the client; Coaches do not prescribe or diagnose    Coaching helps inspire the client to reach his/her personal goals    COMPREHENSIVE WEIGHT MANAGEMENT PROGRAM  VIRTUAL SUPPORT GROUPS    For Support Group Information:      We offer support groups for patients who are working on weight loss and considering, preparing for or have had weight loss surgery.   There " "is no cost for this opportunity.  You are invited to attend the?Virtual Support Groups?provided by any of the following locations:    [facility] via Microsoft Teams with Josefa Jacinto RN  2.   [place] via [employer] with Mitchell Friend, PhD, LP  3.   [place] via [employer] with Funmilayo Mares RN  4.   [facility] via [facility] [facility] Teams with Funmilayo Elliott [facility]    The following Support Group information can also be found on our website:  [email or web address]://www.[email or web address]/treatments/weight-loss-surgery-support-groups    [email or web address]://www.[facility].[email or web address]/treatments/weight-loss-and-weight-loss-surgery-support-groups    [facility] Weight Loss Surgery Support Group    [facility] Weight Loss Surgery Support Group  The support group is a patient-lead forum that meets monthly to share experiences, encouragement and education. It is open to those who have had weight loss surgery, are scheduled for surgery, or are considering surgery.   WHEN: This group meets on the 3rd Wednesday of each month from 5:00PM - 6:00PM virtually using Microsoft Teams.   FACILITATOR: Led by Josefa Gu, SUMI, LD, RN, the program's Clinical Coordinator.   TO REGISTER: Please contact the clinic via [employer] or call the nurse line directly at [phone] to inform our staff that you would like an invite sent to you and to let us know the email you would like the invite sent to. Prior to the meeting, a link with directions on how to join the meeting will be sent to you.    2023 Meetings (speakers to be determined)  January 18: \"Let's Talk\" a time for the group to share.  February 15: \"Let's Talk\" a time for the group to share.  March 15: \"Let's Talk\" a time for the group to share.  April 19: \"Let's Talk\" a time for the group to share.  May 17: \"Let's Talk\" a time for the group to share.  June 21: \"Let's Talk\" a time for the group to share.    [facility] Support " "Groups    Connections Bariatric Care Support Group?  This is open to all pre- and post- operative bariatric surgery patients as well as their support system.   WHEN: This group meets the 2nd Tuesday of each month from 6:30 PM - 8:00 PM virtually using Microsoft Teams.   FACILITATOR: Led by Mitchell Friend, Ph.D who is a Licensed Psychologist with the [facility] Comprehensive Weight Management Program.   TO REGISTER: Please send an email to Mitchell Friend, Ph.D., LP at?[email or web address]?if you would like an invitation to the group and to learn about using Microsoft Teams.    2023 Meetings  January 10: Karthik Haq, PharmD, Pharmacy Resident, \"Medications and Bariatric Surgery\"  February 14:  March 14:  April 11:  May 9:  June 11:    Connections Post-Operative Bariatric Surgery Support Group  This is a support group for [facility] bariatric patients (and those external to [facility]) who have had bariatric surgery and are at least 3 months post-surgery.  WHEN: This support group meets the 4th Wednesday of the month from 11:00 AM - 12:00 PM virtually using Microsoft Teams.   FACILITATOR: Led by Certified Bariatric Nurse, Funmilayo Mares RN.   TO REGISTER: Please send an email to Funmilayo at [email or web address] if you would like an invitation to the group and to learn about using Microsoft Teams.    2023 Meetings  January 25  February 22  March 22  April 26  May 24  Colleen 28      [facility] Healthy Lifestyle Virtual Support Group    Healthy Lifestyle Virtual Support Group?  This is 60 minutes of small group guided discussion, support and resources. All are welcome who want a healthy lifestyle.  WHEN: This group meets monthly on a Friday from 12:30 PM - 1:30 PM virtually using Microsoft Teams.   FACILITATOR: Led by National Board Certified Health and , Funmilayo Elliott [facility].   TO REGISTER: Please send an email to Funmilayo" "[email or web address] to receive monthly invites to the group or if you have any questions about having a health .  Prior to the meeting, a link with directions on how to join the meeting will be sent to you.    2023 Meetings  January 20: \"Let's Talk\" a time for the group to share  February 17: Guest Speaker, Kathia Bledsoe RD, Registered Dietician, \"Tips to Maximize Your Metabolism\"  March 17: Let's Talk\" a time for the group to share  April 14: Guest Speaker, Loreta Fraser RD, Registered Dietician, \"Heart Health\"  May 19: \"Let's Talk\" a time for the group to share  June: To be announced.      " Niacinamide Pregnancy And Lactation Text: These medications are considered safe during pregnancy.

## 2023-03-08 NOTE — PROGRESS NOTES
"Patricia Perez is a 62 year old female who is being evaluated via a billable video visit.      The patient has been notified of following:     \"This video visit will be conducted via a call between you and your physician/provider. We have found that certain health care needs can be provided without the need for an in-person physical exam.  This service lets us provide the care you need with a video conversation.  If a prescription is necessary we can send it directly to your pharmacy.  If lab work is needed we can place an order for that and you can then stop by our lab to have the test done at a later time.    Video visits are billed at different rates depending on your insurance coverage.  Please reach out to your insurance provider with any questions.    If during the course of the call the physician/provider feels a video visit is not appropriate, you will not be charged for this service.\"    Patient has given verbal consent for Video visit? Yes  How would you like to obtain your AVS? MyChart  If you are dropped from the video visit, the video invite should be resent to: Text to cell phone: [phone]  Will anyone else be joining your video visit? No  {If patient encounters technical issues they should call [phone]      Video-Visit Details    Type of service:  Video Visit    Video Start Time: 1:20 PM  Video End Time: 1:44 PM    Originating Location (pt. Location): Home    Distant Location (provider location):  Offsite (providers home)    Platform used for Video Visit: "[employer]"    During this virtual visit the patient is located in [place], patient verifies this as the location during the entirety of this visit.       Nutrition Assessment  Reason For Visit:  Patricia Perez is a 62 year old female presents today for new re-establish nutrition visit. 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 Bradford.  Patient referred by JOHN Lopez on March 8, 2023.    Patient with Co-morbidities of obesity " "including:  HTN, GERD    Anthropometrics:  Estimated body mass index is 32.52 kg/m  as calculated from the following:    Height as of 3/6/23: 1.613 m (5' 3.5\").    Weight as of 3/6/23: 84.6 kg (186 lb 8 oz).  Current Weight: 185 lbs per pt     Pt's weight loss goal is -20 lbs    Current Vitamins/Minerals: MVI/minerals BID, 500 mg Calcium Citrate + Vitamin D TID, Vitamin B12, iron     Nutrition History:  Pt reports she really struggles with boredom/stress eating. Cravings for chocolate and sugar. Has bouts of hypoglycemia since surgery with higher sugar foods.     Recall Diet Questions Reviewed With Patient 3/6/2023   Describe what you typically consume for breakfast (typical or most recent): toast or cereal   Describe what you typically consume for lunch (typical or most recent): sandwich or microwave meal   Describe what you typically consume for supper (typical or most recent): meat and potatoes   Describe what you typically consume as snacks (typical or most recent): granola bars   How many ounces of water, or other low calorie drinks, do you drink daily (8 oz=1 glass)? 64 oz or more   How many ounces of caffeine (coffee, tea, pop) do you drink daily (8 oz=1 glass)? 32 oz   How many ounces of carbonated (pop, beer, sparkling water) drinks do you drinky daily (8 oz=1 glass)? 24 oz   How many ounces of juice, pop, sweet tea, sports drinks, protein drinks, other sweetened drinks, do you drink daily (8 oz=1 glass)? 0 oz   How many ounces of milk do you drink daily (8 oz=1 glass) 0 oz   Please indicate the type of milk: 2%   How often do you drink alcohol? Monthly or less   If you do drink alcohol, how many drinks might you have in a day? (one drink = 5 oz. wine, 1 can/bottle of beer, 1 shot liquor) 1 or 2       Eating Habits 3/6/2023   Do you have any dietary restrictions? No   Do you currently binge eat (eat a large amount of food in a short time)? No   Are you an emotional eater? Yes   Do you get up to eat after " "falling asleep? No   What foods do you crave? Chocolate       Physical Activity:  3xweek goes to the gym. Walks other days as able.     Nutrition Prescription:  Grams Protein: 50-60 (minimum)  Amount of Fluid: 48-64 oz    Nutrition Diagnosis  Obesity r/t long history of positive energy balance aeb BMI >30.    Intervention  Materials/Education provided on maintenance dietary guidelines after bariatric surgery, portion sizes, eating pace and chewing well, snacking, separation of beverages from meals, vitamin and mineral supplements after weight loss surgery, and protein needs. Discussed importance of physical activity.  Discussed protein shakes/meal replacements available in clinic. Patient demonstrates understanding. Provided pt with list of goals and RD contact information.    Expected Engagement: good    Goals:  1) 9\" Plate method (1/2 plate non-starchy vegetables/fruit, 1/4 plate lean protein, 1/4 plate whole grain starch - no more than 1/2 cup carb/meal)  2) Preventing Low Blood Sugar after Weight Loss Surgery  [email or web address]   3) Non-starchy vegetables  Asparagus, beets, bell peppers, broccoli, cauliflower,   carrots, celery, cucumbers, kale, lettuce, mushrooms,   onions, spinach, tomatoes, zucchini.  Starchy vegetables  Potatoes with skins, green peas, corn, dried beans,   pumpkin and lentils. They can be fresh, frozen,   cooked or raw  4) Protein Sources for Weight Loss  [email or web address]       Preventing Dumping Syndrome after Weight Loss Surgery  [email or web address]       After Weight Loss Surgery    Keeping Up Your Diet after Weight Loss Surgery  [email or web address]      Follow-Up:  1 month, PRN    Time spent with patient: 24 minutes.  JARON CARDENAS RD, LD    "

## 2023-03-08 NOTE — LETTER
"3/8/2023       RE: Patricia Perez  [address] [address]     Dear Colleague,    Thank you for referring your patient, Patricia Perez, to the [facility] [place] [facility]. Please see a copy of my visit note below.    Patricia Perez is a 62 year old female who is being evaluated via a billable video visit.      The patient has been notified of following:     \"This video visit will be conducted via a call between you and your physician/provider. We have found that certain health care needs can be provided without the need for an in-person physical exam.  This service lets us provide the care you need with a video conversation.  If a prescription is necessary we can send it directly to your pharmacy.  If lab work is needed we can place an order for that and you can then stop by our lab to have the test done at a later time.    Video visits are billed at different rates depending on your insurance coverage.  Please reach out to your insurance provider with any questions.    If during the course of the call the physician/provider feels a video visit is not appropriate, you will not be charged for this service.\"    Patient has given verbal consent for Video visit? Yes  How would you like to obtain your AVS? MyChart  If you are dropped from the video visit, the video invite should be resent to: Text to cell phone: [phone]  Will anyone else be joining your video visit? No  {If patient encounters technical issues they should call [phone]      Video-Visit Details    Type of service:  Video Visit    Video Start Time: 1:20 PM  Video End Time: 1:44 PM    Originating Location (pt. Location): Home    Distant Location (provider location):  Offsite (providers home)    Platform used for Video Visit: Knok    During this virtual visit the patient is located in [place], patient verifies this as the location during the " "entirety of this visit.       Nutrition Assessment  Reason For Visit:  Patricia Perez is a 62 year old female presents today for new re-establish nutrition visit. 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 Bradford.  Patient referred by JOHN Lopez on March 8, 2023.    Patient with Co-morbidities of obesity including:  HTN, GERD    Anthropometrics:  Estimated body mass index is 32.52 kg/m  as calculated from the following:    Height as of 3/6/23: 1.613 m (5' 3.5\").    Weight as of 3/6/23: 84.6 kg (186 lb 8 oz).  Current Weight: 185 lbs per pt     Pt's weight loss goal is -20 lbs    Current Vitamins/Minerals: MVI/minerals BID, 500 mg Calcium Citrate + Vitamin D TID, Vitamin B12, iron     Nutrition History:  Pt reports she really struggles with boredom/stress eating. Cravings for chocolate and sugar. Has bouts of hypoglycemia since surgery with higher sugar foods.     Recall Diet Questions Reviewed With Patient 3/6/2023   Describe what you typically consume for breakfast (typical or most recent): toast or cereal   Describe what you typically consume for lunch (typical or most recent): sandwich or microwave meal   Describe what you typically consume for supper (typical or most recent): meat and potatoes   Describe what you typically consume as snacks (typical or most recent): granola bars   How many ounces of water, or other low calorie drinks, do you drink daily (8 oz=1 glass)? 64 oz or more   How many ounces of caffeine (coffee, tea, pop) do you drink daily (8 oz=1 glass)? 32 oz   How many ounces of carbonated (pop, beer, sparkling water) drinks do you drinky daily (8 oz=1 glass)? 24 oz   How many ounces of juice, pop, sweet tea, sports drinks, protein drinks, other sweetened drinks, do you drink daily (8 oz=1 glass)? 0 oz   How many ounces of milk do you drink daily (8 oz=1 glass) 0 oz   Please indicate the type of milk: 2%   How often do you drink alcohol? Monthly or less   If you do drink alcohol, how " "many drinks might you have in a day? (one drink = 5 oz. wine, 1 can/bottle of beer, 1 shot liquor) 1 or 2       Eating Habits 3/6/2023   Do you have any dietary restrictions? No   Do you currently binge eat (eat a large amount of food in a short time)? No   Are you an emotional eater? Yes   Do you get up to eat after falling asleep? No   What foods do you crave? Chocolate       Physical Activity:  3xweek goes to the gym. Walks other days as able.     Nutrition Prescription:  Grams Protein: 50-60 (minimum)  Amount of Fluid: 48-64 oz    Nutrition Diagnosis  Obesity r/t long history of positive energy balance aeb BMI >30.    Intervention  Materials/Education provided on maintenance dietary guidelines after bariatric surgery, portion sizes, eating pace and chewing well, snacking, separation of beverages from meals, vitamin and mineral supplements after weight loss surgery, and protein needs. Discussed importance of physical activity.  Discussed protein shakes/meal replacements available in clinic. Patient demonstrates understanding. Provided pt with list of goals and RD contact information.    Expected Engagement: good    Goals:  1) 9\" Plate method (1/2 plate non-starchy vegetables/fruit, 1/4 plate lean protein, 1/4 plate whole grain starch - no more than 1/2 cup carb/meal)  2) Preventing Low Blood Sugar after Weight Loss Surgery  [email or web address]   3) Non-starchy vegetables  Asparagus, beets, bell peppers, broccoli, cauliflower,   carrots, celery, cucumbers, kale, lettuce, mushrooms,   onions, spinach, tomatoes, zucchini.  Starchy vegetables  Potatoes with skins, green peas, corn, dried beans,   pumpkin and lentils. They can be fresh, frozen,   cooked or raw  4) Protein Sources for Weight Loss  [email or web address]       Preventing Dumping Syndrome after Weight Loss Surgery  [email or web address]       After Weight Loss Surgery    Keeping Up Your Diet after Weight Loss " Surgery  [email or web address]      Follow-Up:  1 month, PRN    Time spent with patient: 24 minutes.  JARON CARDENAS RD, LD

## 2023-03-09 ENCOUNTER — OFFICE VISIT (OUTPATIENT)
Dept: URGENT CARE | Facility: URGENT CARE | Age: 63
End: 2023-03-09
Payer: COMMERCIAL

## 2023-03-09 ENCOUNTER — VIRTUAL VISIT (OUTPATIENT)
Dept: PSYCHIATRY | Facility: CLINIC | Age: 63
End: 2023-03-09
Attending: PSYCHIATRY & NEUROLOGY
Payer: COMMERCIAL

## 2023-03-09 VITALS
RESPIRATION RATE: 16 BRPM | DIASTOLIC BLOOD PRESSURE: 86 MMHG | SYSTOLIC BLOOD PRESSURE: 135 MMHG | OXYGEN SATURATION: 100 % | HEART RATE: 68 BPM | TEMPERATURE: 97 F

## 2023-03-09 DIAGNOSIS — F33.1 MODERATE EPISODE OF RECURRENT MAJOR DEPRESSIVE DISORDER (H): Primary | ICD-10-CM

## 2023-03-09 DIAGNOSIS — I73.00 RAYNAUD'S DISEASE WITHOUT GANGRENE: Primary | ICD-10-CM

## 2023-03-09 DIAGNOSIS — R41.840 ATTENTION DEFICIT: ICD-10-CM

## 2023-03-09 DIAGNOSIS — Z98.84 HX OF GASTRIC BYPASS: ICD-10-CM

## 2023-03-09 PROCEDURE — 99213 OFFICE O/P EST LOW 20 MIN: CPT | Performed by: NURSE PRACTITIONER

## 2023-03-09 PROCEDURE — G0463 HOSPITAL OUTPT CLINIC VISIT: HCPCS | Mod: PN,GT | Performed by: STUDENT IN AN ORGANIZED HEALTH CARE EDUCATION/TRAINING PROGRAM

## 2023-03-09 PROCEDURE — 99213 OFFICE O/P EST LOW 20 MIN: CPT | Mod: VID | Performed by: STUDENT IN AN ORGANIZED HEALTH CARE EDUCATION/TRAINING PROGRAM

## 2023-03-09 RX ORDER — VENLAFAXINE 75 MG/1
75 TABLET ORAL 3 TIMES DAILY
Qty: 90 TABLET | Refills: 2 | Status: SHIPPED | OUTPATIENT
Start: 2023-03-09 | End: 2023-05-25

## 2023-03-09 RX ORDER — ARIPIPRAZOLE 2 MG/1
4 TABLET ORAL DAILY
Qty: 60 TABLET | Refills: 2 | Status: SHIPPED | OUTPATIENT
Start: 2023-03-09 | End: 2023-05-25

## 2023-03-09 NOTE — PATIENT INSTRUCTIONS
Avoid cold as much as possible.   Make appointment to follow up with primary provider in the next couple weeks.

## 2023-03-09 NOTE — NURSING NOTE
Is the patient currently in the state of [place]? YES    Visit mode:VIDEO    If the visit is dropped, the patient can be reconnected by: VIDEO VISIT: Send to e-mail at: [email or web address]    Will anyone else be joining the visit? NO      How would you like to obtain your AVS? MyChart    Are changes needed to the allergy or medication list? NO    Reason for visit: Psychiatry Return    JAMIE Mcmanus on 3/9/2023 at 9:23 AM

## 2023-03-09 NOTE — PATIENT INSTRUCTIONS
**For crisis resources, please see the information at the end of this document**   Patient Education    Thank you for coming to the [facility] MENTAL HEALTH & ADDI[facility] [place] [facility]IN[facility].     Lab Testing:  If you had lab testing today and your results are reassuring or normal they will be mailed to you or sent through Or[employer]pt within 7 days. If the lab tests need quick action we will call you with the results. The phone number we will call with results is # [phone]. If this is not the best number please call our clinic and change the number.     Medication Refills:  If you need any refills please call your pharmacy and they will contact us. Our fax number for refills is [phone].   Three business days of notice are needed for general medication refill requests.   Five business days of notice are needed for controlled substance refill requests.   If you need to change to a different pharmacy, please contact the new pharmacy directly. The new pharmacy will help you get your medications transferred.     Contact Us:  Please call [phone] during business hours (8-5:00 M-F).   If you have medication related questions after clinic hours, or on the weekend, please call [phone].     Financial Assistance [phone]   Medical Records [phone]       MENTAL HEALTH CRISIS RESOURCES:  For a emergency help, please call 911 or go to the nearest Emergency Department.     Emergency Walk-In Options:   EmPATH Unit @ [facility] ([place]): [phone] - Specialized mental health emergency area designed to be calming  [facility] ([place]): [phone]  [facility] ([place]): [phone]  [facility]): [phone]    [facility] Crisis Information:   [place]: [phone]  [place]: [phone]  [place] ([place]) - Adult: [phone]     Child: [phone]  [place] - Adult: [phone]     Child: [phone]  [place]:  [phone]  List of all [facility] resources:   https://[place].gov/dhs/people-we-serve/adults/health-care/mental-health/resources/crisis-contacts.jsp    National Crisis Information:   Crisis Text Line: Text  [place]  to 324364  Suicide & Crisis Lifeline: 988  National Suicide Prevention Lifeline: 2-413-806-TALK (1-857.414.7377)       For online chat options, visit https://suicidepreventionlifeline.org/chat/  Poison Control Center: 6-573-891-2197  Trans Lifeline: 8-249-875-1134 - Hotline for transgender people of all ages  The Ricardo Project: 5-879-553-3835 - Hotline for LGBT youth     For Non-Emergency Support:   Fast Tracker: Mental Health & Substance Use Disorder Resources -   https://www.[employer].org/

## 2023-03-09 NOTE — PROGRESS NOTES
Patricia Perez is a 62 year old who is being evaluated via a billable video visit.      Pt will join video visit via: [employer]Well  If there are problems joining the visit, send backup video invite via: Send to preferred e-mail: [email or web address]    Originating location (patient location): Patient's home    Will anyone else be joining the visit? No    Martha Vyas, VF on 3/9/2023 at 9:22 AM    Answers for HPI/ROS submitted by the patient on 3/8/2023  If you checked off any problems, how difficult have these problems made it for you to do your work, take care of things at home, or get along with other people?: Somewhat difficult  PHQ9 TOTAL SCORE: 6

## 2023-03-09 NOTE — PROGRESS NOTES
SUBJECTIVE:   Patricia Perez is a 62 year old female presenting with a chief complaint of   Chief Complaint   Patient presents with     Urgent Care     Finger     Per patient states she noticed yesterday her right hand middle finger turning dark blue/purple. Now today finger is turning colors and it's completely numb. Patient states no injury, or been out in the cold for hours without gloves or new medication.   Has full ROM however reports stiffness. Also notes slight swelling to bilat knuckles. Hands are cool to touch at the fingertips.    She is an established patient of [facility].        Past Medical History:   Diagnosis Date     Anxiety      Arthritis      ASCUS of cervix with negative high risk HPV 01/23/2006     Asthma      Bursitis of shoulder 03/04/2010     Chronic rhinitis 03/25/2009     Coronary artery disease      Depression      Dyspnea on exertion      GERD (gastroesophageal reflux disease) 10/14/2008     Hypertension      Idiopathic cardiomyopathy (H) 01/08/2009     Indigestion      Itchy eyes      Left leg pain 06/16/2011     Motion sickness 02/27/2008     Nasal congestion      Pneumonia      Problems related to lack of adequate sleep      Ringing in ears      Sleep apnea 03/25/2009     Sneezing      Family History   Problem Relation Age of Onset     Gastrointestinal Disease Mother         non cancerous pancreatic tumor     Hypertension Mother      Heart Disease Mother         A fib     Allergies Mother      Hypertension Father      Lipids Father      Alcohol/Drug Father         Abuse     Depression Father      Respiratory Father         COPD     Cardiovascular Father      Allergies Sister         Poison Ivy     Depression Brother      Hypertension Brother      Allergies Brother         Enviromental/ Bees     Breast Cancer Maternal Grandmother      Glaucoma Paternal Grandmother      Glaucoma Paternal Grandfather      Cerebrovascular Disease Other      Macular Degeneration Other      Diabetes No  family hx of      Cancer - colorectal No family hx of      Retinal detachment No family hx of      Amblyopia No family hx of      Thyroid Disease No family hx of      Adrenal Disorder No family hx of      Current Outpatient Medications   Medication Sig Dispense Refill     albuterol (PROAIR HFA/PROVENTIL HFA/VENTOLIN HFA) 108 (90 Base) MCG/ACT inhaler Inhale 2 puffs into the lungs every 4 hours as needed for shortness of breath / dyspnea or wheezing 18 g 0     ARIPiprazole (ABILIFY) 2 MG tablet Take 2 tablets (4 mg) by mouth daily 60 tablet 2     aspirin (ASA) 81 MG EC tablet Take 1 tablet (81 mg) by mouth daily 90 tablet 3     azelastine (ASTELIN) 0.1 % nasal spray Spray 2 sprays into both nostrils 2 times daily as needed for rhinitis 30 mL 3     azelastine (OPTIVAR) 0.05 % ophthalmic solution Apply 1 drop to eye 2 times daily as needed (itchy/watery eyes) 6 mL 3     blood glucose (NO BRAND SPECIFIED) lancing device Device to be used with lancets. 3 each 3     blood glucose (NO BRAND SPECIFIED) test strip Use to test blood sugar 3 times daily 300 strip 3     calcium carbonate-vitamin D (CALTRATE 600+D) 600-400 MG-UNIT CHEW Take 1 chew tab by mouth 2 times daily 180 tablet 3     carvedilol (COREG) 25 MG tablet Take 1 tablet (25 mg) by mouth 2 times daily (with meals) 180 tablet 3     cetirizine (ZYRTEC) 10 MG tablet Take 1 tablet (10 mg) by mouth daily 30 tablet 3     Continuous Blood Gluc Sensor (FREESTYLE JOHN 14 DAY SENSOR) [facility] 1 each every 14 days Use 1 Sensor every 14 days. Use to read blood sugars per 's instructions. 2 each 5     Cyanocobalamin (VITAMIN B-12 SL) Place under the tongue every other day        estrogen conj-medroxyPROGESTERone (PREMPRO) 0.45-1.5 MG tablet Take 1 tablet by mouth daily 90 tablet 3     ezetimibe (ZETIA) 10 MG tablet Take 1 tablet (10 mg) by mouth daily 90 tablet 3     FEROSUL 325 (65 Fe) MG tablet TAKE 1 TABLET(325 MG) BY MOUTH TWICE DAILY 180 tablet 0     hydrALAZINE  (APRESOLINE) 10 MG tablet Take 1 tablet (10 mg) by mouth 3 times daily 270 tablet 3     Ibuprofen-diphenhydrAMINE Cit (IBUPROFEN PM PO) Take by mouth At Bedtime       isosorbide mononitrate (IMDUR) 30 MG 24 hr tablet Take 1 tablet (30 mg) by mouth daily 90 tablet 3     lisinopril (ZESTRIL) 5 MG tablet TAKE 1 TABLET(5 MG) BY MOUTH DAILY 90 tablet 3     meclizine (ANTIVERT) 25 MG tablet Take 1 tablet (25 mg) by mouth 3 times daily as needed 30 tablet 1     montelukast (SINGULAIR) 10 MG tablet Take 1 tablet (10 mg) by mouth At Bedtime 90 tablet 3     Multiple Vitamin (MULTI-VITAMIN) per tablet Take 1 tablet by mouth daily. 100 tablet 3     ondansetron (ZOFRAN-ODT) 4 MG ODT tab Take 1 tablet (4 mg) by mouth every 8 hours as needed for nausea 15 tablet 1     simvastatin (ZOCOR) 40 MG tablet Take 1 tablet (40 mg) by mouth At Bedtime 90 tablet 3     venlafaxine (EFFEXOR) 75 MG tablet Take 1 tablet (75 mg) by mouth 3 times daily 90 tablet 2     Social History     Tobacco Use     Smoking status: Never     Smokeless tobacco: Never   Substance Use Topics     Alcohol use: No       OBJECTIVE  /86   Pulse 68   Temp 97  F (36.1  C) (Tympanic)   Resp 16   LMP 06/01/2012   SpO2 100%     Physical Exam  Skin:     Capillary Refill: Capillary refill takes less than 2 seconds.      Comments: Thumb and first 2 fingers of right hand are numb, cool to touch distally. Middle finger is purple/blue on distal 2/3 of finger just underneath.    Neurological:      General: No focal deficit present.      Mental Status: She is oriented to person, place, and time.         Labs:  No results found. However, due to the size of the patient record, not all encounters were searched. Please check Results Review for a complete set of results.    X-Ray was not done.    ASSESSMENT: To note, picture is after warming hand with warm pack for 10-15 min.      ICD-10-CM    1. Raynaud's disease without gangrene  I73.00                Serious Comorbid  Conditions:  Adult:  Asthma, HTN, CAD    PLAN:    MS Discoloration and Pain, plan is heat.  Discussed medications sometimes used for Raynauds but these will affect blood pressure. Recommend follow up with primary for further evaluation, and recheck.   Go to ER if finger discoloration doesn't return to near normal with heat, or you have severe pain in the hands. You may need an ultrasound at some point to check the blood flow.  -Handout on Raynauds given.    Followup:    If not improving or if condition worsens, follow up with your Primary Care Provider    Patient Instructions   Avoid cold as much as possible.   Make appointment to follow up with primary provider in the next couple weeks.

## 2023-03-09 NOTE — PROGRESS NOTES
Video- Visit Details  Type of service:  video visit for medication management  Time of service:    Date:  03/09/2023    Video Start Time: 8:13      Video End Time:  8:33    Patient location: home    Provider location: on-site    Platform: [facility]  Psychiatry Clinic  MEDICAL PROGRESS NOTE     CARE TEAM:  PCP- Funmilayo Grubbs    Psychotherapist- Delvis Lynch This person is a 62 year old who uses the name Patricia and pronouns she, her.      DIAGNOSIS     Major depressive disorder, recurrent, in partial remission, with seasonal component  ADHD by history     ASSESSMENT   Patricia Perez is a 62 year old female with history of recurrent depression as well as reported history of ADHD who presents today for medication follow-up; she reports doing fair. Finds change to TID dosing of venlafaxine modestly helpful (history of gastric bypass), finds individual therapy particularly helpful.     Reports increase in teeth grinding and restlessness.  Exacerbated by stress. I considered if this may be dystonia or akathisia secondary to aripiprazole though sxs are not closely correlated with medication administration timing, also not functionally impairing at this time.     Discussed multiple medication changes today including increasing venlafaxine, decreasing abilify, vs no medication changes. We agreed upon no medication changes today. Encouraged to continue individual therapy and continue pursuing physical health goals. No safety concerns today. Will follow-up with myself in 2 months.       Future considerations  - consider increasing venlafaxine to max dose as-tolerated, monitor BP  - consider stimulant for ADHD symptoms (not very burdensome as of 11/18/22) and/or antidepressant augmentation   - hx gastric bypass in 2018, needs IR formulations   - bupropion for mood / concentration? Would need IR if trialing this    MNPMP review was not needed today.     PLAN                   "                                                                                              1) Meds-   - Continue aripiprazole 4 mg daily  - Continue venlafaxine to 75 mg TID     Other medications:  1.  Aspirin 81 mg a day.  2.  Carvedilol 25 mg twice daily.  3.  Prempro.  4.  Ezetimibe 10 mg a day.  5.  Hydralazine 10 mg twice daily.  6.  Isosorbide mononitrate 30 mg a day.  7.  Lisinopril 5 mg a day.  8.  Meclizine.  9.  Ondansetron.  10.  Simvastatin 20 mg a day.  11. otc Vitamin D     2) Psychotherapy- continue individual psychotherapy     3) Next due-  Labs- last AP labs done Feb 2022, due at next appointment   EKG- 1/7/20: 447 ms @ 54 bpm  Rating scales- PHQ9, AIMS due Nov 2023    4) Referrals-  Weight management referral placed 8/15/22 callback #[phone], established     5) Dispo- RTC in-person 3 months     PERTINENT BACKGROUND                                                    [most recent eval 08/15/22]   Components copy-forwarded from prior assessment. Last reviewed with patient and updated with patient on 08/15/22.     Onset of mental health problems at age 13-14 years old when younger brother passed away. Denies memory of significant trauma or abuse and poor memory childhood. Did well in school and no discpline issues or legal issues during childhood. Father with alcohol use disorder.  Started mental health treatment in 1995. Niece passed away in MVA in 2020, COVID in 2020 both were disruptive to mental health (worsened depression). History gastric bypass in 2018.      Psych pertinent item history includes trauma hx and eating disorder (binge eating)     SUBJECTIVE   Last seen in clinic 2 months ago, at which time venlafaxine changed to TID dosing to target mood (IR formulation due to history of gastric bypass). Since last being seen, they report:    Mood:   \"okay. I've been seeing Kay and that's been going well I think. And I did end up going to the nutritionist and they did not yell at me, " "you were right. They were very nice and gave me some options.\" (considered naltrexone for weight \"but I'd like to get by without it but we'll see.\")     Mood \"it's up and down. I think it's better. Not back to where I'd like it to be but it's better.\" uses watch alarm to take meds three times a day.     \"I did want to mention I'm still gnashing my teeth.\" thinks it's obvious on Zoom. \"I don't know if that's part of the medicine or if it's me being odd and weird.\" thinks it happens \"all the time.\" Unsure if present during sleep. Not correlated with med timing.     ADHD? \"I still don't concentrate wel. I'm trying to look at some ways to force myself to concentrate.\" avoids multitasking, trying to use lists more. \"I seem to move a lot and I can't sit still.\" thinks this has always been the case, unsure if better/worse while on Adderall.      Social update: Bathroom remodel? \"finally done\" and will be adding closets to the downstairs. Job going well.     Sleep:   \"I've been sleeping better I think. I have good days and bad days. Typically better.\" Thinks ~6-7 hours most nights. Daytime energy \"not too bad. I usually start going downhill sometime around 3 or 4. But I get up at 4 or 5.\"     Stress: home remodeling     Physical Health:   No fevers/chills, GI sxs, headaches.     Recent finger injury.      \"some kind of stomach bug over the weekend.\"     Substance use: not asked    Meds:   Taking daily as prescribed, rarely misses doses. No headache, nausea,     \"I think it's working pretty well. I'm still not up to where I'd like to be but I'm not as bad as I was.\"      \"wouldn't mind leaving them where they're at for now. Maybe increasing the venlafaxine.\"      No other med changes.      Therapy:   Going well \"we were seeing each other every Friday, so I'll see her again next Friday.\" and \"I've gotten some things off my chest.\"      SI:  \"no\"      FAMILY and SOCIAL HISTORY                                 pt reported "   Components copy-forwarded from prior assessment. Last reviewed with patient and updated with patient on 08/15/22.     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 Hopes to retire ~age 64.   Partner/ -  in 2017 to her first , Mitchell, relationship feels very strong   Children- None      Living situation- Lives in a home in [place], [place] with  and cat (Ms. Queen), recently purchased house  Social/ Spiritual Support-  Mitchell, three close friends        Feels Safe at Home- yes   Legal- None     Trauma History (self-report)- Younger brother  of Leukemia when patient was in 8th grade. Niece  in MVA on 2020  Early History/Education- Born and raised in [place], [place]. Family is still in [place]. Grad school in [place].  Reports her childhood was jackie, father was an alcoholic.  Lived in a trailer with 4 siblings.      PAST MED TRIALS      Medication Max Dose (mg) Dates / Duration Helpful? DC Reason / Adverse Effects?   bupropion 300 9858-9051 N Initially helpful then lost efficacy   Adderall 25 0135-2815 N Lack of efficacy   sertraline 100 2113-4694 N Initially helpful then lost efficacy   venlafaxine 225 -current Y     aripiprazole 4 -current Y                                                                   Denies history of allergies   Dec 2020: Adderall, sertraline stopped     21: Started aripiprazole  3/31/21: Increased aripiprazole to 4 mg daily  21: Discontinued sertraline  10/22/21: No changes.  22: No medication changes. [facility] referral placed. SAD light ordered.   8/15/22: transfer visit, no changes   22: worse mood. venlafaxine changed to IR BID. SAD light re-ordered.  23: venlafaxine changed to  "TID dosing, no other changes   03/09/23: no changes      PAST SUBSTANCE USE HISTORY   Components copy-forwarded from prior assessment. Last reviewed with patient and updated with patient on 08/15/22.     Past Use- Reports history of occasional alcohol use from teens to 30's \"never drank to the point where I was overembibing\" (does not drink now d/t  being recovering alcoholic), tried marijuana occasionally In the 1970s   Treatment- #, most recent- no  Medical Consequences- no  Legal Consequences- no  Other- N/A     MEDICAL HISTORY and ALLERGY     ALLERGIES: Atorvastatin    Patient Active Problem List   Diagnosis     Major depression, recurrent (H)     Dizziness and giddiness     Motion sickness     GERD (gastroesophageal reflux disease)     Idiopathic cardiomyopathy (H)     Sleep apnea     Hyperlipidemia LDL goal <100     Allergic rhinitis     Hypertension goal BP (blood pressure) < 130/80     Pelvic pain in female     Health Care Home     24 hour contact handout given     Class 1 obesity with serious comorbidity and body mass index (BMI) of 32.0 to 32.9 in adult     Elevated PTHrP level     H/O bariatric surgery     Hx of gastric bypass     Hypovitaminosis D     Allergic rhinitis     Attention deficit disorder of adult     Mild persistent asthma     Hand joint pain     Myopia, bilateral     Dumping syndrome     Benign essential hypertension     Adjustment disorder with depressed mood     Decreased hearing of both ears     BPV (benign positional vertigo), unspecified laterality     Advanced directives, counseling/discussion     Vitamin D deficiency     Coronary artery disease involving native coronary artery of native heart without angina pectoris     Acute pain of left knee     Syncope     Posterior vitreous detachment of both eyes     Combined forms of age-related cataract, mild, of both eyes     Glaucoma suspect of both eyes     Sciatica of right side     Hypoglycemia        MEDICAL REVIEW OF SYSTEMS     A " comprehensive review of systems was performed and is negative other than noted in the HPI.     MEDICATIONS     Current Outpatient Medications   Medication Sig Dispense Refill     albuterol (PROAIR HFA/PROVENTIL HFA/VENTOLIN HFA) 108 (90 Base) MCG/ACT inhaler Inhale 2 puffs into the lungs every 4 hours as needed for shortness of breath / dyspnea or wheezing 18 g 0     ARIPiprazole (ABILIFY) 2 MG tablet Take 2 tablets (4 mg) by mouth daily 60 tablet 1     aspirin (ASA) 81 MG EC tablet Take 1 tablet (81 mg) by mouth daily 90 tablet 3     azelastine (ASTELIN) 0.1 % nasal spray Spray 2 sprays into both nostrils 2 times daily as needed for rhinitis 30 mL 3     azelastine (OPTIVAR) 0.05 % ophthalmic solution Apply 1 drop to eye 2 times daily as needed (itchy/watery eyes) 6 mL 3     blood glucose (NO BRAND SPECIFIED) lancing device Device to be used with lancets. 3 each 3     blood glucose (NO BRAND SPECIFIED) test strip Use to test blood sugar 3 times daily 300 strip 3     calcium carbonate-vitamin D (CALTRATE 600+D) 600-400 MG-UNIT CHEW Take 1 chew tab by mouth 2 times daily 180 tablet 3     carvedilol (COREG) 25 MG tablet Take 1 tablet (25 mg) by mouth 2 times daily (with meals) 180 tablet 3     cetirizine (ZYRTEC) 10 MG tablet Take 1 tablet (10 mg) by mouth daily 30 tablet 3     Continuous Blood Gluc Sensor (FREESTYLE JOHN 14 DAY SENSOR) [facility] 1 each every 14 days Use 1 Sensor every 14 days. Use to read blood sugars per 's instructions. 2 each 5     Cyanocobalamin (VITAMIN B-12 SL) Place under the tongue every other day        estrogen conj-medroxyPROGESTERone (PREMPRO) 0.45-1.5 MG tablet Take 1 tablet by mouth daily 90 tablet 3     ezetimibe (ZETIA) 10 MG tablet Take 1 tablet (10 mg) by mouth daily 90 tablet 3     FEROSUL 325 (65 Fe) MG tablet TAKE 1 TABLET(325 MG) BY MOUTH TWICE DAILY 180 tablet 0     hydrALAZINE (APRESOLINE) 10 MG tablet Take 1 tablet (10 mg) by mouth 3 times daily 270 tablet 3      "Ibuprofen-diphenhydrAMINE Cit (IBUPROFEN PM PO) Take by mouth At Bedtime       isosorbide mononitrate (IMDUR) 30 MG 24 hr tablet Take 1 tablet (30 mg) by mouth daily 90 tablet 3     lisinopril (ZESTRIL) 5 MG tablet TAKE 1 TABLET(5 MG) BY MOUTH DAILY 90 tablet 3     meclizine (ANTIVERT) 25 MG tablet Take 1 tablet (25 mg) by mouth 3 times daily as needed 30 tablet 1     montelukast (SINGULAIR) 10 MG tablet Take 1 tablet (10 mg) by mouth At Bedtime 90 tablet 3     Multiple Vitamin (MULTI-VITAMIN) per tablet Take 1 tablet by mouth daily. 100 tablet 3     ondansetron (ZOFRAN-ODT) 4 MG ODT tab Take 1 tablet (4 mg) by mouth every 8 hours as needed for nausea 15 tablet 1     simvastatin (ZOCOR) 40 MG tablet Take 1 tablet (40 mg) by mouth At Bedtime 90 tablet 3     venlafaxine (EFFEXOR) 75 MG tablet Take 1 tablet (75 mg) by mouth 3 times daily 90 tablet 1      VITALS   LMP 06/01/2012     MENTAL STATUS EXAM     Alertness: alert  and oriented  Appearance: well groomed  Behavior/Demeanor: cooperative, pleasant and calm, with good  eye contact   Speech: regular rate and rhythm  Language: intact  Psychomotor: normal or unremarkable  Mood: \"up and down... it's okay\"  Affect: full range; congruent to: mood- yes, content- yes  Thought Process/Associations: unremarkable  Thought Content:  Reports none;  Denies suicidal & violent ideation and delusions  Perception:  Reports none;  Denies auditory hallucinations and visual hallucinations  Insight: good - acknowledges and describes mental health symptoms  Judgment: good  Cognition: not formally assessed, adequate for brief interview  Gait and Station: N/A (telehealth)     LABS and DATA     PHQ 1/19/2023 2/24/2023 3/8/2023   PHQ-9 Total Score 7 6 6   Q9: Thoughts of better off dead/self-harm past 2 weeks Not at all Not at all Not at all       Recent Labs   Lab Test 02/06/23  0735 09/12/22  0712   CR 0.74 0.74   GFRESTIMATED 90 90     Recent Labs   Lab Test 01/28/22  0726 01/07/20  0740 "   AST 12 12   ALT 36 24   ALKPHOS 78 83         ECG on 1/7/20 QTc = 447 ms @ 54 bpm. Sinus bradycardia     PSYCHOTROPIC DRUG INTERACTIONS                                                       PSYCHCLINICDDI   Per [employer]    Concurrent use of ARIPIPRAZOLE and QT INTERVAL PROLONGING DRUGS may result in increased risk of QT interval prolongation.    MANAGEMENT:  Monitoring for adverse effects and periodic EKGs     RISK STATEMENT for SAFETY     Patricia did not appear to be an imminent safety risk to self or others.     TREATMENT RISK STATEMENT: The risks, benefits, alternatives and potential adverse effects have been discussed and are understood by the pt. The pt understands the risks of using street drugs or alcohol. There are no medical contraindications, the pt agrees to treatment with the ability to do so. The pt knows to call the clinic for any problems or to access emergency care if needed.  Medical and substance use concerns are documented above.  Psychotropic drug interaction check was done, including changes made today.     PROVIDER: Allyson Benítez MD    Patient not staffed in clinic.  Note will be reviewed and signed by supervisor Dr. Javier.      Level of Medical Decision Making:   - At least 1 chronic problem that is not stable  - Engaged in prescription drug management during visit (discussed any medication benefits, side effects, alternatives, etc.)

## 2023-03-10 ENCOUNTER — LAB (OUTPATIENT)
Dept: LAB | Facility: CLINIC | Age: 63
End: 2023-03-10
Payer: COMMERCIAL

## 2023-03-10 DIAGNOSIS — Z98.84 HX OF GASTRIC BYPASS: ICD-10-CM

## 2023-03-10 LAB
FERRITIN SERPL-MCNC: 166 NG/ML (ref 8–252)
HBA1C MFR BLD: 5.5 % (ref 0–5.6)
PTH-INTACT SERPL-MCNC: 114 PG/ML (ref 15–65)
VIT B12 SERPL-MCNC: 1511 PG/ML (ref 232–1245)

## 2023-03-10 PROCEDURE — 99000 SPECIMEN HANDLING OFFICE-LAB: CPT

## 2023-03-10 PROCEDURE — 82306 VITAMIN D 25 HYDROXY: CPT

## 2023-03-10 PROCEDURE — 36415 COLL VENOUS BLD VENIPUNCTURE: CPT

## 2023-03-10 PROCEDURE — 84590 ASSAY OF VITAMIN A: CPT | Mod: 90

## 2023-03-10 PROCEDURE — 82728 ASSAY OF FERRITIN: CPT

## 2023-03-10 PROCEDURE — 83970 ASSAY OF PARATHORMONE: CPT

## 2023-03-10 PROCEDURE — 83036 HEMOGLOBIN GLYCOSYLATED A1C: CPT

## 2023-03-10 PROCEDURE — 82607 VITAMIN B-12: CPT

## 2023-03-12 LAB — DEPRECATED CALCIDIOL+CALCIFEROL SERPL-MC: 43 UG/L (ref 20–75)

## 2023-03-13 ENCOUNTER — TELEPHONE (OUTPATIENT)
Dept: PSYCHIATRY | Facility: CLINIC | Age: 63
End: 2023-03-13
Payer: COMMERCIAL

## 2023-03-13 LAB
ANNOTATION COMMENT IMP: NORMAL
RETINYL PALMITATE SERPL-MCNC: 0.04 MG/L
VIT A SERPL-MCNC: 0.53 MG/L

## 2023-03-13 NOTE — TELEPHONE ENCOUNTER
Prior Authorization Retail Medication Request     Medication/Dose: ARIPiprazole (ABILIFY) 2 MG tablet - Renewal  ICD code (if different than what is on RX):  Moderate episode of recurrent major depressive disorder (H) [F33.1]  - Primary   Previously Tried and Failed:  Wellbutrin, Zoloft, Lexapro   Rationale:  Patient has a long standing history of depressive disorder. She has historic diagnoses of MDD and ADHD. She has concerns of depressed mood, anhedonia, distractibility, amotivation, memory difficulties, word-finding struggles, and anxiety. Patient is at a high risk for decompensation and possible hospitalization if she is not able to continue this trial of medication.     Insurance Name:  [employer]  Insurance ID:  [id number]

## 2023-03-16 ENCOUNTER — OFFICE VISIT (OUTPATIENT)
Dept: FAMILY MEDICINE | Facility: CLINIC | Age: 63
End: 2023-03-16
Payer: COMMERCIAL

## 2023-03-16 VITALS
HEIGHT: 64 IN | DIASTOLIC BLOOD PRESSURE: 68 MMHG | OXYGEN SATURATION: 100 % | RESPIRATION RATE: 14 BRPM | HEART RATE: 65 BPM | SYSTOLIC BLOOD PRESSURE: 124 MMHG | BODY MASS INDEX: 33.63 KG/M2 | WEIGHT: 197 LBS | TEMPERATURE: 97.5 F

## 2023-03-16 DIAGNOSIS — I73.00 RAYNAUD'S DISEASE WITHOUT GANGRENE: Primary | ICD-10-CM

## 2023-03-16 PROCEDURE — 99214 OFFICE O/P EST MOD 30 MIN: CPT | Performed by: STUDENT IN AN ORGANIZED HEALTH CARE EDUCATION/TRAINING PROGRAM

## 2023-03-16 RX ORDER — NIFEDIPINE 30 MG
30 TABLET, EXTENDED RELEASE ORAL DAILY
Qty: 60 TABLET | Refills: 0 | Status: SHIPPED | OUTPATIENT
Start: 2023-03-16 | End: 2023-05-18

## 2023-03-16 ASSESSMENT — ASTHMA QUESTIONNAIRES
QUESTION_5 LAST FOUR WEEKS HOW WOULD YOU RATE YOUR ASTHMA CONTROL: WELL CONTROLLED
QUESTION_4 LAST FOUR WEEKS HOW OFTEN HAVE YOU USED YOUR RESCUE INHALER OR NEBULIZER MEDICATION (SUCH AS ALBUTEROL): ONCE A WEEK OR LESS
QUESTION_2 LAST FOUR WEEKS HOW OFTEN HAVE YOU HAD SHORTNESS OF BREATH: ONCE OR TWICE A WEEK
QUESTION_1 LAST FOUR WEEKS HOW MUCH OF THE TIME DID YOUR ASTHMA KEEP YOU FROM GETTING AS MUCH DONE AT WORK, SCHOOL OR AT HOME: NONE OF THE TIME
ACT_TOTALSCORE: 22
QUESTION_3 LAST FOUR WEEKS HOW OFTEN DID YOUR ASTHMA SYMPTOMS (WHEEZING, COUGHING, SHORTNESS OF BREATH, CHEST TIGHTNESS OR PAIN) WAKE YOU UP AT NIGHT OR EARLIER THAN USUAL IN THE MORNING: NOT AT ALL
ACT_TOTALSCORE: 22

## 2023-03-16 NOTE — PROGRESS NOTES
"  Assessment & Plan     (I73.00) Raynaud's disease without gangrene  (primary encounter diagnosis)  Comment: Acute onset. Concerning lack of oxygen and blood supply to fingertips with correlate to concerns of raynaud's. At this time will provide patient with referral for vascular surgery and will also start a vasodilator to aid in increasing blood supply to fingers with use of Nifedipine. Pt encouraged to take blood pressure and hold medication if hypotensive  Plan: Vascular Surgery Referral, NIFEdipine ER        (ADALAT CC) 30 MG 24 hr tablet        Pending pickup of medications from pharmacy. Pt to follow-up in 1 month                MED REC REQUIRED  Post Medication Reconciliation Status:       BMI:   Estimated body mass index is 34.35 kg/m  as calculated from the following:    Height as of this encounter: 1.613 m (5' 3.5\").    Weight as of this encounter: 89.4 kg (197 lb).   Weight management plan: Patient was referred to their PCP to discuss a diet and exercise plan.    Orders Placed This Encounter   Medications     NIFEdipine ER (ADALAT CC) 30 MG 24 hr tablet     Sig: Take 1 tablet (30 mg) by mouth daily     Dispense:  60 tablet     Refill:  0          - Continue other medications without change    No follow-ups on file.    LAKESHA VILLASENOR MD  [facility] ANDRA Gomez is a 62 year old, presenting for the following health issues:  urgent care f/u       [facility]     ED/UC Followup:    Facility:   [facility]   Date of visit: 3/9/23  Reason for visit: Raynaud's disease without gangrene  Current Status: Numbness and tingling in fingers.     Patient reports that she has had a history of numbness and tingling for some time. She reports last Wednesday she began to notice her fingers turning black and purple in her fingertips. She reports being seen in the urgent care on Thursday. She reports she was diagnosed with Raynaud's disease at that time  She reports there is no pain however " "there tingling and numbness in there fingers.           Review of Systems   CONSTITUTIONAL: NEGATIVE for fever, chills, change in weight  INTEGUMENTARY/SKIN: fingers- cold, purple on fingertips   ENT/MOUTH: NEGATIVE for ear, mouth and throat problems  RESP: NEGATIVE for significant cough or SOB  CV: NEGATIVE for chest pain, palpitations or peripheral edema      Objective    /68   Pulse 65   Temp 97.5  F (36.4  C) (Oral)   Resp 14   Ht 1.613 m (5' 3.5\")   Wt 89.4 kg (197 lb)   LMP 06/01/2012   SpO2 100%   BMI 34.35 kg/m    Body mass index is 34.35 kg/m .  Physical Exam   GENERAL: healthy, alert and no distress  EYES: Eyes grossly normal to inspection, PERRL and conjunctivae and sclerae normal  SKIN: b/l fingertips of both hands with notable dusky to blue appearance with cold fingers tips                    "

## 2023-03-17 ENCOUNTER — VIRTUAL VISIT (OUTPATIENT)
Dept: PSYCHIATRY | Facility: CLINIC | Age: 63
End: 2023-03-17
Attending: PSYCHIATRY & NEUROLOGY
Payer: COMMERCIAL

## 2023-03-17 ENCOUNTER — DOCUMENTATION ONLY (OUTPATIENT)
Dept: VASCULAR SURGERY | Facility: CLINIC | Age: 63
End: 2023-03-17
Payer: COMMERCIAL

## 2023-03-17 DIAGNOSIS — F33.1 MODERATE EPISODE OF RECURRENT MAJOR DEPRESSIVE DISORDER (H): Primary | ICD-10-CM

## 2023-03-17 PROCEDURE — 90834 PSYTX W PT 45 MINUTES: CPT | Mod: VID | Performed by: STUDENT IN AN ORGANIZED HEALTH CARE EDUCATION/TRAINING PROGRAM

## 2023-03-17 NOTE — PROGRESS NOTES
Vascular Referral Intake    Referred by: Miladis Baca MD  for Raynaud's disease without gangrene [I73.00]    Specialty: Vascular Medicine    Specific Provider if Necessary:  Vascular Medicine [facility]    Visit Type: Vascular Medicine    Time Frame: Next Available    Testing/Imaging Needed Before Consult: Per  provider    Appt Note: (to be pasted into appt comments, also add where additional information is located, ie, outside images pushed to PACS, in Epic, [facility], etc)  Consult.  Raynaud's. Discoloration, tingling, numbness in fingers.  Miladis Baca MD referring.

## 2023-03-17 NOTE — PROGRESS NOTES
Video- Visit Details  Type of service:  video visit for mental health treatment.  Time of service:    Date:  03/17/2023    Video Start Time:  10:21 AM        Video End Time:  11:07    Reason for video visit: COVID-19  Originating Site (patient location):  Patient's home  Distant Site (provider location):  Remote location  Mode of Communication:  Video Conference via [facility]    OUTPATIENT PSYCHOTHERAPY PROGRESS NOTE    Client Name: Patricia Perez   YOB: 1960 (62 year old)   Date of Service:  March 17, 2023  Time of Service: 10:21 AM to 11:07 AM (46 minutes)  Service Type(s):4105369 psychotherapy (38-52 min. with patient and/or family) / Individual psychotherapy session  Patient MRN: [id number]  Psychiatrist: Kay Tirado MD      Individuals Present:   Patient    Session # with this psychiatrist: 6     Session content:  This supportive psychotherapy session addressed issues related to goals of therapy and current psychosocial stressors. Psychotherapy techniques or interventions utilized throughout the session include: Establishing rapport, active listening, date gathering, reassurance, normalizing, encouragement, re-framing, advice and teaching.     - Going a little better  - Hard to get motivation going, looking at screen  - Has not made a list, afraid, maybe too big, yes used to, hasn't in last year - thinks needs to  - Feeling disorganized   - Why does she think? Not sure, maybe fear, she tells herself maybe its from being lazy - that's what she hears   - Told that she was lazy since kid, was told she was lazy, overweight all life, ppl think if you are overweight you are lazy, mom and father and grandmother would say that  - Havent had a lot of the lack of motvation in other jobs, in [place] just didn't have a lot to do to accomplish  - No one ever said she was lazy at work, always looked at as good in field, overacheived  - Homework to think of 2-3 ways that she is special     - Some thoughts about  exercising, not as much as used to, excersing more now, feels it at night   - being hard on self - not sure how to stop that - positive self talk , not good at giving self kudos, one more thing always should be doing - greatful that I have a  who loves me but I dont know why   - List of why special, great, amazing     - Usually hear from people when doing something wrong, you don't hear the kudos a lot, she tries to do that with people, thinks its hard for peole to say good things to people in their face  - Did well all those year at work until mistake around covid, only time heard from job about performance   -  is one to give positive feedback right now   - Feedback from a few good friends     - Thinks she is noticing it more or craving it more, not sure if anything doing or receving is any different, in a job that most people don't quite understand, would get good feedback when doing presentations, not doing as much after pandemic and boss doing more presentations now  - why boss doing more of the presentations now? She likes doing them and she is in charge - she misses that part - the lady who came in - 3 years now  - Misses being the person people rely on and ppl come to, miss connecting with people - dont miss being in charge   - Ego took a big hit   - Seems more to the side and hidden, thinks ppl are wondering what she did, self concious about that, logically she knows its not true   - Meetings every morning at 9am, she started that at start of pandemic before Polly came in, does not think Polly would want to continue them - interrupts her work     - Still hasn't heard back, supervisor out a week, feeling it will be no since last conversation she was saying (I don't know etc)  - Has somewhat started putting it together  - Meeting with really good friend who is a consultant, she had really good ideas     - They have a N retention schedule which is old and needs to be updated, PDF of records and  "how long to keep them, some have changed names, some info so old ppl using it, some in narrative form and narratives are off, not easy to find things in it, wants to put it in a database so people can find what they need, update it, make it readable, so people can tell its about digital and electronic records - needs to be taken apart and put back together - share new resource and train people   - Easier for people to find things   - Hundreds of types will need updates - consolidate   - Her idea    - Sees the snf coming and wants this project to be seen - wants to leave it in good shape   - Suspects they think it's a good idea, not sure if they realize the importance - they wont be using it daily   - Ppl have told her they would appreciate it this would be done   - Office of general counseling doesn't realize all the background work     Behavioral observations/mental status:     Appearance:  casually dressed  Muscle Strength and Tone: normal  Gait and Station: N/A: Telehealth  Behavior (Psychomotor):  no evidence of tardive dyskinesia, dystonia, or tics  Eye Contact:  good  Speech:  clear, coherent and normal prosody  Mood:  \"Going a little better\"  Affect:  appropriate and in normal range  Attitude:  cooperative  Thought Process:  logical, linear and goal oriented  Thought Content:  no evidence of suicidal ideation or homicidal ideation, no evidence of psychotic thought, no auditory hallucinations present and no visual hallucinations present  Associations:  no loose associations  Insight:  good  Judgment:  intact  Oriented to:  time, person, and place  Attention Span and Concentration:  intact  Recent and Remote Memory:  intact  Language: Fluent in English with appropriate syntax and vocabulary.  Fund of Knowledge: appropriate         Assessment   Patricia Perez is a 62 year old female previously diagnosed with Major Depressive Disorder who presents to psychotherapy clinic on 03/17/2023. Patient is " currently being treated for Major Depressive Disorder with weekly supportive therapy sessions with this psychiatrist. The patient's progress is great, based upon ability and willingness to discuss current and past conflicts and working at how this has impacted her through life and currently. Today we discussed ongoing work project, starting the project and what could possibly get in the way.     We will continue to work on skills to address this patient's treatment plan.     Suicide risk assessment: Low  Risk factors: Age, mental health condition, and recent life stressors  Protective risk factors: Family, community support, engagement in treatment, no current SI, no previous suicide attempts     Patient is at minimal risk for suicide given history, risk factors, and protective factors. The person currently has no suicidal thoughts or plan.      Diagnoses treated:  Encounter Diagnosis   Name Primary?     Moderate episode of recurrent major depressive disorder (H) Yes     Plan:   - Continue weekly individual psychodynamic psychotherapy for the above diagnoses.   - Next therapy appointment has been scheduled for 3/24/2023 to continue work on treatment goals.     Treatment Plan         SYMPTOMS; PROBLEMS    MEASURABLE GOALS;    FUNCTIONAL IMPROVEMENT INTERVENTIONS;   GAINS MADE DISCHARGE CRITERIA   Wants to get her leo back    Glass half full vs empty, seeing the good in everybody and things Supportive / psychodynamic Symptom resolution   Improve focus and concentration    Able to start and complete tasks as she previous did  Supportive / psychodynamic Symptom resolution   Motivation and drive    Resume her fun activities and hobbies Supportive / psychodynamic Symptom resolution      Date of most recent treatment plan: 1/27/2023  Date next treatment plan due: 3 months    CRISIS NUMBERS:   Provided routinely in AVS.     Treatment Risk Statement:  The patient understands the risks, benefits and alternatives. Agrees to  treatment with the capacity to do so and agrees to call clinic for any problems. The patient understands to call 911 or go to the nearest ED if life threatening or urgent symptoms occur.      PSYCHIATRIST:  Kay Tirado MD     Patient will be reviewed by supervisors Dr. Britney Aaron and Dr. Belinda Ayoub

## 2023-03-20 ENCOUNTER — TELEPHONE (OUTPATIENT)
Dept: OTHER | Facility: CLINIC | Age: 63
End: 2023-03-20
Payer: COMMERCIAL

## 2023-03-20 NOTE — PROGRESS NOTES
See tele-encounter from today.     Edna LUNDBERG, RN    [facility]  Office: [phone]  Fax: [phone]

## 2023-03-20 NOTE — TELEPHONE ENCOUNTER
Pt referred to [facility] by Miladis Baca MD for Raynaud's disease of bilateral hands.     Pt needs to be scheduled for new pt in clinic consult with vascular medicine (any provider).  Will route to scheduling to coordinate an appointment at next available.    Appt note: new pt ref by Miladis Baca MD for Raynaud's disease of bilateral hands.     TAMIKA Granger, RN  [facility] [place]

## 2023-03-24 ENCOUNTER — VIRTUAL VISIT (OUTPATIENT)
Dept: PSYCHIATRY | Facility: CLINIC | Age: 63
End: 2023-03-24
Attending: PSYCHIATRY & NEUROLOGY
Payer: COMMERCIAL

## 2023-03-24 DIAGNOSIS — F33.1 MODERATE EPISODE OF RECURRENT MAJOR DEPRESSIVE DISORDER (H): Primary | ICD-10-CM

## 2023-03-24 PROCEDURE — 90834 PSYTX W PT 45 MINUTES: CPT | Mod: VID | Performed by: STUDENT IN AN ORGANIZED HEALTH CARE EDUCATION/TRAINING PROGRAM

## 2023-03-24 NOTE — PROGRESS NOTES
"Video- Visit Details  Type of service:  video visit for mental health treatment.  Time of service:    Date:  03/24/2023    Video Start Time:  10:10 AM        Video End Time:  10:58 AM    Reason for video visit: COVID-19  Originating Site (patient location):  Patient's home  Distant Site (provider location):  Remote location  Mode of Communication:  Video Conference via [facility]    OUTPATIENT PSYCHOTHERAPY PROGRESS NOTE    Client Name: Patricia Perez   YOB: 1960 (62 year old)   Date of Service:  March 24, 2023  Time of Service: 10:10 AM to 10:58 AM (48 minutes)  Service Type(s):7059963 psychotherapy (38-52 min. with patient and/or family) / Individual psychotherapy session  Patient MRN: [id number]  Psychiatrist: Kay Tirado MD      Individuals Present:   Patient    Session # with this psychiatrist: 7     Session content:  This supportive psychotherapy session addressed issues related to goals of therapy and current psychosocial stressors. Psychotherapy techniques or interventions utilized throughout the session include: Establishing rapport, active listening, date gathering, reassurance, normalizing, encouragement, re-framing, advice and teaching.     \"Good\"    Homework   - Havent really written it down, thinking about it  - Really do pay attention to people around her, make them feel better, listening to their needs  - Good at cards and gifts - good gift giver - just because gifts   - Good listener, good friend    - What did it feel like doing this homework? Felt good, if worked on it hard could have come up with a few more   - What got in the way? Frankly forget - busy at work   - What reactions and feelings came up? Don't think so - made her feel good   - Challenges? Doesn't think that a lot of people appreciate that sometimes, at work are appreciative - but not from everyone at work   - High expectations of others   - Expect too much out of people     - Has high expectations of her self   - " "Something she picked up growing up  - Always that feeling that she has to pay people to be her friends  - Voice in back of mind, in [place] mom would send flowers to her friends thanking them for being her friend - friends told her they want to be her friend regardless   - Mom made her feel pressured to maintain friends   - Mom didn't have a lot of friends, a lot of kids     -  from [place], 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 Wasn't surprised, over a chat message     - Not wanting to do something - doesn't have the focus to do   - Havent been able to do lists - didn't have right paper for list   - Knows what she should be doing   - Food? Morning toast with peanut butter + banana, can't eat a lot of surgery due to weight loss surgery, lunch is usually left overs from dinner or soup, dinner meat and potatoes + vegetable, trying to do more salads - describes self as nervous eater - healthy alternative   - List? Scheduling? - thinks scheduling would help   - Some ways feeling overwhelmed    - Not having as much external pressure at work, no deadlines     - How was it not meeting? thought it was good, gave her time to process things, work on stuff, see how things are going   - Still not good at focusing, would like to get tools to bring mind back when mind wanders   - Sivakumar send update in 2 weeks via [employer]      Behavioral observations/mental status:     Appearance:  casually dressed  Muscle Strength and Tone: normal  Gait and Station: N/A: Telehealth  Behavior (Psychomotor):  no evidence of tardive dyskinesia, dystonia, or tics  Eye Contact:  good  Speech:  clear, coherent and normal prosody  Mood:  \"Good\"  Affect:  appropriate and in normal range  Attitude:  " cooperative  Thought Process:  logical, linear and goal oriented  Thought Content:  no evidence of suicidal ideation or homicidal ideation, no evidence of psychotic thought, no auditory hallucinations present and no visual hallucinations present  Associations:  no loose associations  Insight:  good  Judgment:  intact  Oriented to:  time, person, and place  Attention Span and Concentration:  intact  Recent and Remote Memory:  intact  Language: Fluent in English with appropriate syntax and vocabulary.  Fund of Knowledge: appropriate         Assessment   Patricia Perez is a 62 year old female previously diagnosed with Major Depressive Disorder who presents to psychotherapy clinic on 03/24/2023. Patient is currently being treated for Major Depressive Disorder with weekly supportive therapy sessions with this psychiatrist. The patient's progress is great, based upon ability and willingness to discuss current and past conflicts and working at how this has impacted her through life and currently. Today we discussed homework from last week, work and how to improve focus.     We will continue to work on skills to address this patient's treatment plan.     Suicide risk assessment: Low  Risk factors: Age, mental health condition, and recent life stressors  Protective risk factors: Family, community support, engagement in treatment, no current SI, no previous suicide attempts     Patient is at minimal risk for suicide given history, risk factors, and protective factors. The person currently has no suicidal thoughts or plan.      Diagnoses treated:  Encounter Diagnosis   Name Primary?     Moderate episode of recurrent major depressive disorder (H) Yes     Plan:   - Continue weekly/biweekly individual psychodynamic psychotherapy for the above diagnoses.   - Next therapy appointment has been scheduled for 4/21/2023 to continue work on treatment goals.     Treatment Plan         SYMPTOMS; PROBLEMS    MEASURABLE GOALS;     FUNCTIONAL IMPROVEMENT INTERVENTIONS;   GAINS MADE DISCHARGE CRITERIA   Wants to get her leo back    Glass half full vs empty, seeing the good in everybody and things Supportive / psychodynamic Symptom resolution   Improve focus and concentration    Able to start and complete tasks as she previous did  Supportive / psychodynamic Symptom resolution   Motivation and drive    Resume her fun activities and hobbies Supportive / psychodynamic Symptom resolution      Date of most recent treatment plan: 1/27/2023  Date next treatment plan due: 3 months    CRISIS NUMBERS:   Provided routinely in AVS.     Treatment Risk Statement:  The patient understands the risks, benefits and alternatives. Agrees to treatment with the capacity to do so and agrees to call clinic for any problems. The patient understands to call 911 or go to the nearest ED if life threatening or urgent symptoms occur.      PSYCHIATRIST:  Kay Tirado MD     Patient will be reviewed by supervisors Dr. Britney Aaron and Dr. Belinda Ayoub

## 2023-03-28 ENCOUNTER — OFFICE VISIT (OUTPATIENT)
Dept: OTHER | Facility: CLINIC | Age: 63
End: 2023-03-28
Attending: PHYSICIAN ASSISTANT
Payer: COMMERCIAL

## 2023-03-28 VITALS
HEIGHT: 64 IN | HEART RATE: 73 BPM | BODY MASS INDEX: 32.17 KG/M2 | OXYGEN SATURATION: 99 % | WEIGHT: 188.4 LBS | SYSTOLIC BLOOD PRESSURE: 135 MMHG | DIASTOLIC BLOOD PRESSURE: 79 MMHG

## 2023-03-28 DIAGNOSIS — I73.00 RAYNAUD'S DISEASE WITHOUT GANGRENE: ICD-10-CM

## 2023-03-28 PROCEDURE — G0463 HOSPITAL OUTPT CLINIC VISIT: HCPCS

## 2023-03-28 PROCEDURE — G0463 HOSPITAL OUTPT CLINIC VISIT: HCPCS | Performed by: PHYSICIAN ASSISTANT

## 2023-03-28 PROCEDURE — 99204 OFFICE O/P NEW MOD 45 MIN: CPT | Performed by: PHYSICIAN ASSISTANT

## 2023-03-28 NOTE — PROGRESS NOTES
"Patient is here to discuss consult    /75 (BP Location: Right arm, Patient Position: Chair, Cuff Size: Adult Regular)   Pulse 74   Ht 5' 3.5\" (1.613 m)   Wt 188 lb 6.4 oz (85.5 kg)   LMP 06/01/2012   SpO2 99%   BMI 32.85 kg/m      Questions patient would like addressed today are: N/A.    Refills are needed: No    Has homecare services and agency name:  Phyllis FERNANDES"

## 2023-03-28 NOTE — PROGRESS NOTES
[facility] INITIAL VASCULAR MEDICINE CONSULT      PRIMARY HEALTH CARE PROVIDER:  Funmilayo Grubbs      REFERRING HEALTH CARE PROVIDER;  Miladis Baca      REASON FOR CONSULT:  Raynauds      HPI: Patricia Preez is a 62 year old non-smoking female  at the [facility] who for several years has noticed some numbness and white fingertips, mainly in the right hand, occasionally in the left. More recently, she noted that her middle right finger was swollen and bluish in discoloration. This did result in some stiffness and pain and eventually subsided. She has noticed on 2-3 more occassions this happening briefly, but not as bad. She was seen in Urgent Care when this happened and was diagnosed with Raynaud's. She was told to follow-up with PCP. She saw her PCP on 3/16/23 and was started on Nifedipine. Since starting this, she feels her hand is feeling better.     She has no prior history of color changes to her hands/feet. She has no personal or family history of autoimmune disease. She has no history of atrial fibrillation with an echocardiogram and heart monitor in 9/2022 being negative for atrial fibrillation. She drinks 2 cups of coffee daily.       PAST MEDICAL HISTORY  Past Medical History:   Diagnosis Date     Anxiety      Arthritis      ASCUS of cervix with negative high risk HPV 01/23/2006     Asthma      Bursitis of shoulder 03/04/2010     Chronic rhinitis 03/25/2009     Coronary artery disease      Depression      Dyspnea on exertion      GERD (gastroesophageal reflux disease) 10/14/2008     Hypertension      Idiopathic cardiomyopathy (H) 01/08/2009     Indigestion      Itchy eyes      Left leg pain 06/16/2011     Motion sickness 02/27/2008     Nasal congestion      Pneumonia      Problems related to lack of adequate sleep      Ringing in ears      Sleep apnea 03/25/2009     Sneezing        CURRENT MEDICATIONS  albuterol (PROAIR HFA/PROVENTIL  HFA/VENTOLIN HFA) 108 (90 Base) MCG/ACT inhaler, Inhale 2 puffs into the lungs every 4 hours as needed for shortness of breath / dyspnea or wheezing  ARIPiprazole (ABILIFY) 2 MG tablet, Take 2 tablets (4 mg) by mouth daily  aspirin (ASA) 81 MG EC tablet, Take 1 tablet (81 mg) by mouth daily  azelastine (ASTELIN) 0.1 % nasal spray, Spray 2 sprays into both nostrils 2 times daily as needed for rhinitis  azelastine (OPTIVAR) 0.05 % ophthalmic solution, Apply 1 drop to eye 2 times daily as needed (itchy/watery eyes)  blood glucose (NO BRAND SPECIFIED) lancing device, Device to be used with lancets.  blood glucose (NO BRAND SPECIFIED) test strip, Use to test blood sugar 3 times daily  calcium carbonate-vitamin D (CALTRATE 600+D) 600-400 MG-UNIT CHEW, Take 1 chew tab by mouth 2 times daily  carvedilol (COREG) 25 MG tablet, Take 1 tablet (25 mg) by mouth 2 times daily (with meals)  cetirizine (ZYRTEC) 10 MG tablet, Take 1 tablet (10 mg) by mouth daily  Continuous Blood Gluc Sensor (Lishang.comSTYLE JOHN 14 DAY SENSOR) MIS, 1 each every 14 days Use 1 Sensor every 14 days. Use to read blood sugars per 's instructions.  Cyanocobalamin (VITAMIN B-12 SL), Place under the tongue every other day   estrogen conj-medroxyPROGESTERone (PREMPRO) 0.45-1.5 MG tablet, Take 1 tablet by mouth daily  ezetimibe (ZETIA) 10 MG tablet, Take 1 tablet (10 mg) by mouth daily  FEROSUL 325 (65 Fe) MG tablet, TAKE 1 TABLET(325 MG) BY MOUTH TWICE DAILY  hydrALAZINE (APRESOLINE) 10 MG tablet, Take 1 tablet (10 mg) by mouth 3 times daily  Ibuprofen-diphenhydrAMINE Cit (IBUPROFEN PM PO), Take by mouth At Bedtime  isosorbide mononitrate (IMDUR) 30 MG 24 hr tablet, Take 1 tablet (30 mg) by mouth daily  lisinopril (ZESTRIL) 5 MG tablet, TAKE 1 TABLET(5 MG) BY MOUTH DAILY  meclizine (ANTIVERT) 25 MG tablet, Take 1 tablet (25 mg) by mouth 3 times daily as needed  montelukast (SINGULAIR) 10 MG tablet, Take 1 tablet (10 mg) by mouth At Bedtime  Multiple  Vitamin (MULTI-VITAMIN) per tablet, Take 1 tablet by mouth daily.  NIFEdipine ER (ADALAT CC) 30 MG 24 hr tablet, Take 1 tablet (30 mg) by mouth daily  ondansetron (ZOFRAN-ODT) 4 MG ODT tab, Take 1 tablet (4 mg) by mouth every 8 hours as needed for nausea  simvastatin (ZOCOR) 40 MG tablet, Take 1 tablet (40 mg) by mouth At Bedtime  venlafaxine (EFFEXOR) 75 MG tablet, Take 1 tablet (75 mg) by mouth 3 times daily    No current facility-administered medications on file prior to visit.      PAST SURGICAL HISTORY:  Past Surgical History:   Procedure Laterality Date     BARIATRIC SURGERY       COLONOSCOPY N/A 11/11/2021    Procedure: COLONOSCOPY, WITH POLYPECTOMY;  Surgeon: Stephanie Meneses MD;  Location: [facility] OR     DIABETES RESOURCES  As Child    Tonsillectomy     ENT SURGERY       ESOPHAGOSCOPY, GASTROSCOPY, DUODENOSCOPY (EGD), COMBINED N/A 11/11/2021    Procedure: ESOPHAGOGASTRODUODENOSCOPY, WITH BIOPSY;  Surgeon: Stephanie Meneses MD;  Location: [facility] OR     HERNIA REPAIR       LAPAROSCOPIC BYPASS GASTRIC  10/16/2012    Procedure: LAPAROSCOPIC BYPASS GASTRIC;  laparoscopic reyna en y gastric bypass;  Surgeon: Nish Bradford MD;  Location: [facility] OR     TONSILLECTOMY       Uretural Sticture  As Child       ALLERGIES     Allergies   Allergen Reactions     Atorvastatin      Leg myalgias       FAMILY HISTORY  Family History   Problem Relation Age of Onset     Gastrointestinal Disease Mother         non cancerous pancreatic tumor     Hypertension Mother      Heart Disease Mother         A fib     Allergies Mother      Hypertension Father      Lipids Father      Alcohol/Drug Father         Abuse     Depression Father      Respiratory Father         COPD     Cardiovascular Father      Allergies Sister         Poison Ivy     Depression Brother      Hypertension Brother      Allergies Brother         Enviromental/ Bees     Breast Cancer Maternal Grandmother      Glaucoma Paternal Grandmother      Glaucoma Paternal Grandfather       Cerebrovascular Disease Other      Macular Degeneration Other      Diabetes No family hx of      Cancer - colorectal No family hx of      Retinal detachment No family hx of      Amblyopia No family hx of      Thyroid Disease No family hx of      Adrenal Disorder No family hx of          SOCIAL HISTORY  Social History     Socioeconomic History     Marital status:      Spouse name: Not on file     Number of children: 0     Years of education: 12+     Highest education level: Not on file   Occupational History     Employer: [employer]   Tobacco Use     Smoking status: Never     Smokeless tobacco: Never   Vaping Use     Vaping Use: Never used   Substance and Sexual Activity     Alcohol use: No     Drug use: No     Sexual activity: Not Currently     Partners: Male   Other Topics Concern     Parent/sibling w/ CABG, MI or angioplasty before 65F 55M? No   Social History Narrative    Dairy/d 2 servings/d.     Caffeine 3 servings/d    Exercise 2 x week    Sunscreen used - Yes    Seatbelts used - Yes    Working smoke/CO detectors in the home - Yes    Guns stored in the home - No    Self Breast Exams - Yes    Self Testicular Exam - NA    Eye Exam up to date - Yes    Dental Exam up to date - No    Pap Smear up to date - Yes    Mammogram up to date - Yes    PSA up to date - NA    Dexa Scan up to date - NA    Flex Sig / Colonoscopy up to date - Yes    Immunizations up to date - Yes    Abuse: Current or Past(Physical, Sexual or Emotional)- No    Do you feel safe in your environment - Yes    LOWELL SMITH LPN.    02/05/2007        May 19, 2021    ENVIRONMENTAL HISTORY: The family [place]. The home is heated with a forced air. They do have central air conditioning. The patient's bedroom is furnished with carpeting in bedroom, allergen mattress cover, and animals sleep in bedroom.  Pets inside the house include 1 cat(s). There is no history of cockroach or mice infestation. There  "is/are 0 smokers in the house.  The house does not have a damp basement, it's a split level.        ROS:   General: No change in weight, sleep or appetite.  Normal energy.  No fever or chills  Eyes: Negative for vision changes or eye problems  ENT: No problems with ears, nose or throat.  No difficulty swallowing.  Resp: No coughing, wheezing or shortness of breath  CV: No chest pains or palpitations  GI: No nausea, vomiting,  heartburn, abdominal pain, diarrhea, constipation or change in bowel habits  : No urinary frequency or dysuria, bladder or kidney problems  Musculoskeletal: No significant muscle or joint pains  Neurologic: No headaches, numbness, tingling, weakness, problems with balance or coordination  Psychiatric: No problems with anxiety, depression or mental health  Heme/immune/allergy: No history of bleeding or clotting problems or anemia.  No allergies or immune system problems  Endocrine: No history of thyroid disease, diabetes or other endocrine disorders  Skin: No rashes,worrisome lesions or skin problems  Vascular:  No claudication, lifestyle limiting or otherwise; no ischemic rest pain; no non-healing ulcers. No weakness, No loss of sensation     EXAM:  /79 (BP Location: Left arm, Patient Position: Chair, Cuff Size: Adult Regular)   Pulse 73   Ht 5' 3.5\" (1.613 m)   Wt 188 lb 6.4 oz (85.5 kg)   LMP 06/01/2012   SpO2 99%   BMI 32.85 kg/m    In general, the patient is a pleasant female in no apparent distress.    HEENT: NC/AT.  PERRLA.  EOMI.  Sclerae white, not injected. Dentition intact.    Heart: RRR. Normal S1, S2 splits physiologically. No murmur, rub, click, or gallop.   Lungs: CTA.  No ronchi, wheezes, rales.   Abdomen: Soft, nontender, nondistended.  Extremities: No clubbing, cyanosis, or edema. No wounds. Fingers are warm and of good color with good capillary refill. Slight swelling in the MCP joints on right hand.        Labs:  LIPID RESULTS:  Lab Results   Component Value " Date    CHOL 187 02/06/2023    CHOL 205 (H) 06/30/2021    HDL 97 02/06/2023     06/30/2021    LDL 75 02/06/2023    LDL 64 06/30/2021    TRIG 74 02/06/2023    TRIG 80 06/30/2021    CHOLHDLRATIO 2.0 06/24/2014       LIVER ENZYME RESULTS:  Lab Results   Component Value Date    AST 12 01/28/2022    AST 12 01/07/2020    ALT 36 01/28/2022    ALT 24 01/07/2020       CBC RESULTS:  Lab Results   Component Value Date    WBC 5.5 02/06/2023    WBC 4.3 12/08/2020    RBC 3.90 02/06/2023    RBC 3.76 (L) 12/08/2020    HGB 12.7 02/06/2023    HGB 10.5 (L) 12/08/2020    HCT 38.6 02/06/2023    HCT 33.7 (L) 12/08/2020    MCV 99 02/06/2023    MCV 90 12/08/2020    MCH 32.6 02/06/2023    MCH 27.9 12/08/2020    MCHC 32.9 02/06/2023    MCHC 31.2 (L) 12/08/2020    RDW 13.7 02/06/2023    RDW 15.0 12/08/2020     02/06/2023     12/08/2020       BMP RESULTS:  Lab Results   Component Value Date     02/06/2023     06/30/2021    POTASSIUM 4.6 02/06/2023    POTASSIUM 4.2 06/30/2021    CHLORIDE 109 02/06/2023    CHLORIDE 109 06/30/2021    CO2 26 02/06/2023    CO2 25 06/30/2021    ANIONGAP 4 02/06/2023    ANIONGAP 4 06/30/2021    GLC 93 02/06/2023    GLC 91 06/30/2021    BUN 15 02/06/2023    BUN 17 06/30/2021    CR 0.74 02/06/2023    CR 0.77 06/30/2021    GFRESTIMATED 90 02/06/2023    GFRESTIMATED 82 06/30/2021    GFRESTBLACK >90 06/30/2021    TREVOR 9.0 02/06/2023    TREVOR 8.7 06/30/2021        A1C RESULTS:  Lab Results   Component Value Date    A1C 5.5 03/10/2023    A1C 5.4 06/29/2015       THYROID RESULTS:  Lab Results   Component Value Date    TSH 1.95 02/06/2023    TSH 1.42 12/08/2020       Procedures:   Echocardiogram with two-dimensional, color and spectral Doppler performed 9/8/2022  ______________________________________________________________________________  Interpretation Summary  Left ventricular function is normal.The ejection fraction is 55-60%.  Abnormal non-specific septal motion is present.  Global right  ventricular function is normal.  No significant valvular abnormalities present.  The inferior vena cava is normal.  No pericardial effusion is present.      Assessment and Plan:   Right middle finger color changes, possible Raynaud's    -She has no prior history of color changes to her digits other than on the very tips some whiteness and numbness for the past 4-5 years on occasion.   -She does not smoke, has no history of autoimmune disease, and has no history of atrial fibrillation.   -Symptoms felt to be improving some with Nifedipine.     Recommendations:   -Continue Nifedipine (started on 3/16/23).   -Obtain arterial duplex of the right upper extremity to further evaluate.   -Will check FAHEEM, rheumatoid factor, ESR, and CRP.   -Follow-up with us once this is all completed.   -She should use good thermal protection.       JOHN Roman-MAKAYLA      45 minutes spent on the date of the encounter doing chart review, history and exam, documentation, and further activities as noted above.

## 2023-03-28 NOTE — PATIENT INSTRUCTIONS
Continue Nifedipine.     2.  Get an ultrasound of the arteries in your arm/hand.     3. Get labs done.     4. Follow-up with us once this is all completed (we will call you to schedule).     5. Call us with any questions or concerns ([phone]).

## 2023-04-01 ENCOUNTER — HEALTH MAINTENANCE LETTER (OUTPATIENT)
Age: 63
End: 2023-04-01

## 2023-04-13 ENCOUNTER — HOSPITAL ENCOUNTER (OUTPATIENT)
Dept: ULTRASOUND IMAGING | Facility: CLINIC | Age: 63
Discharge: HOME | End: 2023-04-13
Attending: PHYSICIAN ASSISTANT | Admitting: PHYSICIAN ASSISTANT
Payer: COMMERCIAL

## 2023-04-13 ENCOUNTER — OFFICE VISIT (OUTPATIENT)
Dept: FAMILY MEDICINE | Facility: CLINIC | Age: 63
End: 2023-04-13
Payer: COMMERCIAL

## 2023-04-13 VITALS
OXYGEN SATURATION: 98 % | SYSTOLIC BLOOD PRESSURE: 104 MMHG | TEMPERATURE: 97.8 F | HEART RATE: 83 BPM | HEIGHT: 64 IN | WEIGHT: 192.2 LBS | BODY MASS INDEX: 32.81 KG/M2 | DIASTOLIC BLOOD PRESSURE: 68 MMHG | RESPIRATION RATE: 14 BRPM

## 2023-04-13 DIAGNOSIS — I73.00 RAYNAUD'S DISEASE WITHOUT GANGRENE: ICD-10-CM

## 2023-04-13 PROCEDURE — 99213 OFFICE O/P EST LOW 20 MIN: CPT | Performed by: STUDENT IN AN ORGANIZED HEALTH CARE EDUCATION/TRAINING PROGRAM

## 2023-04-13 PROCEDURE — 93931 UPPER EXTREMITY STUDY: CPT | Mod: RT

## 2023-04-13 NOTE — PROGRESS NOTES
"  Assessment & Plan     (I73.00) Raynaud's disease without gangrene  Comment: Chronic, improved  Plan: Pt to complete Ultrasound today as scheduled and to follow-up with Vascular surgery for pending labs. Pt to continue with Nifedipine 30 mg daily.  Pt can return PRN                 LAKESHA VILLASENOR MD  [facility] ANDRA Gomez is a 63 year old, presenting for the following health issues:  f/u on Raynaud's disease    History of Present Illness       Reason for visit:  Follow up conchas    She eats 2-3 servings of fruits and vegetables daily.She consumes 0 sweetened beverage(s) daily.She exercises with enough effort to increase her heart rate 10 to 19 minutes per day.  She exercises with enough effort to increase her heart rate 3 or less days per week. She is missing 1 dose(s) of medications per week.     Raynaud's  Patient reports that she has been doing very well overall with use of medication.  She reports seeing a vascular surgeon who did agree with her use of the medication and states that she is having completing a ultrasound later today.  She states that the vascular surgeon did recommend doing a series of autoimmune test to determine the underlying cause of the Raynaud's.  Overall patient states that she is happy that her fingers are no longer discolored and that she has warmth in her fingers throughout the day.    Patient stated she is feeling a lot better since last visit on 3/16/23.She has been taking the medications  prescribed.             Review of Systems   Constitutional, HEENT, cardiovascular, pulmonary, gi and gu systems are negative, except as otherwise noted.      Objective    /68   Pulse 83   Temp 97.8  F (36.6  C) (Oral)   Resp 14   Ht 1.613 m (5' 3.5\")   Wt 87.2 kg (192 lb 3.2 oz)   LMP 06/01/2012   SpO2 98%   BMI 33.51 kg/m    Body mass index is 33.51 kg/m .  Physical Exam   GENERAL: healthy, alert and no distress  EYES: Eyes grossly normal " to inspection, PERRL and conjunctivae and sclerae normal  SKIN: no suspicious lesions or rashes    No results found for any visits on 04/13/23.

## 2023-04-14 ENCOUNTER — LAB (OUTPATIENT)
Dept: LAB | Facility: CLINIC | Age: 63
End: 2023-04-14
Payer: COMMERCIAL

## 2023-04-14 DIAGNOSIS — I73.00 RAYNAUD'S DISEASE WITHOUT GANGRENE: ICD-10-CM

## 2023-04-14 LAB
CRP SERPL-MCNC: <2.9 MG/L (ref 0–8)
ERYTHROCYTE [SEDIMENTATION RATE] IN BLOOD BY WESTERGREN METHOD: 8 MM/HR (ref 0–30)

## 2023-04-14 PROCEDURE — 85652 RBC SED RATE AUTOMATED: CPT

## 2023-04-14 PROCEDURE — 86140 C-REACTIVE PROTEIN: CPT

## 2023-04-14 PROCEDURE — 36415 COLL VENOUS BLD VENIPUNCTURE: CPT

## 2023-04-14 PROCEDURE — 86431 RHEUMATOID FACTOR QUANT: CPT

## 2023-04-14 PROCEDURE — 86038 ANTINUCLEAR ANTIBODIES: CPT

## 2023-04-17 LAB
ANA SER QL IF: NEGATIVE
RHEUMATOID FACT SER NEPH-ACNC: 12 IU/ML

## 2023-04-20 ENCOUNTER — PATIENT OUTREACH (OUTPATIENT)
Dept: CARE COORDINATION | Facility: CLINIC | Age: 63
End: 2023-04-20
Payer: COMMERCIAL

## 2023-04-21 ENCOUNTER — MYC MEDICAL ADVICE (OUTPATIENT)
Dept: PSYCHIATRY | Facility: CLINIC | Age: 63
End: 2023-04-21
Payer: COMMERCIAL

## 2023-04-21 ENCOUNTER — TELEPHONE (OUTPATIENT)
Dept: PSYCHIATRY | Facility: CLINIC | Age: 63
End: 2023-04-21
Payer: COMMERCIAL

## 2023-04-21 NOTE — TELEPHONE ENCOUNTER
Called Patricia at ~10:35 AM regarding therapy appointment scheduled for 10:10 AM that appeared she did not log in for. Patricia states she had just sent a [facility] message that she waited for the visit until 10:30 AM. Discussed that there must have been technical difficulties with [facility] because [facility] did not show that she was logged in on my end. We rescheduled for 5/5 at 10:10 AM.         Kay Tirado MD  Psychiatry PGY-3

## 2023-04-24 ENCOUNTER — VIRTUAL VISIT (OUTPATIENT)
Dept: OTHER | Facility: CLINIC | Age: 63
End: 2023-04-24
Attending: PHYSICIAN ASSISTANT
Payer: COMMERCIAL

## 2023-04-24 DIAGNOSIS — R76.8 ELEVATED RHEUMATOID FACTOR: Primary | ICD-10-CM

## 2023-04-24 DIAGNOSIS — I73.00 RAYNAUD'S DISEASE WITHOUT GANGRENE: ICD-10-CM

## 2023-04-24 PROCEDURE — 99213 OFFICE O/P EST LOW 20 MIN: CPT | Mod: VID | Performed by: PHYSICIAN ASSISTANT

## 2023-04-24 PROCEDURE — G0463 HOSPITAL OUTPT CLINIC VISIT: HCPCS | Mod: GT

## 2023-04-24 NOTE — PROGRESS NOTES
Patricia is a 63 year old who is being evaluated via a billable video visit.      How would you like to obtain your AVS? MyChart  If the video visit is dropped, the invitation should be resent by: Text to cell phone: [phone]  Will anyone else be joining your video visit? No            Objective           Vitals:  No vitals were obtained today due to virtual visit.        Video-Visit Details    Type of service:  Video Visit     Originating Location (pt. Location): Home    Distant Location (provider location):  On-site  Platform used for Video Visit: Ayleen Dennis

## 2023-04-24 NOTE — PROGRESS NOTES
[facility] VASCULAR [facility] CENTER  VASCULAR MEDICINE FOLLOW-UP VISIT      PRIMARY HEALTH CARE PROVIDER:  Funmilayo Grubbs    REASON FOR VISIT:  Follow-up Raynauds      HPI: Patricia Perez is a 62 year old non-smoking female  at the [facility] who for several years has noticed some numbness and white fingertips, mainly in the right hand, occasionally in the left. More recently, she noted that her middle right finger was swollen and bluish in discoloration. This did result in some stiffness and pain and eventually subsided. She has noticed on 2-3 more occassions this happening briefly, but not as bad. She was seen in Urgent Care when this happened and was diagnosed with Raynaud's. She was told to follow-up with PCP. She saw her PCP on 3/16/23 and was started on Nifedipine. Since starting this, she feels her hand is feeling better and has not had further trouble with color changes/numbness.      She has no prior history of color changes to her hands/feet. She has no personal or family history of autoimmune disease. She has no history of atrial fibrillation with an echocardiogram and heart monitor in 9/2022 being negative for atrial fibrillation.   She underwent an arterial duplex of the right upper extremity, which was normal. Labs show a negative FAHEEM, normal ESR and CRP, but a mildly elevated rheumatoid factor. She does complain of bilateral wrist and knuckle joint pain. She drinks 2 cups of coffee daily.          PAST MEDICAL HISTORY  Past Medical History:   Diagnosis Date     Anxiety      Arthritis      ASCUS of cervix with negative high risk HPV 01/23/2006     Asthma      Bursitis of shoulder 03/04/2010     Chronic rhinitis 03/25/2009     Coronary artery disease      Depression      Dyspnea on exertion      GERD (gastroesophageal reflux disease) 10/14/2008     Hypertension      Idiopathic cardiomyopathy (H) 01/08/2009     Indigestion      Itchy eyes      Left leg pain 06/16/2011      Motion sickness 02/27/2008     Nasal congestion      Pneumonia      Problems related to lack of adequate sleep      Ringing in ears      Sleep apnea 03/25/2009     Sneezing        PAST SURGICAL HISTORY:  Past Surgical History:   Procedure Laterality Date     BARIATRIC SURGERY       COLONOSCOPY N/A 11/11/2021    Procedure: COLONOSCOPY, WITH POLYPECTOMY;  Surgeon: Stephanie Meneses MD;  [place] [facility] [place] [place]     DIABETES RESOURCES  As Child    Tonsillectomy     ENT SURGERY       ESOPHAGOSCOPY, GASTROSCOPY, DUODENOSCOPY (EGD), COMBINED N/A 11/11/2021    Procedure: ESOPHAGOGASTRODUODENOSCOPY, WITH BIOPSY;  Surgeon: Stephanie Meneses MD;  Location: [facility] [place]     HERNIA REPAIR       LAPAROSCOPIC BYPASS GASTRIC  10/16/2012    Procedure: LAPAROSCOPIC BYPASS GASTRIC;  laparoscopic reyna en y gastric bypass;  Surgeon: Nish Bradford MD;  Location: [facility] OR     TONSILLECTOMY       Uretural Sticture  As Child         CURRENT MEDICATIONS  albuterol (PROAIR HFA/PROVENTIL HFA/VENTOLIN HFA) 108 (90 Base) MCG/ACT inhaler, Inhale 2 puffs into the lungs every 4 hours as needed for shortness of breath / dyspnea or wheezing  ARIPiprazole (ABILIFY) 2 MG tablet, Take 2 tablets (4 mg) by mouth daily  aspirin (ASA) 81 MG EC tablet, Take 1 tablet (81 mg) by mouth daily  azelastine (ASTELIN) 0.1 % nasal spray, Spray 2 sprays into both nostrils 2 times daily as needed for rhinitis  azelastine (OPTIVAR) 0.05 % ophthalmic solution, Apply 1 drop to eye 2 times daily as needed (itchy/watery eyes)  blood glucose (NO BRAND SPECIFIED) lancing device, Device to be used with lancets.  blood glucose (NO BRAND SPECIFIED) test strip, Use to test blood sugar 3 times daily  calcium carbonate-vitamin D (CALTRATE 600+D) 600-400 MG-UNIT CHEW, Take 1 chew tab by mouth 2 times daily  carvedilol (COREG) 25 MG tablet, Take 1 tablet (25 mg) by mouth 2 times daily (with meals)  cetirizine (ZYRTEC) 10 MG tablet, Take 1 tablet (10 mg) by mouth daily  Continuous Blood Gluc Sensor  (FREESTYLE JOHN 14 DAY SENSOR) [facility], 1 each every 14 days Use 1 Sensor every 14 days. Use to read blood sugars per 's instructions.  Cyanocobalamin (VITAMIN B-12 SL), Place under the tongue every other day   estrogen conj-medroxyPROGESTERone (PREMPRO) 0.45-1.5 MG tablet, Take 1 tablet by mouth daily  ezetimibe (ZETIA) 10 MG tablet, Take 1 tablet (10 mg) by mouth daily  FEROSUL 325 (65 Fe) MG tablet, TAKE 1 TABLET(325 MG) BY MOUTH TWICE DAILY  hydrALAZINE (APRESOLINE) 10 MG tablet, Take 1 tablet (10 mg) by mouth 3 times daily  Ibuprofen-diphenhydrAMINE Cit (IBUPROFEN PM PO), Take by mouth At Bedtime  isosorbide mononitrate (IMDUR) 30 MG 24 hr tablet, Take 1 tablet (30 mg) by mouth daily  lisinopril (ZESTRIL) 5 MG tablet, TAKE 1 TABLET(5 MG) BY MOUTH DAILY  meclizine (ANTIVERT) 25 MG tablet, Take 1 tablet (25 mg) by mouth 3 times daily as needed  montelukast (SINGULAIR) 10 MG tablet, TAKE 1 TABLET(10 MG) BY MOUTH AT BEDTIME  Multiple Vitamin (MULTI-VITAMIN) per tablet, Take 1 tablet by mouth daily.  NIFEdipine ER (ADALAT CC) 30 MG 24 hr tablet, Take 1 tablet (30 mg) by mouth daily  ondansetron (ZOFRAN-ODT) 4 MG ODT tab, Take 1 tablet (4 mg) by mouth every 8 hours as needed for nausea  simvastatin (ZOCOR) 40 MG tablet, Take 1 tablet (40 mg) by mouth At Bedtime  venlafaxine (EFFEXOR) 75 MG tablet, Take 1 tablet (75 mg) by mouth 3 times daily    No current facility-administered medications on file prior to visit.      ALLERGIES     Allergies   Allergen Reactions     Atorvastatin      Leg myalgias       FAMILY HISTORY  Family History   Problem Relation Age of Onset     Gastrointestinal Disease Mother         non cancerous pancreatic tumor     Hypertension Mother      Heart Disease Mother         A fib     Allergies Mother      Hypertension Father      Lipids Father      Alcohol/Drug Father         Abuse     Depression Father      Respiratory Father         COPD     Cardiovascular Father      Allergies Sister          Poison Ivy     Depression Brother      Hypertension Brother      Allergies Brother         Enviromental/ Bees     Breast Cancer Maternal Grandmother      Glaucoma Paternal Grandmother      Glaucoma Paternal Grandfather      Cerebrovascular Disease Other      Macular Degeneration Other      Diabetes No family hx of      Cancer - colorectal No family hx of      Retinal detachment No family hx of      Amblyopia No family hx of      Thyroid Disease No family hx of      Adrenal Disorder No family hx of        SOCIAL HISTORY  Social History     Socioeconomic History     Marital status:      Spouse name: Not on file     Number of children: 0     Years of education: 12+     Highest education level: Not on file   Occupational History     Employer: [employer]   Tobacco Use     Smoking status: Never     Smokeless tobacco: Never   Vaping Use     Vaping status: Never Used   Substance and Sexual Activity     Alcohol use: Yes     Comment: seldom     Drug use: No     Sexual activity: Not Currently     Partners: Male   Other Topics Concern     Parent/sibling w/ CABG, MI or angioplasty before 65F 55M? No   Social History Narrative    Dairy/d 2 servings/d.     Caffeine 3 servings/d    Exercise 2 x week    Sunscreen used - Yes    Seatbelts used - Yes    Working smoke/CO detectors in the home - Yes    Guns stored in the home - No    Self Breast Exams - Yes    Self Testicular Exam - NA    Eye Exam up to date - Yes    Dental Exam up to date - No    Pap Smear up to date - Yes    Mammogram up to date - Yes    PSA up to date - NA    Dexa Scan up to date - NA    Flex Sig / Colonoscopy up to date - Yes    Immunizations up to date - Yes    Abuse: Current or Past(Physical, Sexual or Emotional)- No    Do you feel safe in your environment - Yes    LOWELL SMITH LPN.    02/05/2007        May 19, 2021    ENVIRONMENTAL HISTORY: [place]. The home is heated with a forced air. They  do have central air conditioning. The patient's bedroom is furnished with carpeting in bedroom, allergen mattress cover, and animals sleep in bedroom.  Pets inside the house include 1 cat(s). There is no history of cockroach or mice infestation. There is/are 0 smokers in the house.  The house does not have a damp basement, it's a split level.        ROS:   Complete ROS negative other than what is stated in HPI.     EXAM:  LMP 06/01/2012   In general, the patient is a pleasant female in no apparent distress.    HEENT: NC/AT.  PERVIRI.  EOMI.  Sclerae white, not injected.    Unable to do a full exam as this was a virtual visit.       Labs:  LIPID RESULTS:  Lab Results   Component Value Date    CHOL 187 02/06/2023    CHOL 205 (H) 06/30/2021    HDL 97 02/06/2023     06/30/2021    LDL 75 02/06/2023    LDL 64 06/30/2021    TRIG 74 02/06/2023    TRIG 80 06/30/2021    CHOLHDLRATIO 2.0 06/24/2014       LIVER ENZYME RESULTS:  Lab Results   Component Value Date    AST 12 01/28/2022    AST 12 01/07/2020    ALT 36 01/28/2022    ALT 24 01/07/2020       CBC RESULTS:  Lab Results   Component Value Date    WBC 5.5 02/06/2023    WBC 4.3 12/08/2020    RBC 3.90 02/06/2023    RBC 3.76 (L) 12/08/2020    HGB 12.7 02/06/2023    HGB 10.5 (L) 12/08/2020    HCT 38.6 02/06/2023    HCT 33.7 (L) 12/08/2020    MCV 99 02/06/2023    MCV 90 12/08/2020    MCH 32.6 02/06/2023    MCH 27.9 12/08/2020    MCHC 32.9 02/06/2023    MCHC 31.2 (L) 12/08/2020    RDW 13.7 02/06/2023    RDW 15.0 12/08/2020     02/06/2023     12/08/2020       BMP RESULTS:  Lab Results   Component Value Date     02/06/2023     06/30/2021    POTASSIUM 4.6 02/06/2023    POTASSIUM 4.2 06/30/2021    CHLORIDE 109 02/06/2023    CHLORIDE 109 06/30/2021    CO2 26 02/06/2023    CO2 25 06/30/2021    ANIONGAP 4 02/06/2023    ANIONGAP 4 06/30/2021    GLC 93 02/06/2023    GLC 91 06/30/2021    BUN 15 02/06/2023    BUN 17 06/30/2021    CR 0.74 02/06/2023    CR 0.77  06/30/2021    GFRESTIMATED 90 02/06/2023    GFRESTIMATED 82 06/30/2021    GFRESTBLACK >90 06/30/2021    TREVOR 9.0 02/06/2023    TREVOR 8.7 06/30/2021        A1C RESULTS:  Lab Results   Component Value Date    A1C 5.5 03/10/2023    A1C 5.4 06/29/2015       THYROID RESULTS:  Lab Results   Component Value Date    TSH 1.95 02/06/2023    TSH 1.42 12/08/2020         Procedures:   US UPPER EXTREMITY ARTERIAL DUPLEX RIGHT   4/13/2023 2:19 PM      HISTORY: Right middle finger blue. Raynaud's disease without gangrene.     COMPARISON: None.     FINDINGS: Color Doppler and spectral waveform analysis was performed  throughout the arteries of the right upper extremity. The right  subclavian, axillary, brachial, radial and ulnar arteries are patent  with triphasic waveforms throughout. No evidence of stenosis or  occlusion.                                                                      IMPRESSION: Patent arteries throughout the right upper extremity. No  evidence of stenosis or occlusion. Waveforms are multiphasic  throughout.          Echocardiogram with two-dimensional, color and spectral Doppler performed 9/8/22  ______________________________________________________________________________  Interpretation Summary  Left ventricular function is normal.The ejection fraction is 55-60%.  Abnormal non-specific septal motion is present.  Global right ventricular function is normal.  No significant valvular abnormalities present.  The inferior vena cava is normal.  No pericardial effusion is present.        Assessment and Plan:   Raynaud's    -Episode of right middle finger color changes. Years of whiteness and numbness of all digits in both hands.    -She does not smoke, has no history of autoimmune disease she knows of, and has no history of atrial fibrillation.   -Symptoms have improved with Nifedipine.   -FAHEEM is negative, RF is positive (marginally). She complains of joint pain in her bilateral wrists and knuckles.       Recommendations:   -Continue Nifedipine (started on 3/16/23).   -She should use good thermal protection.   -Will refer to rheumatology given joint pain, raynauds, and marginally elevated rheumatoid factor.   -Follow up with Vascular Medicine PRN.            June Arguelles PA-C      Video Visit Details    Type of Service: Video Visit    Video Start Time: 8:26 am  Video End Time: 8:36 am    Originating Location (patient location): [place]    Distant Location (provider location): [facility]    Mode of Communication:  Video Conference via [employer]        This visit is being conducted as a virtual visit due to the emphasis on mitigation of the COVID-19 virus pandemic. The clinician has decided that the risk of an in-office visit outweighs the benefit for this patient.             25 minutes spent on the date of the encounter doing chart review, history and exam, documentation, and further activities as noted above.

## 2023-05-04 ENCOUNTER — VIRTUAL VISIT (OUTPATIENT)
Dept: ENDOCRINOLOGY | Facility: CLINIC | Age: 63
End: 2023-05-04
Payer: COMMERCIAL

## 2023-05-04 VITALS — HEIGHT: 63 IN | BODY MASS INDEX: 33.49 KG/M2 | WEIGHT: 189 LBS

## 2023-05-04 DIAGNOSIS — E66.811 CLASS 1 OBESITY WITH SERIOUS COMORBIDITY AND BODY MASS INDEX (BMI) OF 33.0 TO 33.9 IN ADULT, UNSPECIFIED OBESITY TYPE: Primary | ICD-10-CM

## 2023-05-04 PROCEDURE — 99212 OFFICE O/P EST SF 10 MIN: CPT | Mod: VID

## 2023-05-04 RX ORDER — NALTREXONE HYDROCHLORIDE 50 MG/1
TABLET, FILM COATED ORAL
Qty: 30 TABLET | Refills: 3 | Status: SHIPPED | OUTPATIENT
Start: 2023-05-04 | End: 2023-12-08

## 2023-05-04 ASSESSMENT — PAIN SCALES - GENERAL: PAINLEVEL: NO PAIN (0)

## 2023-05-04 NOTE — LETTER
"2023       RE: Patricia Perez  [address] [address]     Dear Colleague,    Thank you for referring your patient, Patricia Perez, to the [facility] WEIGHT MANAGEMENT CLINIC [place] at [facility]. Please see a copy of my visit note below.    Patricia Perez is a 63 year old who is being evaluated via a billable video visit.      How would you like to obtain your AVS? MyChart  If the video visit is dropped, the invitation should be resent by: Text to cell phone: [phone]  Will anyone else be joining your video visit? No  If patient encounters technical issues they should call [phone]    During this virtual visit the patient is located in [place], patient verifies this as the location during the entirety of this visit.       Video-Visit Details    Type of service:  Video Visit     Originating Location (pt. Location): Home    Distant Location (provider location):  On-site  Platform used for Video Visit: [facility] Weight Management Note     Patricia Perez  MRN:  [id number]  :  1960  SARA:  2023    Dear Funmilayo Grubbs PA-C,    I had the pleasure of seeing your patient Patricia Perez. She is a 63 year old female who I am continuing to see for treatment of obesity related to:        3/6/2023     8:02 AM   --   I have the following health issues associated with obesity None of the above       Assessment & Plan   Problem List Items Addressed This Visit          Digestive    Class 1 obesity with serious comorbidity and body mass index (BMI) of 33.0 to 33.9 in adult, unspecified obesity type - Primary     S/p RYBP in  with Dr. Bradford. Last seen 3/6/2023 for reestablishing care. At that time had recently lost 7lb with diet and exercise. Wanted to continue with diet and exercise and consider AOMs in the future.     Had a \"tough\" April with some weight regain. Has restarted getting back to " increasing fruits and vegetables and walking daily. Would like to discuss starting an AOM today:   - Phenermine - contraindicated due to history of cardiomyopathy and CAD.   - Topiramate- concern for brain fog   - GLP-1 - can consider in the future. Concern for self injections.   - Naltrexone to be started today. No contraindications. Discussed side effects, risks, and benefits. No history of liver disease. Not taking opioids.          Relevant Medications    naltrexone (DEPADE/REVIA) 50 MG tablet        Start Naltrexone 50mg - take 1/2 tablet daily for 7 days, then increase to 1 tablet daily.   Follow up with Stephanie Morel in 3 months. Will reach out via [employer] in 1 month.       INTERVAL HISTORY:  [facility]  Recently 7lbs due to diet change, continue with diet and exercise  Consider Naltrexone in the future     April was a harder month as it was her birthday and with worse weather.       Recent diet changes: Was doing really well for a while. Harder through April. Is getting back on track. Increasing fruits and vegetables. Has the most cravings and hunger between lunch and breakfast.     Recent exercise/activity changes: Trying to increase back to walking - 3000 steps daily.     Recent stressors: Increase stress due to mother being sick who lives out of safe. This has led to increase baking     Recent sleep changes: Can be hard with cat gets them up at 3am.     No history of liver disease, not taking opioids.     CURRENT WEIGHT:   189 lbs 0 oz    Initial Weight (lbs): 186 lbs  Last Visits Weight: 87.2 kg (192 lb 3.2 oz)  Cumulative weight loss (lbs): -3  Weight Loss Percentage: -1.61%        5/4/2023    11:58 AM   Changes and Difficulties   I have made the following changes to my diet since my last visit: trying to eat smaller portions   With regards to my diet, I am still struggling with: eating too much   I have made the following changes to my activity/exercise  since my last visit: trying to walk every day   With regards to my activity/exercise, I am still struggling with: walking outside when weather is bad         MEDICATIONS:   Current Outpatient Medications   Medication Sig Dispense Refill    albuterol (PROAIR HFA/PROVENTIL HFA/VENTOLIN HFA) 108 (90 Base) MCG/ACT inhaler Inhale 2 puffs into the lungs every 4 hours as needed for shortness of breath / dyspnea or wheezing 18 g 0    ARIPiprazole (ABILIFY) 2 MG tablet Take 2 tablets (4 mg) by mouth daily 60 tablet 2    aspirin (ASA) 81 MG EC tablet Take 1 tablet (81 mg) by mouth daily 90 tablet 3    azelastine (ASTELIN) 0.1 % nasal spray Spray 2 sprays into both nostrils 2 times daily as needed for rhinitis 30 mL 3    azelastine (OPTIVAR) 0.05 % ophthalmic solution Apply 1 drop to eye 2 times daily as needed (itchy/watery eyes) 6 mL 3    blood glucose (NO BRAND SPECIFIED) lancing device Device to be used with lancets. 3 each 3    blood glucose (NO BRAND SPECIFIED) test strip Use to test blood sugar 3 times daily 300 strip 3    calcium carbonate-vitamin D (CALTRATE 600+D) 600-400 MG-UNIT CHEW Take 1 chew tab by mouth 2 times daily 180 tablet 3    carvedilol (COREG) 25 MG tablet Take 1 tablet (25 mg) by mouth 2 times daily (with meals) 180 tablet 3    cetirizine (ZYRTEC) 10 MG tablet Take 1 tablet (10 mg) by mouth daily 30 tablet 3    Continuous Blood Gluc Sensor (FREESTYLE JOHN 14 DAY SENSOR) [facility] 1 each every 14 days Use 1 Sensor every 14 days. Use to read blood sugars per 's instructions. 2 each 5    Cyanocobalamin (VITAMIN B-12 SL) Place under the tongue every other day       estrogen conj-medroxyPROGESTERone (PREMPRO) 0.45-1.5 MG tablet Take 1 tablet by mouth daily 90 tablet 3    ezetimibe (ZETIA) 10 MG tablet Take 1 tablet (10 mg) by mouth daily 90 tablet 3    FEROSUL 325 (65 Fe) MG tablet TAKE 1 TABLET(325 MG) BY MOUTH TWICE DAILY 180 tablet 0    hydrALAZINE (APRESOLINE) 10 MG tablet Take 1 tablet (10 mg)  "by mouth 3 times daily 270 tablet 3    Ibuprofen-diphenhydrAMINE Cit (IBUPROFEN PM PO) Take by mouth At Bedtime      isosorbide mononitrate (IMDUR) 30 MG 24 hr tablet Take 1 tablet (30 mg) by mouth daily 90 tablet 3    lisinopril (ZESTRIL) 5 MG tablet TAKE 1 TABLET(5 MG) BY MOUTH DAILY 90 tablet 3    meclizine (ANTIVERT) 25 MG tablet Take 1 tablet (25 mg) by mouth 3 times daily as needed 30 tablet 1    montelukast (SINGULAIR) 10 MG tablet TAKE 1 TABLET(10 MG) BY MOUTH AT BEDTIME 90 tablet 0    Multiple Vitamin (MULTI-VITAMIN) per tablet Take 1 tablet by mouth daily. 100 tablet 3    naltrexone (DEPADE/REVIA) 50 MG tablet Take 1/2 tablet once daily 1-2 hours prior to worst cravings for 1 week, then increase to 1 tablet daily as directed if tolerating 30 tablet 3    NIFEdipine ER (ADALAT CC) 30 MG 24 hr tablet Take 1 tablet (30 mg) by mouth daily 60 tablet 0    ondansetron (ZOFRAN-ODT) 4 MG ODT tab Take 1 tablet (4 mg) by mouth every 8 hours as needed for nausea 15 tablet 1    simvastatin (ZOCOR) 40 MG tablet Take 1 tablet (40 mg) by mouth At Bedtime 90 tablet 3    venlafaxine (EFFEXOR) 75 MG tablet Take 1 tablet (75 mg) by mouth 3 times daily 90 tablet 2           5/4/2023    11:58 AM   Weight Loss Medication History Reviewed With Patient   Which weight loss medications are you currently taking on a regular basis? None   If you are not taking a weight loss medication that was prescribed to you, please indicate why: Other             Objective    Ht 1.6 m (5' 3\")   Wt 85.7 kg (189 lb)   LMP 06/01/2012   BMI 33.48 kg/m             PHYSICAL EXAM:  GENERAL: Healthy, alert and no distress  EYES: Eyes grossly normal to inspection.  No discharge or erythema, or obvious scleral/conjunctival abnormalities.  RESP: No audible wheeze, cough, or visible cyanosis.  No visible retractions or increased work of breathing.    SKIN: Visible skin clear. No significant rash, abnormal pigmentation or lesions.  NEURO: Cranial nerves " grossly intact.  Mentation and speech appropriate for age.  PSYCH: Mentation appears normal, affect normal/bright, judgement and insight intact, normal speech and appearance well-groomed.        Sincerely,    ZELALEM VIVAS PA-C      18 minutes spent by me on the date of the encounter doing chart review, history and exam, documentation and further activities per the note

## 2023-05-04 NOTE — NURSING NOTE
"(   Chief Complaint   Patient presents with     RECHECK     Video visit return     )    ( Weight: 85.7 kg (189 lb) )  ( Height: 160 cm (5' 3\") )  ( BMI (Calculated): 33.48 )  (   )  (   )  (   )  (   )  (   )  (   )    (   )  (   )  (   )  (   )  (   )  (   )  (   )    (   Patient Active Problem List   Diagnosis     Major depression, recurrent (H)     Dizziness and giddiness     Motion sickness     GERD (gastroesophageal reflux disease)     Idiopathic cardiomyopathy (H)     Sleep apnea     Hyperlipidemia LDL goal <100     Allergic rhinitis     Hypertension goal BP (blood pressure) < 130/80     Pelvic pain in female     Health Care Home     24 hour contact handout given     Class 1 obesity with serious comorbidity and body mass index (BMI) of 32.0 to 32.9 in adult     Elevated PTHrP level     H/O bariatric surgery     Hx of gastric bypass     Hypovitaminosis D     Allergic rhinitis     Attention deficit disorder of adult     Mild persistent asthma     Hand joint pain     Myopia, bilateral     Dumping syndrome     Benign essential hypertension     Adjustment disorder with depressed mood     Decreased hearing of both ears     BPV (benign positional vertigo), unspecified laterality     Advanced directives, counseling/discussion     Vitamin D deficiency     Coronary artery disease involving native coronary artery of native heart without angina pectoris     Acute pain of left knee     Syncope     Posterior vitreous detachment of both eyes     Combined forms of age-related cataract, mild, of both eyes     Glaucoma suspect of both eyes     Sciatica of right side     Hypoglycemia     Raynaud's disease without gangrene    )  (   Current Outpatient Medications   Medication Sig Dispense Refill     albuterol (PROAIR HFA/PROVENTIL HFA/VENTOLIN HFA) 108 (90 Base) MCG/ACT inhaler Inhale 2 puffs into the lungs every 4 hours as needed for shortness of breath / dyspnea or wheezing 18 g 0     ARIPiprazole (ABILIFY) 2 MG tablet Take 2 " tablets (4 mg) by mouth daily 60 tablet 2     aspirin (ASA) 81 MG EC tablet Take 1 tablet (81 mg) by mouth daily 90 tablet 3     azelastine (ASTELIN) 0.1 % nasal spray Spray 2 sprays into both nostrils 2 times daily as needed for rhinitis 30 mL 3     azelastine (OPTIVAR) 0.05 % ophthalmic solution Apply 1 drop to eye 2 times daily as needed (itchy/watery eyes) 6 mL 3     blood glucose (NO BRAND SPECIFIED) lancing device Device to be used with lancets. 3 each 3     blood glucose (NO BRAND SPECIFIED) test strip Use to test blood sugar 3 times daily 300 strip 3     calcium carbonate-vitamin D (CALTRATE 600+D) 600-400 MG-UNIT CHEW Take 1 chew tab by mouth 2 times daily 180 tablet 3     carvedilol (COREG) 25 MG tablet Take 1 tablet (25 mg) by mouth 2 times daily (with meals) 180 tablet 3     cetirizine (ZYRTEC) 10 MG tablet Take 1 tablet (10 mg) by mouth daily 30 tablet 3     Continuous Blood Gluc Sensor ([employer] SAYLE JOHN 14 DAY SENSOR) [facility] 1 each every 14 days Use 1 Sensor every 14 days. Use to read blood sugars per 's instructions. 2 each 5     Cyanocobalamin (VITAMIN B-12 SL) Place under the tongue every other day        estrogen conj-medroxyPROGESTERone (PREMPRO) 0.45-1.5 MG tablet Take 1 tablet by mouth daily 90 tablet 3     ezetimibe (ZETIA) 10 MG tablet Take 1 tablet (10 mg) by mouth daily 90 tablet 3     FEROSUL 325 (65 Fe) MG tablet TAKE 1 TABLET(325 MG) BY MOUTH TWICE DAILY 180 tablet 0     hydrALAZINE (APRESOLINE) 10 MG tablet Take 1 tablet (10 mg) by mouth 3 times daily 270 tablet 3     Ibuprofen-diphenhydrAMINE Cit (IBUPROFEN PM PO) Take by mouth At Bedtime       isosorbide mononitrate (IMDUR) 30 MG 24 hr tablet Take 1 tablet (30 mg) by mouth daily 90 tablet 3     lisinopril (ZESTRIL) 5 MG tablet TAKE 1 TABLET(5 MG) BY MOUTH DAILY 90 tablet 3     meclizine (ANTIVERT) 25 MG tablet Take 1 tablet (25 mg) by mouth 3 times daily as needed 30 tablet 1     montelukast (SINGULAIR) 10 MG tablet TAKE 1  TABLET(10 MG) BY MOUTH AT BEDTIME 90 tablet 0     Multiple Vitamin (MULTI-VITAMIN) per tablet Take 1 tablet by mouth daily. 100 tablet 3     NIFEdipine ER (ADALAT CC) 30 MG 24 hr tablet Take 1 tablet (30 mg) by mouth daily 60 tablet 0     ondansetron (ZOFRAN-ODT) 4 MG ODT tab Take 1 tablet (4 mg) by mouth every 8 hours as needed for nausea 15 tablet 1     simvastatin (ZOCOR) 40 MG tablet Take 1 tablet (40 mg) by mouth At Bedtime 90 tablet 3     venlafaxine (EFFEXOR) 75 MG tablet Take 1 tablet (75 mg) by mouth 3 times daily 90 tablet 2    )  ( Diabetes Eval:    )    ( Pain Eval:  No Pain (0) )    ( Wound Eval:       )    (   History   Smoking Status     Never   Smokeless Tobacco     Never    )    ( Signed By:  Allan Ron; May 4, 2023; 1:04 PM )

## 2023-05-04 NOTE — PROGRESS NOTES
"Patricia Perez is a 63 year old who is being evaluated via a billable video visit.      How would you like to obtain your AVS? MyChart  If the video visit is dropped, the invitation should be resent by: Text to cell phone: [phone]  Will anyone else be joining your video visit? No  If patient encounters technical issues they should call [phone]    During this virtual visit the patient is located in [place], patient verifies this as the location during the entirety of this visit.       Video-Visit Details    Type of service:  Video Visit     Originating Location (pt. Location): Home    Distant Location (provider location):  On-site  Platform used for Video Visit: [employer]        [facility] Medical Weight Management Note     Patricia Perez  MRN:  [id number]  :  1960  SARA:  2023    Dear Funmilayo Grubbs PA-C,    I had the pleasure of seeing your patient Patricia Perez. She is a 63 year old female who I am continuing to see for treatment of obesity related to:        3/6/2023     8:02 AM   --   I have the following health issues associated with obesity None of the above       Assessment & Plan   Problem List Items Addressed This Visit        Digestive    Class 1 obesity with serious comorbidity and body mass index (BMI) of 33.0 to 33.9 in adult, unspecified obesity type - Primary     S/p RYBP in  with Dr. Bradford. Last seen 3/6/2023 for reestablishing care. At that time had recently lost 7lb with diet and exercise. Wanted to continue with diet and exercise and consider AOMs in the future.     Had a \"tough\" April with some weight regain. Has restarted getting back to increasing fruits and vegetables and walking daily. Would like to discuss starting an AOM today:   - Phenermine - contraindicated due to history of cardiomyopathy and CAD.   - Topiramate- concern for brain fog   - GLP-1 - can consider in the future. Concern for self injections.   - Naltrexone to be started today. No " contraindications. Discussed side effects, risks, and benefits. No history of liver disease. Not taking opioids.          Relevant Medications    naltrexone (DEPADE/REVIA) 50 MG tablet        1. Start Naltrexone 50mg - take 1/2 tablet daily for 7 days, then increase to 1 tablet daily.   2. Follow up with Stephanie oMrel in 3 months. Will reach out via [employer] in 1 month.       INTERVAL HISTORY:  [facility]  Recently 7lbs due to diet change, continue with diet and exercise  Consider Naltrexone in the future     April was a harder month as it was her birthday and with worse weather.       Recent diet changes: Was doing really well for a while. Harder through April. Is getting back on track. Increasing fruits and vegetables. Has the most cravings and hunger between lunch and breakfast.     Recent exercise/activity changes: Trying to increase back to walking - 3000 steps daily.     Recent stressors: Increase stress due to mother being sick who lives out of safe. This has led to increase baking     Recent sleep changes: Can be hard with cat gets them up at 3am.     No history of liver disease, not taking opioids.     CURRENT WEIGHT:   189 lbs 0 oz    Initial Weight (lbs): 186 lbs  Last Visits Weight: 87.2 kg (192 lb 3.2 oz)  Cumulative weight loss (lbs): -3  Weight Loss Percentage: -1.61%        5/4/2023    11:58 AM   Changes and Difficulties   I have made the following changes to my diet since my last visit: trying to eat smaller portions   With regards to my diet, I am still struggling with: eating too much   I have made the following changes to my activity/exercise since my last visit: trying to walk every day   With regards to my activity/exercise, I am still struggling with: walking outside when weather is bad         MEDICATIONS:   Current Outpatient Medications   Medication Sig Dispense Refill     albuterol (PROAIR HFA/PROVENTIL HFA/VENTOLIN HFA) 108 (90 Base)  MCG/ACT inhaler Inhale 2 puffs into the lungs every 4 hours as needed for shortness of breath / dyspnea or wheezing 18 g 0     ARIPiprazole (ABILIFY) 2 MG tablet Take 2 tablets (4 mg) by mouth daily 60 tablet 2     aspirin (ASA) 81 MG EC tablet Take 1 tablet (81 mg) by mouth daily 90 tablet 3     azelastine (ASTELIN) 0.1 % nasal spray Spray 2 sprays into both nostrils 2 times daily as needed for rhinitis 30 mL 3     azelastine (OPTIVAR) 0.05 % ophthalmic solution Apply 1 drop to eye 2 times daily as needed (itchy/watery eyes) 6 mL 3     blood glucose (NO BRAND SPECIFIED) lancing device Device to be used with lancets. 3 each 3     blood glucose (NO BRAND SPECIFIED) test strip Use to test blood sugar 3 times daily 300 strip 3     calcium carbonate-vitamin D (CALTRATE 600+D) 600-400 MG-UNIT CHEW Take 1 chew tab by mouth 2 times daily 180 tablet 3     carvedilol (COREG) 25 MG tablet Take 1 tablet (25 mg) by mouth 2 times daily (with meals) 180 tablet 3     cetirizine (ZYRTEC) 10 MG tablet Take 1 tablet (10 mg) by mouth daily 30 tablet 3     Continuous Blood Gluc Sensor ([employer] 14 DAY SENSOR) [facility] 1 each every 14 days Use 1 Sensor every 14 days. Use to read blood sugars per 's instructions. 2 each 5     Cyanocobalamin (VITAMIN B-12 SL) Place under the tongue every other day        estrogen conj-medroxyPROGESTERone (PREMPRO) 0.45-1.5 MG tablet Take 1 tablet by mouth daily 90 tablet 3     ezetimibe (ZETIA) 10 MG tablet Take 1 tablet (10 mg) by mouth daily 90 tablet 3     FEROSUL 325 (65 Fe) MG tablet TAKE 1 TABLET(325 MG) BY MOUTH TWICE DAILY 180 tablet 0     hydrALAZINE (APRESOLINE) 10 MG tablet Take 1 tablet (10 mg) by mouth 3 times daily 270 tablet 3     Ibuprofen-diphenhydrAMINE Cit (IBUPROFEN PM PO) Take by mouth At Bedtime       isosorbide mononitrate (IMDUR) 30 MG 24 hr tablet Take 1 tablet (30 mg) by mouth daily 90 tablet 3     lisinopril (ZESTRIL) 5 MG tablet TAKE 1 TABLET(5 MG) BY MOUTH DAILY  "90 tablet 3     meclizine (ANTIVERT) 25 MG tablet Take 1 tablet (25 mg) by mouth 3 times daily as needed 30 tablet 1     montelukast (SINGULAIR) 10 MG tablet TAKE 1 TABLET(10 MG) BY MOUTH AT BEDTIME 90 tablet 0     Multiple Vitamin (MULTI-VITAMIN) per tablet Take 1 tablet by mouth daily. 100 tablet 3     naltrexone (DEPADE/REVIA) 50 MG tablet Take 1/2 tablet once daily 1-2 hours prior to worst cravings for 1 week, then increase to 1 tablet daily as directed if tolerating 30 tablet 3     NIFEdipine ER (ADALAT CC) 30 MG 24 hr tablet Take 1 tablet (30 mg) by mouth daily 60 tablet 0     ondansetron (ZOFRAN-ODT) 4 MG ODT tab Take 1 tablet (4 mg) by mouth every 8 hours as needed for nausea 15 tablet 1     simvastatin (ZOCOR) 40 MG tablet Take 1 tablet (40 mg) by mouth At Bedtime 90 tablet 3     venlafaxine (EFFEXOR) 75 MG tablet Take 1 tablet (75 mg) by mouth 3 times daily 90 tablet 2           5/4/2023    11:58 AM   Weight Loss Medication History Reviewed With Patient   Which weight loss medications are you currently taking on a regular basis? None   If you are not taking a weight loss medication that was prescribed to you, please indicate why: Other             Objective    Ht 1.6 m (5' 3\")   Wt 85.7 kg (189 lb)   LMP 06/01/2012   BMI 33.48 kg/m             PHYSICAL EXAM:  GENERAL: Healthy, alert and no distress  EYES: Eyes grossly normal to inspection.  No discharge or erythema, or obvious scleral/conjunctival abnormalities.  RESP: No audible wheeze, cough, or visible cyanosis.  No visible retractions or increased work of breathing.    SKIN: Visible skin clear. No significant rash, abnormal pigmentation or lesions.  NEURO: Cranial nerves grossly intact.  Mentation and speech appropriate for age.  PSYCH: Mentation appears normal, affect normal/bright, judgement and insight intact, normal speech and appearance well-groomed.        Sincerely,    ZELALEM VIVAS PA-C      18 minutes spent by me on the date of the " encounter doing chart review, history and exam, documentation and further activities per the note

## 2023-05-05 ENCOUNTER — VIRTUAL VISIT (OUTPATIENT)
Dept: PSYCHIATRY | Facility: CLINIC | Age: 63
End: 2023-05-05
Attending: PSYCHIATRY & NEUROLOGY
Payer: COMMERCIAL

## 2023-05-05 DIAGNOSIS — F33.1 MODERATE EPISODE OF RECURRENT MAJOR DEPRESSIVE DISORDER (H): Primary | ICD-10-CM

## 2023-05-05 PROCEDURE — 90832 PSYTX W PT 30 MINUTES: CPT | Mod: HN | Performed by: STUDENT IN AN ORGANIZED HEALTH CARE EDUCATION/TRAINING PROGRAM

## 2023-05-05 NOTE — PROGRESS NOTES
"Video- Visit Details  Type of service:  video visit for mental health treatment.  Time of service:    Date:  05/05/2023    Video Start Time:  10:10 AM        Video End Time:  10:31 AM    Reason for video visit: COVID-19  Originating Site (patient location):  Patient's home  Distant Site (provider location):  Remote location  Mode of Communication:  Video Conference via [facility]    OUTPATIENT PSYCHOTHERAPY PROGRESS NOTE    Client Name: Patricia Perez   YOB: 1960 (62 year old)   Date of Service:  May 5, 2023  Time of Service: 10:10 AM to 10:31 AM (21 minutes)  Service Type(s):[id number] psychotherapy (16-37 min. with patient and/or family) / Individual psychotherapy session  Patient MRN: [id number]  Psychiatrist: Kay Tirado MD      Individuals Present:   Patient    Session # with this psychiatrist: 8     Session content:  This supportive psychotherapy session addressed issues related to goals of therapy and current psychosocial stressors. Psychotherapy techniques or interventions utilized throughout the session include: Establishing rapport, active listening, date gathering, reassurance, normalizing, encouragement, re-framing, advice and teaching.     \"Pretty well\"  - Coming out of funk, maybe part of it is from the weather  - More done on the house  - Getting a bigger desk   - Thinks things are getting better  - Did make a list, crossing things off and adding to   - Started making an outline for her project, better sense of project path    - Work okay, working on project, haven't been told she will get the professional development leave  - Just going forward as if its going to happen      - A little irked at , doing more around house and him not helping, sit down with him this weekend to talk about it   - Only  4 years, still working out kinks, hasn't really happened before  - Doesn't seem like he's helping out as much around house   - He's not fully engaged all day, will take a nap, " "will need to talk to him about it    - No real plans for the summer, she is planner, sal is not   - Otherwise things going well     Discussed current therapy goals, this break and if we will continue   - Thinking that maybe the break was a good thing, not needing therapy, everything Mercedes done and talked about has helped her immensely, maybe just being able to get things off her chest, saying things out loud, they don't bother her like before, thinks maybe she can take a break until necessary   - Thinks meds are also working, seem to be going in a good direction     - Thinks these sessions were amazing, very comfortable since beginning, and that's most of rosado      Behavioral observations/mental status:     Appearance:  casually dressed  Muscle Strength and Tone: normal  Gait and Station: N/A: Telehealth  Behavior (Psychomotor):  no evidence of tardive dyskinesia, dystonia, or tics  Eye Contact:  good  Speech:  clear, coherent and normal prosody  Mood:  \"Pretty well\"  Affect:  appropriate and in normal range  Attitude:  cooperative  Thought Process:  logical, linear and goal oriented  Thought Content:  no evidence of suicidal ideation or homicidal ideation, no evidence of psychotic thought, no auditory hallucinations present and no visual hallucinations present  Associations:  no loose associations  Insight:  good  Judgment:  intact  Oriented to:  time, person, and place  Attention Span and Concentration:  intact  Recent and Remote Memory:  intact  Language: Fluent in English with appropriate syntax and vocabulary.  Fund of Knowledge: appropriate         Assessment   Patricia Perez is a 62 year old female previously diagnosed with Major Depressive Disorder who presents to psychotherapy clinic on 05/05/2023. Patient is currently being treated for Major Depressive Disorder with weekly supportive therapy sessions with this psychiatrist. Today we had a final therapy session. We discussed what we accomplished and " skills she will continue that have helped her. Patricia describes our series of therapy sessions as very helpful being able to discuss things that she never has before and thinks things are moving in the right direction. We discussed my upcoming maternity leave and to contact the clinic with any further therapy needs.      Suicide risk assessment: Low  Risk factors: Age, mental health condition, and recent life stressors  Protective risk factors: Family, community support, engagement in treatment, no current SI, no previous suicide attempts     Patient is at minimal risk for suicide given history, risk factors, and protective factors. The person currently has no suicidal thoughts or plan.      Diagnoses treated:  Encounter Diagnosis   Name Primary?     Moderate episode of recurrent major depressive disorder (H) Yes     Plan:   - Patricia will contact the clinic for future therapy appointments as necessary      Treatment Plan         SYMPTOMS; PROBLEMS    MEASURABLE GOALS;    FUNCTIONAL IMPROVEMENT INTERVENTIONS;   GAINS MADE DISCHARGE CRITERIA   Wants to get her leo back    Glass half full vs empty, seeing the good in everybody and things Supportive / psychodynamic Symptom resolution   Improve focus and concentration    Able to start and complete tasks as she previous did  Supportive / psychodynamic Symptom resolution   Motivation and drive    Resume her fun activities and hobbies Supportive / psychodynamic Symptom resolution      Date of most recent treatment plan: 1/27/2023  Date next treatment plan due: 3 months    CRISIS NUMBERS:   Provided routinely in AVS.     Treatment Risk Statement:  The patient understands the risks, benefits and alternatives. Agrees to treatment with the capacity to do so and agrees to call clinic for any problems. The patient understands to call 911 or go to the nearest ED if life threatening or urgent symptoms occur.      PSYCHIATRIST:  Kay Tirado MD     Patient will be reviewed  by supervisors Dr. Britney Aaron and Dr. Belinda Ayoub

## 2023-05-06 NOTE — PATIENT INSTRUCTIONS
"Thank you for allowing us the privilege of caring for you. We hope we provided you with the excellent service you deserve.   Please let us know if there is anything else we can do for you so that we can be sure you are completely satisfied with your care experience.    To ensure the quality of our services you may be receiving a patient satisfaction survey from an independent patient satisfaction monitoring company.    The greatest compliment you can give is a \"Likely to Recommend\"    Your visit was with ZELALEM VIVAS PA-C today.    Instructions per today's visit:   Start Naltrexone 50mg - take 1/2 tablet daily for 7 days, then increase to 1 tablet daily.   Follow up with Macie in 3 months. Will reach out via Wyle in 1 month.     _________________________________________________________________________________________________________________________________________________________  Important contact and scheduling information:  Please call our contact center at [phone] to schedule your next appointments.  To find a lab location near you, please call [phone].  For any nursing questions or concerns call Aziza Wilson LPN at [phone] or Oksana Pierce RN at [phone]  Please call during clinic hours Monday through Friday 8:00a - 4:00p if you have questions or you can contact us via Vidmind at anytime and we will reply during clinic hours.    Lab results will be communicated through My Chart or letter (if My Chart not used). Please call the clinic if you have not received communication after 1 week or if you have any questions.?  Clinic Fax: [phone]  _________________________________________________________________________________________________________________________________________________________  If you are asked by your clinic team to have your blood pressure checked:  [facility] do offer several locations for blood pressure checks. Please follow the below link to schedule an " appointment. Scheduling an appointment at the pharmacy for a blood pressure check is now preferred.    Appointment Plus ([email or web address])  _________________________________________________________________________________________________________________________________________________________  Meal Replacement Products:    Here is the link to our new e-store where you can purchase our meal replacement products     [employer] [place] E-Store  [email or web address]    The one week starter kit is a great way to sample a variety of products and see what works for you.    If you want more information about the product go to: Fresh Steps Meals  [email or web address]    If you are an employee or [facility] or [facility] please contact your care team for a 10% estore discount    Free Shipping for orders over $75     Benefits of meal replacements products:    Portion and calorie control  Improved nutrition  Structured eating  Simplified food choices  Avoid contact with trigger foods  _________________________________________________________________________________________________________________________________________________________  Interested in working with a health ?  Health coaches work with you to improve your overall health and wellbeing.  They look at the whole person, and may involve discussion of different areas of life, including, but not limited to the four pillars of health (sleep, exercise, nutrition, and stress management). Discuss with your care team if you would like to start working a health .  Health Coaching-3 Pack: Schedule by calling [phone]    $99 for three health coaching visits    Visits may be done in person or via phone    Coaching is a partnership between the  and the client; Coaches do not prescribe or diagnose    Coaching helps inspire the client to reach his/her personal goals    _________________________________________________________________________________________________________________________________________________________  24 Week Healthy Lifestyle Plan:    Our mission in the 24-week Healthy Lifestyle Plan is to provide you with individualized care by giving you the tools, education and support you need to lose weight and maintain a healthy lifestyle. In your 24-week journey, you ll be supported by a dedicated weight loss team that includes registered dietitians, medical weight management providers, health coaches, and nurses -- all with special expertise in weight loss -- to help you every step of the way.     Monthly meetings with your registered dietician or medical weight management provider help to review your progress, update your care plan, and make any adjustments needed to ensure success. Between these visits, weekly and bi-weekly health  visits will help you focus on the four pillars of weight loss -- stress, sleep, nutrition, and exercise -- and how you can best adapt each to achieve sustainable weight loss results.    In addition, you will be given exclusive access to online wellbeing classes through [employer].  Your initial visit will be with a medical weight management provider who will help to understand your weight loss goals and ensure this program is the right fit for you. Please let our team know if you are interested in the 24 week plan by sending a message to your care team or calling [phone] to schedule.  _________________________________________________________________________________________________________________________________________________________    COMPREHENSIVE WEIGHT MANAGEMENT PROGRAM  VIRTUAL SUPPORT GROUPS    For Support Group Information:      We offer support groups for patients who are working on weight loss and considering, preparing for or have had weight loss surgery.   There is no cost for this opportunity.  You are invited to  "attend the?Virtual Support Groups?provided by any of the following locations:    [facility] via [employer] Teams with Josefa Gu RN  2.   [facility] via [employer] Teams with Mitchell Friend, PhD, LP  3.   [facility] via [employer] Teams with Funmilayo Mares RN  4.   [facility] via [employer] Teams with Funmilayo Elliott [facility]    The following Support Group information can also be found on our website:  [email or web address]    [facility] Weight Loss Surgery Support Group    [facility] Weight Loss Surgery Support Group  The support group is a patient-lead forum that meets monthly to share experiences, encouragement and education. It is open to those who have had weight loss surgery, are scheduled for surgery, and those who are considering surgery.   WHEN: This group meets on the 3rd Wednesday of each month from 5:00PM - 6:00PM virtually using Microsoft Teams.   FACILITATOR: Led by Josefa Gu RD, FABIEN, RN, the program's Clinical Coordinator.   TO REGISTER: Please contact the clinic via [email or web address] or call the nurse line directly at [phone] to inform our staff that you would like an invite sent to you and to let us know the email you would like the invite sent to. Prior to the meeting, a link with directions on how to join the meeting will be sent to you.    2022 Meetings  Colleen 15: \"Let's Talk\" a time for the group to share.  July 20: \"Let's Talk\" a time for the group to share.  August 17: \"Let's Talk\" a time for the group to share.  September 21: \"Let's Talk\" a time for the group to share.  October 19: Guest Speaker: Dr Antonio Li MD Pulmonologist and Sleep Medicine Physician, \"Getting a Good Night's Sleep\".  November 16: \"Let's Talk\" a time for the group to share.  December 21: \"Let's Talk\" a time for the group to share.    [facility] and [facility] Support Groups    Connections: Bariatric Care Support " "Group?  This is open to all [facility] (and those external to this program) pre- and post- operative bariatric surgery patients as well as their support system.   WHEN: This group meets the 2nd Tuesday of each month from 6:30 PM - 8:00 PM virtually using Microsoft Teams.   FACILITATOR: Led by Mitchell Friend, Ph.D who is a Licensed Psychologist with the [facility] Comprehensive Weight Management Program.   TO REGISTER: Please send an email to Mitchell Friend, Ph.D.,  at?[email or web address]?if you would like an invitation to the group and to learn about using Microsoft Teams.    2022 Meetings June 14: Tamie Watts, SUMI, LD at [facility], \"Nutritional Labeling\"  July 12 August 2 (Please Note Date Change)  September 13 October 11 November 8 December 13    Connections: Post-Operative Bariatric Surgery Support Group  This is a support group for [facility] bariatric patients (and those external to [facility]) who have had bariatric surgery and are at least 3 months post-surgery.  WHEN: This support group meets the 4th Wednesday of the month from 11:00 AM - 12:00 PM virtually using Microsoft Teams.   FACILITATOR: Led by Certified Bariatric Nurse, Funmilayo Mares RN.   TO REGISTER: Please send an email to Funmilayo at [email or web address] if you would like an invitation to the group and to learn about using Microsoft Teams.    2022 Meetings June 22 July 27 August 24 September 28 October 26 November 23 December 28      [facility] Healthy Lifestyle Virtual Support Group    Healthy Lifestyle Virtual Support Group?  This is 60 minutes of small group guided discussion, support and resources. All are welcome who want a healthy lifestyle.  WHEN: This group meets monthly on a Friday from 12:30 PM - 1:30 PM virtually using Microsoft Teams.   FACILITATOR: Led by National Board Certified Health and , ALYSSA Guillen-[facility].   TO " "REGISTER: Please send an email to Funmilayo at?[email or web address] to receive monthly invites to the group or if you have any questions about having a health .  Prior to the meeting, a link with directions on how to join the meeting will be sent to you.    2022 Meetings  June 24: Funmilayo Elliott, [facility], \"Setting Limits and Boundaries\".  Jul 29: Open Forum  August 26: Guest Speaker: Loreta Fraser, Registered Dietitian  September 30: Open Forum  October 28th: Guest Speaker: Becky Mann, [facility], Health , \"Gratitude Practices\".  November 18: Guest Speaker: Kathia Bledsoe RD Registered Dietitian, \"Navigating How to Eat around the Holidays\".  December 16: Guest Speaker: Maxine Saucedo, [facility], \"Changing Your Relationship with Movement\".    ____________________________________________________________________________________________________________________________________________________________________________  [facility] Get Moving Program  Our team of physical therapists is trained to help you understand and take control of your condition. They will perform a thorough evaluation to determine your ability for activity and develop a customized plan to fit your goals and physical ability.  Scheduling: Unsure if the Get Moving program is right for you? Discuss the program with your medical provider or diabetes educator. You can also call us at [phone] to ask questions or schedule an appointment.   [facility] Get Moving Program  ____________________________________________________________________________________________________________________________________________________________________________  [facility] Diabetes Prevention Program (DPP)  If you have prediabetes and Medicare please contact us via Thinkspeedhart to learn more about the Diabetes Prevention Program (DPP)  Program Details:   [employer] [place] offers the year-long Diabetes Prevention Program (DPP). The program helps you to make " lifestyle changes that prevent or delay type 2 diabetes by supporting healthy eating, increased physical activity, stress reduction and use of coping skills.   On average, previous [facility] DPP cohorts have lost and maintained at least 5% of their starting weight throughout the program and averaged more than 150 minutes of physical activity per week.  Participants meet weekly for one-hour group sessions over sixteen weeks, every other week for the next 8 weeks, and monthly for the last six months.   A year-long maintenance program is also available for participants who complete the first year.   Location & Cost:   During the COVID-19 Public Health Emergency, the program is offered virtually. When in-person classes can resume, they will be held at [facility].  For people with Medicare, the program is covered in full. A self-pay option will also be available for those with non-Medicare insurance plans.   _________________________________________________________________________________________________________________________________________________________  Bluetooth Scale:    We hope to provide you with high quality virtual healthcare visits while social distancing for COVID-19 is necessary, as well as in the future when virtual visits may be more convenient for you.     Our technology team made it possible for Bluetooth scales to send weight measurements to our electronic medical record. This allows weights from you weighing at home to securely flow into the medical record, which will improve telephone and virtual visits.   Additionally, studies have shown that adults actually lose more weight when their weights are automatically sent to someone else, and also that this process is not stressful for those adults.    Below is a link for purchasing the scale, with a discount code for our patients. You may call your insurance company to see if they will reimburse you for the cost  of the scale, as a piece of durable medical equipment. The scales only go up to a weight of 400 pounds. This is an issue and we are working with the developer on increasing this. We found no scales that go over 400lb that have blue-tooth for connecting to nap- Naturally [employer] Parents.    Scale to purchase: the [employer]  Body  Scale: [email or web address]=aw.ds    Discount Code: We have a discount code for our patients to bring the cost down to $50, Discount code is: [place]_Scale_20%off  _______________________________________________________________________________________________________________________________________________________________________________    To work with a Behavioral Health Psychologist:    Call to schedule:    Vincent Shetty - [phone]  Fidel Vogel - [phone]  Melissa Mensah - [phone]  Florence Jimenes - [phone]   Allyson Chavez PhD (cannot accept Medicare) [phone]        Thank you,   [facility] Comprehensive Weight Management Team                          NALTREXONE    We are considering starting Naltrexone. Start with 1/2 tab 1-2 hours prior to the time you have the most trouble with cravings or extra hunger. If you are doing well you may switch to a whole tablet taken at the same time period.     WARNING: This medication blocks the action of opioid type pain medications. If you routinely take any medication like Codeine, Oxycontin,Percocet,Morphine,Dilaudid or Methodone, do not take this until you have talked with weight management staff. If you are planning surgery you should stop Naltrexone 4 days prior to the surgery. If you have an injury that requires pain medication, make sure the health care staff knows you take Naltrexone.     Naltrexone is a medication that is used most often to help people who are troubled by dependence on prescription pain killers or alcohol. It has also been found  "to help with weight loss. Although it's not currently FDA approved for weight loss, it has been used safely for a number of years to help people who are carrying extra weight.     Just how Naltrexone helps with weight loss has not been exactly determined.  It seems to work by quieting down brain signals related to strong food cravings. Many of our patients use the word \"addiction\" to describe their feelings and constant thoughts about food. It makes sense then to treat the feeling of dependence on food, outside of real hunger, with a medication designed to help with other sorts of dependence.     Our patients on Naltrexone find that they:    >feel less interest in food   >think less about food and eating and have more time to think of other things   >find it easier to push the plate away   >have an easier time eating less    For some of our patients, these feelings are very immediate. Other patients, don't feel much of a change but find they've lost weight. Like all weight loss medications, Naltrexone works best when you help it work. This means:  1. Having less tempting high calorie (fattening) food around the house or office. (For people with strong cravings this is very important.)   2. Staying away from situations or people that may trigger your cravings .   3. Eating out only one time or less each week.  4. Eating your meals at a table with the TV or computer off.    Side-effects. Naltrexone is generally well tolerated. The main side-effect we see is  nausea or a woozy feeling. A small number of people feel quite ill. Most people have a mild reaction and some people have no reaction at all.  The good news is that this feeling does go away.     In order to avoid nausea, please start the medication with half a pill for the first few days. Go on to a full pill if you are feeling well.      If you  are nauseated on 1/2 a pill it is okay to cut back to 1/4 pill ( a very small amount). Take this for a couple of days " and work your way back up to a 1/2 pill and then a whole pill. Taking the medication at night or with food  to start also may help prevent the feeling of nausea.       For any questions or concerns please send a [employer] message to our team or call our weight management call center at [phone] during regular business hours. For questions during evenings or weekends your messages will be addressed during the next business day.  For emergencies please call 911 or seek immediate medical care.     (Do not stop taking it if you don't think it's working. For some people it works without them knowing it.)      Please refer to the pharmacy insert for more information on side-effects. Since many pharmacists are not familiar with the use of naltrexone in weight loss, calling the nurse at [phone] will get you the most accurate information.  In order to get refills of this or any medication we prescribe you must be seen in the medical weight mgmt clinic every 2-3 months. Please have your pharmacy fax a refill request to [phone].

## 2023-05-20 ENCOUNTER — TELEPHONE (OUTPATIENT)
Dept: ENDOCRINOLOGY | Facility: CLINIC | Age: 63
End: 2023-05-20
Payer: COMMERCIAL

## 2023-05-24 ENCOUNTER — OFFICE VISIT (OUTPATIENT)
Dept: FAMILY MEDICINE | Facility: CLINIC | Age: 63
End: 2023-05-24
Payer: COMMERCIAL

## 2023-05-24 VITALS
DIASTOLIC BLOOD PRESSURE: 76 MMHG | BODY MASS INDEX: 33.73 KG/M2 | WEIGHT: 190.4 LBS | RESPIRATION RATE: 16 BRPM | TEMPERATURE: 97.5 F | OXYGEN SATURATION: 98 % | HEART RATE: 78 BPM | SYSTOLIC BLOOD PRESSURE: 123 MMHG | HEIGHT: 63 IN

## 2023-05-24 DIAGNOSIS — G56.01 CARPAL TUNNEL SYNDROME OF RIGHT WRIST: Primary | ICD-10-CM

## 2023-05-24 PROCEDURE — 99214 OFFICE O/P EST MOD 30 MIN: CPT | Performed by: PHYSICIAN ASSISTANT

## 2023-05-24 ASSESSMENT — PAIN SCALES - GENERAL: PAINLEVEL: SEVERE PAIN (6)

## 2023-05-24 NOTE — PROGRESS NOTES
"  Assessment & Plan     Carpal tunnel syndrome of right wrist  New, worsening  - Occupational Therapy Referral; Future  Discussed movement precautions/lifestyle mods. Will trial OT hand therapy initially with overnight bracing. If not improving did discuss surgical options or EMG if equivocal down the road.             Follow up 2 months if not improving     YODIT REBOLLAR [facility]   Patricia is a 63 year old, presenting for the following health issues:  Musculoskeletal Problem (Right)        5/24/2023     9:58 AM   Additional Questions   Roomed by Misti   Accompanied by Self     Musculoskeletal Problem    History of Present Illness       Reason for visit:  Hand pain  Symptom onset:  1-2 weeks ago  Symptoms include:  Hand pain  Symptom intensity:  Moderate  Symptom progression:  Worsening  Had these symptoms before:  No    She eats 2-3 servings of fruits and vegetables daily.She consumes 0 sweetened beverage(s) daily.She exercises with enough effort to increase her heart rate 10 to 19 minutes per day.  She exercises with enough effort to increase her heart rate 4 days per week.   She is taking medications regularly.               Review of Systems   Constitutional, HEENT, cardiovascular, pulmonary, gi and gu systems are negative, except as otherwise noted.      Objective    /76 (BP Location: Right arm, Patient Position: Sitting, Cuff Size: Adult Regular)   Pulse 78   Temp 97.5  F (36.4  C)   Resp 16   Ht 1.6 m (5' 2.99\")   Wt 86.4 kg (190 lb 6.4 oz)   LMP 06/01/2012   SpO2 98%   BMI 33.74 kg/m    Body mass index is 33.74 kg/m .       Physical Exam   GENERAL: healthy, alert and no distress  NECK: no adenopathy, no asymmetry, masses, or scars and thyroid normal to palpation  RESP: lungs clear to auscultation - no rales, rhonchi or wheezes  CV: regular rate and rhythm, normal S1 S2, no S3 or S4, no murmur, click or rub, no peripheral edema and peripheral pulses " strong  MS: no gross musculoskeletal defects noted, no edema. Pain in wrist and thumb easily reproducible with wrist flexion/extension

## 2023-05-25 ENCOUNTER — VIRTUAL VISIT (OUTPATIENT)
Dept: PSYCHIATRY | Facility: CLINIC | Age: 63
End: 2023-05-25
Attending: PSYCHIATRY & NEUROLOGY
Payer: COMMERCIAL

## 2023-05-25 DIAGNOSIS — Z98.84 HX OF GASTRIC BYPASS: ICD-10-CM

## 2023-05-25 DIAGNOSIS — F33.1 MODERATE EPISODE OF RECURRENT MAJOR DEPRESSIVE DISORDER (H): Primary | ICD-10-CM

## 2023-05-25 DIAGNOSIS — R41.840 ATTENTION DEFICIT: ICD-10-CM

## 2023-05-25 PROCEDURE — 99214 OFFICE O/P EST MOD 30 MIN: CPT | Mod: HN | Performed by: STUDENT IN AN ORGANIZED HEALTH CARE EDUCATION/TRAINING PROGRAM

## 2023-05-25 RX ORDER — VENLAFAXINE 75 MG/1
75 TABLET ORAL 3 TIMES DAILY
Qty: 90 TABLET | Refills: 2 | Status: SHIPPED | OUTPATIENT
Start: 2023-05-25 | End: 2023-08-31

## 2023-05-25 RX ORDER — ARIPIPRAZOLE 2 MG/1
4 TABLET ORAL DAILY
Qty: 60 TABLET | Refills: 2 | Status: SHIPPED | OUTPATIENT
Start: 2023-05-25 | End: 2023-10-05

## 2023-05-25 ASSESSMENT — PATIENT HEALTH QUESTIONNAIRE - PHQ9
SUM OF ALL RESPONSES TO PHQ QUESTIONS 1-9: 3
SUM OF ALL RESPONSES TO PHQ QUESTIONS 1-9: 3
10. IF YOU CHECKED OFF ANY PROBLEMS, HOW DIFFICULT HAVE THESE PROBLEMS MADE IT FOR YOU TO DO YOUR WORK, TAKE CARE OF THINGS AT HOME, OR GET ALONG WITH OTHER PEOPLE: NOT DIFFICULT AT ALL

## 2023-05-25 NOTE — PROGRESS NOTES
Video- Visit Details  Type of service:  video visit for medication management  Time of service:    Date:  05/25/2023    Video Start Time: 8:08      Video End Time:  8:26    Patient location: work    Provider location: on-site    Platform: [facility]  Psychiatry Clinic  MEDICAL PROGRESS NOTE     CARE TEAM:  PCP- Funmilayo Grubbs    Psychotherapist- Delvis Lynch This person is a 63 year old who uses the name Patricia and pronouns she, her.      DIAGNOSIS     Major depressive disorder, recurrent, in partial remission, with seasonal component  ADHD by history     ASSESSMENT   Patricia Perez is a 62 year old female with history of recurrent depression as well as reported history of ADHD who presents today for medication follow-up; she reports doing well overall. Has found current medication, recent engagement in therapy, and ongoing physical activity helpful for mood.     Reports continued teeth grinding and restlessness.  Exacerbated by stress. I considered if this may be dystonia or akathisia secondary to aripiprazole though sxs have been longstanding, they are not closely correlated with medication administration timing, also not functionally impairing at this time.     Recent metabolic labwork completed by cardiology show normal lipids and A1c.     We agreed upon no medication changes today. I discussed if mood is stable 6+ months could consider dose-reductions in medication at that time. If worsening of mood could return to individual therapy and/or increase venlafaxine.     No safety concerns today. No other questions nor concerns.     Future considerations  - consider increasing venlafaxine to max dose as-tolerated, monitor BP  - consider stimulant for ADHD symptoms (not very burdensome as of 11/18/22) and/or antidepressant augmentation   - hx gastric bypass in 2018, needs IR formulations   - bupropion for mood / concentration? Would need IR if trialing  "this    [facility] review was not needed today.     PLAN                                                                                                                1) Meds-   - Continue aripiprazole 4 mg daily  - Continue venlafaxine to 75 mg TID     Other medications:  1.  Aspirin 81 mg a day.  2.  Carvedilol 25 mg twice daily.  3.  Prempro.  4.  Ezetimibe 10 mg a day.  5.  Hydralazine 10 mg twice daily.  6.  Isosorbide mononitrate 30 mg a day.  7.  Lisinopril 5 mg a day.  8.  Meclizine.  9.  Ondansetron.  10.  Simvastatin 20 mg a day.  11. otc Vitamin D     2) Psychotherapy- continue individual psychotherapy     3) Next due-  Labs- last AP labs done Feb 2023 (by cardiology) repeat annually if not collected by outside providers   EKG- 1/7/20: 447 ms @ 54 bpm  Rating scales- PHQ9, AIMS due Nov 2023     4) Referrals-  Weight management referral placed 8/15/22 callback #[phone], established care there      5) Dispo- RTC 3 months     PERTINENT BACKGROUND                                                    [most recent eval 08/15/22]   Components copy-forwarded from prior assessment. Last reviewed with patient and updated with patient on 08/15/22.     Onset of mental health problems at age 13-14 years old when younger brother passed away. Denies memory of significant trauma or abuse and poor memory childhood. Did well in school and no discpline issues or legal issues during childhood. Father with alcohol use disorder.  Started mental health treatment in 1995. Niece passed away in MVA in 2020, COVID in 2020 both were disruptive to mental health (worsened depression). History gastric bypass in 2018.      Psych pertinent item history includes trauma hx and eating disorder (binge eating)     SUBJECTIVE   Last seen in clinic 2 months ago, at which time no medication changes were made. Since last being seen, they report:    Mood:   \"good. I think I'm doing much better. I've been noticing that I'm not as discombobulated as " "I have been in the past. Was having so many different things that I couldn't concentrate on any one of them. And my mood, I feel better. I'm not as down. I have [bad] days but not as often. Maybe one a week when I'm tired.     Social update: no changes, work going well    Sleep:   \"I sleep as well as I can with the cat waking me up.\" daytime energy \"I do really well until about 3:30.\"     works from 5am-3pm to accomodate sleep schedule.     Physical Health:   \"I think my physical health is better. I've been still going to the gym at least twice a week with my . I do walking and now that it's nice I go walk outside.\"      Went to weight management clinic \"they said I shouldn't feel bad about having gained some weight. That took off the feeling that I was a failure.\"      Substance use: no changes    Meds:   \"I think they're doing fine. I don't know that there was a huge difference going from once a day to three but I'm feeling better so I'm gonna stick with it.\" sometimes misses noon dose \"more days than not I take it. 6/7 days I get all 3\"      teeth grinding and restlessness? - \"I've just decided that that's just going to go on.\" and \"I do get a little antsy and I don't know what that is.\" worse in the evenings (not after AM medicine). And \"I have never been able to sit still.\"      Therapy: recently terminated with Dr Kay Pinto \"I think that was the way to do it. We were basically looking at each other saying things were pretty good.\"  States has some friends who are close social supports \"and my  is good too I just chose not to use him.\"      SI:  \"no, never. I've never had that.\"         FAMILY and SOCIAL HISTORY                                 pt reported   Components copy-forwarded from prior assessment. Last reviewed with patient and updated with patient on 08/15/22.     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 Hopes to retire ~age 64.   Partner/ -  in 2017 to her first , Mitchell, relationship feels very strong   Children- None      Living situation- Lives in a home in [place], [place] with  and cat (Ms. Queen), recently purchased house  Social/ Spiritual Support-  Mitchell, three close friends        Feels Safe at Home- yes   Legal- None     Trauma History (self-report)- Younger brother  of Leukemia when patient was in 8th grade. Niece  in MVA on 2020  Early History/Education- Born and raised in [place], [place]. Family is still in [place]. Grad school in [place].  Reports her childhood was jackie, father was an alcoholic.  Lived in a trailer with 4 siblings.      PAST MED TRIALS      Medication Max Dose (mg) Dates / Duration Helpful? DC Reason / Adverse Effects?   bupropion 300 8377-3209 N Initially helpful then lost efficacy   Adderall 25 7345-5404 N Lack of efficacy   sertraline 100 3022-6653 N Initially helpful then lost efficacy   venlafaxine 225 -current Y     aripiprazole 4 -current Y                                                                   Denies history of allergies   Dec 2020: Adderall, sertraline stopped     21: Started aripiprazole  3/31/21: Increased aripiprazole to 4 mg daily  21: Discontinued sertraline  10/22/21: No changes.  22: No medication changes. [facility] referral placed. SAD light ordered.   8/15/22: transfer visit, no changes   22: worse mood. venlafaxine changed to IR BID. SAD light re-ordered.  23: venlafaxine changed to TID dosing, no other changes   23: no changes   23: no changes      PAST SUBSTANCE USE HISTORY   Components copy-forwarded from prior assessment. Last reviewed with patient and updated with patient on 08/15/22.     Past  "Use- Reports history of occasional alcohol use from teens to 30's \"never drank to the point where I was overembibing\" (does not drink now d/t  being recovering alcoholic), tried marijuana occasionally In the 1970s   Treatment- #, most recent- no  Medical Consequences- no  Legal Consequences- no  Other- N/A     MEDICAL HISTORY and ALLERGY     ALLERGIES: Atorvastatin    Patient Active Problem List   Diagnosis     Major depression, recurrent (H)     Dizziness and giddiness     Motion sickness     GERD (gastroesophageal reflux disease)     Idiopathic cardiomyopathy (H)     Sleep apnea     Hyperlipidemia LDL goal <100     Allergic rhinitis     Hypertension goal BP (blood pressure) < 130/80     Pelvic pain in female     Health Care Home     24 hour contact handout given     Class 1 obesity with serious comorbidity and body mass index (BMI) of 33.0 to 33.9 in adult, unspecified obesity type     Elevated PTHrP level     H/O bariatric surgery     Hx of gastric bypass     Hypovitaminosis D     Allergic rhinitis     Attention deficit disorder of adult     Mild persistent asthma     Hand joint pain     Myopia, bilateral     Dumping syndrome     Benign essential hypertension     Adjustment disorder with depressed mood     Decreased hearing of both ears     BPV (benign positional vertigo), unspecified laterality     Advanced directives, counseling/discussion     Vitamin D deficiency     Coronary artery disease involving native coronary artery of native heart without angina pectoris     Acute pain of left knee     Syncope     Posterior vitreous detachment of both eyes     Combined forms of age-related cataract, mild, of both eyes     Glaucoma suspect of both eyes     Sciatica of right side     Hypoglycemia     Raynaud's disease without gangrene        MEDICAL REVIEW OF SYSTEMS     A comprehensive review of systems was performed and is negative other than noted in the HPI.     MEDICATIONS     Current Outpatient Medications "   Medication Sig Dispense Refill     albuterol (PROAIR HFA/PROVENTIL HFA/VENTOLIN HFA) 108 (90 Base) MCG/ACT inhaler Inhale 2 puffs into the lungs every 4 hours as needed for shortness of breath / dyspnea or wheezing 18 g 0     ARIPiprazole (ABILIFY) 2 MG tablet Take 2 tablets (4 mg) by mouth daily 60 tablet 2     aspirin (ASA) 81 MG EC tablet Take 1 tablet (81 mg) by mouth daily 90 tablet 3     azelastine (ASTELIN) 0.1 % nasal spray Spray 2 sprays into both nostrils 2 times daily as needed for rhinitis 30 mL 3     azelastine (OPTIVAR) 0.05 % ophthalmic solution Apply 1 drop to eye 2 times daily as needed (itchy/watery eyes) 6 mL 3     blood glucose (NO BRAND SPECIFIED) lancing device Device to be used with lancets. 3 each 3     blood glucose (NO BRAND SPECIFIED) test strip Use to test blood sugar 3 times daily 300 strip 3     calcium carbonate-vitamin D (CALTRATE 600+D) 600-400 MG-UNIT CHEW Take 1 chew tab by mouth 2 times daily 180 tablet 3     carvedilol (COREG) 25 MG tablet Take 1 tablet (25 mg) by mouth 2 times daily (with meals) 180 tablet 3     cetirizine (ZYRTEC) 10 MG tablet Take 1 tablet (10 mg) by mouth daily 30 tablet 3     Continuous Blood Gluc Sensor (iS[employer] GlobalSTYLE JOHN 14 DAY SENSOR) [facility] 1 each every 14 days Use 1 Sensor every 14 days. Use to read blood sugars per 's instructions. 2 each 5     Cyanocobalamin (VITAMIN B-12 SL) Place under the tongue every other day        estrogen conj-medroxyPROGESTERone (PREMPRO) 0.45-1.5 MG tablet Take 1 tablet by mouth daily 90 tablet 3     ezetimibe (ZETIA) 10 MG tablet Take 1 tablet (10 mg) by mouth daily 90 tablet 3     FEROSUL 325 (65 Fe) MG tablet TAKE 1 TABLET(325 MG) BY MOUTH TWICE DAILY 180 tablet 0     hydrALAZINE (APRESOLINE) 10 MG tablet Take 1 tablet (10 mg) by mouth 3 times daily 270 tablet 3     Ibuprofen-diphenhydrAMINE Cit (IBUPROFEN PM PO) Take by mouth At Bedtime       isosorbide mononitrate (IMDUR) 30 MG 24 hr tablet Take 1 tablet (30  "mg) by mouth daily 90 tablet 3     lisinopril (ZESTRIL) 5 MG tablet TAKE 1 TABLET(5 MG) BY MOUTH DAILY 90 tablet 3     meclizine (ANTIVERT) 25 MG tablet Take 1 tablet (25 mg) by mouth 3 times daily as needed 30 tablet 1     montelukast (SINGULAIR) 10 MG tablet TAKE 1 TABLET(10 MG) BY MOUTH AT BEDTIME 90 tablet 0     Multiple Vitamin (MULTI-VITAMIN) per tablet Take 1 tablet by mouth daily. 100 tablet 3     naltrexone (DEPADE/REVIA) 50 MG tablet Take 1/2 tablet once daily 1-2 hours prior to worst cravings for 1 week, then increase to 1 tablet daily as directed if tolerating 30 tablet 3     NIFEdipine ER (ADALAT CC) 30 MG 24 hr tablet Take 1 tablet (30 mg) by mouth daily 60 tablet 1     ondansetron (ZOFRAN-ODT) 4 MG ODT tab Take 1 tablet (4 mg) by mouth every 8 hours as needed for nausea 15 tablet 1     simvastatin (ZOCOR) 40 MG tablet Take 1 tablet (40 mg) by mouth At Bedtime 90 tablet 3     venlafaxine (EFFEXOR) 75 MG tablet Take 1 tablet (75 mg) by mouth 3 times daily 90 tablet 2      VITALS   LMP 06/01/2012     MENTAL STATUS EXAM     Alertness: alert  and oriented  Appearance: well groomed  Behavior/Demeanor: cooperative, pleasant and calm, with good  eye contact   Speech: regular rate and rhythm  Language: intact  Psychomotor: normal or unremarkable  Mood: \"better\"  Affect: full range; congruent to: mood- yes, content- yes  Thought Process/Associations: unremarkable  Thought Content:  Reports none;  Denies suicidal & violent ideation and delusions  Perception:  Reports none;  Denies auditory hallucinations and visual hallucinations  Insight: good - acknowledges and describes mental health symptoms  Judgment: good  Cognition: not formally assessed, adequate for brief interview  Gait and Station: N/A (telehealth)     LABS and DATA         3/8/2023    12:49 PM 4/21/2023     8:53 AM 5/25/2023     7:56 AM   PHQ   PHQ-9 Total Score 6 5 3   Q9: Thoughts of better off dead/self-harm past 2 weeks Not at all Not at all Not " at all       Recent Labs   Lab Test 02/06/23  0735 09/12/22  0712   CR 0.74 0.74   GFRESTIMATED 90 90     Recent Labs   Lab Test 01/28/22  0726 01/07/20  0740   AST 12 12   ALT 36 24   ALKPHOS 78 83         ECG on 1/7/20 QTc = 447 ms @ 54 bpm. Sinus bradycardia     PSYCHOTROPIC DRUG INTERACTIONS                                                       PSYCHCLINICDDI   Per [employer]    Concurrent use of ARIPIPRAZOLE and QT INTERVAL PROLONGING DRUGS may result in increased risk of QT interval prolongation.    MANAGEMENT:  Monitoring for adverse effects and periodic EKGs     RISK STATEMENT for SAFETY     Patricia did not appear to be an imminent safety risk to self or others.     TREATMENT RISK STATEMENT: The risks, benefits, alternatives and potential adverse effects have been discussed and are understood by the pt. The pt understands the risks of using street drugs or alcohol. There are no medical contraindications, the pt agrees to treatment with the ability to do so. The pt knows to call the clinic for any problems or to access emergency care if needed.  Medical and substance use concerns are documented above.  Psychotropic drug interaction check was done, including changes made today.     PROVIDER: Allyson Benítez MD    Patient not staffed in clinic.  Note will be reviewed and signed by supervisor Dr. Javier.      Level of Medical Decision Making:   - At least 2 stable chronic problems  - Engaged in prescription drug management during visit (discussed any medication benefits, side effects, alternatives, etc.)

## 2023-05-25 NOTE — NURSING NOTE
Is the patient currently in the state of [place]? YES    Visit mode:VIDEO    If the visit is dropped, the patient can be reconnected by: VIDEO VISIT: Text to cell phone: [phone]    Will anyone else be joining the visit? NO      How would you like to obtain your AVS? MyChart    Are changes needed to the allergy or medication list? NO    Reason for visit: R[facility]

## 2023-05-25 NOTE — PROGRESS NOTES
"Virtual Visit Details    Type of service:  Video Visit     Originating Location (pt. Location): {video visit patient location:240200::\"Home\"}  {PROVIDER LOCATION On-site should be selected for visits conducted from your clinic location or adjoining [facility] hospital, academic office, or other nearby [facility] building. Off-site should be selected for all other provider locations, including home:359031}  Distant Location (provider location):  {virtual location provider:301367}  Platform used for Video Visit: {Virtual Visit Platforms:270914::\"Viewbix\"}  "

## 2023-05-25 NOTE — PATIENT INSTRUCTIONS
**For crisis resources, please see the information at the end of this document**   Patient Education    Thank you for coming to the [facility] [place] [facility].     Lab Testing:  If you had lab testing today and your results are reassuring or normal they will be mailed to you or sent through [employer] within 7 days. If the lab tests need quick action we will call you with the results. The phone number we will call with results is # [phone]. If this is not the best number please call our clinic and change the number.     Medication Refills:  If you need any refills please call your pharmacy and they will contact us. Our fax number for refills is [phone].   Three business days of notice are needed for general medication refill requests.   Five business days of notice are needed for controlled substance refill requests.   If you need to change to a different pharmacy, please contact the new pharmacy directly. The new pharmacy will help you get your medications transferred.     Contact Us:  Please call [phone] during business hours (8-5:00 M-F).   If you have medication related questions after clinic hours, or on the weekend, please call [phone].     Financial Assistance [phone]   Medical Records [phone]       MENTAL HEALTH CRISIS RESOURCES:  For a emergency help, please call 911 or go to the nearest Emergency Department.     Emergency Walk-In Options:   EmPATH Unit @ [facility] ([place]): [phone] - Specialized mental health emergency area designed to be calming  [facility] ([place]): [phone]  [facility] ([place]): [phone]  [facility]): [phone]    [facility] Crisis Information:   [place]: [phone]  [place]: [phone]  [place] ([place]) - Adult: [phone]     Child: [phone]  [place] - Adult: [phone]     Child: [phone]  [place]:  [phone]  List of all [facility] resources:   https://[place].gov/dhs/people-we-serve/adults/health-care/mental-health/resources/crisis-contacts.jsp    National Crisis Information:   Crisis Text Line: Text  [place]  to 508817  Suicide & Crisis Lifeline: 988  National Suicide Prevention Lifeline: 3-244-053-TALK (1-307.690.5540)       For online chat options, visit https://suicidepreventionlifeline.org/chat/  Poison Control Center: 0-651-922-7668  Trans Lifeline: 1-174-052-4134 - Hotline for transgender people of all ages  The [employer]ardo Project: 0-585-601-4197 - Hotline for LGBT youth     For Non-Emergency Support:   Fast Tracker: Mental Health & Substance Use Disorder Resources -   https://www.[employer]ce[employer].org/

## 2023-06-01 ENCOUNTER — HEALTH MAINTENANCE LETTER (OUTPATIENT)
Age: 63
End: 2023-06-01

## 2023-06-05 ENCOUNTER — TELEPHONE (OUTPATIENT)
Dept: PSYCHOLOGY | Facility: CLINIC | Age: 63
End: 2023-06-05

## 2023-06-05 NOTE — TELEPHONE ENCOUNTER
Pt is requesting new rx for    Hydrochlorothiazide 12.5mg caps    Did not see on active med list please verify and send new rx    [facility]  [phone]

## 2023-06-09 ENCOUNTER — TELEPHONE (OUTPATIENT)
Dept: FAMILY MEDICINE | Facility: CLINIC | Age: 63
End: 2023-06-09
Payer: COMMERCIAL

## 2023-06-09 NOTE — TELEPHONE ENCOUNTER
Patient has not been seen by Funmilayo Grubbs since 3/28/22?   Who is her current PCP?    hydrochlorothiazide not on med list, is she taking this?    Attempted to call patient at home/mobile number, no answer, left message on voicemail; patient was instructed to return call to [facility] at [phone].    Patricia Bernard RN  [facility]

## 2023-06-09 NOTE — TELEPHONE ENCOUNTER
Patient is requesting a rx for:    Hydrochlorothiazide 12.5mg    Did not see on active med list can you please verify and send new rx? Thank you!    [facility]  [phone]

## 2023-06-13 ENCOUNTER — MYC MEDICAL ADVICE (OUTPATIENT)
Dept: FAMILY MEDICINE | Facility: CLINIC | Age: 63
End: 2023-06-13
Payer: COMMERCIAL

## 2023-06-13 NOTE — TELEPHONE ENCOUNTER
See [employer] message.  Patient did not request this medication.    Shelia RN BSN  Triage Nurse  [facility]: [facility]  Ph: [phone]

## 2023-06-13 NOTE — TELEPHONE ENCOUNTER
Called patient. Patient picked up phone, but immediately call was disconnected.  Will try again later.      Shelia RN BSN  Triage Nurse  [facility]: [facility]  Ph: [phone]

## 2023-06-13 NOTE — TELEPHONE ENCOUNTER
Called patient.  Did they answer the phone: No, left a message on voicemail to return call to the [facility] at [phone], and to ask for any available triage nurse.    [employer] message also sent to patient.  Shelia MISTRY  Triage Nurse  [facility]: [facility]

## 2023-06-15 ENCOUNTER — THERAPY VISIT (OUTPATIENT)
Dept: OCCUPATIONAL THERAPY | Facility: CLINIC | Age: 63
End: 2023-06-15
Attending: PHYSICIAN ASSISTANT
Payer: COMMERCIAL

## 2023-06-15 DIAGNOSIS — G56.01 CARPAL TUNNEL SYNDROME OF RIGHT WRIST: ICD-10-CM

## 2023-06-15 DIAGNOSIS — M79.641 PAIN OF RIGHT HAND: ICD-10-CM

## 2023-06-15 PROCEDURE — 97760 ORTHOTIC MGMT&TRAING 1ST ENC: CPT | Mod: GO | Performed by: OCCUPATIONAL THERAPIST

## 2023-06-15 PROCEDURE — 97165 OT EVAL LOW COMPLEX 30 MIN: CPT | Mod: GO | Performed by: OCCUPATIONAL THERAPIST

## 2023-06-15 PROCEDURE — 97535 SELF CARE MNGMENT TRAINING: CPT | Mod: GO | Performed by: OCCUPATIONAL THERAPIST

## 2023-06-15 NOTE — PROGRESS NOTES
OCCUPATIONAL THERAPY EVALUATION  Type of Visit: Evaluation    See electronic medical record for Abuse and Falls Screening details.    Subjective      Presenting condition or subjective complaint: pain in right hand  Date of onset: 05/24/23 (therapy referral)    Relevant medical history: Arthritis; Asthma; Depression; Dizziness; Hearing problems; High blood pressure; Menopause; Overweight   Dates & types of surgery: weight loss surgery    Prior diagnostic imaging/testing results: X-ray     Prior therapy history for the same diagnosis, illness or injury: No      Prior Level of Function   ADL: Independent    Living Environment  Social support: With a significant other or spouse   Type of home: House; Basement   Stairs to enter the home: Yes 3 Is there a railing: Yes   Ramp: No   Stairs inside the home: Yes   Is there a railing: Yes   Help at home: None  Equipment owned:       Employment: Yes   Hobbies/Interests: cooking, reading    Patient goals for therapy: write and stir more easily     Objective     ADDITIONAL HISTORY:  Right hand dominant  Patient reports symptoms of pain, stiffness/loss of motion, weakness/loss of strength and edema over the past 2 months with insidious onset  Transportation: drives  Currently working in normal job without restrictions    Functional Outcome Measure:   Functional Outcome Measure:  Upper Extremity Functional Index  SCORE:   Column Totals: 69/80  (A lower score indicates greater disability.)    Pain Level (Scale 0-10)   6/15/2023   At Rest 2   With Use 5-6   Worst 8-9     Pain Description  Date 6/15/2023   Location wrist and thumb   Pain Quality Aching and Sharp   Frequency intermittent sharp pain, constant ache   Pain is worst  daytime and nighttime   Exacerbated by  writing, gripping, pinching   Relieved by heat and NSAIDs   Progression Gradually worsening     ROM  Pain Report: - none  + mild    ++ moderate    +++ severe   Thumb 6/15/2023 6/15/2023   AROM  (PROM) Left  Right   MP /60 /35-   IP /60 /70-   RABD 45 35+   PABD 45 45+   Retropulsion (cm) 5 cm 4 cm+ slight   Kapandji Opposition Scale (0-10/10) 10/10 9/10+     Thumb Observation/Appearance   - none  + mild    ++ moderate    +++ severe     6/15/2023   Shoulder deformity present over CMC +   Edema over the CMC joint + slight   Noted collapse of MP into hyperextension during pinch + slight      Edema  Mild dorsal wrist and long MCP joint    Provocative Tests  Pain Report:  - none    + mild    ++ moderate    +++ severe     6/15/2023   CMC Grind test -   Crepitus present -   CMC Adduction Stress Test + slight   CMC Extension Stress Test +   Finkelstein's -       Special NerveTests   - none    + mild    ++ moderate    +++ severe       6/15/2023   Elbow Flexion Test -   Tinels Cubital Tunnel -   Tinels Guyons Canal -   Median Nerve Compression at Pronator -   Carpal-Compression Test + slight   Tinels at Carpal Tunnel + slight   Phalens + slight 60 secs     Strength   (Measured in pounds)  Pain Report: - none  + mild    ++ moderate    +++ severe    6/15/2023 6/15/2023   Trials Left Right   1  2  3 38  39  36 22-  22-  18+   Average 38 21     Lat Pinch 6/15/2023 6/15/2023   Trials Left Right   1  2  3 11  11  10 9+  8+  9+   Average 11 9     3 Pt Pinch 6/15/2023 6/15/2023   Trials Left Right   1  2  3 8  8  7 6++  7++  7++   Average 8 7     Palpation   Pain Report:  - none    + mild    ++ moderate    +++ severe    6/15/2023   CMC Joint Line + slight   Thenar Addison + slight   Web Space -   1st DC +   Radial Styloid +   FCR +     Assessment & Plan   CLINICAL IMPRESSIONS   Medical Diagnosis: R hand CTS    Treatment Diagnosis: R hand/thumb pain    Impression/Assessment: Pt is a 63 year old female presenting to Occupational Therapy due to right hand and thumb pain. Patient's limitations or Problem List includes: Pain, Decreased ROM/motion, Increased edema, Weakness, Decreased stability, Decreased , Decreased pinch and  Tightness in musculature of the right hand and thumb which interferes with the patient's ability to perform Self Care Tasks (eating), Work Tasks, Recreational Activities and Household Chores as compared to previous level of function.    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, meal preparation and cleanup, work and leisure activities  Clinical Decision Making (Complexity): Low complexity    PLAN OF CARE  Treatment Interventions:   Modalities:  Paraffin  Therapeutic Exercise:  AROM, PROM, Tendon Gliding, Isometrics and Stabilization  Manual Techniques:  Joint mobilization and Myofascial release  Orthotic Fabrication:  Static Hand and PForearm based  Self Care:  Self Care Tasks and Joint Protection and Adaptive Equipment Education     Long Term Goals   OT Goal 1  Goal Identifier: writing  Goal Description: Pt will decrease pain to be able to write with mild to no difficulty  Rationale:  (for I with ADL's and work tasks)  Goal Progress: current pain 9/10 at worst  Target Date: 09/12/23      Frequency of Treatment: 2 x per month  Duration of Treatment: 3 months     Recommended Referrals to Other Professionals: none  Education Assessment: Learner/Method: Patient;Demonstration;Pictures/Video  Education Comments: printed PTRx     Risks and benefits of evaluation/treatment have been explained.   Patient/Family/caregiver agrees with Plan of Care.     Evaluation Time:    OT Eval, Low Complexity Minutes (71614): 20   Present: Not applicable     Signing Clinician: Oksana Deleon OT

## 2023-06-20 ENCOUNTER — ALLIED HEALTH/NURSE VISIT (OUTPATIENT)
Dept: RESEARCH | Facility: CLINIC | Age: 63
End: 2023-06-20
Payer: COMMERCIAL

## 2023-06-20 VITALS
RESPIRATION RATE: 18 BRPM | SYSTOLIC BLOOD PRESSURE: 132 MMHG | WEIGHT: 187.4 LBS | OXYGEN SATURATION: 96 % | DIASTOLIC BLOOD PRESSURE: 71 MMHG | HEIGHT: 61 IN | BODY MASS INDEX: 35.38 KG/M2 | HEART RATE: 85 BPM

## 2023-06-20 DIAGNOSIS — Z00.6 EXAMINATION OF PARTICIPANT OR CONTROL IN CLINICAL RESEARCH: Primary | ICD-10-CM

## 2023-06-20 PROCEDURE — 99207 PR NO CHARGE-RESEARCH SERVICE: CPT

## 2023-06-20 NOTE — PROGRESS NOTES
"             Watt Study Note    Study Description: This is a prospective intervention-based study that will involve data collection from individuals with measured or perceived mild-to-moderate hearing loss. This study is deemed to meet the qualification of a research study with non-significant risk.           Subject ID: [id number]      SCREENING     Demographic Info  Patricia Perez   1960          63 year old  female    Race: White  Ethnicity: Non-/         Medical History:  Has the subject experienced any past and/or concomitant Medical History in the following categories: Yes  -Ear/Nose/Throat:  None  -Dementia or other neurological condition preventing following instructions':  None  -Vascular conditions:   Coronary Artery Disease, Start Date: (11/21/2008), Ongoing  Cardiomyopathy Start Date: (11/21/2008), Ongoing     If yes, record that medical history in [facility], other conditions are NOT entered into [facility] and deleted from below        Medications  Has the subject taken either of these medications in the last 6 months?   Parenteral Aminoglycoside Antibiotics: No   (ex. gentamicin, amikacin, tobramycin, neomycin, streptomycin and others)  Chemotherapy and/or radiation to Head and/or Neck: No        Vitals:  Time Subject Sat: 1327   /71   Pulse 85   Resp 18   Ht 1.549 m (5' 1\")   Wt 85 kg (187 lb 6.4 oz)   LMP 06/01/2012   SpO2 96%   BMI 35.41 kg/m         Perceived Hearing Loss:  1. Do you have trouble hearing speech in noisy places? Yes  2. Do you find it hard to follow speech in groups? Yes  3. Do you have trouble hearing on the phone? No  4. Do you need to turn up the volume on the TV or radio, and other people complain it is too loud? Yes    ELIGIBILITY CRITERIA     Protocol Version: 2.0 (24-May-2023)  Consent Version: 3.0 (8-Jun-2023)  Criteria #  Inclusion Criteria (ALL MUST BE YES)  YES/NO/N/A   1  Age > 18 years Yes   2  Proficient in written and spoken English, defined " by self report Yes   3  Mild- to Moderate- hearing loss as measured by pure tone audiometry (PTA) reference test, or self-report of perceived hearing loss and 15-19 dB hearing loss (by 4PTA) NA   4  Participants have access to stable internet connection  Yes           Criteria # Exclusion Criteria (ALL MUST BE NO) YES/NO/N/A   5  Ear anatomy non-conducive to comfortable wear of headphone  NA   6  Active ear disease  No   7  Cerumen impaction that cannot be removed  No   8  Sudden loss of hearing (in the preceding 90 days), defined by self-report No   9  Self-report of loud environmental sound exposure (e.g., concert, construction site, fireworks) without hearing protection, within 72 hours of reference PTA assessed at Clinic Visit 1 NA   10  Tinnitus that impacts one's daily life, defined by self-report No   11  Use of cochlear implants No   12  Self-reported issues with small or confined spaces such as a single-person enclosed vizcarra, and/or claustrophobia No   13  Health technology, [employer], media outlet employees (or spouse of employees) or employees of /sites contracted to execute this study  No   14  User noted preference to not wear headphone consistently, or charge headphone and smartphone consistently, during field-use  No   15  Hearing loss >60 dB HL at 0.25-3kHz and >65 dB HL at 4kHz in either ear; assessed during PTA NA   16  Hearing loss that requires electroacoustic settings which are not acoustically stable in the participant's ear per audiologist judgement NA   17  Current regular use of hearing aids No   18  Active treatment, or treatment in the past 6 months, with either a chemotherapeutic drug for cancer, or radiation therapy to the head or neck region No   19  Active treatment, or treatment in the past 6 months, with parenteral aminoglycoside antibiotics  No   20   In the Investigator's opinion, unable to adhere to study procedures No     Patient does fulfill study inclusion criteria and no  exclusion criteria are found at this time. IE criteria assessment will be completed at Clinic Visit 1.    Subject Disposition Summary  Is there a change in Subject Disposition? No    20-JUN-2023  Ruma Rosa RN

## 2023-06-20 NOTE — PROGRESS NOTES
[place] Study Physical Exam      Medical History Reviewed? Yes  (Parenteral aminoglycoside antibiotics: gentamicin, amikacin, tobramycin, neomycin, and streptomycin, ect )    Does Patricia use hearing aids daily? No    Physical Examination  For abnormal findings, please evaluate if the finding is Clinically Significant (by 'CS') or Not Clinically Significant (by 'NCS')  General Appearance   Abnormal; NCS obese  Head and Neck   Abnormal; NCS wears glasses  Lungs     Normal  Cardiovascular   Normal  Abdomen    Abnormal; NCS obese  Lymph Nodes   Normal  Skin     Normal  Neurological    Normal      Vitals:    06/20/23 1327   BP: 132/71   Pulse: 85   Resp: 18   SpO2: 96%              Otoscopy    Left Ear Right Ear   Ear Canal  Normal after ear irrigation Normal   Abnormal Findings Description  Loose cerumen cleared freely from membrane with irrigation N/A   Finding of Other Description N/A N/A         Cerumen Disimpaction Procedure Note    Provider: Kamille Poe PA-C    Consent:  Informed verbal consent; Writer discussed with the subject that study visits include examination of the ears by a clinician who may remove ear wax if deemed they may experience discomfort with ear exams and/or removal of wax. Removal of ear wax is considered safe but does have potential risks of eardrum perforation, a laceration (cut) in the ear canal, dizziness, ringing in the ears, loss of ability to hear, nausea, vertigo, nystagmus and inability to remove ear wax.     Background:   When did the symptoms begin?: asymtomatic   Describe the duration of the symptoms: n/a   Affected Ear: left side  Location: n/a  Pain Severity: 0/10  Additional Symptoms: none  Affect on ADLs: none   History: cerumen impaction  Interventions Taken Prior to Today: none   Measures which relieve Symptoms: none     Provider ear assessment completed prior to ear irrigation. Otoscopic exam reveals cerumen present and irrigation advised. Patient was screened  for ear irrigations exclusion critera and has the following There are NO exclusion criteria present Otoscopic exam reveals cerumen present and irrigation advised. Post Ear Irrigation exam completed and cerumen plug removed, tympanic membrane intact and no bleeding noted.  Pain assessment completed.  Patient tolerated procedure:  yes      20-JUN-2023    Kamille Poe PA-C

## 2023-06-20 NOTE — PROGRESS NOTES
[facility]att Study Consent    Study Description: This is a prospective intervention-based study that will involve data collection from individuals with measured or perceived mild-to-moderate hearing loss. This study is deemed to meet the qualification of a research study with non-significant risk.          Patricia Perez a 63 year old female, was on-site  today to discuss participation in the Watt Study.       The consent form was reviewed with the patient.     The review of the study included:    Study Purpose      Participant Responsibilities      Study Data and Devices      Benefits and Risks of Participation      Compensation and Costs of Participation      Voluntary Participation      Confidentiality      Injury and Legal Rights      Protocol Version: 2.0     Subject ID: [id number]    The subject was queried in regards to her willingness to continue and her questions were answered to her satisfaction.     The patient has given her agreement to volunteer and participate in the above noted study.     The eConsent and HIPAA form version 3.0 (8-Jun-2023) was signed on  20-JUN-2023 with the Clinical Research Unit of [facility].     A copy of the Watt consent will be placed in subject's medical record. A copy of the consent form was given to the subject today.    Study data is directly entered into [employer] and [employer] per protocol.     No study procedures were done prior to Patricia Perez providing informed consent.       20-JUN-2023    Ruma Rosa RN

## 2023-06-22 ENCOUNTER — THERAPY VISIT (OUTPATIENT)
Dept: OCCUPATIONAL THERAPY | Facility: CLINIC | Age: 63
End: 2023-06-22
Attending: PHYSICIAN ASSISTANT
Payer: COMMERCIAL

## 2023-06-22 DIAGNOSIS — M79.641 PAIN OF RIGHT HAND: Primary | ICD-10-CM

## 2023-06-22 DIAGNOSIS — G56.01 CARPAL TUNNEL SYNDROME OF RIGHT WRIST: ICD-10-CM

## 2023-06-22 PROCEDURE — 97110 THERAPEUTIC EXERCISES: CPT | Mod: GO | Performed by: OCCUPATIONAL THERAPIST

## 2023-06-22 PROCEDURE — 97763 ORTHC/PROSTC MGMT SBSQ ENC: CPT | Mod: GO | Performed by: OCCUPATIONAL THERAPIST

## 2023-06-22 NOTE — PROGRESS NOTES
Objective Information for 6/22/2023:    Pain Level (Scale 0-10)   6/15/2023 6/22/2023   At Rest 2    With Use 5-6    Worst 8-9 6-7     ROM  Pain Report: - none  + mild    ++ moderate    +++ severe   Thumb 6/15/2023 6/15/2023 6/22/2023   AROM  (PROM) Left Right Right   MP /60 /35-    IP /60 /70-    RABD 45 35+ 40-   PABD 45 45+ 45-   Retropulsion (cm) 5 cm 4 cm+ slight    Kapandji Opposition Scale (0-10/10) 10/10 9/10+      Palpation   Pain Report:  - none    + mild    ++ moderate    +++ severe    6/15/2023 6/22/2023   CMC Joint Line + slight + slight   Thenar Eminence + slight -   Web Space - -   1st DC + + slight   Radial Styloid + -   FCR + +

## 2023-06-26 ENCOUNTER — OFFICE VISIT (OUTPATIENT)
Dept: AUDIOLOGY | Facility: CLINIC | Age: 63
End: 2023-06-26

## 2023-06-26 ENCOUNTER — ALLIED HEALTH/NURSE VISIT (OUTPATIENT)
Dept: RESEARCH | Facility: CLINIC | Age: 63
End: 2023-06-26
Payer: COMMERCIAL

## 2023-06-26 DIAGNOSIS — Z00.6 EXAMINATION OF PARTICIPANT OR CONTROL IN CLINICAL RESEARCH: Primary | ICD-10-CM

## 2023-06-26 PROCEDURE — 92557 COMPREHENSIVE HEARING TEST: CPT | Performed by: AUDIOLOGIST

## 2023-06-26 PROCEDURE — 92567 TYMPANOMETRY: CPT | Performed by: AUDIOLOGIST

## 2023-06-26 PROCEDURE — 99207 PR NO CHARGE NURSE ONLY: CPT

## 2023-06-26 PROCEDURE — V5011 HEARING AID FITTING/CHECKING: HCPCS | Performed by: AUDIOLOGIST

## 2023-06-26 NOTE — PROGRESS NOTES
Watt Study Clinic Visit 1 Note    Study Description: This is a prospective intervention-based study that will involve data collection from individuals with measured or perceived mild-to-moderate hearing loss. This study is deemed to meet the qualification of a research study with non-significant risk.           Patricia Perez a 63 year old female, was seen at the [facility] today to discuss participation in the Watt study and complete Clinic Visit 1.    Subject ID: [id number]    Study data including Pure Tone Reference, Tympanometry, Bone Conduction was captured on the audiogram and paper source and then entered into EDC later by Lico Wise.     Randomization:   Group Confirmed? [x]  Treatment Arm: Self-Fit    Device Accountability Dispense  Was Device Kit Dispensed?  Yes  Date Device Kit was dispensed? 26-JUN-2023  Device Kit ID: [id number]   Phone ID: [id number]   Charging Case ID: [id number]   Case Serial Number: [id number]     Recommended Headphone Tip Size: M  Left Ear Final Tip Size: S   Right Ear Final Tip Size: S     Patient was extensively educated for their treatment arm. All questions were answered and subject voiced understanding.     Issues: N/A    Is there a change in Subject Disposition?  No  Were any Adverse Events (AEs) experienced?  No  Were there any Protocol Deviations? No    26-JUN-2023    Lico Wise

## 2023-06-26 NOTE — PROGRESS NOTES
Watt Study Clinic Visit 1 Note    Study Description: This is a prospective intervention-based study that will involve data collection from individuals with measured or perceived mild-to-moderate hearing loss. This study is deemed to meet the qualification of a research study with non-significant risk.           Patricia Perez a 63 year old female, was seen at the [facility] today to discuss participation in the Watt study.     Pure Tone Reference, Tympanometry and Bone Conduction were performed during this visit.     Subject Status: Enrolled and Randomized    Study data was captured on the audiogram and then entered into EDC later by a Research Coordinator.     No study procedures were done prior to Patricia Perez providing informed consent.     All subject questions were answered and they voiced understanding.     Claire Garcia, AuD  06/26/23

## 2023-06-26 NOTE — PROGRESS NOTES
[place] Inclusion/Exclusion Criteria:    Study Name: Watt   : Saul Coronel MD      Study Description: This is a prospective intervention-based study that will involve data collection from individuals with measured or perceived mild-to-moderate hearing loss. This study is deemed to meet the qualification of a research study with non-significant risk.     Protocol Version: 2.0 (24-May-2023)  Consent Version: 3.0 (8-Jun-2023)    Criteria #  Inclusion Criteria (ALL MUST BE YES)  YES/NO/N/A   1  Age > 18 years  Yes   2  Proficient in written and spoken English, defined by self report Yes   3  Mild- to Moderate- hearing loss as measured by pure tone audiometry (PTA) reference test, or self-report of perceived hearing loss and 15-25 dB hearing loss (by 4PTA) Yes   4  Participants have access to stable internet connection    Yes             Criteria # Exclusion Criteria (ALL MUST BE NO) YES/NO/N/A   5  Ear anatomy non-conducive to comfortable wear of headphone  No   6  Active ear disease    No   7  Cerumen impaction that cannot be removed    No   8  Sudden loss of hearing (in the preceding 90 days), defined by self-report No   9 Self-report of loud environmental sound exposure (e.g., concert, construction site, fireworks) without hearing protection, within 72 hours of reference PTA assessed at Clinic Visit 1 No   10    Tinnitus that impacts one's daily life, defined by self-report No   11  Use of cochlear implants   No   12  Self-reported issues with small or confined spaces such as single-person enclosed vizcarra, and/or claustrophobia No   13  Health technology, fitness, media outlet employees (or spouse of employees) or employees of /sites contracted to execute this study  No   14  User noted preference to not wear headphone consistently, or charge headphone and smartphone consistently, during field-use  No   15  Hearing loss >60 dB HL at 0.25-3kHz and >65 dB HL at 4kHz in either ear; assessed  during PTA No   16  Hearing loss that requires electroacoustic settings which are not acoustically stable in the participant's ear per audiologist judgement No   17    Current regular use of hearing aids No   18    Active treatment, or treatment in the past 6 months, with either a chemotherapeutic drug for cancer, or radiation therapy to the head or neck region No   19    Active treatment, or treatment in the past 6 months, with parenteral aminoglycoside antibiotics  No   20    In the Investigator's opinion, unable to adhere to study procedures No     Patient does fulfill study inclusion criteria and no exclusion criteria are found at this time. IE criteria assessment will be completed at Clinic Visit 1.  Following CV1, participant qualifies for enrollment.     Saul Coronel MD    26-JUN-2023    Lico Wise

## 2023-06-27 ENCOUNTER — OFFICE VISIT (OUTPATIENT)
Dept: FAMILY MEDICINE | Facility: CLINIC | Age: 63
End: 2023-06-27
Payer: COMMERCIAL

## 2023-06-27 VITALS
BODY MASS INDEX: 35.61 KG/M2 | HEIGHT: 61 IN | WEIGHT: 188.6 LBS | DIASTOLIC BLOOD PRESSURE: 72 MMHG | OXYGEN SATURATION: 98 % | TEMPERATURE: 97.4 F | HEART RATE: 74 BPM | SYSTOLIC BLOOD PRESSURE: 106 MMHG | RESPIRATION RATE: 14 BRPM

## 2023-06-27 DIAGNOSIS — E66.812 CLASS 2 SEVERE OBESITY DUE TO EXCESS CALORIES WITH SERIOUS COMORBIDITY AND BODY MASS INDEX (BMI) OF 35.0 TO 35.9 IN ADULT (H): ICD-10-CM

## 2023-06-27 DIAGNOSIS — Z12.31 VISIT FOR SCREENING MAMMOGRAM: ICD-10-CM

## 2023-06-27 DIAGNOSIS — E66.01 CLASS 2 SEVERE OBESITY DUE TO EXCESS CALORIES WITH SERIOUS COMORBIDITY AND BODY MASS INDEX (BMI) OF 35.0 TO 35.9 IN ADULT (H): ICD-10-CM

## 2023-06-27 DIAGNOSIS — R42 DIZZINESS: ICD-10-CM

## 2023-06-27 DIAGNOSIS — E55.9 VITAMIN D DEFICIENCY: ICD-10-CM

## 2023-06-27 DIAGNOSIS — Z00.00 ENCOUNTER FOR MEDICARE ANNUAL WELLNESS EXAM: Primary | ICD-10-CM

## 2023-06-27 PROCEDURE — G0402 INITIAL PREVENTIVE EXAM: HCPCS | Performed by: STUDENT IN AN ORGANIZED HEALTH CARE EDUCATION/TRAINING PROGRAM

## 2023-06-27 RX ORDER — ONDANSETRON 4 MG/1
4 TABLET, ORALLY DISINTEGRATING ORAL EVERY 8 HOURS PRN
Qty: 30 TABLET | Refills: 2 | Status: SHIPPED | OUTPATIENT
Start: 2023-06-27 | End: 2024-10-04

## 2023-06-27 ASSESSMENT — ENCOUNTER SYMPTOMS
NAUSEA: 0
PARESTHESIAS: 0
DIZZINESS: 0
DYSURIA: 0
EYE PAIN: 0
HEARTBURN: 1
HEMATURIA: 0
BREAST MASS: 0
CONSTIPATION: 0
COUGH: 0
FEVER: 0
ARTHRALGIAS: 0
DIARRHEA: 0
SORE THROAT: 0
JOINT SWELLING: 0
HEMATOCHEZIA: 0
FREQUENCY: 0
NERVOUS/ANXIOUS: 0
CHILLS: 0
HEADACHES: 0
SHORTNESS OF BREATH: 0
ABDOMINAL PAIN: 0
WEAKNESS: 0
MYALGIAS: 0
PALPITATIONS: 0

## 2023-06-27 ASSESSMENT — ACTIVITIES OF DAILY LIVING (ADL): CURRENT_FUNCTION: NO ASSISTANCE NEEDED

## 2023-06-27 NOTE — PATIENT INSTRUCTIONS
[facility]       Patient Education   Personalized Prevention Plan  You are due for the preventive services outlined below.  Your care team is available to assist you in scheduling these services.  If you have already completed any of these items, please share that information with your care team to update in your medical record.  Health Maintenance Due   Topic Date Due    Osteoporosis Screening  Never done    Pneumococcal Vaccine (2 - PCV) 07/28/2012    Asthma Action Plan - yearly  09/03/2021    Mammogram  11/17/2021    Eye Exam  01/18/2022    Kidney Microalbumin Urine Test  06/30/2022    COVID-19 Vaccine (6 - Moderna series) 04/10/2023    Zoster (Shingles) Vaccine (2 of 2) 06/08/2023       Understanding USDA MyPlate  The USDA has guidelines to help you make healthy food choices. These are called MyPlate. MyPlate shows the food groups that make up healthy meals using the image of a place setting. Before you eat, think about the healthiest choices for what to put on your plate or in your cup or bowl. To learn more about building a healthy plate, visit www.choosemyplate.gov.     The food groups  Fruits. Any fruit or 100% fruit juice counts as part of the Fruit Group. Fruits may be fresh, canned, frozen, or dried, and may be whole, cut-up, or pureed. Make 1/2 of your plate fruits and vegetables.  Vegetables. Any vegetable or 100% vegetable juice counts as a member of the Vegetable Group. Vegetables may be fresh, frozen, canned, or dried. They can be served raw or cooked and may be whole, cut-up, or mashed. Make 1/2 of your plate fruits and vegetables.  Grains. All foods made from grains are part of the Grains Group. These include wheat, rice, oats, cornmeal, and barley. Grains are often used to make foods such as bread, pasta, oatmeal, cereal, tortillas, and grits. Grains should be no more than 1/4 of your plate. At least half of your grains should be whole grains.  Protein. This group includes meat, poultry,  seafood, beans and peas, eggs, processed soy products (such as tofu), nuts (including nut butters), and seeds. Make protein choices no more than 1/4 of your plate. Meat and poultry choices should be lean or low fat.  Dairy. The Dairy Group includes all fluid milk products and foods made from milk that contain calcium, such as yogurt and cheese. (Foods that have little calcium, such as cream, butter, and cream cheese, are not part of this group.) Most dairy choices should be low-fat or fat-free.  Oils. Oils aren't a food group, but they do contain essential nutrients. However it's important to watch your intake of oils. These are fats that are liquid at room temperature. They include canola, corn, olive, soybean, vegetable, and sunflower oil. Foods that are mainly oil include mayonnaise, certain salad dressings, and soft margarines. You likely already get your daily oil allowance from the foods you eat.  Things to limit  Eating healthy also means limiting these things in your diet:  Salt (sodium). Many processed foods have a lot of sodium. To keep sodium intake down, eat fresh vegetables, meats, poultry, and seafood when possible. Purchase low-sodium, reduced-sodium, or no-salt-added food products at the store. And don't add salt to your meals at home. Instead, season them with herbs and spices such as dill, oregano, cumin, and paprika. Or try adding flavor with lemon or lime zest and juice.  Saturated fat. Saturated fats are most often found in animal products such as beef, pork, and chicken. They are often solid at room temperature, such as butter. To reduce your saturated fat intake, choose leaner cuts of meat and poultry. And try healthier cooking methods such as grilling, broiling, roasting, or baking. For a simple lower-fat swap, use plain nonfat yogurt instead of mayonnaise when making potato salad or macaroni salad.  Added sugars. These are sugars added to foods. They are in foods such as ice cream, candy,  soda, fruit drinks, sports drinks, energy drinks, cookies, pastries, jams, and syrups. Cut down on added sugars by sharing sweet treats with a family member or friend. You can also choose fruit for dessert, and drink water or other unsweetened beverages.  [employer] last reviewed this educational content on 6/1/2020 2000-2022 [employer]. All rights reserved. This information is not intended as a substitute for professional medical care. Always follow your healthcare professional's instructions.          Signs of Hearing Loss  Hearing loss is a problem shared by many people. In fact, it's one of the most common health problems, particularly as people age. Most people aged 65 and older have some hearing loss. By age 80, almost everyone does. Hearing loss often occurs slowly over the years. So, you may not realize your hearing has gotten worse.   When sudden hearing loss occurs, it's important to contact your healthcare provider right away. Your provider will do a medical exam and a hearing exam as soon as possible. This is to help find the cause and type of your sudden hearing loss. Based on your diagnosis, your healthcare provider will discuss possible treatments.      Hearing much better with one ear can be a sign of hearing loss.     Have your hearing checked  Call your healthcare provider if you:   Have to strain to hear normal conversation  Have to watch other people s faces very carefully to follow what they re saying  Need to ask people to repeat what they ve said  Often misunderstand what people are saying  Turn the volume of the television or radio up so high that others complain  Feel that people are mumbling when they re talking to you  Find that the effort to hear leaves you feeling tired and irritated  Notice, when using the phone, that you hear better with one ear than the other  [employer] last reviewed this educational content on 6/1/2022 2000-2022 [employer]. All rights  reserved. This information is not intended as a substitute for professional medical care. Always follow your healthcare professional's instructions.

## 2023-06-27 NOTE — PROGRESS NOTES
"SUBJECTIVE:   Patricia is a 63 year old who presents for Preventive Visit.      6/27/2023     1:11 PM   Additional Questions   Roomed by Hannah     Are you in the first 12 months of your Medicare coverage?  Yes,  Visual Acuity:  Right Eye:  20/30   Left Eye: 20/20 Patient sees an eye doctor. Brenden Thrasher in [place] - May 2023       Healthy Habits:    In general, how would you rate your overall health?  Good    Frequency of exercise:  2-3 days/week    Duration of exercise:  15-30 minutes    Do you usually eat at least 4 servings of fruit and vegetables a day, include whole grains    & fiber and avoid regularly eating high fat or \"junk\" foods?  No    Taking medications regularly:  Yes    Medication side effects:  Not applicable    Ability to successfully perform activities of daily living:  No assistance needed    Home Safety:  No safety concerns identified    Hearing Impairment:  Difficulty following a conversation in a noisy restaurant or crowded room, difficulty following dialogue in the theater, difficult to understand a speaker at a public meeting or Islam service, need to ask people to speak up or repeat themselves, find that men's voices are easier to understand than woman's, difficulty understanding soft or whispered speech and difficulty understanding speech on the telephone    In the past 6 months, have you been bothered by leaking of urine?  No    In general, how would you rate your overall mental or emotional health?  Good      PHQ-2 Total Score:    Additional concerns today:  Yes    Dizziness  Patient reports an intermittent episodes of dizziness since the 1980s. She reports the last severe episode occurred 15 years ago. She reports a lifelong history of motion sickness. She reports use of the Zofran helps with associated symptoms of nausea with dizzy spells occurs. She reports not knowing any associated triggers     Have you ever done Advance Care Planning? (For example, a Health Directive, POLST, or a " discussion with a medical provider or your loved ones about your wishes): Yes, advance care planning is on file.       Fall risk  Fallen 2 or more times in the past year?: No  Any fall with injury in the past year?: No    Cognitive Screening   1) Repeat 3 items (Leader, Season, Table)    2) Clock draw: NORMAL  3) 3 item recall: Recalls 3 objects  Results: 3 items recalled: COGNITIVE IMPAIRMENT LESS LIKELY    Mini-CogTM Copyright AMADEO Sepulveda. Licensed by the author for use in [facility]; reprinted with permission (elias@[facility]). All rights reserved.    Reviewed and updated as needed this visit by clinical staff   Tobacco  Allergies  Meds              Reviewed and updated as needed this visit by Provider                 Social History     Tobacco Use     Smoking status: Never     Smokeless tobacco: Never   Substance Use Topics     Alcohol use: Yes     Comment: seldom         6/27/2023     1:04 PM   Alcohol Use   Prescreen: >3 drinks/day or >7 drinks/week? No     Do you have a current opioid prescription? No  Do you use any other controlled substances or medications that are not prescribed by a provider? None              Current providers sharing in care for this patient include:   Patient Care Team:  Claire Garcia AuD as PCP - General (Audiology)  Preston Cuadra MD as MD (Cardiology)  Phyllis Palomo MD as MD (Ophthalmology)  Andreina Moore AuD as Audiologist (Audiology)  Blanche Cummins APRN CNP (Inactive) as Referring Physician (Nurse Practitioner - Family)  Preston Cuadra MD as Assigned Heart and Vascular Provider  Funmilayo Grubbs PA-C as Assigned PCP  Cody Vazquez MD as Assigned Neuroscience Provider  John Ruiz MD as MD (Psychiatry)  Gregorio Carcamo MD as Resident (Psychiatry)  Delvis Lynch LMFT as Assigned Behavioral Health Provider  Ingrid Serrato MD as Assigned Endocrinology Provider    The following health  maintenance items are reviewed in Epic and correct as of today:  Health Maintenance   Topic Date Due     DEXA  Never done     Pneumococcal Vaccine: 65+ Years (2 - PCV) 07/28/2012     ASTHMA ACTION PLAN  09/03/2021     MAMMO SCREENING  11/17/2021     EYE EXAM  01/18/2022     MICROALBUMIN  06/30/2022     ADVANCE CARE PLANNING  08/31/2022     MEDICARE ANNUAL WELLNESS VISIT  04/10/2023     COVID-19 Vaccine (6 - Moderna series) 04/10/2023     ZOSTER IMMUNIZATION (2 of 2) 06/08/2023     BMP  08/06/2023     ASTHMA CONTROL TEST  09/16/2023     PHQ-9  11/25/2023     LIPID  02/06/2024     ANNUAL REVIEW OF HM ORDERS  03/16/2024     FALL RISK ASSESSMENT  06/27/2024     DTAP/TDAP/TD IMMUNIZATION (3 - Td or Tdap) 10/08/2030     COLORECTAL CANCER SCREENING  11/11/2031     HEPATITIS C SCREENING  Completed     HIV SCREENING  Completed     DEPRESSION ACTION PLAN  Completed     INFLUENZA VACCINE  Completed     IPV IMMUNIZATION  Aged Out     MENINGITIS IMMUNIZATION  Aged Out     PAP  Discontinued     Labs reviewed in EPIC      FHS-7:       3/28/2022    12:49 PM 6/27/2023     1:05 PM   Breast CA Risk Assessment (FHS-7)   Did any of your first-degree relatives have breast or ovarian cancer? No No   Did any of your relatives have bilateral breast cancer? No Unknown   Did any man in your family have breast cancer? No No   Did any woman in your family have breast and ovarian cancer? No Yes   Did any woman in your family have breast cancer before age 50 y? Yes Yes   Do you have 2 or more relatives with breast and/or ovarian cancer? Yes Yes   Do you have 2 or more relatives with breast and/or bowel cancer? No Yes       Mammogram Screening: Recommended mammography every 1-2 years with patient discussion and risk factor consideration  Pertinent mammograms are reviewed under the imaging tab.    Review of Systems   Constitutional: Negative for chills and fever.   HENT: Negative for congestion, ear pain, hearing loss and sore throat.    Eyes:  "Negative for pain and visual disturbance.   Respiratory: Negative for cough and shortness of breath.    Cardiovascular: Negative for chest pain, palpitations and peripheral edema.   Gastrointestinal: Positive for heartburn. Negative for abdominal pain, constipation, diarrhea, hematochezia and nausea.   Breasts:  Negative for tenderness, breast mass and discharge.   Genitourinary: Negative for dysuria, frequency, genital sores, hematuria, pelvic pain, urgency, vaginal bleeding and vaginal discharge.   Musculoskeletal: Negative for arthralgias, joint swelling and myalgias.   Skin: Negative for rash.   Neurological: Negative for dizziness, weakness, headaches and paresthesias.   Psychiatric/Behavioral: Negative for mood changes. The patient is not nervous/anxious.          OBJECTIVE:   LMP 06/01/2012  Estimated body mass index is 35.41 kg/m  as calculated from the following:    Height as of 6/20/23: 1.549 m (5' 1\").    Weight as of 6/20/23: 85 kg (187 lb 6.4 oz).  Physical Exam  GENERAL: healthy, alert and no distress  EYES: Eyes grossly normal to inspection, PERRL and conjunctivae and sclerae normal  RESP: symmetrical rise in chest  CV: no peripheral edema  MS: no gross musculoskeletal defects noted, no edema  SKIN: no suspicious lesions or rashes  NEURO: Normal strength and tone, mentation intact and speech normal  PSYCH: mentation appears normal, affect normal/bright    Diagnostic Test Results:  Labs reviewed in Epic  No results found for any visits on 06/27/23.    ASSESSMENT / PLAN:   (Z00.00) Encounter for Medicare annual wellness exam  (primary encounter diagnosis)  Comment: Chronic, stable  Plan: PRIMARY CARE FOLLOW-UP SCHEDULING, REVIEW OF         HEALTH MAINTENANCE PROTOCOL ORDERS            (E55.9) Vitamin D deficiency  Comment: Chronic, stable. Pt has never completed a bone density scan. Will provide today   Plan: DEXA HIP/PELVIS/SPINE - Future            (Z12.31) Visit for screening mammogram  Comment: " Stable  Plan: MA SCREENING DIGITAL BILAT - Future  (s+30)            (R42) Dizziness  Comment: Chronic, stable. History of vertigo for many years. Will provide refill for medication today  Plan: ondansetron (ZOFRAN ODT) 4 MG ODT tab            (E66.01,  Z68.35) Class 2 severe obesity due to excess calories with serious comorbidity and body mass index (BMI) of 35.0 to 35.9 in adult (H)  Comment: Chronic, stable  Plan: Continue to monitor     Patient has been advised of split billing requirements and indicates understanding: Yes      COUNSELING:  Reviewed preventive health counseling, as reflected in patient instructions        She reports that she has never smoked. She has never used smokeless tobacco.      Appropriate preventive services were discussed with this patient, including applicable screening as appropriate for cardiovascular disease, diabetes, osteopenia/osteoporosis, and glaucoma.  As appropriate for age/gender, discussed screening for colorectal cancer, prostate cancer, breast cancer, and cervical cancer. Checklist reviewing preventive services available has been given to the patient.    Reviewed patients plan of care and provided an AVS. The Basic Care Plan (routine screening as documented in Health Maintenance) for Patricia meets the Care Plan requirement. This Care Plan has been established and reviewed with the Patient.          LAKESHA VILLASENOR MD  [facility]    Identified Health Risks:    I have reviewed Opioid Use Disorder and Substance Use Disorder risk factors and made any needed referrals.

## 2023-06-29 ENCOUNTER — THERAPY VISIT (OUTPATIENT)
Dept: OCCUPATIONAL THERAPY | Facility: CLINIC | Age: 63
End: 2023-06-29
Attending: PHYSICIAN ASSISTANT
Payer: COMMERCIAL

## 2023-06-29 DIAGNOSIS — M79.641 PAIN OF RIGHT HAND: Primary | ICD-10-CM

## 2023-06-29 DIAGNOSIS — G56.01 CARPAL TUNNEL SYNDROME OF RIGHT WRIST: ICD-10-CM

## 2023-06-29 PROCEDURE — 97535 SELF CARE MNGMENT TRAINING: CPT | Mod: GO | Performed by: OCCUPATIONAL THERAPIST

## 2023-06-29 PROCEDURE — 97110 THERAPEUTIC EXERCISES: CPT | Mod: GO | Performed by: OCCUPATIONAL THERAPIST

## 2023-06-29 NOTE — PROGRESS NOTES
OCCUPATIONAL THERAPY EVALUATION  Type of Visit: Re-evaluation    Subjective   The constant ache is gone.  The soft brace helps with writing and driving. I also like the splint for night.     Objective   Pain Level (Scale 0-10)   6/15/2023 6/29/2023   At Rest 2 0   With Use 5-6 2-3   Worst 8-9 3     Pain Description  Date 6/15/2023 6/29/2023   Location wrist and thumb Wrist/thumb   Pain Quality Aching and Sharp sharp   Frequency intermittent sharp pain, constant ache Pain is brief    Pain is worst  daytime and nighttime daytime   Exacerbated by  writing, gripping, pinching Brief pain with certain movements   Relieved by heat and NSAIDs Splints, massage   Progression Gradually worsening Improved     ROM  Pain Report: - none  + mild    ++ moderate    +++ severe   Thumb 6/15/2023 6/15/2023 6/29/2023   AROM  (PROM) Left Right Right   MP /60 /35- /35   IP /60 /70- /80   RABD 45 35+ 45-   PABD 45 45+ 50-   Retropulsion (cm) 5 cm 4 cm+ slight 4 cm+   Kapandji Opposition Scale (0-10/10) 10/10 9/10+ 9/10-     Thumb Observation/Appearance   - none  + mild    ++ moderate    +++ severe     6/15/2023   Shoulder deformity present over CMC +   Edema over the CMC joint + slight   Noted collapse of MP into hyperextension during pinch + slight      Edema  Mild dorsal wrist and long MCP joint    Provocative Tests  Pain Report:  - none    + mild    ++ moderate    +++ severe     6/15/2023 6/29/2023   CMC Grind test - -   Crepitus present - -   CMC Adduction Stress Test + slight -   CMC Extension Stress Test + -   Finkelstein's - -       Special NerveTests   - none    + mild    ++ moderate    +++ severe       6/15/2023 6/29/2023   Elbow Flexion Test - -   Tinels Cubital Tunnel - -   Tinels Guy[place] Canal - -   Median Nerve Compression at Pronator - -   Carpal-Compression Test + slight -   Tinels at Carpal Tunnel + slight + slight   Phalens + slight 60 secs + slight 60 secs     Strength   (Measured in pounds)  Pain Report: - none  + mild     ++ moderate    +++ severe    6/15/2023 6/15/2023 6/29/2023   Trials Left Right Right   1  2  3 38  39  36 22-  22-  18+ 27-  25-  19-   Average 38 21 24     Lat Pinch 6/15/2023 6/15/2023 6/29/2023   Trials Left Right Right   1  2  3 11  11  10 9+  8+  9+ 10-  9-  7-   Average 11 9 9     3 Pt Pinch 6/15/2023 6/15/2023 6/29/2023   Trials Left Right Right   1  2  3 8  8  7 6++  7++  7++ 7+ slight  7+ slight  6+ slight   Average 8 7 7     Palpation   Pain Report:  - none    + mild    ++ moderate    +++ severe    6/15/2023 6/29/2023   CMC Joint Line + slight -   Thenar Eminence + slight -   Web Space - -   1st DC + + slight   Radial Styloid + + slight   FCR + -      06/29/23 0500   Appointment Info   Treating Provider Oksana Deleon, OTR/L, CHT   Total/Authorized Visits 6   Visits Used 3   Medical Diagnosis R hand CTS   OT Tx Diagnosis R hand and thumb pain   Progress Note/Certification   Onset of Illness/Injury or Date of Surgery 05/24/23  (therapy referral)   Therapy Frequency 2 x per month   Predicted Duration 3 months   Progress Note Completed Date 06/15/23   OT Goal 1   Goal Identifier writing   Goal Description Pt will decrease pain to be able to write with mild to no difficulty   Rationale   (for I with ADL's and work tasks)   Goal Progress pain 3/10, mild to no difficulty   Target Date 09/12/23   Date Met 06/29/23   Treatment Interventions (OT)   Interventions Therapeutic Procedure/Exercise;Self Care/Home Management   Self Care/Home Management   Self-Care/Home Mgmt/ADL, Compensatory, Meal Prep Minutes (42541) 15 Minutes   PTRx Self Care 1 Arthritis Tips/Joint Protection   PTRx Self Care 1 - Details review joint protection principles to decrease stress to thumb CMC joint   PTRx Self Care 2 Warmth   PTRx Self Care 2 - Details 5-10 mins for stiffness and pain   PTRx Self Care 3 MFR to th Add   PTRx Self Care 3 - Details wear clip 1-3 minutes as tolerated   PTRx Self Care 4 MFR to th Add   PTRx Self Care 4 - Details  massage muscle between thumb and index finger for 1-3 mins using marble or small firm ball   PTRx Self Care 5 Th CMC self jt mobs/repositioning   PTRx Self Care 5 - Details hold gentle pain free stretch for 1-3 minutes   Skilled Intervention to increase mobility and decrease pain and stress to th and hand jts   Patient Response/Progress good   Self Care 1 Adaptive Equipment   Self Care 1 - Details review AE options and issued resources   Self Care 2 HEP   Self Care 2 - Details review I Sx mmt with HEP   Self Care Self Care 2   Therapeutic Procedure/Exercise   Therapeutic Procedure: strength, endurance, ROM, flexibillity minutes (68955) 10   PTRx Ther Proc 1 Thumb Stabilization Strengthening Isometric C   PTRx Ther Proc 1 - Details 10 reps   PTRx Ther Proc 2 Thumb Stabilization 1st Dorsal Interosseous   PTRx Ther Proc 2 - Details 10 reps   Skilled Intervention to improve thumb stability   Patient Response/Progress good   Education   Learner/Method Patient;Demonstration;Pictures/Video   Education Comments printed PTRx   Plan   Home program review AE and issued resources   Total Session Time   Timed Code Treatment Minutes 25   Total Treatment Time (sum of timed and untimed services) 25       Assessment & Plan   Decreased pain, increased mobility and increased strength/stability       DISCHARGE  Reason for Discharge: Patient has met all goals.    Equipment Issued: none    Discharge Plan: Patient to continue home program.    Referring Provider:  Bryant Mota    Beginning/End Dates of Progress Note Reporting Period:   6/15/2023 to 06/29/2023    Signing Clinician: Oksana Deleon OT

## 2023-07-03 ENCOUNTER — OFFICE VISIT (OUTPATIENT)
Dept: AUDIOLOGY | Facility: CLINIC | Age: 63
End: 2023-07-03
Payer: COMMERCIAL

## 2023-07-03 ENCOUNTER — ALLIED HEALTH/NURSE VISIT (OUTPATIENT)
Dept: RESEARCH | Facility: CLINIC | Age: 63
End: 2023-07-03

## 2023-07-03 DIAGNOSIS — Z00.6 EXAMINATION OF PARTICIPANT OR CONTROL IN CLINICAL RESEARCH: Primary | ICD-10-CM

## 2023-07-03 DIAGNOSIS — Z00.6 RESEARCH STUDY PATIENT: Primary | ICD-10-CM

## 2023-07-03 PROCEDURE — 99207 PR NO CHARGE NURSE ONLY: CPT

## 2023-07-03 PROCEDURE — V5020 CONFORMITY EVALUATION: HCPCS | Performed by: AUDIOLOGIST

## 2023-07-03 PROCEDURE — 92556 SPEECH AUDIOMETRY COMPLETE: CPT | Performed by: AUDIOLOGIST

## 2023-07-03 NOTE — PROGRESS NOTES
Watt Study Clinic Visit 2 Note    Purpose: This is a prospective intervention-based study that will involve data collection from individuals with measured or perceived mild-to-moderate hearing loss. This study is deemed to meet the qualification of a research study with non-significant risk.        Patricia Perez a 63 year old female , was seen at the Audiology clinic today to complete clinical visit 2 for the Watt study.     Real Ear Measures and Speech in Noise were performed during this visit.     Study data was directly entered onto paper source and then entered into EDC later by a Research Coordinator.     All questions were answered and subject voiced understanding    Claire Garcia, Levi  07/03/23

## 2023-07-03 NOTE — PROGRESS NOTES
"  Watt Study Clinic Visit 2 Note    Purpose: This is a prospective intervention-based study that will involve data collection from individuals with measured or perceived mild-to-moderate hearing loss. This study is deemed to meet the qualification of a research study with non-significant risk.        Subject ID: [id number]  Treatment Arm: Self-Fit    Patricia Perez a 63 year old female , was seen at the Audiology clinic today to complete clinical visit 2 for the Watt study.    Subject reports that her right study headphone frequently beeps. Staff confirmed that the beep was unrelated to sound notification settings or blue tooth connectivity settings. Staff changed subject's study head phone size from small to large. This felt more comfortable for participant and they no longer hear the beeping sound. Subject also reports noticing difference in how she hears sounds, mentioning that \"sounds go in and out,\" when she moves her head. Otherwise, no other issues reported regarding study devices.     Study data including Real Ear Measures and Speech in Noise was directly entered onto paper source and then entered into EDC later by Sara Behmanesh.     Were the device serial numbers confirmed? Yes    Was there a change in Subject Disposition?  No  Were there any Adverse Events (AEs) experienced? No  Were there any Protocol Deviations? No    All subject questions were answered and they voiced their understanding.     03-JUL-2023    Sara Behmanesh      "

## 2023-07-06 DIAGNOSIS — F33.1 MODERATE EPISODE OF RECURRENT MAJOR DEPRESSIVE DISORDER (H): ICD-10-CM

## 2023-07-07 RX ORDER — ARIPIPRAZOLE 2 MG/1
TABLET ORAL
Qty: 60 TABLET | Refills: 2 | OUTPATIENT
Start: 2023-07-07

## 2023-07-08 NOTE — TELEPHONE ENCOUNTER
Refill denied  Too soon  script sent 5/25/23 #60-2 refills   appt in august  Scheduling has been notified to contact the pt for appointment.

## 2023-07-17 ENCOUNTER — OFFICE VISIT (OUTPATIENT)
Dept: PODIATRY | Facility: CLINIC | Age: 63
End: 2023-07-17
Payer: COMMERCIAL

## 2023-07-17 VITALS
DIASTOLIC BLOOD PRESSURE: 77 MMHG | SYSTOLIC BLOOD PRESSURE: 131 MMHG | WEIGHT: 188 LBS | BODY MASS INDEX: 35.52 KG/M2 | HEART RATE: 80 BPM

## 2023-07-17 DIAGNOSIS — M72.2 PLANTAR FASCIITIS, LEFT: Primary | ICD-10-CM

## 2023-07-17 PROCEDURE — 99213 OFFICE O/P EST LOW 20 MIN: CPT | Mod: 25 | Performed by: PODIATRIST

## 2023-07-17 PROCEDURE — 20550 NJX 1 TENDON SHEATH/LIGAMENT: CPT | Mod: LT | Performed by: PODIATRIST

## 2023-07-17 RX ORDER — DEXAMETHASONE SODIUM PHOSPHATE 4 MG/ML
4 INJECTION, SOLUTION INTRA-ARTICULAR; INTRALESIONAL; INTRAMUSCULAR; INTRAVENOUS; SOFT TISSUE ONCE
Status: COMPLETED | OUTPATIENT
Start: 2023-07-17 | End: 2023-07-17

## 2023-07-17 RX ORDER — BUPIVACAINE HYDROCHLORIDE 5 MG/ML
1 INJECTION, SOLUTION PERINEURAL ONCE
Status: COMPLETED | OUTPATIENT
Start: 2023-07-17 | End: 2023-07-17

## 2023-07-17 RX ORDER — TRIAMCINOLONE ACETONIDE 40 MG/ML
40 INJECTION, SUSPENSION INTRA-ARTICULAR; INTRAMUSCULAR ONCE
Status: COMPLETED | OUTPATIENT
Start: 2023-07-17 | End: 2023-07-17

## 2023-07-17 RX ADMIN — BUPIVACAINE HYDROCHLORIDE 5 MG: 5 INJECTION, SOLUTION PERINEURAL at 14:45

## 2023-07-17 RX ADMIN — TRIAMCINOLONE ACETONIDE 40 MG: 40 INJECTION, SUSPENSION INTRA-ARTICULAR; INTRAMUSCULAR at 14:42

## 2023-07-17 RX ADMIN — DEXAMETHASONE SODIUM PHOSPHATE 4 MG: 4 INJECTION, SOLUTION INTRA-ARTICULAR; INTRALESIONAL; INTRAMUSCULAR; INTRAVENOUS; SOFT TISSUE at 14:44

## 2023-07-17 NOTE — LETTER
7/17/2023         RE: Patricia Perez  [address] [facility] [address]        Dear Colleague,    Thank you for referring your patient, Patricia Perez, to the [facility]. Please see a copy of my visit note below.    Assessment:      ICD-10-CM    1. Plantar fasciitis, left  M72.2 triamcinolone (KENALOG-40) injection 40 mg     dexamethasone (DECADRON) injection 4 mg     Bupivacaine 0.5 % Injection     diclofenac (VOLTAREN) 50 MG EC tablet     INJECTION SINGLE TENDON SHEATH/LIGAMENT           Plan:  Orders Placed This Encounter   Procedures     INJECTION SINGLE TENDON SHEATH/LIGAMENT         Discussed the etiology and treatment of the condition with the patient.  Imaging studies reviewed and discussed with the patient.  Discussed surgical and conservative options.    -Injection  Procedure:  injection  PARQ session held, verbal consent obtained.  Sterile skin prep.    Location:  Left fascia  Contents:  3ml total, marcaine, kenalog-40, dexamethasone phosphate  Dressing applied.    Post-injection instructions reviewed including close attention to amount and duration of pain relief.    -NSAID- Rx po  -Orthoses- SuperFeet  -Activity- low impact, calf muscle stretching.  -PT- Rx today for Graston/ASTYM,eccentric training/    Return:  No follow-ups on file.    Veronica Riso DPM                Chief Complaint:     Patient presents with:  Left Foot - Plantar Fascitis         HPI:  Patricia Perez is a 63 year old year old female who presents for evaluation of heel pain.    Pain location bottom of heel.      Past Medical & Surgical History:  Past Medical History:   Diagnosis Date     Anxiety      Arthritis      ASCUS of cervix with negative high risk HPV 01/23/2006     Asthma      Bursitis of shoulder 03/04/2010     Chronic rhinitis 03/25/2009     Coronary artery disease      Depression      Dyspnea on exertion      GERD (gastroesophageal reflux disease) 10/14/2008     Hypertension       Idiopathic cardiomyopathy (H) 01/08/2009     Indigestion      Itchy eyes      Left leg pain 06/16/2011     Motion sickness 02/27/2008     Nasal congestion      Pneumonia      Problems related to lack of adequate sleep      Ringing in ears      Sleep apnea 03/25/2009     Sneezing       Past Surgical History:   Procedure Laterality Date     BARIATRIC SURGERY       COLONOSCOPY N/A 11/11/2021    Procedure: COLONOSCOPY, WITH POLYPECTOMY;  Surgeon: Stephanie Meneses MD;  Location: [facility] OR     DIABETES RESOURCES  As Child    Tonsillectomy     ENT SURGERY       ESOPHAGOSCOPY, GASTROSCOPY, DUODENOSCOPY (EGD), COMBINED N/A 11/11/2021    Procedure: ESOPHAGOGASTRODUODENOSCOPY, WITH BIOPSY;  Surgeon: Stephanie Meneses MD;  Location: [facility] OR     HERNIA REPAIR       LAPAROSCOPIC BYPASS GASTRIC  10/16/2012    Procedure: LAPAROSCOPIC BYPASS GASTRIC;  laparoscopic reyna en y gastric bypass;  Surgeon: Nish Bradford MD;  Location: [facility] OR     TONSILLECTOMY       Uretural Sticture  As Child      Family History   Problem Relation Age of Onset     Gastrointestinal Disease Mother         non cancerous pancreatic tumor     Hypertension Mother      Heart Disease Mother         A fib     Allergies Mother      Hypertension Father      Lipids Father      Alcohol/Drug Father         Abuse     Depression Father      Respiratory Father         COPD     Cardiovascular Father      Allergies Sister         Poison Ivy     Depression Brother      Hypertension Brother      Allergies Brother         Enviromental/ Bees     Breast Cancer Maternal Grandmother      Glaucoma Paternal Grandmother      Glaucoma Paternal Grandfather      Cerebrovascular Disease Other      Macular Degeneration Other      Diabetes No family hx of      Cancer - colorectal No family hx of      Retinal detachment No family hx of      Amblyopia No family hx of      Thyroid Disease No family hx of      Adrenal Disorder No family hx of         Social History:  ?  History   Smoking Status      Never   Smokeless Tobacco     Never     History   Drug Use No     Social History    Substance and Sexual Activity      Alcohol use: Yes        Comment: seldom      Allergies:  ?   Allergies   Allergen Reactions     Atorvastatin      Leg myalgias        Medications:    Current Outpatient Medications   Medication     diclofenac (VOLTAREN) 50 MG EC tablet     albuterol (PROAIR HFA/PROVENTIL HFA/VENTOLIN HFA) 108 (90 Base) MCG/ACT inhaler     ARIPiprazole (ABILIFY) 2 MG tablet     aspirin (ASA) 81 MG EC tablet     azelastine (ASTELIN) 0.1 % nasal spray     azelastine (OPTIVAR) 0.05 % ophthalmic solution     blood glucose (NO BRAND SPECIFIED) lancing device     blood glucose (NO BRAND SPECIFIED) test strip     calcium carbonate-vitamin D (CALTRATE 600+D) 600-400 MG-UNIT CHEW     carvedilol (COREG) 25 MG tablet     cetirizine (ZYRTEC) 10 MG tablet     Continuous Blood Gluc Sensor (FREESTYLE JOHN 14 DAY SENSOR) MISC     Cyanocobalamin (VITAMIN B-12 SL)     ezetimibe (ZETIA) 10 MG tablet     FEROSUL 325 (65 Fe) MG tablet     hydrALAZINE (APRESOLINE) 10 MG tablet     Ibuprofen-diphenhydrAMINE Cit (IBUPROFEN PM PO)     isosorbide mononitrate (IMDUR) 30 MG 24 hr tablet     lisinopril (ZESTRIL) 5 MG tablet     meclizine (ANTIVERT) 25 MG tablet     montelukast (SINGULAIR) 10 MG tablet     Multiple Vitamin (MULTI-VITAMIN) per tablet     naltrexone (DEPADE/REVIA) 50 MG tablet     NIFEdipine ER (ADALAT CC) 30 MG 24 hr tablet     ondansetron (ZOFRAN ODT) 4 MG ODT tab     simvastatin (ZOCOR) 40 MG tablet     venlafaxine (EFFEXOR) 75 MG tablet     No current facility-administered medications for this visit.       Physical Exam:  ?  Vitals:  /77   Pulse 80   Wt 85.3 kg (188 lb)   LMP 06/01/2012   BMI 35.52 kg/m     General:  WD/WN, in NAD.  A&O x3.  Dermatologic:    Skin is intact, open lesions absent.   Skin texture, turgor is normal.  Vascular:  Pulses palpable .  Digital capillary refill time normal.  Skin temperature  is normal to affected heel.  Generalized edema- trace.  Focal edema- mild.  Neurologic:    Gross sensation normal.  Gait and balance normal.  Musculoskeletal:  Maximal pain to palpation of plantar  heel left.  no pain to palpation of posterior heel, Achilles tendon left.  STJ, MTJ ROM intact to affected extremity.  Muscle strength 5/5  foot and ankle to affected extremity.                 Again, thank you for allowing me to participate in the care of your patient.        Sincerely,        Veronica Rios DPM

## 2023-07-17 NOTE — PROGRESS NOTES
Assessment:      ICD-10-CM    1. Plantar fasciitis, left  M72.2 triamcinolone (KENALOG-40) injection 40 mg     dexamethasone (DECADRON) injection 4 mg     Bupivacaine 0.5 % Injection     diclofenac (VOLTAREN) 50 MG EC tablet     INJECTION SINGLE TENDON SHEATH/LIGAMENT           Plan:  Orders Placed This Encounter   Procedures    INJECTION SINGLE TENDON SHEATH/LIGAMENT         Discussed the etiology and treatment of the condition with the patient.  Imaging studies reviewed and discussed with the patient.  Discussed surgical and conservative options.    -Injection  Procedure:  injection  PARQ session held, verbal consent obtained.  Sterile skin prep.    Location:  Left fascia  Contents:  3ml total, marcaine, kenalog-40, dexamethasone phosphate  Dressing applied.    Post-injection instructions reviewed including close attention to amount and duration of pain relief.    -NSAID- Rx po  -Orthoses- SuperFeet  -Activity- low impact, calf muscle stretching.  -PT- Rx today for Graston/ASTYM,eccentric training/    Return:  No follow-ups on file.    Veronica Rios DPM                Chief Complaint:     Patient presents with:  Left Foot - Plantar Fascitis         HPI:  Patricia Perez is a 63 year old year old female who presents for evaluation of heel pain.    Pain location bottom of heel.      Past Medical & Surgical History:  Past Medical History:   Diagnosis Date    Anxiety     Arthritis     ASCUS of cervix with negative high risk HPV 01/23/2006    Asthma     Bursitis of shoulder 03/04/2010    Chronic rhinitis 03/25/2009    Coronary artery disease     Depression     Dyspnea on exertion     GERD (gastroesophageal reflux disease) 10/14/2008    Hypertension     Idiopathic cardiomyopathy (H) 01/08/2009    Indigestion     Itchy eyes     Left leg pain 06/16/2011    Motion sickness 02/27/2008    Nasal congestion     Pneumonia     Problems related to lack of adequate sleep     Ringing in ears     Sleep apnea 03/25/2009     Sneezing       Past Surgical History:   Procedure Laterality Date    BARIATRIC SURGERY      COLONOSCOPY N/A 11/11/2021    Procedure: COLONOSCOPY, WITH POLYPECTOMY;  Surgeon: Stephanie Meneses MD;  Location: [facility] OR    DIABETES RESOURCES  As Child    Tonsillectomy    ENT SURGERY      ESOPHAGOSCOPY, GASTROSCOPY, DUODENOSCOPY (EGD), COMBINED N/A 11/11/2021    Procedure: ESOPHAGOGASTRODUODENOSCOPY, WITH BIOPSY;  Surgeon: Stephanie Meneses MD;  Location: [facility] OR    HERNIA REPAIR      LAPAROSCOPIC BYPASS GASTRIC  10/16/2012    Procedure: LAPAROSCOPIC BYPASS GASTRIC;  laparoscopic reyna en y gastric bypass;  Surgeon: Nish Bradford MD;  Location: [facility] OR    TONSILLECTOMY      Uretural Sticture  As Child      Family History   Problem Relation Age of Onset    Gastrointestinal Disease Mother         non cancerous pancreatic tumor    Hypertension Mother     Heart Disease Mother         A fib    Allergies Mother     Hypertension Father     Lipids Father     Alcohol/Drug Father         Abuse    Depression Father     Respiratory Father         COPD    Cardiovascular Father     Allergies Sister         Poison Ivy    Depression Brother     Hypertension Brother     Allergies Brother         Enviromental/ Bees    Breast Cancer Maternal Grandmother     Glaucoma Paternal Grandmother     Glaucoma Paternal Grandfather     Cerebrovascular Disease Other     Macular Degeneration Other     Diabetes No family hx of     Cancer - colorectal No family hx of     Retinal detachment No family hx of     Amblyopia No family hx of     Thyroid Disease No family hx of     Adrenal Disorder No family hx of         Social History:  ?  History   Smoking Status    Never   Smokeless Tobacco    Never     History   Drug Use No     Social History    Substance and Sexual Activity      Alcohol use: Yes        Comment: seldom      Allergies:  ?   Allergies   Allergen Reactions    Atorvastatin      Leg myalgias        Medications:    Current Outpatient Medications    Medication    diclofenac (VOLTAREN) 50 MG EC tablet    albuterol (PROAIR HFA/PROVENTIL HFA/VENTOLIN HFA) 108 (90 Base) MCG/ACT inhaler    ARIPiprazole (ABILIFY) 2 MG tablet    aspirin (ASA) 81 MG EC tablet    azelastine (ASTELIN) 0.1 % nasal spray    azelastine (OPTIVAR) 0.05 % ophthalmic solution    blood glucose (NO BRAND SPECIFIED) lancing device    blood glucose (NO BRAND SPECIFIED) test strip    calcium carbonate-vitamin D (CALTRATE 600+D) 600-400 MG-UNIT CHEW    carvedilol (COREG) 25 MG tablet    cetirizine (ZYRTEC) 10 MG tablet    Continuous Blood Gluc Sensor (FREESTYLE JOHN 14 DAY SENSOR) MISC    Cyanocobalamin (VITAMIN B-12 SL)    ezetimibe (ZETIA) 10 MG tablet    FEROSUL 325 (65 Fe) MG tablet    hydrALAZINE (APRESOLINE) 10 MG tablet    Ibuprofen-diphenhydrAMINE Cit (IBUPROFEN PM PO)    isosorbide mononitrate (IMDUR) 30 MG 24 hr tablet    lisinopril (ZESTRIL) 5 MG tablet    meclizine (ANTIVERT) 25 MG tablet    montelukast (SINGULAIR) 10 MG tablet    Multiple Vitamin (MULTI-VITAMIN) per tablet    naltrexone (DEPADE/REVIA) 50 MG tablet    NIFEdipine ER (ADALAT CC) 30 MG 24 hr tablet    ondansetron (ZOFRAN ODT) 4 MG ODT tab    simvastatin (ZOCOR) 40 MG tablet    venlafaxine (EFFEXOR) 75 MG tablet     No current facility-administered medications for this visit.       Physical Exam:  ?  Vitals:  /77   Pulse 80   Wt 85.3 kg (188 lb)   LMP 06/01/2012   BMI 35.52 kg/m     General:  WD/WN, in NAD.  A&O x3.  Dermatologic:    Skin is intact, open lesions absent.   Skin texture, turgor is normal.  Vascular:  Pulses palpable .  Digital capillary refill time normal.  Skin temperature is normal to affected heel.  Generalized edema- trace.  Focal edema- mild.  Neurologic:    Gross sensation normal.  Gait and balance normal.  Musculoskeletal:  Maximal pain to palpation of plantar  heel left.  no pain to palpation of posterior heel, Achilles tendon left.  STJ, MTJ ROM intact to affected extremity.  Muscle  strength 5/5  foot and ankle to affected extremity.

## 2023-07-17 NOTE — PATIENT INSTRUCTIONS
"PATIENT INSTRUCTIONS - Podiatry / Foot & Ankle Surgery        Aftercare for Steroid Injection  Activity:  -Resume normal activity as tolerated.  -Tendon, ligament, fascia injectionons- avoid high-impact activity for 1 week.  Icing:  -May apply to the injection site if needed.  Infection:  -Risk is generally low (<1%), but must be aware of the signs/symptoms.    -Both infection and an inflammatory reaction to the steroid (\"steroid flare\") will cause redness, warmth, and increased pain.   -If these symptoms develop within 12-24h & resolve within 2-3 days- \"steroid flare\"- ice (non-worrisome)  -If these symptoms develop after 24-48h, progressively get worse, & are associated with a fever- possible infection; call the clinic or present to urgent care immediately      Diclofenac / Voltaren - 1 pill twice daily x1 week.  Then take a 1 week break.  Repeat as needed.  Take with food & an acid blocker if stomach upset occurs.  Stop all other NSAIDs (aspirin, ibuprofen/Motrin, naproxen/ Aleve).      Diclofenac / Voltaren Gel- Apply to affected area only.  Apply 3-4 times daily for the first 3-4 days, then 1-2 times daily as needed.  Over the counter (OTC), a prescription is not needed.  Available at most pharmacies, [employer], [employer].      SuperFeet orthotics  SuperFeet are over the counter (OTC), you do not need a prescription.  Wear Superfeet orthotics in all of your shoes.  Remove the existing liner and replace it with SuperFeet.    SuperFeet are available on [employer], at [employer] and most [employer].  Beryr or blue      Calf muscle stretching 2-3x daily as instructed, both with the knees straight and the knees bent. Hold for 30-60 seconds.  For personal instruction on this,  please request a Physical Therapy referral from your doctor.        "

## 2023-07-21 ENCOUNTER — OFFICE VISIT (OUTPATIENT)
Dept: AUDIOLOGY | Facility: CLINIC | Age: 63
End: 2023-07-21
Payer: COMMERCIAL

## 2023-07-21 ENCOUNTER — ALLIED HEALTH/NURSE VISIT (OUTPATIENT)
Dept: RESEARCH | Facility: CLINIC | Age: 63
End: 2023-07-21

## 2023-07-21 DIAGNOSIS — Z00.6 RESEARCH STUDY PATIENT: Primary | ICD-10-CM

## 2023-07-21 DIAGNOSIS — Z00.6 RESEARCH SUBJECT: Primary | ICD-10-CM

## 2023-07-21 PROCEDURE — 99207 PR NO CHARGE NURSE ONLY: CPT

## 2023-07-21 PROCEDURE — 92556 SPEECH AUDIOMETRY COMPLETE: CPT | Performed by: AUDIOLOGIST

## 2023-07-21 PROCEDURE — V5020 CONFORMITY EVALUATION: HCPCS | Performed by: AUDIOLOGIST

## 2023-07-21 NOTE — PROGRESS NOTES
Watt Study Clinic Visit 3 Note    Study Description: This is a prospective intervention-based study that will involve data collection from individuals with measured or perceived mild-to-moderate hearing loss. This study is deemed to meet the qualification of a research study with non-significant risk.       Subject ID: [id number]  Treatment Arm: Self-Fit    Patricia Perez a 63 year old female, was seen at the Audiology clinic today to complete Clinic Visit 3 for the Watt study.     Study data including Real Ear Measures and Speech in Noise was directly captured on paper source and then entered into the EDC later by Ruma Rosa RN.     Subject completed [facility]I-[facility] in [place] per protocol.       Device Accountability Return  Was the Device Kit Returned? Yes  Returned Device Kit ID: [id number]   Date Device Kit Returned: 21-JUL-2023    Were there Device Issues? No   If yes, provide description of device issues, whether device kit was replaced, and replacement device kit ID.     Did the subject complete the study? Yes  Date of Study Completion: 21-JUL-2023    Were any Adverse Events (AEs) experienced? No  Any Protocol Deviations? No    This visit thus completes their participation in the study.     21-JUL-2023    Ruma Rosa RN

## 2023-07-21 NOTE — PROGRESS NOTES
Watt Study Clinic Visit 3 Note    Study Description: This is a prospective intervention-based study that will involve data collection from individuals with measured or perceived mild-to-moderate hearing loss. This study is deemed to meet the qualification of a research study with non-significant risk.       Patricia Perez a 63 year old female, was seen at the Audiology clinic today to complete Clinic Visit 3 for the Watt study.     Real Ear Measures and Speech in Noise were performed during this visit.     Study data was directly entered onto paper source and then entered into the EDC later by a Research Coordinator.     All questions were answered and subject voiced understanding.     Claire Garcia, AuD  07/21/23

## 2023-07-28 ENCOUNTER — ANCILLARY PROCEDURE (OUTPATIENT)
Dept: GENERAL RADIOLOGY | Facility: CLINIC | Age: 63
End: 2023-07-28
Attending: PHYSICIAN ASSISTANT
Payer: COMMERCIAL

## 2023-07-28 ENCOUNTER — OFFICE VISIT (OUTPATIENT)
Dept: RHEUMATOLOGY | Facility: CLINIC | Age: 63
End: 2023-07-28
Attending: PHYSICIAN ASSISTANT
Payer: COMMERCIAL

## 2023-07-28 VITALS
TEMPERATURE: 97.6 F | SYSTOLIC BLOOD PRESSURE: 109 MMHG | RESPIRATION RATE: 15 BRPM | DIASTOLIC BLOOD PRESSURE: 72 MMHG | OXYGEN SATURATION: 99 % | HEART RATE: 76 BPM

## 2023-07-28 DIAGNOSIS — I73.00 RAYNAUD'S DISEASE WITHOUT GANGRENE: ICD-10-CM

## 2023-07-28 DIAGNOSIS — M79.641 BILATERAL HAND PAIN: Primary | ICD-10-CM

## 2023-07-28 DIAGNOSIS — M79.641 BILATERAL HAND PAIN: ICD-10-CM

## 2023-07-28 DIAGNOSIS — M79.642 BILATERAL HAND PAIN: Primary | ICD-10-CM

## 2023-07-28 DIAGNOSIS — R76.8 ELEVATED RHEUMATOID FACTOR: ICD-10-CM

## 2023-07-28 DIAGNOSIS — M79.642 BILATERAL HAND PAIN: ICD-10-CM

## 2023-07-28 LAB
CRP SERPL-MCNC: <3 MG/L
ERYTHROCYTE [SEDIMENTATION RATE] IN BLOOD BY WESTERGREN METHOD: 9 MM/HR (ref 0–30)

## 2023-07-28 PROCEDURE — 85652 RBC SED RATE AUTOMATED: CPT | Performed by: PHYSICIAN ASSISTANT

## 2023-07-28 PROCEDURE — 86200 CCP ANTIBODY: CPT | Performed by: PHYSICIAN ASSISTANT

## 2023-07-28 PROCEDURE — 36415 COLL VENOUS BLD VENIPUNCTURE: CPT | Performed by: PHYSICIAN ASSISTANT

## 2023-07-28 PROCEDURE — 86235 NUCLEAR ANTIGEN ANTIBODY: CPT | Performed by: PHYSICIAN ASSISTANT

## 2023-07-28 PROCEDURE — 99204 OFFICE O/P NEW MOD 45 MIN: CPT | Performed by: PHYSICIAN ASSISTANT

## 2023-07-28 PROCEDURE — 73130 X-RAY EXAM OF HAND: CPT | Mod: TC | Performed by: RADIOLOGY

## 2023-07-28 PROCEDURE — 86140 C-REACTIVE PROTEIN: CPT | Performed by: PHYSICIAN ASSISTANT

## 2023-07-28 ASSESSMENT — PAIN SCALES - GENERAL: PAINLEVEL: MILD PAIN (3)

## 2023-07-28 NOTE — NURSING NOTE
"Initial /72 (BP Location: Right arm, Patient Position: Chair, Cuff Size: Adult Regular)   Pulse 76   Temp 97.6  F (36.4  C) (Tympanic)   Resp 15   LMP 06/01/2012   SpO2 99%  Estimated body mass index is 35.52 kg/m  as calculated from the following:    Height as of 6/27/23: 1.549 m (5' 1\").    Weight as of 7/17/23: 85.3 kg (188 lb). .  Everett Penn on 7/28/2023 at 10:56 AM    "

## 2023-07-28 NOTE — PATIENT INSTRUCTIONS
After Visit Instructions:     Thank you for coming to [facility] for your care. It is my goal to partner with you to help you reach your optimal state of health.       Plan:     Schedule follow-up with Melissa El PA-C as needed  Imaging: xray bilateral hands; may want to consider MRI of your right hand  Labs: CCP antibody, SSA, SSB, CRP and Sed Rate      Melissa El PA-C  [facility]  [facility]    Contact information: [facility]  Clinic Number:  [phone]  Please call or send a Edictive message with any questions about your care

## 2023-07-28 NOTE — PROGRESS NOTES
Rheumatology Clinic Visit  [facility]  Melissa ElROSSY     Patricia Perez MRN# [id number]   YOB: 1960 Age: 63 year old   Date of Visit: 07/28/2023  Primary care provider: Claire Garcia          Assessment and Plan:     1.  Bilateral hand pain  2.  Elevated rheumatoid factor  3.  Raynaud's disease    Patient presents today for initial evaluation of bilateral hand pain.  She was diagnosed with Raynaud's disease and is on nifedipine for it.  She states that overall she feels that this is well controlled.  With regards to her bilateral hand pain that she does find that the right hand tends to be worse than the left hand.  She does get a lot of thumb pain.  She does have stiffness when she wakes up in the morning unless her approximately 1 hour.  Physical examination today does show some bogginess to the second and third MCPs on the right.  She does have full fist formation and normal  strength.  Normal joint range of motion otherwise.  Previous after evaluations and imaging studies were reviewed, results below.    Discussed with the patient that she certainly has evidence of osteoarthritis of her thumbs.  There is a bogginess that is over her second and third MCPs on the right which does make a little more suspicious for inflammatory arthritis especially in the context of 1 hour of morning stiffness in her hands.  She did have the equivocal rheumatoid factor at 12.  Discussed with the patient that this is a very nonspecific results as it could be a normal variant as we get older the rheumatoid factor can increase.  I would recommend further evaluation including a CCP antibody.  She does have a sister with Sjogren's syndrome, therefore we will check the SSA and SSB as well.  I would like to get updated hand x-rays.  Discussed with patient that if everything returns normal/negative we may want to consider getting an MRI of her right hand.  We will further discuss this option after the  lab and x-rays from today.  I will contact the patient with the results of the evaluation once they are complete    Plan:     Schedule follow-up with Melissa El PA-C as needed  Imaging: xray bilateral hands; may want to consider MRI of your right hand  Labs: CCP antibody, SSA, SSB, CRP and Sed Rate      ROSSY Haney  Rheumatology         History of Present Illness:   Patricia Perez presents for evaluation of raynaud's, elevated rheumatoid factor, joint pain.      She was diagnosed with Raynaud's and takes Nifedipine for it. She feels the Raynaud's is controlled. She gets some mild color changes but nothing like what it was in the past.     He states that she has thumb joint pain. She states that she thought it was carpal tunnel, but PT told her it was arthritis. She states that she feels achy in her right hand. Occasional aching the left hand. She has a lot of stiffness. She does get some swelling in the right hand. No skin rashes. No psoriasis. No ulcerative colitis or crohn's. She has had some fatigue. She states that she gets up she does have some stiffness. She states that it takes about 1 hour to loosen up. She states that her joints get worse if she is doing a lot of typing. The right middle finger that bothers her the most, it clicks and catches. She finds that she does  not have the hand strength to open things. By the end of the day, she can have more aching. She got some larger pens which has helped. No fevers. No dry eyes or dry mouth. No shortness of breath, unless related to her asthma. She does not notice any other significant joint pain. She does get some cramping in her feet.     Sister with Sjogren's.          Review of Systems:     Constitutional: negative  Skin: negative  Eyes: negative  Ears/Nose/Throat: negative  Respiratory: No shortness of breath, dyspnea on exertion, cough, or hemoptysis  Cardiovascular: negative  Gastrointestinal: negative  Genitourinary:  negative  Musculoskeletal: as above  Neurologic: negative  Psychiatric: negative  Hematologic/Lymphatic/Immunologic: negative  Endocrine: negative         Active Problem List:     Patient Active Problem List    Diagnosis Date Noted    Hypertension goal BP (blood pressure) < 130/80 05/23/2011     Priority: High    Hyperlipidemia LDL goal <100 05/09/2010     Priority: High    Idiopathic cardiomyopathy (H) 01/08/2009     Priority: High     See Dr. Soto      Class 2 severe obesity due to excess calories with serious comorbidity in adult (H) 06/27/2023     Priority: Medium    Raynaud's disease without gangrene 04/13/2023     Priority: Medium    Hypoglycemia 07/07/2022     Priority: Medium    Sciatica of right side 04/18/2022     Priority: Medium    Posterior vitreous detachment of both eyes 01/23/2021     Priority: Medium    Combined forms of age-related cataract, mild, of both eyes 01/23/2021     Priority: Medium    Glaucoma suspect of both eyes 01/23/2021     Priority: Medium    Syncope 01/07/2020     Priority: Medium    Acute pain of left knee 11/16/2018     Priority: Medium    Coronary artery disease involving native coronary artery of native heart without angina pectoris 08/06/2018     Priority: Medium    Vitamin D deficiency 03/01/2018     Priority: Medium    BPV (benign positional vertigo), unspecified laterality 08/31/2017     Priority: Medium    Advanced directives, counseling/discussion 08/31/2017     Priority: Medium     Advance Care Planning 8/31/2017: ACP Review of Chart / Resources Provided:  Reviewed chart for advance care plan.  Patricia Perez has been provided information and resources to begin or update their advance care plan.  Added by Phyllis Kwan            Adjustment disorder with depressed mood 06/22/2017     Priority: Medium    Decreased hearing of both ears 06/22/2017     Priority: Medium    Benign essential hypertension 06/15/2016     Priority: Medium    Dumping syndrome 09/22/2015      Priority: Medium    Myopia, bilateral 08/17/2015     Priority: Medium    Hand joint pain 01/14/2015     Priority: Medium    Mild persistent asthma 04/03/2014     Priority: Medium    Allergic rhinitis 02/27/2014     Priority: Medium     Problem list name updated by automated process. Provider to review      Attention deficit disorder of adult 02/27/2014     Priority: Medium    Hx of gastric bypass 08/21/2013     Priority: Medium    Hypovitaminosis D 08/21/2013     Priority: Medium    H/O bariatric surgery 07/08/2013     Priority: Medium    Elevated PTHrP level 06/15/2013     Priority: Medium    Class 1 obesity with serious comorbidity and body mass index (BMI) of 33.0 to 33.9 in adult, unspecified obesity type 06/12/2013     Priority: Medium    Pelvic pain in female 09/14/2011     Priority: Medium    Allergic rhinitis 09/09/2010     Priority: Medium    Sleep apnea 03/25/2009     Priority: Medium    GERD (gastroesophageal reflux disease) 10/14/2008     Priority: Medium    Motion sickness 02/27/2008     Priority: Medium    Dizziness and giddiness 02/05/2007     Priority: Medium    Major depression, recurrent (H) 11/12/2004     Priority: Medium    24 hour contact handout given 01/04/2012     Priority: Low     EMERGENCY CARE PLAN  Presenting Problem Signs and Symptoms Treatment Plan    Questions or conerns during clinic hours    I will call the clinic directly     Questions or conerns outside clinic hours    I will call the 24 hour nurse line at [phone]    Patient needs to schedule an appointment    I will call the 24 hour scheduling team at [phone] or clinic directly    Same day treatment     I will call the clinic first, nurse line if after hours, urgent care and express care if needed                                    Health Care Home 09/23/2011     Priority: Low     X  EMERGENCY CARE PLAN  Presenting Problem Signs and Symptoms Treatment Plan    Questions or concerns during clinic hours    I will call the  clinic directly     Questions or concerns outside clinic hours    I will call the 24 hour nurse line at [phone]    Patient needs to schedule an appointment    I will call the 24 hour scheduling team at [phone] or clinic directly    Same day treatment     I will call the clinic first, nurse line if after hours, urgent care and express care if needed                                DX V65.8 REPLACED WITH [id number] HEALTH CARE HOME (04/08/2013)              Past Medical History:     Past Medical History:   Diagnosis Date    Anxiety     Arthritis     ASCUS of cervix with negative high risk HPV 01/23/2006    Asthma     Bursitis of shoulder 03/04/2010    Chronic rhinitis 03/25/2009    Coronary artery disease     Depression     Dyspnea on exertion     GERD (gastroesophageal reflux disease) 10/14/2008    Hypertension     Idiopathic cardiomyopathy (H) 01/08/2009    Indigestion     Itchy eyes     Left leg pain 06/16/2011    Motion sickness 02/27/2008    Nasal congestion     Pneumonia     Problems related to lack of adequate sleep     Ringing in ears     Sleep apnea 03/25/2009    Sneezing      Past Surgical History:   Procedure Laterality Date    BARIATRIC SURGERY      COLONOSCOPY N/A 11/11/2021    Procedure: COLONOSCOPY, WITH POLYPECTOMY;  Surgeon: Stephanie Meneses MD;  Location: [facility] OR    DIABETES RESOURCES  As Child    Tonsillectomy    ENT SURGERY      ESOPHAGOSCOPY, GASTROSCOPY, DUODENOSCOPY (EGD), COMBINED N/A 11/11/2021    Procedure: ESOPHAGOGASTRODUODENOSCOPY, WITH BIOPSY;  Surgeon: Stephanie Meneses MD;  Location: [facility] OR    HERNIA REPAIR      LAPAROSCOPIC BYPASS GASTRIC  10/16/2012    Procedure: LAPAROSCOPIC BYPASS GASTRIC;  laparoscopic reyna en y gastric bypass;  Surgeon: Nish Bradford MD;  Location:  OR    TONSILLECTOMY      Uretural Sticture  As Child            Social History:     Social History     Socioeconomic History    Marital status:      Spouse name: Not on file    Number of children: 0     Years of education: 12+    Highest education level: Not on file   Occupational History     Employer: [employer]   Tobacco Use    Smoking status: Never    Smokeless tobacco: Never   Vaping Use    Vaping Use: Never used   Substance and Sexual Activity    Alcohol use: Yes     Comment: seldom    Drug use: No    Sexual activity: Not Currently     Partners: Male   Other Topics Concern    Parent/sibling w/ CABG, MI or angioplasty before 65F 55M? No   Social History Narrative    Dairy/d 2 servings/d.     Caffeine 3 servings/d    Exercise 2 x week    Sunscreen used - Yes    Seatbelts used - Yes    Working smoke/CO detectors in the home - Yes    Guns stored in the home - No    Self Breast Exams - Yes    Self Testicular Exam - NA    Eye Exam up to date - Yes    Dental Exam up to date - No    Pap Smear up to date - Yes    Mammogram up to date - Yes    PSA up to date - NA    Dexa Scan up to date - NA    Flex Sig / Colonoscopy up to date - Yes    Immunizations up to date - Yes    Abuse: Current or Past(Physical, Sexual or Emotional)- No    Do you feel safe in your environment - Yes    LOWELL SMITH LPN.    02/05/2007        May 19, 2021    ENVIRONMENTAL HISTORY: [place]. The home is heated with a forced air. They do have central air conditioning. The patient's bedroom is furnished with carpeting in bedroom, allergen mattress cover, and animals sleep in bedroom.  Pets inside the house include 1 cat(s). There is no history of cockroach or mice infestation. There is/are 0 smokers in the house.  The house does not have a damp basement, it's a split level.      Social Determinants of Health     Financial Resource Strain: Not on file   Food Insecurity: Not on file   Transportation Needs: Not on file   Physical Activity: Not on file   Stress: Not on file   Social Connections: Not on file   Intimate Partner Violence: Not on file   Housing Stability: Not on file          Family  History:     Family History   Problem Relation Age of Onset    Gastrointestinal Disease Mother         non cancerous pancreatic tumor    Hypertension Mother     Heart Disease Mother         A fib    Allergies Mother     Hypertension Father     Lipids Father     Alcohol/Drug Father         Abuse    Depression Father     Respiratory Father         COPD    Cardiovascular Father     Allergies Sister         Poison Ivy    Depression Brother     Hypertension Brother     Allergies Brother         Enviromental/ Bees    Breast Cancer Maternal Grandmother     Glaucoma Paternal Grandmother     Glaucoma Paternal Grandfather     Cerebrovascular Disease Other     Macular Degeneration Other     Diabetes No family hx of     Cancer - colorectal No family hx of     Retinal detachment No family hx of     Amblyopia No family hx of     Thyroid Disease No family hx of     Adrenal Disorder No family hx of             Allergies:     Allergies   Allergen Reactions    Atorvastatin      Leg myalgias            Medications:     Current Outpatient Medications   Medication Sig Dispense Refill    albuterol (PROAIR HFA/PROVENTIL HFA/VENTOLIN HFA) 108 (90 Base) MCG/ACT inhaler Inhale 2 puffs into the lungs every 4 hours as needed for shortness of breath / dyspnea or wheezing 18 g 0    ARIPiprazole (ABILIFY) 2 MG tablet Take 2 tablets (4 mg) by mouth daily 60 tablet 2    aspirin (ASA) 81 MG EC tablet Take 1 tablet (81 mg) by mouth daily 90 tablet 3    azelastine (ASTELIN) 0.1 % nasal spray Spray 2 sprays into both nostrils 2 times daily as needed for rhinitis 30 mL 3    azelastine (OPTIVAR) 0.05 % ophthalmic solution Apply 1 drop to eye 2 times daily as needed (itchy/watery eyes) 6 mL 3    blood glucose (NO BRAND SPECIFIED) lancing device Device to be used with lancets. 3 each 3    blood glucose (NO BRAND SPECIFIED) test strip Use to test blood sugar 3 times daily 300 strip 3    calcium carbonate-vitamin D (CALTRATE 600+D) 600-400 MG-UNIT CHEW Take  1 chew tab by mouth 2 times daily 180 tablet 3    carvedilol (COREG) 25 MG tablet Take 1 tablet (25 mg) by mouth 2 times daily (with meals) 180 tablet 3    cetirizine (ZYRTEC) 10 MG tablet Take 1 tablet (10 mg) by mouth daily 30 tablet 3    Continuous Blood Gluc Sensor (FREESTYLE JOHN 14 DAY SENSOR) MISC 1 each every 14 days Use 1 Sensor every 14 days. Use to read blood sugars per 's instructions. 2 each 5    Cyanocobalamin (VITAMIN B-12 SL) Place under the tongue every other day       diclofenac (VOLTAREN) 50 MG EC tablet Take 1 tablet (50 mg) by mouth 2 times daily for 7 days 28 tablet 2    ezetimibe (ZETIA) 10 MG tablet Take 1 tablet (10 mg) by mouth daily 90 tablet 3    FEROSUL 325 (65 Fe) MG tablet TAKE 1 TABLET(325 MG) BY MOUTH TWICE DAILY 180 tablet 0    hydrALAZINE (APRESOLINE) 10 MG tablet Take 1 tablet (10 mg) by mouth 3 times daily 270 tablet 3    Ibuprofen-diphenhydrAMINE Cit (IBUPROFEN PM PO) Take by mouth At Bedtime      isosorbide mononitrate (IMDUR) 30 MG 24 hr tablet Take 1 tablet (30 mg) by mouth daily 90 tablet 3    lisinopril (ZESTRIL) 5 MG tablet TAKE 1 TABLET(5 MG) BY MOUTH DAILY 90 tablet 3    meclizine (ANTIVERT) 25 MG tablet Take 1 tablet (25 mg) by mouth 3 times daily as needed 30 tablet 1    montelukast (SINGULAIR) 10 MG tablet TAKE 1 TABLET(10 MG) BY MOUTH AT BEDTIME 90 tablet 0    Multiple Vitamin (MULTI-VITAMIN) per tablet Take 1 tablet by mouth daily. 100 tablet 3    naltrexone (DEPADE/REVIA) 50 MG tablet Take 1/2 tablet once daily 1-2 hours prior to worst cravings for 1 week, then increase to 1 tablet daily as directed if tolerating 30 tablet 3    NIFEdipine ER (ADALAT CC) 30 MG 24 hr tablet Take 1 tablet (30 mg) by mouth daily 60 tablet 1    ondansetron (ZOFRAN ODT) 4 MG ODT tab Take 1 tablet (4 mg) by mouth every 8 hours as needed for nausea 30 tablet 2    simvastatin (ZOCOR) 40 MG tablet Take 1 tablet (40 mg) by mouth At Bedtime 90 tablet 3    venlafaxine (EFFEXOR) 75  MG tablet Take 1 tablet (75 mg) by mouth 3 times daily 90 tablet 2            Physical Exam:   Last menstrual period 06/01/2012, not currently breastfeeding.  Wt Readings from Last 6 Encounters:   07/17/23 85.3 kg (188 lb)   06/27/23 85.5 kg (188 lb 9.6 oz)   06/20/23 85 kg (187 lb 6.4 oz)   05/24/23 86.4 kg (190 lb 6.4 oz)   05/04/23 85.7 kg (189 lb)   04/13/23 87.2 kg (192 lb 3.2 oz)     Constitutional: well-developed, appearing stated age; cooperative  Eyes: nl conjunctiva, sclera  ENT: nl external ears, nose, hearing, lips,   Neck: no mass or thyroid enlargement  Resp: lungs clear to auscultation  CV: RRR, no murmurs, rubs or gallops, no edema  Lymph: no cervical, supraclavicular or epitrochlear nodes  MS: There is a fullness/bogginess over the second and third MCPs on the right.  She has full fist formation and normal  strength.  Skin: no nail pitting, alopecia, rash, nodules or lesions.   Psych: nl judgement, orientation, memory, affect.           Data:   Imaging:  X-ray cervical spine 3/28/2022  IMPRESSION: Mild degenerative retrolisthesis of C3 upon C4 and minimal  degenerative anterolisthesis of C7 upon T1. Alignment otherwise  normal. Vertebral body heights normal. No fractures. Moderate-severe  facet arthropathy throughout the cervical spine. Loss of disc space  height and degenerative endplate spurring at all levels of the  cervical spine with exception of the C2-C3 level.    X-ray lumbar spine 3/28/2022  IMPRESSION: Five lumbar type vertebrae. Moderate left convex curvature  of the lumbar spine centered at L2 has developed since the comparison  study. Mild degenerative retrolisthesis of L1 upon L2 and L2 upon L3  again noted. Degenerative grade 1 anterolisthesis of L4 upon L5 again  noted. Alignment otherwise normal. Vertebral body heights normal. No  evidence for recent fracture. Presumed chronic fracture deformity or  large Schmorl's node deformity at the anterior superior corner of the  L4  vertebral body again noted. Loss of disc space height and  degenerative endplate spurring at T12-L1, L1-L2 and L2-L3. Facet  arthropathy at L3-L4, L4-L5 and L5-S1.    X-ray right hand 6/30/2021  IMPRESSION: Moderate degenerative arthrosis at the STT and distal  radioulnar joints. There is cystic change at the proximal-medial  aspect of the lunate. This could relate to ulnolunate impaction or  subchondral cystic change. Mild subchondral cystic change at the  proximal aspect of the triquetrum. Mild third MCP joint space  narrowing. No fracture identified.     X-ray right foot.  12/29/2020  IMPRESSION:  1.  Moderate right second TMT degenerative arthrosis.  2.  Mild right first MTP degenerative arthrosis.  3.  Small osteophyte at the lateral margin of the fifth metatarsal  head.  4.  Plantar calcaneal spur.  5.  No fracture.  6.  Second hammertoe deformity.       Laboratory:  4/14/2023  CRP less than 2.9  Rheumatoid factor 12  Sed rate 8  FAHEEM negative

## 2023-07-31 ENCOUNTER — MYC MEDICAL ADVICE (OUTPATIENT)
Dept: FAMILY MEDICINE | Facility: CLINIC | Age: 63
End: 2023-07-31
Payer: COMMERCIAL

## 2023-07-31 DIAGNOSIS — H91.90 HEARING LOSS, UNSPECIFIED HEARING LOSS TYPE, UNSPECIFIED LATERALITY: Primary | ICD-10-CM

## 2023-07-31 LAB
CCP AB SER IA-ACNC: 1.1 U/ML
ENA SS-A AB SER IA-ACNC: <0.5 U/ML
ENA SS-A AB SER IA-ACNC: NEGATIVE
ENA SS-B IGG SER IA-ACNC: <0.6 U/ML
ENA SS-B IGG SER IA-ACNC: NEGATIVE

## 2023-08-01 ENCOUNTER — MYC MEDICAL ADVICE (OUTPATIENT)
Dept: RHEUMATOLOGY | Facility: CLINIC | Age: 63
End: 2023-08-01
Payer: COMMERCIAL

## 2023-08-01 DIAGNOSIS — M25.541 ARTHRALGIA OF RIGHT HAND: ICD-10-CM

## 2023-08-01 DIAGNOSIS — R76.8 ELEVATED RHEUMATOID FACTOR: Primary | ICD-10-CM

## 2023-08-08 ENCOUNTER — MYC MEDICAL ADVICE (OUTPATIENT)
Dept: RHEUMATOLOGY | Facility: CLINIC | Age: 63
End: 2023-08-08
Payer: COMMERCIAL

## 2023-08-21 ENCOUNTER — VIRTUAL VISIT (OUTPATIENT)
Dept: FAMILY MEDICINE | Facility: CLINIC | Age: 63
End: 2023-08-21
Payer: COMMERCIAL

## 2023-08-21 ENCOUNTER — ANCILLARY PROCEDURE (OUTPATIENT)
Dept: MRI IMAGING | Facility: CLINIC | Age: 63
End: 2023-08-21
Attending: PHYSICIAN ASSISTANT
Payer: COMMERCIAL

## 2023-08-21 DIAGNOSIS — M25.541 ARTHRALGIA OF RIGHT HAND: ICD-10-CM

## 2023-08-21 DIAGNOSIS — R76.8 ELEVATED RHEUMATOID FACTOR: ICD-10-CM

## 2023-08-21 DIAGNOSIS — R23.2 HOT FLASHES: Primary | ICD-10-CM

## 2023-08-21 PROCEDURE — 73218 MRI UPPER EXTREMITY W/O DYE: CPT | Mod: RT | Performed by: RADIOLOGY

## 2023-08-21 PROCEDURE — 99213 OFFICE O/P EST LOW 20 MIN: CPT | Mod: VID | Performed by: STUDENT IN AN ORGANIZED HEALTH CARE EDUCATION/TRAINING PROGRAM

## 2023-08-21 NOTE — PROGRESS NOTES
Patricia is a 63 year old who is being evaluated via a billable video visit.      How would you like to obtain your AVS? MyChart  If the video visit is dropped, the invitation should be resent by: Text to cell phone: [phone]  Will anyone else be joining your video visit? No          Assessment & Plan     (R23.2) Hot flashes  (primary encounter diagnosis)  Comment: Chronic, uncontrolled  Plan: Use of black cohosh has not improved symptoms. Pt already on maximum dose of Effexor. Discussion had with patient regarding use of hormone replacement therapy. Will provide patient with referral to establish care with Gynecology as Endocrinology's waitlist for patients is extensive. Did provide patient with reading material in after visit summary                 LAKESHA VILLASENOR MD  [facility]   Patricia is a 63 year old, presenting for the following health issues:  No chief complaint on file.        8/21/2023     7:11 AM   Additional Questions   Roomed by Lucia JENNINGS     Discuss estrogen      Patient states that she has been having hot flashes and states that she is unsure as to why she continues to have them. She endorses that yesterday was extensive with experiencing sweats throughout the day irrespective of lowering the AC unit in her house. She states she has been on Effexor for years and states she does not believe it is helping her overall symptoms of hot flashes. Pt states she is interested in discussing next steps as it relates to hormone replacement therapy. She also endorses use of Black Cohosh which was unsuccessful         Review of Systems   CONSTITUTIONAL: NEGATIVE for fever, chills, change in weight  ENT/MOUTH: NEGATIVE for ear, mouth and throat problems  RESP: NEGATIVE for significant cough or SOB  CV: NEGATIVE for chest pain, palpitations or peripheral edema  ENDOCRINE: POSITIVE  for hot flashes      Objective           Vitals:  No vitals were obtained today due to  virtual visit.    Physical Exam   GENERAL: Healthy, alert and no distress  EYES: Eyes grossly normal to inspection.  No discharge or erythema, or obvious scleral/conjunctival abnormalities.  RESP: No audible wheeze, cough, or visible cyanosis.  No visible retractions or increased work of breathing.    SKIN: Visible skin clear. No significant rash, abnormal pigmentation or lesions.  NEURO: Cranial nerves grossly intact.  Mentation and speech appropriate for age.  PSYCH: Mentation appears normal, affect normal/bright, judgement and insight intact, normal speech and appearance well-groomed.    No results found for any visits on 08/21/23.            Video-Visit Details    Type of service:  Video Visit     Originating Location (pt. Location): Home    Distant Location (provider location):  Off-site  Platform used for Video Visit: Shruthi

## 2023-08-31 ENCOUNTER — TELEPHONE (OUTPATIENT)
Dept: RHEUMATOLOGY | Facility: CLINIC | Age: 63
End: 2023-08-31

## 2023-08-31 DIAGNOSIS — F33.1 MODERATE EPISODE OF RECURRENT MAJOR DEPRESSIVE DISORDER (H): ICD-10-CM

## 2023-08-31 RX ORDER — VENLAFAXINE 75 MG/1
75 TABLET ORAL 3 TIMES DAILY
Qty: 90 TABLET | Refills: 2 | Status: SHIPPED | OUTPATIENT
Start: 2023-08-31 | End: 2023-10-23

## 2023-08-31 NOTE — TELEPHONE ENCOUNTER
Last Seen: 5-  RTC: 3 month   Cancel: yes   No-Show: no   Next Appt: 10/2/2023    Incoming Refill From [facility]  via  faxed     Medication Requested: venlafaxine (EFFEXOR) 75 MG tablet   Directions: Sig - Route: Take 1 tablet (75 mg) by mouth 3 times daily - Oral   Qty: 90   Last Refill: 8/1/2023     Medication Refill pended  Per Refill Protocol     Becky Smith on 8/31/2023 at 12:57 PM

## 2023-08-31 NOTE — TELEPHONE ENCOUNTER
Patient is requesting a follow up virtual visit with JOHN Torres. Please advise if this is okay to schedule.

## 2023-09-06 ENCOUNTER — OFFICE VISIT (OUTPATIENT)
Dept: AUDIOLOGY | Facility: CLINIC | Age: 63
End: 2023-09-06
Payer: COMMERCIAL

## 2023-09-06 DIAGNOSIS — H91.90 HEARING LOSS, UNSPECIFIED HEARING LOSS TYPE, UNSPECIFIED LATERALITY: ICD-10-CM

## 2023-09-06 DIAGNOSIS — H90.3 SENSORINEURAL HEARING LOSS, BILATERAL: Primary | ICD-10-CM

## 2023-09-06 PROCEDURE — 92555 SPEECH THRESHOLD AUDIOMETRY: CPT

## 2023-09-06 PROCEDURE — 92591 PR HEARING AID EXAM BINAURAL: CPT

## 2023-09-06 NOTE — PROGRESS NOTES
AUDIOLOGY REPORT    SUBJECTIVE:  Patricia Perez is a 63 year old female who was seen in the Audiology Clinic at the [facility] for audiologic evaluation, referred by Miladis Baca M.D. The patient has been seen previously at the [facility] on 6/26/2023 for assessment and results indicated a bilateral sensorineural hearing loss. The patient is here today as she is interested in amplification. The patient denies bilateral otalgia, bilateral drainage, and bilateral aural fullness. She does report constant, longstanding, bilateral tinnitus. The patient notes difficulty with communication in a variety of listening situations.  She was not accompanied to today's appointment    *Patricia was apart of a research study at the [facility] where she had a hearing evaluation done. At this appointment speech was not tested.     OBJECTIVE:    Otoscopic exam indicates ears are clear of cerumen bilaterally     Pure Tone Thresholds:  DNT    Tympanogram:    RIGHT: normal eardrum mobility    LEFT:   normal eardrum mobility    Reflexes (reported by stimulus ear):  RIGHT: Ipsilateral is present at normal levels  RIGHT: Contralateral is absent at frequencies tested  LEFT:   Ipsilateral is present at normal levels  LEFT:   Contralateral is absent at frequencies tested      Speech Reception Threshold:    RIGHT: 30 dB HL    LEFT:   30 dB HL  Word Recognition Score:     RIGHT: 100% at 70 dB HL using NU-6 recorded word list.    LEFT:   100% at 70 dB HL using NU-6 recorded word list.      ASSESSMENT:    Bilateral sensorineural hearing loss. Today s results were discussed with the patient in detail.     Hearing aid consult to follow    AUDIOLOGY REPORT    OBJECTIVE:  Patient is a hearing aid candidate. Patient would like to move forward with a hearing aid evaluation today. Therefore, the patient was presented with different options for amplification to help aid in communication. Discussed  styles, levels of technology and monaural vs. binaural fitting.     The hearing aid(s) mutually chosen were:  Binaural: Phonak Slim 50-R TARA  COLOR: silver/black  BATTERY SIZE: rechargeable  EARMOLD/TIPS: medium closed  CANAL/ LENGTH: 2M    ASSESSMENT:    Reviewed purchase information and warranty information with patient. The 45 day trial period was explained to patient. The patient was given a copy of the [place] [employer] consumer brochure on purchasing hearing instruments. Patient risk factors have been provided to the patient in writing prior to the sale of the hearing aid per FDA regulation. The risk factors are also available in the User Instructional Booklet to be presented on the day of the hearing aid fitting. Hearing aid(s) ordered. Hearing aid evaluation completed.    PLAN: Patricia is scheduled to return in 2-3 weeks for a hearing aid fitting and programming. Purchase agreement will be completed on that date. Please contact this clinic with any questions or concerns.      [email or web address]  Licensed Audiologist  [place] #[id number]       9/6/2023

## 2023-09-10 NOTE — PROGRESS NOTES
Patricia is a 63 year old who is being evaluated via a billable video visit.      How would you like to obtain your AVS? MyChart  If the video visit is dropped, the invitation should be resent by: Send to e-mail at: [email or web address]  Will anyone else be joining your video visit? No  Will you be in [place] for the visit?  Yes     Patient will be entering visit by:     Video-Visit Details    Type of service:  Video Visit     Originating Location (pt. Location): Other sitting in her desk    Distant Location (provider location):  On-site  Platform used for Video Visit: [facility]      Rheumatology Clinic Visit  [facility]  ROSSY Haney     Patricia Perez MRN# [id number]   YOB: 1960 Age: 63 year old   Date of Visit: 09/12/23  Primary care provider: Claire Garcia          Assessment and Plan:     1.  Rheumatoid arthritis with an elevated rheumatoid factor  2.  High-risk medication use  3.  Raynaud's    Patient presents today for follow-up.  We reviewed the results of her MRI which is suggestive of both an inflammatory and noninflammatory arthritis.  Given the prolonged morning stiffness that she has been having I would recommend immunomodulatory therapy.  We discussed 3 different options today including methotrexate, hydroxychloroquine and sulfasalazine.  After further discussion of these 3 medications she elected to start with sulfasalazine.  Side effects of this medication were reviewed.  I did also provide her the information on methotrexate and hydroxychloroquine in case either of these medications would be needed in the future.  We will check labs in 1 month to verify that there is no medication toxicity.  I would also like her to follow-up with me in 3 months, sooner if needed.    Plan:   Schedule follow-up with Melissa El PA-C in 3 months  Labs: CBC, creatinine, albumin, AST, ALT in 1 month and then every 3 months  Medications:  In the future consider methotrexate (this one is  taken weekly) or hydroxychloroquine (this one is taken daily)  # Methotrexate Risks and Benefits: The risks and benefits of methotrexate were discussed in detail and the patient verbalized understanding.  The risks discussed include, but are not limited to, the risk for hypersensitivity, anaphylaxis, anaphylactoid reactions, infections, bone marrow suppression, renal toxicity, hepatotoxicity, pulmonary toxicity, malignancy, impaired fertility, GI upset, alopecia, and oral and nasal sores.  Folic acid supplementation is recommended during methotrexate therapy to help prevent some of the side effects. Pregnancy prevention and planning was discussed; it is recommended that women of childbearing potential use reliable contraception during therapy.  The risks of taking both methotrexate and alcohol were reviewed; complete alcohol avoidance was discussed.  Routine laboratory monitoring is required during methotrexate therapy. Taking MTX once weekly, all within a 24 hour period was stressed and the patient verbalized this instruction back to me.  I encouraged reviewing the package insert and asking any questions about the medication.  # Hydroxychloroquine (Plaquenil) Risks and Benefits:  The risks and benefits of hydroxychloroquine were discussed in detail and the patient verbalized understanding; the patient also verbalized agreement to get the required ophthalmologic toxicity monitoring.  The risks discussed include, but are not limited to, the risk for hypersensitivity, anaphylaxis, anaphylactoid reactions, irreversible retinal damage, rare hematologic reactions, and rare cardiomyopathy.  Patients with G6PD deficiency or hepatic impairment may be at an increased risk for adverse effects.  I encouraged reviewing the package insert and asking any questions about the medication.      Start Sulfasalazine 500mg: Take 1 tablet daily for 1 week, then take 1 tablet twice daily for 1 week, then take 1 tab in AM and 2 tabs in PM  for 1 week, then take 2 tabs twice daily. Take this medication with food.  # Sulfasalazine Risks and Benefits: The risks and benefits of sulfasalazine were discussed in detail and the patient verbalized understanding.  The risks discussed include, but are not limited to, the risk for hypersensitivity, anaphylaxis, anaphylactoid reactions, infections, bone marrow suppression,  hepatotoxicity, nausea, vomiting, and GI upset.  Oligospermia may occur in males.  I encouraged reviewing the package insert and asking any questions about the medication.        Melissa El, [facility]  Rheumatology         History of Present Illness:   Patricia Perez presents for evaluation of raynaud's, elevated rheumatoid factor, joint pain.     Interval history September 12, 2023:  No significant hand pain.  She does have a lot of stiffness particularly in the mornings and after periods of rest.  Raynaud's is overall controlled.       HPI from consult of July 28, 2023:  She was diagnosed with Raynaud's and takes Nifedipine for it. She feels the Raynaud's is controlled. She gets some mild color changes but nothing like what it was in the past.     He states that she has thumb joint pain. She states that she thought it was carpal tunnel, but PT told her it was arthritis. She states that she feels achy in her right hand. Occasional aching the left hand. She has a lot of stiffness. She does get some swelling in the right hand. No skin rashes. No psoriasis. No ulcerative colitis or crohn's. She has had some fatigue. She states that she gets up she does have some stiffness. She states that it takes about 1 hour to loosen up. She states that her joints get worse if she is doing a lot of typing. The right middle finger that bothers her the most, it clicks and catches. She finds that she does  not have the hand strength to open things. By the end of the day, she can have more aching. She got some larger pens which has helped. No fevers. No dry eyes  or dry mouth. No shortness of breath, unless related to her asthma. She does not notice any other significant joint pain. She does get some cramping in her feet.     Sister with Sjogren's.          Review of Systems:     Constitutional: negative  Skin: negative  Eyes: negative  Ears/Nose/Throat: negative  Respiratory: No shortness of breath, dyspnea on exertion, cough, or hemoptysis  Cardiovascular: negative  Gastrointestinal: negative  Genitourinary: negative  Musculoskeletal: as above  Neurologic: negative  Psychiatric: negative  Hematologic/Lymphatic/Immunologic: negative  Endocrine: negative         Active Problem List:     Patient Active Problem List    Diagnosis Date Noted    Hypertension goal BP (blood pressure) < 130/80 05/23/2011     Priority: High    Hyperlipidemia LDL goal <100 05/09/2010     Priority: High    Idiopathic cardiomyopathy (H) 01/08/2009     Priority: High     See Dr. Soto      Class 2 severe obesity due to excess calories with serious comorbidity in adult (H) 06/27/2023     Priority: Medium    Raynaud's disease without gangrene 04/13/2023     Priority: Medium    Hypoglycemia 07/07/2022     Priority: Medium    Sciatica of right side 04/18/2022     Priority: Medium    Posterior vitreous detachment of both eyes 01/23/2021     Priority: Medium    Combined forms of age-related cataract, mild, of both eyes 01/23/2021     Priority: Medium    Glaucoma suspect of both eyes 01/23/2021     Priority: Medium    Syncope 01/07/2020     Priority: Medium    Acute pain of left knee 11/16/2018     Priority: Medium    Coronary artery disease involving native coronary artery of native heart without angina pectoris 08/06/2018     Priority: Medium    Vitamin D deficiency 03/01/2018     Priority: Medium    BPV (benign positional vertigo), unspecified laterality 08/31/2017     Priority: Medium    Advanced directives, counseling/discussion 08/31/2017     Priority: Medium     Advance Care Planning 8/31/2017: ACP  Review of Chart / Resources Provided:  Reviewed chart for advance care plan.  Patricia Perez has been provided information and resources to begin or update their advance care plan.  Added by Phyllis FELIX'Brien            Adjustment disorder with depressed mood 06/22/2017     Priority: Medium    Decreased hearing of both ears 06/22/2017     Priority: Medium    Benign essential hypertension 06/15/2016     Priority: Medium    Dumping syndrome 09/22/2015     Priority: Medium    Myopia, bilateral 08/17/2015     Priority: Medium    Hand joint pain 01/14/2015     Priority: Medium    Mild persistent asthma 04/03/2014     Priority: Medium    Allergic rhinitis 02/27/2014     Priority: Medium     Problem list name updated by automated process. Provider to review      Attention deficit disorder of adult 02/27/2014     Priority: Medium    Hx of gastric bypass 08/21/2013     Priority: Medium    Hypovitaminosis D 08/21/2013     Priority: Medium    H/O bariatric surgery 07/08/2013     Priority: Medium    Elevated PTHrP level 06/15/2013     Priority: Medium    Class 1 obesity with serious comorbidity and body mass index (BMI) of 33.0 to 33.9 in adult, unspecified obesity type 06/12/2013     Priority: Medium    Pelvic pain in female 09/14/2011     Priority: Medium    Allergic rhinitis 09/09/2010     Priority: Medium    Sleep apnea 03/25/2009     Priority: Medium    GERD (gastroesophageal reflux disease) 10/14/2008     Priority: Medium    Motion sickness 02/27/2008     Priority: Medium    Dizziness and giddiness 02/05/2007     Priority: Medium    Major depression, recurrent (H) 11/12/2004     Priority: Medium    24 hour contact handout given 01/04/2012     Priority: Low     EMERGENCY CARE PLAN  Presenting Problem Signs and Symptoms Treatment Plan    Questions or conerns during clinic hours    I will call the clinic directly     Questions or conerns outside clinic hours    I will call the 24 hour nurse line at [phone]    Patient needs  to schedule an appointment    I will call the 24 hour scheduling team at [phone] or clinic directly    Same day treatment     I will call the clinic first, nurse line if after hours, urgent care and express care if needed                                    Health Care Home 09/23/2011     Priority: Low     X  EMERGENCY CARE PLAN  Presenting Problem Signs and Symptoms Treatment Plan    Questions or concerns during clinic hours    I will call the clinic directly     Questions or concerns outside clinic hours    I will call the 24 hour nurse line at [phone]    Patient needs to schedule an appointment    I will call the 24 hour scheduling team at [phone] or clinic directly    Same day treatment     I will call the clinic first, nurse line if after hours, urgent care and express care if needed                                DX V65.8 REPLACED WITH [id number] [facility] CARE HOME (04/08/2013)              Past Medical History:     Past Medical History:   Diagnosis Date    Anxiety     Arthritis     ASCUS of cervix with negative high risk HPV 01/23/2006    Asthma     Bursitis of shoulder 03/04/2010    Chronic rhinitis 03/25/2009    Coronary artery disease     Depression     Dyspnea on exertion     GERD (gastroesophageal reflux disease) 10/14/2008    Hypertension     Idiopathic cardiomyopathy (H) 01/08/2009    Indigestion     Itchy eyes     Left leg pain 06/16/2011    Motion sickness 02/27/2008    Nasal congestion     Pneumonia     Problems related to lack of adequate sleep     Ringing in ears     Sleep apnea 03/25/2009    Sneezing      Past Surgical History:   Procedure Laterality Date    BARIATRIC SURGERY      COLONOSCOPY N/A 11/11/2021    Procedure: COLONOSCOPY, WITH POLYPECTOMY;  Surgeon: Stephanie Meneses MD;  Location: [facility] [place]    DIABETES RESOURCES  As Child    Tonsillectomy    ENT SURGERY      ESOPHAGOSCOPY, GASTROSCOPY, DUODENOSCOPY (EGD), COMBINED N/A 11/11/2021    Procedure: ESOPHAGOGASTRODUODENOSCOPY, WITH  BIOPSY;  Surgeon: Stephanie Meneses MD;  Location: [facility] OR    HERNIA REPAIR      LAPAROSCOPIC BYPASS GASTRIC  10/16/2012    Procedure: LAPAROSCOPIC BYPASS GASTRIC;  laparoscopic reyna en y gastric bypass;  Surgeon: Nish Bradford MD;  Location: [facility] OR    TONSILLECTOMY      Uretural Sticture  As Child            Social History:     Social History     Socioeconomic History    Marital status:      Spouse name: Not on file    Number of children: 0    Years of education: 12+    Highest education level: Not on file   Occupational History     Employer: [employer]   Tobacco Use    Smoking status: Never    Smokeless tobacco: Never   Vaping Use    Vaping Use: Never used   Substance and Sexual Activity    Alcohol use: Yes     Comment: seldom    Drug use: No    Sexual activity: Not Currently     Partners: Male   Other Topics Concern    Parent/sibling w/ CABG, MI or angioplasty before 65F 55M? No   Social History Narrative    Dairy/d 2 servings/d.     Caffeine 3 servings/d    Exercise 2 x week    Sunscreen used - Yes    Seatbelts used - Yes    Working smoke/CO detectors in the home - Yes    Guns stored in the home - No    Self Breast Exams - Yes    Self Testicular Exam - NA    Eye Exam up to date - Yes    Dental Exam up to date - No    Pap Smear up to date - Yes    Mammogram up to date - Yes    PSA up to date - NA    Dexa Scan up to date - NA    Flex Sig / Colonoscopy up to date - Yes    Immunizations up to date - Yes    Abuse: Current or Past(Physical, Sexual or Emotional)- No    Do you feel safe in your environment - Yes    LOWELL SMITH LPN.    02/05/2007        May 19, 2021    ENVIRONMENTAL HISTORY: The family lives in a [facility] home [place]. The home is heated with a forced air. They do have central air conditioning. The patient's bedroom is furnished with carpeting in bedroom, allergen mattress cover, and animals sleep in bedroom.  Pets inside the house include 1 cat(s). There is no history of  cockroach or mice infestation. There is/are 0 smokers in the house.  The house does not have a damp basement, it's a split level.      Social Determinants of Health     Financial Resource Strain: Not on file   Food Insecurity: Not on file   Transportation Needs: Not on file   Physical Activity: Not on file   Stress: Not on file   Social Connections: Not on file   Intimate Partner Violence: Not on file   Housing Stability: Not on file          Family History:     Family History   Problem Relation Age of Onset    Gastrointestinal Disease Mother         non cancerous pancreatic tumor    Hypertension Mother     Heart Disease Mother         A fib    Allergies Mother     Hypertension Father     Lipids Father     Alcohol/Drug Father         Abuse    Depression Father     Respiratory Father         COPD    Cardiovascular Father     Allergies Sister         Poison Ivy    Depression Brother     Hypertension Brother     Allergies Brother         Enviromental/ Bees    Breast Cancer Maternal Grandmother     Glaucoma Paternal Grandmother     Glaucoma Paternal Grandfather     Cerebrovascular Disease Other     Macular Degeneration Other     Diabetes No family hx of     Cancer - colorectal No family hx of     Retinal detachment No family hx of     Amblyopia No family hx of     Thyroid Disease No family hx of     Adrenal Disorder No family hx of             Allergies:     Allergies   Allergen Reactions    Atorvastatin      Leg myalgias            Medications:     Current Outpatient Medications   Medication Sig Dispense Refill    albuterol (PROAIR HFA/PROVENTIL HFA/VENTOLIN HFA) 108 (90 Base) MCG/ACT inhaler Inhale 2 puffs into the lungs every 4 hours as needed for shortness of breath / dyspnea or wheezing 18 g 0    ARIPiprazole (ABILIFY) 2 MG tablet Take 2 tablets (4 mg) by mouth daily 60 tablet 2    aspirin (ASA) 81 MG EC tablet Take 1 tablet (81 mg) by mouth daily 90 tablet 3    azelastine (ASTELIN) 0.1 % nasal spray Spray 2 sprays  into both nostrils 2 times daily as needed for rhinitis 30 mL 3    azelastine (OPTIVAR) 0.05 % ophthalmic solution Apply 1 drop to eye 2 times daily as needed (itchy/watery eyes) 6 mL 3    blood glucose (NO BRAND SPECIFIED) lancing device Device to be used with lancets. 3 each 3    blood glucose (NO BRAND SPECIFIED) test strip Use to test blood sugar 3 times daily 300 strip 3    calcium carbonate-vitamin D (CALTRATE 600+D) 600-400 MG-UNIT CHEW Take 1 chew tab by mouth 2 times daily 180 tablet 3    carvedilol (COREG) 25 MG tablet Take 1 tablet (25 mg) by mouth 2 times daily (with meals) 180 tablet 3    cetirizine (ZYRTEC) 10 MG tablet Take 1 tablet (10 mg) by mouth daily 30 tablet 3    ezetimibe (ZETIA) 10 MG tablet Take 1 tablet (10 mg) by mouth daily 90 tablet 3    FEROSUL 325 (65 Fe) MG tablet TAKE 1 TABLET(325 MG) BY MOUTH TWICE DAILY 180 tablet 0    hydrALAZINE (APRESOLINE) 10 MG tablet Take 1 tablet (10 mg) by mouth 3 times daily 270 tablet 3    Ibuprofen-diphenhydrAMINE Cit (IBUPROFEN PM PO) Take by mouth At Bedtime      isosorbide mononitrate (IMDUR) 30 MG 24 hr tablet Take 1 tablet (30 mg) by mouth daily 90 tablet 3    lisinopril (ZESTRIL) 5 MG tablet TAKE 1 TABLET(5 MG) BY MOUTH DAILY 90 tablet 3    meclizine (ANTIVERT) 25 MG tablet Take 1 tablet (25 mg) by mouth 3 times daily as needed 30 tablet 1    montelukast (SINGULAIR) 10 MG tablet TAKE 1 TABLET(10 MG) BY MOUTH AT BEDTIME 90 tablet 0    Multiple Vitamin (MULTI-VITAMIN) per tablet Take 1 tablet by mouth daily. 100 tablet 3    naltrexone (DEPADE/REVIA) 50 MG tablet Take 1/2 tablet once daily 1-2 hours prior to worst cravings for 1 week, then increase to 1 tablet daily as directed if tolerating 30 tablet 3    NIFEdipine ER (ADALAT CC) 30 MG 24 hr tablet Take 1 tablet (30 mg) by mouth daily 60 tablet 1    ondansetron (ZOFRAN ODT) 4 MG ODT tab Take 1 tablet (4 mg) by mouth every 8 hours as needed for nausea 30 tablet 2    simvastatin (ZOCOR) 40 MG tablet  Take 1 tablet (40 mg) by mouth At Bedtime 90 tablet 3    sulfaSALAzine ER (AZULFIDINE EN) 500 MG EC tablet Take 1 tablet daily for 1 week, then take 1 tablet twice daily for 1 week, then take 1 tab in AM and 2 tabs in PM for 1 week, then take 2 tabs twice daily. Take this medication with food. Labs every 8-12 weeks. 120 tablet 2    venlafaxine (EFFEXOR) 75 MG tablet Take 1 tablet (75 mg) by mouth 3 times daily 90 tablet 2    Cyanocobalamin (VITAMIN B-12 SL) Place under the tongue every other day       diclofenac (VOLTAREN) 50 MG EC tablet Take 1 tablet (50 mg) by mouth 2 times daily for 7 days 28 tablet 2            Physical Exam:   Last menstrual period 06/01/2012, not currently breastfeeding.  Wt Readings from Last 6 Encounters:   07/17/23 85.3 kg (188 lb)   06/27/23 85.5 kg (188 lb 9.6 oz)   06/20/23 85 kg (187 lb 6.4 oz)   05/24/23 86.4 kg (190 lb 6.4 oz)   05/04/23 85.7 kg (189 lb)   04/13/23 87.2 kg (192 lb 3.2 oz)   Exam limited as this was a video visit  Constitutional: well-developed, appearing stated age; cooperative  Eyes: nl conjunctiva, sclera  ENT: nl external ears, nose, hearing, lips,   Neck: no visible mass or thyroid enlargement  Resp: No shortness of breath with normal conversation  Psych: nl judgement, orientation, memory, affect.           Data:   Imaging:  X-ray cervical spine 3/28/2022  IMPRESSION: Mild degenerative retrolisthesis of C3 upon C4 and minimal  degenerative anterolisthesis of C7 upon T1. Alignment otherwise  normal. Vertebral body heights normal. No fractures. Moderate-severe  facet arthropathy throughout the cervical spine. Loss of disc space  height and degenerative endplate spurring at all levels of the  cervical spine with exception of the C2-C3 level.    X-ray lumbar spine 3/28/2022  IMPRESSION: Five lumbar type vertebrae. Moderate left convex curvature  of the lumbar spine centered at L2 has developed since the comparison  study. Mild degenerative retrolisthesis of L1 upon  L2 and L2 upon L3  again noted. Degenerative grade 1 anterolisthesis of L4 upon L5 again  noted. Alignment otherwise normal. Vertebral body heights normal. No  evidence for recent fracture. Presumed chronic fracture deformity or  large Schmorl's node deformity at the anterior superior corner of the  L4 vertebral body again noted. Loss of disc space height and  degenerative endplate spurring at T12-L1, L1-L2 and L2-L3. Facet  arthropathy at L3-L4, L4-L5 and L5-S1.    X-ray right hand 6/30/2021  IMPRESSION: Moderate degenerative arthrosis at the STT and distal  radioulnar joints. There is cystic change at the proximal-medial  aspect of the lunate. This could relate to ulnolunate impaction or  subchondral cystic change. Mild subchondral cystic change at the  proximal aspect of the triquetrum. Mild third MCP joint space  narrowing. No fracture identified.     X-ray right foot.  12/29/2020  IMPRESSION:  1.  Moderate right second TMT degenerative arthrosis.  2.  Mild right first MTP degenerative arthrosis.  3.  Small osteophyte at the lateral margin of the fifth metatarsal  head.  4.  Plantar calcaneal spur.  5.  No fracture.  6.  Second hammertoe deformity.    X-ray bilateral hand 7/28/2023  IMPRESSION:   1.  No definite osseous erosions. Scattered periarticular lucencies  bilaterally, such as within the distal ulna on the right and at the  triscaphe joint within the distal pole of the scaphoid bilaterally  which have characteristics most consistent with subchondral cysts or  intraosseous ganglions as likely sequela of degenerative arthritis.  2.  Moderate second and third MCP joint space narrowing on the right  with periarticular soft tissue thickening which may reflect synovitis.  3.  No obvious periarticular osteopenia.  4.  Mild to moderate degenerative arthritis involving multiple joints  of both hands and wrists, greatest involving the first CMC and  triscaphe joints bilaterally.     MRI right hand  8/21/2023  IMPRESSION:  1.  While lack of contrast limits differentiation between synovitis and joint effusion, there is a large joint effusion versus synovitis within the wrist with multifocal subcortical cystic change and patchy areas of marrow edema. While the more advanced   arthrosis in the triscaphe and first CMC joints is typical of osteoarthritis, additional sites within the wrist may represent an inflammatory arthropathy although mechanical changes/osteoarthritis is not completely excluded.  2.  Scattered joint effusions versus synovitis within the MCP and PIP joints of the fingers can be seen with both mechanical as well as inflammatory causes.  3.  High-grade cartilage loss and patchy subchondral bone marrow edema with subcortical cystic change at the long finger MCP joint can be seen with an inflammatory arthropathy as well as mechanical change.  4.  Mild, scattered tenosynovitis within the flexor and extensor tendons, primarily at the wrist as described.   5.  Given the overall appearance of the hands and wrists, a combination of an inflammatory and degenerative arthritis is favored.    Laboratory:  4/14/2023  CRP less than 2.9  Rheumatoid factor 12  Sed rate 8  FAHEEM negative    7/28/2023  CRP less than 3.0  CCP antibody 1.1  Sed rate 9  SSA and SSB negative

## 2023-09-12 ENCOUNTER — VIRTUAL VISIT (OUTPATIENT)
Dept: RHEUMATOLOGY | Facility: CLINIC | Age: 63
End: 2023-09-12
Payer: COMMERCIAL

## 2023-09-12 DIAGNOSIS — I73.00 RAYNAUD'S DISEASE WITHOUT GANGRENE: ICD-10-CM

## 2023-09-12 DIAGNOSIS — M05.742 RHEUMATOID ARTHRITIS INVOLVING BOTH HANDS WITH POSITIVE RHEUMATOID FACTOR (H): Primary | ICD-10-CM

## 2023-09-12 DIAGNOSIS — Z79.899 HIGH RISK MEDICATION USE: ICD-10-CM

## 2023-09-12 DIAGNOSIS — M05.741 RHEUMATOID ARTHRITIS INVOLVING BOTH HANDS WITH POSITIVE RHEUMATOID FACTOR (H): Primary | ICD-10-CM

## 2023-09-12 PROCEDURE — 99214 OFFICE O/P EST MOD 30 MIN: CPT | Mod: VID | Performed by: PHYSICIAN ASSISTANT

## 2023-09-12 RX ORDER — SULFASALAZINE 500 MG/1
TABLET, DELAYED RELEASE ORAL
Qty: 120 TABLET | Refills: 2 | Status: SHIPPED | OUTPATIENT
Start: 2023-09-12 | End: 2024-01-10

## 2023-09-12 NOTE — PATIENT INSTRUCTIONS
After Visit Instructions:     Thank you for coming to [facility] for your care. It is my goal to partner with you to help you reach your optimal state of health.       Plan:     Schedule follow-up with Melissa El PA-C in 3 months  Labs: CBC, creatinine, albumin, AST, ALT in 1 month and then every 3 months  Medications:  In the future consider methotrexate (this one is taken weekly) or hydroxychloroquine (this one is taken daily)  # Methotrexate Risks and Benefits: The risks and benefits of methotrexate were discussed in detail and the patient verbalized understanding.  The risks discussed include, but are not limited to, the risk for hypersensitivity, anaphylaxis, anaphylactoid reactions, infections, bone marrow suppression, renal toxicity, hepatotoxicity, pulmonary toxicity, malignancy, impaired fertility, GI upset, alopecia, and oral and nasal sores.  Folic acid supplementation is recommended during methotrexate therapy to help prevent some of the side effects. Pregnancy prevention and planning was discussed; it is recommended that women of childbearing potential use reliable contraception during therapy.  The risks of taking both methotrexate and alcohol were reviewed; complete alcohol avoidance was discussed.  Routine laboratory monitoring is required during methotrexate therapy. Taking MTX once weekly, all within a 24 hour period was stressed and the patient verbalized this instruction back to me.  I encouraged reviewing the package insert and asking any questions about the medication.  # Hydroxychloroquine (Plaquenil) Risks and Benefits:  The risks and benefits of hydroxychloroquine were discussed in detail and the patient verbalized understanding; the patient also verbalized agreement to get the required ophthalmologic toxicity monitoring.  The risks discussed include, but are not limited to, the risk for hypersensitivity, anaphylaxis, anaphylactoid reactions, irreversible retinal  damage, rare hematologic reactions, and rare cardiomyopathy.  Patients with G6PD deficiency or hepatic impairment may be at an increased risk for adverse effects.  I encouraged reviewing the package insert and asking any questions about the medication.      Start Sulfasalazine 500mg: Take 1 tablet daily for 1 week, then take 1 tablet twice daily for 1 week, then take 1 tab in AM and 2 tabs in PM for 1 week, then take 2 tabs twice daily. Take this medication with food.  # Sulfasalazine Risks and Benefits: The risks and benefits of sulfasalazine were discussed in detail and the patient verbalized understanding.  The risks discussed include, but are not limited to, the risk for hypersensitivity, anaphylaxis, anaphylactoid reactions, infections, bone marrow suppression,  hepatotoxicity, nausea, vomiting, and GI upset.  Oligospermia may occur in males.  I encouraged reviewing the package insert and asking any questions about the medication.          Melissa El PA-C  [facility]  [place]    Contact information: [facility]  Clinic Number:  [phone]  Please call or send a [employer] message with any questions about your care

## 2023-09-12 NOTE — NURSING NOTE
Chief Complaint   Patient presents with    RECHECK     2 month recheck: bilateral hand pain, Raynaud's, positive rheumatoid factor. Had MRI right hand 8/21/23.       There were no vitals filed for this visit.  Wt Readings from Last 1 Encounters:   07/17/23 85.3 kg (188 lb)       Ann-Marie Calvillo MA

## 2023-09-15 DIAGNOSIS — I73.00 RAYNAUD'S DISEASE WITHOUT GANGRENE: ICD-10-CM

## 2023-09-15 RX ORDER — NIFEDIPINE 30 MG
30 TABLET, EXTENDED RELEASE ORAL DAILY
Qty: 60 TABLET | Refills: 0 | Status: SHIPPED | OUTPATIENT
Start: 2023-09-15 | End: 2023-11-09

## 2023-09-19 ENCOUNTER — OFFICE VISIT (OUTPATIENT)
Dept: FAMILY MEDICINE | Facility: CLINIC | Age: 63
End: 2023-09-19
Payer: COMMERCIAL

## 2023-09-19 VITALS
SYSTOLIC BLOOD PRESSURE: 116 MMHG | TEMPERATURE: 97 F | HEART RATE: 71 BPM | HEIGHT: 61 IN | OXYGEN SATURATION: 96 % | DIASTOLIC BLOOD PRESSURE: 72 MMHG | WEIGHT: 188.2 LBS | BODY MASS INDEX: 35.53 KG/M2 | RESPIRATION RATE: 16 BRPM

## 2023-09-19 DIAGNOSIS — Z23 NEED FOR PROPHYLACTIC VACCINATION AND INOCULATION AGAINST INFLUENZA: ICD-10-CM

## 2023-09-19 DIAGNOSIS — J45.30 MILD PERSISTENT ASTHMA WITHOUT COMPLICATION: ICD-10-CM

## 2023-09-19 DIAGNOSIS — R25.2 LEG CRAMPS: Primary | ICD-10-CM

## 2023-09-19 LAB
ALBUMIN SERPL BCG-MCNC: 4 G/DL (ref 3.5–5.2)
ALP SERPL-CCNC: 94 U/L (ref 35–104)
ALT SERPL W P-5'-P-CCNC: 61 U/L (ref 0–50)
ANION GAP SERPL CALCULATED.3IONS-SCNC: 11 MMOL/L (ref 7–15)
AST SERPL W P-5'-P-CCNC: 40 U/L (ref 0–45)
BILIRUB SERPL-MCNC: 0.3 MG/DL
BUN SERPL-MCNC: 13.5 MG/DL (ref 8–23)
CALCIUM SERPL-MCNC: 9.2 MG/DL (ref 8.8–10.2)
CHLORIDE SERPL-SCNC: 101 MMOL/L (ref 98–107)
CREAT SERPL-MCNC: 0.87 MG/DL (ref 0.51–0.95)
CREAT UR-MCNC: 60.9 MG/DL
DEPRECATED HCO3 PLAS-SCNC: 24 MMOL/L (ref 22–29)
EGFRCR SERPLBLD CKD-EPI 2021: 74 ML/MIN/1.73M2
GLUCOSE SERPL-MCNC: 97 MG/DL (ref 70–99)
MICROALBUMIN UR-MCNC: <12 MG/L
MICROALBUMIN/CREAT UR: NORMAL MG/G{CREAT}
POTASSIUM SERPL-SCNC: 4.8 MMOL/L (ref 3.4–5.3)
PROT SERPL-MCNC: 6.4 G/DL (ref 6.4–8.3)
SODIUM SERPL-SCNC: 136 MMOL/L (ref 136–145)

## 2023-09-19 PROCEDURE — 90471 IMMUNIZATION ADMIN: CPT | Performed by: STUDENT IN AN ORGANIZED HEALTH CARE EDUCATION/TRAINING PROGRAM

## 2023-09-19 PROCEDURE — 80053 COMPREHEN METABOLIC PANEL: CPT | Performed by: STUDENT IN AN ORGANIZED HEALTH CARE EDUCATION/TRAINING PROGRAM

## 2023-09-19 PROCEDURE — 36415 COLL VENOUS BLD VENIPUNCTURE: CPT | Performed by: STUDENT IN AN ORGANIZED HEALTH CARE EDUCATION/TRAINING PROGRAM

## 2023-09-19 PROCEDURE — 82043 UR ALBUMIN QUANTITATIVE: CPT | Performed by: STUDENT IN AN ORGANIZED HEALTH CARE EDUCATION/TRAINING PROGRAM

## 2023-09-19 PROCEDURE — 99214 OFFICE O/P EST MOD 30 MIN: CPT | Mod: 25 | Performed by: STUDENT IN AN ORGANIZED HEALTH CARE EDUCATION/TRAINING PROGRAM

## 2023-09-19 PROCEDURE — 90662 IIV NO PRSV INCREASED AG IM: CPT | Performed by: STUDENT IN AN ORGANIZED HEALTH CARE EDUCATION/TRAINING PROGRAM

## 2023-09-19 PROCEDURE — 82570 ASSAY OF URINE CREATININE: CPT | Performed by: STUDENT IN AN ORGANIZED HEALTH CARE EDUCATION/TRAINING PROGRAM

## 2023-09-19 RX ORDER — ALBUTEROL SULFATE 90 UG/1
2 AEROSOL, METERED RESPIRATORY (INHALATION) EVERY 4 HOURS PRN
Qty: 18 G | Refills: 3 | Status: SHIPPED | OUTPATIENT
Start: 2023-09-19 | End: 2024-10-04

## 2023-09-19 ASSESSMENT — ASTHMA QUESTIONNAIRES: ACT_TOTALSCORE: 20

## 2023-09-19 NOTE — PROGRESS NOTES
"  Assessment & Plan     (R25.2) Leg cramps  (primary encounter diagnosis)  Comment: Chronic, uncontrolled. Pending labs to determine if there is underlying vitamin deficiency. Will also place order for emg completion. Pt declined medications at this time   Plan: Albumin Random Urine Quantitative with Creat         Ratio, Comprehensive metabolic panel (BMP +         Alb, Alk Phos, ALT, AST, Total. Bili, TP), EMG            (J45.30) Mild persistent asthma without complication  Comment: chronic, stable  Plan: albuterol (PROAIR HFA/PROVENTIL HFA/VENTOLIN         HFA) 108 (90 Base) MCG/ACT inhaler            (Z23) Need for prophylactic vaccination and inoculation against influenza  Comment: Stable  Plan: INFLUENZA VACCINE 65+ (FLUZONE HD)            Dictation Disclaimer: Some of this Note has been completed with voice-recognition dictation software. Although errors are generally corrected real-time, there is the potential for a rare error to be present in the completed chart.              BMI:   Estimated body mass index is 35.56 kg/m  as calculated from the following:    Height as of this encounter: 1.549 m (5' 1\").    Weight as of this encounter: 85.4 kg (188 lb 3.2 oz).   Weight management plan: not discussed        LAKESHA VILLASENOR MD  [facility] ANDRA Gomez is a 63 year old, presenting for the following health issues:  leg cramping         9/19/2023     8:19 AM   Additional Questions   Roomed by Hannah       History of Present Illness       Reason for visit:  Leg cramps  Symptom onset:  3-4 weeks ago    She eats 2-3 servings of fruits and vegetables daily.She consumes 0 sweetened beverage(s) daily.She exercises with enough effort to increase her heart rate 20 to 29 minutes per day.  She exercises with enough effort to increase her heart rate 3 or less days per week. She is missing 1 dose(s) of medications per week.       Onset/Duration: 1 month   Description  Location: Bilateral leg " "cramping. Worse in left leg than right. In the mornings when patient wakes up.   Joint Swelling: YES  Redness: No  Pain: YES  Warmth: No  Progression of Symptoms:  waxing and waning  Accompanying signs and symptoms:   Fevers: No  Numbness/tingling/weakness: No  History  Trauma to the area: No  Recent illness:  No  Previous similar problem: No  Previous evaluation:  No  Precipitating or alleviating factors:  Aggravating factors include: None  Therapies tried and outcome: None     HISTORY OF PRESENT ILLNESS  The patient presents for evaluation of multiple medical concerns.    She has severe leg cramps, especially when she wakes up. It is in both legs, but mostly the left. When she wakes up, it feels like everything is moving in there. She has to get up and try to stand on it to get it to go away. She takes a couple of steps and finally it gets back to normal. It does not feel like a charley horse. She has been trying to drink more water than she has been. She thought it was due to dehydration. It happens almost every morning except today morning. She is on many medications. She wonders if she needs to wear compression socks. She has gone to the bed a couple of times. She has pain with cramping. She has had Charley horses in the past.    Supplemental Information  She has a wart. She needs a refill on her albuterol.    Medication Followup of albuterol  Taking Medication as prescribed: yes  Side Effects:  None  Medication Helping Symptoms:  yes      Review of Systems   CONSTITUTIONAL: NEGATIVE for fever, chills, change in weight  ENT/MOUTH: NEGATIVE for ear, mouth and throat problems  RESP: NEGATIVE for significant cough or SOB  CV: NEGATIVE for chest pain, palpitations or peripheral edema  MUSCULOSKELETAL: POSITIVE  for muscle cramps,nocturnal and pain in lower legs      Objective    /72   Pulse 71   Temp 97  F (36.1  C) (Temporal)   Resp 16   Ht 1.549 m (5' 1\")   Wt 85.4 kg (188 lb 3.2 oz)   LMP 06/01/2012  "  SpO2 96%   BMI 35.56 kg/m    Body mass index is 35.56 kg/m .  Physical Exam   GENERAL: healthy, alert and no distress  EYES: Eyes grossly normal to inspection, PERRL and conjunctivae and sclerae normal  Neck: No visible JVD or lymphadenopathy   RESP: symmetrical rise in chest   CV: No peripheral edema notable   MS: no gross musculoskeletal defects noted  SKIN: no suspicious lesions or rashes  PSYCH: mentation appears normal, affect normal/bright      No results found for any visits on 09/19/23.

## 2023-09-20 ENCOUNTER — OFFICE VISIT (OUTPATIENT)
Dept: OBGYN | Facility: CLINIC | Age: 63
End: 2023-09-20
Attending: OBSTETRICS & GYNECOLOGY
Payer: COMMERCIAL

## 2023-09-20 VITALS
WEIGHT: 189 LBS | HEART RATE: 68 BPM | BODY MASS INDEX: 35.71 KG/M2 | OXYGEN SATURATION: 97 % | DIASTOLIC BLOOD PRESSURE: 77 MMHG | SYSTOLIC BLOOD PRESSURE: 119 MMHG

## 2023-09-20 DIAGNOSIS — N95.1 SYMPTOMATIC MENOPAUSAL OR FEMALE CLIMACTERIC STATES: Primary | ICD-10-CM

## 2023-09-20 PROCEDURE — 99203 OFFICE O/P NEW LOW 30 MIN: CPT | Performed by: OBSTETRICS & GYNECOLOGY

## 2023-09-20 RX ORDER — ESTRADIOL 1 MG/1
1 TABLET ORAL DAILY
Qty: 90 TABLET | Refills: 3 | Status: SHIPPED | OUTPATIENT
Start: 2023-09-20 | End: 2024-07-17

## 2023-09-20 RX ORDER — MEDROXYPROGESTERONE ACETATE 2.5 MG/1
2.5 TABLET ORAL DAILY
Qty: 90 TABLET | Refills: 3 | Status: SHIPPED | OUTPATIENT
Start: 2023-09-20 | End: 2024-07-17

## 2023-09-20 NOTE — PROGRESS NOTES
Patricia Perez is a 63 year old female, .  She presents today with menopausal symptoms.  She reports she was using Prempro up until May and the prescription was not renewed.  The hot flashes have been bad.  She has had mood swings, sweating episodes, and they have interfered with her daily activities.   She has not had any alleviating factors.  She is interested in restarting the HT.    We reviewed the WHI study that found increase risk in VTE and the cardiovascular incidents associated with the HT.  The WHI was developed with the primary end point related to cardiac disease.  The initial reports showed an increase risk in the first year, equally out over the next couple of years, and then the following years the HT was beneficial.   New information not associated with the WHI, has suggested the benefit of HT regarding cardiac disease, and other medications, such as the statins, do not have beneficial profiles.  I also reviewed with the patient the slight increase in breast cancer in the combined HRT arm (not the ERT arm regarding breast CA).  I also reviewed with her the Confidence Interval with her and the CI was from 1.0 to 1.59.  Even though the breast cancer risk increased by 24%, It is not statistically significant since the CI hit 1.  We also reviewed Earline Grijalva's last study of the meta-analysis of the best studies regarding HT back to the 50's.  Her conclusion was no conclusive evidence exists showing increase risk with breast cancer.  I also reviewed with her about the real benefits which include the osteoporosis and fracture risk reduction.  This discussion also dealt with the other medications for fracture risk reduction.  I also reviewed with her about the incidence of colon cancer of 1 in 10 in the general population, but the WHI showed reduction in the combination arm that was significant with the CI range of 0.89 to 0.53.  The theoretical risk regarding the breast cancer risk and the benefits  regarding colon cancer are likely related to the progestin use.   We discussed the current recommendation to be on the lowest HT dose possible, for the shortest amount of time.  Other studies are currently being performed, which may change the recommendations.  I recommend to review the HT use on a yearly basis as new information may be released, impacting her choice.  She voiced her understanding.        Past Medical History:   Diagnosis Date    Anxiety     Arthritis     ASCUS of cervix with negative high risk HPV 01/23/2006    Asthma     Bursitis of shoulder 03/04/2010    Chronic rhinitis 03/25/2009    Coronary artery disease     Depression     Dyspnea on exertion     GERD (gastroesophageal reflux disease) 10/14/2008    Hypertension     Idiopathic cardiomyopathy (H) 01/08/2009    Indigestion     Itchy eyes     Left leg pain 06/16/2011    Motion sickness 02/27/2008    Nasal congestion     Pneumonia     Problems related to lack of adequate sleep     Ringing in ears     Sleep apnea 03/25/2009    Sneezing      Past Surgical History:   Procedure Laterality Date    BARIATRIC SURGERY      COLONOSCOPY N/A 11/11/2021    Procedure: COLONOSCOPY, WITH POLYPECTOMY;  Surgeon: Stephanie Meneses MD;  Location: [facility] OR    DIABETES RESOURCES  As Child    Tonsillectomy    ENT SURGERY      ESOPHAGOSCOPY, GASTROSCOPY, DUODENOSCOPY (EGD), COMBINED N/A 11/11/2021    Procedure: ESOPHAGOGASTRODUODENOSCOPY, WITH BIOPSY;  Surgeon: Stephanie Meneses MD;  Location: [facility] OR    HERNIA REPAIR      LAPAROSCOPIC BYPASS GASTRIC  10/16/2012    Procedure: LAPAROSCOPIC BYPASS GASTRIC;  laparoscopic reyna en y gastric bypass;  Surgeon: Nish Bradford MD;  Location: [facility] OR    TONSILLECTOMY      Uretural Sticture  As Child        Allergies   Allergen Reactions    Atorvastatin      Leg myalgias     Current Outpatient Medications   Medication Sig Dispense Refill    albuterol (PROAIR HFA/PROVENTIL HFA/VENTOLIN HFA) 108 (90 Base) MCG/ACT inhaler Inhale 2 puffs  into the lungs every 4 hours as needed for shortness of breath or wheezing 18 g 3    ARIPiprazole (ABILIFY) 2 MG tablet Take 2 tablets (4 mg) by mouth daily 60 tablet 2    aspirin (ASA) 81 MG EC tablet Take 1 tablet (81 mg) by mouth daily 90 tablet 3    azelastine (ASTELIN) 0.1 % nasal spray Spray 2 sprays into both nostrils 2 times daily as needed for rhinitis 30 mL 3    azelastine (OPTIVAR) 0.05 % ophthalmic solution Apply 1 drop to eye 2 times daily as needed (itchy/watery eyes) 6 mL 3    blood glucose (NO BRAND SPECIFIED) lancing device Device to be used with lancets. 3 each 3    blood glucose (NO BRAND SPECIFIED) test strip Use to test blood sugar 3 times daily 300 strip 3    calcium carbonate-vitamin D (CALTRATE 600+D) 600-400 MG-UNIT CHEW Take 1 chew tab by mouth 2 times daily 180 tablet 3    carvedilol (COREG) 25 MG tablet Take 1 tablet (25 mg) by mouth 2 times daily (with meals) 180 tablet 3    cetirizine (ZYRTEC) 10 MG tablet Take 1 tablet (10 mg) by mouth daily 30 tablet 3    Cyanocobalamin (VITAMIN B-12 SL) Place under the tongue every other day       ezetimibe (ZETIA) 10 MG tablet Take 1 tablet (10 mg) by mouth daily 90 tablet 3    FEROSUL 325 (65 Fe) MG tablet TAKE 1 TABLET(325 MG) BY MOUTH TWICE DAILY 180 tablet 0    hydrALAZINE (APRESOLINE) 10 MG tablet Take 1 tablet (10 mg) by mouth 3 times daily 270 tablet 3    Ibuprofen-diphenhydrAMINE Cit (IBUPROFEN PM PO) Take by mouth At Bedtime      isosorbide mononitrate (IMDUR) 30 MG 24 hr tablet Take 1 tablet (30 mg) by mouth daily 90 tablet 3    lisinopril (ZESTRIL) 5 MG tablet TAKE 1 TABLET(5 MG) BY MOUTH DAILY 90 tablet 3    meclizine (ANTIVERT) 25 MG tablet Take 1 tablet (25 mg) by mouth 3 times daily as needed 30 tablet 1    montelukast (SINGULAIR) 10 MG tablet TAKE 1 TABLET(10 MG) BY MOUTH AT BEDTIME 90 tablet 0    Multiple Vitamin (MULTI-VITAMIN) per tablet Take 1 tablet by mouth daily. 100 tablet 3    naltrexone (DEPADE/REVIA) 50 MG tablet Take 1/2  tablet once daily 1-2 hours prior to worst cravings for 1 week, then increase to 1 tablet daily as directed if tolerating 30 tablet 3    NIFEdipine ER (ADALAT CC) 30 MG 24 hr tablet Take 1 tablet (30 mg) by mouth daily 60 tablet 0    ondansetron (ZOFRAN ODT) 4 MG ODT tab Take 1 tablet (4 mg) by mouth every 8 hours as needed for nausea 30 tablet 2    simvastatin (ZOCOR) 40 MG tablet Take 1 tablet (40 mg) by mouth At Bedtime 90 tablet 3    sulfaSALAzine ER (AZULFIDINE EN) 500 MG EC tablet Take 1 tablet daily for 1 week, then take 1 tablet twice daily for 1 week, then take 1 tab in AM and 2 tabs in PM for 1 week, then take 2 tabs twice daily. Take this medication with food. Labs every 8-12 weeks. 120 tablet 2    venlafaxine (EFFEXOR) 75 MG tablet Take 1 tablet (75 mg) by mouth 3 times daily 90 tablet 2    diclofenac (VOLTAREN) 50 MG EC tablet Take 1 tablet (50 mg) by mouth 2 times daily for 7 days 28 tablet 2     Social History     Socioeconomic History    Marital status:      Spouse name: Not on file    Number of children: 0    Years of education: 12+    Highest education level: Not on file   Occupational History     Employer: [employer]   Tobacco Use    Smoking status: Never    Smokeless tobacco: Never   Vaping Use    Vaping Use: Never used   Substance and Sexual Activity    Alcohol use: Yes     Comment: seldom    Drug use: No    Sexual activity: Not Currently     Partners: Male   Other Topics Concern    Parent/sibling w/ CABG, MI or angioplasty before 65F 55M? No   Social History Narrative    Dairy/d 2 servings/d.     Caffeine 3 servings/d    Exercise 2 x week    Sunscreen used - Yes    Seatbelts used - Yes    Working smoke/CO detectors in the home - Yes    Guns stored in the home - No    Self Breast Exams - Yes    Self Testicular Exam - NA    Eye Exam up to date - Yes    Dental Exam up to date - No    Pap Smear up to date - Yes    Mammogram up to date - Yes    PSA up to date - NA    Dexa Scan up  to date - NA    Flex Sig / Colonoscopy up to date - Yes    Immunizations up to date - Yes    Abuse: Current or Past(Physical, Sexual or Emotional)- No    Do you feel safe in your environment - Yes    LOWELL CAMPOSSly LAZO.    02/05/2009        May 19, 2021    ENVIRONMENTAL HISTORY: [place]. The home is heated with a forced air. They do have central air conditioning. The patient's bedroom is furnished with carpeting in bedroom, allergen mattress cover, and animals sleep in bedroom.  Pets inside the house include 1 cat(s). There is no history of cockroach or mice infestation. There is/are 0 smokers in the house.  The house does not have a damp basement, it's a split level.      Social Determinants of Health     Financial Resource Strain: Not on file   Food Insecurity: Not on file   Transportation Needs: Not on file   Physical Activity: Not on file   Stress: Not on file   Social Connections: Not on file   Interpersonal Safety: Not on file   Housing Stability: Not on file     Family History   Problem Relation Age of Onset    Gastrointestinal Disease Mother         non cancerous pancreatic tumor    Hypertension Mother     Heart Disease Mother         A fib    Allergies Mother     Hypertension Father     Lipids Father     Alcohol/Drug Father         Abuse    Depression Father     Respiratory Father         COPD    Cardiovascular Father     Allergies Sister         Poison Ivy    Depression Brother     Hypertension Brother     Allergies Brother         Enviromental/ Bees    Breast Cancer Maternal Grandmother     Glaucoma Paternal Grandmother     Glaucoma Paternal Grandfather     Cerebrovascular Disease Other     Macular Degeneration Other     Diabetes No family hx of     Cancer - colorectal No family hx of     Retinal detachment No family hx of     Amblyopia No family hx of     Thyroid Disease No family hx of     Adrenal Disorder No family hx of      Review of Systems:  10 point ROS  of systems including Constitutional, Eyes, Respiratory, Cardiovascular, Gastroenterology, Genitourinary, Integumentary, Muscularskeletal, Psychiatric were all negative except for pertinent positives noted in my HPI and in the PMH.      Exam  /77 (BP Location: Right arm, Cuff Size: Adult Large)   Pulse 68   Wt 85.7 kg (189 lb)   LMP 06/01/2012   SpO2 97%   BMI 35.71 kg/m    General:  WNWD female, NAD  Alert  Oriented x 3  Gait:  Normal  Skin:  Normal skin turgor  HEENT:  NC/AT, EOMI  Abdomen:  Non-tender, non-distended.  Pelvic exam:  Not performed  Extremities:  No clubbing, no cyanosis and no edema.      Assessment   Menopausal symptoms      Plan  The options are reviewed with her and the associated risks and benefits and goals and limitations.  She is interested in the HT.  The Prempro is $90 for 3 months.  We reviewed the use of estradiol and MPA and due to the decrease in cost, she is interested in the medication.  The prescriptions are sent to [facility].  She may need to check with other pharmacies, including [facility] to determine if she may have less out of pocket.  It may cost less to not use her insurance.    Questions seem to be answered.     Total time preparing to see patient with reviewing prior encounter and labs, face to face time,  and coordinating care on the same calendar date:  35 minutes   Ananda Evans MD

## 2023-09-25 ENCOUNTER — ANCILLARY PROCEDURE (OUTPATIENT)
Dept: MAMMOGRAPHY | Facility: CLINIC | Age: 63
End: 2023-09-25
Attending: STUDENT IN AN ORGANIZED HEALTH CARE EDUCATION/TRAINING PROGRAM
Payer: COMMERCIAL

## 2023-09-25 ENCOUNTER — ANCILLARY PROCEDURE (OUTPATIENT)
Dept: BONE DENSITY | Facility: CLINIC | Age: 63
End: 2023-09-25
Attending: STUDENT IN AN ORGANIZED HEALTH CARE EDUCATION/TRAINING PROGRAM
Payer: COMMERCIAL

## 2023-09-25 DIAGNOSIS — E55.9 VITAMIN D DEFICIENCY: ICD-10-CM

## 2023-09-25 DIAGNOSIS — Z12.31 VISIT FOR SCREENING MAMMOGRAM: ICD-10-CM

## 2023-09-25 PROCEDURE — 99207 DX WRIST/HEEL/RADIUS: CPT | Performed by: INTERNAL MEDICINE

## 2023-09-25 PROCEDURE — 77080 DXA BONE DENSITY AXIAL: CPT | Performed by: INTERNAL MEDICINE

## 2023-09-25 PROCEDURE — 77067 SCR MAMMO BI INCL CAD: CPT | Mod: GC

## 2023-10-02 ENCOUNTER — OFFICE VISIT (OUTPATIENT)
Dept: AUDIOLOGY | Facility: CLINIC | Age: 63
End: 2023-10-02
Payer: COMMERCIAL

## 2023-10-02 DIAGNOSIS — H90.3 BILATERAL SENSORINEURAL HEARING LOSS: Primary | ICD-10-CM

## 2023-10-02 PROCEDURE — V5160 DISPENSING FEE BINAURAL: HCPCS

## 2023-10-02 PROCEDURE — 92593 PR HEARING AID CHECK, BINAURAL: CPT

## 2023-10-02 PROCEDURE — V5261 HEARING AID, DIGIT, BIN, BTE: HCPCS

## 2023-10-02 PROCEDURE — V5020 CONFORMITY EVALUATION: HCPCS | Mod: RT

## 2023-10-02 PROCEDURE — V5011 HEARING AID FITTING/CHECKING: HCPCS | Mod: RT

## 2023-10-02 NOTE — PROGRESS NOTES
AUDIOLOGY REPORT    SUBJECTIVE: Patricia Perez, a 63 year old female, was seen in the Audiology Clinic at [facility] today for a Binaural hearing aid fitting. Previous results have revealed a bilateral sensorineural hearing loss.       OBJECTIVE:  Prior to fitting, a hearing aid check was performed to ensure device functionality. The hearing aid conformity evaluation was completed.The hearing aids were placed and they provided a good fit. Real-ear-probe-microphone measurements were completed on the Nirvanix system and were a good match to NAL-NL2 target with soft sounds audible, moderate sounds comfortable, and loud sounds below discomfort. UCLs are verified through maximum power output measures and demonstrate appropriate limiting of loud inputs. Ms. Perez was oriented to proper hearing aid use, care, cleaning (no water, dry brush), batteries (size rechargeable, insertion/removal, toxicity, low-battery signal), aid insertion/removal, user booklet, warranty information, storage cases, and other hearing aid details. The patient confirmed understanding of hearing aid use and care, and showed proper insertion of hearing aid and batteries while in the office today. Ms. Perez reported good volume and sound quality today.    EAR(S) FIT: Binaural  MA HEARING AID MAKE: Right: Phonak; Left: Phonak    MA HEARING AID MODEL #: Right: Slim L50-R; Left: Slim L50-R  HEARING AID STYLE: Right: TARA; Left: TARA  DOME SIZE: Right:  medium double dome; Left::  medium double dome   LENGTH: Right:  1M; Left:  1M  SERIAL NUMBERS: Right: [id number]; Left: [id number]  WARRANTY END DATE: Right: 12/11/2026; Left:: 12/11/2026      (The patient Does Not Require a signed a waiver that is on file agreeing to the upgrade charge, per their insurance contract. )     Hearing aids were connected to patients phone for phone calls and sebastián use.     ASSESSMENT: Binaural hearing aid fitting completed today. Verification  measures were performed. The 45 day trial period was explained to patient, and they expressed understanding. Ms. Perez signed the Hearing Aid Purchase Agreement and was given a copy, as well as details on her hearing aids. Patient was counseled that exact out of pocket amounts cannot be determined for hearing aid claims being sent to insurance. Any insurance coverage information presented to the patient is an estimate only, and is not a guarantee of payment. Patient has been advised to check with their own insurance.    PLAN: Ms. Perez will return for follow-up in 2-3 weeks for a hearing aid review appointment. Please call this clinic with questions regarding today s appointment.    Malathi Landaverde, CCC-A  Licensed Audiologist  [place] #[id number]      10/02/23

## 2023-10-02 NOTE — PATIENT INSTRUCTIONS

## 2023-10-05 DIAGNOSIS — F33.1 MODERATE EPISODE OF RECURRENT MAJOR DEPRESSIVE DISORDER (H): ICD-10-CM

## 2023-10-05 NOTE — TELEPHONE ENCOUNTER
Medication requested: ARIPiprazole (ABILIFY) 2 MG tablet   Last refilled: 5/25/23  Qty: 60/2      Last seen: 5/25/23  RTC: 3 months  Cancel: x 2 10/2/23 and 9/7/23  No-show: 0  Next appt: 10/23/23    Refill decision:   Refill pended and routed to the provider for review/determination due to   Cancel x 2  Scheduled for follow-up 10/23/23

## 2023-10-06 RX ORDER — ARIPIPRAZOLE 2 MG/1
4 TABLET ORAL DAILY
Qty: 60 TABLET | Refills: 0 | Status: SHIPPED | OUTPATIENT
Start: 2023-10-06 | End: 2023-10-23

## 2023-10-23 ENCOUNTER — VIRTUAL VISIT (OUTPATIENT)
Dept: PSYCHIATRY | Facility: CLINIC | Age: 63
End: 2023-10-23
Attending: PSYCHIATRY & NEUROLOGY
Payer: COMMERCIAL

## 2023-10-23 DIAGNOSIS — F33.1 MODERATE EPISODE OF RECURRENT MAJOR DEPRESSIVE DISORDER (H): ICD-10-CM

## 2023-10-23 PROCEDURE — 90833 PSYTX W PT W E/M 30 MIN: CPT | Mod: VID | Performed by: PSYCHIATRY & NEUROLOGY

## 2023-10-23 PROCEDURE — 99214 OFFICE O/P EST MOD 30 MIN: CPT | Mod: VID | Performed by: PSYCHIATRY & NEUROLOGY

## 2023-10-23 RX ORDER — ARIPIPRAZOLE 2 MG/1
4 TABLET ORAL DAILY
Qty: 180 TABLET | Refills: 0 | Status: SHIPPED | OUTPATIENT
Start: 2023-11-01 | End: 2024-02-07

## 2023-10-23 RX ORDER — VENLAFAXINE 75 MG/1
75 TABLET ORAL 3 TIMES DAILY
Qty: 270 TABLET | Refills: 0 | Status: SHIPPED | OUTPATIENT
Start: 2023-11-01 | End: 2024-01-25

## 2023-10-23 ASSESSMENT — PATIENT HEALTH QUESTIONNAIRE - PHQ9
SUM OF ALL RESPONSES TO PHQ QUESTIONS 1-9: 5
10. IF YOU CHECKED OFF ANY PROBLEMS, HOW DIFFICULT HAVE THESE PROBLEMS MADE IT FOR YOU TO DO YOUR WORK, TAKE CARE OF THINGS AT HOME, OR GET ALONG WITH OTHER PEOPLE: SOMEWHAT DIFFICULT
SUM OF ALL RESPONSES TO PHQ QUESTIONS 1-9: 5

## 2023-10-23 NOTE — NURSING NOTE
Is the patient currently in the state of [place]? YES    Visit mode:VIDEO    If the visit is dropped, the patient can be reconnected by: VIDEO VISIT: Text to cell phone:   Telephone Information:   Mobile [phone]       Will anyone else be joining the visit? NO  (If patient encounters technical issues they should call [phone])    How would you like to obtain your AVS? MyChart    Are changes needed to the allergy or medication list? No    Reason for visit: No chief complaint on file.    Emerita CHINCHILLAF

## 2023-10-23 NOTE — PATIENT INSTRUCTIONS
It was nice to meet you today. Here is what we discussed:    -Continue taking your psych medications as prescribed    -Try using fidgets or tapping your fingers to be less distracting    -Get a light box on Amazon    SEASONAL LIGHT BOX INFORMATION    See [facility] Website  [email or web address]    INSTRUCTIONS FOR LIGHT BOX THERAPY  PLACE THE LIGHT BOX ON A TABLE OR COUNTER WHERE YOU CAN SIT COMFORTABLY.  FOLLOW THE 'S INFORMATION FOR THE DISTANCE TO THE LIGHT BOX.  YOU SHOULD NOT STARE DIRECTLY INTO THE LIGHT. YOU CAN READ OR EAT WHILE USING THE LIGHT. YOUR EYES MUST BE OPEN.  START WITH 30 MINUTES OF LIGHT EXPOSURE AS SOON AS POSSIBLE AFTER WAKING ( BETWEEN 6AM AND 9 AM).  MOST PEOPLE USE LIGHT THERAPY THROUGH THE WINTER UNTIL SPRINGTIME.    WHEN TO EXPECT A RESPONSE  YOU SHOULD NOTICE A RESPONSE IN A FEW DAYS AND BY TWO WEEKS DEFINITE IMPROVEMENT.   WHEN LIGHT THERAPY IS STOPPED IT MAY BE A FEW DAYS BEFORE SYMPTOMS REAPPEAR.    WHAT TO DO IF IMPROVEMENT IS NOT NOTICED  IF NO IMPROVEMENT AFTER 10 TO 14 DAYS, TRY SPENDING UP TO 60 MINUTES PER DAY IN FRONT OF THE LIGHT EVERY MORNING OR DIVIDING THE TIME BETWEEN MORNING AND EVENING. DO NOT USE CLOSE TO BEDTIME.      Navi Marie MD  [facility]     **For crisis resources, please see the information at the end of this document**   Patient Education    Thank you for coming to the [facility] [place] [facility].     Lab Testing:  If you had lab testing today and your results are reassuring or normal they will be mailed to you or sent through [employer] within 7 days. If the lab tests need quick action we will call you with the results. The phone number we will call with results is # [phone]. If this is not the best number please call our clinic and change the number.     Medication Refills:  If you need any refills please  call your pharmacy and they will contact us. Our fax number for refills is [phone].   Three business days of notice are needed for general medication refill requests.   Five business days of notice are needed for controlled substance refill requests.   If you need to change to a different pharmacy, please contact the new pharmacy directly. The new pharmacy will help you get your medications transferred.     Contact Us:  Please call [phone] during business hours (8-5:00 M-F).   If you have medication related questions after clinic hours, or on the weekend, please call [phone].     Financial Assistance [phone]   Medical Records [phone]       MENTAL HEALTH CRISIS RESOURCES:  For a emergency help, please call 911 or go to the nearest Emergency Department.     Emergency Walk-In Options:   EmPATH Unit @ [facility] ([place]): [phone] - Specialized mental health emergency area designed to be calming  [facility] ([place]): [phone]  [facility] ([place]): [phone]  [facility]): [phone]    [facility] Crisis Information:   [place]: [phone]  [place]: [phone]  [place] ([place]) - Adult: [phone]     Child: [phone]  [place] - Adult: [phone]     Child: [phone]  [place]: [phone]  List of all [facility] resources:   https://[place].gov/dhs/people-we-serve/adults/health-care/mental-health/resources/crisis-contacts.jsp    National Crisis Information:   Crisis Text Line: Text  [place]  to 216941  Suicide & Crisis Lifeline: 988  National Suicide Prevention Lifeline: 0-779-142-NXGY (1-756.529.8659)       For online chat options, visit https://suicidepreventionlifeline.org/chat/  Poison Control Center: 1-164.919.9021  Trans Lifeline: 1-190.854.7070 - Hotline for transgender people of all ages  The [employer]ardo Project: 1-213.396.5963 - Hotline for LGBT youth     For Non-Emergency Support:   Fast Tracker: Mental  Health & Substance Use Disorder Resources -   https://www.[place]erm[place].org/

## 2023-10-23 NOTE — PROGRESS NOTES
"  Virtual Visit Details     Type of service:  Video Visit     Originating Location (pt. Location): Home  Distant Location (provider location):  On-site  Platform used for Video Visit: Shruthi     Joined the call at 10/23/2023, 3:21:40 pm.  Left the call at 10/23/2023, 4:00:21 pm.  You were on the call for 38 minutes 40 seconds .       [facility]  TRANSFER of CARE DIAGNOSTIC ASSESSMENT     CARE TEAM:    PCP- LAKESHA VILLASENOR  Psychotherapist- none    This person is a 63 year old who uses the name Patricia and pronouns she, her.      Chief Concern     \"Fidgeting\"     Diagnoses     Major depressive disorder, recurrent, in partial remission, with seasonal component  ADHD by history     Assessment   Patricia Perez is a 62 year old woman with history of recurrent depression as well as reported history of ADHD who presents today for medication follow-up. Overall, Patricia is doing well except for her the fidgeting and restlessness. The start of these symptoms appear to coincide with starting aripiprazole, or at least were worsened by the medication. However, Patricia notes that she has done really well on Abilify and has had leo she hadn't experienced in years. Also, she notes a seasonal component to her depression. At this time, she will continue to manage her fidgeting with non-pharmacologic measures such as fidget tools. She also agreed to get a light box and was provided instructions. Can consider decreasing Abilify in the spring.      No safety concerns today. No other questions nor concerns.      Future considerations  - consider increasing venlafaxine to max dose as-tolerated, monitor BP  - consider stimulant for ADHD symptoms (not very burdensome as of 11/18/22) and/or antidepressant augmentation   - hx gastric bypass in 2018, needs IR formulations   - bupropion for mood / concentration? Would need IR if trialing this       Psychotropic Drug " Interactions:  [PSYCHCLINICDDI]  none  Management: N/A    [place] was not [place] today: not using controlled substances    Risk Statements:   Treatment Risk- Risks, benefits, alternatives and potential adverse effects have been discussed and are understood.   Safety Risk-Patricia did not appear to be an imminent safety risk to self or others.     Plan     1) Medications:   - Continue aripiprazole 4 mg daily  - Continue venlafaxine to 75 mg TID     Other pertinent medications not prescribed by psychiatry:  1.  Aspirin 81 mg a day.  2.  Carvedilol 25 mg twice daily.  3.  Prempro.  4.  Ezetimibe 10 mg a day.  5.  Hydralazine 10 mg twice daily.  6.  Isosorbide mononitrate 30 mg a day.  7.  Lisinopril 5 mg a day.  8.  Meclizine.  9.  Ondansetron.  10. Simvastatin 20 mg a day  11. otc Vitamin D      2) Psychotherapy- continue individual psychotherapy      3) Next due-  Labs- last AP labs done Feb 2023 (by cardiology) repeat annually if not collected by outside providers   EKG- 1/7/20: 447 ms @ 54 bpm  Rating scales- PHQ9, AIMS due Nov 2023      4) Referrals-   Weight management referral placed 8/15/22 callback #[phone], established care there       5) Follow-up: Return to clinic in 3 months in person for AIMS assessment     Pertinent Background                                                   [most recent eval 10/23/23]     Patricia first experienced the onset of mental health problems at age 13-14 years old when younger brother passed away. Denies memory of significant trauma or abuse and poor memory childhood. Did well in school and no discpline issues or legal issues during childhood. Father with alcohol use disorder.  Started mental health treatment in 1995. Niece passed away in MVA in 2020, COVID in 2020 both were disruptive to mental health (worsened depression). History gastric bypass in 2018.      Psych pertinent item history includes trauma hx and eating disorder (binge eating)       Subjective     Last seen by  Dr. Benítez in clinic in May 2023, at which time things were going well.     Today, she's still experiencing restlessness and fidgeting. She thinks the medications are working well for her, as she's not having as much depression as previously. She is functioning well at work and at home. She goes for regular walks at work and at home. She is concerned with the coming winter as she's noticed a seasonal component in the past for her depression. She is planning to get a bright light box.     Regarding her previously reported movement side effects, Patricia doesn't remember when the teeth clicking and restlessness started, but it's been going on for a couple of years. She thinks is got worse after starting the Abilify. Prior to taking Abilify, Patricia couldn't find leo in things that she used to have, such as Myron. It has really helped her mood and she is concerned about stopping it.      Recent Psych Symptoms:   Depression:   none  Elevated:  none  Anxiety:   worried at a conference, not normally  Trauma Related:  none, previously traumatized by reading lots of animal research protocols; not having to do it anymore  Insomnia:  Yes: gets to sleep fine, has a cat that gets her up at 4 am; goes to bed at 8 pm and wakes up at 4 am    Current Social History:  See below    Pertinent Substance Use:   none    Medical Review of Systems:   Lightheadedness/orthostasis: Unsteady of her feet sometimes, has vertigo but not recently  Headaches: None  GI:  GERD  Sexual health concerns: None    A comprehensive review of systems was performed and is negative other than noted above.       Mental Status Exam     Alertness: alert  and oriented  Appearance: well groomed  Behavior/Demeanor: cooperative, pleasant, and calm, with good  eye contact   Speech: normal and regular rate and rhythm  Language: good  Psychomotor: restless and fidgety  Mood: description consistent with euthymia  Affect: full range; congruent to: mood- yes, content-  yes  Thought Process/Associations: unremarkable  Thought Content:  Reports none;  Denies suicidal & violent ideation and delusions  Perception:  Reports none;  Denies auditory hallucinations and visual hallucinations  Insight: excellent  Judgment: excellent  Cognition: does  appear grossly intact; formal cognitive testing was not done  Gait and Station: N/A (telehealth)     Social History                                 pt reported     Components copy-forwarded from prior documentation. Last reviewed with patient and updated with patient on 10/23/23.  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 Hopes to retire ~age 64. Enjoys job.   Partner/ -  in 2017 to her first , Mitchell, relationship feels very strong   Children- None      Living situation- Lives in a home in [place], [place] with  and cat (Ms. Queen), recently purchased house  Social/ Spiritual Support-  Mitchell, few close friends. Lots of acquaintances. Has two sisters and mother still alive, report somewhat close  Feels Safe at Home- yes   Guns at home- none  Legal- None     Trauma History (self-report)- Younger brother  of Leukemia when patient was in 8th grade. Niece  in MVA on 2020  Early History/Education- Born and raised in [place], [place]. Family is still in [place]. Grad school in [place].  Reports her childhood was jackie, father was an alcoholic.  Lived in a trailer with 4 siblings. Younger brother  of leukemia when he was 3 and she was 11.      Family Mental Health History                                 pt reported     Father: AUD, depression  Mother: depression  Sister 1: depression  Sister 2: depression  Brother: depression      No known history of schizophrenia, suicides or bipolar disorder     Past Psychiatric History     Components copy-forwarded from prior documentation. Last reviewed with patient and updated with patient on 10/23/23.  SIB- None  Suicide Attempt [#,  "most recent]- None  Suicidal Ideation Hx- None     Violence/Aggression Hx- None  Psychosis Hx- None  Eating Disorder Hx- Binge eating disorder history. Endorses ongoing stress eating.   Other- None     Psych Hosp [#, most recent]- None  Commitment- None  ECT- None  Outpatient Programs - None   Other - N/A    SUBSTANCE USE HISTORY   Past Use- Reports history of occasional alcohol use from teens to 30's \"never drank to the point where I was overembibing\" (does not drink now d/t  being recovering alcoholic), tried marijuana occasionally In the 1970s   Treatment- #, most recent- no  Medical Consequences- no  Legal Consequences- no  Other- N/A     Past Psych Med Trials        Medication Max Dose (mg) Dates / Duration Helpful? DC Reason / Adverse Effects?   bupropion 300 0196-0048 N Initially helpful then lost efficacy   Adderall 25 4585-5481 N Lack of efficacy   sertraline 100 7284-2496 N Initially helpful then lost efficacy   Venlafaxine 225 2008-current Y  previously had ER, tried IR BID to help with mood in winter   aripiprazole 4 2021-current Y                                                                       Vitals   LMP 06/01/2012   Pulse Readings from Last 3 Encounters:   09/20/23 68   09/19/23 71   07/28/23 76     Wt Readings from Last 3 Encounters:   09/20/23 85.7 kg (189 lb)   09/19/23 85.4 kg (188 lb 3.2 oz)   07/17/23 85.3 kg (188 lb)     BP Readings from Last 3 Encounters:   09/20/23 119/77   09/19/23 116/72   07/28/23 109/72        Medical History     ALLERGIES: Atorvastatin    Patient Active Problem List   Diagnosis    Major depression, recurrent (H24)    Dizziness and giddiness    Motion sickness    GERD (gastroesophageal reflux disease)    Idiopathic cardiomyopathy (H)    Sleep apnea    Hyperlipidemia LDL goal <100    Allergic rhinitis    Hypertension goal BP (blood pressure) < 130/80    Pelvic pain in female    Health Care Home    24 hour contact handout given    Class 1 obesity with serious " comorbidity and body mass index (BMI) of 33.0 to 33.9 in adult, unspecified obesity type    Elevated PTHrP level    H/O bariatric surgery    Hx of gastric bypass    Hypovitaminosis D    Allergic rhinitis    Attention deficit disorder of adult    Mild persistent asthma    Hand joint pain    Myopia, bilateral    Dumping syndrome    Benign essential hypertension    Adjustment disorder with depressed mood    Decreased hearing of both ears    BPV (benign positional vertigo), unspecified laterality    Advanced directives, counseling/discussion    Vitamin D deficiency    Coronary artery disease involving native coronary artery of native heart without angina pectoris    Acute pain of left knee    Syncope    Posterior vitreous detachment of both eyes    Combined forms of age-related cataract, mild, of both eyes    Glaucoma suspect of both eyes    Sciatica of right side    Hypoglycemia    Raynaud's disease without gangrene    Class 2 severe obesity due to excess calories with serious comorbidity in adult (H)        Medications     Current Outpatient Medications   Medication Sig Dispense Refill    albuterol (PROAIR HFA/PROVENTIL HFA/VENTOLIN HFA) 108 (90 Base) MCG/ACT inhaler Inhale 2 puffs into the lungs every 4 hours as needed for shortness of breath or wheezing 18 g 3    ARIPiprazole (ABILIFY) 2 MG tablet Take 2 tablets (4 mg) by mouth daily Please come to appointment on 10/23/23 with Dr. Marie to get more refills 60 tablet 0    aspirin (ASA) 81 MG EC tablet Take 1 tablet (81 mg) by mouth daily 90 tablet 3    azelastine (ASTELIN) 0.1 % nasal spray Spray 2 sprays into both nostrils 2 times daily as needed for rhinitis 30 mL 3    azelastine (OPTIVAR) 0.05 % ophthalmic solution Apply 1 drop to eye 2 times daily as needed (itchy/watery eyes) 6 mL 3    blood glucose (NO BRAND SPECIFIED) lancing device Device to be used with lancets. 3 each 3    blood glucose (NO BRAND SPECIFIED) test strip Use to test blood sugar 3 times  daily 300 strip 3    calcium carbonate-vitamin D (CALTRATE 600+D) 600-400 MG-UNIT CHEW Take 1 chew tab by mouth 2 times daily 180 tablet 3    carvedilol (COREG) 25 MG tablet Take 1 tablet (25 mg) by mouth 2 times daily (with meals) 180 tablet 3    cetirizine (ZYRTEC) 10 MG tablet Take 1 tablet (10 mg) by mouth daily 30 tablet 3    Cyanocobalamin (VITAMIN B-12 SL) Place under the tongue every other day       estradiol (ESTRACE) 1 MG tablet Take 1 tablet (1 mg) by mouth daily 90 tablet 3    ezetimibe (ZETIA) 10 MG tablet Take 1 tablet (10 mg) by mouth daily 90 tablet 3    FEROSUL 325 (65 Fe) MG tablet TAKE 1 TABLET(325 MG) BY MOUTH TWICE DAILY 180 tablet 0    hydrALAZINE (APRESOLINE) 10 MG tablet Take 1 tablet (10 mg) by mouth 3 times daily 270 tablet 3    Ibuprofen-diphenhydrAMINE Cit (IBUPROFEN PM PO) Take by mouth At Bedtime      isosorbide mononitrate (IMDUR) 30 MG 24 hr tablet Take 1 tablet (30 mg) by mouth daily 90 tablet 3    lisinopril (ZESTRIL) 5 MG tablet TAKE 1 TABLET(5 MG) BY MOUTH DAILY 90 tablet 3    meclizine (ANTIVERT) 25 MG tablet Take 1 tablet (25 mg) by mouth 3 times daily as needed 30 tablet 1    medroxyPROGESTERone (PROVERA) 2.5 MG tablet Take 1 tablet (2.5 mg) by mouth daily 90 tablet 3    montelukast (SINGULAIR) 10 MG tablet TAKE 1 TABLET(10 MG) BY MOUTH AT BEDTIME 90 tablet 0    Multiple Vitamin (MULTI-VITAMIN) per tablet Take 1 tablet by mouth daily. 100 tablet 3    naltrexone (DEPADE/REVIA) 50 MG tablet Take 1/2 tablet once daily 1-2 hours prior to worst cravings for 1 week, then increase to 1 tablet daily as directed if tolerating 30 tablet 3    NIFEdipine ER (ADALAT CC) 30 MG 24 hr tablet Take 1 tablet (30 mg) by mouth daily 60 tablet 0    ondansetron (ZOFRAN ODT) 4 MG ODT tab Take 1 tablet (4 mg) by mouth every 8 hours as needed for nausea 30 tablet 2    simvastatin (ZOCOR) 40 MG tablet Take 1 tablet (40 mg) by mouth At Bedtime 90 tablet 3    sulfaSALAzine ER (AZULFIDINE EN) 500 MG EC  tablet Take 1 tablet daily for 1 week, then take 1 tablet twice daily for 1 week, then take 1 tab in AM and 2 tabs in PM for 1 week, then take 2 tabs twice daily. Take this medication with food. Labs every 8-12 weeks. 120 tablet 2    venlafaxine (EFFEXOR) 75 MG tablet Take 1 tablet (75 mg) by mouth 3 times daily 90 tablet 2    diclofenac (VOLTAREN) 50 MG EC tablet Take 1 tablet (50 mg) by mouth 2 times daily for 7 days 28 tablet 2        Labs and Data         2/17/2023     9:48 AM 3/6/2023     8:03 AM 10/23/2023     3:05 PM   PROMIS-10 Total Score w/o Sub Scores   PROMIS TOTAL - SUBSCORES 27    27 26    26 29         4/13/2022    11:36 AM   CAGE-AID Total Score   Total Score 0   Total Score MyChart 0 (A total score of 2 or greater is considered clinically significant)         4/21/2023     8:53 AM 5/25/2023     7:56 AM 10/23/2023     3:02 PM   PHQ-9 SCORE   PHQ-9 Total Score MyChart 5 (Mild depression) 3 (Minimal depression) 5 (Mild depression)   PHQ-9 Total Score 5 3 5         9/3/2020    11:56 AM 6/30/2021     8:15 AM 3/28/2022    12:45 PM   PRISCILLA-7 SCORE   Total Score   7 (mild anxiety)   Total Score 10 3 7       Liver/Kidney Function, TSH Metabolic Blood counts   Recent Labs   Lab Test 09/19/23  0855 02/06/23  0735   AST 40  --    ALT 61*  --    ALKPHOS 94  --    CR 0.87 0.74     Recent Labs   Lab Test 02/06/23  0735   TSH 1.95    Recent Labs   Lab Test 02/06/23  0735   CHOL 187   TRIG 74   LDL 75   HDL 97     Recent Labs   Lab Test 03/10/23  1146   A1C 5.5     Recent Labs   Lab Test 09/19/23  0855   GLC 97    Recent Labs   Lab Test 02/06/23  0735   WBC 5.5   HGB 12.7   HCT 38.6   MCV 99              PROVIDER: Navi Marie MD    Level of Medical Decision Making:   - At least 1 chronic problem that is not stable  - Engaged in prescription drug management during visit (discussed any medication benefits, side effects, alternatives, etc.)       Psychiatry Individual Psychotherapy Note   Psychotherapy  start time - 3:38 PM  Psychotherapy end time - 3:58 PM  Date treatment plan last reviewed with patient - 10/23/23  Subjective: This supportive psychotherapy session addressed issues related to goals of therapy and current psychosocial stressors. Patient's reaction: Preparatory in the context of mental status appropriate for ambulatory setting.    Interactive complexity indicated? No  Plan: RTC in timeframe noted above  Psychotherapy services during this visit included myself and the patient.   Treatment Plan      SYMPTOMS; PROBLEMS   MEASURABLE GOALS;    FUNCTIONAL IMPROVEMENT / GAINS INTERVENTIONS DISCHARGE CRITERIA   Depression: depressed mood  ADHD: fidgety    find enjoyment at least once a day and less distracting fidgets Supportive / psychodynamic symptom resolution     Patient not staffed in clinic.  Note will be reviewed and signed by supervisor Dr. Ruiz.

## 2023-10-23 NOTE — PROGRESS NOTES
"Virtual Visit Details    Type of service:  Video Visit     Originating Location (pt. Location): {video visit patient location:310122::\"Home\"}  {PROVIDER LOCATION On-site should be selected for visits conducted from your clinic location or adjoining [facility] hospital, academic office, or other nearby [facility] building. Off-site should be selected for all other provider locations, including home:278012}  Distant Location (provider location):  {virtual location provider:975644}  Platform used for Video Visit: {Virtual Visit Platforms:615119::\"LiveSafe\"}    "

## 2023-11-01 ENCOUNTER — VIRTUAL VISIT (OUTPATIENT)
Dept: ENDOCRINOLOGY | Facility: CLINIC | Age: 63
End: 2023-11-01
Payer: COMMERCIAL

## 2023-11-01 ENCOUNTER — OFFICE VISIT (OUTPATIENT)
Dept: AUDIOLOGY | Facility: CLINIC | Age: 63
End: 2023-11-01
Payer: COMMERCIAL

## 2023-11-01 VITALS — BODY MASS INDEX: 30.9 KG/M2 | HEIGHT: 64 IN | WEIGHT: 181 LBS

## 2023-11-01 DIAGNOSIS — H90.3 BILATERAL SENSORINEURAL HEARING LOSS: Primary | ICD-10-CM

## 2023-11-01 DIAGNOSIS — E66.811 CLASS 1 OBESITY WITH SERIOUS COMORBIDITY AND BODY MASS INDEX (BMI) OF 33.0 TO 33.9 IN ADULT, UNSPECIFIED OBESITY TYPE: Primary | ICD-10-CM

## 2023-11-01 PROCEDURE — 99215 OFFICE O/P EST HI 40 MIN: CPT | Mod: VID

## 2023-11-01 PROCEDURE — V5299 HEARING SERVICE: HCPCS

## 2023-11-01 RX ORDER — TOPIRAMATE 25 MG/1
TABLET, FILM COATED ORAL
Qty: 60 TABLET | Refills: 3 | Status: SHIPPED | OUTPATIENT
Start: 2023-11-01 | End: 2024-03-13

## 2023-11-01 ASSESSMENT — PAIN SCALES - GENERAL: PAINLEVEL: NO PAIN (0)

## 2023-11-01 NOTE — PROGRESS NOTES
Virtual Visit Details    Type of service:  Video Visit     Originating Location (pt. Location): Home    Distant Location (provider location):  Off-site  Platform used for Video Visit: [facility]     Patricia Perez  MRN:  [id number]  :  1960  SARA:  2023    Dear LAKESHA VILLASENOR MD,    I had the pleasure of seeing your patient Patricia Perez. She is a 63 year old female who I am continuing to see for treatment of obesity related to:        3/6/2023     8:02 AM   --   I have the following health issues associated with obesity None of the above       Assessment & Plan   Problem List Items Addressed This Visit       Class 1 obesity with serious comorbidity and body mass index (BMI) of 33.0 to 33.9 in adult, unspecified obesity type - Primary     S/p RYBP in  with Dr. Bradford. Last seen 23 and started Naltrexone.     Currently taking Naltrexone 50mg in the morning. Will have drowsiness starting 1 hour after medication. Finding this hard to sustain for long term. Possibly helping with snacking during the day. Reviewed CMP, which showed elevated ALT, and increasing alkphos and AST. Discussed how this could be due to the naltrexone. Was also started on azulfidine, which could be influencing as well. Due to both drowsy side effect, increased LFTs and minimal effect on weight will stop Naltrexone today.     Discussed other AOMs to be started today. Phentermine continues to be contraindicated due to hx of CAD and cardiomyopathy, along with concern for age. She continues to have concerns with self injections and does not want to try GLP-1. Metformin could be an option, but discussed possible minimal help with snacking - but no contraindications and GFR >90.     Topiramate to be started today. No contraindications. Discussed side effects, risks, and benefits.No hx of kidney disease or glaucoma. History of kidney stone 6 years ago - passed on own. Discussed this is  an increase risk and need to drink at least 64oz of water daily. She understood this risk and verbalized understanding. Post-menopausal. Recent CMP was WNL, GFR >60, CrCl 84. Will start with a low dose of 50mg. Discussed not to dose increase if having side effects.           Relevant Medications    topiramate (TOPAMAX) 25 MG tablet    Other Relevant Orders    Med Therapy Management Referral        Start Topiramate 25mg - take 1 tablet at night for 7 days, then increase to 2 tablets at night.   Fluid goal of at least 64oz daily   Stop Naltrexone   Follow up with Lauren Bloch in 6 weeks   Follow up with Stephanie Morel in 3 months       INTERVAL HISTORY:  S/p RYBP in 2012 with Dr. Bradford  Pre surgery weight - 292lbs   Reynaldo - 150lbs   New reestablish - 3/6/2023  Recently 7lbs due to diet change, continue with diet and exercise  Last seen 5/4/23 - started Naltrexone       Anti-obesity medication history    Current:   Naltrexone - taking 1 tablet in the morning. Side effects of drowsiness. Thinks it might be helpful with weight loss.     Past/Failed/contraindicated:   - Phentermine - contraindicated due to history of cardiomyopathy and CAD.   - Topiramate- concern for brain fog and hx of kidney stone 6 years ago   - GLP-1 - can consider in the future. Concern for self injections.     Recent diet changes: Continues to struggle with trying to not snack between meals. Eating 3 meals a day. Drinking 45-50oz of water daily. Has had a few more down days with the changing of the wether    Recent exercise/activity changes:  2xweek, 3500-4k steps daily     Recent stressors: Some increase stress with the holiday season, some increase in stress.     Recent sleep changes: No concerns     Vitamins/Labs: LFTs elevated - could be related to new RA medication or naltrexone     CURRENT WEIGHT:   181 lbs 0 oz    Initial Weight (lbs): 186 lbs     Cumulative weight loss (lbs): 5  Weight Loss Percentage: 2.69%    Wt Readings from Last  5 Encounters:   11/01/23 82.1 kg (181 lb)   09/20/23 85.7 kg (189 lb)   09/19/23 85.4 kg (188 lb 3.2 oz)   07/17/23 85.3 kg (188 lb)   06/27/23 85.5 kg (188 lb 9.6 oz)             11/1/2023    11:49 AM   Changes and Difficulties   I have made the following changes to my diet since my last visit: trying to eat healthier, fewer sweets and more salads   With regards to my diet, I am still struggling with: wanting sweets, cravings   I have made the following changes to my activity/exercise since my last visit: more walking   With regards to my activity/exercise, I am still struggling with: forcing myself to go walk         MEDICATIONS:   Current Outpatient Medications   Medication Sig Dispense Refill    albuterol (PROAIR HFA/PROVENTIL HFA/VENTOLIN HFA) 108 (90 Base) MCG/ACT inhaler Inhale 2 puffs into the lungs every 4 hours as needed for shortness of breath or wheezing 18 g 3    ARIPiprazole (ABILIFY) 2 MG tablet Take 2 tablets (4 mg) by mouth daily for 90 days 180 tablet 0    aspirin (ASA) 81 MG EC tablet Take 1 tablet (81 mg) by mouth daily 90 tablet 3    azelastine (ASTELIN) 0.1 % nasal spray Spray 2 sprays into both nostrils 2 times daily as needed for rhinitis 30 mL 3    azelastine (OPTIVAR) 0.05 % ophthalmic solution Apply 1 drop to eye 2 times daily as needed (itchy/watery eyes) 6 mL 3    blood glucose (NO BRAND SPECIFIED) lancing device Device to be used with lancets. 3 each 3    blood glucose (NO BRAND SPECIFIED) test strip Use to test blood sugar 3 times daily 300 strip 3    calcium carbonate-vitamin D (CALTRATE 600+D) 600-400 MG-UNIT CHEW Take 1 chew tab by mouth 2 times daily 180 tablet 3    carvedilol (COREG) 25 MG tablet Take 1 tablet (25 mg) by mouth 2 times daily (with meals) 180 tablet 3    cetirizine (ZYRTEC) 10 MG tablet Take 1 tablet (10 mg) by mouth daily 30 tablet 3    Cyanocobalamin (VITAMIN B-12 SL) Place under the tongue every other day       estradiol (ESTRACE) 1 MG tablet Take 1 tablet (1 mg)  by mouth daily 90 tablet 3    ezetimibe (ZETIA) 10 MG tablet Take 1 tablet (10 mg) by mouth daily 90 tablet 3    FEROSUL 325 (65 Fe) MG tablet TAKE 1 TABLET(325 MG) BY MOUTH TWICE DAILY 180 tablet 0    hydrALAZINE (APRESOLINE) 10 MG tablet Take 1 tablet (10 mg) by mouth 3 times daily 270 tablet 3    Ibuprofen-diphenhydrAMINE Cit (IBUPROFEN PM PO) Take by mouth At Bedtime      isosorbide mononitrate (IMDUR) 30 MG 24 hr tablet Take 1 tablet (30 mg) by mouth daily 90 tablet 3    lisinopril (ZESTRIL) 5 MG tablet TAKE 1 TABLET(5 MG) BY MOUTH DAILY 90 tablet 3    meclizine (ANTIVERT) 25 MG tablet Take 1 tablet (25 mg) by mouth 3 times daily as needed 30 tablet 1    medroxyPROGESTERone (PROVERA) 2.5 MG tablet Take 1 tablet (2.5 mg) by mouth daily 90 tablet 3    montelukast (SINGULAIR) 10 MG tablet TAKE 1 TABLET(10 MG) BY MOUTH AT BEDTIME 90 tablet 0    Multiple Vitamin (MULTI-VITAMIN) per tablet Take 1 tablet by mouth daily. 100 tablet 3    naltrexone (DEPADE/REVIA) 50 MG tablet Take 1/2 tablet once daily 1-2 hours prior to worst cravings for 1 week, then increase to 1 tablet daily as directed if tolerating 30 tablet 3    NIFEdipine ER (ADALAT CC) 30 MG 24 hr tablet Take 1 tablet (30 mg) by mouth daily 60 tablet 0    ondansetron (ZOFRAN ODT) 4 MG ODT tab Take 1 tablet (4 mg) by mouth every 8 hours as needed for nausea 30 tablet 2    simvastatin (ZOCOR) 40 MG tablet Take 1 tablet (40 mg) by mouth At Bedtime 90 tablet 3    sulfaSALAzine ER (AZULFIDINE EN) 500 MG EC tablet Take 1 tablet daily for 1 week, then take 1 tablet twice daily for 1 week, then take 1 tab in AM and 2 tabs in PM for 1 week, then take 2 tabs twice daily. Take this medication with food. Labs every 8-12 weeks. 120 tablet 2    topiramate (TOPAMAX) 25 MG tablet 25mg at bedtime for week 1, 50mg at bedtime thereafter 60 tablet 3    venlafaxine (EFFEXOR) 75 MG tablet Take 1 tablet (75 mg) by mouth 3 times daily for 90 days 270 tablet 0    diclofenac (VOLTAREN)  50 MG EC tablet Take 1 tablet (50 mg) by mouth 2 times daily for 7 days 28 tablet 2           11/1/2023    11:49 AM   Weight Loss Medication History Reviewed With Patient   Which weight loss medications are you currently taking on a regular basis? Naltrexone   Are you having any side effects from the weight loss medication that we have prescribed you? Yes   If you are having side effects please describe: makes me sleepy       Office Visit on 09/19/2023   Component Date Value Ref Range Status    Creatinine Urine mg/dL 09/19/2023 60.9  mg/dL Final    The reference ranges have not been established in urine creatinine. The results should be integrated into the clinical context for interpretation.    Albumin Urine mg/L 09/19/2023 <12.0  mg/L Final    The reference ranges have not been established in urine albumin. The results should be integrated into the clinical context for interpretation.    Albumin Urine mg/g Cr 09/19/2023    Final    Unable to calculate, urine albumin and/or urine creatinine is outside detectable limits.  Microalbuminuria is defined as an albumin:creatinine ratio of 17 to 299 for males and 25 to 299 for females. A ratio of albumin:creatinine of 300 or higher is indicative of overt proteinuria.  Due to biologic variability, positive results should be confirmed by a second, first-morning random or 24-hour timed urine specimen. If there is discrepancy, a third specimen is recommended. When 2 out of 3 results are in the microalbuminuria range, this is evidence for incipient nephropathy and warrants increased efforts at glucose control, blood pressure control, and institution of therapy with an angiotensin-converting-enzyme (ACE) inhibitor (if the patient can tolerate it).      Sodium 09/19/2023 136  136 - 145 mmol/L Final    Potassium 09/19/2023 4.8  3.4 - 5.3 mmol/L Final    Chloride 09/19/2023 101  98 - 107 mmol/L Final    Carbon Dioxide (CO2) 09/19/2023 24  22 - 29 mmol/L Final    Anion Gap  "09/19/2023 11  7 - 15 mmol/L Final    Urea Nitrogen 09/19/2023 13.5  8.0 - 23.0 mg/dL Final    Creatinine 09/19/2023 0.87  0.51 - 0.95 mg/dL Final    Calcium 09/19/2023 9.2  8.8 - 10.2 mg/dL Final    Glucose 09/19/2023 97  70 - 99 mg/dL Final    Alkaline Phosphatase 09/19/2023 94  35 - 104 U/L Final    AST 09/19/2023 40  0 - 45 U/L Final    Reference intervals for this test were updated on 6/12/2023 to more accurately reflect our healthy population. There may be differences in the flagging of prior results with similar values performed with this method. Interpretation of those prior results can be made in the context of the updated reference intervals.    ALT 09/19/2023 61 (H)  0 - 50 U/L Final    Reference intervals for this test were updated on 6/12/2023 to more accurately reflect our healthy population. There may be differences in the flagging of prior results with similar values performed with this method. Interpretation of those prior results can be made in the context of the updated reference intervals.      Protein Total 09/19/2023 6.4  6.4 - 8.3 g/dL Final    Albumin 09/19/2023 4.0  3.5 - 5.2 g/dL Final    Bilirubin Total 09/19/2023 0.3  <=1.2 mg/dL Final    GFR Estimate 09/19/2023 74  >60 mL/min/1.73m2 Final         Objective    Ht 1.613 m (5' 3.5\")   Wt 82.1 kg (181 lb)   LMP 06/01/2012   BMI 31.56 kg/m      Vitals - Patient Reported  Pain Score: No Pain (0)      PHYSICAL EXAM:    GENERAL: Healthy, alert and no distress  EYES: Eyes grossly normal to inspection.  No discharge or erythema, or obvious scleral/conjunctival abnormalities.  RESP: No audible wheeze, cough, or visible cyanosis.  No visible retractions or increased work of breathing.    SKIN: Visible skin clear. No significant rash, abnormal pigmentation or lesions.  NEURO: Cranial nerves grossly intact.  Mentation and speech appropriate for age.  PSYCH: Mentation appears normal, affect normal/bright, judgement and insight intact, normal speech " and appearance well-groomed.        Sincerely,    ZELALEM VIVAS PA-C      43 minutes spent by me on the date of the encounter doing chart review, history and exam, documentation and further activities per the note

## 2023-11-01 NOTE — NURSING NOTE
Is the patient currently in the state of [place]? YES    Visit mode:VIDEO    If the visit is dropped, the patient can be reconnected by: VIDEO VISIT: Send to e-mail at: [email or web address]    Will anyone else be joining the visit? NO  (If patient encounters technical issues they should call [phone]    How would you like to obtain your AVS? MyChart    Are changes needed to the allergy or medication list? No    Reason for visit: RECHECK    Tiny AYON

## 2023-11-01 NOTE — PATIENT INSTRUCTIONS
"Attila Gomez,  Thank you for allowing us the privilege of caring for you. We hope we provided you with the excellent service you deserve.   Please let us know if there is anything else we can do for you so that we can be sure you are completely satisfied with your care experience.    To ensure the quality of our services you may be receiving a patient satisfaction survey from an independent patient satisfaction monitoring company.    The greatest compliment you can give is a \"Likely to Recommend\"    Your visit was with ZELALEM VIVAS PA-C today.    Instructions per today's visit:     Start Topiramate 25mg - take 1 tablet at night for 7 days, then increase to 2 tablets at night.   Fluid goal of at least 64oz daily   Stop Naltrexone   Follow up with Lauren Bloch in 6 weeks   Follow up with Zelalem Morel in 3 months     ___________________________________________________________________________  Important contact and scheduling information:  Please call our contact center at [phone] to schedule your next appointments.  For any nursing questions or concerns call Aziza Wilson LPN at [phone] or Oksana Pierce RN at [phone]  Please call during clinic hours Monday through Friday 8:00a - 4:00p if you have questions or you can contact us via [employer] at anytime and we will reply during clinic hours.    Lab results will be communicated through My Chart or letter (if My Chart not used). Please call the clinic if you have not received communication after 1 week or if you have any questions.?  Clinic Fax: [phone]  __________________________________________________________________________    If labs were ordered today:    Please make an appointment to have them drawn at your convenience.     To schedule the Lab Appointment using [employer]:  Select \"Schedule an Appointment\"  Select \"Lab Only\"  For \"A couple of questions\", select \"Other\"  For \"Which locations work for you?, select the location and set up the " appointment    To schedule by phone call [phone] to schedule a lab only appointment at any [facility] lab.  ___________________________________________________________________________  Work with A Health !  Virtual Sessions are Available through [facility] Weight Management Clinics    To learn more, call to schedule a free, Health  Q&A appointment: [phone]     What is Health Coaching?  Do you know what you are supposed to do, but you just aren't doing it?  Then, HEALTH COACHING may help you!   Get unstuck and move forward with the support of a professionally trained [employer]-HWC (National Board-Certified Health and ) who uses evidence-based approaches to help you move forward with healthy lifestyle changes in the areas of weight loss, stress management and overall well-being.    Health Coaches help you identify goals that will work best for you. Health Coaches provide support and encouragement with overcoming barriers and help you to find inspiration and motivation to lead a healthy lifestyle.    Option one:  Health Coaching 3-Pack; Three, 30-minute Health Coaching Visits, for $99  Visits are done virtually (phone or video)  This is a self pay service; we do not accept insurance for pardeep coaching.    Option two:   The 24 week Plan; 11 Health Coaching Visits, and a 7 months subscription to [employer]-- on-demand fitness, nutrition and mindfulness classes, for $499 (employee discounts may be available). Participants will also meet regularly with a weight management Medical Provider and a Registered/Licensed Dietician.  This is a self-pay service; we do not accept insurance for health coaching.    To Schedule a free Health  Q&A appointment to learn more,  call [phone].  ____________________________________________________________________  [facility]  Healthy Lifestyle Group    Healthy Lifestyle Group  This is a 60 minute  "virtual coaching group for those who want to lead a healthier lifestyle. Come together to set goals and overcome barriers in a supportive group environment. We will address the four pillars of health--nutrition, exercise, sleep and emotional well-being.  This group is highly recommended for those who are participating in the 24 week Healthy Lifestyle Plan and our Health Coaching sessions.    WHEN: This group meets the first Friday of the month, 12:30 PM - 1:30 PM online, via a zoom meeting.      FACILITATOR: Led by National Board Certified Health and , Funmilayo Elliott, [facility].    TO REGISTER: Please call the Call Center at [phone] to register. You will get an appointment to attend in [place]. Fifteen minutes prior to the meeting, complete the e-check in and you will get the link to join the meeting.  There is no charge to attend this group and space is limited.      2023 and 2024 Meeting Topics and Dates:    November 3: Introduction to Mindfulness (Learn simple and effective mindfulness practices and how it can benefit you)    December 8: Let's Talk (guided discussion on our wins and challenges)    January 5: New Years Vision: Manifest your Best 2024! (Guided imagery,  journaling and discussion)    February 2: Let's Talk    March 1: 10 Percent Happier by Hugh Saunders (Book Bites; a guided discussion on the nuggets of wisdom from favorite wellness books; no need to read the book but highly encouraged)    April 5: Let's Talk    May 3: \"Essentialism; The Disciplined Pursuit of Less by Bigg Garcia (book bites discussion)    June 7: Let's Talk    July 5: NO MEETING, off for the 4th of July Holiday    August 2: The Blue Zones, Secrets for Living a Longer Life by Hugh Cameron (book bites discussion)      If you would like bariatric surgery specific support group info please let your care team know.         Thank you,   [facility] Comprehensive Weight Management Team          TOPIRAMATE (TOPAMAX)    We " are considering starting topiramate at bedtime.  Start one tab, 25 mg, for a week. Go up to 50 mg (2 tabs) for the next week. At the third week, take  3 tabs (75 mg).  Stay at 3 tabs until you are seen again.       Topiramate (Topamax) is a medication that is used most often to treat migraine headaches or for seizures. It has also been found to help with weight loss. Although it's not currently FDA approved for weight loss, it has been used safely for a number of years to help people who are carrying extra weight.     Just how topiramate helps with weight loss has not been exactly determined. However it seems to work on areas of the brain to quiet down signals related to eating.      Topiramate may make you:    >feel less interest in eating in between meals   >think less about food and eating   >find it easier to push the plate away   >find giving up pop easier    >have an easier time eating less    For some of our patients, the pills work right away. They feel and think quite differently about food. Other patients don't feel much of a change but find in fact they have lost weight! Like all weight loss medications, topiramate works best when you help it work.  This means:    1) Have less tempting high calorie (fattening) food around the house or office    2) Have lower calorie food (fruits, vegetables,low fat meats and dairy) for snacks    3) Eat out only one time or less each week.   4) Eat your meals at a table with the TV or computer off.    Side-effects. Topiramate is generally well tolerated. The main side-effects we see are:   Tingling in hands,feet, or face (usually not very troublesome)   Mental confusion and word finding trouble (about 10% of patients have this.)     Feeling sleepy or a bit dopey- this goes away very soon after starting.    One of the dangers of topiramate is the possibility of birth defects--if you get pregnant when you are on it, there is the risk that your baby will be born with a cleft  lip or palate.  If you are on topiramate and of child bearing age, you need to be on a reliable form of birth control or refrain from sexual intercourse.     Please refer to the pharmacy insert for more information on side-effects. Since many pharmacists are not familiar with the use of topiramate in weight loss, calling the clinic will get you the most accurate information on the use of this medication for weight loss.    For any questions or concerns please send a [employer] message to our team or call our weight management call center at [phone] during regular business hours. For questions during evenings or weekends your messages will be addressed during the next business day.  For emergencies please call 911 or seek immediate medical care.     (Do not stop taking it if you don't think it's working. For some people it works even though they do not feel much different.)     In order to get refills of this or any medication we prescribe you must be seen in the medical weight mgmt clinic every 3-6 months. Please have your pharmacy fax a refill request to [phone].

## 2023-11-01 NOTE — ASSESSMENT & PLAN NOTE
S/p RYBP in 2012 with Dr. Bradford. Last seen 5/4/23 and started Naltrexone.     Currently taking Naltrexone 50mg in the morning. Will have drowsiness starting 1 hour after medication. Finding this hard to sustain for long term. Possibly helping with snacking during the day. Reviewed CMP, which showed elevated ALT, and increasing alkphos and AST. Discussed how this could be due to the naltrexone. Was also started on azulfidine, which could be influencing as well. Due to both drowsy side effect, increased LFTs and minimal effect on weight will stop Naltrexone today.     Discussed other AOMs to be started today. Phentermine continues to be contraindicated due to hx of CAD and cardiomyopathy, along with concern for age. She continues to have concerns with self injections and does not want to try GLP-1. Metformin could be an option, but discussed possible minimal help with snacking - but no contraindications and GFR >90.     Topiramate to be started today. No contraindications. Discussed side effects, risks, and benefits.No hx of kidney disease or glaucoma. History of kidney stone 6 years ago - passed on own. Discussed this is an increase risk and need to drink at least 64oz of water daily. She understood this risk and verbalized understanding. Post-menopausal. Recent CMP was WNL, GFR >60, CrCl 84. Will start with a low dose of 50mg. Discussed not to dose increase if having side effects.

## 2023-11-01 NOTE — PROGRESS NOTES
AUDIOLOGY REPORT    SUBJECTIVE:Patricia Perez is a 63 year old female who was seen in the Audiology Clinic at the [facility] on 11/1/2023  for a follow-up check regarding the fitting of new hearing aids. Previous results have revealed a bilateral sensorineural hearing loss.  The patient has been seen previously in this clinic and was fit with Phonak Slim L50-R TARA hearing aids on 10/2/2023. Patricia reports good sound quality with the hearing aids, but feels they are a little 'tinny'    OBJECTIVE:   The International Outcome Inventory-Hearing Aids (IOI-HA) was administered today.The patient s responses to the 7 questions can be compared to normative data relative to how others are performing with their hearing aids, as well as focusing audiologic care and counseling.This patient s Quality of Life score (Question 7) was 4, which is at normative average.     Based on patient report, the following changes were made; hearing aids connected to software where gain in the high frequencies was decreased. Patient reports better sound output    Reviewed 45 day trial period, care, cleaning (no water, dry brush), batteries (size rechargeable) insertion/removal, toxicity, low-battery signal), aid insertion/removal, volume adjustment (if applicable), user booklet, warranty information, storage cases, and other hearing aid details.     No charge visit today (in warranty hearing aid check).     ASSESSMENT: A follow-up appointment for hearing aid fitting was completed today. IOI-HA administered today. Changes to hearing aid was completed as outlined above.     PLAN:Patricia will return for follow-up as needed, or at least every 9-12 months for cleaning and assessment of hearing aid.  Please call this clinic with any questions regarding today s appointment.    Malathi Landaverde, CCC-A  Licensed Audiologist  [place] #[id number]      11/01/23

## 2023-11-01 NOTE — LETTER
2023       RE: Patricia Perez  [address] [address]     Dear Colleague,    Thank you for referring your patient, Patricia Perez, to the [facility] WEIGHT MANAGEMENT CLINIC [place] at [facility]. Please see a copy of my visit note below.    Virtual Visit Details    Type of service:  Video Visit     Originating Location (pt. Location): Home    Distant Location (provider location):  Off-site  Platform used for Video Visit: [facility] Medical Weight Management Note     Patricia Perez  MRN:  [id number]  :  1960  SARA:  2023    Dear LAKESHA VILLASENOR MD,    I had the pleasure of seeing your patient Patricia Perez. She is a 63 year old female who I am continuing to see for treatment of obesity related to:        3/6/2023     8:02 AM   --   I have the following health issues associated with obesity None of the above       Assessment & Plan  Problem List Items Addressed This Visit       Class 1 obesity with serious comorbidity and body mass index (BMI) of 33.0 to 33.9 in adult, unspecified obesity type - Primary     S/p RYBP in  with Dr. Bradford. Last seen 23 and started Naltrexone.     Currently taking Naltrexone 50mg in the morning. Will have drowsiness starting 1 hour after medication. Finding this hard to sustain for long term. Possibly helping with snacking during the day. Reviewed CMP, which showed elevated ALT, and increasing alkphos and AST. Discussed how this could be due to the naltrexone. Was also started on azulfidine, which could be influencing as well. Due to both drowsy side effect, increased LFTs and minimal effect on weight will stop Naltrexone today.     Discussed other AOMs to be started today. Phentermine continues to be contraindicated due to hx of CAD and cardiomyopathy, along with concern for age. She continues to have concerns with self injections and does not want to try GLP-1.  Metformin could be an option, but discussed possible minimal help with snacking - but no contraindications and GFR >90.     Topiramate to be started today. No contraindications. Discussed side effects, risks, and benefits.No hx of kidney disease or glaucoma. History of kidney stone 6 years ago - passed on own. Discussed this is an increase risk and need to drink at least 64oz of water daily. She understood this risk and verbalized understanding. Post-menopausal. Recent CMP was WNL, GFR >60, CrCl 84. Will start with a low dose of 50mg. Discussed not to dose increase if having side effects.           Relevant Medications    topiramate (TOPAMAX) 25 MG tablet    Other Relevant Orders    Med Therapy Management Referral        Start Topiramate 25mg - take 1 tablet at night for 7 days, then increase to 2 tablets at night.   Fluid goal of at least 64oz daily   Stop Naltrexone   Follow up with Lauren Bloch in 6 weeks   Follow up with Stephanie Morel in 3 months       INTERVAL HISTORY:  S/p RYBP in 2012 with Dr. Bradford  Pre surgery weight - 292lbs   Reynaldo - 150lbs   New reestablish - 3/6/2023  Recently 7lbs due to diet change, continue with diet and exercise  Last seen 5/4/23 - started Naltrexone       Anti-obesity medication history    Current:   Naltrexone - taking 1 tablet in the morning. Side effects of drowsiness. Thinks it might be helpful with weight loss.     Past/Failed/contraindicated:   - Phentermine - contraindicated due to history of cardiomyopathy and CAD.   - Topiramate- concern for brain fog and hx of kidney stone 6 years ago   - GLP-1 - can consider in the future. Concern for self injections.     Recent diet changes: Continues to struggle with trying to not snack between meals. Eating 3 meals a day. Drinking 45-50oz of water daily. Has had a few more down days with the changing of the wether    Recent exercise/activity changes:  2xweek, 3500-4k steps daily     Recent stressors: Some increase stress with  the holiday season, some increase in stress.     Recent sleep changes: No concerns     Vitamins/Labs: LFTs elevated - could be related to new RA medication or naltrexone     CURRENT WEIGHT:   181 lbs 0 oz    Initial Weight (lbs): 186 lbs     Cumulative weight loss (lbs): 5  Weight Loss Percentage: 2.69%    Wt Readings from Last 5 Encounters:   11/01/23 82.1 kg (181 lb)   09/20/23 85.7 kg (189 lb)   09/19/23 85.4 kg (188 lb 3.2 oz)   07/17/23 85.3 kg (188 lb)   06/27/23 85.5 kg (188 lb 9.6 oz)             11/1/2023    11:49 AM   Changes and Difficulties   I have made the following changes to my diet since my last visit: trying to eat healthier, fewer sweets and more salads   With regards to my diet, I am still struggling with: wanting sweets, cravings   I have made the following changes to my activity/exercise since my last visit: more walking   With regards to my activity/exercise, I am still struggling with: forcing myself to go walk         MEDICATIONS:   Current Outpatient Medications   Medication Sig Dispense Refill    albuterol (PROAIR HFA/PROVENTIL HFA/VENTOLIN HFA) 108 (90 Base) MCG/ACT inhaler Inhale 2 puffs into the lungs every 4 hours as needed for shortness of breath or wheezing 18 g 3    ARIPiprazole (ABILIFY) 2 MG tablet Take 2 tablets (4 mg) by mouth daily for 90 days 180 tablet 0    aspirin (ASA) 81 MG EC tablet Take 1 tablet (81 mg) by mouth daily 90 tablet 3    azelastine (ASTELIN) 0.1 % nasal spray Spray 2 sprays into both nostrils 2 times daily as needed for rhinitis 30 mL 3    azelastine (OPTIVAR) 0.05 % ophthalmic solution Apply 1 drop to eye 2 times daily as needed (itchy/watery eyes) 6 mL 3    blood glucose (NO BRAND SPECIFIED) lancing device Device to be used with lancets. 3 each 3    blood glucose (NO BRAND SPECIFIED) test strip Use to test blood sugar 3 times daily 300 strip 3    calcium carbonate-vitamin D (CALTRATE 600+D) 600-400 MG-UNIT CHEW Take 1 chew tab by mouth 2 times daily 180  tablet 3    carvedilol (COREG) 25 MG tablet Take 1 tablet (25 mg) by mouth 2 times daily (with meals) 180 tablet 3    cetirizine (ZYRTEC) 10 MG tablet Take 1 tablet (10 mg) by mouth daily 30 tablet 3    Cyanocobalamin (VITAMIN B-12 SL) Place under the tongue every other day       estradiol (ESTRACE) 1 MG tablet Take 1 tablet (1 mg) by mouth daily 90 tablet 3    ezetimibe (ZETIA) 10 MG tablet Take 1 tablet (10 mg) by mouth daily 90 tablet 3    FEROSUL 325 (65 Fe) MG tablet TAKE 1 TABLET(325 MG) BY MOUTH TWICE DAILY 180 tablet 0    hydrALAZINE (APRESOLINE) 10 MG tablet Take 1 tablet (10 mg) by mouth 3 times daily 270 tablet 3    Ibuprofen-diphenhydrAMINE Cit (IBUPROFEN PM PO) Take by mouth At Bedtime      isosorbide mononitrate (IMDUR) 30 MG 24 hr tablet Take 1 tablet (30 mg) by mouth daily 90 tablet 3    lisinopril (ZESTRIL) 5 MG tablet TAKE 1 TABLET(5 MG) BY MOUTH DAILY 90 tablet 3    meclizine (ANTIVERT) 25 MG tablet Take 1 tablet (25 mg) by mouth 3 times daily as needed 30 tablet 1    medroxyPROGESTERone (PROVERA) 2.5 MG tablet Take 1 tablet (2.5 mg) by mouth daily 90 tablet 3    montelukast (SINGULAIR) 10 MG tablet TAKE 1 TABLET(10 MG) BY MOUTH AT BEDTIME 90 tablet 0    Multiple Vitamin (MULTI-VITAMIN) per tablet Take 1 tablet by mouth daily. 100 tablet 3    naltrexone (DEPADE/REVIA) 50 MG tablet Take 1/2 tablet once daily 1-2 hours prior to worst cravings for 1 week, then increase to 1 tablet daily as directed if tolerating 30 tablet 3    NIFEdipine ER (ADALAT CC) 30 MG 24 hr tablet Take 1 tablet (30 mg) by mouth daily 60 tablet 0    ondansetron (ZOFRAN ODT) 4 MG ODT tab Take 1 tablet (4 mg) by mouth every 8 hours as needed for nausea 30 tablet 2    simvastatin (ZOCOR) 40 MG tablet Take 1 tablet (40 mg) by mouth At Bedtime 90 tablet 3    sulfaSALAzine ER (AZULFIDINE EN) 500 MG EC tablet Take 1 tablet daily for 1 week, then take 1 tablet twice daily for 1 week, then take 1 tab in AM and 2 tabs in PM for 1 week,  then take 2 tabs twice daily. Take this medication with food. Labs every 8-12 weeks. 120 tablet 2    topiramate (TOPAMAX) 25 MG tablet 25mg at bedtime for week 1, 50mg at bedtime thereafter 60 tablet 3    venlafaxine (EFFEXOR) 75 MG tablet Take 1 tablet (75 mg) by mouth 3 times daily for 90 days 270 tablet 0    diclofenac (VOLTAREN) 50 MG EC tablet Take 1 tablet (50 mg) by mouth 2 times daily for 7 days 28 tablet 2           11/1/2023    11:49 AM   Weight Loss Medication History Reviewed With Patient   Which weight loss medications are you currently taking on a regular basis? Naltrexone   Are you having any side effects from the weight loss medication that we have prescribed you? Yes   If you are having side effects please describe: makes me sleepy       Office Visit on 09/19/2023   Component Date Value Ref Range Status    Creatinine Urine mg/dL 09/19/2023 60.9  mg/dL Final    The reference ranges have not been established in urine creatinine. The results should be integrated into the clinical context for interpretation.    Albumin Urine mg/L 09/19/2023 <12.0  mg/L Final    The reference ranges have not been established in urine albumin. The results should be integrated into the clinical context for interpretation.    Albumin Urine mg/g Cr 09/19/2023    Final    Unable to calculate, urine albumin and/or urine creatinine is outside detectable limits.  Microalbuminuria is defined as an albumin:creatinine ratio of 17 to 299 for males and 25 to 299 for females. A ratio of albumin:creatinine of 300 or higher is indicative of overt proteinuria.  Due to biologic variability, positive results should be confirmed by a second, first-morning random or 24-hour timed urine specimen. If there is discrepancy, a third specimen is recommended. When 2 out of 3 results are in the microalbuminuria range, this is evidence for incipient nephropathy and warrants increased efforts at glucose control, blood pressure control, and institution  "of therapy with an angiotensin-converting-enzyme (ACE) inhibitor (if the patient can tolerate it).      Sodium 09/19/2023 136  136 - 145 mmol/L Final    Potassium 09/19/2023 4.8  3.4 - 5.3 mmol/L Final    Chloride 09/19/2023 101  98 - 107 mmol/L Final    Carbon Dioxide (CO2) 09/19/2023 24  22 - 29 mmol/L Final    Anion Gap 09/19/2023 11  7 - 15 mmol/L Final    Urea Nitrogen 09/19/2023 13.5  8.0 - 23.0 mg/dL Final    Creatinine 09/19/2023 0.87  0.51 - 0.95 mg/dL Final    Calcium 09/19/2023 9.2  8.8 - 10.2 mg/dL Final    Glucose 09/19/2023 97  70 - 99 mg/dL Final    Alkaline Phosphatase 09/19/2023 94  35 - 104 U/L Final    AST 09/19/2023 40  0 - 45 U/L Final    Reference intervals for this test were updated on 6/12/2023 to more accurately reflect our healthy population. There may be differences in the flagging of prior results with similar values performed with this method. Interpretation of those prior results can be made in the context of the updated reference intervals.    ALT 09/19/2023 61 (H)  0 - 50 U/L Final    Reference intervals for this test were updated on 6/12/2023 to more accurately reflect our healthy population. There may be differences in the flagging of prior results with similar values performed with this method. Interpretation of those prior results can be made in the context of the updated reference intervals.      Protein Total 09/19/2023 6.4  6.4 - 8.3 g/dL Final    Albumin 09/19/2023 4.0  3.5 - 5.2 g/dL Final    Bilirubin Total 09/19/2023 0.3  <=1.2 mg/dL Final    GFR Estimate 09/19/2023 74  >60 mL/min/1.73m2 Final         Objective   Ht 1.613 m (5' 3.5\")   Wt 82.1 kg (181 lb)   LMP 06/01/2012   BMI 31.56 kg/m      Vitals - Patient Reported  Pain Score: No Pain (0)      PHYSICAL EXAM:    GENERAL: Healthy, alert and no distress  EYES: Eyes grossly normal to inspection.  No discharge or erythema, or obvious scleral/conjunctival abnormalities.  RESP: No audible wheeze, cough, or visible " cyanosis.  No visible retractions or increased work of breathing.    SKIN: Visible skin clear. No significant rash, abnormal pigmentation or lesions.  NEURO: Cranial nerves grossly intact.  Mentation and speech appropriate for age.  PSYCH: Mentation appears normal, affect normal/bright, judgement and insight intact, normal speech and appearance well-groomed.        Sincerely,    ZELALEM VIVAS PA-C      43 minutes spent by me on the date of the encounter doing chart review, history and exam, documentation and further activities per the note

## 2023-11-03 ENCOUNTER — TELEPHONE (OUTPATIENT)
Dept: ENDOCRINOLOGY | Facility: CLINIC | Age: 63
End: 2023-11-03

## 2023-11-03 NOTE — TELEPHONE ENCOUNTER
MTM referral from: [facility] visit (referral by provider)    MTM referral outreach attempt #2 on November 3, 2023 at 10:08 AM      Outcome: Patient not reachable after several attempts, will route to MTM Pharmacist/Provider as an FYI.  [facility] scheduling number is [phone].  Thank you for the referral.    Use inContact for the carrier/Plan on the flowsheet      Fengxiafei Message Sent    June Teresa CPhT  [facility]

## 2023-11-09 DIAGNOSIS — I73.00 RAYNAUD'S DISEASE WITHOUT GANGRENE: ICD-10-CM

## 2023-11-09 RX ORDER — NIFEDIPINE 30 MG
30 TABLET, EXTENDED RELEASE ORAL DAILY
Qty: 60 TABLET | Refills: 1 | Status: SHIPPED | OUTPATIENT
Start: 2023-11-09 | End: 2024-05-17

## 2023-11-30 ENCOUNTER — OFFICE VISIT (OUTPATIENT)
Dept: NEUROLOGY | Facility: CLINIC | Age: 63
End: 2023-11-30
Attending: STUDENT IN AN ORGANIZED HEALTH CARE EDUCATION/TRAINING PROGRAM
Payer: COMMERCIAL

## 2023-11-30 DIAGNOSIS — R25.2 LEG CRAMPS: ICD-10-CM

## 2023-11-30 PROCEDURE — 95910 NRV CNDJ TEST 7-8 STUDIES: CPT | Performed by: STUDENT IN AN ORGANIZED HEALTH CARE EDUCATION/TRAINING PROGRAM

## 2023-11-30 PROCEDURE — 95886 MUSC TEST DONE W/N TEST COMP: CPT | Mod: LT | Performed by: STUDENT IN AN ORGANIZED HEALTH CARE EDUCATION/TRAINING PROGRAM

## 2023-11-30 NOTE — PROGRESS NOTES
[facility]  Electrodiagnostic Laboratory                 Department of Neurology                                                                                                         Test Date:  2023    Patient: Patricia Perez : 1960 Physician: Kimberli Marquez MD   Sex: Female AGE: 63 year Ref Phys:    ID#: [id number]   Technician: Kristy Behling     History and Examination:  63-year-old female presented with frequent cramping in her legs, mainly in the left calf, that has been going on for a few months.  No muscle twitching, weakness, or atrophy.  No change in her gait.  No back pain or radicular pain.  This study was performed to assess for lumbosacral radiculopathy.    Techniques:  Motor and sensory conduction studies were done with surface recording electrodes. EMG was done with a concentric needle electrode.     Results:  Nerve conduction studies of bilateral lower limbs showed normal bilateral sural and right superficial fibular sensory responses.  Bilateral tibial and fibular motor responses were normal.  Right tibial F-wave latency was within normal limits.    Needle EMG of proximal and distal muscles of the left lower limb and left lumbar paraspinal muscles were normal.  There were no fibrillation potentials, fasciculations, or myotonic discharges.    Interpretation:  This is a normal study.  There is no electrophysiologic evidence of a left lumbosacral radiculopathy, myopathy, or large fiber peripheral neuropathy in the current study.    Kimberli Marquez MD  Department of Neurology        Nerve Conduction Studies  Motor Sites      Latency Amplitude Neg. Amp Diff Segment Distance Velocity Neg. Dur Neg Area Diff Temperature Comment   Site (ms) Norm (mV) Norm (%)  cm m/s Norm (ms) (%) ( C)    Left Fibular (EDB) Motor   Ankle 3.8  < 6.0 3.7  > 2.0  Ankle-EDB 8   5.2  31.1    Right Fibular (EDB) Motor   Ankle 3.7  < 6.0 3.7  > 2.0  Ankle-EDB 8   6.0   31.2    Bel Fib Head 11.6 - 2.8 - -24 Bel Fib Head-Ankle 30 38  > 38 7.2 -8 31.2    Pop Fossa 13.6 - 2.6 - -7 Pop Fossa-Bel Fib Head 9 45  > 38 6.8 -2 31.2    Left Tibial (AHB) Motor   Ankle 3.2  < 6.5 8.0  > 5.0  Ankle-AHB 8   6.0  31.2    Right Tibial (AHB) Motor   Ankle 3.1  < 6.5 11.6  > 5.0  Ankle-AHB 8   5.3  31.2    Knee 12.6 - 4.2 - -64 Knee-Ankle 41 43  > 38 6.4 -42 31.2      Sensory Sites      Onset Lat Peak Lat Amp (O-P) Amp (P-P) Segment Distance Velocity Temperature Comment   Site ms (ms)  V Norm ( V)  cm m/s Norm ( C)    Right Superficial Fibular Sensory   14 cm-Ankle 2.7 3.9 4  > 3 4 14 cm-Ankle 12.5 46  > 38 30.4    Left Sural Sensory   Calf-Lat Mall 2.1 2.8 15  > 5 18 Calf-Lat Mall 14 67  > 38 30.9    Right Sural Sensory   Calf-Lat Mall 2.4 3.0 21  > 5 15 Calf-Lat Mall 14 58  > 38 31      F Wave Studies     Min-F Max-F Dispersion Persistence Mean-F F-Norm L-R Mean-F L-R Mean-F Norm F/M Ratio F-M Lat (ms)   Right Tibial (Abd Hallucis)  31.2  C   53.44 56.41 2.97 100.00 54.43 <61  <5.7 3.65 47.81       Electromyography     Side Muscle Ins Act Fibs/PSW Fasc HF Amp Dur Poly Recrt Int Pat   Left Tib Anterior Nml None Nml 0 Nml Nml 0 Nml Nml   Left Gastroc MH Nml None Nml 0 Nml Nml 0 Nml Nml   Left Vastus Med Nml None Nml 0 Nml Nml 0 Nml Nml   Left TensFascLat Nml None Nml 0 Nml Nml 0 Nml Nml   Left Lumbar Paraspinals Nml None Nml 0        Left Gluteus Max Nml None Nml 0 Nml Nml 0 Nml Nml       NCS Waveforms:    Motor                Sensory

## 2023-11-30 NOTE — LETTER
2023       RE: Patricia Perez  [address] [address]       Dear Colleague,    Thank you for referring your patient, Patricia Perez, to the [facility] EMG CLINIC [place] at [facility]. Please see a copy of my visit note below.                            [facility]  Electrodiagnostic Laboratory                 Department of Neurology                                                                                                         Test Date:  2023    Patient: Patricia Perez : 1960 Physician: Kimberli Maruqez MD   Sex: Female AGE: 63 year Ref Phys:    ID#: [id number]   Technician: Kristy Behling     History and Examination:  63-year-old female presented with frequent cramping in her legs, mainly in the left calf, that has been going on for a few months.  No muscle twitching, weakness, or atrophy.  No change in her gait.  No back pain or radicular pain.  This study was performed to assess for lumbosacral radiculopathy.    Techniques:  Motor and sensory conduction studies were done with surface recording electrodes. EMG was done with a concentric needle electrode.     Results:  Nerve conduction studies of bilateral lower limbs showed normal bilateral sural and right superficial fibular sensory responses.  Bilateral tibial and fibular motor responses were normal.  Right tibial F-wave latency was within normal limits.    Needle EMG of proximal and distal muscles of the left lower limb and left lumbar paraspinal muscles were normal.  There were no fibrillation potentials, fasciculations, or myotonic discharges.    Interpretation:  This is a normal study.  There is no electrophysiologic evidence of a left lumbosacral radiculopathy, myopathy, or large fiber peripheral neuropathy in the current study.    Kimberli Marquez MD  Department of Neurology        Nerve Conduction Studies  Motor Sites      Latency  Amplitude Neg. Amp Diff Segment Distance Velocity Neg. Dur Neg Area Diff Temperature Comment   Site (ms) Norm (mV) Norm (%)  cm m/s Norm (ms) (%) ( C)    Left Fibular (EDB) Motor   Ankle 3.8  < 6.0 3.7  > 2.0  Ankle-EDB 8   5.2  31.1    Right Fibular (EDB) Motor   Ankle 3.7  < 6.0 3.7  > 2.0  Ankle-EDB 8   6.0  31.2    Bel Fib Head 11.6 - 2.8 - -24 Bel Fib Head-Ankle 30 38  > 38 7.2 -8 31.2    Pop Fossa 13.6 - 2.6 - -7 Pop Fossa-Bel Fib Head 9 45  > 38 6.8 -2 31.2    Left Tibial (AHB) Motor   Ankle 3.2  < 6.5 8.0  > 5.0  Ankle-AHB 8   6.0  31.2    Right Tibial (AHB) Motor   Ankle 3.1  < 6.5 11.6  > 5.0  Ankle-AHB 8   5.3  31.2    Knee 12.6 - 4.2 - -64 Knee-Ankle 41 43  > 38 6.4 -42 31.2      Sensory Sites      Onset Lat Peak Lat Amp (O-P) Amp (P-P) Segment Distance Velocity Temperature Comment   Site ms (ms)  V Norm ( V)  cm m/s Norm ( C)    Right Superficial Fibular Sensory   14 cm-Ankle 2.7 3.9 4  > 3 4 14 cm-Ankle 12.5 46  > 38 30.4    Left Sural Sensory   Calf-Lat Mall 2.1 2.8 15  > 5 18 Calf-Lat Mall 14 67  > 38 30.9    Right Sural Sensory   Calf-Lat Mall 2.4 3.0 21  > 5 15 Calf-Lat Mall 14 58  > 38 31      F Wave Studies     Min-F Max-F Dispersion Persistence Mean-F F-Norm L-R Mean-F L-R Mean-F Norm F/M Ratio F-M Lat (ms)   Right Tibial (Abd Hallucis)  31.2  C   53.44 56.41 2.97 100.00 54.43 <61  <5.7 3.65 47.81       Electromyography     Side Muscle Ins Act Fibs/PSW Fasc HF Amp Dur Poly Recrt Int Pat   Left Tib Anterior Nml None Nml 0 Nml Nml 0 Nml Nml   Left Gastroc MH Nml None Nml 0 Nml Nml 0 Nml Nml   Left Vastus Med Nml None Nml 0 Nml Nml 0 Nml Nml   Left TensFascLat Nml None Nml 0 Nml Nml 0 Nml Nml   Left Lumbar Paraspinals Nml None Nml 0        Left Gluteus Max Nml None Nml 0 Nml Nml 0 Nml Nml       NCS Waveforms:    Motor                Sensory                    Again, thank you for allowing me to participate in the care of your patient.      Sincerely,    Kimberli Marquez MD

## 2023-12-01 ENCOUNTER — TELEPHONE (OUTPATIENT)
Dept: CARDIOLOGY | Facility: CLINIC | Age: 63
End: 2023-12-01

## 2023-12-07 NOTE — PROGRESS NOTES
"Medication Therapy Management (MTM) Encounter    ASSESSMENT:                            Medication Adherence/Access: No issues identified    Weight Management  No updated weight today so unable to assess objective effectiveness but patient has noticed benefits such as decreased cravings and snacking. Tolerating well so far. No side effect concerns. Discussed trying higher dose (75 mg/day) but patient prefers to continue current dose for longer. Glaucoma suspect listed on problem list - didn't discuss today. Recommend follow-up with ophtho for monitoring if on topiramate.     S/P RYBP 2012  stable    Hyperlipidemia/CAD/cardiomyopathy:   Would benefit from restarting aspirin    Hypertension:   Stable    Motion sickness:   stable    Depression:  stable    GERD:   Will discuss with primary care providers to see if patient would benefit from trying famotidine instead or continuing omeprazole with a prescription to reduce cost with buying OTC.    Asthma/Allergic Rhinitis:   Oxymetolazone nasal spray can cause rebound congestion if used >3 days and has post marketing reports of CV events. Saline nasal spray or steroid nasal spray would be safer alternatives. Could consider trying lower cetirizine dose in the future to decrease risk for anticholinergic side effects per Beer's list. Will remove montelukast from list since not taking.    Arthritis:   NSAIDs not preferred since they can increase cardiovascular risk (patient has established heart disease) and increase risk for kidney dysfunction. Acetaminophen would be a safer option. Since no significant problems sleeping and diphenhydramine can effect cognition, would benefit from stopping the \"PM\" formulations. Melatonin would be a safer alternative if needed.     Post-menopausal:  stable    Nausea:   Stable    PLAN:                            Continue topiramate 50 mg once daily at night   Continue at least annual checks with eye doctor to make sure your vision stays stable " on topiramate  Restart taking aspirin 81 mg once daily   Stop oxymetolazone nasal spray, try saline nasal spray instead or flonase nasal spray instead as needed  Stop advil PM and acetaminophen PM. These both contain diphenhydramine which can impact your cognition/thinking as you get older.   Instead, recommend to take acetaminophen 325 mg tablets - take 2 tables every 6 hours as needed for pain (max 3g per day)  Can try melatonin 3 mg 1-2 hours before bed for sleep if needed instead of diphenhydramine.    Follow-up: 4 moths with Stephanie North PA-C, 6 months with medication therapy management     SUBJECTIVE/OBJECTIVE:                          Patricia Perez is a 63 year old female called for an initial visit. She was referred to me from Stephanie North PA-C    Reason for visit: topiramate    Allergies/ADRs: Reviewed in chart  Past Medical History: Reviewed in chart  Tobacco: She reports that she has never smoked. She has never used smokeless tobacco.  Alcohol: rare  Medication Adherence/Access: no issues reported    Weight Management  Topiramate 50 mg once daily at bedtime  Tolerating well so far. Has a little word-finding difficulty at baseline - not worse since starting topiramate. Sometimes has dizziness/motion sickness which is not new. No paresthesia, brain fog, blurry vision.    Followed by Stephanie North PA-C, seen 11/1/23 for Return Medical Weight Management.   Diet/Eating Habits: She doesn't feel as hungry. Decreased cravings. Decreased looking for food/snacking when she's bored.  Trying to eat 3 meals a day. Drinking 60-72 oz of water daily. Fluid goal of at least 64oz daily.   Decreased from 2 diet sodas to 1 diet soda since starting topiramate. Taste hasn't been as good since starting topiramate. Has been drinking more hot tea. No caffeine after noon.   Exercise/Activity: Patient reports  2xweek, 3500-4k steps daily.   Tried/Failed/Contraindicated Medications:   Naltrexone: fatigue,  "ineffective  Phentermine - contraindicated due to history of cardiomyopathy and CAD.   GLP-1 - can consider in the future. Concern for self injections.     Current weight today: no updated weight today, weight set-back around Thanksgiving and has been afraid to look.   Goal weight: 170 lbs  Wt Readings from Last 4 Encounters:   11/01/23 181 lb (82.1 kg)   09/20/23 189 lb (85.7 kg)   09/19/23 188 lb 3.2 oz (85.4 kg)   07/17/23 188 lb (85.3 kg)     Estimated body mass index is 31.56 kg/m  as calculated from the following:    Height as of this encounter: 5' 3.5\" (1.613 m).    Weight as of 11/1/23: 181 lb (82.1 kg).      S/P RYBP 2012  Multivitamin with iron once daily  Calcium-vitamin D Caltrate 1 tab 2 times daily   Vitamin B12 SL tablet every other day (reduced dosing since last level)    Patient had reyna-Y bypass surgery in 2012. She does complain of acid reflux. She does not have issues with swallowing food/pills.     Hemoglobin   Date Value Ref Range Status   02/06/2023 12.7 11.7 - 15.7 g/dL Final   12/08/2020 10.5 (L) 11.7 - 15.7 g/dL Final     Ferritin   Date Value Ref Range Status   03/10/2023 166 8 - 252 ng/mL Final   11/20/2013 97 10 - 300 ng/mL Final     Lab Results   Component Value Date    VITDT 43 03/10/2023     Lab Results   Component Value Date    PTHI 114 (H) 03/10/2023     Lab Results   Component Value Date    B12 1,511 (H) 03/10/2023     Lab Results   Component Value Date    JOSH 0.53 03/10/2023       Hyperlipidemia/CAD/cardiomyopathy:   simvastatin 40 mg daily  Ezetimibe (Zetia) 10 mg once daily  Aspirin 81 mg once daily - she's currently out of this. Forgets to buy.  Patient reports cramps in legs over the last 2 months     Recent Labs   Lab Test 02/06/23  0735 09/12/22  0712   CHOL 187 207*   HDL 97 106   LDL 75 91   TRIG 74 49     Hypertension:   Lisinopril 5 mg once daily   Nifedipine ER 30 mg once daily   Isosorbide ER 30 mg once daily   Carvedilol 25 mg 2 times daily   Patient reports no " current medication side effects. Doesn't feel lightheaded.  Patient self-monitors blood pressure and reports they are stable.  BP Readings from Last 3 Encounters:   09/20/23 119/77   09/19/23 116/72   07/28/23 109/72     Pulse Readings from Last 3 Encounters:   09/20/23 68   09/19/23 71   07/28/23 76     Motion sickness:   Meclizine 25 mg 3 times daily as needed   Occasional symptoms (triggers: car rides)    Depression:  Aripiprazole 4 mg once daily  Venlafaxine 75 mg 3 times daily.   Uses a light in the winter month which helps  Follows with psychiatrist   Patient reports no current medication side effects.  Patient reports symptoms are stable.      4/21/2023     8:53 AM 5/25/2023     7:56 AM 10/23/2023     3:02 PM   PHQ-9 SCORE   PHQ-9 Total Score MyChart 5 (Mild depression) 3 (Minimal depression) 5 (Mild depression)   PHQ-9 Total Score 5 3 5         9/3/2020    11:56 AM 6/30/2021     8:15 AM 3/28/2022    12:45 PM   PRISCILLA-7 SCORE   Total Score   7 (mild anxiety)   Total Score 10 3 7       GERD:   Omeprazole 20 mg once daily - buying OTC. Has been taking for almost 2 years. Hasn't tried famotidine.  Patient feels that current regimen is effective.  No heartburn after RYBP but has increased lately.     Asthma/Allergic Rhinitis:   Albuterol (ProAir/Ventolin/Proventil)- once per month   cetirizine 10 mg once daily  Oxymetolazone nasal spray as needed - not currently  Patient reports no current medication side effects.      Triggers include: mold and cats (has a cat)  Patient reports the following symptoms: none.  Patient feels that current therapy is effective.   No side effects    Arthritis:   Sulfasalazine  mg 2 tablets 2 times daily   Ibuprofen PM - takes 2 tablets (400 mg ibuprofen, diphenhydramine 80 mg) every night   Ibuprofen/acetaminophen combination tablet once per day   Was taking acetaminophen PM but ran out.   Doesn't have trouble sleeping. No next day drowsiness. Some word-finding difficulty at  "baseline.  Regimen is effective. No issues today.    Post-menopausal:  Estradiol 1 mg once daily   Medroxyprogesterone 2.5 mg once daily   No issues today.    Pain:     Nausea:   Ondansetron as needed  - once per month  This is effective    Today's Vitals: Ht 5' 3.5\" (1.613 m)   LMP 06/01/2012   BMI 31.56 kg/m    ----------------    I spent 41 minutes with this patient today. All changes were made via collaborative practice agreement with Stephanie North PA-C. A copy of the visit note was provided to the patient's provider(s).    A summary of these recommendations was sent via [employer].    Telemedicine Visit Details  Type of service:  Telephone visit  Start Time:  8:35 am  End Time: 9:16 AM     Medication Therapy Recommendations  Allergic rhinitis    Rationale: Synergistic therapy - Needs additional medication therapy - Indication   Recommendation: Start Medication - SALINE NASAL SPRAY NA   Status: Patient Agreed - Adherence/Education         Arthritis    Current Medication: Ibuprofen-diphenhydrAMINE Cit (IBUPROFEN PM PO) (Discontinued)   Rationale: Undesirable effect - Adverse medication event - Safety   Recommendation: Change Medication - acetaminophen 650 MG CR tablet   Status: Patient Agreed - Adherence/Education              "

## 2023-12-08 ENCOUNTER — VIRTUAL VISIT (OUTPATIENT)
Dept: PHARMACY | Facility: CLINIC | Age: 63
End: 2023-12-08
Payer: COMMERCIAL

## 2023-12-08 VITALS — BODY MASS INDEX: 31.56 KG/M2 | HEIGHT: 64 IN

## 2023-12-08 DIAGNOSIS — Z98.84 HX OF GASTRIC BYPASS: ICD-10-CM

## 2023-12-08 DIAGNOSIS — M19.90 ARTHRITIS: ICD-10-CM

## 2023-12-08 DIAGNOSIS — T75.3XXS MOTION SICKNESS, SEQUELA: ICD-10-CM

## 2023-12-08 DIAGNOSIS — K21.9 GASTROESOPHAGEAL REFLUX DISEASE WITHOUT ESOPHAGITIS: ICD-10-CM

## 2023-12-08 DIAGNOSIS — J30.2 SEASONAL ALLERGIC RHINITIS, UNSPECIFIED TRIGGER: ICD-10-CM

## 2023-12-08 DIAGNOSIS — E66.811 CLASS 1 OBESITY WITH SERIOUS COMORBIDITY AND BODY MASS INDEX (BMI) OF 31.0 TO 31.9 IN ADULT, UNSPECIFIED OBESITY TYPE: Primary | ICD-10-CM

## 2023-12-08 DIAGNOSIS — R42 DIZZINESS AND GIDDINESS: ICD-10-CM

## 2023-12-08 DIAGNOSIS — J45.30 MILD PERSISTENT ASTHMA WITHOUT COMPLICATION: ICD-10-CM

## 2023-12-08 DIAGNOSIS — E78.5 HYPERLIPIDEMIA LDL GOAL <100: ICD-10-CM

## 2023-12-08 DIAGNOSIS — F33.9 RECURRENT MAJOR DEPRESSIVE DISORDER, REMISSION STATUS UNSPECIFIED (H): ICD-10-CM

## 2023-12-08 DIAGNOSIS — I10 HYPERTENSION GOAL BP (BLOOD PRESSURE) < 130/80: ICD-10-CM

## 2023-12-08 DIAGNOSIS — R11.0 NAUSEA: ICD-10-CM

## 2023-12-08 DIAGNOSIS — I42.9 IDIOPATHIC CARDIOMYOPATHY (H): ICD-10-CM

## 2023-12-08 PROCEDURE — 99605 MTMS BY PHARM NP 15 MIN: CPT | Performed by: PHARMACIST

## 2023-12-08 PROCEDURE — 99607 MTMS BY PHARM ADDL 15 MIN: CPT | Performed by: PHARMACIST

## 2023-12-08 RX ORDER — ACETAMINOPHEN 325 MG/1
650 TABLET ORAL EVERY 6 HOURS PRN
COMMUNITY

## 2023-12-08 RX ORDER — OMEPRAZOLE 20 MG/1
20 TABLET, DELAYED RELEASE ORAL DAILY
COMMUNITY
End: 2023-12-08

## 2023-12-08 ASSESSMENT — PAIN SCALES - GENERAL: PAINLEVEL: NO PAIN (0)

## 2023-12-08 NOTE — PROGRESS NOTES
Per Stephanie North PA-C, okay to send prescription for omeprazole 20 mg once daily. Can consider switching to famotidine in the future if not taking NSAIDs regularly.    Connie Munoz, PharmD, AAHIVP  Medication Therapy Management Pharmacist

## 2023-12-08 NOTE — PATIENT INSTRUCTIONS
"Recommendations from today's MTM visit:                                                    MTM (medication therapy management) is a service provided by a clinical pharmacist designed to help you get the most of out of your medicines.   Today we reviewed what your medicines are for, how to know if they are working, that your medicines are safe and how to make your medicine regimen as easy as possible.      Continue topiramate 50 mg once daily at night   Continue at least annual checks with eye doctor to make sure your vision stays stable on topiramate  Restart taking aspirin 81 mg once daily   Stop oxymetolazone nasal spray, try saline nasal spray instead or flonase nasal spray instead as needed  Stop advil PM and acetaminophen PM. These both contain diphenhydramine which can impact your cognition/thinking as you get older.   Instead, recommend to take acetaminophen 325 mg tablets - take 2 tables every 6 hours as needed for pain (max 3g per day)  Can try melatonin 3 mg 1-2 hours before bed for sleep if needed instead of diphenhydramine.    Follow-up: 4 moths with Stephanie North PA-C, 6 months with medication therapy management     It was great speaking with you today.  I value your experience and would be very thankful for your time in providing feedback in our clinic survey. In the next few days, you may receive an email or text message from [employer] with a link to a survey related to your  clinical pharmacist.\"     To schedule another MTM appointment, please call the clinic directly or you may call the MTM scheduling line at [phone] or toll-free at [phone].     My Clinical Pharmacist's contact information:                                                      Please feel free to contact me with any questions or concerns you have.      Connie Munoz, PharmD, AAHIVP  Medication Therapy Management Pharmacist     "

## 2023-12-08 NOTE — Clinical Note
Attila Morel,  Please let me know if Lorraine typically follows up with theses types of patients more frequently, still trying to find the best follow-up flow. I also noticed she has history of being a glaucoma suspect so I will recommend she continues to have vision follow-up to monitor. Thank you!  Connie

## 2023-12-08 NOTE — NURSING NOTE
Is the patient currently in the state of [place]? YES    Visit mode:TELEPHONE    If the visit is dropped, the patient can be reconnected by: TELEPHONE VISIT: Phone number:   Telephone Information:   Mobile [phone]       Will anyone else be joining the visit? NO  (If patient encounters technical issues they should call [phone]    How would you like to obtain your AVS? MyChart    Are changes needed to the allergy or medication list? No    Reason for visit: Consult    Tiny AYON

## 2023-12-18 ENCOUNTER — TELEPHONE (OUTPATIENT)
Dept: CARDIOLOGY | Facility: CLINIC | Age: 63
End: 2023-12-18

## 2023-12-29 ENCOUNTER — LAB (OUTPATIENT)
Dept: LAB | Facility: CLINIC | Age: 63
End: 2023-12-29
Payer: COMMERCIAL

## 2023-12-29 DIAGNOSIS — Z79.899 HIGH RISK MEDICATION USE: ICD-10-CM

## 2023-12-29 DIAGNOSIS — M05.742 RHEUMATOID ARTHRITIS INVOLVING BOTH HANDS WITH POSITIVE RHEUMATOID FACTOR (H): ICD-10-CM

## 2023-12-29 DIAGNOSIS — M05.741 RHEUMATOID ARTHRITIS INVOLVING BOTH HANDS WITH POSITIVE RHEUMATOID FACTOR (H): ICD-10-CM

## 2023-12-29 LAB
ALBUMIN SERPL BCG-MCNC: 4 G/DL (ref 3.5–5.2)
ALT SERPL W P-5'-P-CCNC: 54 U/L (ref 0–50)
AST SERPL W P-5'-P-CCNC: 40 U/L (ref 0–45)
CREAT SERPL-MCNC: 0.81 MG/DL (ref 0.51–0.95)
EGFRCR SERPLBLD CKD-EPI 2021: 80 ML/MIN/1.73M2
ERYTHROCYTE [DISTWIDTH] IN BLOOD BY AUTOMATED COUNT: 13.5 % (ref 10–15)
HCT VFR BLD AUTO: 35.5 % (ref 35–47)
HGB BLD-MCNC: 11.5 G/DL (ref 11.7–15.7)
MCH RBC QN AUTO: 32.3 PG (ref 26.5–33)
MCHC RBC AUTO-ENTMCNC: 32.4 G/DL (ref 31.5–36.5)
MCV RBC AUTO: 100 FL (ref 78–100)
PLATELET # BLD AUTO: 243 10E3/UL (ref 150–450)
RBC # BLD AUTO: 3.56 10E6/UL (ref 3.8–5.2)
WBC # BLD AUTO: 4.4 10E3/UL (ref 4–11)

## 2023-12-29 PROCEDURE — 85027 COMPLETE CBC AUTOMATED: CPT

## 2023-12-29 PROCEDURE — 82040 ASSAY OF SERUM ALBUMIN: CPT

## 2023-12-29 PROCEDURE — 82565 ASSAY OF CREATININE: CPT

## 2023-12-29 PROCEDURE — 84450 TRANSFERASE (AST) (SGOT): CPT

## 2023-12-29 PROCEDURE — 84460 ALANINE AMINO (ALT) (SGPT): CPT

## 2023-12-29 PROCEDURE — 36415 COLL VENOUS BLD VENIPUNCTURE: CPT

## 2024-01-08 NOTE — PROGRESS NOTES
Patricia is a 63 year old who is being evaluated via a billable video visit.      How would you like to obtain your AVS? [facility]  If the video visit is dropped, the invitation should be resent by: Text to cell phone: [phone]  Will anyone else be joining your video visit? No  Will you be in [place] for the visit?  Yes    Pt will enter the visit via [employer]     Video-Visit Details    Type of service:  Video Visit     Originating Location (pt. Location): Home    Distant Location (provider location):  On-site  Platform used for Video Visit: [facility]      Rheumatology Clinic Visit  [facility]  ROSSY Haney     Patricia Perez MRN# [id number]   YOB: 1960 Age: 63 year old   Date of Visit: 01/10/2024  Primary care provider: Claire Garcia          Assessment and Plan:     1.  Rheumatoid arthritis with an elevated rheumatoid factor  2.  High-risk medication use  3.  Raynaud's    Patient presents today for follow-up.  She notes improvement with the use of the sulfasalazine.  She is not having the prolonged morning stiffness or the soreness in her hand that she was having prior.  She is also tolerating the sulfasalazine well.  We did review the labs that she had done a couple of weeks ago which did show a very mild elevation in one of her liver function test however this was improved from the last time that was checked.  She also did have a slightly low hemoglobin.  We will recheck her labs at the end of this month just to verify that the sulfasalazine is not causing any further changes to her blood counts.  We will otherwise have her continue on the current dose that she is noting improvement.  Patient will follow-up with me in 3 months, sooner if needed.      Plan:   Schedule follow-up with Melissa El PA-C in 3 months  Labs: CBC, creatinine, albumin, AST, ALT at the end of this month  Look into the mediterranean diet  Medications:  Continue Sulfasalazine 500mg: Take  2 tabs twice  daily. Take this medication with food.      ROSSY Haney  Rheumatology         History of Present Illness:   Patricia Perez presents for evaluation of raynaud's, elevated rheumatoid factor, joint pain.     Interval history January 10, 2024:  She states that she is tolerating the sulfasalazine well. She is not having any soreness in her hands. She states that she has some stiffness but it does not take very long to loosen up. She has also started to use a larger  pen and finds this to be helpful.      Interval history September 12, 2023:  No significant hand pain.  She does have a lot of stiffness particularly in the mornings and after periods of rest.  Raynaud's is overall controlled.     HPI from consult of July 28, 2023:  She was diagnosed with Raynaud's and takes Nifedipine for it. She feels the Raynaud's is controlled. She gets some mild color changes but nothing like what it was in the past.     He states that she has thumb joint pain. She states that she thought it was carpal tunnel, but PT told her it was arthritis. She states that she feels achy in her right hand. Occasional aching the left hand. She has a lot of stiffness. She does get some swelling in the right hand. No skin rashes. No psoriasis. No ulcerative colitis or crohn's. She has had some fatigue. She states that she gets up she does have some stiffness. She states that it takes about 1 hour to loosen up. She states that her joints get worse if she is doing a lot of typing. The right middle finger that bothers her the most, it clicks and catches. She finds that she does  not have the hand strength to open things. By the end of the day, she can have more aching. She got some larger pens which has helped. No fevers. No dry eyes or dry mouth. No shortness of breath, unless related to her asthma. She does not notice any other significant joint pain. She does get some cramping in her feet.     Sister with Sjogren's.          Review of  Systems:     Constitutional: negative  Skin: negative  Eyes: negative  Ears/Nose/Throat: negative  Respiratory: No shortness of breath, dyspnea on exertion, cough, or hemoptysis  Cardiovascular: negative  Gastrointestinal: negative  Genitourinary: negative  Musculoskeletal: as above  Neurologic: negative  Psychiatric: negative  Hematologic/Lymphatic/Immunologic: negative  Endocrine: negative         Active Problem List:     Patient Active Problem List    Diagnosis Date Noted    Hypertension goal BP (blood pressure) < 130/80 05/23/2011     Priority: High    Hyperlipidemia LDL goal <100 05/09/2010     Priority: High    Idiopathic cardiomyopathy (H) 01/08/2009     Priority: High     See Dr. Soto      Class 2 severe obesity due to excess calories with serious comorbidity in adult (H) 06/27/2023     Priority: Medium    Raynaud's disease without gangrene 04/13/2023     Priority: Medium    Hypoglycemia 07/07/2022     Priority: Medium    Sciatica of right side 04/18/2022     Priority: Medium    Posterior vitreous detachment of both eyes 01/23/2021     Priority: Medium    Combined forms of age-related cataract, mild, of both eyes 01/23/2021     Priority: Medium    Glaucoma suspect of both eyes 01/23/2021     Priority: Medium    Syncope 01/07/2020     Priority: Medium    Acute pain of left knee 11/16/2018     Priority: Medium    Coronary artery disease involving native coronary artery of native heart without angina pectoris 08/06/2018     Priority: Medium    Vitamin D deficiency 03/01/2018     Priority: Medium    BPV (benign positional vertigo), unspecified laterality 08/31/2017     Priority: Medium    Advanced directives, counseling/discussion 08/31/2017     Priority: Medium     Advance Care Planning 8/31/2017: ACP Review of Chart / Resources Provided:  Reviewed chart for advance care plan.  Patricia Perez has been provided information and resources to begin or update their advance care plan.  Added by Phyllis  Daniels            Adjustment disorder with depressed mood 06/22/2017     Priority: Medium    Decreased hearing of both ears 06/22/2017     Priority: Medium    Benign essential hypertension 06/15/2016     Priority: Medium    Dumping syndrome 09/22/2015     Priority: Medium    Myopia, bilateral 08/17/2015     Priority: Medium    Hand joint pain 01/14/2015     Priority: Medium    Mild persistent asthma 04/03/2014     Priority: Medium    Allergic rhinitis 02/27/2014     Priority: Medium     Problem list name updated by automated process. Provider to review      Attention deficit disorder of adult 02/27/2014     Priority: Medium    Hx of gastric bypass 08/21/2013     Priority: Medium    Hypovitaminosis D 08/21/2013     Priority: Medium    H/O bariatric surgery 07/08/2013     Priority: Medium    Elevated PTHrP level 06/15/2013     Priority: Medium    Class 1 obesity with serious comorbidity and body mass index (BMI) of 33.0 to 33.9 in adult, unspecified obesity type 06/12/2013     Priority: Medium    Pelvic pain in female 09/14/2011     Priority: Medium    Allergic rhinitis 09/09/2010     Priority: Medium    Sleep apnea 03/25/2009     Priority: Medium    GERD (gastroesophageal reflux disease) 10/14/2008     Priority: Medium    Motion sickness 02/27/2008     Priority: Medium    Dizziness and giddiness 02/05/2007     Priority: Medium    Major depression, recurrent (H24) 11/12/2004     Priority: Medium    24 hour contact handout given 01/04/2012     Priority: Low     EMERGENCY CARE PLAN  Presenting Problem Signs and Symptoms Treatment Plan    Questions or conerns during clinic hours    I will call the clinic directly     Questions or conerns outside clinic hours    I will call the 24 hour nurse line at [phone]    Patient needs to schedule an appointment    I will call the 24 hour scheduling team at [phone] or clinic directly    Same day treatment     I will call the clinic first, nurse line if after hours, urgent  care and express care if needed                                    Health Care Home 09/23/2011     Priority: Low     X  EMERGENCY CARE PLAN  Presenting Problem Signs and Symptoms Treatment Plan    Questions or concerns during clinic hours    I will call the clinic directly     Questions or concerns outside clinic hours    I will call the 24 hour nurse line at [phone]    Patient needs to schedule an appointment    I will call the 24 hour scheduling team at [phone] or clinic directly    Same day treatment     I will call the clinic first, nurse line if after hours, urgent care and express care if needed                                DX V65.8 REPLACED WITH [id number] [facility] CARE HOME (04/08/2013)              Past Medical History:     Past Medical History:   Diagnosis Date    Anxiety     Arthritis     ASCUS of cervix with negative high risk HPV 01/23/2006    Asthma     Bursitis of shoulder 03/04/2010    Chronic rhinitis 03/25/2009    Coronary artery disease     Depression     Dyspnea on exertion     GERD (gastroesophageal reflux disease) 10/14/2008    Hypertension     Idiopathic cardiomyopathy (H) 01/08/2009    Indigestion     Itchy eyes     Left leg pain 06/16/2011    Motion sickness 02/27/2008    Nasal congestion     Pneumonia     Problems related to lack of adequate sleep     Ringing in ears     Sleep apnea 03/25/2009    Sneezing      Past Surgical History:   Procedure Laterality Date    BARIATRIC SURGERY      COLONOSCOPY N/A 11/11/2021    Procedure: COLONOSCOPY, WITH POLYPECTOMY;  Surgeon: Stephanie Meneses MD;  Location: [facility] [place] OR    DIABETES RESOURCES  As Child    Tonsillectomy    ENT SURGERY      ESOPHAGOSCOPY, GASTROSCOPY, DUODENOSCOPY (EGD), COMBINED N/A 11/11/2021    Procedure: ESOPHAGOGASTRODUODENOSCOPY, WITH BIOPSY;  Surgeon: Stephanie Meneses MD;  Location: [facility] [place] [facility] OR    HERNIA REPAIR      LAPAROSCOPIC BYPASS GASTRIC  10/16/2012    Procedure: LAPAROSCOPIC BYPASS GASTRIC;  laparoscopic reyna en y gastric bypass;   Surgeon: Nish Bradford MD;  Location: [facility] OR    TONSILLECTOMY      Uretural Sticture  As Child            Social History:     Social History     Socioeconomic History    Marital status:      Spouse name: Not on file    Number of children: 0    Years of education: 12+    Highest education level: Not on file   Occupational History     Employer: [employer]   Tobacco Use    Smoking status: Never    Smokeless tobacco: Never   Vaping Use    Vaping Use: Never used   Substance and Sexual Activity    Alcohol use: Yes     Comment: seldom    Drug use: No    Sexual activity: Not Currently     Partners: Male   Other Topics Concern    Parent/sibling w/ CABG, MI or angioplasty before 65F 55M? No   Social History Narrative    Dairy/d 2 servings/d.     Caffeine 3 servings/d    Exercise 2 x week    Sunscreen used - Yes    Seatbelts used - Yes    Working smoke/CO detectors in the home - Yes    Guns stored in the home - No    Self Breast Exams - Yes    Self Testicular Exam - NA    Eye Exam up to date - Yes    Dental Exam up to date - No    Pap Smear up to date - Yes    Mammogram up to date - Yes    PSA up to date - NA    Dexa Scan up to date - NA    Flex Sig / Colonoscopy up to date - Yes    Immunizations up to date - Yes    Abuse: Current or Past(Physical, Sexual or Emotional)- No    Do you feel safe in your environment - Yes    LOWELL SMITH LPN.    02/05/2007        May 19, 2021    ENVIRONMENTAL HISTORY: [place] The home is heated with a forced air. They do have central air conditioning. The patient's bedroom is furnished with carpeting in bedroom, allergen mattress cover, and animals sleep in bedroom.  Pets inside the house include 1 cat(s). There is no history of cockroach or mice infestation. There is/are 0 smokers in the house.  The house does not have a damp basement, it's a split level.      Social Determinants of Health     Financial Resource Strain: Not on  file   Food Insecurity: Not on file   Transportation Needs: Not on file   Physical Activity: Not on file   Stress: Not on file   Social Connections: Not on file   Interpersonal Safety: Not on file   Housing Stability: Not on file          Family History:     Family History   Problem Relation Age of Onset    Gastrointestinal Disease Mother         non cancerous pancreatic tumor    Hypertension Mother     Heart Disease Mother         A fib    Allergies Mother     Hypertension Father     Lipids Father     Alcohol/Drug Father         Abuse    Depression Father     Respiratory Father         COPD    Cardiovascular Father     Allergies Sister         Poison Ivy    Depression Brother     Hypertension Brother     Allergies Brother         Enviromental/ Bees    Breast Cancer Maternal Grandmother     Glaucoma Paternal Grandmother     Glaucoma Paternal Grandfather     Cerebrovascular Disease Other     Macular Degeneration Other     Diabetes No family hx of     Cancer - colorectal No family hx of     Retinal detachment No family hx of     Amblyopia No family hx of     Thyroid Disease No family hx of     Adrenal Disorder No family hx of             Allergies:     Allergies   Allergen Reactions    Atorvastatin      Leg myalgias            Medications:     Current Outpatient Medications   Medication Sig Dispense Refill    acetaminophen (TYLENOL) 325 MG tablet Take 650 mg by mouth every 6 hours as needed for mild pain      albuterol (PROAIR HFA/PROVENTIL HFA/VENTOLIN HFA) 108 (90 Base) MCG/ACT inhaler Inhale 2 puffs into the lungs every 4 hours as needed for shortness of breath or wheezing 18 g 3    ARIPiprazole (ABILIFY) 2 MG tablet Take 2 tablets (4 mg) by mouth daily for 90 days 180 tablet 0    aspirin (ASA) 81 MG EC tablet Take 1 tablet (81 mg) by mouth daily 90 tablet 3    blood glucose (NO BRAND SPECIFIED) lancing device Device to be used with lancets. 3 each 3    blood glucose (NO BRAND SPECIFIED) test strip Use to test  blood sugar 3 times daily 300 strip 3    calcium carbonate-vitamin D (CALTRATE 600+D) 600-400 MG-UNIT CHEW Take 1 chew tab by mouth 2 times daily 180 tablet 3    carvedilol (COREG) 25 MG tablet Take 1 tablet (25 mg) by mouth 2 times daily (with meals) 180 tablet 3    cetirizine (ZYRTEC) 10 MG tablet Take 1 tablet (10 mg) by mouth daily 30 tablet 3    Cyanocobalamin (VITAMIN B-12 SL) Place under the tongue every other day       estradiol (ESTRACE) 1 MG tablet Take 1 tablet (1 mg) by mouth daily 90 tablet 3    ezetimibe (ZETIA) 10 MG tablet Take 1 tablet (10 mg) by mouth daily 90 tablet 3    hydrALAZINE (APRESOLINE) 10 MG tablet Take 1 tablet (10 mg) by mouth 3 times daily 270 tablet 3    isosorbide mononitrate (IMDUR) 30 MG 24 hr tablet Take 1 tablet (30 mg) by mouth daily 90 tablet 3    lisinopril (ZESTRIL) 5 MG tablet TAKE 1 TABLET(5 MG) BY MOUTH DAILY 90 tablet 3    meclizine (ANTIVERT) 25 MG tablet Take 1 tablet (25 mg) by mouth 3 times daily as needed 30 tablet 1    medroxyPROGESTERone (PROVERA) 2.5 MG tablet Take 1 tablet (2.5 mg) by mouth daily 90 tablet 3    Multiple Vitamin (MULTI-VITAMIN) per tablet Take 1 tablet by mouth daily. 100 tablet 3    NIFEdipine ER (ADALAT CC) 30 MG 24 hr tablet TAKE 1 TABLET(30 MG) BY MOUTH DAILY 60 tablet 1    omeprazole (PRILOSEC) 20 MG DR capsule Take 1 capsule (20 mg) by mouth daily 90 capsule 1    ondansetron (ZOFRAN ODT) 4 MG ODT tab Take 1 tablet (4 mg) by mouth every 8 hours as needed for nausea 30 tablet 2    simvastatin (ZOCOR) 40 MG tablet Take 1 tablet (40 mg) by mouth At Bedtime 90 tablet 3    sulfaSALAzine ER (AZULFIDINE EN) 500 MG EC tablet Take 1 tablet daily for 1 week, then take 1 tablet twice daily for 1 week, then take 1 tab in AM and 2 tabs in PM for 1 week, then take 2 tabs twice daily. Take this medication with food. Labs every 8-12 weeks. 120 tablet 2    topiramate (TOPAMAX) 25 MG tablet 25mg at bedtime for week 1, 50mg at bedtime thereafter 60 tablet 3     venlafaxine (EFFEXOR) 75 MG tablet Take 1 tablet (75 mg) by mouth 3 times daily for 90 days 270 tablet 0            Physical Exam:   Last menstrual period 06/01/2012, not currently breastfeeding.  Wt Readings from Last 6 Encounters:   11/01/23 82.1 kg (181 lb)   09/20/23 85.7 kg (189 lb)   09/19/23 85.4 kg (188 lb 3.2 oz)   07/17/23 85.3 kg (188 lb)   06/27/23 85.5 kg (188 lb 9.6 oz)   06/20/23 85 kg (187 lb 6.4 oz)   Exam limited as this was a video visit  Constitutional: well-developed, appearing stated age; cooperative  Eyes: nl conjunctiva, sclera  ENT: nl external ears, nose, hearing, lips,   Neck: no visible mass or thyroid enlargement  Resp: No shortness of breath with normal conversation  Psych: nl judgement, orientation, memory, affect.           Data:   Imaging:  X-ray cervical spine 3/28/2022  IMPRESSION: Mild degenerative retrolisthesis of C3 upon C4 and minimal  degenerative anterolisthesis of C7 upon T1. Alignment otherwise  normal. Vertebral body heights normal. No fractures. Moderate-severe  facet arthropathy throughout the cervical spine. Loss of disc space  height and degenerative endplate spurring at all levels of the  cervical spine with exception of the C2-C3 level.    X-ray lumbar spine 3/28/2022  IMPRESSION: Five lumbar type vertebrae. Moderate left convex curvature  of the lumbar spine centered at L2 has developed since the comparison  study. Mild degenerative retrolisthesis of L1 upon L2 and L2 upon L3  again noted. Degenerative grade 1 anterolisthesis of L4 upon L5 again  noted. Alignment otherwise normal. Vertebral body heights normal. No  evidence for recent fracture. Presumed chronic fracture deformity or  large Schmorl's node deformity at the anterior superior corner of the  L4 vertebral body again noted. Loss of disc space height and  degenerative endplate spurring at T12-L1, L1-L2 and L2-L3. Facet  arthropathy at L3-L4, L4-L5 and L5-S1.    X-ray right hand 6/30/2021  IMPRESSION:  Moderate degenerative arthrosis at the STT and distal  radioulnar joints. There is cystic change at the proximal-medial  aspect of the lunate. This could relate to ulnolunate impaction or  subchondral cystic change. Mild subchondral cystic change at the  proximal aspect of the triquetrum. Mild third MCP joint space  narrowing. No fracture identified.     X-ray right foot.  12/29/2020  IMPRESSION:  1.  Moderate right second TMT degenerative arthrosis.  2.  Mild right first MTP degenerative arthrosis.  3.  Small osteophyte at the lateral margin of the fifth metatarsal  head.  4.  Plantar calcaneal spur.  5.  No fracture.  6.  Second hammertoe deformity.    X-ray bilateral hand 7/28/2023  IMPRESSION:   1.  No definite osseous erosions. Scattered periarticular lucencies  bilaterally, such as within the distal ulna on the right and at the  triscaphe joint within the distal pole of the scaphoid bilaterally  which have characteristics most consistent with subchondral cysts or  intraosseous ganglions as likely sequela of degenerative arthritis.  2.  Moderate second and third MCP joint space narrowing on the right  with periarticular soft tissue thickening which may reflect synovitis.  3.  No obvious periarticular osteopenia.  4.  Mild to moderate degenerative arthritis involving multiple joints  of both hands and wrists, greatest involving the first CMC and  triscaphe joints bilaterally.     MRI right hand 8/21/2023  IMPRESSION:  1.  While lack of contrast limits differentiation between synovitis and joint effusion, there is a large joint effusion versus synovitis within the wrist with multifocal subcortical cystic change and patchy areas of marrow edema. While the more advanced   arthrosis in the triscaphe and first CMC joints is typical of osteoarthritis, additional sites within the wrist may represent an inflammatory arthropathy although mechanical changes/osteoarthritis is not completely excluded.  2.  Scattered joint  effusions versus synovitis within the MCP and PIP joints of the fingers can be seen with both mechanical as well as inflammatory causes.  3.  High-grade cartilage loss and patchy subchondral bone marrow edema with subcortical cystic change at the long finger MCP joint can be seen with an inflammatory arthropathy as well as mechanical change.  4.  Mild, scattered tenosynovitis within the flexor and extensor tendons, primarily at the wrist as described.   5.  Given the overall appearance of the hands and wrists, a combination of an inflammatory and degenerative arthritis is favored.    Laboratory:  4/14/2023  CRP less than 2.9  Rheumatoid factor 12  Sed rate 8  FAHEEM negative    7/28/2023  CRP less than 3.0  CCP antibody 1.1  Sed rate 9  SSA and SSB negative    12/29/2023  Creatinine 0.81, GFR 80  Albumin 4.0  ALT 54, AST 40  White blood cell count 4.4, hemoglobin 11.5, platelet count 243

## 2024-01-10 ENCOUNTER — VIRTUAL VISIT (OUTPATIENT)
Dept: RHEUMATOLOGY | Facility: CLINIC | Age: 64
End: 2024-01-10
Payer: COMMERCIAL

## 2024-01-10 VITALS — HEIGHT: 61 IN | BODY MASS INDEX: 34.2 KG/M2

## 2024-01-10 DIAGNOSIS — M05.741 RHEUMATOID ARTHRITIS INVOLVING BOTH HANDS WITH POSITIVE RHEUMATOID FACTOR (H): ICD-10-CM

## 2024-01-10 DIAGNOSIS — Z79.899 HIGH RISK MEDICATION USE: ICD-10-CM

## 2024-01-10 DIAGNOSIS — M05.742 RHEUMATOID ARTHRITIS INVOLVING BOTH HANDS WITH POSITIVE RHEUMATOID FACTOR (H): ICD-10-CM

## 2024-01-10 PROCEDURE — 99214 OFFICE O/P EST MOD 30 MIN: CPT | Mod: 95 | Performed by: PHYSICIAN ASSISTANT

## 2024-01-10 RX ORDER — SULFASALAZINE 500 MG/1
1000 TABLET, DELAYED RELEASE ORAL 2 TIMES DAILY
Qty: 120 TABLET | Refills: 2 | Status: SHIPPED | OUTPATIENT
Start: 2024-01-10 | End: 2024-04-03

## 2024-01-10 ASSESSMENT — PAIN SCALES - GENERAL: PAINLEVEL: NO PAIN (0)

## 2024-01-10 NOTE — PATIENT INSTRUCTIONS
After Visit Instructions:     Thank you for coming to [facility] for your care. It is my goal to partner with you to help you reach your optimal state of health.       Plan:     Schedule follow-up with Melissa El PA-C in 3 months  Labs: CBC, creatinine, albumin, AST, ALT at the end of this month  Look into the mediterranean diet  Medications:  Continue Sulfasalazine 500mg: Take  2 tabs twice daily. Take this medication with food.          Melissa El PA-C  [facility]  [facility]    Contact information: [facility]  Clinic Number:  [phone]  Please call or send a BUILD message with any questions about your care

## 2024-01-19 ENCOUNTER — LAB (OUTPATIENT)
Dept: LAB | Facility: CLINIC | Age: 64
End: 2024-01-19
Payer: COMMERCIAL

## 2024-01-19 DIAGNOSIS — E78.5 HYPERLIPIDEMIA LDL GOAL <70: ICD-10-CM

## 2024-01-19 DIAGNOSIS — I10 HYPERTENSION, UNSPECIFIED TYPE: ICD-10-CM

## 2024-01-19 LAB
ERYTHROCYTE [DISTWIDTH] IN BLOOD BY AUTOMATED COUNT: 13.4 % (ref 10–15)
HCT VFR BLD AUTO: 38.3 % (ref 35–47)
HGB BLD-MCNC: 12.5 G/DL (ref 11.7–15.7)
MCH RBC QN AUTO: 32.5 PG (ref 26.5–33)
MCHC RBC AUTO-ENTMCNC: 32.6 G/DL (ref 31.5–36.5)
MCV RBC AUTO: 100 FL (ref 78–100)
PLATELET # BLD AUTO: 237 10E3/UL (ref 150–450)
RBC # BLD AUTO: 3.85 10E6/UL (ref 3.8–5.2)
WBC # BLD AUTO: 5 10E3/UL (ref 4–11)

## 2024-01-19 PROCEDURE — 84443 ASSAY THYROID STIM HORMONE: CPT

## 2024-01-19 PROCEDURE — 36415 COLL VENOUS BLD VENIPUNCTURE: CPT

## 2024-01-19 PROCEDURE — 80061 LIPID PANEL: CPT

## 2024-01-19 PROCEDURE — 85027 COMPLETE CBC AUTOMATED: CPT

## 2024-01-19 PROCEDURE — 80048 BASIC METABOLIC PNL TOTAL CA: CPT

## 2024-01-20 LAB
ANION GAP SERPL CALCULATED.3IONS-SCNC: 9 MMOL/L (ref 7–15)
BUN SERPL-MCNC: 14.5 MG/DL (ref 8–23)
CALCIUM SERPL-MCNC: 9.1 MG/DL (ref 8.8–10.2)
CHLORIDE SERPL-SCNC: 105 MMOL/L (ref 98–107)
CHOLEST SERPL-MCNC: 205 MG/DL
CREAT SERPL-MCNC: 0.87 MG/DL (ref 0.51–0.95)
DEPRECATED HCO3 PLAS-SCNC: 24 MMOL/L (ref 22–29)
EGFRCR SERPLBLD CKD-EPI 2021: 74 ML/MIN/1.73M2
FASTING STATUS PATIENT QL REPORTED: YES
GLUCOSE SERPL-MCNC: 89 MG/DL (ref 70–99)
HDLC SERPL-MCNC: 107 MG/DL
LDLC SERPL CALC-MCNC: 86 MG/DL
NONHDLC SERPL-MCNC: 98 MG/DL
POTASSIUM SERPL-SCNC: 4.9 MMOL/L (ref 3.4–5.3)
SODIUM SERPL-SCNC: 138 MMOL/L (ref 135–145)
TRIGL SERPL-MCNC: 61 MG/DL
TSH SERPL DL<=0.005 MIU/L-ACNC: 2.06 UIU/ML (ref 0.3–4.2)

## 2024-01-22 ENCOUNTER — OFFICE VISIT (OUTPATIENT)
Dept: CARDIOLOGY | Facility: CLINIC | Age: 64
End: 2024-01-22
Attending: INTERNAL MEDICINE
Payer: COMMERCIAL

## 2024-01-22 VITALS
HEART RATE: 64 BPM | DIASTOLIC BLOOD PRESSURE: 83 MMHG | SYSTOLIC BLOOD PRESSURE: 127 MMHG | OXYGEN SATURATION: 99 % | BODY MASS INDEX: 34.27 KG/M2 | WEIGHT: 193.4 LBS | HEIGHT: 63 IN

## 2024-01-22 DIAGNOSIS — I25.10 CORONARY ARTERY DISEASE INVOLVING NATIVE CORONARY ARTERY OF NATIVE HEART WITHOUT ANGINA PECTORIS: ICD-10-CM

## 2024-01-22 DIAGNOSIS — I42.9 CARDIOMYOPATHY, UNSPECIFIED TYPE (H): ICD-10-CM

## 2024-01-22 DIAGNOSIS — I10 ESSENTIAL HYPERTENSION WITH GOAL BLOOD PRESSURE LESS THAN 130/80: Primary | ICD-10-CM

## 2024-01-22 PROCEDURE — 93005 ELECTROCARDIOGRAM TRACING: CPT

## 2024-01-22 PROCEDURE — 99213 OFFICE O/P EST LOW 20 MIN: CPT | Performed by: INTERNAL MEDICINE

## 2024-01-22 PROCEDURE — 99214 OFFICE O/P EST MOD 30 MIN: CPT | Performed by: INTERNAL MEDICINE

## 2024-01-22 PROCEDURE — 93010 ELECTROCARDIOGRAM REPORT: CPT | Performed by: INTERNAL MEDICINE

## 2024-01-22 RX ORDER — EZETIMIBE 10 MG/1
10 TABLET ORAL DAILY
Qty: 90 TABLET | Refills: 3 | Status: SHIPPED | OUTPATIENT
Start: 2024-01-22

## 2024-01-22 RX ORDER — SIMVASTATIN 40 MG
40 TABLET ORAL AT BEDTIME
Qty: 90 TABLET | Refills: 3 | Status: SHIPPED | OUTPATIENT
Start: 2024-01-22

## 2024-01-22 RX ORDER — ASPIRIN 81 MG/1
81 TABLET ORAL DAILY
Qty: 90 TABLET | Refills: 3 | Status: SHIPPED | OUTPATIENT
Start: 2024-01-22

## 2024-01-22 RX ORDER — CARVEDILOL 25 MG/1
25 TABLET ORAL 2 TIMES DAILY WITH MEALS
Qty: 180 TABLET | Refills: 3 | Status: SHIPPED | OUTPATIENT
Start: 2024-01-22

## 2024-01-22 RX ORDER — ISOSORBIDE MONONITRATE 30 MG/1
30 TABLET, EXTENDED RELEASE ORAL DAILY
Qty: 90 TABLET | Refills: 3 | Status: SHIPPED | OUTPATIENT
Start: 2024-01-22

## 2024-01-22 ASSESSMENT — PAIN SCALES - GENERAL: PAINLEVEL: NO PAIN (0)

## 2024-01-22 NOTE — NURSING NOTE
Chief Complaint   Patient presents with    Follow Up     Dr. Arnett follow-up         Vitals were taken, medications reconciled and EKG performed.    Josette Celis, Facilitator  11:30 AM

## 2024-01-22 NOTE — PROGRESS NOTES
History:    Ms. Patricia Perez is a 63-year-old lady with a history of palpitations, prior cardiomyopathy, hypertension, hyperlipidemia and nonobstructive coronary disease. She  presents today for establishing care and was previously a patient of Dr. Cuadra.     Her past medical history is significant for the followin.  Hypertension.  2.  Hyperlipidemia.  3.  Sleep apnea, on CPAP.  4.  Depression.  5.  Gastroesophageal reflux disease.  6.  Asthma.  7.  Obesity.  8.  Status post gastric bypass surgery.    Her cardiac history is significant for a nonischemic cardiomyopathy that is mild to moderate degree in .  Coronary angiogram revealed only mild disease in the LAD and right coronary territories.    She had an episode of syncope in 2020 and coronary CTA 2020 revealed the following:      Left main okay.  LAD less than 50% proximal to mid stenosis.  Circumflex less than 50% proximal stenosis.  RCA less than 30% proximal stenosis.    Zio patch monitor and echo were unremarkable at that time. This was repeated again in  and her echo was unremarkable and her Zio patch monitor revealed 3 episodes of nonsustained VT up to 9 beats, 2 episodes of SVT, longest 8 beats.  Symptoms corresponded to normal sinus rhythm, plus or minus PVCs with a 3% burden.  No sustained malignant arrhythmias were identified.    She works out with a  doing circuit weight training twice a week.  She denies any new chest pain, shortness of breath or exertional limitation.  She denies other PND, orthopnea, edema, palpitations, syncope or near syncope.    REVIEW OF SYSTEMS:  A 10-point review of systems is otherwise unremarkable.  She has no other complaints.    CURRENT MEDICATIONS:      1.  Abilify.    2.  Albuterol.    3.  Azelastine.    4.  Carvedilol 25 mg twice daily.  5.  Estrogen.  6.  Ezetimibe 10 mg a day.  7.  Hydralazine 10 mg 3 times a day.  8.  Imdur 30 mg a day.  9.  Lisinopril 5 mg a day.  10.  Nifedipine ER 30 mg daily  11. Simvastatin 40 mg a day.    PHYSICAL EXAMINATION:      In general, the patient is in no apparent distress.        Neck: No JVD. No thyromegaly  Heart: regular with normal S1, S2. No murmur. No audible rub or gallop.    Lungs: Clear bilaterally.  No rhonchi, wheezes, rales.   Extremities: No edema.  The pulses are 2+at the radial bilaterally.    I have independently reviewed this patient's relevant laboratory and cardiac data :  Lipids:   Recent Labs   Lab Test 01/19/24  0731 02/06/23  0735   CHOL 205* 187    97   LDL 86 75   TRIG 61 74      Recent Labs   Lab Test 12/29/23 0730 09/19/23  0855   AST 40 40     Recent Labs   Lab Test 12/29/23 0730 09/19/23  0855   ALT 54* 61*     Electrolytes:   Recent Labs   Lab Test 01/19/24 0731 12/29/23  0730 09/19/23  0855     --  136   POTASSIUM 4.9  --  4.8   CHLORIDE 105  --  101   CO2 24  --  24   ANIONGAP 9  --  11   BUN 14.5  --  13.5   CR 0.87 0.81 0.87     CBC:   Recent Labs   Lab Test 01/19/24  0731 12/29/23  0730   WBC 5.0 4.4   RBC 3.85 3.56*   HGB 12.5 11.5*    100    243     Hemoglobin A1c:  Recent Labs   Lab Test 03/10/23  1146 01/28/22  0726   A1C 5.5 5.6     TSH:   Recent Labs   Lab Test 01/19/24  0731 02/06/23  0735   TSH 2.06 1.95       Echocardiogram 09/08/2022 reveals normal LV systolic function, no significant valvular pathology identified.     Zio patch monitor 09/09/2022 reveals symptoms reported with ectopy, but no sustained malignant arrhythmias identified.      IMPRESSION:      Patricia is a 63-year-old lady with several active cardiac issues:    1.  History of palpitations.Stable now    2.  Prior history of mild nonischemic cardiomyopathy.    In 2008, ejection fraction was 40%-45%.  With medical therapy, her ejection fraction improved to the normal range as of most recent echo 09/08/2022.  She is currently doing well from this standpoint and she has only mild nonobstructive disease by coronary CTA  01/07/2020. Continue to monitor clinically.    3.  Hypertension, well controlled.  She is on multiple medications. She as not been taking hydralazine three times daily.  Continue to monitor BP at home and will stop hydralazine.    4.  Hyperlipidemia. On simvastatin and zetia.     5.  History of mild to moderate nonobstructive coronary disease. We will continue with secondary prevention.      PLAN:      1. Stop hydralazine   2. Monitor BP at home - ~120/80 mmHg  3. Follow up in year - virtual OK after annual lipid, BMP, AST, ALT.      Debora Arnett MD, MS  Professor of Medicine  Cardiovascular Medicine

## 2024-01-22 NOTE — LETTER
2024      RE: Patricia Perez  [address] [address]       Dear Colleague,    Thank you for the opportunity to participate in the care of your patient, Patricia Perez, at the [facility] [place] at [facility]. Please see a copy of my visit note below.    History:    Ms. Patricia Perez is a 63-year-old lady with a history of palpitations, prior cardiomyopathy, hypertension, hyperlipidemia and nonobstructive coronary disease. She  presents today for establishing care and was previously a patient of Dr. Cuadra.     Her past medical history is significant for the followin.  Hypertension.  2.  Hyperlipidemia.  3.  Sleep apnea, on CPAP.  4.  Depression.  5.  Gastroesophageal reflux disease.  6.  Asthma.  7.  Obesity.  8.  Status post gastric bypass surgery.    Her cardiac history is significant for a nonischemic cardiomyopathy that is mild to moderate degree in .  Coronary angiogram revealed only mild disease in the LAD and right coronary territories.    She had an episode of syncope in 2020 and coronary CTA 2020 revealed the following:      Left main okay.  LAD less than 50% proximal to mid stenosis.  Circumflex less than 50% proximal stenosis.  RCA less than 30% proximal stenosis.    Zio patch monitor and echo were unremarkable at that time. This was repeated again in  and her echo was unremarkable and her Zio patch monitor revealed 3 episodes of nonsustained VT up to 9 beats, 2 episodes of SVT, longest 8 beats.  Symptoms corresponded to normal sinus rhythm, plus or minus PVCs with a 3% burden.  No sustained malignant arrhythmias were identified.    She works out with a  doing circuit weight training twice a week.  She denies any new chest pain, shortness of breath or exertional limitation.  She denies other PND, orthopnea, edema, palpitations, syncope or near syncope.    REVIEW OF SYSTEMS:  A  10-point review of systems is otherwise unremarkable.  She has no other complaints.    CURRENT MEDICATIONS:      1.  Abilify.    2.  Albuterol.    3.  Azelastine.    4.  Carvedilol 25 mg twice daily.  5.  Estrogen.  6.  Ezetimibe 10 mg a day.  7.  Hydralazine 10 mg 3 times a day.  8.  Imdur 30 mg a day.  9.  Lisinopril 5 mg a day.  10. Nifedipine ER 30 mg daily  11. Simvastatin 40 mg a day.    PHYSICAL EXAMINATION:      In general, the patient is in no apparent distress.        Neck: No JVD. No thyromegaly  Heart: regular with normal S1, S2. No murmur. No audible rub or gallop.    Lungs: Clear bilaterally.  No rhonchi, wheezes, rales.   Extremities: No edema.  The pulses are 2+at the radial bilaterally.    I have independently reviewed this patient's relevant laboratory and cardiac data :  Lipids:   Recent Labs   Lab Test 01/19/24 0731 02/06/23  0735   CHOL 205* 187    97   LDL 86 75   TRIG 61 74      Recent Labs   Lab Test 12/29/23  0730 09/19/23  0855   AST 40 40     Recent Labs   Lab Test 12/29/23  0730 09/19/23  0855   ALT 54* 61*     Electrolytes:   Recent Labs   Lab Test 01/19/24  0731 12/29/23  0730 09/19/23  0855     --  136   POTASSIUM 4.9  --  4.8   CHLORIDE 105  --  101   CO2 24  --  24   ANIONGAP 9  --  11   BUN 14.5  --  13.5   CR 0.87 0.81 0.87     CBC:   Recent Labs   Lab Test 01/19/24  0731 12/29/23  0730   WBC 5.0 4.4   RBC 3.85 3.56*   HGB 12.5 11.5*    100    243     Hemoglobin A1c:  Recent Labs   Lab Test 03/10/23  1146 01/28/22  0726   A1C 5.5 5.6     TSH:   Recent Labs   Lab Test 01/19/24  0731 02/06/23  0735   TSH 2.06 1.95       Echocardiogram 09/08/2022 reveals normal LV systolic function, no significant valvular pathology identified.     Zio patch monitor 09/09/2022 reveals symptoms reported with ectopy, but no sustained malignant arrhythmias identified.      IMPRESSION:      Patricia is a 63-year-old lady with several active cardiac issues:    1.  History of  palpitations.Stable now    2.  Prior history of mild nonischemic cardiomyopathy.    In 2008, ejection fraction was 40%-45%.  With medical therapy, her ejection fraction improved to the normal range as of most recent echo 09/08/2022.  She is currently doing well from this standpoint and she has only mild nonobstructive disease by coronary CTA 01/07/2020. Continue to monitor clinically.    3.  Hypertension, well controlled.  She is on multiple medications. She as not been taking hydralazine three times daily.  Continue to monitor BP at home and will stop hydralazine.    4.  Hyperlipidemia. On simvastatin and zetia.     5.  History of mild to moderate nonobstructive coronary disease. We will continue with secondary prevention.      PLAN:      1. Stop hydralazine   2. Monitor BP at home - ~120/80 mmHg  3. Follow up in year - virtual OK after annual lipid, BMP, AST, ALT.      Debora Arnett MD, MS  Professor of Medicine  Cardiovascular Medicine

## 2024-01-22 NOTE — PATIENT INSTRUCTIONS
You were seen in the Cardiology Clinic today by: Dr. Arnett    Plan:   Medication Changes:   STOP Hydralazine. Other medications refilled for 1 year.     Follow up Visit:  With Dr. Arnett in 1 year, fasting labs completed prior to appointment.     Further Instructions:  Follow the American Heart Association Diet and Lifestyle recommendations: Limit saturated fat, trans fat, sodium, red meat, sweets and sugar-sweetened beverages. If you choose to eat red meat, compare labels and select the leanest cuts available. Aim for at least 150 minutes of moderate physical activity or 75 minutes of vigorous physical activity - or an equal combination of both - each week.    If you have further questions, please utilize [employer] to contact us.     Your Care Team:    Cardiology   Telephone Number     Nurse Line  Jeff Briggs RN    [phone]     For scheduling appointments:  [phone]           On-call cardiologist for after hours or on weekends:   [phone], option #4, and ask to speak to the on-call cardiologist.     Cardiovascular Clinic:   [address] [place] [address]. [place] [address]      As always, Thank you for trusting us with your health care needs!

## 2024-01-25 DIAGNOSIS — F33.1 MODERATE EPISODE OF RECURRENT MAJOR DEPRESSIVE DISORDER (H): ICD-10-CM

## 2024-01-25 RX ORDER — VENLAFAXINE 75 MG/1
75 TABLET ORAL 3 TIMES DAILY
Qty: 90 TABLET | Refills: 1 | Status: SHIPPED | OUTPATIENT
Start: 2024-01-25 | End: 2024-03-28

## 2024-01-25 NOTE — TELEPHONE ENCOUNTER
Date of Last Office Visit: 10/23/2023  [facility]     Navi Marie MD     Date of Next Office Visit: 3/28/2024   No shows since last visit: 0  Cancellations since last visit: x1 (1/25/24)  ------------------------------  Medication requested:    Date last ordered: 11/1/2023 Qty: 270 Refills: 0             Sig - Route: Take 1 tablet (75 mg) by mouth 3 times daily for 90 days - Oral  Sent to pharmacy as: Venlafaxine HCl 75 MG Oral Tablet (EFFEXOR)  Class: E-Prescribe  ------------------------------     Lapse in medication adherence greater than 5 days?: no  If yes, call patient and gather details: -  Medication refill request verified as identical to current order?: yes       Last Visit Treatment Plan     1) PSYCHOTROPIC MEDICATIONS:  - Continue aripiprazole 4 mg daily  - Continue venlafaxine to 75 mg TID     2) THERAPY:  continue individual psychotherapy      3) MONITORING:  Labs- last AP labs done Feb 2023 (by cardiology) repeat annually if not collected by outside providers   EKG- 1/7/20: 447 ms @ 54 bpm  Rating scales- PHQ9, AIMS due Nov 2023    4) REFERRALS:  Weight management referral placed 8/15/22 callback #[phone], established care there        5) RTC: Return to clinic in 3 months in person for AIMS assessment          Refill decision: Refill pended and routed to the provider for review/determination due to the following criteria not met: Cxl x1. Last sig only written for 90 days, is patient to continue on this dose?    Medication unable to be refilled by RN due to criteria not met as indicated.                 []Eligibility - not seen in the last year              []Supervision - no future appointment              [x]Compliance - no shows, cancellations or lapse in therapy              [x]Verification - order discrepancy              []Controlled medication              []Medication not included in policy              []90-day supply request               []Other:

## 2024-01-26 LAB
ATRIAL RATE - MUSE: 58 BPM
DIASTOLIC BLOOD PRESSURE - MUSE: NORMAL MMHG
INTERPRETATION ECG - MUSE: NORMAL
P AXIS - MUSE: 38 DEGREES
PR INTERVAL - MUSE: 154 MS
QRS DURATION - MUSE: 82 MS
QT - MUSE: 408 MS
QTC - MUSE: 400 MS
R AXIS - MUSE: -29 DEGREES
SYSTOLIC BLOOD PRESSURE - MUSE: NORMAL MMHG
T AXIS - MUSE: 0 DEGREES
VENTRICULAR RATE- MUSE: 58 BPM

## 2024-02-01 ENCOUNTER — OFFICE VISIT (OUTPATIENT)
Dept: URGENT CARE | Facility: URGENT CARE | Age: 64
End: 2024-02-01
Payer: COMMERCIAL

## 2024-02-01 ENCOUNTER — ANCILLARY PROCEDURE (OUTPATIENT)
Dept: CT IMAGING | Facility: CLINIC | Age: 64
End: 2024-02-01
Attending: PHYSICIAN ASSISTANT
Payer: COMMERCIAL

## 2024-02-01 ENCOUNTER — OFFICE VISIT (OUTPATIENT)
Dept: PEDIATRICS | Facility: CLINIC | Age: 64
End: 2024-02-01
Payer: COMMERCIAL

## 2024-02-01 VITALS
HEART RATE: 66 BPM | RESPIRATION RATE: 17 BRPM | DIASTOLIC BLOOD PRESSURE: 73 MMHG | OXYGEN SATURATION: 99 % | TEMPERATURE: 98.3 F | SYSTOLIC BLOOD PRESSURE: 117 MMHG

## 2024-02-01 VITALS
OXYGEN SATURATION: 100 % | RESPIRATION RATE: 15 BRPM | HEART RATE: 68 BPM | DIASTOLIC BLOOD PRESSURE: 74 MMHG | SYSTOLIC BLOOD PRESSURE: 113 MMHG | TEMPERATURE: 97.2 F

## 2024-02-01 DIAGNOSIS — R10.31 RLQ ABDOMINAL PAIN: Primary | ICD-10-CM

## 2024-02-01 DIAGNOSIS — R10.84 ABDOMINAL PAIN, GENERALIZED: Primary | ICD-10-CM

## 2024-02-01 DIAGNOSIS — R11.0 NAUSEA: ICD-10-CM

## 2024-02-01 DIAGNOSIS — R10.31 RLQ ABDOMINAL PAIN: ICD-10-CM

## 2024-02-01 LAB
ALBUMIN SERPL BCG-MCNC: 4.5 G/DL (ref 3.5–5.2)
ALP SERPL-CCNC: 98 U/L (ref 40–150)
ALT SERPL W P-5'-P-CCNC: 41 U/L (ref 0–50)
ANION GAP SERPL CALCULATED.3IONS-SCNC: 10 MMOL/L (ref 7–15)
AST SERPL W P-5'-P-CCNC: 35 U/L (ref 0–45)
BILIRUB SERPL-MCNC: 0.3 MG/DL
BUN SERPL-MCNC: 15 MG/DL (ref 8–23)
CALCIUM SERPL-MCNC: 9.6 MG/DL (ref 8.8–10.2)
CHLORIDE SERPL-SCNC: 108 MMOL/L (ref 98–107)
CREAT SERPL-MCNC: 0.83 MG/DL (ref 0.51–0.95)
DEPRECATED HCO3 PLAS-SCNC: 23 MMOL/L (ref 22–29)
EGFRCR SERPLBLD CKD-EPI 2021: 78 ML/MIN/1.73M2
ERYTHROCYTE [DISTWIDTH] IN BLOOD BY AUTOMATED COUNT: 13.4 % (ref 10–15)
GLUCOSE SERPL-MCNC: 93 MG/DL (ref 70–99)
HCT VFR BLD AUTO: 38.6 % (ref 35–47)
HGB BLD-MCNC: 12.4 G/DL (ref 11.7–15.7)
LIPASE SERPL-CCNC: 49 U/L (ref 13–60)
MCH RBC QN AUTO: 31.6 PG (ref 26.5–33)
MCHC RBC AUTO-ENTMCNC: 32.1 G/DL (ref 31.5–36.5)
MCV RBC AUTO: 99 FL (ref 78–100)
PLATELET # BLD AUTO: 235 10E3/UL (ref 150–450)
POTASSIUM SERPL-SCNC: 4.6 MMOL/L (ref 3.4–5.3)
PROT SERPL-MCNC: 7 G/DL (ref 6.4–8.3)
RBC # BLD AUTO: 3.92 10E6/UL (ref 3.8–5.2)
SODIUM SERPL-SCNC: 141 MMOL/L (ref 135–145)
WBC # BLD AUTO: 5.2 10E3/UL (ref 4–11)

## 2024-02-01 PROCEDURE — 36415 COLL VENOUS BLD VENIPUNCTURE: CPT | Performed by: PHYSICIAN ASSISTANT

## 2024-02-01 PROCEDURE — 83690 ASSAY OF LIPASE: CPT | Performed by: PHYSICIAN ASSISTANT

## 2024-02-01 PROCEDURE — 85027 COMPLETE CBC AUTOMATED: CPT | Performed by: PHYSICIAN ASSISTANT

## 2024-02-01 PROCEDURE — 80053 COMPREHEN METABOLIC PANEL: CPT | Performed by: PHYSICIAN ASSISTANT

## 2024-02-01 PROCEDURE — 99214 OFFICE O/P EST MOD 30 MIN: CPT | Performed by: PHYSICIAN ASSISTANT

## 2024-02-01 PROCEDURE — 99207 REFERRAL TO ACUTE AND DIAGNOSTIC SERVICES: CPT | Performed by: STUDENT IN AN ORGANIZED HEALTH CARE EDUCATION/TRAINING PROGRAM

## 2024-02-01 PROCEDURE — 74177 CT ABD & PELVIS W/CONTRAST: CPT | Mod: GC | Performed by: RADIOLOGY

## 2024-02-01 RX ORDER — IOPAMIDOL 755 MG/ML
107 INJECTION, SOLUTION INTRAVASCULAR ONCE
Status: COMPLETED | OUTPATIENT
Start: 2024-02-01 | End: 2024-02-01

## 2024-02-01 RX ADMIN — IOPAMIDOL 107 ML: 755 INJECTION, SOLUTION INTRAVASCULAR at 13:38

## 2024-02-01 ASSESSMENT — PAIN SCALES - GENERAL: PAINLEVEL: MILD PAIN (3)

## 2024-02-01 NOTE — PROGRESS NOTES
Assessment & Plan     Abdominal pain, generalized  Patient presents to urgent care with generalized abdominal discomfort for the past 2 weeks.  She has had loose stools, nausea, bloating.  Last week the pain localized to the right side but has since gone back to being generalized discomfort.  She has no history of abdominal surgeries.  No history of recent antibiotic use.  On exam she has a firm questionable mass in the left lower quadrant and midline pelvic region that is nontender.  I am concerned about ruling out possible malignancy given it feels like there is a mass and it is nontender and not mobile on exam.  I advise she is seen in [facility] today for further evaluation to get CT abdomen and pelvis, possible stool cultures if CT is unremarkable.  Patient is agreeable to this.  I called and talked to the [facility] provider and he is willing to accept her to be seen today at the [facility].  Patient given instructions and address for the [facility] and will go directly there from urgent care.       No follow-ups on file.    [facility]     Patricia is a 63 year old female who presents to clinic today for the following health issues:  Chief Complaint   Patient presents with    Abdominal Pain     Stomach pain for a couple weeks- not getting any better. Nausea, diarrhea, gassy, feels bloated. Her usual stools are not loose. She has history of a twisted colon and is wondering if there is a blockage. She is passing gas. No history of abdominal surgeries. No recent antibiotic use.       HPI        Patient Active Problem List   Diagnosis    Major depression, recurrent (H24)    Dizziness and giddiness    Motion sickness    GERD (gastroesophageal reflux disease)    Idiopathic cardiomyopathy (H)    Sleep apnea    Hyperlipidemia LDL goal <100    Allergic rhinitis    Hypertension goal BP (blood pressure) < 130/80    Pelvic pain in female    Health Care Home    24 hour  contact handout given    Class 1 obesity with serious comorbidity and body mass index (BMI) of 33.0 to 33.9 in adult, unspecified obesity type    Elevated PTHrP level    H/O bariatric surgery    Hx of gastric bypass    Hypovitaminosis D    Allergic rhinitis    Attention deficit disorder of adult    Mild persistent asthma    Hand joint pain    Myopia, bilateral    Dumping syndrome    Benign essential hypertension    Adjustment disorder with depressed mood    Decreased hearing of both ears    BPV (benign positional vertigo), unspecified laterality    Advanced directives, counseling/discussion    Vitamin D deficiency    Coronary artery disease involving native coronary artery of native heart without angina pectoris    Acute pain of left knee    Syncope    Posterior vitreous detachment of both eyes    Combined forms of age-related cataract, mild, of both eyes    Glaucoma suspect of both eyes    Sciatica of right side    Hypoglycemia    Raynaud's disease without gangrene    Class 2 severe obesity due to excess calories with serious comorbidity in adult (H)     Current Outpatient Medications   Medication    acetaminophen (TYLENOL) 325 MG tablet    albuterol (PROAIR HFA/PROVENTIL HFA/VENTOLIN HFA) 108 (90 Base) MCG/ACT inhaler    aspirin 81 MG EC tablet    blood glucose (NO BRAND SPECIFIED) lancing device    blood glucose (NO BRAND SPECIFIED) test strip    calcium carbonate-vitamin D (CALTRATE 600+D) 600-400 MG-UNIT CHEW    carvedilol (COREG) 25 MG tablet    cetirizine (ZYRTEC) 10 MG tablet    Cyanocobalamin (VITAMIN B-12 SL)    estradiol (ESTRACE) 1 MG tablet    ezetimibe (ZETIA) 10 MG tablet    isosorbide mononitrate (IMDUR) 30 MG 24 hr tablet    lisinopril (ZESTRIL) 5 MG tablet    meclizine (ANTIVERT) 25 MG tablet    medroxyPROGESTERone (PROVERA) 2.5 MG tablet    Multiple Vitamin (MULTI-VITAMIN) per tablet    NIFEdipine ER (ADALAT CC) 30 MG 24 hr tablet    omeprazole (PRILOSEC) 20 MG DR capsule    ondansetron (ZOFRAN  ODT) 4 MG ODT tab    simvastatin (ZOCOR) 40 MG tablet    sulfaSALAzine ER (AZULFIDINE EN) 500 MG EC tablet    topiramate (TOPAMAX) 25 MG tablet    venlafaxine (EFFEXOR) 75 MG tablet    ARIPiprazole (ABILIFY) 2 MG tablet     No current facility-administered medications for this visit.         Review of Systems  Constitutional, HEENT, cardiovascular, pulmonary, GI, , musculoskeletal, neuro, skin, endocrine and psych systems are negative, except as otherwise noted.      Objective    /73   Pulse 66   Temp 98.3  F (36.8  C) (Oral)   Resp 17   LMP 06/01/2012   SpO2 99%   Physical Exam   GENERAL: alert and no distress  ABDOMEN: generalized distention, tenderness RLQ, no rebound tenderness, firm ? Mass in LLQ and midline pelvic region that is nontender to palpation  MS: no gross musculoskeletal defects noted, no edema  SKIN: no suspicious lesions or rashes  NEURO: Normal strength and tone, mentation intact and speech normal    No results found. However, due to the size of the patient record, not all encounters were searched. Please check Results Review for a complete set of results.

## 2024-02-01 NOTE — PROGRESS NOTES
Assessment & Plan     (R10.31) RLQ abdominal pain  (primary encounter diagnosis)  Comment: Patient has a 63-year-old female who presents for evaluation of abdominal bloating discomfort, especially felt in the right lower quadrant.  Was sent to [facility] acute diagnostic service in [place] today due to concern for mass in the lower abdomen on the right side.  Workup included CBC, CMP and lipase, results are grossly normal.  CT abdomen pelvis showed evidence of cholelithiasis/biliary sludge with no evidence of acute cholecystitis, distended gallbladder and dilatation of the common bile duct measuring 8 mm.  However alkaline phosphatase, AST and ALT are completely normal.  The patient has no pain in the right upper quadrant today.  Plan: CBC with platelets, Comprehensive metabolic         panel, Lipase, CT Abdomen Pelvis w Contrast          Monitor for worsening symptoms with no acute findings today.  May take over-the-counter Mylanta for symptomatic relief of abdominal discomfort and bloating.    Return precautions discussed including worrisome signs and symptoms that would warrant urgent medical evaluation such as worsening abdominal pain or bloating, acutely worsening and uncontrolled nausea/vomiting, fevers, flank pain, blood in the urine or stool, chest pain or shortness of breath.    (R11.0) Nausea  Comment: No active concerns with nausea today, declines medication such as Zofran today.  Plan:                   No follow-ups on file.    Jesusita Gomez is a 63 year old, presenting for the following health issues:  Abdominal Pain    HPI     Abdominal/Flank Pain  Onset/Duration: x2 weeks  Description:   Character: Sharp and Dull ache  Location: right lower quadrant and a generalized pain in the lower center  Radiation: None  Intensity: mild, moderate  Progression of Symptoms:  same and constant  Accompanying Signs & Symptoms:  Fever/chills: no   Gas/Bloating: YES  Nausea: YES  Vomitting: no   Diarrhea:  YES  Constipation:YES  Dysuria: no            Hematuria: no            Frequency: no            Incontinence of urine: no   History:            Last bowel movement: yesterday  Trauma: no   Previous similar pain: no    Previous tests done: none           Previous Abdominal surgery: YES- Bariatric surgery completed laparoscopically   Precipitating factors:   Does the pain change with:     Food: YES- after eating     Bowel Movement: YES- Worse    Urination: no              Other factors:   Therapies tried and outcome:  None    When food last eaten: 2/1 8am    The patient is a 63-year-old female with a past medical history of bariatric surgery, glaucoma, dizziness, depression, ADD, idiopathic cardiomyopathy, hypertension, coronary artery disease, Raynaud's disease, hyperlipidemia and hyperglycemia, GERD, mild persistent asthma who presents for evaluation of abdominal discomfort and bloating over the past 2 weeks.  Has mild pain/discomfort felt in the right lower quadrant.  Pain is sharp versus dull, is minimal currently.  She had gone to urgent care today with this concern but was referred to the acute diagnostic service for further evaluation and management due to masses palpated in the right lower quadrant area especially.  I discussed this concern with the urgent care provider, felt it was needful to complete more advanced imaging to further characterize the patient's abdominal pain/discomfort.  The patient describes having occasional nausea and bloating but able to tolerate food and fluids well.  No current concerns of diarrhea or constipation.  Last bowel movement was yesterday.  No URI's concerns, no chest discomfort or pain, no coughing, wheezing or breathing concerns.  No concerns with swallowing.  No headaches or dizziness, no rashes, no flank pain or back pain.      Review of Systems  Constitutional, neuro, ENT, endocrine, pulmonary, cardiac, gastrointestinal, genitourinary, musculoskeletal, integument and  psychiatric systems are negative, except as otherwise noted.      Objective    LMP 06/01/2012   There is no height or weight on file to calculate BMI.  Physical Exam  Constitutional:       General: She is not in acute distress.     Appearance: Normal appearance. She is not ill-appearing, toxic-appearing or diaphoretic.   HENT:      Head: Normocephalic and atraumatic.      Right Ear: No middle ear effusion. No mastoid tenderness. Tympanic membrane is not injected, perforated, erythematous, retracted or bulging.      Left Ear:  No middle ear effusion. No mastoid tenderness. Tympanic membrane is not injected, perforated, erythematous, retracted or bulging.      Nose: Nose normal. No congestion or rhinorrhea.      Mouth/Throat:      Mouth: Mucous membranes are moist.      Pharynx: No oropharyngeal exudate or posterior oropharyngeal erythema.   Cardiovascular:      Rate and Rhythm: Normal rate and regular rhythm.      Pulses: Normal pulses.      Heart sounds: Normal heart sounds. No murmur heard.  Pulmonary:      Effort: Pulmonary effort is normal. No respiratory distress.      Breath sounds: Normal breath sounds. No stridor. No wheezing or rhonchi.   Abdominal:      General: Abdomen is flat. There is no distension.      Palpations: Abdomen is soft. There is mass. There is no fluid wave, hepatomegaly or splenomegaly.      Tenderness: There is abdominal tenderness in the right lower quadrant. There is no right CVA tenderness, left CVA tenderness, guarding or rebound.      Hernia: No hernia is present.      Comments: Palpable mass in the right and left lower quadrants.  Mass in the right lower quadrant is slightly larger than in the left lower quadrant.  There is mild tenderness on exam, primarily in the right lower quadrant.  No other tenderness or incisions.  No evidence of bloating.  No flank pain.   Musculoskeletal:      Cervical back: Neck supple. No rigidity. No muscular tenderness.   Lymphadenopathy:      Cervical:  No cervical adenopathy.      Right cervical: No superficial, deep or posterior cervical adenopathy.     Left cervical: No superficial, deep or posterior cervical adenopathy.   Neurological:      Mental Status: She is alert and oriented to person, place, and time.      Sensory: No sensory deficit.                    Signed Electronically by: Kelvin Mae PA-C

## 2024-02-02 ENCOUNTER — LAB (OUTPATIENT)
Dept: LAB | Facility: CLINIC | Age: 64
End: 2024-02-02
Payer: COMMERCIAL

## 2024-02-02 DIAGNOSIS — M05.741 RHEUMATOID ARTHRITIS INVOLVING BOTH HANDS WITH POSITIVE RHEUMATOID FACTOR (H): ICD-10-CM

## 2024-02-02 DIAGNOSIS — M05.742 RHEUMATOID ARTHRITIS INVOLVING BOTH HANDS WITH POSITIVE RHEUMATOID FACTOR (H): ICD-10-CM

## 2024-02-02 DIAGNOSIS — Z79.899 HIGH RISK MEDICATION USE: ICD-10-CM

## 2024-02-02 LAB
ALBUMIN SERPL BCG-MCNC: 4.2 G/DL (ref 3.5–5.2)
ALT SERPL W P-5'-P-CCNC: 36 U/L (ref 0–50)
AST SERPL W P-5'-P-CCNC: 31 U/L (ref 0–45)
CREAT SERPL-MCNC: 0.84 MG/DL (ref 0.51–0.95)
EGFRCR SERPLBLD CKD-EPI 2021: 77 ML/MIN/1.73M2
ERYTHROCYTE [DISTWIDTH] IN BLOOD BY AUTOMATED COUNT: 13.4 % (ref 10–15)
HCT VFR BLD AUTO: 37.9 % (ref 35–47)
HGB BLD-MCNC: 12.4 G/DL (ref 11.7–15.7)
MCH RBC QN AUTO: 32.5 PG (ref 26.5–33)
MCHC RBC AUTO-ENTMCNC: 32.7 G/DL (ref 31.5–36.5)
MCV RBC AUTO: 100 FL (ref 78–100)
PLATELET # BLD AUTO: 249 10E3/UL (ref 150–450)
RBC # BLD AUTO: 3.81 10E6/UL (ref 3.8–5.2)
WBC # BLD AUTO: 5.6 10E3/UL (ref 4–11)

## 2024-02-02 PROCEDURE — 82040 ASSAY OF SERUM ALBUMIN: CPT

## 2024-02-02 PROCEDURE — 85027 COMPLETE CBC AUTOMATED: CPT

## 2024-02-02 PROCEDURE — 82565 ASSAY OF CREATININE: CPT

## 2024-02-02 PROCEDURE — 84460 ALANINE AMINO (ALT) (SGPT): CPT

## 2024-02-02 PROCEDURE — 84450 TRANSFERASE (AST) (SGOT): CPT

## 2024-02-02 PROCEDURE — 36415 COLL VENOUS BLD VENIPUNCTURE: CPT

## 2024-02-07 DIAGNOSIS — F33.1 MODERATE EPISODE OF RECURRENT MAJOR DEPRESSIVE DISORDER (H): ICD-10-CM

## 2024-02-07 DIAGNOSIS — I25.10 CORONARY ARTERY DISEASE INVOLVING NATIVE CORONARY ARTERY OF NATIVE HEART WITHOUT ANGINA PECTORIS: ICD-10-CM

## 2024-02-07 RX ORDER — ARIPIPRAZOLE 2 MG/1
4 TABLET ORAL DAILY
Qty: 60 TABLET | Refills: 1 | Status: SHIPPED | OUTPATIENT
Start: 2024-02-07 | End: 2024-03-28

## 2024-02-07 NOTE — TELEPHONE ENCOUNTER
Date of Last Office Visit: 10/23/2023  [facility]     Navi Marie MD     Date of Next Office Visit: 3/28/2024   No shows since last visit: 0  Cancellations since last visit: x1 (1/25/23)  ------------------------------  Medication requested:  ARIPiprazole (ABILIFY) 2 MG tablet   Date last ordered: 11/1/2023 Qty: 180 Refills: 0     Sig - Route: Take 2 tablets (4 mg) by mouth daily for 90 days - Oral  Sent to pharmacy as: ARIPiprazole 2 MG Oral Tablet (ABILIFY)  Class: E-Prescribe  ------------------------------     Lapse in medication adherence greater than 5 days?: no  If yes, call patient and gather details:   Medication refill request verified as identical to current order?:        Last Visit Treatment Plan     1) Medications:   - Continue aripiprazole 4 mg daily  - Continue venlafaxine to 75 mg TID     Other pertinent medications not prescribed by psychiatry:  1.  Aspirin 81 mg a day.  2.  Carvedilol 25 mg twice daily.  3.  Prempro.  4.  Ezetimibe 10 mg a day.  5.  Hydralazine 10 mg twice daily.  6.  Isosorbide mononitrate 30 mg a day.  7.  Lisinopril 5 mg a day.  8.  Meclizine.  9.  Ondansetron.  10. Simvastatin 20 mg a day  11. otc Vitamin D      2) Psychotherapy- continue individual psychotherapy      3) Next due-  Labs- last AP labs done Feb 2023 (by cardiology) repeat annually if not collected by outside providers   EKG- 1/7/20: 447 ms @ 54 bpm  Rating scales- PHQ9, AIMS due Nov 2023      4) Referrals-   Weight management referral placed 8/15/22 callback #[phone], established care there       5) Follow-up: Return to clinic in 3 months in person for AIMS assessment      Refill decision: Refill pended and routed to the provider for review/determination due to the following criteria not met: Last Rx written to take for just 90 days, do you want pt to continue the Abilify? One cancellation that was a reschedule.    Medication unable to be refilled  by RN due to criteria not met as indicated.                 []Eligibility - not seen in the last year              []Supervision - no future appointment              [x]Compliance - no shows, cancellations or lapse in therapy              [x]Verification - order discrepancy              []Controlled medication              []Medication not included in policy              []90-day supply request              []Other:

## 2024-02-12 RX ORDER — LISINOPRIL 5 MG/1
TABLET ORAL
Qty: 90 TABLET | Refills: 3 | Status: SHIPPED | OUTPATIENT
Start: 2024-02-12

## 2024-02-12 NOTE — TELEPHONE ENCOUNTER
lisinopril (ZESTRIL) 5 MG tablet 90 tablet 3 2/8/2023     Last Office Visit: 1/22/24  Future Office visit:  none       ACE Inhibitors (Including Combos) Protocol Passed        Refill protocol passed  Nuris Sandoval RN

## 2024-02-20 ENCOUNTER — OFFICE VISIT (OUTPATIENT)
Dept: FAMILY MEDICINE | Facility: CLINIC | Age: 64
End: 2024-02-20
Payer: COMMERCIAL

## 2024-02-20 VITALS
HEIGHT: 63 IN | TEMPERATURE: 97.8 F | SYSTOLIC BLOOD PRESSURE: 116 MMHG | DIASTOLIC BLOOD PRESSURE: 68 MMHG | OXYGEN SATURATION: 98 % | WEIGHT: 193 LBS | RESPIRATION RATE: 14 BRPM | HEART RATE: 75 BPM | BODY MASS INDEX: 34.2 KG/M2

## 2024-02-20 DIAGNOSIS — K58.2 IRRITABLE BOWEL SYNDROME WITH BOTH CONSTIPATION AND DIARRHEA: ICD-10-CM

## 2024-02-20 DIAGNOSIS — E66.01 CLASS 2 SEVERE OBESITY DUE TO EXCESS CALORIES WITH SERIOUS COMORBIDITY AND BODY MASS INDEX (BMI) OF 35.0 TO 35.9 IN ADULT (H): ICD-10-CM

## 2024-02-20 DIAGNOSIS — E66.812 CLASS 2 SEVERE OBESITY DUE TO EXCESS CALORIES WITH SERIOUS COMORBIDITY AND BODY MASS INDEX (BMI) OF 35.0 TO 35.9 IN ADULT (H): ICD-10-CM

## 2024-02-20 PROCEDURE — 99214 OFFICE O/P EST MOD 30 MIN: CPT | Performed by: STUDENT IN AN ORGANIZED HEALTH CARE EDUCATION/TRAINING PROGRAM

## 2024-02-20 RX ORDER — DICYCLOMINE HCL 20 MG
20 TABLET ORAL EVERY 6 HOURS
Qty: 30 TABLET | Refills: 0 | Status: SHIPPED | OUTPATIENT
Start: 2024-02-20 | End: 2024-06-27

## 2024-02-20 NOTE — PROGRESS NOTES
Assessment & Plan     (K58.2) Irritable bowel syndrome with both constipation and diarrhea  Comment: Chronic, uncontrolled.  Discussed aspects of IBS with therapy in combination type.  Did discuss use of a FODMAP diet.  Due to intermittent episodes of pain will prescribe Bentyl to be used as needed.  Patient encouraged to make dietary changes that are more balanced to decrease risk for triggers and to start a stool diary and present findings via MyChart.  Will also place referral for GI for further evaluation.  Plan: Adult GI  Referral - Consult Only,         dicyclomine (BENTYL) 20 MG tablet            (E66.01,  Z68.35) Class 2 severe obesity due to excess calories with serious comorbidity and body mass index (BMI) of 35.0 to 35.9 in adult (H)  Comment: Chronic, uncontrolled  Plan: Discussed dietary changes and importance of exercise and movement.    Dictation Disclaimer: Some of this Note has been completed with voice-recognition dictation software. Although errors are generally corrected real-time, there is the potential for a rare error to be present in the completed chart.     I personally spent a total of  30  minutes on the day of the visit, excluding procedures. Please see note for details about time spent which includes:        reviewing records    face to face time with the patient    timely documentation of the encounter    ordering medications/tests    communication with care team    care coordination.                    Jesusita Gomez is a 63 year old, presenting for the following health issues:  Bowel Problems        2/20/2024     7:00 AM   Additional Questions   Roomed by Hannah     History of Present Illness       Reason for visit:  Stomach and bowel problems  Symptom onset:  3-4 weeks ago  Symptoms include:  Stomach aches and either diarrhea or constipation  Symptom intensity:  Mild  Symptom progression:  Staying the same  Had these symptoms before:  Yes  Has tried/received treatment  for these symptoms:  Yes  Previous treatment was successful:  No  What makes it worse:  No  What makes it better:  No    She eats 2-3 servings of fruits and vegetables daily.She consumes 0 sweetened beverage(s) daily.She exercises with enough effort to increase her heart rate 20 to 29 minutes per day.  She exercises with enough effort to increase her heart rate 3 or less days per week.   She is taking medications regularly.         ED/UC Followup:    Facility:  [facility]   Date of visit: 2/1/2024  Reason for visit: Abdominal pain   Current Status: Patient denies pain currently just stated she feels uncomfortable.       Constipation  Onset/Duration:  3 months   Description:  Frequency of bowel movements: Patient had 3 bowel movements yesterday.   Consistency of stool: Loose when she has diarrhea.   Progression of Symptoms: same  Accompanying signs and symptoms:    Abdominal pain: No   Rectal pain: No   Blood in stool: No   Nausea/Vomiting: No   Weight loss or gain: No  History:   Similar problems in past: No  History of abdominal surgery: Patient had weight loss surgery 12 -15 years ago roughly.   Chronic laxative use: No  New medications: No  Therapies tried and outcome: None  Patricia is a 63-year-old female presenting for an ED follow-up.  She reports over the last 3 months she has had intermittent episodes of diarrhea and constipation in her bowels.  She states that it has been extremely uncomfortable and there are times that there is associated pain.  She reports in the emergency room them doing a scan which was found to be normal overall.  She states that she is unsure of any triggers however has noticed that her diet has become less healthy due to not feeling well and that being her crutch to lean on.  She states that when she does go to the bathroom and it is constipation she does not feel as if she lets all of her stool out and when she does have diarrhea it is persistent.  She reports being unsure  "what her next steps needs to be.     ROS: 10 point ROS neg other than the symptoms noted above in the HPI.        Objective    /68   Pulse 75   Temp 97.8  F (36.6  C) (Temporal)   Resp 14   Ht 1.592 m (5' 2.68\")   Wt 87.5 kg (193 lb)   LMP 06/01/2012   SpO2 98%   BMI 34.54 kg/m    Body mass index is 34.54 kg/m .  Physical Exam   GENERAL: healthy, alert and no distress  EYES: Eyes grossly normal to inspection, PERRL and conjunctivae and sclerae normal  Neck: No visible JVD or lymphadenopathy   RESP: symmetrical rise in chest   CV: No peripheral edema notable   MS: no gross musculoskeletal defects noted  SKIN: no suspicious lesions or rashes  PSYCH: mentation appears normal, affect normal/bright      No results found for any visits on 02/20/24.        Signed Electronically by: LAKESHA ANNE MD    "

## 2024-03-11 DIAGNOSIS — K21.9 GASTROESOPHAGEAL REFLUX DISEASE WITHOUT ESOPHAGITIS: ICD-10-CM

## 2024-03-13 ENCOUNTER — VIRTUAL VISIT (OUTPATIENT)
Dept: ENDOCRINOLOGY | Facility: CLINIC | Age: 64
End: 2024-03-13
Payer: COMMERCIAL

## 2024-03-13 VITALS — BODY MASS INDEX: 32.07 KG/M2 | HEIGHT: 63 IN | WEIGHT: 181 LBS

## 2024-03-13 DIAGNOSIS — E66.811 CLASS 1 OBESITY WITH SERIOUS COMORBIDITY AND BODY MASS INDEX (BMI) OF 33.0 TO 33.9 IN ADULT, UNSPECIFIED OBESITY TYPE: ICD-10-CM

## 2024-03-13 PROCEDURE — 99213 OFFICE O/P EST LOW 20 MIN: CPT | Mod: 95

## 2024-03-13 RX ORDER — TOPIRAMATE 25 MG/1
TABLET, FILM COATED ORAL
Qty: 60 TABLET | Refills: 3 | Status: SHIPPED | OUTPATIENT
Start: 2024-03-13

## 2024-03-13 ASSESSMENT — PAIN SCALES - GENERAL: PAINLEVEL: NO PAIN (0)

## 2024-03-13 NOTE — LETTER
3/13/2024       RE: Patricia Perez  [address] [place] [address]     Dear Colleague,    Thank you for referring your patient, Patricia Perez, to the [facility] [place] at [facility]. Please see a copy of my visit note below.    Virtual Visit Details    Type of service:  Video Visit     Originating Location (pt. Location): Home    Distant Location (provider location):  Off-site  Platform used for Video Visit: [facility] Medical Weight Management Note     Patricia Perez  MRN:  [id number]  :  1960  SARA:  3/13/2024    Dear LAKESHA ANNE MD,    I had the pleasure of seeing your patient Patricia Perez. She is a 63 year old female who I am continuing to see for treatment of obesity related to:        3/6/2023     8:02 AM   --   I have the following health issues associated with obesity None of the above       Assessment & Plan  Problem List Items Addressed This Visit       Class 1 obesity with serious comorbidity and body mass index (BMI) of 33.0 to 33.9 in adult, unspecified obesity type    Relevant Medications    topiramate (TOPAMAX) 25 MG tablet      Try taking Topiramate 25mg in the morning and 25mg at night. Refills sent  Stephanie Morel in 4 months     INTERVAL HISTORY:  S/p RYBP in  with Dr. Bradford  Pre surgery weight - 292lbs   Reynaldo - 150lbs   New reestablish - 3/6/2023  Recently 7lbs loss due to diet change, continue with diet and exercise  23 - started Naltrexone   Stopped Naltrexone - not helpful with weight loss and SE of drowsiness   Started Topiramate      Has been sick over the past couple of months. Is being worked up for IBS - constipation and diarrhea. Has been feeling a little better. Will be establishing with GI in the next month.     Regained weight in the past couple of months to 186lb, but has been back down to 181lbs with starting of fodmap diet.     Anti-obesity  medication history    Current:   Topiramate 50mg - taking at night. No side effects. Thinks it is helping, but will still have increase hunger in the late in afternoon.     Past/Failed/contraindicated:   Phentermine - contraindicated due to history of cardiomyopathy and CAD.   GLP-1 - can consider in the future. Concern for self injections.   Naltrexone - side effects of drowsiness     Recent diet changes: Started fodmap diet in the past month, feels like sugar and dairy that might be influencing it. Before was feeling so bad was eating whatever sounded good. Eating 3 meals a day, with 1 snack. Drinking 80oz of water daily.   Breakfast - cereal + banana  Lunch - sandwich or soup   Dinner - meat + potatoes + vegetable   Snack - fruit     Recent exercise/activity changes: Working out 2xweek - weight training . Walking when in the office.     Recent stressors: Increase with health, but has been feeling better.     Recent sleep changes: No concerns     Vitamins/Labs: MV, calcium, vitamin D. CMP WNL on 2/1/24     CURRENT WEIGHT:   181 lbs 0 oz    Initial Weight (lbs): 186 lbs  Last Visits Weight: 87.5 kg (193 lb)  Cumulative weight loss (lbs): 5  Weight Loss Percentage: 2.69%    Wt Readings from Last 5 Encounters:   03/13/24 82.1 kg (181 lb)   02/20/24 87.5 kg (193 lb)   01/22/24 87.7 kg (193 lb 6.4 oz)   11/01/23 82.1 kg (181 lb)   09/20/23 85.7 kg (189 lb)             3/13/2024    12:12 PM   Changes and Difficulties   I have made the following changes to my diet since my last visit: Cutting out sugar   With regards to my diet, I am still struggling with: cravings   I have made the following changes to my activity/exercise since my last visit: trying to walk more   With regards to my activity/exercise, I am still struggling with: taking the time to walk         MEDICATIONS:   Current Outpatient Medications   Medication Sig Dispense Refill    acetaminophen (TYLENOL) 325 MG tablet Take 650 mg by mouth every 6 hours as  needed for mild pain      albuterol (PROAIR HFA/PROVENTIL HFA/VENTOLIN HFA) 108 (90 Base) MCG/ACT inhaler Inhale 2 puffs into the lungs every 4 hours as needed for shortness of breath or wheezing 18 g 3    ARIPiprazole (ABILIFY) 2 MG tablet Take 2 tablets (4 mg) by mouth daily Replaces prior orders 60 tablet 1    aspirin 81 MG EC tablet Take 1 tablet (81 mg) by mouth daily 90 tablet 3    blood glucose (NO BRAND SPECIFIED) lancing device Device to be used with lancets. 3 each 3    blood glucose (NO BRAND SPECIFIED) test strip Use to test blood sugar 3 times daily 300 strip 3    calcium carbonate-vitamin D (CALTRATE 600+D) 600-400 MG-UNIT CHEW Take 1 chew tab by mouth 2 times daily 180 tablet 3    carvedilol (COREG) 25 MG tablet Take 1 tablet (25 mg) by mouth 2 times daily (with meals) 180 tablet 3    cetirizine (ZYRTEC) 10 MG tablet Take 1 tablet (10 mg) by mouth daily 30 tablet 3    Cyanocobalamin (VITAMIN B-12 SL) Place under the tongue every other day       dicyclomine (BENTYL) 20 MG tablet Take 1 tablet (20 mg) by mouth every 6 hours 30 tablet 0    estradiol (ESTRACE) 1 MG tablet Take 1 tablet (1 mg) by mouth daily 90 tablet 3    ezetimibe (ZETIA) 10 MG tablet Take 1 tablet (10 mg) by mouth daily 90 tablet 3    isosorbide mononitrate (IMDUR) 30 MG 24 hr tablet Take 1 tablet (30 mg) by mouth daily 90 tablet 3    lisinopril (ZESTRIL) 5 MG tablet TAKE 1 TABLET(5 MG) BY MOUTH DAILY 90 tablet 3    meclizine (ANTIVERT) 25 MG tablet Take 1 tablet (25 mg) by mouth 3 times daily as needed 30 tablet 1    medroxyPROGESTERone (PROVERA) 2.5 MG tablet Take 1 tablet (2.5 mg) by mouth daily 90 tablet 3    Multiple Vitamin (MULTI-VITAMIN) per tablet Take 1 tablet by mouth daily. 100 tablet 3    NIFEdipine ER (ADALAT CC) 30 MG 24 hr tablet TAKE 1 TABLET(30 MG) BY MOUTH DAILY 60 tablet 1    omeprazole (PRILOSEC) 20 MG DR capsule Take 1 capsule (20 mg) by mouth daily 90 capsule 1    ondansetron (ZOFRAN ODT) 4 MG ODT tab Take 1  "tablet (4 mg) by mouth every 8 hours as needed for nausea 30 tablet 2    simvastatin (ZOCOR) 40 MG tablet Take 1 tablet (40 mg) by mouth at bedtime 90 tablet 3    sulfaSALAzine ER (AZULFIDINE EN) 500 MG EC tablet Take 2 tablets (1,000 mg) by mouth 2 times daily Take this medication with food. Labs every 8-12 weeks. 120 tablet 2    topiramate (TOPAMAX) 25 MG tablet 25mg in the morning and 25mg at night 60 tablet 3    venlafaxine (EFFEXOR) 75 MG tablet Take 1 tablet (75 mg) by mouth 3 times daily 90 tablet 1           3/13/2024    12:12 PM   Weight Loss Medication History Reviewed With Patient   Which weight loss medications are you currently taking on a regular basis? Topamax (topiramate)   Are you having any side effects from the weight loss medication that we have prescribed you? No         Objective   Ht 1.592 m (5' 2.68\")   Wt 82.1 kg (181 lb)   LMP 06/01/2012   BMI 32.39 kg/m      Vitals - Patient Reported  Pain Score: No Pain (0)      PHYSICAL EXAM:    GENERAL: alert and no distress  EYES: Eyes grossly normal to inspection.  No discharge or erythema, or obvious scleral/conjunctival abnormalities.  RESP: No audible wheeze, cough, or visible cyanosis.    SKIN: Visible skin clear. No significant rash, abnormal pigmentation or lesions.  NEURO: Cranial nerves grossly intact.  Mentation and speech appropriate for age.  PSYCH: Appropriate affect, tone, and pace of words    Sincerely,    Stephanie North PA-C  23 minutes spent by me on the date of the encounter doing chart review, history and exam, documentation and further activities per the note  "

## 2024-03-13 NOTE — PATIENT INSTRUCTIONS
"Attila Gomez,   Thank you for allowing us the privilege of caring for you. We hope we provided you with the excellent service you deserve.   Please let us know if there is anything else we can do for you so that we can be sure you are completely satisfied with your care experience.    To ensure the quality of our services you may be receiving a patient satisfaction survey from an independent patient satisfaction monitoring company.    The greatest compliment you can give is a \"Likely to Recommend\"    Your visit was with Macie YODIT North today.    Instructions per today's visit:     Try taking Topiramate 25mg in the morning and 25mg at night. Refills sent  Stephanie Morel in 4 months     ___________________________________________________________________________  Important contact and scheduling information:  Please call our contact center at [phone] to schedule your next appointments.  For any nursing questions or concerns call Aziza Wilson LPN at [phone] or Oksana Pierce RN at [phone]  Please call during clinic hours Monday through Friday 8:00a - 4:00p if you have questions or you can contact us via [employer] at anytime and we will reply during clinic hours.    Lab results will be communicated through My Chart or letter (if My Chart not used). Please call the clinic if you have not received communication after 1 week or if you have any questions.?  Clinic Fax: [phone]  __________________________________________________________________________    If labs were ordered today:    Please make an appointment to have them drawn at your convenience.     To schedule the Lab Appointment using [employer]:  Select \"Schedule an Appointment\"  Select \"Lab Only\"  For \"A couple of questions\", select \"Other\"  For \"Which locations work for you?, select the location and set up the appointment    To schedule by phone call [phone] to schedule a lab only appointment at [facility] " lab.  ___________________________________________________________________________  Work with A Health !  Virtual Sessions are Available through [facility]    To learn more, call to schedule a free, Health  Q&A appointment: [phone]     What is Health Coaching?  Do you know what you are supposed to do, but you just aren't doing it?  Then, HEALTH COACHING may help you!   Get unstuck and move forward with the support of a professionally trained [employer]-HWC (National Board-Certified Health and ) who uses evidence-based approaches to help you move forward with healthy lifestyle changes in the areas of weight loss, stress management and overall well-being.    Health Coaches help you identify goals that will work best for you. Health Coaches provide support and encouragement with overcoming barriers and help you to find inspiration and motivation to lead a healthy lifestyle.    Option one:  Health Coaching 3-Pack; Three, 30-minute Health Coaching Visits, for $99  Visits are done virtually (phone or video)  This is a self pay service; we do not accept insurance for pardeep coaching.    Option two:   The 24 week Plan; 11 Health Coaching Visits, and a 7 months subscription to [employer]-- on-demand fitness, nutrition and mindfulness classes, for $499 (employee discounts may be available). Participants will also meet regularly with a weight management Medical Provider and a Registered/Licensed Dietician.  This is a self-pay service; we do not accept insurance for health coaching.    To Schedule a free Health  Q&A appointment to learn more,  call [phone].  ____________________________________________________________________  [facility]  Healthy Lifestyle Group    Healthy Lifestyle Group  This is a 60 minute virtual coaching group for those who want to lead a healthier lifestyle. Come together to set goals and overcome  "barriers in a supportive group environment. We will address the four pillars of health--nutrition, exercise, sleep and emotional well-being.  This group is highly recommended for those who are participating in the 24 week Healthy Lifestyle Plan and our Health Coaching sessions.    WHEN: This group meets the first Friday of the month, 12:30 PM - 1:30 PM online, via a zoom meeting.      FACILITATOR: Led by National Board Certified Health and , Funmilayo Elliott [facility].    TO REGISTER: Please call the Call Center at [phone] to register. You will get an appointment to attend in [place]. Fifteen minutes prior to the meeting, complete the e-check in and you will get the link to join the meeting.  There is no charge to attend this group and space is limited.      2023 and 2024 Meeting Topics and Dates:    November 3: Introduction to Mindfulness (Learn simple and effective mindfulness practices and how it can benefit you)    December 8: Let's Talk (guided discussion on our wins and challenges)    January 5: New Years Vision: Manifest your Best 2024! (Guided imagery,  journaling and discussion)    February 2: Let's Talk    March 1: 10 Percent Happier by Hugh Saunders (Book Bites; a guided discussion on the nuggets of wisdom from favorite wellness books; no need to read the book but highly encouraged)    April 5: Let's Talk    May 3: \"Essentialism; The Disciplined Pursuit of Less by Bigg Garcia (book bites discussion)    June 7: Let's Talk    July 5: NO MEETING, off for the 4th of July Holiday    August 2: The Blue Zones, Secrets for Living a Longer Life by Hugh Cameron (book bites discussion)      If you would like bariatric surgery specific support group info please let your care team know.         Thank you,   [facility] Comprehensive Weight Management Team                          "

## 2024-03-13 NOTE — PROGRESS NOTES
Virtual Visit Details    Type of service:  Video Visit     Originating Location (pt. Location): Home    Distant Location (provider location):  Off-site  Platform used for Video Visit: [facility]        Return Medical Weight Management Note     Patricia Perez  MRN:  [id number]  :  1960  SARA:  3/13/2024    Dear LAKESHA ANNE MD,    I had the pleasure of seeing your patient Patricia Perez. She is a 63 year old female who I am continuing to see for treatment of obesity related to:        3/6/2023     8:02 AM   --   I have the following health issues associated with obesity None of the above       Assessment & Plan   Problem List Items Addressed This Visit       Class 1 obesity with serious comorbidity and body mass index (BMI) of 33.0 to 33.9 in adult, unspecified obesity type    Relevant Medications    topiramate (TOPAMAX) 25 MG tablet      Try taking Topiramate 25mg in the morning and 25mg at night. Refills sent  Stephanie Morel in 4 months     INTERVAL HISTORY:  S/p RYBP in  with Dr. Bradford  Pre surgery weight - 292lbs   Reynaldo - 150lbs   New reestablish - 3/6/2023  Recently 7lbs loss due to diet change, continue with diet and exercise  23 - started Naltrexone   Stopped Naltrexone - not helpful with weight loss and SE of drowsiness   Started Topiramate      Has been sick over the past couple of months. Is being worked up for IBS - constipation and diarrhea. Has been feeling a little better. Will be establishing with GI in the next month.     Regained weight in the past couple of months to 186lb, but has been back down to 181lbs with starting of fodmap diet.     Anti-obesity medication history    Current:   Topiramate 50mg - taking at night. No side effects. Thinks it is helping, but will still have increase hunger in the late in afternoon.     Past/Failed/contraindicated:   Phentermine - contraindicated due to history of cardiomyopathy and CAD.   GLP-1 - can consider in the future. Concern  for self injections.   Naltrexone - side effects of drowsiness     Recent diet changes: Started fodmap diet in the past month, feels like sugar and dairy that might be influencing it. Before was feeling so bad was eating whatever sounded good. Eating 3 meals a day, with 1 snack. Drinking 80oz of water daily.   Breakfast - cereal + banana  Lunch - sandwich or soup   Dinner - meat + potatoes + vegetable   Snack - fruit     Recent exercise/activity changes: Working out 2xweek - weight training . Walking when in the office.     Recent stressors: Increase with health, but has been feeling better.     Recent sleep changes: No concerns     Vitamins/Labs: MV, calcium, vitamin D. CMP WNL on 2/1/24     CURRENT WEIGHT:   181 lbs 0 oz    Initial Weight (lbs): 186 lbs  Last Visits Weight: 87.5 kg (193 lb)  Cumulative weight loss (lbs): 5  Weight Loss Percentage: 2.69%    Wt Readings from Last 5 Encounters:   03/13/24 82.1 kg (181 lb)   02/20/24 87.5 kg (193 lb)   01/22/24 87.7 kg (193 lb 6.4 oz)   11/01/23 82.1 kg (181 lb)   09/20/23 85.7 kg (189 lb)             3/13/2024    12:12 PM   Changes and Difficulties   I have made the following changes to my diet since my last visit: Cutting out sugar   With regards to my diet, I am still struggling with: cravings   I have made the following changes to my activity/exercise since my last visit: trying to walk more   With regards to my activity/exercise, I am still struggling with: taking the time to walk         MEDICATIONS:   Current Outpatient Medications   Medication Sig Dispense Refill    acetaminophen (TYLENOL) 325 MG tablet Take 650 mg by mouth every 6 hours as needed for mild pain      albuterol (PROAIR HFA/PROVENTIL HFA/VENTOLIN HFA) 108 (90 Base) MCG/ACT inhaler Inhale 2 puffs into the lungs every 4 hours as needed for shortness of breath or wheezing 18 g 3    ARIPiprazole (ABILIFY) 2 MG tablet Take 2 tablets (4 mg) by mouth daily Replaces prior orders 60 tablet 1    aspirin 81  MG EC tablet Take 1 tablet (81 mg) by mouth daily 90 tablet 3    blood glucose (NO BRAND SPECIFIED) lancing device Device to be used with lancets. 3 each 3    blood glucose (NO BRAND SPECIFIED) test strip Use to test blood sugar 3 times daily 300 strip 3    calcium carbonate-vitamin D (CALTRATE 600+D) 600-400 MG-UNIT CHEW Take 1 chew tab by mouth 2 times daily 180 tablet 3    carvedilol (COREG) 25 MG tablet Take 1 tablet (25 mg) by mouth 2 times daily (with meals) 180 tablet 3    cetirizine (ZYRTEC) 10 MG tablet Take 1 tablet (10 mg) by mouth daily 30 tablet 3    Cyanocobalamin (VITAMIN B-12 SL) Place under the tongue every other day       dicyclomine (BENTYL) 20 MG tablet Take 1 tablet (20 mg) by mouth every 6 hours 30 tablet 0    estradiol (ESTRACE) 1 MG tablet Take 1 tablet (1 mg) by mouth daily 90 tablet 3    ezetimibe (ZETIA) 10 MG tablet Take 1 tablet (10 mg) by mouth daily 90 tablet 3    isosorbide mononitrate (IMDUR) 30 MG 24 hr tablet Take 1 tablet (30 mg) by mouth daily 90 tablet 3    lisinopril (ZESTRIL) 5 MG tablet TAKE 1 TABLET(5 MG) BY MOUTH DAILY 90 tablet 3    meclizine (ANTIVERT) 25 MG tablet Take 1 tablet (25 mg) by mouth 3 times daily as needed 30 tablet 1    medroxyPROGESTERone (PROVERA) 2.5 MG tablet Take 1 tablet (2.5 mg) by mouth daily 90 tablet 3    Multiple Vitamin (MULTI-VITAMIN) per tablet Take 1 tablet by mouth daily. 100 tablet 3    NIFEdipine ER (ADALAT CC) 30 MG 24 hr tablet TAKE 1 TABLET(30 MG) BY MOUTH DAILY 60 tablet 1    omeprazole (PRILOSEC) 20 MG DR capsule Take 1 capsule (20 mg) by mouth daily 90 capsule 1    ondansetron (ZOFRAN ODT) 4 MG ODT tab Take 1 tablet (4 mg) by mouth every 8 hours as needed for nausea 30 tablet 2    simvastatin (ZOCOR) 40 MG tablet Take 1 tablet (40 mg) by mouth at bedtime 90 tablet 3    sulfaSALAzine ER (AZULFIDINE EN) 500 MG EC tablet Take 2 tablets (1,000 mg) by mouth 2 times daily Take this medication with food. Labs every 8-12 weeks. 120 tablet 2  "   topiramate (TOPAMAX) 25 MG tablet 25mg in the morning and 25mg at night 60 tablet 3    venlafaxine (EFFEXOR) 75 MG tablet Take 1 tablet (75 mg) by mouth 3 times daily 90 tablet 1           3/13/2024    12:12 PM   Weight Loss Medication History Reviewed With Patient   Which weight loss medications are you currently taking on a regular basis? Topamax (topiramate)   Are you having any side effects from the weight loss medication that we have prescribed you? No         Objective    Ht 1.592 m (5' 2.68\")   Wt 82.1 kg (181 lb)   LMP 06/01/2012   BMI 32.39 kg/m      Vitals - Patient Reported  Pain Score: No Pain (0)      PHYSICAL EXAM:    GENERAL: alert and no distress  EYES: Eyes grossly normal to inspection.  No discharge or erythema, or obvious scleral/conjunctival abnormalities.  RESP: No audible wheeze, cough, or visible cyanosis.    SKIN: Visible skin clear. No significant rash, abnormal pigmentation or lesions.  NEURO: Cranial nerves grossly intact.  Mentation and speech appropriate for age.  PSYCH: Appropriate affect, tone, and pace of words        Sincerely,    Stephanie North PA-C      23 minutes spent by me on the date of the encounter doing chart review, history and exam, documentation and further activities per the note  "

## 2024-03-13 NOTE — PROGRESS NOTES
"Virtual Visit Details    Type of service:  Video Visit     Originating Location (pt. Location): {video visit patient location:198525::\"Home\"}  {PROVIDER LOCATION On-site should be selected for visits conducted from your clinic location or adjoining [facility] hospital, academic office, or other nearby [facility] building. Off-site should be selected for all other provider locations, including home:[id number]}  Distant Location (provider location):  {virtual location provider:492539}  Platform used for Video Visit: {Virtual Visit Platforms:386731::\"[employer]\"}    "

## 2024-03-13 NOTE — NURSING NOTE
Is the patient currently in the state of [place]? YES    Visit mode:VIDEO    If the visit is dropped, the patient can be reconnected by: VIDEO VISIT: Send to e-mail at: [email or web address]    Will anyone else be joining the visit? NO  (If patient encounters technical issues they should call [phone])    How would you like to obtain your AVS? MyChart    Are changes needed to the allergy or medication list? No, Pt stated no changes to allergies, and Pt stated no med changes    Reason for visit: RECHECK (TARA)    Diana AYON

## 2024-03-18 ENCOUNTER — TELEPHONE (OUTPATIENT)
Dept: RHEUMATOLOGY | Facility: CLINIC | Age: 64
End: 2024-03-18
Payer: COMMERCIAL

## 2024-03-18 NOTE — TELEPHONE ENCOUNTER
Last done 2/2 and are generally due every 8-12 weeks.     Do you want them at 8 weeks or closer to 12 weeks this time.?      Tere SOTO   [facility]

## 2024-03-18 NOTE — TELEPHONE ENCOUNTER
[facility] Call Center    Phone Message    May a detailed message be left on voicemail: yes     Reason for Call: Order(s): Other:   Reason for requested: Patient scheduled follow up for 04/03/2024. She is requesting lab orders for lab appt prior to her visit.  Date needed: ASAP  Provider name: Nikko    Action Taken: Other: RHEUM    Travel Screening: Not Applicable

## 2024-03-22 ENCOUNTER — TELEPHONE (OUTPATIENT)
Dept: PSYCHIATRY | Facility: CLINIC | Age: 64
End: 2024-03-22
Payer: COMMERCIAL

## 2024-03-22 NOTE — TELEPHONE ENCOUNTER
Prior Authorization Retail Medication Request    Medication/Dose: Abilify 2 mg   Diagnosis and ICD code (if different than what is on RX):  Moderate episode of recurrent major depressive disorder (H) [F33.1]   New/renewal/insurance change PA/secondary ins. PA:Renewal   Previously Tried and Failed:         Past Psych Med Trials          Medication Max Dose (mg) Dates / Duration Helpful? DC Reason / Adverse Effects?   bupropion 300 2818-0311 N Initially helpful then lost efficacy   Adderall 25 5672-3039 N Lack of efficacy   sertraline 100 3998-9859 N Initially helpful then lost efficacy   Venlafaxine 225 2008-current Y  previously had ER, tried IR BID to help with mood in winter   aripiprazole 4 2021-current Y                                                                    Rationale: renewal     Insurance   Primary: [employer]   Insurance ID:  [id number]     Secondary (if applicable):[employer]   Insurance ID:  [id number]     Pharmacy Information (if different than what is on RX)  Name:  same   Phone:  same   Fax:tre Smith on 3/22/2024 at 10:24 AM

## 2024-03-22 NOTE — TELEPHONE ENCOUNTER
PA Initiation    Medication: ARIPIPRAZOLE 2 MG PO TABS  Insurance Company: [employer] - Phone [phone] Fax [phone]  Pharmacy Filling the Rx: [id number] [address] AT SEC OF [facility] [address]   Filling Pharmacy Phone: [phone]  Filling Pharmacy Fax:    Start Date: 3/22/2024  Retail Pharmacy Prior Authorization Team   Phone: [phone]

## 2024-03-24 ENCOUNTER — OFFICE VISIT (OUTPATIENT)
Dept: URGENT CARE | Facility: URGENT CARE | Age: 64
End: 2024-03-24
Payer: COMMERCIAL

## 2024-03-24 VITALS
BODY MASS INDEX: 33.49 KG/M2 | HEIGHT: 63 IN | SYSTOLIC BLOOD PRESSURE: 128 MMHG | WEIGHT: 189 LBS | HEART RATE: 81 BPM | DIASTOLIC BLOOD PRESSURE: 75 MMHG | TEMPERATURE: 97.8 F | OXYGEN SATURATION: 98 %

## 2024-03-24 DIAGNOSIS — J22 LOWER RESPIRATORY INFECTION: Primary | ICD-10-CM

## 2024-03-24 PROCEDURE — 99213 OFFICE O/P EST LOW 20 MIN: CPT | Performed by: FAMILY MEDICINE

## 2024-03-24 RX ORDER — AZITHROMYCIN 250 MG/1
TABLET, FILM COATED ORAL
Qty: 6 TABLET | Refills: 0 | Status: SHIPPED | OUTPATIENT
Start: 2024-03-24 | End: 2024-03-29

## 2024-03-24 RX ORDER — BENZONATATE 100 MG/1
100 CAPSULE ORAL 3 TIMES DAILY PRN
Qty: 30 CAPSULE | Refills: 0 | Status: SHIPPED | OUTPATIENT
Start: 2024-03-24

## 2024-03-24 NOTE — PROGRESS NOTES
"  Assessment & Plan     Lower respiratory infection  Differentials discussed in detail including lower respiratory recommendation, acute bronchitis.  Azithromycin prescribed considering chronicity of symptoms.  Suggested to use Tessalon for cough control.  Recommended well hydration, warm fluids and to follow-up if symptoms persist or worsen.  Patient understood and in agreement with above plan.  All questions answered.  - azithromycin (ZITHROMAX) 250 MG tablet; Take 2 tablets (500 mg) by mouth daily for 1 day, THEN 1 tablet (250 mg) daily for 4 days.  - benzonatate (TESSALON) 100 MG capsule; Take 1 capsule (100 mg) by mouth 3 times daily as needed for cough      Subjective   Patricia is a 63 year old, presenting for the following health issues:  Nasal Congestion (Sneezing, cough that's going into chest)    HPI   Concern -   Onset: >1 week  Description: productive cough, chest congestion, sneezing and bodyaches  Intensity: moderate  Progression of Symptoms:  same  Accompanying Signs & Symptoms: no fever, chills, chest pain, sob or other relevant systemic symptoms   Therapies tried and outcome: OTC cold meds    Review of Systems  Constitutional, HEENT, cardiovascular, pulmonary, gi and gu systems are negative, except as otherwise noted.      Objective    /75 (BP Location: Right arm, Patient Position: Sitting, Cuff Size: Adult Regular)   Pulse 81   Temp 97.8  F (36.6  C) (Tympanic)   Ht 1.6 m (5' 3\")   Wt 85.7 kg (189 lb)   LMP 06/01/2012   SpO2 98%   BMI 33.48 kg/m    Body mass index is 33.48 kg/m .  Physical Exam   GENERAL: alert and no distress  EYES: Eyes grossly normal to inspection, PERRL and conjunctivae and sclerae normal  HENT: ear canals and TM's normal, nose and mouth without ulcers or lesions  NECK: no adenopathy, no asymmetry, masses, or scars  RESP: lungs clear to auscultation - no rales, rhonchi or wheezes  CV: regular rates and rhythm, normal S1 S2, no S3 or S4, and no murmur, click or " rub  MS: no gross musculoskeletal defects noted, no edema  NEURO: Normal strength and tone, mentation intact and speech normal  PSYCH: mentation appears normal, affect normal/bright      Signed Electronically by: Denzel Nathan MD

## 2024-03-24 NOTE — TELEPHONE ENCOUNTER
Prior Authorization Approval    Medication: ARIPIPRAZOLE 2 MG PO TABS  Authorization Effective Date: 2/21/2024  Authorization Expiration Date: 3/22/2025  Approved Dose/Quantity: Up to 2 per day  Reference #:     Insurance Company: [employer] - Phone [phone] Fax [phone]  Expected CoPay: $    CoPay Card Available:      Financial Assistance Needed: No  Which Pharmacy is filling the prescription: [facility] #[id number] - [address] AT [address] [place] [address]  Pharmacy Notified: Yes  Patient Notified: Pharmacy will notify patient.

## 2024-03-27 NOTE — PROGRESS NOTES
Patricia is a 63 year old who is being evaluated via a billable video visit.    How would you like to obtain your AVS? Xipin  If the video visit is dropped, the invitation should be resent by: Text to cell phone: [phone]  Will anyone else be joining your video visit? No  Will you be in [place] for the visit?  Yes    Pt will enter visit via Xipin     Video-Visit Details  Type of service:  Video Visit   Originating Location (pt. Location): Home    Distant Location (provider location):  On-site  Platform used for Video Visit: [facility]    Rheumatology Clinic Visit  [facility] [place] [facility]  ROSSY Haney     Patricia Perez MRN# [id number]   YOB: 1960 Age: 63 year old   Date of Visit: 4/3/2024  Primary care provider: Claire Garcia          Assessment and Plan:     1.  Rheumatoid arthritis with an elevated rheumatoid factor  2.  High-risk medication use  3.  Raynaud's    Patient presents today for follow-up.  She states that she continues to do quite well.  The sulfasalazine has made quite an improvement with regards to her hand.  For the last month she has had some pain in her thigh without any injury.  There is not been any swelling either.  She will continue to monitor and if it persist she will follow-up with her primary care provider.  As her arthritis is under excellent control with the sulfasalazine we will continue that.  Continue labs every 3 months.  Follow-up with me in 6 months, sooner if needed.      Plan:   Schedule follow-up with Melissa El PA-C in 6 months  Labs: CBC, creatinine, albumin, AST, ALT every 3 months-next due at the beginning of May  Medications:  Continue Sulfasalazine 500mg: Take  2 tabs twice daily. Take this medication with food.      ROSSY Haney  Rheumatology         History of Present Illness:   Patricia Perez presents for evaluation of raynaud's, elevated rheumatoid factor, joint pain.     Interval history April 3, 2024:  She states that she has  been good. She is getting over some bronchitis. She did need antibiotics for it.      She has been having pain in her left thigh. She feels that it is a muscle pain. This has been ongoing for about 1 month. No injury. No swelling.     Interval history January 10, 2024:  She states that she is tolerating the sulfasalazine well. She is not having any soreness in her hands. She states that she has some stiffness but it does not take very long to loosen up. She has also started to use a larger  pen and finds this to be helpful.      Interval history September 12, 2023:  No significant hand pain.  She does have a lot of stiffness particularly in the mornings and after periods of rest.  Raynaud's is overall controlled.     HPI from consult of July 28, 2023:  She was diagnosed with Raynaud's and takes Nifedipine for it. She feels the Raynaud's is controlled. She gets some mild color changes but nothing like what it was in the past.     He states that she has thumb joint pain. She states that she thought it was carpal tunnel, but PT told her it was arthritis. She states that she feels achy in her right hand. Occasional aching the left hand. She has a lot of stiffness. She does get some swelling in the right hand. No skin rashes. No psoriasis. No ulcerative colitis or crohn's. She has had some fatigue. She states that she gets up she does have some stiffness. She states that it takes about 1 hour to loosen up. She states that her joints get worse if she is doing a lot of typing. The right middle finger that bothers her the most, it clicks and catches. She finds that she does  not have the hand strength to open things. By the end of the day, she can have more aching. She got some larger pens which has helped. No fevers. No dry eyes or dry mouth. No shortness of breath, unless related to her asthma. She does not notice any other significant joint pain. She does get some cramping in her feet.     Sister with Sjogren's.           Review of Systems:     Constitutional: negative  Skin: negative  Eyes: negative  Ears/Nose/Throat: negative  Respiratory: No shortness of breath, dyspnea on exertion, cough, or hemoptysis  Cardiovascular: negative  Gastrointestinal: negative  Genitourinary: negative  Musculoskeletal: as above  Neurologic: negative  Psychiatric: negative  Hematologic/Lymphatic/Immunologic: negative  Endocrine: negative         Active Problem List:     Patient Active Problem List    Diagnosis Date Noted    Hypertension goal BP (blood pressure) < 130/80 05/23/2011     Priority: High    Hyperlipidemia LDL goal <100 05/09/2010     Priority: High    Idiopathic cardiomyopathy (H) 01/08/2009     Priority: High     See Dr. Soto      Irritable bowel syndrome with both constipation and diarrhea 02/20/2024     Priority: Medium    Class 2 severe obesity due to excess calories with serious comorbidity in adult (H) 06/27/2023     Priority: Medium    Raynaud's disease without gangrene 04/13/2023     Priority: Medium    Hypoglycemia 07/07/2022     Priority: Medium    Sciatica of right side 04/18/2022     Priority: Medium    Posterior vitreous detachment of both eyes 01/23/2021     Priority: Medium    Combined forms of age-related cataract, mild, of both eyes 01/23/2021     Priority: Medium    Glaucoma suspect of both eyes 01/23/2021     Priority: Medium    Syncope 01/07/2020     Priority: Medium    Acute pain of left knee 11/16/2018     Priority: Medium    Coronary artery disease involving native coronary artery of native heart without angina pectoris 08/06/2018     Priority: Medium    Vitamin D deficiency 03/01/2018     Priority: Medium    BPV (benign positional vertigo), unspecified laterality 08/31/2017     Priority: Medium    Advanced directives, counseling/discussion 08/31/2017     Priority: Medium     Advance Care Planning 8/31/2017: ACP Review of Chart / Resources Provided:  Reviewed chart for advance care plan.  Patricia Perez has been  provided information and resources to begin or update their advance care plan.  Added by Phyllis BLACKBrien            Adjustment disorder with depressed mood 06/22/2017     Priority: Medium    Decreased hearing of both ears 06/22/2017     Priority: Medium    Benign essential hypertension 06/15/2016     Priority: Medium    Dumping syndrome 09/22/2015     Priority: Medium    Myopia, bilateral 08/17/2015     Priority: Medium    Hand joint pain 01/14/2015     Priority: Medium    Mild persistent asthma 04/03/2014     Priority: Medium    Allergic rhinitis 02/27/2014     Priority: Medium     Problem list name updated by automated process. Provider to review      Attention deficit disorder of adult 02/27/2014     Priority: Medium    Hx of gastric bypass 08/21/2013     Priority: Medium    Hypovitaminosis D 08/21/2013     Priority: Medium    H/O bariatric surgery 07/08/2013     Priority: Medium    Elevated PTHrP level 06/15/2013     Priority: Medium    Class 1 obesity with serious comorbidity and body mass index (BMI) of 33.0 to 33.9 in adult, unspecified obesity type 06/12/2013     Priority: Medium    Pelvic pain in female 09/14/2011     Priority: Medium    Allergic rhinitis 09/09/2010     Priority: Medium    Sleep apnea 03/25/2009     Priority: Medium    GERD (gastroesophageal reflux disease) 10/14/2008     Priority: Medium    Motion sickness 02/27/2008     Priority: Medium    Dizziness and giddiness 02/05/2007     Priority: Medium    24 hour contact handout given 01/04/2012     Priority: Low     EMERGENCY CARE PLAN  Presenting Problem Signs and Symptoms Treatment Plan    Questions or conerns during clinic hours    I will call the clinic directly     Questions or conerns outside clinic hours    I will call the 24 hour nurse line at [phone]    Patient needs to schedule an appointment    I will call the 24 hour scheduling team at [phone] or clinic directly    Same day treatment     I will call the clinic first, nurse  line if after hours, urgent care and express care if needed                                    Health Care Home 09/23/2011     Priority: Low     X  EMERGENCY CARE PLAN  Presenting Problem Signs and Symptoms Treatment Plan    Questions or concerns during clinic hours    I will call the clinic directly     Questions or concerns outside clinic hours    I will call the 24 hour nurse line at [phone]    Patient needs to schedule an appointment    I will call the 24 hour scheduling team at [phone] or clinic directly    Same day treatment     I will call the clinic first, nurse line if after hours, urgent care and express care if needed                                DX V65.8 REPLACED WITH [id number] [facility] CARE HOME (04/08/2013)              Past Medical History:     Past Medical History:   Diagnosis Date    Anxiety     Arthritis     ASCUS of cervix with negative high risk HPV 01/23/2006    Asthma     Bursitis of shoulder 03/04/2010    Chronic rhinitis 03/25/2009    Coronary artery disease     Depression     Dyspnea on exertion     GERD (gastroesophageal reflux disease) 10/14/2008    Hypertension     Idiopathic cardiomyopathy (H) 01/08/2009    Indigestion     Itchy eyes     Left leg pain 06/16/2011    Motion sickness 02/27/2008    Nasal congestion     Pneumonia     Problems related to lack of adequate sleep     Ringing in ears     Sleep apnea 03/25/2009    Sneezing      Past Surgical History:   Procedure Laterality Date    BARIATRIC SURGERY      COLONOSCOPY N/A 11/11/2021    Procedure: COLONOSCOPY, WITH POLYPECTOMY;  Surgeon: Stephanie Meneses MD;  [place] [facility] [place]    DIABETES RESOURCES  As Child    Tonsillectomy    ENT SURGERY      ESOPHAGOSCOPY, GASTROSCOPY, DUODENOSCOPY (EGD), COMBINED N/A 11/11/2021    Procedure: ESOPHAGOGASTRODUODENOSCOPY, WITH BIOPSY;  Surgeon: Stephanie Meneses MD;  [place] [facility] [place]    HERNIA REPAIR      LAPAROSCOPIC BYPASS GASTRIC  10/16/2012    Procedure: LAPAROSCOPIC BYPASS GASTRIC;   laparoscopic reyna en y gastric bypass;  Surgeon: Nish Bradford MD;  Location: [facility] OR    TONSILLECTOMY      Uretural Sticture  As Child            Social History:     Social History     Socioeconomic History    Marital status:      Spouse name: Not on file    Number of children: 0    Years of education: 12+    Highest education level: Not on file   Occupational History     Employer: [employer]   Tobacco Use    Smoking status: Never    Smokeless tobacco: Never   Vaping Use    Vaping Use: Never used   Substance and Sexual Activity    Alcohol use: Yes     Comment: seldom    Drug use: No    Sexual activity: Not Currently     Partners: Male   Other Topics Concern    Parent/sibling w/ CABG, MI or angioplasty before 65F 55M? No   Social History Narrative    Dairy/d 2 servings/d.     Caffeine 3 servings/d    Exercise 2 x week    Sunscreen used - Yes    Seatbelts used - Yes    Working smoke/CO detectors in the home - Yes    Guns stored in the home - No    Self Breast Exams - Yes    Self Testicular Exam - NA    Eye Exam up to date - Yes    Dental Exam up to date - No    Pap Smear up to date - Yes    Mammogram up to date - Yes    PSA up to date - NA    Dexa Scan up to date - NA    Flex Sig / Colonoscopy up to date - Yes    Immunizations up to date - Yes    Abuse: Current or Past(Physical, Sexual or Emotional)- No    Do you feel safe in your environment - Yes    LOWELL SMITH LPN.    02/05/2007        May 19, 2021    ENVIRONMENTAL HISTORY: [place]. The home is heated with a forced air. They do have central air conditioning. The patient's bedroom is furnished with carpeting in bedroom, allergen mattress cover, and animals sleep in bedroom.  Pets inside the house include 1 cat(s). There is no history of cockroach or mice infestation. There is/are 0 smokers in the house.  The house does not have a damp basement, it's a split level.      Social Determinants of  Health     Financial Resource Strain: Not on file   Food Insecurity: Not on file   Transportation Needs: Not on file   Physical Activity: Not on file   Stress: Not on file   Social Connections: Not on file   Interpersonal Safety: Not on file   Housing Stability: Not on file          Family History:     Family History   Problem Relation Age of Onset    Gastrointestinal Disease Mother         non cancerous pancreatic tumor    Hypertension Mother     Heart Disease Mother         A fib    Allergies Mother     Hypertension Father     Lipids Father     Alcohol/Drug Father         Abuse    Depression Father     Respiratory Father         COPD    Cardiovascular Father     Allergies Sister         Poison Ivy    Depression Brother     Hypertension Brother     Allergies Brother         Enviromental/ Bees    Breast Cancer Maternal Grandmother     Glaucoma Paternal Grandmother     Glaucoma Paternal Grandfather     Cerebrovascular Disease Other     Macular Degeneration Other     Diabetes No family hx of     Cancer - colorectal No family hx of     Retinal detachment No family hx of     Amblyopia No family hx of     Thyroid Disease No family hx of     Adrenal Disorder No family hx of             Allergies:     Allergies   Allergen Reactions    Atorvastatin      Leg myalgias            Medications:     Current Outpatient Medications   Medication Sig Dispense Refill    acetaminophen (TYLENOL) 325 MG tablet Take 650 mg by mouth every 6 hours as needed for mild pain      albuterol (PROAIR HFA/PROVENTIL HFA/VENTOLIN HFA) 108 (90 Base) MCG/ACT inhaler Inhale 2 puffs into the lungs every 4 hours as needed for shortness of breath or wheezing 18 g 3    ARIPiprazole (ABILIFY) 2 MG tablet Take 2 tablets (4 mg) by mouth daily Replaces prior orders 60 tablet 1    aspirin 81 MG EC tablet Take 1 tablet (81 mg) by mouth daily 90 tablet 3    azithromycin (ZITHROMAX) 250 MG tablet Take 2 tablets (500 mg) by mouth daily for 1 day, THEN 1 tablet (250  mg) daily for 4 days. 6 tablet 0    benzonatate (TESSALON) 100 MG capsule Take 1 capsule (100 mg) by mouth 3 times daily as needed for cough 30 capsule 0    blood glucose (NO BRAND SPECIFIED) lancing device Device to be used with lancets. 3 each 3    blood glucose (NO BRAND SPECIFIED) test strip Use to test blood sugar 3 times daily 300 strip 3    calcium carbonate-vitamin D (CALTRATE 600+D) 600-400 MG-UNIT CHEW Take 1 chew tab by mouth 2 times daily 180 tablet 3    carvedilol (COREG) 25 MG tablet Take 1 tablet (25 mg) by mouth 2 times daily (with meals) 180 tablet 3    cetirizine (ZYRTEC) 10 MG tablet Take 1 tablet (10 mg) by mouth daily 30 tablet 3    Cyanocobalamin (VITAMIN B-12 SL) Place under the tongue every other day       dicyclomine (BENTYL) 20 MG tablet Take 1 tablet (20 mg) by mouth every 6 hours 30 tablet 0    estradiol (ESTRACE) 1 MG tablet Take 1 tablet (1 mg) by mouth daily 90 tablet 3    ezetimibe (ZETIA) 10 MG tablet Take 1 tablet (10 mg) by mouth daily 90 tablet 3    isosorbide mononitrate (IMDUR) 30 MG 24 hr tablet Take 1 tablet (30 mg) by mouth daily 90 tablet 3    lisinopril (ZESTRIL) 5 MG tablet TAKE 1 TABLET(5 MG) BY MOUTH DAILY 90 tablet 3    meclizine (ANTIVERT) 25 MG tablet Take 1 tablet (25 mg) by mouth 3 times daily as needed 30 tablet 1    medroxyPROGESTERone (PROVERA) 2.5 MG tablet Take 1 tablet (2.5 mg) by mouth daily 90 tablet 3    Multiple Vitamin (MULTI-VITAMIN) per tablet Take 1 tablet by mouth daily. 100 tablet 3    NIFEdipine ER (ADALAT CC) 30 MG 24 hr tablet TAKE 1 TABLET(30 MG) BY MOUTH DAILY 60 tablet 1    omeprazole (PRILOSEC) 20 MG DR capsule Take 1 capsule (20 mg) by mouth daily 90 capsule 1    ondansetron (ZOFRAN ODT) 4 MG ODT tab Take 1 tablet (4 mg) by mouth every 8 hours as needed for nausea 30 tablet 2    simvastatin (ZOCOR) 40 MG tablet Take 1 tablet (40 mg) by mouth at bedtime 90 tablet 3    sulfaSALAzine ER (AZULFIDINE EN) 500 MG EC tablet Take 2 tablets (1,000 mg)  by mouth 2 times daily Take this medication with food. Labs every 8-12 weeks. 120 tablet 2    topiramate (TOPAMAX) 25 MG tablet 25mg in the morning and 25mg at night 60 tablet 3    venlafaxine (EFFEXOR) 75 MG tablet Take 1 tablet (75 mg) by mouth 3 times daily 90 tablet 1            Physical Exam:   Last menstrual period 06/01/2012, not currently breastfeeding.  Wt Readings from Last 6 Encounters:   03/24/24 85.7 kg (189 lb)   03/13/24 82.1 kg (181 lb)   02/20/24 87.5 kg (193 lb)   01/22/24 87.7 kg (193 lb 6.4 oz)   11/01/23 82.1 kg (181 lb)   09/20/23 85.7 kg (189 lb)   Exam limited as this was a video visit  Constitutional: well-developed, appearing stated age; cooperative  Eyes: nl conjunctiva, sclera  ENT: nl external ears, nose, hearing, lips,   Neck: no visible mass or thyroid enlargement  Resp: No shortness of breath with normal conversation  Psych: nl judgement, orientation, memory, affect.           Data:   Imaging:  X-ray cervical spine 3/28/2022  IMPRESSION: Mild degenerative retrolisthesis of C3 upon C4 and minimal  degenerative anterolisthesis of C7 upon T1. Alignment otherwise  normal. Vertebral body heights normal. No fractures. Moderate-severe  facet arthropathy throughout the cervical spine. Loss of disc space  height and degenerative endplate spurring at all levels of the  cervical spine with exception of the C2-C3 level.    X-ray lumbar spine 3/28/2022  IMPRESSION: Five lumbar type vertebrae. Moderate left convex curvature  of the lumbar spine centered at L2 has developed since the comparison  study. Mild degenerative retrolisthesis of L1 upon L2 and L2 upon L3  again noted. Degenerative grade 1 anterolisthesis of L4 upon L5 again  noted. Alignment otherwise normal. Vertebral body heights normal. No  evidence for recent fracture. Presumed chronic fracture deformity or  large Schmorl's node deformity at the anterior superior corner of the  L4 vertebral body again noted. Loss of disc space height  and  degenerative endplate spurring at T12-L1, L1-L2 and L2-L3. Facet  arthropathy at L3-L4, L4-L5 and L5-S1.    X-ray right hand 6/30/2021  IMPRESSION: Moderate degenerative arthrosis at the STT and distal  radioulnar joints. There is cystic change at the proximal-medial  aspect of the lunate. This could relate to ulnolunate impaction or  subchondral cystic change. Mild subchondral cystic change at the  proximal aspect of the triquetrum. Mild third MCP joint space  narrowing. No fracture identified.     X-ray right foot.  12/29/2020  IMPRESSION:  1.  Moderate right second TMT degenerative arthrosis.  2.  Mild right first MTP degenerative arthrosis.  3.  Small osteophyte at the lateral margin of the fifth metatarsal  head.  4.  Plantar calcaneal spur.  5.  No fracture.  6.  Second hammertoe deformity.    X-ray bilateral hand 7/28/2023  IMPRESSION:   1.  No definite osseous erosions. Scattered periarticular lucencies  bilaterally, such as within the distal ulna on the right and at the  triscaphe joint within the distal pole of the scaphoid bilaterally  which have characteristics most consistent with subchondral cysts or  intraosseous ganglions as likely sequela of degenerative arthritis.  2.  Moderate second and third MCP joint space narrowing on the right  with periarticular soft tissue thickening which may reflect synovitis.  3.  No obvious periarticular osteopenia.  4.  Mild to moderate degenerative arthritis involving multiple joints  of both hands and wrists, greatest involving the first CMC and  triscaphe joints bilaterally.     MRI right hand 8/21/2023  IMPRESSION:  1.  While lack of contrast limits differentiation between synovitis and joint effusion, there is a large joint effusion versus synovitis within the wrist with multifocal subcortical cystic change and patchy areas of marrow edema. While the more advanced   arthrosis in the triscaphe and first CMC joints is typical of osteoarthritis, additional sites  within the wrist may represent an inflammatory arthropathy although mechanical changes/osteoarthritis is not completely excluded.  2.  Scattered joint effusions versus synovitis within the MCP and PIP joints of the fingers can be seen with both mechanical as well as inflammatory causes.  3.  High-grade cartilage loss and patchy subchondral bone marrow edema with subcortical cystic change at the long finger MCP joint can be seen with an inflammatory arthropathy as well as mechanical change.  4.  Mild, scattered tenosynovitis within the flexor and extensor tendons, primarily at the wrist as described.   5.  Given the overall appearance of the hands and wrists, a combination of an inflammatory and degenerative arthritis is favored.    Laboratory:  4/14/2023  CRP less than 2.9  Rheumatoid factor 12  Sed rate 8  FAHEEM negative    7/28/2023  CRP less than 3.0  CCP antibody 1.1  Sed rate 9  SSA and SSB negative    12/29/2023  Creatinine 0.81, GFR 80  Albumin 4.0  ALT 54, AST 40  White blood cell count 4.4, hemoglobin 11.5, platelet count 243    2/2/2024  Creatinine 0.84, GFR 77  Albumin 4.2  ALT 36, AST 31  White blood cell count 5.6, hemoglobin 12.4, platelet count 249

## 2024-03-28 ENCOUNTER — VIRTUAL VISIT (OUTPATIENT)
Dept: PSYCHIATRY | Facility: CLINIC | Age: 64
End: 2024-03-28
Attending: PSYCHIATRY & NEUROLOGY
Payer: COMMERCIAL

## 2024-03-28 VITALS — BODY MASS INDEX: 33.49 KG/M2 | HEIGHT: 63 IN | WEIGHT: 189 LBS

## 2024-03-28 DIAGNOSIS — R41.840 ATTENTION DEFICIT: ICD-10-CM

## 2024-03-28 DIAGNOSIS — F33.42 RECURRENT MAJOR DEPRESSIVE DISORDER, IN FULL REMISSION (H): Primary | ICD-10-CM

## 2024-03-28 PROCEDURE — 99214 OFFICE O/P EST MOD 30 MIN: CPT | Mod: 95 | Performed by: PSYCHIATRY & NEUROLOGY

## 2024-03-28 PROCEDURE — G2211 COMPLEX E/M VISIT ADD ON: HCPCS | Mod: 95 | Performed by: PSYCHIATRY & NEUROLOGY

## 2024-03-28 PROCEDURE — 90833 PSYTX W PT W E/M 30 MIN: CPT | Mod: 95 | Performed by: PSYCHIATRY & NEUROLOGY

## 2024-03-28 RX ORDER — VENLAFAXINE 75 MG/1
75 TABLET ORAL 3 TIMES DAILY
Qty: 90 TABLET | Refills: 3 | Status: SHIPPED | OUTPATIENT
Start: 2024-03-28 | End: 2024-07-09

## 2024-03-28 RX ORDER — ARIPIPRAZOLE 2 MG/1
4 TABLET ORAL DAILY
Qty: 60 TABLET | Refills: 3 | Status: SHIPPED | OUTPATIENT
Start: 2024-03-28 | End: 2024-07-30

## 2024-03-28 RX ORDER — VENLAFAXINE 75 MG/1
75 TABLET ORAL 3 TIMES DAILY
Qty: 90 TABLET | Refills: 1 | Status: SHIPPED | OUTPATIENT
Start: 2024-03-28 | End: 2024-03-28

## 2024-03-28 RX ORDER — ARIPIPRAZOLE 2 MG/1
4 TABLET ORAL DAILY
Qty: 60 TABLET | Refills: 1 | Status: SHIPPED | OUTPATIENT
Start: 2024-03-28 | End: 2024-03-28

## 2024-03-28 ASSESSMENT — ABNORMAL INVOLUNTARY MOVEMENT SCALE (AIMS)
TONGUE: 0
AIMS_PATIENT_AWARENESS: NO AWARENESS
AIMS_DISAPPEAR_SLEEPING: NO
CURRENT_PROBLEMS_TEETH_DENTURES: NO
PATIENT_WEARS_DENTURES: NO

## 2024-03-28 NOTE — PATIENT INSTRUCTIONS
**For crisis resources, please see the information at the end of this document**   Patient Education    Thank you for coming to the [facility] [place] [facility].     Lab Testing:  If you had lab testing today and your results are reassuring or normal they will be mailed to you or sent through [employer] within 7 days. If the lab tests need quick action we will call you with the results. The phone number we will call with results is # [phone]. If this is not the best number please call our clinic and change the number.     Medication Refills:  If you need any refills please call your pharmacy and they will contact us. Our fax number for refills is [phone].   Three business days of notice are needed for general medication refill requests.   Five business days of notice are needed for controlled substance refill requests.   If you need to change to a different pharmacy, please contact the new pharmacy directly. The new pharmacy will help you get your medications transferred.     Contact Us:  Please call [phone] during business hours (8-5:00 M-F).   If you have medication related questions after clinic hours, or on the weekend, please call [phone].     Financial Assistance [phone]   Medical Records [phone]       MENTAL HEALTH CRISIS RESOURCES:  For a emergency help, please call 911 or go to the nearest Emergency Department.     Emergency Walk-In Options:   EmPATH Unit @ [facility] ([place]): [phone] - Specialized mental health emergency area designed to be calming  [facility] ([place]): [phone]  [facility] ([place]): [phone]  [facility]): [phone]    [facility] Crisis Information:   [place]: [phone]  [place]: [phone]  [place] ([place]) - Adult: [phone]     Child: [phone]  [place] - Adult: [phone]     Child: [phone]  [place]:  [phone]  List of all [facility] resources:   https://[place].gov/dhs/people-we-serve/adults/health-care/mental-health/resources/crisis-contacts.jsp    National Crisis Information:   Crisis Text Line: Text  [place]  to 376222  Suicide & Crisis Lifeline: 988  National Suicide Prevention Lifeline: 6-903-238-TALK (1-742.242.8712)       For online chat options, visit https://suicidepreventionlifeline.org/chat/  Poison Control Center: 2-027-352-5849  Trans Lifeline: 9-587-300-1481 - Hotline for transgender people of all ages  The [employer] Project: 9-228-588-1925 - Hotline for LGBT youth     For Non-Emergency Support:   Fast Tracker: Mental Health & Substance Use Disorder Resources -   https://www.TabbedOutckCebaTechn.org/

## 2024-03-28 NOTE — NURSING NOTE
Is the patient currently in the state of [place]? YES    Visit mode:VIDEO    If the visit is dropped, the patient can be reconnected by: VIDEO VISIT: Text to cell phone:   Telephone Information:   Mobile [phone]       Will anyone else be joining the visit? NO  (If patient encounters technical issues they should call [phone]    How would you like to obtain your AVS? MyChart    Are changes needed to the allergy or medication list? No    Reason for visit: RECHECK    Kelin AYON

## 2024-03-28 NOTE — PROGRESS NOTES
"Virtual Visit Details    Type of service:  Video Visit     Originating Location (pt. Location): Home  Distant Location (provider location):  On-site  Platform used for Video Visit: Shruthi     Joined the call at 3/28/2024, 9:10:23 am.  Left the call at 3/28/2024, 9:32:35 am.         [facility]  MEDICAL PROGRESS NOTE     CARE TEAM:    PCP- LAKESHA VILLASENOR [facility]  Psychotherapist- none    This person is a 63 year old who uses the name Patricia and pronouns she, her.      Diagnoses     Major depressive disorder, recurrent, in full remission, with seasonal component  ADHD      Assessment   Patricia Perez is a 63 year old  woman with history of recurrent depression as well as reported history of ADHD who presents today for medication follow-up. Overall, Patricia is doing well and \"finally feels happy\", particularly after hearing she can retire at anytime now. Still experiencing the fidgeting and restlessness. The start of these symptoms appear to coincide with starting aripiprazole, or at least were worsened by the medication. However, Patricia notes that she has done really well on Abilify and has leo she hadn't experienced in years. At this time, she will continue to manage her fidgeting with non-pharmacologic measures such as fidget tools. Lightbox was effective for winter time.     No safety concerns today. No other questions nor concerns.      Future considerations  - consider increasing venlafaxine to max dose as-tolerated, monitor BP  - hx gastric bypass in 2018, needs IR formulations       Psychotropic Drug Interactions:  [PSYCHCLINICDDI]  none  Management: N/A    [place] was not [place] today: not using controlled substances    Risk Statements:   Treatment Risk- Risks, benefits, alternatives and potential adverse effects have been discussed and are understood.   Safety Risk-Patricia did not appear to be an imminent safety risk to self or " others.     Plan     1) Medications:   - Continue aripiprazole 4 mg daily  - Continue venlafaxine to 75 mg TID     Other pertinent medications not prescribed by psychiatry:  1.  Aspirin 81 mg a day.  2.  Carvedilol 25 mg twice daily.  3.  Prempro.  4.  Ezetimibe 10 mg a day.  5.  Hydralazine 10 mg twice daily.  6.  Isosorbide mononitrate 30 mg a day.  7.  Lisinopril 5 mg a day.  8.  Meclizine.  9.  Ondansetron.  10. Simvastatin 20 mg a day  11. otc Vitamin D   12. Topiramate 25 mg BID     2) Psychotherapy-  none     3) Next due-  Labs- last AP labs done January 2024, repeat annually if not collected by outside providers   EKG- 1/7/20: 447 ms @ 54 bpm  Rating scales- AIMS completed virtually 3/2024, score of 0.      4) Referrals-   - none     5) Follow-up: Return to clinic in 3-4 months      Pertinent Background                                                   [most recent eval 10/23/23]     Patricia first experienced the onset of mental health problems at age 13-14 years old when younger brother passed away. Denies memory of significant trauma or abuse and poor memory childhood. Did well in school and no discpline issues or legal issues during childhood. Father with alcohol use disorder.  Started mental health treatment in 1995. Niece passed away in MVA in 2020, COVID in 2020 both were disruptive to mental health (worsened depression). History gastric bypass in 2018.      Psych pertinent item history includes trauma hx and eating disorder (binge eating)       Subjective     Last seen in October 2023.     Today, Patricia reports doing really well. Her  and Patricia went to a  and found out she could retire comfortably. She feels this is a weight of her shoulders that she wasn't even thinking about. Patricia grew up with nothing, and her mom has been struggling on Medicaid. Money has always been something Patircia has been extremely conscious of. She says she finally knows what it is like to be happy. She  used the lightbox over the winter and its helped a lot.     Patricia is still experiencing the restlessness and fidgeting. She is coping with fidgets by using a fidget tool and messing with her hands. There are no signs of jaw clenching or abnormal movements of her face. She was planning on coming into the clinic today for a AIMS assessment, but has bronchitis and switched to virtual. No other concerns for today. She does not want to make any medication changes.       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 Hopes to retire ~age 64. Enjoys job. Can retire anytime.   Partner/ -  in 2017 to her first , Mitchell, relationship feels very strong   Children- None      Living situation- Lives in a home in [place], [place] with  and cat (Ms. Queen), recently purchased house  Social/ Spiritual Support-  Mitchell, few close friends. Lots of acquaintances. Has two sisters and mother still alive, report somewhat close  Feels Safe at Home- yes     Pertinent Substance Use:   none    Medical Review of Systems:   Lightheadedness/orthostasis: Unsteady of her feet sometimes, has vertigo but not recently  Headaches: None  GI:  GERD  Sexual health concerns: None    A comprehensive review of systems was performed and is negative other than noted above.       Mental Status Exam     Alertness: alert  and oriented  Appearance: well groomed  Behavior/Demeanor: cooperative, pleasant, and calm, with good  eye contact   Speech: normal and regular rate and rhythm  Language: good  Psychomotor: restless and  "fidgety  Mood: description consistent with euthymia \"this is what it feels like to be happy\"  Affect: full range; congruent to: mood- yes, content- yes  Thought Process/Associations: unremarkable  Thought Content:  Reports none;  Denies suicidal & violent ideation and delusions  Perception:  Reports none;  Denies auditory hallucinations and visual hallucinations  Insight: excellent  Judgment: excellent  Cognition: does  appear grossly intact; formal cognitive testing was not done  Gait and Station: N/A (telehealth)         Social History                                 pt reported     Components copy-forwarded from prior documentation. Last reviewed with patient and updated with patient on 10/23/23.  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 Hopes to retire ~age 64. Enjoys job.   Partner/ -  in 2017 to her first , Mitchell, relationship feels very strong   Children- None      Living situation- Lives in a home in [place], [place] with  and cat (Ms. Queen), recently purchased house  Social/ Spiritual Support-  Mitchell, few close friends. Lots of acquaintances. Has two sisters and mother still alive, report somewhat close  Feels Safe at Home- yes   Guns at home- none  Legal- None     Trauma History (self-report)- Younger brother  of Leukemia when patient was in 8th grade. Niece  in MVA on 2020  Early History/Education- Born and raised in [place], [place]. Family is still in [place]. Grad school in [place].  Reports her childhood was jackie, father was an alcoholic.  Lived in a trailer with 4 siblings. Younger brother  of leukemia when he was 3 and she was 11.      Family Mental Health History                                 pt reported     Father: AUD, depression  Mother: depression  Sister 1: depression  Sister 2: depression  Brother: depression      No known history of schizophrenia, suicides or bipolar disorder     Past Psychiatric History " "    Components copy-forwarded from prior documentation. Last reviewed with patient and updated with patient on 10/23/23.  SIB- None  Suicide Attempt [#, most recent]- None  Suicidal Ideation Hx- None     Violence/Aggression Hx- None  Psychosis Hx- None  Eating Disorder Hx- Binge eating disorder history. Endorses ongoing stress eating.   Other- None     Psych Hosp [#, most recent]- None  Commitment- None  ECT- None  Outpatient Programs - None   Other - N/A    SUBSTANCE USE HISTORY   Past Use- Reports history of occasional alcohol use from teens to 30's \"never drank to the point where I was overembibing\" (does not drink now d/t  being recovering alcoholic), tried marijuana occasionally In the 1970s   Treatment- #, most recent- no  Medical Consequences- no  Legal Consequences- no  Other- N/A     Past Psych Med Trials        Medication Max Dose (mg) Dates / Duration Helpful? DC Reason / Adverse Effects?   bupropion 300 0762-4175 N Initially helpful then lost efficacy   Adderall 25 6626-3669 N Lack of efficacy   sertraline 100 1163-2765 N Initially helpful then lost efficacy   Venlafaxine 225 2008-current Y  previously had ER, tried IR BID to help with mood in winter   aripiprazole 4 2021-current Y                                                                       Vitals   Ht 1.6 m (5' 3\")   Wt 85.7 kg (189 lb)   LMP 06/01/2012   BMI 33.48 kg/m    Pulse Readings from Last 3 Encounters:   03/24/24 81   02/20/24 75   02/01/24 68     Wt Readings from Last 3 Encounters:   03/28/24 85.7 kg (189 lb)   03/24/24 85.7 kg (189 lb)   03/13/24 82.1 kg (181 lb)     BP Readings from Last 3 Encounters:   03/24/24 128/75   02/20/24 116/68   02/01/24 113/74        Medical History     ALLERGIES: Atorvastatin    Patient Active Problem List   Diagnosis    Dizziness and giddiness    Motion sickness    GERD (gastroesophageal reflux disease)    Idiopathic cardiomyopathy (H)    Sleep apnea    Hyperlipidemia LDL goal <100    " Allergic rhinitis    Hypertension goal BP (blood pressure) < 130/80    Pelvic pain in female    Health Care Home    24 hour contact handout given    Class 1 obesity with serious comorbidity and body mass index (BMI) of 33.0 to 33.9 in adult, unspecified obesity type    Elevated PTHrP level    H/O bariatric surgery    Hx of gastric bypass    Hypovitaminosis D    Allergic rhinitis    Attention deficit disorder of adult    Mild persistent asthma    Hand joint pain    Myopia, bilateral    Dumping syndrome    Benign essential hypertension    Adjustment disorder with depressed mood    Decreased hearing of both ears    BPV (benign positional vertigo), unspecified laterality    Advanced directives, counseling/discussion    Vitamin D deficiency    Coronary artery disease involving native coronary artery of native heart without angina pectoris    Acute pain of left knee    Syncope    Posterior vitreous detachment of both eyes    Combined forms of age-related cataract, mild, of both eyes    Glaucoma suspect of both eyes    Sciatica of right side    Hypoglycemia    Raynaud's disease without gangrene    Class 2 severe obesity due to excess calories with serious comorbidity in adult (H)    Irritable bowel syndrome with both constipation and diarrhea        Medications     Current Outpatient Medications   Medication Sig Dispense Refill    acetaminophen (TYLENOL) 325 MG tablet Take 650 mg by mouth every 6 hours as needed for mild pain      albuterol (PROAIR HFA/PROVENTIL HFA/VENTOLIN HFA) 108 (90 Base) MCG/ACT inhaler Inhale 2 puffs into the lungs every 4 hours as needed for shortness of breath or wheezing 18 g 3    ARIPiprazole (ABILIFY) 2 MG tablet Take 2 tablets (4 mg) by mouth daily Replaces prior orders 60 tablet 1    aspirin 81 MG EC tablet Take 1 tablet (81 mg) by mouth daily 90 tablet 3    azithromycin (ZITHROMAX) 250 MG tablet Take 2 tablets (500 mg) by mouth daily for 1 day, THEN 1 tablet (250 mg) daily for 4 days. 6  tablet 0    benzonatate (TESSALON) 100 MG capsule Take 1 capsule (100 mg) by mouth 3 times daily as needed for cough 30 capsule 0    blood glucose (NO BRAND SPECIFIED) lancing device Device to be used with lancets. 3 each 3    blood glucose (NO BRAND SPECIFIED) test strip Use to test blood sugar 3 times daily 300 strip 3    calcium carbonate-vitamin D (CALTRATE 600+D) 600-400 MG-UNIT CHEW Take 1 chew tab by mouth 2 times daily 180 tablet 3    carvedilol (COREG) 25 MG tablet Take 1 tablet (25 mg) by mouth 2 times daily (with meals) 180 tablet 3    cetirizine (ZYRTEC) 10 MG tablet Take 1 tablet (10 mg) by mouth daily 30 tablet 3    Cyanocobalamin (VITAMIN B-12 SL) Place under the tongue every other day       dicyclomine (BENTYL) 20 MG tablet Take 1 tablet (20 mg) by mouth every 6 hours 30 tablet 0    estradiol (ESTRACE) 1 MG tablet Take 1 tablet (1 mg) by mouth daily 90 tablet 3    ezetimibe (ZETIA) 10 MG tablet Take 1 tablet (10 mg) by mouth daily 90 tablet 3    isosorbide mononitrate (IMDUR) 30 MG 24 hr tablet Take 1 tablet (30 mg) by mouth daily 90 tablet 3    lisinopril (ZESTRIL) 5 MG tablet TAKE 1 TABLET(5 MG) BY MOUTH DAILY 90 tablet 3    meclizine (ANTIVERT) 25 MG tablet Take 1 tablet (25 mg) by mouth 3 times daily as needed 30 tablet 1    medroxyPROGESTERone (PROVERA) 2.5 MG tablet Take 1 tablet (2.5 mg) by mouth daily 90 tablet 3    Multiple Vitamin (MULTI-VITAMIN) per tablet Take 1 tablet by mouth daily. 100 tablet 3    NIFEdipine ER (ADALAT CC) 30 MG 24 hr tablet TAKE 1 TABLET(30 MG) BY MOUTH DAILY 60 tablet 1    omeprazole (PRILOSEC) 20 MG DR capsule Take 1 capsule (20 mg) by mouth daily 90 capsule 1    ondansetron (ZOFRAN ODT) 4 MG ODT tab Take 1 tablet (4 mg) by mouth every 8 hours as needed for nausea 30 tablet 2    simvastatin (ZOCOR) 40 MG tablet Take 1 tablet (40 mg) by mouth at bedtime 90 tablet 3    sulfaSALAzine ER (AZULFIDINE EN) 500 MG EC tablet Take 2 tablets (1,000 mg) by mouth 2 times daily  Take this medication with food. Labs every 8-12 weeks. 120 tablet 2    topiramate (TOPAMAX) 25 MG tablet 25mg in the morning and 25mg at night 60 tablet 3    venlafaxine (EFFEXOR) 75 MG tablet Take 1 tablet (75 mg) by mouth 3 times daily 90 tablet 1        Labs and Data         3/6/2023     8:03 AM 10/23/2023     3:05 PM 3/28/2024     7:59 AM   PROMIS-10 Total Score w/o Sub Scores   PROMIS TOTAL - SUBSCORES 26    26 29 29         4/13/2022    11:36 AM   CAGE-AID Total Score   Total Score 0   Total Score MyChart 0 (A total score of 2 or greater is considered clinically significant)         4/21/2023     8:53 AM 5/25/2023     7:56 AM 10/23/2023     3:02 PM   PHQ-9 SCORE   PHQ-9 Total Score MyChart 5 (Mild depression) 3 (Minimal depression) 5 (Mild depression)   PHQ-9 Total Score 5 3 5         9/3/2020    11:56 AM 6/30/2021     8:15 AM 3/28/2022    12:45 PM   PRISCILLA-7 SCORE   Total Score   7 (mild anxiety)   Total Score 10 3 7       Liver/Kidney Function, TSH Metabolic Blood counts   Recent Labs   Lab Test 02/02/24  0731 02/01/24  1322   AST 31 35   ALT 36 41   ALKPHOS  --  98   CR 0.84 0.83     Recent Labs   Lab Test 01/19/24  0731   TSH 2.06    Recent Labs   Lab Test 01/19/24  0731   CHOL 205*   TRIG 61   LDL 86        Recent Labs   Lab Test 03/10/23  1146   A1C 5.5     Recent Labs   Lab Test 02/01/24  1322   GLC 93    Recent Labs   Lab Test 02/02/24  0731   WBC 5.6   HGB 12.4   HCT 37.9                 PROVIDER: Navi Marie MD    Level of Medical Decision Making:   - At least 1 chronic problem that is not stable  - Engaged in prescription drug management during visit (discussed any medication benefits, side effects, alternatives, etc.)      :[id number]}  The longitudinal plan of care for depression and ADHD was addressed during this visit. Due to the added complexity in care, I will continue to support Patricia in the subsequent management of this condition(s) and with the ongoing continuity of  care of this condition(s).           Psychiatry Individual Psychotherapy Note   Psychotherapy start time - 9:15 AM  Psychotherapy end time - 9:32 AM      Date treatment plan last reviewed with patient - 10/23/23  Subjective: This supportive psychotherapy session addressed issues related to goals of therapy and current psychosocial stressors. Patient's reaction: Preparatory in the context of mental status appropriate for ambulatory setting.    Interactive complexity indicated? No  Plan: RTC in timeframe noted above  Psychotherapy services during this visit included myself and the patient.   Treatment Plan      SYMPTOMS; PROBLEMS   MEASURABLE GOALS;    FUNCTIONAL IMPROVEMENT / GAINS INTERVENTIONS DISCHARGE CRITERIA   Depression: depressed mood  ADHD: fidgety    find enjoyment at least once a day and less distracting fidgets Supportive / psychodynamic symptom resolution     Patient not staffed in clinic.  Note will be reviewed and signed by supervisor Dr. Ruiz.      The longitudinal plan of care for Patricia bush: major depressive disorder and attention deficit was addressed during this visit. Due to the added complexity in care, I will continue to support this patient in the subsequent management of this condition(s) and with the ongoing continuity of care of this condition(s).

## 2024-04-03 ENCOUNTER — VIRTUAL VISIT (OUTPATIENT)
Dept: RHEUMATOLOGY | Facility: CLINIC | Age: 64
End: 2024-04-03
Payer: COMMERCIAL

## 2024-04-03 ENCOUNTER — VIRTUAL VISIT (OUTPATIENT)
Dept: GASTROENTEROLOGY | Facility: CLINIC | Age: 64
End: 2024-04-03
Attending: STUDENT IN AN ORGANIZED HEALTH CARE EDUCATION/TRAINING PROGRAM
Payer: COMMERCIAL

## 2024-04-03 VITALS — WEIGHT: 189 LBS | HEIGHT: 63 IN | BODY MASS INDEX: 33.49 KG/M2

## 2024-04-03 VITALS — WEIGHT: 189 LBS | BODY MASS INDEX: 32.27 KG/M2 | HEIGHT: 64 IN

## 2024-04-03 DIAGNOSIS — M05.741 RHEUMATOID ARTHRITIS INVOLVING BOTH HANDS WITH POSITIVE RHEUMATOID FACTOR (H): ICD-10-CM

## 2024-04-03 DIAGNOSIS — M05.742 RHEUMATOID ARTHRITIS INVOLVING BOTH HANDS WITH POSITIVE RHEUMATOID FACTOR (H): ICD-10-CM

## 2024-04-03 DIAGNOSIS — Z79.899 HIGH RISK MEDICATION USE: ICD-10-CM

## 2024-04-03 DIAGNOSIS — K58.2 IRRITABLE BOWEL SYNDROME WITH BOTH CONSTIPATION AND DIARRHEA: ICD-10-CM

## 2024-04-03 PROCEDURE — 99213 OFFICE O/P EST LOW 20 MIN: CPT | Mod: 95 | Performed by: PHYSICIAN ASSISTANT

## 2024-04-03 PROCEDURE — 99203 OFFICE O/P NEW LOW 30 MIN: CPT | Mod: 95 | Performed by: INTERNAL MEDICINE

## 2024-04-03 RX ORDER — SULFASALAZINE 500 MG/1
1000 TABLET, DELAYED RELEASE ORAL 2 TIMES DAILY
Qty: 360 TABLET | Refills: 0 | Status: SHIPPED | OUTPATIENT
Start: 2024-04-03 | End: 2024-07-15

## 2024-04-03 ASSESSMENT — PAIN SCALES - GENERAL
PAINLEVEL: NO PAIN (0)
PAINLEVEL: NO PAIN (0)

## 2024-04-03 NOTE — NURSING NOTE
Is the patient currently in the state of [place]? YES    Visit mode:VIDEO    If the visit is dropped, the patient can be reconnected by: VIDEO VISIT: Send to e-mail at: [email or web address]    Will anyone else be joining the visit? NO  (If patient encounters technical issues they should call [phone] :[phone])    How would you like to obtain your AVS? MyChart    Are changes needed to the allergy or medication list? No    Are refills needed on medications prescribed by this physician? YES    Reason for visit: Consult    Eric AYON

## 2024-04-03 NOTE — PROGRESS NOTES
"Virtual Visit Details    Type of service:  Video Visit     Originating Location (pt. Location): {video visit patient location:803621::\"Home\"}  {PROVIDER LOCATION On-site should be selected for visits conducted from your clinic location or adjoining [facility] hospital, academic office, or other nearby [facility] building. Off-site should be selected for all other provider locations, including home:168214}  Distant Location (provider location):  {virtual location provider:252763}  Platform used for Video Visit: {Virtual Visit Platforms:152125::\"[employer]\"}    "

## 2024-04-03 NOTE — PATIENT INSTRUCTIONS
After Visit Instructions:     Thank you for coming to [facility] for your care. It is my goal to partner with you to help you reach your optimal state of health.       Plan:     Schedule follow-up with Melissa El PA-C in 6 months  Labs: CBC, creatinine, albumin, AST, ALT every 3 months-next due at the beginning of May  Medications:  Continue Sulfasalazine 500mg: Take  2 tabs twice daily. Take this medication with food.          Melissa El PA-C  [facility]  [facility]    Contact information: [facility]  Clinic Number:  [phone]  Please call or send a [employer] message with any questions about your care

## 2024-04-03 NOTE — PROGRESS NOTES
HPI:    Patricia  presents today for a video visit to discuss abdominal pain and bloating that started about a month ago.  Had an ultrasound done which was unrevealing.  Was then told by her PCP that she may have IBS and was put on a low FODMAP diet - noticed that she seemed to react to sugar.  Was alternating between diarrhea and constipation.  Now things are better. Stools are back to normal. Abdominal pain and bloating have resolved. Symptoms lasted about 6 weeks in total.    No other concerns at present.    EGD/colonoscopy in 2021 - two hyperplastic polyps.  Anastomotic ulcerations noted.    Past Medical History:   Diagnosis Date    Anxiety     Arthritis     ASCUS of cervix with negative high risk HPV 01/23/2006    Asthma     Bursitis of shoulder 03/04/2010    Chronic rhinitis 03/25/2009    Coronary artery disease     Depression     Dyspnea on exertion     GERD (gastroesophageal reflux disease) 10/14/2008    Hypertension     Idiopathic cardiomyopathy (H) 01/08/2009    Indigestion     Itchy eyes     Left leg pain 06/16/2011    Motion sickness 02/27/2008    Nasal congestion     Pneumonia     Problems related to lack of adequate sleep     Ringing in ears     Sleep apnea 03/25/2009    Sneezing        Past Surgical History:   Procedure Laterality Date    BARIATRIC SURGERY      COLONOSCOPY N/A 11/11/2021    Procedure: COLONOSCOPY, WITH POLYPECTOMY;  Surgeon: Stephanie Meneses MD;  [place] [facility] [place]    DIABETES RESOURCES  As Child    Tonsillectomy    ENT SURGERY      ESOPHAGOSCOPY, GASTROSCOPY, DUODENOSCOPY (EGD), COMBINED N/A 11/11/2021    Procedure: ESOPHAGOGASTRODUODENOSCOPY, WITH BIOPSY;  Surgeon: Stephanie Meneses MD;  [place] [facility] [place]    HERNIA REPAIR      LAPAROSCOPIC BYPASS GASTRIC  10/16/2012    Procedure: LAPAROSCOPIC BYPASS GASTRIC;  laparoscopic reyna en y gastric bypass;  Surgeon: Nish Bradford MD;  Location: [facility] OR    TONSILLECTOMY      Uretural Sticture  As Child       Family History   Problem  Relation Age of Onset    Gastrointestinal Disease Mother         non cancerous pancreatic tumor    Hypertension Mother     Heart Disease Mother         A fib    Allergies Mother     Hypertension Father     Lipids Father     Alcohol/Drug Father         Abuse    Depression Father     Respiratory Father         COPD    Cardiovascular Father     Allergies Sister         Poison Ivy    Depression Brother     Hypertension Brother     Allergies Brother         Enviromental/ Bees    Breast Cancer Maternal Grandmother     Glaucoma Paternal Grandmother     Glaucoma Paternal Grandfather     Cerebrovascular Disease Other     Macular Degeneration Other     Diabetes No family hx of     Cancer - colorectal No family hx of     Retinal detachment No family hx of     Amblyopia No family hx of     Thyroid Disease No family hx of     Adrenal Disorder No family hx of        Social History     Tobacco Use    Smoking status: Never    Smokeless tobacco: Never   Substance Use Topics    Alcohol use: Yes     Comment: seldom        O:    Gen: no acute distress  HEENT: NCAT  Neck: normal ROM  Resp: nonlabored breathing  Neuro: no gross deficits  Psych: appropriate mood and affect    Assessment and Plan:    # bloating, abdominal pain, alternating diarrhea/constipation - ?IBS.  Symptoms have now fortunately resolved with some dietary changes.  No alarm symptoms.  Given that she has improved - will defer further evaluation at this time.  Patient encouraged to reach out if symptoms recur.    RTC as needed    Olga Lidia Gale DO     Video-Visit Details     Type of service:  Video Visit     Video Start Time: 1:29 PM  Video End Time (time video stopped): 1:38 PM    Originating Location (pt. Location): home     Distant Location (provider location): remote  Mode of Communication:  Video Conference via [employer]

## 2024-04-05 ENCOUNTER — OFFICE VISIT (OUTPATIENT)
Dept: AUDIOLOGY | Facility: CLINIC | Age: 64
End: 2024-04-05
Payer: COMMERCIAL

## 2024-04-05 DIAGNOSIS — H90.3 BILATERAL SENSORINEURAL HEARING LOSS: Primary | ICD-10-CM

## 2024-04-05 PROCEDURE — V5299 HEARING SERVICE: HCPCS

## 2024-04-05 NOTE — PROGRESS NOTES
AUDIOLOGY REPORT: HEARING AID RECHECK    SUBJECTIVE: Patricia Perez is a 63 year old female, :  1960, was seen in the Audiology Clinic at [facility] on 24 for a return check of their hearing aids.      Background:   Patient is here today with the complaint of hearing aids not pairing to her new cellphone.     Procedures:       SIDE: Both    : Phonak    TYPE: Slim L50-R TARA    S/N: Right: [id number]; Left: [id number]     WARRANTY: 2026    Visual inspection found hearing aids clean and with good sound output. Hearing aids were connected to patient's new cellphone for both phone calls and sebastián use. A practice phone call was completed to ensure proper pairing.     Plan:   Patient will return as needed for hearing aid concerns.     NO CHARGE VISIT    Malathi Dejesus, [facility]  Licensed Audiologist  [place] #[id number]  2024

## 2024-05-17 DIAGNOSIS — I73.00 RAYNAUD'S DISEASE WITHOUT GANGRENE: ICD-10-CM

## 2024-05-17 RX ORDER — NIFEDIPINE 30 MG
30 TABLET, EXTENDED RELEASE ORAL DAILY
Qty: 60 TABLET | Refills: 1 | Status: SHIPPED | OUTPATIENT
Start: 2024-05-17 | End: 2024-07-16

## 2024-05-28 ENCOUNTER — PATIENT OUTREACH (OUTPATIENT)
Dept: CARE COORDINATION | Facility: CLINIC | Age: 64
End: 2024-05-28
Payer: COMMERCIAL

## 2024-06-11 ENCOUNTER — PATIENT OUTREACH (OUTPATIENT)
Dept: CARE COORDINATION | Facility: CLINIC | Age: 64
End: 2024-06-11
Payer: COMMERCIAL

## 2024-06-17 PROBLEM — Z71.89 ADVANCED DIRECTIVES, COUNSELING/DISCUSSION: Status: RESOLVED | Noted: 2017-08-31 | Resolved: 2024-06-17

## 2024-06-24 ENCOUNTER — LAB (OUTPATIENT)
Dept: LAB | Facility: CLINIC | Age: 64
End: 2024-06-24
Payer: COMMERCIAL

## 2024-06-24 DIAGNOSIS — M05.742 RHEUMATOID ARTHRITIS INVOLVING BOTH HANDS WITH POSITIVE RHEUMATOID FACTOR (H): ICD-10-CM

## 2024-06-24 DIAGNOSIS — Z79.899 HIGH RISK MEDICATION USE: ICD-10-CM

## 2024-06-24 DIAGNOSIS — M05.741 RHEUMATOID ARTHRITIS INVOLVING BOTH HANDS WITH POSITIVE RHEUMATOID FACTOR (H): ICD-10-CM

## 2024-06-24 LAB
ALBUMIN SERPL BCG-MCNC: 4.2 G/DL (ref 3.5–5.2)
ALT SERPL W P-5'-P-CCNC: 39 U/L (ref 0–50)
AST SERPL W P-5'-P-CCNC: 33 U/L (ref 0–45)
CREAT SERPL-MCNC: 0.85 MG/DL (ref 0.51–0.95)
EGFRCR SERPLBLD CKD-EPI 2021: 75 ML/MIN/1.73M2
ERYTHROCYTE [DISTWIDTH] IN BLOOD BY AUTOMATED COUNT: 13 % (ref 10–15)
HCT VFR BLD AUTO: 39 % (ref 35–47)
HGB BLD-MCNC: 12.7 G/DL (ref 11.7–15.7)
MCH RBC QN AUTO: 32.2 PG (ref 26.5–33)
MCHC RBC AUTO-ENTMCNC: 32.6 G/DL (ref 31.5–36.5)
MCV RBC AUTO: 99 FL (ref 78–100)
PLATELET # BLD AUTO: 229 10E3/UL (ref 150–450)
RBC # BLD AUTO: 3.95 10E6/UL (ref 3.8–5.2)
WBC # BLD AUTO: 5.3 10E3/UL (ref 4–11)

## 2024-06-24 PROCEDURE — 85027 COMPLETE CBC AUTOMATED: CPT

## 2024-06-24 PROCEDURE — 82040 ASSAY OF SERUM ALBUMIN: CPT

## 2024-06-24 PROCEDURE — 82565 ASSAY OF CREATININE: CPT

## 2024-06-24 PROCEDURE — 36415 COLL VENOUS BLD VENIPUNCTURE: CPT

## 2024-06-24 PROCEDURE — 84460 ALANINE AMINO (ALT) (SGPT): CPT

## 2024-06-24 PROCEDURE — 84450 TRANSFERASE (AST) (SGOT): CPT

## 2024-06-27 ENCOUNTER — MYC REFILL (OUTPATIENT)
Dept: FAMILY MEDICINE | Facility: CLINIC | Age: 64
End: 2024-06-27
Payer: COMMERCIAL

## 2024-06-27 DIAGNOSIS — K58.2 IRRITABLE BOWEL SYNDROME WITH BOTH CONSTIPATION AND DIARRHEA: ICD-10-CM

## 2024-06-27 RX ORDER — DICYCLOMINE HCL 20 MG
20 TABLET ORAL EVERY 6 HOURS
Qty: 90 TABLET | Refills: 2 | Status: SHIPPED | OUTPATIENT
Start: 2024-06-27

## 2024-07-07 DIAGNOSIS — F33.42 RECURRENT MAJOR DEPRESSIVE DISORDER, IN FULL REMISSION (H): ICD-10-CM

## 2024-07-09 RX ORDER — VENLAFAXINE 75 MG/1
75 TABLET ORAL 3 TIMES DAILY
Qty: 90 TABLET | Refills: 0 | Status: SHIPPED | OUTPATIENT
Start: 2024-07-09 | End: 2024-07-30

## 2024-07-09 NOTE — TELEPHONE ENCOUNTER
"Date of Last Office Visit: 3/28/2024  [facility]  RTC: 3-4 mo  No shows: 0  Cancellations: 0  Date of Next Office Visit:    7/30/2024 8:00 AM (60 min)  Vaz[facility]   Arrive by:  7:45 AM   BRIEF CARE RETURN    URPSY (P [facility])   Bhavya Gomez MD     ------------------------------    Incoming refill from Pharmacy via interface  Medication requested:    venlafaxine (EFFEXOR) 75 MG tablet 90 tablet 3 3/28/2024 -- No   Sig - Route: Take 1 tablet (75 mg) by mouth 3 times daily - Oral     ------------------------------  From last visit note:   \"Continue venlafaxine to 75 mg TID \"       Refill decision: Medication refilled 30d per protocol.           "

## 2024-07-15 DIAGNOSIS — M05.741 RHEUMATOID ARTHRITIS INVOLVING BOTH HANDS WITH POSITIVE RHEUMATOID FACTOR (H): ICD-10-CM

## 2024-07-15 DIAGNOSIS — Z79.899 HIGH RISK MEDICATION USE: ICD-10-CM

## 2024-07-15 DIAGNOSIS — M05.742 RHEUMATOID ARTHRITIS INVOLVING BOTH HANDS WITH POSITIVE RHEUMATOID FACTOR (H): ICD-10-CM

## 2024-07-15 NOTE — TELEPHONE ENCOUNTER
LF: [birth year]  Qty: 360  Last Rheumatology Visit: 4/3/24  Next Rheumatology Visit Due: ~ 10/3/24  Next Scheduled Rheumatology Visit: none scheduled      Plan: 4/3/24  Schedule follow-up with Melissa El PA-C in 6 months  Labs: CBC, creatinine, albumin, AST, ALT every 3 months-next due at the beginning of May  Medications:  Continue Sulfasalazine 500mg: Take  2 tabs twice daily. Take this medication with food.

## 2024-07-16 DIAGNOSIS — I73.00 RAYNAUD'S DISEASE WITHOUT GANGRENE: ICD-10-CM

## 2024-07-16 DIAGNOSIS — E66.811 CLASS 1 OBESITY WITH SERIOUS COMORBIDITY AND BODY MASS INDEX (BMI) OF 33.0 TO 33.9 IN ADULT, UNSPECIFIED OBESITY TYPE: ICD-10-CM

## 2024-07-16 RX ORDER — TOPIRAMATE 25 MG/1
TABLET, FILM COATED ORAL
Qty: 60 TABLET | Refills: 3 | Status: CANCELLED | OUTPATIENT
Start: 2024-07-16

## 2024-07-16 RX ORDER — SULFASALAZINE 500 MG/1
1000 TABLET, DELAYED RELEASE ORAL 2 TIMES DAILY
Qty: 360 TABLET | Refills: 0 | Status: SHIPPED | OUTPATIENT
Start: 2024-07-16 | End: 2024-09-20

## 2024-07-16 RX ORDER — NIFEDIPINE 30 MG
30 TABLET, EXTENDED RELEASE ORAL DAILY
Qty: 60 TABLET | Refills: 1 | Status: SHIPPED | OUTPATIENT
Start: 2024-07-16

## 2024-07-16 NOTE — TELEPHONE ENCOUNTER
Last labs were done on 6/24/24. (WNL) Due for follow up appointment in October.    Refilled sulfasalazine per specialty scope of practice.    Vero Herman RN on 7/16/2024 at 9:18 AM

## 2024-07-16 NOTE — TELEPHONE ENCOUNTER
Refill denied at this time. Patient needs appointment with GABRIELA. [employer] message sent to patient to schedule appointment.

## 2024-07-16 NOTE — TELEPHONE ENCOUNTER
topiramate (TOPAMAX) 25 MG tablet   Last Written Prescription Date:  3/13/2024  Last Fill Quantity: 60,   # refills: 3  Last Office Visit : 3/13/2024  Future Office visit:  none  Routing topiramate (TOPAMAX) 25 MG tablet refill request to provider for review/approval because: Failed - medication not indicated for associated diagnosis

## 2024-07-17 ENCOUNTER — VIRTUAL VISIT (OUTPATIENT)
Dept: OBGYN | Facility: CLINIC | Age: 64
End: 2024-07-17
Payer: COMMERCIAL

## 2024-07-17 DIAGNOSIS — N95.1 SYMPTOMATIC MENOPAUSAL OR FEMALE CLIMACTERIC STATES: ICD-10-CM

## 2024-07-17 PROCEDURE — 99441 PR PHYSICIAN TELEPHONE EVALUATION 5-10 MIN: CPT | Mod: 93 | Performed by: OBSTETRICS & GYNECOLOGY

## 2024-07-17 RX ORDER — ESTRADIOL 1 MG/1
1 TABLET ORAL DAILY
Qty: 90 TABLET | Refills: 3 | Status: SHIPPED | OUTPATIENT
Start: 2024-07-17

## 2024-07-17 RX ORDER — MEDROXYPROGESTERONE ACETATE 2.5 MG/1
2.5 TABLET ORAL DAILY
Qty: 90 TABLET | Refills: 3 | Status: SHIPPED | OUTPATIENT
Start: 2024-07-17

## 2024-07-17 NOTE — PROGRESS NOTES
Patricia is a 64 year old who is being evaluated via a billable telephone visit.    What phone number would you like to be contacted at? [phone]  How would you like to obtain your AVS? Malachihart  Originating Location (pt. Location): Home    Distant Location (provider location):  On-site    Patricia is a 64 year old , with phone visit for follow up of hormone therapy.  She has not had any hot flashes.  She desires to continue with the hormone therapy.  Blood pressures are ok.  She has episodes of really low blood pressure once, every couple of months.          Past Medical History:   Diagnosis Date    Anxiety     Arthritis     ASCUS of cervix with negative high risk HPV 2006    Asthma     Bursitis of shoulder 2010    Chronic rhinitis 2009    Coronary artery disease     Depression     Dyspnea on exertion     GERD (gastroesophageal reflux disease) 10/14/2008    Hypertension     Idiopathic cardiomyopathy (H) 2009    Indigestion     Itchy eyes     Left leg pain 2011    Motion sickness 2008    Nasal congestion     Pneumonia     Problems related to lack of adequate sleep     Ringing in ears     Sleep apnea 2009    Sneezing        Past Surgical History:   Procedure Laterality Date    BARIATRIC SURGERY      COLONOSCOPY N/A 2021    Procedure: COLONOSCOPY, WITH POLYPECTOMY;  Surgeon: Stephanie Meneses MD;  Location: [facility] OR    DIABETES RESOURCES  As Child    Tonsillectomy    ENT SURGERY      ESOPHAGOSCOPY, GASTROSCOPY, DUODENOSCOPY (EGD), COMBINED N/A 2021    Procedure: ESOPHAGOGASTRODUODENOSCOPY, WITH BIOPSY;  Surgeon: Stephanie Meneses MD;  Location: [facility] OR    HERNIA REPAIR      LAPAROSCOPIC BYPASS GASTRIC  10/16/2012    Procedure: LAPAROSCOPIC BYPASS GASTRIC;  laparoscopic reyna en y gastric bypass;  Surgeon: Nish Bradford MD;  Location: [facility] OR    TONSILLECTOMY      Uretural Sticture  As Child          Allergies   Allergen Reactions    Atorvastatin      Leg myalgias        Current Outpatient Medications   Medication Sig Dispense Refill    acetaminophen (TYLENOL) 325 MG tablet Take 650 mg by mouth every 6 hours as needed for mild pain      albuterol (PROAIR HFA/PROVENTIL HFA/VENTOLIN HFA) 108 (90 Base) MCG/ACT inhaler Inhale 2 puffs into the lungs every 4 hours as needed for shortness of breath or wheezing 18 g 3    ARIPiprazole (ABILIFY) 2 MG tablet Take 2 tablets (4 mg) by mouth daily 60 tablet 3    aspirin 81 MG EC tablet Take 1 tablet (81 mg) by mouth daily 90 tablet 3    benzonatate (TESSALON) 100 MG capsule Take 1 capsule (100 mg) by mouth 3 times daily as needed for cough 30 capsule 0    blood glucose (NO BRAND SPECIFIED) lancing device Device to be used with lancets. 3 each 3    blood glucose (NO BRAND SPECIFIED) test strip Use to test blood sugar 3 times daily 300 strip 3    calcium carbonate-vitamin D (CALTRATE 600+D) 600-400 MG-UNIT CHEW Take 1 chew tab by mouth 2 times daily 180 tablet 3    carvedilol (COREG) 25 MG tablet Take 1 tablet (25 mg) by mouth 2 times daily (with meals) 180 tablet 3    cetirizine (ZYRTEC) 10 MG tablet Take 1 tablet (10 mg) by mouth daily 30 tablet 3    Cyanocobalamin (VITAMIN B-12 SL) Place under the tongue every other day       dicyclomine (BENTYL) 20 MG tablet Take 1 tablet (20 mg) by mouth every 6 hours 90 tablet 2    estradiol (ESTRACE) 1 MG tablet Take 1 tablet (1 mg) by mouth daily 90 tablet 3    ezetimibe (ZETIA) 10 MG tablet Take 1 tablet (10 mg) by mouth daily 90 tablet 3    isosorbide mononitrate (IMDUR) 30 MG 24 hr tablet Take 1 tablet (30 mg) by mouth daily 90 tablet 3    lisinopril (ZESTRIL) 5 MG tablet TAKE 1 TABLET(5 MG) BY MOUTH DAILY 90 tablet 3    meclizine (ANTIVERT) 25 MG tablet Take 1 tablet (25 mg) by mouth 3 times daily as needed 30 tablet 1    medroxyPROGESTERone (PROVERA) 2.5 MG tablet Take 1 tablet (2.5 mg) by mouth daily 90 tablet 3    Multiple Vitamin (MULTI-VITAMIN) per tablet Take 1 tablet by mouth daily. 100  tablet 3    NIFEdipine ER (ADALAT CC) 30 MG 24 hr tablet TAKE 1 TABLET(30 MG) BY MOUTH DAILY 60 tablet 1    omeprazole (PRILOSEC) 20 MG DR capsule Take 1 capsule (20 mg) by mouth daily 90 capsule 1    ondansetron (ZOFRAN ODT) 4 MG ODT tab Take 1 tablet (4 mg) by mouth every 8 hours as needed for nausea 30 tablet 2    simvastatin (ZOCOR) 40 MG tablet Take 1 tablet (40 mg) by mouth at bedtime 90 tablet 3    sulfaSALAzine ER (AZULFIDINE EN) 500 MG EC tablet Take 2 tablets (1,000 mg) by mouth 2 times daily Take this medication with food. Labs every 8-12 weeks. 360 tablet 0    topiramate (TOPAMAX) 25 MG tablet 25mg in the morning and 25mg at night 60 tablet 3    venlafaxine (EFFEXOR) 75 MG tablet Take 1 tablet (75 mg) by mouth 3 times daily 90 tablet 0       Social History     Socioeconomic History    Marital status:      Spouse name: Not on file    Number of children: 0    Years of education: 12+    Highest education level: Not on file   Occupational History     Employer: [employer]   Tobacco Use    Smoking status: Never    Smokeless tobacco: Never   Vaping Use    Vaping status: Never Used   Substance and Sexual Activity    Alcohol use: Yes     Comment: seldom    Drug use: No    Sexual activity: Not Currently     Partners: Male   Other Topics Concern    Parent/sibling w/ CABG, MI or angioplasty before 65F 55M? No   Social History Narrative    Dairy/d 2 servings/d.     Caffeine 3 servings/d    Exercise 2 x week    Sunscreen used - Yes    Seatbelts used - Yes    Working smoke/CO detectors in the home - Yes    Guns stored in the home - No    Self Breast Exams - Yes    Self Testicular Exam - NA    Eye Exam up to date - Yes    Dental Exam up to date - No    Pap Smear up to date - Yes    Mammogram up to date - Yes    PSA up to date - NA    Dexa Scan up to date - NA    Flex Sig / Colonoscopy up to date - Yes    Immunizations up to date - Yes    Abuse: Current or Past(Physical, Sexual or Emotional)- No    Do  you feel safe in your environment - Yes    LOWELL CAMPOSSly LAZO.    02/05/2009        May 19, 2021    ENVIRONMENTAL HISTORY: [place]. The home is heated with a forced air. They do have central air conditioning. The patient's bedroom is furnished with carpeting in bedroom, allergen mattress cover, and animals sleep in bedroom.  Pets inside the house include 1 cat(s). There is no history of cockroach or mice infestation. There is/are 0 smokers in the house.  The house does not have a damp basement, it's a split level.      Social Determinants of Health     Financial Resource Strain: Not on file   Food Insecurity: Not on file   Transportation Needs: Not on file   Physical Activity: Not on file   Stress: Not on file   Social Connections: Not on file   Interpersonal Safety: Not on file   Housing Stability: Not on file       Family History   Problem Relation Age of Onset    Gastrointestinal Disease Mother         non cancerous pancreatic tumor    Hypertension Mother     Heart Disease Mother         A fib    Allergies Mother     Hypertension Father     Lipids Father     Alcohol/Drug Father         Abuse    Depression Father     Respiratory Father         COPD    Cardiovascular Father     Allergies Sister         Poison Ivy    Depression Brother     Hypertension Brother     Allergies Brother         Enviromental/ Bees    Breast Cancer Maternal Grandmother     Glaucoma Paternal Grandmother     Glaucoma Paternal Grandfather     Cerebrovascular Disease Other     Macular Degeneration Other     Diabetes No family hx of     Cancer - colorectal No family hx of     Retinal detachment No family hx of     Amblyopia No family hx of     Thyroid Disease No family hx of     Adrenal Disorder No family hx of        Review of Systems:  10 point ROS of systems including Constitutional, Eyes, Respiratory, Cardiovascular, Gastroenterology, Genitourinary, Integumentary, Muscularskeletal, Psychiatric were  all negative except for pertinent positives noted in my HPI and in the PMH.      Exam  LMP 06/01/2012   General:  NAD  Alert  Oriented x 3  HEENT:  NC/AT, EOMI  Lungs:  Good respiratory effort         Assessment  Menopausal symptoms.   Hormone therapy surveillance     Plan  Refills of the estradiol and the MPA are sent to the pharmacy.   She takes her blood pressure, and should her blood pressure elevate, she is to let us know, as the elevated blood pressures would put her at increased risks for adverse outcomes, with the use of estrogen.  She voices understanding.   Questions seem to be answered.       Ananda Evans MD         Phone call duration 5:42 minutes  Time start 14:17  Time end 14:23     Signed Electronically by: Ananda Evans MD

## 2024-07-24 NOTE — PROGRESS NOTES
Rheumatology Clinic Visit  [facility]  ROSSY Haney     Patricia Perez MRN# [id number]   YOB: 1960 Age: 64 year old   Date of Visit: 7/29/2024  Primary care provider: Claire Garcia          Assessment and Plan:     1.  Rheumatoid arthritis with an elevated rheumatoid factor      Patient presents today for an acute visit.  For the last couple weeks she has had a flare in her right hand.  This includes pain, swelling and stiffness.  She also finds that the fourth finger is sticking.  Physical examination today does show active synovitis over her MCPs with associated tenderness.  This is the first flare that she has had since starting sulfasalazine.  At this time since overall her arthritis has been under excellent control we will continue on sulfasalazine but give her a course of prednisone to help with her current flare.  She will take 40 mg daily for 5 days.  If this does not help we could try a lower course of prednisone tapered over 3 weeks.  She was recommended to take this medication with food intake and limiting.  Follow-up with me in 2 months, sooner if needed.        Plan:   Schedule follow-up with Melissa El PA-C in 2 months.   Medication recommendations:   Prednisone 20mg: take 2 tabs daily for 5 days, Take with food and in AM-let me know if the flare does not completely resolve with this course      ROSSY Haney  Rheumatology         History of Present Illness:   Patricia Perez presents for evaluation of raynaud's, elevated rheumatoid factor, joint pain.     Interval history July 29, 2024:  Has had a flare of the right hand.  This has included swelling, pain and stiffness.  She is also noticing that her left fourth finger has been sticking.  The other day she did also notice some darkness over the hand but this is better today.    Interval history April 3, 2024:  She states that she has been good. She is getting over some bronchitis. She did need  antibiotics for it.      She has been having pain in her left thigh. She feels that it is a muscle pain. This has been ongoing for about 1 month. No injury. No swelling.     Interval history January 10, 2024:  She states that she is tolerating the sulfasalazine well. She is not having any soreness in her hands. She states that she has some stiffness but it does not take very long to loosen up. She has also started to use a larger  pen and finds this to be helpful.      Interval history September 12, 2023:  No significant hand pain.  She does have a lot of stiffness particularly in the mornings and after periods of rest.  Raynaud's is overall controlled.     HPI from consult of July 28, 2023:  She was diagnosed with Raynaud's and takes Nifedipine for it. She feels the Raynaud's is controlled. She gets some mild color changes but nothing like what it was in the past.     He states that she has thumb joint pain. She states that she thought it was carpal tunnel, but PT told her it was arthritis. She states that she feels achy in her right hand. Occasional aching the left hand. She has a lot of stiffness. She does get some swelling in the right hand. No skin rashes. No psoriasis. No ulcerative colitis or crohn's. She has had some fatigue. She states that she gets up she does have some stiffness. She states that it takes about 1 hour to loosen up. She states that her joints get worse if she is doing a lot of typing. The right middle finger that bothers her the most, it clicks and catches. She finds that she does  not have the hand strength to open things. By the end of the day, she can have more aching. She got some larger pens which has helped. No fevers. No dry eyes or dry mouth. No shortness of breath, unless related to her asthma. She does not notice any other significant joint pain. She does get some cramping in her feet.     Sister with Sjogren's.          Review of Systems:     Constitutional: negative  Skin:  negative  Eyes: negative  Ears/Nose/Throat: negative  Respiratory: No shortness of breath, dyspnea on exertion, cough, or hemoptysis  Cardiovascular: negative  Gastrointestinal: negative  Genitourinary: negative  Musculoskeletal: as above  Neurologic: negative  Psychiatric: negative  Hematologic/Lymphatic/Immunologic: negative  Endocrine: negative         Active Problem List:     Patient Active Problem List    Diagnosis Date Noted    Hypertension goal BP (blood pressure) < 130/80 05/23/2011     Priority: High    Hyperlipidemia LDL goal <100 05/09/2010     Priority: High    Idiopathic cardiomyopathy (H) 01/08/2009     Priority: High     See Dr. Soto      Irritable bowel syndrome with both constipation and diarrhea 02/20/2024     Priority: Medium    Class 2 severe obesity due to excess calories with serious comorbidity in adult (H) 06/27/2023     Priority: Medium    Raynaud's disease without gangrene 04/13/2023     Priority: Medium    Hypoglycemia 07/07/2022     Priority: Medium    Sciatica of right side 04/18/2022     Priority: Medium    Posterior vitreous detachment of both eyes 01/23/2021     Priority: Medium    Combined forms of age-related cataract, mild, of both eyes 01/23/2021     Priority: Medium    Glaucoma suspect of both eyes 01/23/2021     Priority: Medium    Syncope 01/07/2020     Priority: Medium    Acute pain of left knee 11/16/2018     Priority: Medium    Coronary artery disease involving native coronary artery of native heart without angina pectoris 08/06/2018     Priority: Medium    Vitamin D deficiency 03/01/2018     Priority: Medium    BPV (benign positional vertigo), unspecified laterality 08/31/2017     Priority: Medium    Adjustment disorder with depressed mood 06/22/2017     Priority: Medium    Decreased hearing of both ears 06/22/2017     Priority: Medium    Benign essential hypertension 06/15/2016     Priority: Medium    Dumping syndrome 09/22/2015     Priority: Medium    Myopia, bilateral  08/17/2015     Priority: Medium    Hand joint pain 01/14/2015     Priority: Medium    Mild persistent asthma 04/03/2014     Priority: Medium    Allergic rhinitis 02/27/2014     Priority: Medium     Problem list name updated by automated process. Provider to review      Attention deficit disorder of adult 02/27/2014     Priority: Medium    Hx of gastric bypass 08/21/2013     Priority: Medium    Hypovitaminosis D 08/21/2013     Priority: Medium    H/O bariatric surgery 07/08/2013     Priority: Medium    Elevated PTHrP level 06/15/2013     Priority: Medium    Class 1 obesity with serious comorbidity and body mass index (BMI) of 33.0 to 33.9 in adult, unspecified obesity type 06/12/2013     Priority: Medium    Pelvic pain in female 09/14/2011     Priority: Medium    Allergic rhinitis 09/09/2010     Priority: Medium    Sleep apnea 03/25/2009     Priority: Medium    GERD (gastroesophageal reflux disease) 10/14/2008     Priority: Medium    Motion sickness 02/27/2008     Priority: Medium    Dizziness and giddiness 02/05/2007     Priority: Medium    24 hour contact handout given 01/04/2012     Priority: Low     EMERGENCY CARE PLAN  Presenting Problem Signs and Symptoms Treatment Plan    Questions or conerns during clinic hours    I will call the clinic directly     Questions or conerns outside clinic hours    I will call the 24 hour nurse line at [phone]    Patient needs to schedule an appointment    I will call the 24 hour scheduling team at [phone] or clinic directly    Same day treatment     I will call the clinic first, nurse line if after hours, urgent care and express care if needed                                              Past Medical History:     Past Medical History:   Diagnosis Date    Anxiety     Arthritis     ASCUS of cervix with negative high risk HPV 01/23/2006    Asthma     Bursitis of shoulder 03/04/2010    Chronic rhinitis 03/25/2009    Coronary artery disease     Depression     Dyspnea on  exertion     GERD (gastroesophageal reflux disease) 10/14/2008    Hypertension     Idiopathic cardiomyopathy (H) 01/08/2009    Indigestion     Itchy eyes     Left leg pain 06/16/2011    Motion sickness 02/27/2008    Nasal congestion     Pneumonia     Problems related to lack of adequate sleep     Ringing in ears     Sleep apnea 03/25/2009    Sneezing      Past Surgical History:   Procedure Laterality Date    BARIATRIC SURGERY      COLONOSCOPY N/A 11/11/2021    Procedure: COLONOSCOPY, WITH POLYPECTOMY;  Surgeon: Stephanie Meneses MD;  Location: [facility] [place] OR    DIABETES RESOURCES  As Child    Tonsillectomy    ENT SURGERY      ESOPHAGOSCOPY, GASTROSCOPY, DUODENOSCOPY (EGD), COMBINED N/A 11/11/2021    Procedure: ESOPHAGOGASTRODUODENOSCOPY, WITH BIOPSY;  Surgeon: Stephanie Meneses MD;  Location: [facility] [place] OR    HERNIA REPAIR      LAPAROSCOPIC BYPASS GASTRIC  10/16/2012    Procedure: LAPAROSCOPIC BYPASS GASTRIC;  laparoscopic reyna en y gastric bypass;  Surgeon: Nish Bradford MD;  Location: [facility] OR    TONSILLECTOMY      Uretural Sticture  As Child            Social History:     Social History     Socioeconomic History    Marital status:      Spouse name: Not on file    Number of children: 0    Years of education: 12+    Highest education level: Not on file   Occupational History     Employer: [employer]   Tobacco Use    Smoking status: Never    Smokeless tobacco: Never   Vaping Use    Vaping status: Never Used   Substance and Sexual Activity    Alcohol use: Yes     Comment: seldom    Drug use: No    Sexual activity: Not Currently     Partners: Male   Other Topics Concern    Parent/sibling w/ CABG, MI or angioplasty before 65F 55M? No   Social History Narrative    Dairy/d 2 servings/d.     Caffeine 3 servings/d    Exercise 2 x week    Sunscreen used - Yes    Seatbelts used - Yes    Working smoke/CO detectors in the home - Yes    Guns stored in the home - No    Self Breast Exams - Yes    Self Testicular Exam - NA     Eye Exam up to date - Yes    Dental Exam up to date - No    Pap Smear up to date - Yes    Mammogram up to date - Yes    PSA up to date - NA    Dexa Scan up to date - NA    Flex Sig / Colonoscopy up to date - Yes    Immunizations up to date - Yes    Abuse: Current or Past(Physical, Sexual or Emotional)- No    Do you feel safe in your environment - Yes    LOWELL SMITH VIOLET.    02/05/2007        May 19, 2021    ENVIRONMENTAL HISTORY: [place]. The home is heated with a forced air. They do have central air conditioning. The patient's bedroom is furnished with carpeting in bedroom, allergen mattress cover, and animals sleep in bedroom.  Pets inside the house include 1 cat(s). There is no history of cockroach or mice infestation. There is/are 0 smokers in the house.  The house does not have a damp basement, it's a split level.      Social Determinants of Health     Financial Resource Strain: Not on file   Food Insecurity: Not on file   Transportation Needs: Not on file   Physical Activity: Not on file   Stress: Not on file   Social Connections: Not on file   Interpersonal Safety: Not on file   Housing Stability: Not on file          Family History:     Family History   Problem Relation Age of Onset    Gastrointestinal Disease Mother         non cancerous pancreatic tumor    Hypertension Mother     Heart Disease Mother         A fib    Allergies Mother     Hypertension Father     Lipids Father     Alcohol/Drug Father         Abuse    Depression Father     Respiratory Father         COPD    Cardiovascular Father     Allergies Sister         Poison Ivy    Depression Brother     Hypertension Brother     Allergies Brother         Enviromental/ Bees    Breast Cancer Maternal Grandmother     Glaucoma Paternal Grandmother     Glaucoma Paternal Grandfather     Cerebrovascular Disease Other     Macular Degeneration Other     Diabetes No family hx of     Cancer - colorectal No family hx of      Retinal detachment No family hx of     Amblyopia No family hx of     Thyroid Disease No family hx of     Adrenal Disorder No family hx of             Allergies:     Allergies   Allergen Reactions    Atorvastatin      Leg myalgias            Medications:     Current Outpatient Medications   Medication Sig Dispense Refill    acetaminophen (TYLENOL) 325 MG tablet Take 650 mg by mouth every 6 hours as needed for mild pain      albuterol (PROAIR HFA/PROVENTIL HFA/VENTOLIN HFA) 108 (90 Base) MCG/ACT inhaler Inhale 2 puffs into the lungs every 4 hours as needed for shortness of breath or wheezing 18 g 3    ARIPiprazole (ABILIFY) 2 MG tablet Take 2 tablets (4 mg) by mouth daily 60 tablet 3    aspirin 81 MG EC tablet Take 1 tablet (81 mg) by mouth daily 90 tablet 3    benzonatate (TESSALON) 100 MG capsule Take 1 capsule (100 mg) by mouth 3 times daily as needed for cough 30 capsule 0    blood glucose (NO BRAND SPECIFIED) lancing device Device to be used with lancets. 3 each 3    blood glucose (NO BRAND SPECIFIED) test strip Use to test blood sugar 3 times daily 300 strip 3    calcium carbonate-vitamin D (CALTRATE 600+D) 600-400 MG-UNIT CHEW Take 1 chew tab by mouth 2 times daily 180 tablet 3    carvedilol (COREG) 25 MG tablet Take 1 tablet (25 mg) by mouth 2 times daily (with meals) 180 tablet 3    cetirizine (ZYRTEC) 10 MG tablet Take 1 tablet (10 mg) by mouth daily 30 tablet 3    Cyanocobalamin (VITAMIN B-12 SL) Place under the tongue every other day       dicyclomine (BENTYL) 20 MG tablet Take 1 tablet (20 mg) by mouth every 6 hours 90 tablet 2    estradiol (ESTRACE) 1 MG tablet Take 1 tablet (1 mg) by mouth daily 90 tablet 3    ezetimibe (ZETIA) 10 MG tablet Take 1 tablet (10 mg) by mouth daily 90 tablet 3    isosorbide mononitrate (IMDUR) 30 MG 24 hr tablet Take 1 tablet (30 mg) by mouth daily 90 tablet 3    lisinopril (ZESTRIL) 5 MG tablet TAKE 1 TABLET(5 MG) BY MOUTH DAILY 90 tablet 3    meclizine (ANTIVERT) 25 MG  "tablet Take 1 tablet (25 mg) by mouth 3 times daily as needed 30 tablet 1    medroxyPROGESTERone (PROVERA) 2.5 MG tablet Take 1 tablet (2.5 mg) by mouth daily 90 tablet 3    Multiple Vitamin (MULTI-VITAMIN) per tablet Take 1 tablet by mouth daily. 100 tablet 3    NIFEdipine ER (ADALAT CC) 30 MG 24 hr tablet TAKE 1 TABLET(30 MG) BY MOUTH DAILY 60 tablet 1    omeprazole (PRILOSEC) 20 MG DR capsule Take 1 capsule (20 mg) by mouth daily 90 capsule 1    ondansetron (ZOFRAN ODT) 4 MG ODT tab Take 1 tablet (4 mg) by mouth every 8 hours as needed for nausea 30 tablet 2    simvastatin (ZOCOR) 40 MG tablet Take 1 tablet (40 mg) by mouth at bedtime 90 tablet 3    sulfaSALAzine ER (AZULFIDINE EN) 500 MG EC tablet Take 2 tablets (1,000 mg) by mouth 2 times daily Take this medication with food. Labs every 8-12 weeks. 360 tablet 0    topiramate (TOPAMAX) 25 MG tablet 25mg in the morning and 25mg at night 60 tablet 3    venlafaxine (EFFEXOR) 75 MG tablet Take 1 tablet (75 mg) by mouth 3 times daily 90 tablet 0            Physical Exam:   Height 1.6 m (5' 3\"), last menstrual period 06/01/2012, not currently breastfeeding.  Wt Readings from Last 6 Encounters:   04/03/24 85.7 kg (189 lb)   04/03/24 85.7 kg (189 lb)   03/24/24 85.7 kg (189 lb)   03/13/24 82.1 kg (181 lb)   02/20/24 87.5 kg (193 lb)   01/22/24 87.7 kg (193 lb 6.4 oz)   Exam limited as this was a video visit  Constitutional: well-developed, appearing stated age; cooperative  Eyes: nl conjunctiva, sclera  ENT: nl external ears, nose, hearing, lips,   Neck: no visible mass or thyroid enlargement  Resp: No shortness of breath with normal conversation  MSK: Active synovitis over her MCPs with associated tenderness.  Psych: nl judgement, orientation, memory, affect.           Data:   Imaging:  X-ray cervical spine 3/28/2022  IMPRESSION: Mild degenerative retrolisthesis of C3 upon C4 and minimal  degenerative anterolisthesis of C7 upon T1. Alignment otherwise  normal. " Vertebral body heights normal. No fractures. Moderate-severe  facet arthropathy throughout the cervical spine. Loss of disc space  height and degenerative endplate spurring at all levels of the  cervical spine with exception of the C2-C3 level.    X-ray lumbar spine 3/28/2022  IMPRESSION: Five lumbar type vertebrae. Moderate left convex curvature  of the lumbar spine centered at L2 has developed since the comparison  study. Mild degenerative retrolisthesis of L1 upon L2 and L2 upon L3  again noted. Degenerative grade 1 anterolisthesis of L4 upon L5 again  noted. Alignment otherwise normal. Vertebral body heights normal. No  evidence for recent fracture. Presumed chronic fracture deformity or  large Schmorl's node deformity at the anterior superior corner of the  L4 vertebral body again noted. Loss of disc space height and  degenerative endplate spurring at T12-L1, L1-L2 and L2-L3. Facet  arthropathy at L3-L4, L4-L5 and L5-S1.    X-ray right hand 6/30/2021  IMPRESSION: Moderate degenerative arthrosis at the STT and distal  radioulnar joints. There is cystic change at the proximal-medial  aspect of the lunate. This could relate to ulnolunate impaction or  subchondral cystic change. Mild subchondral cystic change at the  proximal aspect of the triquetrum. Mild third MCP joint space  narrowing. No fracture identified.     X-ray right foot.  12/29/2020  IMPRESSION:  1.  Moderate right second TMT degenerative arthrosis.  2.  Mild right first MTP degenerative arthrosis.  3.  Small osteophyte at the lateral margin of the fifth metatarsal  head.  4.  Plantar calcaneal spur.  5.  No fracture.  6.  Second hammertoe deformity.    X-ray bilateral hand 7/28/2023  IMPRESSION:   1.  No definite osseous erosions. Scattered periarticular lucencies  bilaterally, such as within the distal ulna on the right and at the  triscaphe joint within the distal pole of the scaphoid bilaterally  which have characteristics most consistent with  subchondral cysts or  intraosseous ganglions as likely sequela of degenerative arthritis.  2.  Moderate second and third MCP joint space narrowing on the right  with periarticular soft tissue thickening which may reflect synovitis.  3.  No obvious periarticular osteopenia.  4.  Mild to moderate degenerative arthritis involving multiple joints  of both hands and wrists, greatest involving the first CMC and  triscaphe joints bilaterally.     MRI right hand 8/21/2023  IMPRESSION:  1.  While lack of contrast limits differentiation between synovitis and joint effusion, there is a large joint effusion versus synovitis within the wrist with multifocal subcortical cystic change and patchy areas of marrow edema. While the more advanced   arthrosis in the triscaphe and first CMC joints is typical of osteoarthritis, additional sites within the wrist may represent an inflammatory arthropathy although mechanical changes/osteoarthritis is not completely excluded.  2.  Scattered joint effusions versus synovitis within the MCP and PIP joints of the fingers can be seen with both mechanical as well as inflammatory causes.  3.  High-grade cartilage loss and patchy subchondral bone marrow edema with subcortical cystic change at the long finger MCP joint can be seen with an inflammatory arthropathy as well as mechanical change.  4.  Mild, scattered tenosynovitis within the flexor and extensor tendons, primarily at the wrist as described.   5.  Given the overall appearance of the hands and wrists, a combination of an inflammatory and degenerative arthritis is favored.    Laboratory:  4/14/2023  CRP less than 2.9  Rheumatoid factor 12  Sed rate 8  FAHEEM negative    7/28/2023  CRP less than 3.0  CCP antibody 1.1  Sed rate 9  SSA and SSB negative    12/29/2023  Creatinine 0.81, GFR 80  Albumin 4.0  ALT 54, AST 40  White blood cell count 4.4, hemoglobin 11.5, platelet count 243    2/2/2024  Creatinine 0.84, GFR 77  Albumin 4.2  ALT 36, AST  31  White blood cell count 5.6, hemoglobin 12.4, platelet count 249    6/24/2024  Creatinine 0.85, GFR 75  Albumin 4.2  ALT 39, AST 33  White blood cell count 5.3, hemoglobin 12.7, platelet count 229

## 2024-07-26 ENCOUNTER — OFFICE VISIT (OUTPATIENT)
Dept: RHEUMATOLOGY | Facility: CLINIC | Age: 64
End: 2024-07-26
Attending: PHYSICIAN ASSISTANT
Payer: COMMERCIAL

## 2024-07-26 VITALS — HEIGHT: 63 IN | BODY MASS INDEX: 33.48 KG/M2

## 2024-07-26 DIAGNOSIS — M05.741 RHEUMATOID ARTHRITIS INVOLVING BOTH HANDS WITH POSITIVE RHEUMATOID FACTOR (H): Primary | ICD-10-CM

## 2024-07-26 DIAGNOSIS — M05.742 RHEUMATOID ARTHRITIS INVOLVING BOTH HANDS WITH POSITIVE RHEUMATOID FACTOR (H): Primary | ICD-10-CM

## 2024-07-26 PROCEDURE — 99213 OFFICE O/P EST LOW 20 MIN: CPT | Performed by: PHYSICIAN ASSISTANT

## 2024-07-26 RX ORDER — PREDNISONE 20 MG/1
40 TABLET ORAL DAILY
Qty: 10 TABLET | Refills: 0 | Status: SHIPPED | OUTPATIENT
Start: 2024-07-26 | End: 2024-07-31

## 2024-07-26 ASSESSMENT — PAIN SCALES - GENERAL: PAINLEVEL: SEVERE PAIN (7)

## 2024-07-26 NOTE — PATIENT INSTRUCTIONS
After Visit Instructions:     Thank you for coming to [facility] for your care. It is my goal to partner with you to help you reach your optimal state of health.       Plan:     Schedule follow-up with Melissa El PA-C in 2 months.   Medication recommendations:   Prednisone 20mg: take 2 tabs daily for 5 days, Take with food and in AM-let me know if the flare does not completely resolve with this course    Melissa El PA-C  [facility]  [facility]    Contact information: [facility]  Clinic Number:  [phone]  Please call or send a Joyride message with any questions about your care

## 2024-07-30 ENCOUNTER — VIRTUAL VISIT (OUTPATIENT)
Dept: PSYCHIATRY | Facility: CLINIC | Age: 64
End: 2024-07-30
Attending: PSYCHIATRY & NEUROLOGY
Payer: COMMERCIAL

## 2024-07-30 DIAGNOSIS — R41.840 ATTENTION DEFICIT: Primary | ICD-10-CM

## 2024-07-30 DIAGNOSIS — F33.42 RECURRENT MAJOR DEPRESSIVE DISORDER, IN FULL REMISSION (H): ICD-10-CM

## 2024-07-30 PROCEDURE — 90792 PSYCH DIAG EVAL W/MED SRVCS: CPT | Mod: 95

## 2024-07-30 RX ORDER — VENLAFAXINE 75 MG/1
75 TABLET ORAL 3 TIMES DAILY
Qty: 90 TABLET | Refills: 3 | Status: SHIPPED | OUTPATIENT
Start: 2024-07-30 | End: 2024-10-01

## 2024-07-30 RX ORDER — ARIPIPRAZOLE 2 MG/1
4 TABLET ORAL DAILY
Qty: 60 TABLET | Refills: 3 | Status: SHIPPED | OUTPATIENT
Start: 2024-07-30 | End: 2024-10-01

## 2024-07-30 RX ORDER — VENLAFAXINE 37.5 MG/1
37.5 TABLET ORAL DAILY
Qty: 30 TABLET | Refills: 3 | Status: SHIPPED | OUTPATIENT
Start: 2024-07-30 | End: 2024-10-01

## 2024-07-30 NOTE — NURSING NOTE
Current patient location: [address] [place] [id number]    Is the patient currently in the state of [place]? YES    Visit mode:VIDEO    If the visit is dropped, the patient can be reconnected by: VIDEO VISIT: Send to e-mail at: [email or web address]    Will anyone else be joining the visit? NO  (If patient encounters technical issues they should call [phone]    How would you like to obtain your AVS? MyChart    Are changes needed to the allergy or medication list? Pt stated no changes to allergies and Pt stated no med changes    Are refills needed on medications prescribed by this physician? YES    Reason for visit: ROME CHINCHILLAF

## 2024-07-30 NOTE — PATIENT INSTRUCTIONS
Medications:   - increase morning dose of venlafaxine by 37.5 mg  AM: (75 mg + 37.5 mg)  AFTERNOON: 75 mg   BEDTIME: 75mg   - continue abilify 4mg daily      Order was placed for a therapy referral. [facility] will call you to coordinate your care as prescribed by your provider. If you don't hear from a representative within 2 business days, please call [phone].         **For crisis resources, please see the information at the end of this document**   Patient Education    Thank you for coming to the [facility].     Lab Testing:  If you had lab testing today and your results are reassuring or normal they will be mailed to you or sent through [employer] within 7 days. If the lab tests need quick action we will call you with the results. The phone number we will call with results is # [phone]. If this is not the best number please call our clinic and change the number.     Medication Refills:  If you need any refills please call your pharmacy and they will contact us. Our fax number for refills is [phone].   Three business days of notice are needed for general medication refill requests.   Five business days of notice are needed for controlled substance refill requests.   If you need to change to a different pharmacy, please contact the new pharmacy directly. The new pharmacy will help you get your medications transferred.     Contact Us:  Please call [phone] during business hours (8-5:00 M-F).   If you have medication related questions after clinic hours, or on the weekend, please call [phone].     Financial Assistance [phone]   Medical Records [phone]       MENTAL HEALTH CRISIS RESOURCES:  For a emergency help, please call 911 or go to the nearest Emergency Department.     Emergency Walk-In Options:   EmPATH Unit @ [facility]): [phone] - Specialized mental health emergency area designed to be calming  [facility] ([place]): [phone]  [facility] ([place]): [phone]  [facility] ([place]): [phone]    [facility] Crisis Information:   Jaci: [phone]  Tristan: [phone]  [place] ([facility]) - Adult: [phone]     Child: [phone]  [place] - Adult: [phone]     Child: [phone]  [place]: [phone]  List of all [facility] resources:   [email or web address]/people-[email or web address]-care/mental-health/resources/crisis-contacts.jsp    National Crisis Information:   Crisis Text Line: Text  [place]  to 145346  Suicide & Crisis Lifeline: 988  National Suicide Prevention Lifeline: 4-879-781-TALK (1-160.540.5289)       For online chat options, visit https://suicidepreventionlifeline.org/chat/  Poison Control Center: 1-559.713.7078  Trans Lifeline: 5-493-374-1697 - Hotline for transgender people of all ages  The [employer] Project: 9-431-966-9404 - Hotline for LGBT youth     For Non-Emergency Support:   Fast Tracker: Mental Health & Substance Use Disorder Resources -   https://[email or web address].[email or web address]/

## 2024-07-30 NOTE — PROGRESS NOTES
[facility]  Brief Care Team  TRANSFER of CARE DIAGNOSTIC ASSESSMENT       CARE TEAM:    PCP- LAKESHA ANNE  Therapist- None      Patricia is a 64 year old who uses the pronouns she, her, hers.                   Chief Concern    Anxious about what she needs to finish before FPC                 Assessment & Plan     Major depressive disorder, recurrent, in full remission, with seasonal component  ADHD     Patricia Perez is a 63 year old  woman with history of recurrent depression as well as reported history of ADHD who presents today for medication follow-up.     Today, Patricia reports her depression is well-managed on her current medication regimen with no concern for side effects, including fidgeting which had been mentioned in previous visits. She notes that her primary symptoms right now include anxiety and overwhelm regarding the tasks she has to complete for work before her planned FPC in November. She feels pressure to do this on the current timeline and feels sometimes like ADHD symptoms may be getting in the way. Discussed increasing morning dose of venlafaxine by 37.5mg daily to help target anxiety and possibly improve ADHD symptoms (focus) with norepinephrine effect. While she is looking forward to FPC, she also has pondered what this life transition will be like for her after working for 30 years and acknowledges that this brings some apprehension as well. Discussed recommendation for establishing with individual psychotherapy in anticipation of this transition. She agreed with this recommendation as well and was open to a referral.       Psychotropic Drug Interactions:    Serotonergic agents may enhance adverse/toxic effects of antipsychotic agents (risk for neuroleptic malignant syndrome)  while antipsychotics may enhance serotonergic effects of serotonergic agents (venlafaxine + abilify)    Management: routine monitoring    [place] was not checked today: not using controlled substances    Risk Statements:   Treatment Risk: Risks, benefits, alternatives and potential adverse effects have been discussed and are understood.   Safety Risk: Patricia did not appear to be an imminent safety risk to self or others.      Future considerations  - consider increasing venlafaxine to max dose as-tolerated, monitor BP  - hx gastric bypass in 2018, needs IR formulations     PLAN    1) Medications:   - Add 37.5mg tablet to 75mg tablet of venlafaxine for a total morning dose of 112.5 mg, continue venlafaxine 75 mg BID  - Continue aripiprazole 4 mg daily       Other pertinent medications not prescribed by psychiatry:  1.  Aspirin 81 mg a day.  2.  Carvedilol 25 mg twice daily.  3.  Prempro.  4.  Ezetimibe 10 mg a day.  5.  Hydralazine 10 mg twice daily.  6.  Isosorbide mononitrate 30 mg a day.  7.  Lisinopril 5 mg a day.  8.  Meclizine.  9.  Ondansetron.  10. Simvastatin 20 mg a day  11. otc Vitamin D   12. Topiramate 25 mg BID     2) Psychotherapy-  none    3) Next due:  Labs- last AP labs done January 2024, repeat annually if not collected by outside providers   EKG- 1/7/20: 447 ms @ 54 bpm  Rating scales- AIMS completed virtually 3/2024, score of 0.     4) Referrals: individual psychotherapy    5) Follow-up: Return to clinic in 6 weeks                    Pertinent Background    Per jered 10/23/23  Patricia first experienced the onset of mental health problems at age 13-14 years old when younger brother passed away. Did well in school and no discpline issues or legal issues during childhood. Father with alcohol use disorder.  Started mental health treatment in 1995. Niece passed away in MVA in 2020, COVID in 2020 both were disruptive to mental health (worsened depression). History gastric bypass in 2018.      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 [facility]   - depression has been stable, not up and down like before, has more better days than not   - denies SI, hasn't had any down, weepy days for a long time  - also has a bad habit of not letting go of things, hard to   - regarding movements, has always moved that way, doesn't feel restless   - sleep is pretty good, been on prednisone due to a flare up of arthritis, which made sleep a little worse   - thinks ADHD really bothers her right now because of the transition and trying to focus  - denies AH/VH, feels safe at home  - has been on venlafaxine 75 TID a couple of years -- used to take it once, twice, now 3 times a day, thinks more stable with this scheduling    Current Social History:  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   Living situation: lives at home with   Social/spiritual support: , friends, sister and mother      Pertinent Substance Use:  Alcohol: Yes: seldom, drink with dinner    Cannabis: No - has thought about CBD for anxiety, counseled on potential effects on sleep/worsening mood   Tobacco: No  Caffeine:  Yes: coffee in morning, mtn dew/ppop around lunch     Medical Review of Systems:   A comprehensive review of systems was performed and is negative other than noted above.  Arthritis flare up in hand - first since diagnosed got prednisone                   Physical Exam  (Vitals Only)    LMP 06/01/2012   Pulse Readings from Last 3 Encounters:   03/24/24 81   02/20/24 75   02/01/24 68     Wt Readings from Last 3 Encounters:   04/03/24 85.7 kg (189 lb)   04/03/24 85.7 kg (189 lb)   03/28/24 85.7 kg (189  "lb)     BP Readings from Last 3 Encounters:   03/24/24 128/75   02/20/24 116/68   02/01/24 113/74                      Mental Status Exam    Alertness: alert  and oriented  Appearance: well groomed  Behavior/Demeanor: cooperative and calm, with good  eye contact   Speech: normal and regular rate and rhythm  Language: intact and no problems  Psychomotor: normal or unremarkable  Mood: anxious  Affect: full range; congruent to: mood- yes, content- yes  Thought Process/Associations: unremarkable  Thought Content:  Reports none;  Denies suicidal ideation  Perception:  Reports none;  Denies hallucinations  Insight: excellent  Judgment: excellent  Cognition: does  appear grossly intact; formal cognitive testing was not done  Gait and Station: N/A (telehealth)                  Family History     Per assessment 10/23/23:   Father: AUD, depression  Mother: depression  Sister 1: depression  Sister 2: depression  Brother: depression      No known history of schizophrenia, suicides or bipolar disorder                Past Psychiatric History     Components copy-forwarded from prior documentation 10/23/23. Last reviewed with patient and updated with patient on 08/30/24:   SIB- None  Suicide Attempt [#, most recent]- None  Suicidal Ideation Hx- None     Violence/Aggression Hx- None  Psychosis Hx- None  Eating Disorder Hx- Binge eating disorder history. Endorses ongoing stress eating.   Other- None     Psych Hosp [#, most recent]- None  Commitment- None  ECT- None  Outpatient Programs - None   Other - N/A     SUBSTANCE USE HISTORY   Past Use- Reports history of occasional alcohol use from teens to 30's \"never drank to the point where I was overembibing\" (does not drink now d/t  being recovering alcoholic), tried marijuana occasionally In the 1970s   Treatment- #, most recent- no  Medical Consequences- no  Legal Consequences- no  Other- N/A                  Past Psychotropic Medication Trials     Medication Max Dose (mg) Dates " / Duration Helpful? DC Reason / Adverse Effects?   bupropion 300 5944-2355 N Initially helpful then lost efficacy   Adderall 25 4649-0589 N Lack of efficacy   sertraline 100 8438-2931 N Initially helpful then lost efficacy   Venlafaxine 225 2008-current Y  previously had ER, tried IR BID to help with mood in winter   aripiprazole 4 2021-current Y                                                                                 Past Medical History     Neurologic Hx [head injury, seizures, etc]:    Patient Active Problem List   Diagnosis    Dizziness and giddiness    Motion sickness    GERD (gastroesophageal reflux disease)    Idiopathic cardiomyopathy (H)    Sleep apnea    Hyperlipidemia LDL goal <100    Allergic rhinitis    Hypertension goal BP (blood pressure) < 130/80    Pelvic pain in female    24 hour contact handout given    Class 1 obesity with serious comorbidity and body mass index (BMI) of 33.0 to 33.9 in adult, unspecified obesity type    Elevated PTHrP level    H/O bariatric surgery    Hx of gastric bypass    Hypovitaminosis D    Allergic rhinitis    Attention deficit disorder of adult    Mild persistent asthma    Hand joint pain    Myopia, bilateral    Dumping syndrome    Benign essential hypertension    Adjustment disorder with depressed mood    Decreased hearing of both ears    BPV (benign positional vertigo), unspecified laterality    Vitamin D deficiency    Coronary artery disease involving native coronary artery of native heart without angina pectoris    Acute pain of left knee    Syncope    Posterior vitreous detachment of both eyes    Combined forms of age-related cataract, mild, of both eyes    Glaucoma suspect of both eyes    Sciatica of right side    Hypoglycemia    Raynaud's disease without gangrene    Class 2 severe obesity due to excess calories with serious comorbidity in adult (H)    Irritable bowel syndrome with both constipation and diarrhea                     Allergies      Atorvastatin                Medications     Current Outpatient Medications   Medication Sig Dispense Refill    acetaminophen (TYLENOL) 325 MG tablet Take 650 mg by mouth every 6 hours as needed for mild pain      albuterol (PROAIR HFA/PROVENTIL HFA/VENTOLIN HFA) 108 (90 Base) MCG/ACT inhaler Inhale 2 puffs into the lungs every 4 hours as needed for shortness of breath or wheezing 18 g 3    ARIPiprazole (ABILIFY) 2 MG tablet Take 2 tablets (4 mg) by mouth daily 60 tablet 3    aspirin 81 MG EC tablet Take 1 tablet (81 mg) by mouth daily 90 tablet 3    benzonatate (TESSALON) 100 MG capsule Take 1 capsule (100 mg) by mouth 3 times daily as needed for cough 30 capsule 0    blood glucose (NO BRAND SPECIFIED) lancing device Device to be used with lancets. 3 each 3    blood glucose (NO BRAND SPECIFIED) test strip Use to test blood sugar 3 times daily 300 strip 3    calcium carbonate-vitamin D (CALTRATE 600+D) 600-400 MG-UNIT CHEW Take 1 chew tab by mouth 2 times daily 180 tablet 3    carvedilol (COREG) 25 MG tablet Take 1 tablet (25 mg) by mouth 2 times daily (with meals) 180 tablet 3    cetirizine (ZYRTEC) 10 MG tablet Take 1 tablet (10 mg) by mouth daily 30 tablet 3    Cyanocobalamin (VITAMIN B-12 SL) Place under the tongue every other day       dicyclomine (BENTYL) 20 MG tablet Take 1 tablet (20 mg) by mouth every 6 hours 90 tablet 2    estradiol (ESTRACE) 1 MG tablet Take 1 tablet (1 mg) by mouth daily 90 tablet 3    ezetimibe (ZETIA) 10 MG tablet Take 1 tablet (10 mg) by mouth daily 90 tablet 3    isosorbide mononitrate (IMDUR) 30 MG 24 hr tablet Take 1 tablet (30 mg) by mouth daily 90 tablet 3    lisinopril (ZESTRIL) 5 MG tablet TAKE 1 TABLET(5 MG) BY MOUTH DAILY 90 tablet 3    meclizine (ANTIVERT) 25 MG tablet Take 1 tablet (25 mg) by mouth 3 times daily as needed 30 tablet 1    medroxyPROGESTERone (PROVERA) 2.5 MG tablet Take 1 tablet (2.5 mg) by mouth daily 90 tablet 3    Multiple Vitamin (MULTI-VITAMIN) per  tablet Take 1 tablet by mouth daily. 100 tablet 3    NIFEdipine ER (ADALAT CC) 30 MG 24 hr tablet TAKE 1 TABLET(30 MG) BY MOUTH DAILY 60 tablet 1    omeprazole (PRILOSEC) 20 MG DR capsule Take 1 capsule (20 mg) by mouth daily 90 capsule 1    ondansetron (ZOFRAN ODT) 4 MG ODT tab Take 1 tablet (4 mg) by mouth every 8 hours as needed for nausea 30 tablet 2    predniSONE (DELTASONE) 20 MG tablet Take 2 tablets (40 mg) by mouth daily for 5 days Take with food. 10 tablet 0    simvastatin (ZOCOR) 40 MG tablet Take 1 tablet (40 mg) by mouth at bedtime 90 tablet 3    sulfaSALAzine ER (AZULFIDINE EN) 500 MG EC tablet Take 2 tablets (1,000 mg) by mouth 2 times daily Take this medication with food. Labs every 8-12 weeks. 360 tablet 0    topiramate (TOPAMAX) 25 MG tablet 25mg in the morning and 25mg at night 60 tablet 3    venlafaxine (EFFEXOR) 75 MG tablet Take 1 tablet (75 mg) by mouth 3 times daily 90 tablet 0                     Data         10/23/2023     3:05 PM 3/28/2024     7:59 AM 7/29/2024     4:24 PM   PROMIS-10 Total Score w/o Sub Scores   PROMIS TOTAL - SUBSCORES 29 29 29         4/13/2022    11:36 AM   CAGE-AID Total Score   Total Score 0   Total Score MyChart 0 (A total score of 2 or greater is considered clinically significant)         4/21/2023     8:53 AM 5/25/2023     7:56 AM 10/23/2023     3:02 PM   PHQ-9 SCORE   PHQ-9 Total Score MyChart 5 (Mild depression) 3 (Minimal depression) 5 (Mild depression)   PHQ-9 Total Score 5 3 5         9/3/2020    11:56 AM 6/30/2021     8:15 AM 3/28/2022    12:45 PM   PRISCILLA-7 SCORE   Total Score   7 (mild anxiety)   Total Score 10 3 7       Liver/Kidney Function, TSH Metabolic Blood counts   Recent Labs   Lab Test 06/24/24  0807 02/02/24  0731 02/01/24  1322   AST 33 31 35   ALT 39 36 41   ALKPHOS  --   --  98   CR 0.85 0.84 0.83     Recent Labs   Lab Test 01/19/24  0731   TSH 2.06    Recent Labs   Lab Test 01/19/24  0731   CHOL 205*   TRIG 61   LDL 86        Recent Labs    Lab Test 03/10/23  1146   A1C 5.5     Recent Labs   Lab Test 02/01/24  1322   GLC 93    Recent Labs   Lab Test 06/24/24  0807   WBC 5.3   HGB 12.7   HCT 39.0   MCV 99                  PROVIDER: Bhavya Gomez MD    Patient staffed in clinic with Dr. Rodney who will sign the note.  Supervisor is Dr. Ruiz.

## 2024-07-30 NOTE — PROGRESS NOTES
Virtual Visit Details    Type of service:  Video Visit   Start: 0805  End: 0848    Originating Location (pt. Location): Home    Distant Location (provider location):  On-site  Platform used for Video Visit: Shruthi

## 2024-08-10 ENCOUNTER — HEALTH MAINTENANCE LETTER (OUTPATIENT)
Age: 64
End: 2024-08-10

## 2024-08-16 ENCOUNTER — OFFICE VISIT (OUTPATIENT)
Dept: URGENT CARE | Facility: URGENT CARE | Age: 64
End: 2024-08-16
Payer: COMMERCIAL

## 2024-08-16 ENCOUNTER — ANCILLARY PROCEDURE (OUTPATIENT)
Dept: GENERAL RADIOLOGY | Facility: CLINIC | Age: 64
End: 2024-08-16
Attending: PHYSICIAN ASSISTANT
Payer: COMMERCIAL

## 2024-08-16 VITALS — HEART RATE: 81 BPM | DIASTOLIC BLOOD PRESSURE: 85 MMHG | TEMPERATURE: 98.9 F | SYSTOLIC BLOOD PRESSURE: 142 MMHG
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
## 2024-08-16 DIAGNOSIS — S60.221A CONTUSION OF RIGHT HAND, INITIAL ENCOUNTER: ICD-10-CM

## 2024-08-16 DIAGNOSIS — S01.81XA CHIN LACERATION, INITIAL ENCOUNTER: ICD-10-CM

## 2024-08-16 DIAGNOSIS — S41.111A LACERATION OF RIGHT UPPER EXTREMITY, INITIAL ENCOUNTER: ICD-10-CM

## 2024-08-16 DIAGNOSIS — S80.01XA CONTUSION OF RIGHT KNEE, INITIAL ENCOUNTER: ICD-10-CM

## 2024-08-16 DIAGNOSIS — T07.XXXA MULTIPLE CONTUSIONS: ICD-10-CM

## 2024-08-16 PROCEDURE — 73562 X-RAY EXAM OF KNEE 3: CPT | Mod: TC | Performed by: RADIOLOGY

## 2024-08-16 PROCEDURE — 73110 X-RAY EXAM OF WRIST: CPT | Mod: TC | Performed by: RADIOLOGY

## 2024-08-16 PROCEDURE — 12002 RPR S/N/AX/GEN/TRNK2.6-7.5CM: CPT | Performed by: PHYSICIAN ASSISTANT

## 2024-08-16 PROCEDURE — 12011 RPR F/E/E/N/L/M 2.5 CM/<: CPT | Performed by: PHYSICIAN ASSISTANT

## 2024-08-16 NOTE — PROGRESS NOTES
SUBJECTIVE:   Patricia Perez is a 64 year old female presenting with a chief complaint of   Chief Complaint   Patient presents with    Urgent Care     Right upper arm and right lower arm laceration from fall this evening.   Chin injury from fall this evening.        She is an established patient of [facility].  Patient presents with multiple lacerations after falling this evening - right wrist, chin and right inner, upper arm.  Tdap 10/08/2020.  Additionally, she has right wrist pain and right knee pain.  Patient fell as she was carrying a microwave to her car.  Her chin hit the microwave, as well as her right arm.  RHD.      Laceration    Mechanism of injury: fall  History provided by: Patient  Time of injury was 2 hours(s) ago.    This is a non-work related injury.    Associated symptoms: Weakness noticed by the patient right wrist and right knee    Last tetanus booster within 10 years: Yes    LACERATION EXAM:   Size of laceration: chin with less than 1 cm, right wrist, skin avulsion approximately 3 cm and jagged right upper arm with 4 cm laceration - linear centimeters  Characteristics of the laceration: clean, linear, and jagged  Depth of laceration: superficial  Tendon function intact: Yes  Sensation to light touch intact: Yes  Pulses/capillary refill intact: Yes  Foreign body: No    Picture included in patient's chart: no    PROCEDURE NOTE:  Anesthesia: LET was applied  Prepped and draped in the usual sterile fashion  Wound irrigated with sterile water  Wound was explored for any foreign bodies and evaluated for tendon, nerve, vessel or joint involvement.    Closure was simple and dermabond  Laceration was closed with 4 X 4.0 Nylon interrupted sutures  Bandage was applied  Patient tolerated the procedure well    Review of Systems    Past Medical History:   Diagnosis Date    Anxiety     Arthritis     ASCUS of cervix with negative high risk HPV 01/23/2006    Asthma     Bursitis of shoulder 03/04/2010    Chronic  rhinitis 03/25/2009    Coronary artery disease     Depression     Dyspnea on exertion     GERD (gastroesophageal reflux disease) 10/14/2008    Hypertension     Idiopathic cardiomyopathy (H) 01/08/2009    Indigestion     Itchy eyes     Left leg pain 06/16/2011    Motion sickness 02/27/2008    Nasal congestion     Pneumonia     Problems related to lack of adequate sleep     Ringing in ears     Sleep apnea 03/25/2009    Sneezing      Family History   Problem Relation Age of Onset    Gastrointestinal Disease Mother         non cancerous pancreatic tumor    Hypertension Mother     Heart Disease Mother         A fib    Allergies Mother     Hypertension Father     Lipids Father     Alcohol/Drug Father         Abuse    Depression Father     Respiratory Father         COPD    Cardiovascular Father     Allergies Sister         Poison Ivy    Depression Brother     Hypertension Brother     Allergies Brother         Enviromental/ Bees    Breast Cancer Maternal Grandmother     Glaucoma Paternal Grandmother     Glaucoma Paternal Grandfather     Cerebrovascular Disease Other     Macular Degeneration Other     Diabetes No family hx of     Cancer - colorectal No family hx of     Retinal detachment No family hx of     Amblyopia No family hx of     Thyroid Disease No family hx of     Adrenal Disorder No family hx of      Current Outpatient Medications   Medication Sig Dispense Refill    acetaminophen (TYLENOL) 325 MG tablet Take 650 mg by mouth every 6 hours as needed for mild pain      albuterol (PROAIR HFA/PROVENTIL HFA/VENTOLIN HFA) 108 (90 Base) MCG/ACT inhaler Inhale 2 puffs into the lungs every 4 hours as needed for shortness of breath or wheezing 18 g 3    ARIPiprazole (ABILIFY) 2 MG tablet Take 2 tablets (4 mg) by mouth daily 60 tablet 3    aspirin 81 MG EC tablet Take 1 tablet (81 mg) by mouth daily 90 tablet 3    benzonatate (TESSALON) 100 MG capsule Take 1 capsule (100 mg) by mouth 3 times daily as needed for cough 30  capsule 0    blood glucose (NO BRAND SPECIFIED) lancing device Device to be used with lancets. 3 each 3    blood glucose (NO BRAND SPECIFIED) test strip Use to test blood sugar 3 times daily 300 strip 3    calcium carbonate-vitamin D (CALTRATE 600+D) 600-400 MG-UNIT CHEW Take 1 chew tab by mouth 2 times daily 180 tablet 3    carvedilol (COREG) 25 MG tablet Take 1 tablet (25 mg) by mouth 2 times daily (with meals) 180 tablet 3    cetirizine (ZYRTEC) 10 MG tablet Take 1 tablet (10 mg) by mouth daily 30 tablet 3    Cyanocobalamin (VITAMIN B-12 SL) Place under the tongue every other day       dicyclomine (BENTYL) 20 MG tablet Take 1 tablet (20 mg) by mouth every 6 hours 90 tablet 2    estradiol (ESTRACE) 1 MG tablet Take 1 tablet (1 mg) by mouth daily 90 tablet 3    ezetimibe (ZETIA) 10 MG tablet Take 1 tablet (10 mg) by mouth daily 90 tablet 3    isosorbide mononitrate (IMDUR) 30 MG 24 hr tablet Take 1 tablet (30 mg) by mouth daily 90 tablet 3    lido-EPINEPHrine-tetracaine (LET) 4-0.18-0.5 % GEL topical gel Apply 3 mLs topically once for 1 dose      lisinopril (ZESTRIL) 5 MG tablet TAKE 1 TABLET(5 MG) BY MOUTH DAILY 90 tablet 3    meclizine (ANTIVERT) 25 MG tablet Take 1 tablet (25 mg) by mouth 3 times daily as needed 30 tablet 1    medroxyPROGESTERone (PROVERA) 2.5 MG tablet Take 1 tablet (2.5 mg) by mouth daily 90 tablet 3    Multiple Vitamin (MULTI-VITAMIN) per tablet Take 1 tablet by mouth daily. 100 tablet 3    NIFEdipine ER (ADALAT CC) 30 MG 24 hr tablet TAKE 1 TABLET(30 MG) BY MOUTH DAILY 60 tablet 1    omeprazole (PRILOSEC) 20 MG DR capsule Take 1 capsule (20 mg) by mouth daily 90 capsule 1    ondansetron (ZOFRAN ODT) 4 MG ODT tab Take 1 tablet (4 mg) by mouth every 8 hours as needed for nausea 30 tablet 2    simvastatin (ZOCOR) 40 MG tablet Take 1 tablet (40 mg) by mouth at bedtime 90 tablet 3    sulfaSALAzine ER (AZULFIDINE EN) 500 MG EC tablet Take 2 tablets (1,000 mg) by mouth 2 times daily Take this  medication with food. Labs every 8-12 weeks. 360 tablet 0    topiramate (TOPAMAX) 25 MG tablet 25mg in the morning and 25mg at night 60 tablet 3    venlafaxine (EFFEXOR) 37.5 MG tablet Take 1 tablet (37.5 mg) by mouth daily With 75 mg tablet of venlafaxine for a total morning dose of 112.5 mg 30 tablet 3    venlafaxine (EFFEXOR) 75 MG tablet Take 1 tablet (75 mg) by mouth 3 times daily 90 tablet 3     Social History     Tobacco Use    Smoking status: Never    Smokeless tobacco: Never   Substance Use Topics    Alcohol use: Yes     Comment: seldom       OBJECTIVE  BP (!) 142/85 (BP Location: Left arm, Patient Position: Sitting, Cuff Size: Adult Large)   Pulse 81   Temp 98.9  F (37.2  C) (Tympanic)   LMP 06/01/2012     Physical Exam  Vitals reviewed.   Constitutional:       General: She is not in acute distress.     Appearance: Normal appearance. She is obese. She is not ill-appearing or toxic-appearing.   Musculoskeletal:      Comments: Right knee:  large contusion to patella, no abrasion.  Full ROM.  No effusion, mild edema    Right wrist:  distal forearm with 3 cm  skin avulsion.  Normal ROM at wrist.  Tenderness to palpation at proximal wrist with large ecchymosis.  Mild edema.  No snuff box tenderness. Normal digit ROM.   Skin:     Findings: Bruising present.      Comments: Large bruising to chin, right hand, right knee and right inner arm around laceration, varying in size from 4 cm to 8 cm in diameter.   Neurological:      Mental Status: She is alert.         Labs:  Results for orders placed or performed in visit on 08/16/24 (from the past 24 hour(s))   XR Wrist Right G/E 3 Views    Narrative    EXAM: XR WRIST RIGHT G/E 3 VIEWS  LOCATION: [facility] [place]  DATE: 8/16/2024    INDICATION: Fall, initial encounter.  COMPARISON: 6/30/2021.      Impression    IMPRESSION: Anatomic alignment right wrist. No acute displaced right wrist fracture is identified. Progressive now severe joint space  narrowing at the STT joint with milder scattered joint space narrowing in the wrist including at the distal radioulnar   joint. Cystic change in the ulnar-sided lunate, ulnar head and triquetrum is similar to prior. Mild right wrist soft tissue swelling.   XR Knee Right 3 Views    Narrative    EXAM: XR KNEE RIGHT 3 VIEWS  LOCATION: [facility] [place]  DATE: 8/16/2024    INDICATION: Fall, initial encounter.  COMPARISON: None.      Impression    IMPRESSION: Severe joint space narrowing lateral aspect patellofemoral compartment right knee with bone-on-bone abutment and marginal spurring. No acute displaced right knee fracture or dislocation. Mild joint space narrowing in the medial compartment of   the right knee with marginal spurring. Mild right knee soft tissue swelling. No right knee joint effusion.     *Note: Due to a large number of results and/or encounters for the requested time period, some results have not been displayed. A complete set of results can be found in Results Review.       X-Ray was done, my findings are: Xrays reviewed by myself and independently interpreted.  Any significant discrepancies with official radiologic read, patient will be notified.      No fx.  DJD    ASSESSMENT:      ICD-10-CM    1. Fall, initial encounter  W19.XXXA XR Wrist Right G/E 3 Views     XR Knee Right 3 Views      2. Laceration of right upper extremity, initial encounter  S41.111A lido-EPINEPHrine-tetracaine (LET) 4-0.18-0.5 % GEL topical gel      3. Chin laceration, initial encounter  S01.81XA       4. Contusion of right hand, initial encounter  S60.221A Wrist/Arm/Hand Bracing Supplies Order Wrist Brace; Right; non-thumb spica      5. Multiple contusions  T07.XXXA       6. Contusion of right knee, initial encounter  S80.01XA            Medical Decision Making:    Differential Diagnosis:  Fracture, laceration, contusion    Serious Comorbid Conditions:  Adult:   reviewed    PLAN:    Laceration:    Keep  wound clean and dry for the next 24-48 hours  Ok to shower and get wound wet after 48 hours, but do not soak for 2 weeks  Wound follow-up: Return for suture removal in 14 days  May return to work/school as long as wound is kept clean and dry  Discussed the probability of scarring  Active range of motion exercises encouraged for finger lacerations    Patient will return immediately if they experience redness around the laceration, drainage, worsening pain, or fever.      Right hand/wrist placed in brace.  Prn.      Followup:    If not improving or if condition worsens, follow up with your Primary Care Provider, If not improving or if conditions worsens over the next 12-24 hours, go to the Emergency Department    There are no Patient Instructions on file for this visit.

## 2024-08-17 NOTE — PROGRESS NOTES
The following medication was given:     MEDICATION: Lidocaine HCL 4% / Tetracaine HCL 0.5% / Epinephrine Bitartrate 0.18% (LET)  ROUTE: Topical  SITE: Right Upper Inner Arm  DOSE: 3mL  LOT #: [id number]  :  Evan Purcell Date: 05/20/2024  EXPIRATION DATE:  10/17/2024  NDC: [address]-07    DINA Gauthier, Medical Assistant

## 2024-08-26 ENCOUNTER — PATIENT OUTREACH (OUTPATIENT)
Dept: CARE COORDINATION | Facility: CLINIC | Age: 64
End: 2024-08-26
Payer: COMMERCIAL

## 2024-08-30 ENCOUNTER — OFFICE VISIT (OUTPATIENT)
Dept: URGENT CARE | Facility: URGENT CARE | Age: 64
End: 2024-08-30
Payer: COMMERCIAL

## 2024-08-30 VITALS
OXYGEN SATURATION: 99 % | HEART RATE: 68 BPM | RESPIRATION RATE: 16 BRPM | WEIGHT: 194 LBS | SYSTOLIC BLOOD PRESSURE: 129 MMHG | DIASTOLIC BLOOD PRESSURE: 76 MMHG | BODY MASS INDEX: 34.37 KG/M2 | TEMPERATURE: 98.1 F

## 2024-08-30 DIAGNOSIS — Z48.02 ENCOUNTER FOR REMOVAL OF SUTURES: Primary | ICD-10-CM

## 2024-08-30 PROCEDURE — 99207 PR NO CHARGE LOS: CPT | Performed by: NURSE PRACTITIONER

## 2024-08-30 ASSESSMENT — ENCOUNTER SYMPTOMS: WOUND: 1

## 2024-08-30 NOTE — PROGRESS NOTES
Assessment & Plan       ICD-10-CM    1. Encounter for removal of sutures  Z48.02            Patient instructions: Apply topical antibiotic ointment for next week. Sunscreen if outside. Watch for any signs of infection.      Medical decision making:  Laceration is well approximated and healing well. 4 sutures removed without difficulty. Discussed monitoring for signs of infection, dehiscence, fevers, among others. As well as applying sunscreen to the area.     No follow-ups on file.    At the end of the encounter, I discussed results, diagnosis, medications. Discussed red flags for immediate return to clinic/ER, as well as indications for follow up if no improvement. Patient understood and agreed to plan. Patient was stable for discharge.    Jesusita Gomez is a 64 year old female who presents to clinic today the following health issues:  Chief Complaint   Patient presents with    Urgent Care    Suture Removal     Remove sutures from right arm     Pt here for suture removal in R arm. No redness, swelling or pain to the area.             Review of Systems   Skin:  Positive for wound.       Problem List:  2024-02: Irritable bowel syndrome with both constipation and diarrhea  2023-06: Class 2 severe obesity due to excess calories with serious   comorbidity in adult (H)  2023-06: Pain of right hand  2023-06: Carpal tunnel syndrome of right wrist  2023-04: Raynaud's disease without gangrene  2022-07: Hypoglycemia  2022-04: Sciatica of right side  2021-08: Chronic left-sided low back pain with left-sided sciatica  2021-01: Posterior vitreous detachment of both eyes  2021-01: Combined forms of age-related cataract, mild, of both eyes  2021-01: Glaucoma suspect of both eyes  2020-01: Syncope  2019-06: Neck pain  2019-04: Patellofemoral pain syndrome of left knee  2018-11: Acute pain of left knee  2018-08: Coronary artery disease involving native coronary artery of   native heart without angina pectoris  2018-03: Vitamin  D deficiency  2017-08: BPV (benign positional vertigo), unspecified laterality  2017-08: Advanced directives, counseling/discussion  2017-06: Adjustment disorder with depressed mood  2017-06: Decreased hearing of both ears  2017-04: Nuclear sclerosis of both eyes  2016-06: Benign essential hypertension  2015-09: Dumping syndrome  2015-08: Posterior vitreous detachment, left eye  2015-08: Myopia, bilateral  2015-01: Hand joint pain  2014-06: Low back pain  2014-04: Mild persistent asthma  2014-02: Allergic rhinitis  2014-02: Attention deficit disorder of adult  2013-08: Hx of gastric bypass  2013-08: Hypovitaminosis D  2013-08: HPTH (hyperparathyroidism) (H24)  2013-08: Heat intolerance  2013-07: H/O bariatric surgery  2013-06: Elevated PTHrP level  2013-06: Class 1 obesity with serious comorbidity and body mass index   (BMI) of 33.0 to 33.9 in adult, unspecified obesity type  2013-06: Menopausal syndrome (hot flashes)  2013-06: Ingrown toenail  2013-02: Bilirubin in urine  2012-11: Excessive thirst  2012-09: Advanced directives, counseling/discussion  2012-09: CARVAJAL (dyspnea on exertion)  2012-02: DUB (dysfunctional uterine bleeding)  2012-02: Muscle tension headache  2012-01: ETD (eustachian tube dysfunction)  2012-01: 24 hour contact handout given  2011-10: Morbid obesity (H)  2011-09: Health Care Home  2011-09: Pelvic pain in female  2011-06: Left leg pain  2011-05: Hypertension goal BP (blood pressure) < 130/80  2010-09: Moderate persistent asthma  2010-09: Allergic rhinitis  2010-05: Hyperlipidemia LDL goal <100  2010-03: Bursitis of Shoulder left shoulder  2009-03: Sleep apnea  2009-03: Chronic rhinitis  2009-01: Idiopathic cardiomyopathy (H)  2009-01: HTN (hypertension)  2008-10: GERD (gastroesophageal reflux disease)  2008-02: Pure hypercholesterolemia  2008-02: Motion sickness  2007-02: Dizziness and giddiness  2007-02: Contraceptive management  2004-11: Major depression, recurrent (H24)  2004-11: Esophageal  reflux      Past Medical History:   Diagnosis Date    Anxiety     Arthritis     ASCUS of cervix with negative high risk HPV 01/23/2006    Asthma     Bursitis of shoulder 03/04/2010    Chronic rhinitis 03/25/2009    Coronary artery disease     Depression     Dyspnea on exertion     GERD (gastroesophageal reflux disease) 10/14/2008    Hypertension     Idiopathic cardiomyopathy (H) 01/08/2009    Indigestion     Itchy eyes     Left leg pain 06/16/2011    Motion sickness 02/27/2008    Nasal congestion     Pneumonia     Problems related to lack of adequate sleep     Ringing in ears     Sleep apnea 03/25/2009    Sneezing        Social History     Tobacco Use    Smoking status: Never    Smokeless tobacco: Never   Substance Use Topics    Alcohol use: Yes     Comment: seldom           Objective    /76 (BP Location: Left arm, Patient Position: Sitting, Cuff Size: Adult Large)   Pulse 68   Temp 98.1  F (36.7  C) (Tympanic)   Resp 16   Wt 88 kg (194 lb)   LMP 06/01/2012   SpO2 99%   BMI 34.37 kg/m    Physical Exam  Constitutional:       General: She is not in acute distress.     Appearance: Normal appearance. She is not ill-appearing, toxic-appearing or diaphoretic.   Skin:     Findings: Wound present.      Comments: Well healing, well approximated repaired laceration to R arm with 4 sutures intact. No surrounding erythema, exudate or swelling noted.    Neurological:      Mental Status: She is alert.              ELIOT JONES CNP

## 2024-09-01 ENCOUNTER — OFFICE VISIT (OUTPATIENT)
Dept: URGENT CARE | Facility: URGENT CARE | Age: 64
End: 2024-09-01
Payer: COMMERCIAL

## 2024-09-01 ENCOUNTER — ANCILLARY PROCEDURE (OUTPATIENT)
Dept: GENERAL RADIOLOGY | Facility: CLINIC | Age: 64
End: 2024-09-01
Attending: PHYSICIAN ASSISTANT
Payer: COMMERCIAL

## 2024-09-01 VITALS
DIASTOLIC BLOOD PRESSURE: 71 MMHG | SYSTOLIC BLOOD PRESSURE: 113 MMHG | TEMPERATURE: 98 F | WEIGHT: 193 LBS | HEART RATE: 69 BPM | BODY MASS INDEX: 34.19 KG/M2 | RESPIRATION RATE: 16 BRPM | OXYGEN SATURATION: 98 %

## 2024-09-01 DIAGNOSIS — M25.562 ACUTE PAIN OF LEFT KNEE: Primary | ICD-10-CM

## 2024-09-01 DIAGNOSIS — M25.562 ACUTE PAIN OF LEFT KNEE: ICD-10-CM

## 2024-09-01 PROCEDURE — 73562 X-RAY EXAM OF KNEE 3: CPT | Mod: TC | Performed by: RADIOLOGY

## 2024-09-01 PROCEDURE — 99214 OFFICE O/P EST MOD 30 MIN: CPT | Performed by: PHYSICIAN ASSISTANT

## 2024-09-01 ASSESSMENT — PAIN SCALES - GENERAL: PAINLEVEL: SEVERE PAIN (6)

## 2024-09-01 NOTE — PATIENT INSTRUCTIONS
Rest-activity as tolerated  Ice your injury for 10 to 15 minutes 3-4 times a day  Use ace wrap or brace with activity.  Take this off to shower and at night to sleep.  Be sure the brace is not too tight and constricting blood flow.  Elevate, above the level of your heart, whenever possible  Tylenol and ibuprofen as needed for pain

## 2024-09-01 NOTE — PROGRESS NOTES
Assessment & Plan     Acute pain of left knee  - XR Knee Left 3 Views; Future  - Orthopedic  Referral; Future  - diclofenac (VOLTAREN) 1 % topical gel; Apply 2 g topically 4 times daily.  - Ankle/Knee Bracing Supplies Order Knee Sleeve/Brace; Left; Closed      Xray without acute fracture or malalignment by my read. Joint space narrowing and arthritic changes. We discussed symptomatic treatments including ice, elevation, rest, Tylenol and ibuprofen.  Follow-up to see orthopedics if symptoms significantly worsen or fail to improve.    Return in about 1 week (around 9/8/2024) for consult with ortho if not improving.     Kitty Coy PA-C  [facility]    Subjective   Patricia Perez is a 64 year old who presents for the following health issues     Patient presents with:  Knee Pain: Left knee pain.       DICK Gomez presents clinic today for evaluation of left knee pain.  2 days ago, she was walking in her home to answer the door when she suddenly felt pain in her left knee.  She does not remember stepping funny or doing anything out of the ordinary that would have caused this pain.  Since then, pain is progressively worsening in this area.  The pain is medial and posterior.  There is a small bruise in this area and some swelling.  2 weeks ago, she did have a fall and injured her right knee and her face and her right arm but did not injure her left knee during this fall.      Review of Systems   ROS negative except as stated above.      Objective    /71   Pulse 69   Temp 98  F (36.7  C) (Tympanic)   Resp 16   Wt 87.5 kg (193 lb)   LMP 06/01/2012   SpO2 98%   BMI 34.19 kg/m    Physical Exam   GENERAL: alert and no distress  MS: Left knee with minimal edema and bruising noted medially. Tenderness to palpation medially and posteriorly. No laxity noted.     No results found for any visits on 09/01/24.

## 2024-09-04 NOTE — PROGRESS NOTES
HISTORY OF PRESENT ILLNESS    Patricia Perez is a 64 year old female who is seen as an urgent care referral for evaluation of  left knee pain that has been present approximately 6 days.      HPI: She was walking in her home to answer the door when she suddenly felt pain in her left knee.Since then, pain is progressively worsening in this area. The pain is medial and posterior.   No twist but sudden pain  No snap   Had some trouble walking    Present symptoms:   Feels like something moving inside the  knee  Swelling  Walking is painful, not as bad as before  If moves or pivots wrong pain is worse.   Medial and posterior pain.     Treatments tried to this point: knee sleeve, nsaids, ice.     Orthopedic PMH: pain in the knee on occasion prior to that.  But minor    Other PMH:   Past Medical History:   Diagnosis Date    Anxiety     Arthritis     ASCUS of cervix with negative high risk HPV 01/23/2006    Asthma     Bursitis of shoulder 03/04/2010    Chronic rhinitis 03/25/2009    Coronary artery disease     Depression     Dyspnea on exertion     GERD (gastroesophageal reflux disease) 10/14/2008    Hypertension     Idiopathic cardiomyopathy (H) 01/08/2009    Indigestion     Itchy eyes     Left leg pain 06/16/2011    Motion sickness 02/27/2008    Nasal congestion     Pneumonia     Problems related to lack of adequate sleep     Ringing in ears     Sleep apnea 03/25/2009    Sneezing         Surgical:  has a past surgical history that includes Uretural Sticture (As Child); diabetes resources (As Child); hernia repair; ENT surgery; Laparoscopic bypass gastric (10/16/2012); Bariatric Surgery; tonsillectomy; Colonoscopy (N/A, 11/11/2021); and Esophagoscopy, gastroscopy, duodenoscopy (EGD), combined (N/A, 11/11/2021).    Family Hx:  family history includes Alcohol/Drug in her father; Allergies in her brother, mother, and sister; Breast Cancer in her maternal grandmother; Cardiovascular in her father; Cerebrovascular Disease in  "an other family member; Depression in her brother and father; Gastrointestinal Disease in her mother; Glaucoma in her paternal grandfather and paternal grandmother; Heart Disease in her mother; Hypertension in her brother, father, and mother; Lipids in her father; Macular Degeneration in an other family member; Respiratory in her father.    Social Hx:  reports that she has never smoked. She has never used smokeless tobacco. She reports current alcohol use. She reports that she does not use drugs.    REVIEW OF SYSTEMS:    CONSTITUTIONAL:  NEGATIVE for fever, chills, change in weight  INTEGUMENTARY/SKIN:  NEGATIVE for worrisome rashes, moles or lesions  EYES:  NEGATIVE for vision changes or irritation  ENT/MOUTH:  NEGATIVE for ear, mouth and throat problems  RESP:  NEGATIVE for significant cough or SOB  BREAST:  NEGATIVE for masses, tenderness or discharge  CV:  NEGATIVE for chest pain, palpitations or peripheral edema  GI:  NEGATIVE for nausea, abdominal pain, heartburn, or change in bowel habits  :  Negative   MUSCULOSKELETAL:  See HPI above  NEURO:  NEGATIVE for weakness, dizziness or paresthesias  ENDOCRINE:  NEGATIVE for temperature intolerance, skin/hair changes  HEME/ALLERGY/IMMUNE:  NEGATIVE for bleeding problems  PSYCHIATRIC:  NEGATIVE for changes in mood or affect    PHYSICAL EXAM:  /75 (BP Location: Left arm, Patient Position: Sitting, Cuff Size: Adult Large)   Pulse 83   Ht 1.613 m (5' 3.5\")   Wt 87.5 kg (193 lb)   LMP 06/01/2012   SpO2 97%   BMI 33.65 kg/m     GENERAL APPEARANCE: healthy, alert, and no distress   SKIN: no suspicious lesions or rashes  NEURO: Normal strength and tone, mentation intact, and speech normal  VASCULAR:  good pulses, and cappillary refill   LYMPH: no lymphadenopathy   PSYCH:  mentation appears normal and affect normal/bright  RESP: no increased work of breathing     KNEE EXAM:   Gait: antalgic  Alignment: normal     Patellofemoral joint: mild crepitations in the " patellofemoral joint. No lag  Effusion: mild  ROM: 0-120*  Tender: medial joint line and lateral joint line  Masses: none  Ligaments:  Lachman's Stable, Anterior and posterior drawer stable, stable to varus and valgus stress.  no pain with Varus/Valgus stress testing.      EXAM: XR KNEE LEFT 3 VIEWS  LOCATION: [facility] [place]  DATE: 9/1/2024  Moderate-severe tricompartmental degenerative arthritic changes, with joint space narrowing, subchondral sclerosis, marginal osteophytes, greatest in the patellofemoral compartment. Normal joint alignment. No fracture or erosion. No   significant knee joint effusion.     Impression: Osteoarthritis left knee  Acute onset pain could be due to occult fracture, stress reaction, meniscal tear or simple inflammation of an arthritic knee.     Plan:  since she is feeling a bit better, and not severe pain, there is no urgency to do an MRI.  Modify activities as needed  Ice, wrap as needed. Nsaids as needed   Consider MRI if no improvement in 3-4 weeks.      TING Morris MD  Dept. Orthopedic Surgery  [facility]

## 2024-09-05 ENCOUNTER — OFFICE VISIT (OUTPATIENT)
Dept: ORTHOPEDICS | Facility: CLINIC | Age: 64
End: 2024-09-05
Attending: PHYSICIAN ASSISTANT
Payer: COMMERCIAL

## 2024-09-05 VITALS
HEART RATE: 83 BPM | BODY MASS INDEX: 32.95 KG/M2 | DIASTOLIC BLOOD PRESSURE: 75 MMHG | WEIGHT: 193 LBS | SYSTOLIC BLOOD PRESSURE: 126 MMHG | HEIGHT: 64 IN | OXYGEN SATURATION: 97 %

## 2024-09-05 DIAGNOSIS — M25.562 ACUTE PAIN OF LEFT KNEE: ICD-10-CM

## 2024-09-05 DIAGNOSIS — M17.12 PRIMARY OSTEOARTHRITIS OF LEFT KNEE: Primary | ICD-10-CM

## 2024-09-05 PROCEDURE — 99203 OFFICE O/P NEW LOW 30 MIN: CPT | Performed by: ORTHOPAEDIC SURGERY

## 2024-09-05 ASSESSMENT — KOOS JR
HOW SEVERE IS YOUR KNEE STIFFNESS AFTER FIRST WAKING IN MORNING: MODERATE
TWISING OR PIVOTING ON KNEE: SEVERE
STRAIGHTENING KNEE FULLY: MODERATE
KOOS JR SCORING: 52.47
RISING FROM SITTING: MODERATE
STANDING UPRIGHT: MILD
GOING UP OR DOWN STAIRS: SEVERE
BENDING TO THE FLOOR TO PICK UP OBJECT: MILD

## 2024-09-05 ASSESSMENT — PAIN SCALES - GENERAL: PAINLEVEL: MODERATE PAIN (5)

## 2024-09-05 NOTE — LETTER
9/5/2024      Patricia Perze  [address] [facility] [address]      Dear Colleague,    Thank you for referring your patient, Patricia Perez, to the [facility]. Please see a copy of my visit note below.    HISTORY OF PRESENT ILLNESS    Patricia Perez is a 64 year old female who is seen as an urgent care referral for evaluation of  left knee pain that has been present approximately 6 days.      HPI: She was walking in her home to answer the door when she suddenly felt pain in her left knee.Since then, pain is progressively worsening in this area. The pain is medial and posterior.   No twist but sudden pain  No snap   Had some trouble walking    Present symptoms:   Feels like something moving inside the  knee  Swelling  Walking is painful, not as bad as before  If moves or pivots wrong pain is worse.   Medial and posterior pain.     Treatments tried to this point: knee sleeve, nsaids, ice.     Orthopedic PMH: pain in the knee on occasion prior to that.  But minor    Other PMH:   Past Medical History:   Diagnosis Date     Anxiety      Arthritis      ASCUS of cervix with negative high risk HPV 01/23/2006     Asthma      Bursitis of shoulder 03/04/2010     Chronic rhinitis 03/25/2009     Coronary artery disease      Depression      Dyspnea on exertion      GERD (gastroesophageal reflux disease) 10/14/2008     Hypertension      Idiopathic cardiomyopathy (H) 01/08/2009     Indigestion      Itchy eyes      Left leg pain 06/16/2011     Motion sickness 02/27/2008     Nasal congestion      Pneumonia      Problems related to lack of adequate sleep      Ringing in ears      Sleep apnea 03/25/2009     Sneezing         Surgical:  has a past surgical history that includes Uretural Sticture (As Child); diabetes resources (As Child); hernia repair; ENT surgery; Laparoscopic bypass gastric (10/16/2012); Bariatric Surgery; tonsillectomy; Colonoscopy (N/A, 11/11/2021); and Esophagoscopy, gastroscopy,  "duodenoscopy (EGD), combined (N/A, 11/11/2021).    Family Hx:  family history includes Alcohol/Drug in her father; Allergies in her brother, mother, and sister; Breast Cancer in her maternal grandmother; Cardiovascular in her father; Cerebrovascular Disease in an other family member; Depression in her brother and father; Gastrointestinal Disease in her mother; Glaucoma in her paternal grandfather and paternal grandmother; Heart Disease in her mother; Hypertension in her brother, father, and mother; Lipids in her father; Macular Degeneration in an other family member; Respiratory in her father.    Social Hx:  reports that she has never smoked. She has never used smokeless tobacco. She reports current alcohol use. She reports that she does not use drugs.    REVIEW OF SYSTEMS:    CONSTITUTIONAL:  NEGATIVE for fever, chills, change in weight  INTEGUMENTARY/SKIN:  NEGATIVE for worrisome rashes, moles or lesions  EYES:  NEGATIVE for vision changes or irritation  ENT/MOUTH:  NEGATIVE for ear, mouth and throat problems  RESP:  NEGATIVE for significant cough or SOB  BREAST:  NEGATIVE for masses, tenderness or discharge  CV:  NEGATIVE for chest pain, palpitations or peripheral edema  GI:  NEGATIVE for nausea, abdominal pain, heartburn, or change in bowel habits  :  Negative   MUSCULOSKELETAL:  See HPI above  NEURO:  NEGATIVE for weakness, dizziness or paresthesias  ENDOCRINE:  NEGATIVE for temperature intolerance, skin/hair changes  HEME/ALLERGY/IMMUNE:  NEGATIVE for bleeding problems  PSYCHIATRIC:  NEGATIVE for changes in mood or affect    PHYSICAL EXAM:  /75 (BP Location: Left arm, Patient Position: Sitting, Cuff Size: Adult Large)   Pulse 83   Ht 1.613 m (5' 3.5\")   Wt 87.5 kg (193 lb)   LMP 06/01/2012   SpO2 97%   BMI 33.65 kg/m     GENERAL APPEARANCE: healthy, alert, and no distress   SKIN: no suspicious lesions or rashes  NEURO: Normal strength and tone, mentation intact, and speech normal  VASCULAR:  good " pulses, and cappillary refill   LYMPH: no lymphadenopathy   PSYCH:  mentation appears normal and affect normal/bright  RESP: no increased work of breathing     KNEE EXAM:   Gait: antalgic  Alignment: normal     Patellofemoral joint: mild crepitations in the patellofemoral joint. No lag  Effusion: mild  ROM: 0-120*  Tender: medial joint line and lateral joint line  Masses: none  Ligaments:  Lachman's Stable, Anterior and posterior drawer stable, stable to varus and valgus stress.  no pain with Varus/Valgus stress testing.      EXAM: XR KNEE LEFT 3 VIEWS  LOCATION: [facility] [place]  DATE: 9/1/2024  Moderate-severe tricompartmental degenerative arthritic changes, with joint space narrowing, subchondral sclerosis, marginal osteophytes, greatest in the patellofemoral compartment. Normal joint alignment. No fracture or erosion. No   significant knee joint effusion.     Impression: Osteoarthritis left knee  Acute onset pain could be due to occult fracture, stress reaction, meniscal tear or simple inflammation of an arthritic knee.     Plan:  since she is feeling a bit better, and not severe pain, there is no urgency to do an MRI.  Modify activities as needed  Ice, wrap as needed. Nsaids as needed   Consider MRI if no improvement in 3-4 weeks.      TING Morris MD  Dept. Orthopedic Surgery  [facility]       Again, thank you for allowing me to participate in the care of your patient.        Sincerely,        Kelvin Morris MD

## 2024-09-13 ENCOUNTER — LAB (OUTPATIENT)
Dept: LAB | Facility: CLINIC | Age: 64
End: 2024-09-13
Payer: COMMERCIAL

## 2024-09-13 DIAGNOSIS — Z79.899 HIGH RISK MEDICATION USE: ICD-10-CM

## 2024-09-13 DIAGNOSIS — M05.742 RHEUMATOID ARTHRITIS INVOLVING BOTH HANDS WITH POSITIVE RHEUMATOID FACTOR (H): ICD-10-CM

## 2024-09-13 DIAGNOSIS — M05.741 RHEUMATOID ARTHRITIS INVOLVING BOTH HANDS WITH POSITIVE RHEUMATOID FACTOR (H): ICD-10-CM

## 2024-09-13 LAB
ALBUMIN SERPL BCG-MCNC: 4.1 G/DL (ref 3.5–5.2)
ALT SERPL W P-5'-P-CCNC: 44 U/L (ref 0–50)
AST SERPL W P-5'-P-CCNC: 34 U/L (ref 0–45)
CREAT SERPL-MCNC: 0.73 MG/DL (ref 0.51–0.95)
EGFRCR SERPLBLD CKD-EPI 2021: 90 ML/MIN/1.73M2
ERYTHROCYTE [DISTWIDTH] IN BLOOD BY AUTOMATED COUNT: 12.8 % (ref 10–15)
HCT VFR BLD AUTO: 37.6 % (ref 35–47)
HGB BLD-MCNC: 12.2 G/DL (ref 11.7–15.7)
MCH RBC QN AUTO: 32.7 PG (ref 26.5–33)
MCHC RBC AUTO-ENTMCNC: 32.4 G/DL (ref 31.5–36.5)
MCV RBC AUTO: 101 FL (ref 78–100)
PLATELET # BLD AUTO: 240 10E3/UL (ref 150–450)
RBC # BLD AUTO: 3.73 10E6/UL (ref 3.8–5.2)
WBC # BLD AUTO: 4.9 10E3/UL (ref 4–11)

## 2024-09-13 PROCEDURE — 84460 ALANINE AMINO (ALT) (SGPT): CPT

## 2024-09-13 PROCEDURE — 36415 COLL VENOUS BLD VENIPUNCTURE: CPT

## 2024-09-13 PROCEDURE — 84450 TRANSFERASE (AST) (SGOT): CPT

## 2024-09-13 PROCEDURE — 82040 ASSAY OF SERUM ALBUMIN: CPT

## 2024-09-13 PROCEDURE — 85027 COMPLETE CBC AUTOMATED: CPT

## 2024-09-13 PROCEDURE — 82565 ASSAY OF CREATININE: CPT

## 2024-09-17 NOTE — PROGRESS NOTES
Rheumatology Clinic Visit  [facility]  ROSSY Haney     Patricia Perez MRN# [id number]   YOB: 1960 Age: 64 year old   Date of Visit: 9/20/2024  Primary care provider: Claire Garcia          Assessment and Plan:     1.  Rheumatoid arthritis with an elevated rheumatoid factor  2. High Risk Medication use    Patient presents today for follow up.  She states that the prednisone that she was given for the joint flare was quite helpful.  Unfortunately she had a fall in August and hurt her right wrist.  She states that she continues to have pain over the right wrist and some swelling.  She is also noted some swelling over the second and third knuckle.  Physical examination today does show some mild tenderness to palpation over the right wrist with some soft tissue swelling.  She also has some swelling over the second and third MCP on the right.  She does feel that this is different than her RA flare we will have her continue on the sulfasalazine.  Will repeat the wrist x-ray given the worsening swelling.  If additional fracture show make sure to go to orthopedics.  If it does look like she has an active inflammatory changes recommend adding hydroxychloroquine to the sulfasalazine.  He was provided with information on this medication.  Otherwise follow-up with me in 3 months, sooner if needed.        Plan:   Schedule follow-up with Melissa El PA-C in 3 months  Labs: CBC, creatinine, albumin, AST, ALT every 3 months-unless we add on Hydroxychloroquine then we'd check them 1 month after starting  Imaging: right hand and wrist  Medications:  Continue Sulfasalazine 500mg: Take  2 tabs twice daily. Take this medication with food.  Consider Hydroxychloroquine 200mg: you would take 1 tab twice daily  # Hydroxychloroquine (Plaquenil) Risks and Benefits:  The risks and benefits of hydroxychloroquine were discussed in detail and the patient verbalized understanding; the patient also  verbalized agreement to get the required ophthalmologic toxicity monitoring.  The risks discussed include, but are not limited to, the risk for hypersensitivity, anaphylaxis, anaphylactoid reactions, irreversible retinal damage, rare hematologic reactions, and rare cardiomyopathy.  Patients with G6PD deficiency or hepatic impairment may be at an increased risk for adverse effects.  I encouraged reviewing the package insert and asking any questions about the medication.         ROSSY Haney  Rheumatology         History of Present Illness:   Patricia Perez presents for evaluation of raynaud's, elevated rheumatoid factor, joint pain.     Interval history September 20, 2024:  She had a fall and her right hand has been painful and she has had swelling over the wrist. This feels different than her RA flare. She feels that the Sulfasalazine helps.     Interval history July 29, 2024:  Has had a flare of the right hand.  This has included swelling, pain and stiffness.  She is also noticing that her left fourth finger has been sticking.  The other day she did also notice some darkness over the hand but this is better today.    Interval history April 3, 2024:  She states that she has been good. She is getting over some bronchitis. She did need antibiotics for it.      She has been having pain in her left thigh. She feels that it is a muscle pain. This has been ongoing for about 1 month. No injury. No swelling.     Interval history January 10, 2024:  She states that she is tolerating the sulfasalazine well. She is not having any soreness in her hands. She states that she has some stiffness but it does not take very long to loosen up. She has also started to use a larger  pen and finds this to be helpful.      Interval history September 12, 2023:  No significant hand pain.  She does have a lot of stiffness particularly in the mornings and after periods of rest.  Raynaud's is overall controlled.     HPI from  consult of July 28, 2023:  She was diagnosed with Raynaud's and takes Nifedipine for it. She feels the Raynaud's is controlled. She gets some mild color changes but nothing like what it was in the past.     He states that she has thumb joint pain. She states that she thought it was carpal tunnel, but PT told her it was arthritis. She states that she feels achy in her right hand. Occasional aching the left hand. She has a lot of stiffness. She does get some swelling in the right hand. No skin rashes. No psoriasis. No ulcerative colitis or crohn's. She has had some fatigue. She states that she gets up she does have some stiffness. She states that it takes about 1 hour to loosen up. She states that her joints get worse if she is doing a lot of typing. The right middle finger that bothers her the most, it clicks and catches. She finds that she does  not have the hand strength to open things. By the end of the day, she can have more aching. She got some larger pens which has helped. No fevers. No dry eyes or dry mouth. No shortness of breath, unless related to her asthma. She does not notice any other significant joint pain. She does get some cramping in her feet.     Sister with Sjogren's.          Review of Systems:     Constitutional: negative  Skin: negative  Eyes: negative  Ears/Nose/Throat: negative  Respiratory: No shortness of breath, dyspnea on exertion, cough, or hemoptysis  Cardiovascular: negative  Gastrointestinal: negative  Genitourinary: negative  Musculoskeletal: as above  Neurologic: negative  Psychiatric: negative  Hematologic/Lymphatic/Immunologic: negative  Endocrine: negative         Active Problem List:     Patient Active Problem List    Diagnosis Date Noted    Hypertension goal BP (blood pressure) < 130/80 05/23/2011     Priority: High    Hyperlipidemia LDL goal <100 05/09/2010     Priority: High    Idiopathic cardiomyopathy (H) 01/08/2009     Priority: High     See Dr. Soto      Irritable bowel  syndrome with both constipation and diarrhea 02/20/2024     Priority: Medium    Class 2 severe obesity due to excess calories with serious comorbidity in adult (H) 06/27/2023     Priority: Medium    Raynaud's disease without gangrene 04/13/2023     Priority: Medium    Hypoglycemia 07/07/2022     Priority: Medium    Sciatica of right side 04/18/2022     Priority: Medium    Posterior vitreous detachment of both eyes 01/23/2021     Priority: Medium    Combined forms of age-related cataract, mild, of both eyes 01/23/2021     Priority: Medium    Glaucoma suspect of both eyes 01/23/2021     Priority: Medium    Syncope 01/07/2020     Priority: Medium    Acute pain of left knee 11/16/2018     Priority: Medium    Coronary artery disease involving native coronary artery of native heart without angina pectoris 08/06/2018     Priority: Medium    Vitamin D deficiency 03/01/2018     Priority: Medium    BPV (benign positional vertigo), unspecified laterality 08/31/2017     Priority: Medium    Adjustment disorder with depressed mood 06/22/2017     Priority: Medium    Decreased hearing of both ears 06/22/2017     Priority: Medium    Benign essential hypertension 06/15/2016     Priority: Medium    Dumping syndrome 09/22/2015     Priority: Medium    Myopia, bilateral 08/17/2015     Priority: Medium    Hand joint pain 01/14/2015     Priority: Medium    Mild persistent asthma 04/03/2014     Priority: Medium    Allergic rhinitis 02/27/2014     Priority: Medium     Problem list name updated by automated process. Provider to review      Attention deficit disorder of adult 02/27/2014     Priority: Medium    Hx of gastric bypass 08/21/2013     Priority: Medium    Hypovitaminosis D 08/21/2013     Priority: Medium    H/O bariatric surgery 07/08/2013     Priority: Medium    Elevated PTHrP level 06/15/2013     Priority: Medium    Class 1 obesity with serious comorbidity and body mass index (BMI) of 33.0 to 33.9 in adult, unspecified obesity type  06/12/2013     Priority: Medium    Pelvic pain in female 09/14/2011     Priority: Medium    Allergic rhinitis 09/09/2010     Priority: Medium    Sleep apnea 03/25/2009     Priority: Medium    GERD (gastroesophageal reflux disease) 10/14/2008     Priority: Medium    Motion sickness 02/27/2008     Priority: Medium    Dizziness and giddiness 02/05/2007     Priority: Medium    24 hour contact handout given 01/04/2012     Priority: Low     EMERGENCY CARE PLAN  Presenting Problem Signs and Symptoms Treatment Plan    Questions or conerns during clinic hours    I will call the clinic directly     Questions or conerns outside clinic hours    I will call the 24 hour nurse line at [phone]    Patient needs to schedule an appointment    I will call the 24 hour scheduling team at [phone] or clinic directly    Same day treatment     I will call the clinic first, nurse line if after hours, urgent care and express care if needed                                              Past Medical History:     Past Medical History:   Diagnosis Date    Anxiety     Arthritis     ASCUS of cervix with negative high risk HPV 01/23/2006    Asthma     Bursitis of shoulder 03/04/2010    Chronic rhinitis 03/25/2009    Coronary artery disease     Depression     Dyspnea on exertion     GERD (gastroesophageal reflux disease) 10/14/2008    Hypertension     Idiopathic cardiomyopathy (H) 01/08/2009    Indigestion     Itchy eyes     Left leg pain 06/16/2011    Motion sickness 02/27/2008    Nasal congestion     Pneumonia     Problems related to lack of adequate sleep     Ringing in ears     Sleep apnea 03/25/2009    Sneezing      Past Surgical History:   Procedure Laterality Date    BARIATRIC SURGERY      COLONOSCOPY N/A 11/11/2021    Procedure: COLONOSCOPY, WITH POLYPECTOMY;  Surgeon: Stephanie Meneses MD;  [place] [facility] [place]    DIABETES RESOURCES  As Child    Tonsillectomy    ENT SURGERY      ESOPHAGOSCOPY, GASTROSCOPY, DUODENOSCOPY (EGD), COMBINED N/A  11/11/2021    Procedure: ESOPHAGOGASTRODUODENOSCOPY, WITH BIOPSY;  Surgeon: Stephanie Meneses MD;  Location: [facility] OR    HERNIA REPAIR      LAPAROSCOPIC BYPASS GASTRIC  10/16/2012    Procedure: LAPAROSCOPIC BYPASS GASTRIC;  laparoscopic reyna en y gastric bypass;  Surgeon: Nish Bradford MD;  Location: [facility] OR    TONSILLECTOMY      Uretural Sticture  As Child            Social History:     Social History     Socioeconomic History    Marital status:      Spouse name: Not on file    Number of children: 0    Years of education: 12+    Highest education level: Not on file   Occupational History     Employer: [employer]   Tobacco Use    Smoking status: Never    Smokeless tobacco: Never   Vaping Use    Vaping status: Never Used   Substance and Sexual Activity    Alcohol use: Yes     Comment: seldom    Drug use: No    Sexual activity: Not Currently     Partners: Male   Other Topics Concern    Parent/sibling w/ CABG, MI or angioplasty before 65F 55M? No   Social History Narrative    Dairy/d 2 servings/d.     Caffeine 3 servings/d    Exercise 2 x week    Sunscreen used - Yes    Seatbelts used - Yes    Working smoke/CO detectors in the home - Yes    Guns stored in the home - No    Self Breast Exams - Yes    Self Testicular Exam - NA    Eye Exam up to date - Yes    Dental Exam up to date - No    Pap Smear up to date - Yes    Mammogram up to date - Yes    PSA up to date - NA    Dexa Scan up to date - NA    Flex Sig / Colonoscopy up to date - Yes    Immunizations up to date - Yes    Abuse: Current or Past(Physical, Sexual or Emotional)- No    Do you feel safe in your environment - Yes    LOWELL ANDRESSly LAZO.    02/05/2007        May 19, 2021    ENVIRONMENTAL HISTORY: [place]. The home is heated with a forced air. They do have central air conditioning. The patient's bedroom is furnished with carpeting in bedroom, allergen mattress cover, and animals sleep in bedroom.   Pets inside the house include 1 cat(s). There is no history of cockroach or mice infestation. There is/are 0 smokers in the house.  The house does not have a damp basement, it's a split level.      Social Determinants of Health     Financial Resource Strain: Not on file   Food Insecurity: Not on file   Transportation Needs: Not on file   Physical Activity: Not on file   Stress: Not on file   Social Connections: Not on file   Interpersonal Safety: Not on file   Housing Stability: Not on file          Family History:     Family History   Problem Relation Age of Onset    Gastrointestinal Disease Mother         non cancerous pancreatic tumor    Hypertension Mother     Heart Disease Mother         A fib    Allergies Mother     Hypertension Father     Lipids Father     Alcohol/Drug Father         Abuse    Depression Father     Respiratory Father         COPD    Cardiovascular Father     Allergies Sister         Poison Ivy    Depression Brother     Hypertension Brother     Allergies Brother         Enviromental/ Bees    Breast Cancer Maternal Grandmother     Glaucoma Paternal Grandmother     Glaucoma Paternal Grandfather     Cerebrovascular Disease Other     Macular Degeneration Other     Diabetes No family hx of     Cancer - colorectal No family hx of     Retinal detachment No family hx of     Amblyopia No family hx of     Thyroid Disease No family hx of     Adrenal Disorder No family hx of             Allergies:     Allergies   Allergen Reactions    Atorvastatin      Leg myalgias            Medications:     Current Outpatient Medications   Medication Sig Dispense Refill    acetaminophen (TYLENOL) 325 MG tablet Take 650 mg by mouth every 6 hours as needed for mild pain      albuterol (PROAIR HFA/PROVENTIL HFA/VENTOLIN HFA) 108 (90 Base) MCG/ACT inhaler Inhale 2 puffs into the lungs every 4 hours as needed for shortness of breath or wheezing 18 g 3    ARIPiprazole (ABILIFY) 2 MG tablet Take 2 tablets (4 mg) by mouth daily  60 tablet 3    aspirin 81 MG EC tablet Take 1 tablet (81 mg) by mouth daily 90 tablet 3    benzonatate (TESSALON) 100 MG capsule Take 1 capsule (100 mg) by mouth 3 times daily as needed for cough 30 capsule 0    blood glucose (NO BRAND SPECIFIED) lancing device Device to be used with lancets. 3 each 3    blood glucose (NO BRAND SPECIFIED) test strip Use to test blood sugar 3 times daily 300 strip 3    calcium carbonate-vitamin D (CALTRATE 600+D) 600-400 MG-UNIT CHEW Take 1 chew tab by mouth 2 times daily 180 tablet 3    carvedilol (COREG) 25 MG tablet Take 1 tablet (25 mg) by mouth 2 times daily (with meals) 180 tablet 3    cetirizine (ZYRTEC) 10 MG tablet Take 1 tablet (10 mg) by mouth daily 30 tablet 3    Cyanocobalamin (VITAMIN B-12 SL) Place under the tongue every other day       diclofenac (VOLTAREN) 1 % topical gel Apply 2 g topically 4 times daily. 350 g 1    dicyclomine (BENTYL) 20 MG tablet Take 1 tablet (20 mg) by mouth every 6 hours 90 tablet 2    estradiol (ESTRACE) 1 MG tablet Take 1 tablet (1 mg) by mouth daily 90 tablet 3    ezetimibe (ZETIA) 10 MG tablet Take 1 tablet (10 mg) by mouth daily 90 tablet 3    isosorbide mononitrate (IMDUR) 30 MG 24 hr tablet Take 1 tablet (30 mg) by mouth daily 90 tablet 3    lisinopril (ZESTRIL) 5 MG tablet TAKE 1 TABLET(5 MG) BY MOUTH DAILY 90 tablet 3    meclizine (ANTIVERT) 25 MG tablet Take 1 tablet (25 mg) by mouth 3 times daily as needed 30 tablet 1    medroxyPROGESTERone (PROVERA) 2.5 MG tablet Take 1 tablet (2.5 mg) by mouth daily 90 tablet 3    Multiple Vitamin (MULTI-VITAMIN) per tablet Take 1 tablet by mouth daily. 100 tablet 3    NIFEdipine ER (ADALAT CC) 30 MG 24 hr tablet TAKE 1 TABLET(30 MG) BY MOUTH DAILY 60 tablet 1    omeprazole (PRILOSEC) 20 MG DR capsule Take 1 capsule (20 mg) by mouth daily 90 capsule 1    ondansetron (ZOFRAN ODT) 4 MG ODT tab Take 1 tablet (4 mg) by mouth every 8 hours as needed for nausea 30 tablet 2    simvastatin (ZOCOR) 40 MG  "tablet Take 1 tablet (40 mg) by mouth at bedtime 90 tablet 3    sulfaSALAzine ER (AZULFIDINE EN) 500 MG EC tablet Take 2 tablets (1,000 mg) by mouth 2 times daily. Take this medication with food. Labs every 8-12 weeks. 360 tablet 0    topiramate (TOPAMAX) 25 MG tablet 25mg in the morning and 25mg at night 60 tablet 3    venlafaxine (EFFEXOR) 37.5 MG tablet Take 1 tablet (37.5 mg) by mouth daily With 75 mg tablet of venlafaxine for a total morning dose of 112.5 mg 30 tablet 3    venlafaxine (EFFEXOR) 75 MG tablet Take 1 tablet (75 mg) by mouth 3 times daily 90 tablet 3            Physical Exam:   Blood pressure 101/63, pulse 62, height 1.613 m (5' 3.5\"), weight 88.5 kg (195 lb), last menstrual period 06/01/2012, SpO2 96%, not currently breastfeeding.  Wt Readings from Last 6 Encounters:   09/20/24 88.5 kg (195 lb)   09/05/24 87.5 kg (193 lb)   09/01/24 87.5 kg (193 lb)   08/30/24 88 kg (194 lb)   04/03/24 85.7 kg (189 lb)   04/03/24 85.7 kg (189 lb)   Exam limited as this was a video visit  Constitutional: well-developed, appearing stated age; cooperative  Eyes: nl conjunctiva, sclera  ENT: nl external ears, nose, hearing, lips,   Neck: no visible mass or thyroid enlargement  Resp: No shortness of breath with normal conversation  MSK: Soft tissue swelling over the right wrist.  Associated tenderness.  Some swelling over her second and third knuckles on the right.  Psych: nl judgement, orientation, memory, affect.           Data:   Imaging:  X-ray cervical spine 3/28/2022  IMPRESSION: Mild degenerative retrolisthesis of C3 upon C4 and minimal  degenerative anterolisthesis of C7 upon T1. Alignment otherwise  normal. Vertebral body heights normal. No fractures. Moderate-severe  facet arthropathy throughout the cervical spine. Loss of disc space  height and degenerative endplate spurring at all levels of the  cervical spine with exception of the C2-C3 level.    X-ray lumbar spine 3/28/2022  IMPRESSION: Five lumbar type " vertebrae. Moderate left convex curvature  of the lumbar spine centered at L2 has developed since the comparison  study. Mild degenerative retrolisthesis of L1 upon L2 and L2 upon L3  again noted. Degenerative grade 1 anterolisthesis of L4 upon L5 again  noted. Alignment otherwise normal. Vertebral body heights normal. No  evidence for recent fracture. Presumed chronic fracture deformity or  large Schmorl's node deformity at the anterior superior corner of the  L4 vertebral body again noted. Loss of disc space height and  degenerative endplate spurring at T12-L1, L1-L2 and L2-L3. Facet  arthropathy at L3-L4, L4-L5 and L5-S1.    X-ray right hand 6/30/2021  IMPRESSION: Moderate degenerative arthrosis at the STT and distal  radioulnar joints. There is cystic change at the proximal-medial  aspect of the lunate. This could relate to ulnolunate impaction or  subchondral cystic change. Mild subchondral cystic change at the  proximal aspect of the triquetrum. Mild third MCP joint space  narrowing. No fracture identified.     X-ray right foot.  12/29/2020  IMPRESSION:  1.  Moderate right second TMT degenerative arthrosis.  2.  Mild right first MTP degenerative arthrosis.  3.  Small osteophyte at the lateral margin of the fifth metatarsal  head.  4.  Plantar calcaneal spur.  5.  No fracture.  6.  Second hammertoe deformity.    X-ray bilateral hand 7/28/2023  IMPRESSION:   1.  No definite osseous erosions. Scattered periarticular lucencies  bilaterally, such as within the distal ulna on the right and at the  triscaphe joint within the distal pole of the scaphoid bilaterally  which have characteristics most consistent with subchondral cysts or  intraosseous ganglions as likely sequela of degenerative arthritis.  2.  Moderate second and third MCP joint space narrowing on the right  with periarticular soft tissue thickening which may reflect synovitis.  3.  No obvious periarticular osteopenia.  4.  Mild to moderate degenerative  arthritis involving multiple joints  of both hands and wrists, greatest involving the first CMC and  triscaphe joints bilaterally.     MRI right hand 8/21/2023  IMPRESSION:  1.  While lack of contrast limits differentiation between synovitis and joint effusion, there is a large joint effusion versus synovitis within the wrist with multifocal subcortical cystic change and patchy areas of marrow edema. While the more advanced   arthrosis in the triscaphe and first CMC joints is typical of osteoarthritis, additional sites within the wrist may represent an inflammatory arthropathy although mechanical changes/osteoarthritis is not completely excluded.  2.  Scattered joint effusions versus synovitis within the MCP and PIP joints of the fingers can be seen with both mechanical as well as inflammatory causes.  3.  High-grade cartilage loss and patchy subchondral bone marrow edema with subcortical cystic change at the long finger MCP joint can be seen with an inflammatory arthropathy as well as mechanical change.  4.  Mild, scattered tenosynovitis within the flexor and extensor tendons, primarily at the wrist as described.   5.  Given the overall appearance of the hands and wrists, a combination of an inflammatory and degenerative arthritis is favored.    Laboratory:  4/14/2023  CRP less than 2.9  Rheumatoid factor 12  Sed rate 8  FAHEEM negative    7/28/2023  CRP less than 3.0  CCP antibody 1.1  Sed rate 9  SSA and SSB negative    12/29/2023  Creatinine 0.81, GFR 80  Albumin 4.0  ALT 54, AST 40  White blood cell count 4.4, hemoglobin 11.5, platelet count 243    2/2/2024  Creatinine 0.84, GFR 77  Albumin 4.2  ALT 36, AST 31  White blood cell count 5.6, hemoglobin 12.4, platelet count 249    6/24/2024  Creatinine 0.85, GFR 75  Albumin 4.2  ALT 39, AST 33  White blood cell count 5.3, hemoglobin 12.7, platelet count 229    9/13/2024  Gran 0 point send 3, GFR 90  Albumin 4.1  ALT 44, AST 34  Thomas is a 4.9, hemoglobin 12.2,  platelet count 240

## 2024-09-20 ENCOUNTER — ANCILLARY PROCEDURE (OUTPATIENT)
Dept: GENERAL RADIOLOGY | Facility: CLINIC | Age: 64
End: 2024-09-20
Attending: PHYSICIAN ASSISTANT
Payer: COMMERCIAL

## 2024-09-20 ENCOUNTER — OFFICE VISIT (OUTPATIENT)
Dept: RHEUMATOLOGY | Facility: CLINIC | Age: 64
End: 2024-09-20
Payer: COMMERCIAL

## 2024-09-20 VITALS
OXYGEN SATURATION: 96 % | HEART RATE: 62 BPM | DIASTOLIC BLOOD PRESSURE: 63 MMHG | BODY MASS INDEX: 33.29 KG/M2 | WEIGHT: 195 LBS | SYSTOLIC BLOOD PRESSURE: 101 MMHG | HEIGHT: 64 IN

## 2024-09-20 DIAGNOSIS — M05.741 RHEUMATOID ARTHRITIS INVOLVING BOTH HANDS WITH POSITIVE RHEUMATOID FACTOR (H): ICD-10-CM

## 2024-09-20 DIAGNOSIS — M05.742 RHEUMATOID ARTHRITIS INVOLVING BOTH HANDS WITH POSITIVE RHEUMATOID FACTOR (H): ICD-10-CM

## 2024-09-20 DIAGNOSIS — Z79.899 HIGH RISK MEDICATION USE: ICD-10-CM

## 2024-09-20 PROCEDURE — 73130 X-RAY EXAM OF HAND: CPT | Mod: TC | Performed by: STUDENT IN AN ORGANIZED HEALTH CARE EDUCATION/TRAINING PROGRAM

## 2024-09-20 PROCEDURE — 73110 X-RAY EXAM OF WRIST: CPT | Mod: TC | Performed by: STUDENT IN AN ORGANIZED HEALTH CARE EDUCATION/TRAINING PROGRAM

## 2024-09-20 PROCEDURE — 99214 OFFICE O/P EST MOD 30 MIN: CPT | Performed by: PHYSICIAN ASSISTANT

## 2024-09-20 RX ORDER — SULFASALAZINE 500 MG/1
1000 TABLET, DELAYED RELEASE ORAL 2 TIMES DAILY
Qty: 360 TABLET | Refills: 0 | Status: SHIPPED | OUTPATIENT
Start: 2024-09-20

## 2024-09-20 NOTE — PATIENT INSTRUCTIONS
After Visit Instructions:     Thank you for coming to [facility] for your care. It is my goal to partner with you to help you reach your optimal state of health.       Plan:     Schedule follow-up with Melissa El PA-C in 3 months  Labs: CBC, creatinine, albumin, AST, ALT every 3 months-unless we add on Hydroxychloroquine then we'd check them 1 month after starting  Imaging: right hand and wrist  Medications:  Continue Sulfasalazine 500mg: Take  2 tabs twice daily. Take this medication with food.  Consider Hydroxychloroquine 200mg: you would take 1 tab twice daily  # Hydroxychloroquine (Plaquenil) Risks and Benefits:  The risks and benefits of hydroxychloroquine were discussed in detail and the patient verbalized understanding; the patient also verbalized agreement to get the required ophthalmologic toxicity monitoring.  The risks discussed include, but are not limited to, the risk for hypersensitivity, anaphylaxis, anaphylactoid reactions, irreversible retinal damage, rare hematologic reactions, and rare cardiomyopathy.  Patients with G6PD deficiency or hepatic impairment may be at an increased risk for adverse effects.  I encouraged reviewing the package insert and asking any questions about the medication.       Melissa El PA-C  [facility]  [facility]    Contact information: [facility]  Clinic Number:  [phone]  Please call or send a Singly message with any questions about your care   
The patient has been re-examined and I agree with the above assessment or I updated with my findings.

## 2024-09-20 NOTE — NURSING NOTE
"Chief Complaint   Patient presents with    Rheumatoid Arthritis     2 month recheck       Vitals:    09/20/24 0951   Weight: 88.5 kg (195 lb)   Height: 1.613 m (5' 3.5\")     Wt Readings from Last 1 Encounters:   09/20/24 88.5 kg (195 lb)       Ann-Marie Calvillo MA      "

## 2024-09-23 ENCOUNTER — PATIENT OUTREACH (OUTPATIENT)
Dept: CARE COORDINATION | Facility: CLINIC | Age: 64
End: 2024-09-23
Payer: COMMERCIAL

## 2024-09-27 ENCOUNTER — TELEPHONE (OUTPATIENT)
Dept: CARDIOLOGY | Facility: CLINIC | Age: 64
End: 2024-09-27
Payer: COMMERCIAL

## 2024-09-27 NOTE — TELEPHONE ENCOUNTER
Patient Contacted for the patient to call back and schedule the following:    Appointment type:  rtn cardio   Provider: shanti   Return date: 01/22/25  Specialty phone number: [phone] pt [phone]   Additional appointment(s) needed: labs   Additonal Notes: n/a

## 2024-09-30 ENCOUNTER — MYC MEDICAL ADVICE (OUTPATIENT)
Dept: ORTHOPEDICS | Facility: CLINIC | Age: 64
End: 2024-09-30
Payer: COMMERCIAL

## 2024-09-30 DIAGNOSIS — M25.562 ACUTE PAIN OF LEFT KNEE: Primary | ICD-10-CM

## 2024-09-30 DIAGNOSIS — M17.12 PRIMARY OSTEOARTHRITIS OF LEFT KNEE: ICD-10-CM

## 2024-09-30 NOTE — PROGRESS NOTES
[facility]  Brief Care Team  MEDICAL PROGRESS NOTE       CARE TEAM:    PCP- LAKESHA ANNE  Therapist- at [facility] - Oksana Bernstein    Patricia is a 64 year old who uses the pronouns she, her, hers.     Video Visit  Start: 0803  End: 0819    Patient location: Home  Provider location: [facility]  Video platform: [employer]                Assessment & Plan     Major depressive disorder, recurrent, in full remission, with seasonal component  ADHD     Hx of gastric bypass (2018)     Patricia Perez is a 64 year old  woman with history of recurrent depression as well as reported history of ADHD who presents today for medication follow-up.     Today, Patricia reports that in the interim she was able to establish with an individual psychotherapist to help process her current life transition of pending correction.  She reports that overall this has been very helpful in working through the anxiety around this change and accepting what she will not be able to finish before retiring.  She does note that she is excited to retire and is looking forward to it.  Also thinks that the increase in morning dose of venlafaxine was helpful and overall denies any concerns in regard to mood.  Physically, she did note that she recently had falls resulting in a knee and hand injury but is established with care to have this treated.  Due to her psychiatric stability and placement on BCT, discussed potential transfer of care to primary care physician, she is agreeable to this and PCP confirmed they would be comfortable with this transfer.    Psychotropic Drug Interactions:    Serotonergic agents may enhance adverse/toxic effects of antipsychotic agents (risk for neuroleptic malignant syndrome)  while antipsychotics may enhance serotonergic effects of serotonergic agents (venlafaxine + abilify)   Management: routine monitoring    [place]MP was not checked today: not  using controlled substances    Risk Statements:   Treatment Risk: Risks, benefits, alternatives and potential adverse effects have been discussed and are understood.   Safety Risk: Patricia did not appear to be an imminent safety risk to self or others.    PLAN    1) Medications:   -  continue taking venlafaxine 75 mg TID + additional dose of 37.5 with AM 75mg tablet  - Continue aripiprazole 4 mg daily        Other pertinent medications not prescribed by psychiatry:  1.  Aspirin 81 mg a day.  2.  Carvedilol 25 mg twice daily.  3.  Prempro.  4.  Ezetimibe 10 mg a day.  5.  Hydralazine 10 mg twice daily.  6.  Isosorbide mononitrate 30 mg a day.  7.  Lisinopril 5 mg a day.  8.  Meclizine.  9.  Ondansetron.  10. Simvastatin 20 mg a day  11. otc Vitamin D   12. Topiramate 25 mg BID       2) Psychotherapy: continue with individual therapist    3) Next due:  Labs- last AP labs done January 2024, repeat annually if not collected by outside providers   EKG- 1/7/20: 447 ms @ 54 bpm  Rating scales- AIMS completed virtually 3/2024, score of 0.     4) Referrals: none    5) Follow-up: Plan to transfer to PCP                   Interval History      - Updates: things going well with [facility] this fall, trying to step back and understand certain things won't get done before she leaves. Is ready to retire November 15th. Had thought about moving it to December but she knows it's time. 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   Living situation: lives at  home with   Social/spiritual support: , friends, sister and mother      Pertinent Substance Use:  [Last updated 09/30/24]  Alcohol: No  Cannabis: No  Tobacco: No  Caffeine:  Yes: tries to cut off at 2pm   Opioids: No   Narcan Kit current: N/A  Other substances: No     Medical Review of Systems / Med sfx:   Fell and hurt knee and hand but has seen doctor and gets scan on knee in couple of weeks, just sore                  Summary Points of Current Care  10/20 24: Found improvement in anxiety with increase of morning dose of venlafaxine.  Also started individual psychotherapy to discuss transition of nursing home, which she has found extremely helpful.  Discussed transfer to PCP for management of medications which was confirmed with PCP and patient.                   Physical Exam  (Vitals Only)    LMP 06/01/2012   Pulse Readings from Last 3 Encounters:   09/20/24 62   09/05/24 83   09/01/24 69     Wt Readings from Last 3 Encounters:   09/20/24 88.5 kg (195 lb)   09/05/24 87.5 kg (193 lb)   09/01/24 87.5 kg (193 lb)     BP Readings from Last 3 Encounters:   09/20/24 101/63   09/05/24 126/75   09/01/24 113/71                      Mental Status Exam    Alertness: alert  and oriented  Appearance: casually groomed  Behavior/Demeanor: cooperative and calm, with good  eye contact   Speech: normal and regular rate and rhythm  Language: intact and good  Psychomotor: normal or unremarkable  Mood: description consistent with euthymia  Affect: full range; congruent to: mood- yes, content- yes  Thought Process/Associations: unremarkable  Thought Content:  Reports none;  Denies suicidal ideation  Perception:  Reports none;  Denies hallucinations  Insight: excellent  Judgment: excellent  Cognition: does  appear grossly intact; formal cognitive testing was not done  Gait and Station: N/A (telehealth)                  Past Psychotropic Medication Trials    Medication Max Dose (mg) Dates / Duration Helpful? DC Reason /  Adverse Effects?   bupropion 300 7662-9557 N Initially helpful then lost efficacy   Adderall 25 3942-6245 N Lack of efficacy   sertraline 100 9284-6216 N Initially helpful then lost efficacy   Venlafaxine 225 2008-current Y  previously had ER, tried IR BID to help with mood in winter   aripiprazole 4 2021-current Y                                                                     Past Medical History     Patient Active Problem List   Diagnosis    Dizziness and giddiness    Motion sickness    GERD (gastroesophageal reflux disease)    Idiopathic cardiomyopathy (H)    Sleep apnea    Hyperlipidemia LDL goal <100    Allergic rhinitis    Hypertension goal BP (blood pressure) < 130/80    Pelvic pain in female    24 hour contact handout given    Class 1 obesity with serious comorbidity and body mass index (BMI) of 33.0 to 33.9 in adult, unspecified obesity type    Elevated PTHrP level    H/O bariatric surgery    Hx of gastric bypass    Hypovitaminosis D    Allergic rhinitis    Attention deficit disorder of adult    Mild persistent asthma    Hand joint pain    Myopia, bilateral    Dumping syndrome    Benign essential hypertension    Adjustment disorder with depressed mood    Decreased hearing of both ears    BPV (benign positional vertigo), unspecified laterality    Vitamin D deficiency    Coronary artery disease involving native coronary artery of native heart without angina pectoris    Acute pain of left knee    Syncope    Posterior vitreous detachment of both eyes    Combined forms of age-related cataract, mild, of both eyes    Glaucoma suspect of both eyes    Sciatica of right side    Hypoglycemia    Raynaud's disease without gangrene    Class 2 severe obesity due to excess calories with serious comorbidity in adult (H)    Irritable bowel syndrome with both constipation and diarrhea                     Medications     Current Outpatient Medications   Medication Sig Dispense Refill    acetaminophen (TYLENOL) 325 MG  tablet Take 650 mg by mouth every 6 hours as needed for mild pain      albuterol (PROAIR HFA/PROVENTIL HFA/VENTOLIN HFA) 108 (90 Base) MCG/ACT inhaler Inhale 2 puffs into the lungs every 4 hours as needed for shortness of breath or wheezing 18 g 3    ARIPiprazole (ABILIFY) 2 MG tablet Take 2 tablets (4 mg) by mouth daily 60 tablet 3    aspirin 81 MG EC tablet Take 1 tablet (81 mg) by mouth daily 90 tablet 3    benzonatate (TESSALON) 100 MG capsule Take 1 capsule (100 mg) by mouth 3 times daily as needed for cough 30 capsule 0    blood glucose (NO BRAND SPECIFIED) lancing device Device to be used with lancets. 3 each 3    blood glucose (NO BRAND SPECIFIED) test strip Use to test blood sugar 3 times daily 300 strip 3    calcium carbonate-vitamin D (CALTRATE 600+D) 600-400 MG-UNIT CHEW Take 1 chew tab by mouth 2 times daily 180 tablet 3    carvedilol (COREG) 25 MG tablet Take 1 tablet (25 mg) by mouth 2 times daily (with meals) 180 tablet 3    cetirizine (ZYRTEC) 10 MG tablet Take 1 tablet (10 mg) by mouth daily 30 tablet 3    Cyanocobalamin (VITAMIN B-12 SL) Place under the tongue every other day       diclofenac (VOLTAREN) 1 % topical gel Apply 2 g topically 4 times daily. 350 g 1    dicyclomine (BENTYL) 20 MG tablet Take 1 tablet (20 mg) by mouth every 6 hours 90 tablet 2    estradiol (ESTRACE) 1 MG tablet Take 1 tablet (1 mg) by mouth daily 90 tablet 3    ezetimibe (ZETIA) 10 MG tablet Take 1 tablet (10 mg) by mouth daily 90 tablet 3    hydroxychloroquine (PLAQUENIL) 200 MG tablet Take 1 tablet (200 mg) by mouth 2 times daily. Annual eye exam required. 180 tablet 0    isosorbide mononitrate (IMDUR) 30 MG 24 hr tablet Take 1 tablet (30 mg) by mouth daily 90 tablet 3    lisinopril (ZESTRIL) 5 MG tablet TAKE 1 TABLET(5 MG) BY MOUTH DAILY 90 tablet 3    meclizine (ANTIVERT) 25 MG tablet Take 1 tablet (25 mg) by mouth 3 times daily as needed 30 tablet 1    medroxyPROGESTERone (PROVERA) 2.5 MG tablet Take 1 tablet (2.5  mg) by mouth daily 90 tablet 3    Multiple Vitamin (MULTI-VITAMIN) per tablet Take 1 tablet by mouth daily. 100 tablet 3    NIFEdipine ER (ADALAT CC) 30 MG 24 hr tablet TAKE 1 TABLET(30 MG) BY MOUTH DAILY 60 tablet 1    omeprazole (PRILOSEC) 20 MG DR capsule Take 1 capsule (20 mg) by mouth daily 90 capsule 1    ondansetron (ZOFRAN ODT) 4 MG ODT tab Take 1 tablet (4 mg) by mouth every 8 hours as needed for nausea 30 tablet 2    simvastatin (ZOCOR) 40 MG tablet Take 1 tablet (40 mg) by mouth at bedtime 90 tablet 3    sulfaSALAzine ER (AZULFIDINE EN) 500 MG EC tablet Take 2 tablets (1,000 mg) by mouth 2 times daily. Take this medication with food. Labs every 8-12 weeks. 360 tablet 0    topiramate (TOPAMAX) 25 MG tablet 25mg in the morning and 25mg at night 60 tablet 3    venlafaxine (EFFEXOR) 37.5 MG tablet Take 1 tablet (37.5 mg) by mouth daily With 75 mg tablet of venlafaxine for a total morning dose of 112.5 mg 30 tablet 3    venlafaxine (EFFEXOR) 75 MG tablet Take 1 tablet (75 mg) by mouth 3 times daily 90 tablet 3                     Data         3/28/2024     7:59 AM 7/29/2024     4:24 PM 9/5/2024    11:44 AM   PROMIS-10 Total Score w/o Sub Scores   PROMIS TOTAL - SUBSCORES 29 29 24         4/21/2023     8:53 AM 5/25/2023     7:56 AM 10/23/2023     3:02 PM   PHQ-9 SCORE   PHQ-9 Total Score MyChart 5 (Mild depression) 3 (Minimal depression) 5 (Mild depression)   PHQ-9 Total Score 5 3 5         9/3/2020    11:56 AM 6/30/2021     8:15 AM 3/28/2022    12:45 PM   PRISCILLA-7 SCORE   Total Score   7 (mild anxiety)   Total Score 10 3 7       Liver/Kidney Function, TSH Metabolic Blood counts   Recent Labs   Lab Test 09/13/24  0925 06/24/24  0807 02/02/24  0731 02/01/24  1322   AST 34 33   < > 35   ALT 44 39   < > 41   ALKPHOS  --   --   --  98   CR 0.73 0.85   < > 0.83    < > = values in this interval not displayed.     Recent Labs   Lab Test 01/19/24  0731   TSH 2.06    Recent Labs   Lab Test 01/19/24  0731   CHOL 205*   TRIG  61   LDL 86        Recent Labs   Lab Test 03/10/23  1146   A1C 5.5     Recent Labs   Lab Test 02/01/24  1322   GLC 93    Recent Labs   Lab Test 09/13/24  0925   WBC 4.9   HGB 12.2   HCT 37.6   *                Level of Medical Decision Making:   - At least 1 chronic problem that is not stable  - Engaged in prescription drug management during visit (discussed any medication benefits, side effects, alternatives, etc.)       The longitudinal plan of care for the diagnosis(es)/condition(s) as documented were addressed during this visit. Due to the added complexity in care, I will continue to support Patricia in the subsequent management and with ongoing continuity of care.        PROVIDER: Bhavya Gomez MD    Patient not staffed in clinic.  Note will be reviewed and signed by supervisor Dr. Ruiz.

## 2024-10-01 ENCOUNTER — VIRTUAL VISIT (OUTPATIENT)
Dept: PSYCHIATRY | Facility: CLINIC | Age: 64
End: 2024-10-01
Attending: STUDENT IN AN ORGANIZED HEALTH CARE EDUCATION/TRAINING PROGRAM
Payer: COMMERCIAL

## 2024-10-01 ENCOUNTER — PATIENT OUTREACH (OUTPATIENT)
Dept: CARE COORDINATION | Facility: CLINIC | Age: 64
End: 2024-10-01
Payer: COMMERCIAL

## 2024-10-01 DIAGNOSIS — F90.9 ATTENTION DEFICIT HYPERACTIVITY DISORDER (ADHD), UNSPECIFIED ADHD TYPE: ICD-10-CM

## 2024-10-01 DIAGNOSIS — F33.42 RECURRENT MAJOR DEPRESSIVE DISORDER, IN FULL REMISSION (H): Primary | ICD-10-CM

## 2024-10-01 PROCEDURE — 99213 OFFICE O/P EST LOW 20 MIN: CPT | Mod: 95

## 2024-10-01 PROCEDURE — G2211 COMPLEX E/M VISIT ADD ON: HCPCS | Mod: 95

## 2024-10-01 RX ORDER — VENLAFAXINE 75 MG/1
75 TABLET ORAL 3 TIMES DAILY
Qty: 90 TABLET | Refills: 3 | Status: SHIPPED | OUTPATIENT
Start: 2024-10-01

## 2024-10-01 RX ORDER — ARIPIPRAZOLE 2 MG/1
4 TABLET ORAL DAILY
Qty: 60 TABLET | Refills: 3 | Status: SHIPPED | OUTPATIENT
Start: 2024-10-01

## 2024-10-01 RX ORDER — VENLAFAXINE 37.5 MG/1
37.5 TABLET ORAL DAILY
Qty: 30 TABLET | Refills: 3 | Status: SHIPPED | OUTPATIENT
Start: 2024-10-01

## 2024-10-01 NOTE — PATIENT INSTRUCTIONS
Treatment plan:    Medications:  Continue abilify 4mg daily  Continue venlafaxine 112.5mg in the morning, 75mg midday and 75mg afternoon     Therapy:  Continue with individual therapist    **For crisis resources, please see the information at the end of this document**   Patient Education    Thank you for coming to the [facility].     Lab Testing:  If you had lab testing today and your results are reassuring or normal they will be mailed to you or sent through [employer] within 7 days. If the lab tests need quick action we will call you with the results. The phone number we will call with results is # [phone]. If this is not the best number please call our clinic and change the number.     Medication Refills:  If you need any refills please call your pharmacy and they will contact us. Our fax number for refills is [phone].   Three business days of notice are needed for general medication refill requests.   Five business days of notice are needed for controlled substance refill requests.   If you need to change to a different pharmacy, please contact the new pharmacy directly. The new pharmacy will help you get your medications transferred.     Contact Us:  Please call [phone] during business hours (8-5:00 M-F).   If you have medication related questions after clinic hours, or on the weekend, please call [phone].     Financial Assistance [phone]   Medical Records [phone]       MENTAL HEALTH CRISIS RESOURCES:  For a emergency help, please call 911 or go to the nearest Emergency Department.     Emergency Walk-In Options:   EmPATH Unit @ [facility] ([place]): [phone] - Specialized mental health emergency area designed to be calming  [facility] ([place]): [phone]  [facility] ([place]): [phone]  [facility] ([place]): [phone]    [facility] Crisis Information:    [place]: [phone]  [place]: [phone]  [place] ([place]) - Adult: [phone]     Child: [phone]  [place] - Adult: [phone]     Child: [phone]  [place]: [phone]  List of all [facility] resources:   https://[place].gov/dhs/people-we-serve/adults/health-care/mental-health/resources/crisis-contacts.jsp    National Crisis Information:   Crisis Text Line: Text  [place]  to 465104  Suicide & Crisis Lifeline: 988  National Suicide Prevention Lifeline: 6-059-533-TALK (1-711.131.7820)       For online chat options, visit https://suicidepreventionlifeline.org/chat/  Poison Control Center: 0-332-138-9353  Trans Lifeline: 1-939.302.5524 - Hotline for transgender people of all ages  The [employer] Project: 1-224-531-9929 - Hotline for LGBT youth     For Non-Emergency Support:   Fast Tracker: Mental Health & Substance Use Disorder Resources -   https://www.[employer].org/

## 2024-10-03 SDOH — HEALTH STABILITY: PHYSICAL HEALTH: ON AVERAGE, HOW MANY MINUTES DO YOU ENGAGE IN EXERCISE AT THIS LEVEL?: 30 MIN

## 2024-10-03 SDOH — HEALTH STABILITY: PHYSICAL HEALTH: ON AVERAGE, HOW MANY DAYS PER WEEK DO YOU ENGAGE IN MODERATE TO STRENUOUS EXERCISE (LIKE A BRISK WALK)?: 3 DAYS

## 2024-10-03 ASSESSMENT — PATIENT HEALTH QUESTIONNAIRE - PHQ9
10. IF YOU CHECKED OFF ANY PROBLEMS, HOW DIFFICULT HAVE THESE PROBLEMS MADE IT FOR YOU TO DO YOUR WORK, TAKE CARE OF THINGS AT HOME, OR GET ALONG WITH OTHER PEOPLE: NOT DIFFICULT AT ALL
SUM OF ALL RESPONSES TO PHQ QUESTIONS 1-9: 3
SUM OF ALL RESPONSES TO PHQ QUESTIONS 1-9: 3

## 2024-10-03 ASSESSMENT — SOCIAL DETERMINANTS OF HEALTH (SDOH): HOW OFTEN DO YOU GET TOGETHER WITH FRIENDS OR RELATIVES?: ONCE A WEEK

## 2024-10-03 ASSESSMENT — ASTHMA QUESTIONNAIRES
ACT_TOTALSCORE: 22
QUESTION_1 LAST FOUR WEEKS HOW MUCH OF THE TIME DID YOUR ASTHMA KEEP YOU FROM GETTING AS MUCH DONE AT WORK, SCHOOL OR AT HOME: NONE OF THE TIME
QUESTION_2 LAST FOUR WEEKS HOW OFTEN HAVE YOU HAD SHORTNESS OF BREATH: ONCE OR TWICE A WEEK
QUESTION_3 LAST FOUR WEEKS HOW OFTEN DID YOUR ASTHMA SYMPTOMS (WHEEZING, COUGHING, SHORTNESS OF BREATH, CHEST TIGHTNESS OR PAIN) WAKE YOU UP AT NIGHT OR EARLIER THAN USUAL IN THE MORNING: NOT AT ALL
ACT_TOTALSCORE: 22
QUESTION_5 LAST FOUR WEEKS HOW WOULD YOU RATE YOUR ASTHMA CONTROL: WELL CONTROLLED
QUESTION_4 LAST FOUR WEEKS HOW OFTEN HAVE YOU USED YOUR RESCUE INHALER OR NEBULIZER MEDICATION (SUCH AS ALBUTEROL): ONCE A WEEK OR LESS

## 2024-10-04 ENCOUNTER — OFFICE VISIT (OUTPATIENT)
Dept: FAMILY MEDICINE | Facility: CLINIC | Age: 64
End: 2024-10-04
Payer: COMMERCIAL

## 2024-10-04 VITALS
BODY MASS INDEX: 34.94 KG/M2 | HEIGHT: 63 IN | OXYGEN SATURATION: 98 % | HEART RATE: 69 BPM | SYSTOLIC BLOOD PRESSURE: 136 MMHG | DIASTOLIC BLOOD PRESSURE: 82 MMHG | WEIGHT: 197.2 LBS | TEMPERATURE: 97.9 F | RESPIRATION RATE: 18 BRPM

## 2024-10-04 DIAGNOSIS — H81.10 BPV (BENIGN POSITIONAL VERTIGO), UNSPECIFIED LATERALITY: ICD-10-CM

## 2024-10-04 DIAGNOSIS — E78.5 HYPERLIPIDEMIA LDL GOAL <100: ICD-10-CM

## 2024-10-04 DIAGNOSIS — Z00.00 ROUTINE GENERAL MEDICAL EXAMINATION AT A HEALTH CARE FACILITY: Primary | ICD-10-CM

## 2024-10-04 DIAGNOSIS — J45.30 MILD PERSISTENT ASTHMA WITHOUT COMPLICATION: ICD-10-CM

## 2024-10-04 DIAGNOSIS — R42 DIZZINESS: ICD-10-CM

## 2024-10-04 DIAGNOSIS — Z12.31 VISIT FOR SCREENING MAMMOGRAM: ICD-10-CM

## 2024-10-04 DIAGNOSIS — Z23 NEED FOR VACCINATION: ICD-10-CM

## 2024-10-04 DIAGNOSIS — K21.9 GASTROESOPHAGEAL REFLUX DISEASE WITHOUT ESOPHAGITIS: ICD-10-CM

## 2024-10-04 DIAGNOSIS — M72.2 PLANTAR FASCIITIS: ICD-10-CM

## 2024-10-04 PROCEDURE — 91320 SARSCV2 VAC 30MCG TRS-SUC IM: CPT | Performed by: STUDENT IN AN ORGANIZED HEALTH CARE EDUCATION/TRAINING PROGRAM

## 2024-10-04 PROCEDURE — 90677 PCV20 VACCINE IM: CPT | Performed by: STUDENT IN AN ORGANIZED HEALTH CARE EDUCATION/TRAINING PROGRAM

## 2024-10-04 PROCEDURE — 90472 IMMUNIZATION ADMIN EACH ADD: CPT | Performed by: STUDENT IN AN ORGANIZED HEALTH CARE EDUCATION/TRAINING PROGRAM

## 2024-10-04 PROCEDURE — 82043 UR ALBUMIN QUANTITATIVE: CPT | Performed by: STUDENT IN AN ORGANIZED HEALTH CARE EDUCATION/TRAINING PROGRAM

## 2024-10-04 PROCEDURE — 99214 OFFICE O/P EST MOD 30 MIN: CPT | Mod: 25 | Performed by: STUDENT IN AN ORGANIZED HEALTH CARE EDUCATION/TRAINING PROGRAM

## 2024-10-04 PROCEDURE — 82570 ASSAY OF URINE CREATININE: CPT | Performed by: STUDENT IN AN ORGANIZED HEALTH CARE EDUCATION/TRAINING PROGRAM

## 2024-10-04 PROCEDURE — 90471 IMMUNIZATION ADMIN: CPT | Performed by: STUDENT IN AN ORGANIZED HEALTH CARE EDUCATION/TRAINING PROGRAM

## 2024-10-04 PROCEDURE — 90480 ADMN SARSCOV2 VAC 1/ONLY CMP: CPT | Performed by: STUDENT IN AN ORGANIZED HEALTH CARE EDUCATION/TRAINING PROGRAM

## 2024-10-04 PROCEDURE — 99397 PER PM REEVAL EST PAT 65+ YR: CPT | Mod: 25 | Performed by: STUDENT IN AN ORGANIZED HEALTH CARE EDUCATION/TRAINING PROGRAM

## 2024-10-04 PROCEDURE — 90662 IIV NO PRSV INCREASED AG IM: CPT | Performed by: STUDENT IN AN ORGANIZED HEALTH CARE EDUCATION/TRAINING PROGRAM

## 2024-10-04 RX ORDER — ALBUTEROL SULFATE 90 UG/1
2 INHALANT RESPIRATORY (INHALATION) EVERY 4 HOURS PRN
Qty: 18 G | Refills: 3 | Status: SHIPPED | OUTPATIENT
Start: 2024-10-04

## 2024-10-04 RX ORDER — MECLIZINE HYDROCHLORIDE 25 MG/1
25 TABLET ORAL 3 TIMES DAILY PRN
Qty: 90 TABLET | Refills: 2 | Status: SHIPPED | OUTPATIENT
Start: 2024-10-04

## 2024-10-04 RX ORDER — ONDANSETRON 4 MG/1
4 TABLET, ORALLY DISINTEGRATING ORAL EVERY 8 HOURS PRN
Qty: 30 TABLET | Refills: 2 | Status: SHIPPED | OUTPATIENT
Start: 2024-10-04

## 2024-10-04 NOTE — PROGRESS NOTES
Preventive Care Visit  [facility]  LAKESHA ANNE MD, Family Medicine  Oct 4, 2024      Assessment & Plan     (Z00.00) Routine general medical examination at a health care facility  (primary encounter diagnosis)  Comment: Stable  Plan: REVIEW OF HEALTH MAINTENANCE PROTOCOL ORDERS            (H81.10) BPV (benign positional vertigo), unspecified laterality  Comment: Chronic, controlled. Medication refills  Plan: meclizine (ANTIVERT) 25 MG tablet            (K21.9) Gastroesophageal reflux disease without esophagitis  Comment: Chronic, controlled. Medication refills  Plan: omeprazole (PRILOSEC) 20 MG DR capsule            (R42) Dizziness  Comment: Chronic, controlled. Medication refills  Plan: ondansetron (ZOFRAN ODT) 4 MG ODT tab            (Z12.31) Visit for screening mammogram  Comment: Stable  Plan: MA Screening Bilateral w/ Wero            (Z23) Need for vaccination  Comment: Stable  Plan: Pneumococcal 20 Valent Conjugate (PCV20),         INFLUENZA HIGH DOSE, TRIVALENT, PF (FLUZONE),         COVID-19 12+ (PFIZER)            (E78.5) Hyperlipidemia LDL goal <100  Comment: Chronic, controlled. Pending labs  Plan: Albumin Random Urine Quantitative with Creat         Ratio            (J45.30) Mild persistent asthma without complication  Comment: Chronic, stable. Refills   Plan: albuterol (PROAIR HFA/PROVENTIL HFA/VENTOLIN         HFA) 108 (90 Base) MCG/ACT inhaler            (M72.2) Plantar fasciitis  Comment: Chronic, uncontrolled. Recommended frozen bottle to roll on base of foot, use of ibuprofen as needed.   Plan: pt to follow up with podiatry     Dictation Disclaimer: Some of this Note has been completed with voice-recognition dictation software. Although errors are generally corrected real-time, there is the potential for a rare error to be present in the completed chart.                 BMI  Estimated body mass index is 35.38 kg/m  as calculated from the following:    Height as  "of this encounter: 1.59 m (5' 2.6\").    Weight as of this encounter: 89.4 kg (197 lb 3.2 oz).   Weight management plan: Discussed healthy diet and exercise guidelines    Counseling  Appropriate preventive services were addressed with this patient via screening, questionnaire, or discussion as appropriate for fall prevention, nutrition, physical activity, Tobacco-use cessation, social engagement, weight loss and cognition.  Checklist reviewing preventive services available has been given to the patient.  Reviewed patient's diet, addressing concerns and/or questions.   She is at risk for lack of exercise and has been provided with information to increase physical activity for the benefit of her well-being.   She is at risk for psychosocial distress and has been provided with information to reduce risk.   Discussed possible causes of fatigue. The patient was provided with written information regarding signs of hearing loss.   Information on urinary incontinence and treatment options given to patient.           Jesusita Gomez is a 64 year old, presenting for the following:  Physical         Health Care Directive  Patient has a Health Care Directive on file  Advance care planning document is on file and is current.    HPI  Patient is a 64-year-old female presenting for routine physical.  She reports that she has noticed in her right foot at the base of her foot issues with being able to walk and exercise that she noted.  She reports that the feels similar to plantar fasciitis that she had in her heel on her left foot.  Patient states that she is wanting to see a podiatrist and will schedule a visit.    Patient states that she has been able to transition to primary care managing her psych medications.  She reports feeling very stable and well overall.  She endorses that she sees a therapist at this moment to help with the next step in transitioning from her work environment as she will be retiring at the end of " November.  Patient states that she cut her hours down to 0.8 and she is feeling great about this new take on life.            10/3/2024   General Health   How would you rate your overall physical health? Good   Feel stress (tense, anxious, or unable to sleep) Only a little      (!) STRESS CONCERN      10/3/2024   Nutrition   Diet: Regular (no restrictions)            10/3/2024   Exercise   Days per week of moderate/strenous exercise 3 days   Average minutes spent exercising at this level 30 min            10/3/2024   Social Factors   Frequency of gathering with friends or relatives Once a week   Worry food won't last until get money to buy more No   Food not last or not have enough money for food? No   Do you have housing? (Housing is defined as stable permanent housing and does not include staying ouside in a car, in a tent, in an abandoned building, in an overnight shelter, or couch-surfing.) Yes   Are you worried about losing your housing? No   Lack of transportation? No   Unable to get utilities (heat,electricity)? No            10/4/2024   Fall Risk   Gait Speed Test (Document in seconds) 4.5   Gait Speed Test Interpretation Less than or equal to 5.00 seconds - PASS               10/3/2024   Activities of Daily Living- Home Safety   Needs help with the following daily activites None of the above   Safety concerns in the home None of the above            10/3/2024   Dental   Dentist two times every year? Yes            10/3/2024   Hearing Screening   Hearing concerns? (!) IT'S HARD TO FOLLOW A CONVERSATION IN A NOISY RESTAURANT OR CROWDED ROOM.    (!) TROUBLE UNDERSTANDING SOFT OR WHISPERED SPEECH.    (!) TROUBLE UNDERSTANDING SPEECH ON THE TELEPHONE       Multiple values from one day are sorted in reverse-chronological order         10/3/2024   Driving Risk Screening   Patient/family members have concerns about driving No            10/3/2024   General Alertness/Fatigue Screening   Have you been more tired than  usual lately? (!) YES            10/3/2024   Urinary Incontinence Screening   Bothered by leaking urine in past 6 months Yes            10/3/2024   TB Screening   Were you born outside of the [place]? No          Today's PHQ-9 Score:       10/3/2024    10:17 AM   PHQ-9 SCORE   PHQ-9 Total Score MyChart 3 (Minimal depression)   PHQ-9 Total Score 3         10/3/2024   Substance Use   Alcohol more than 3/day or more than 7/wk No   Do you have a current opioid prescription? No   How severe/bad is pain from 1 to 10? 4/10   Do you use any other substances recreationally? No        Social History     Tobacco Use    Smoking status: Never     Passive exposure: Never    Smokeless tobacco: Never   Vaping Use    Vaping status: Never Used   Substance Use Topics    Alcohol use: Yes     Comment: seldom    Drug use: No           9/25/2023   LAST FHS-7 RESULTS   1st degree relative breast or ovarian cancer No   Any relative bilateral breast cancer No   Any ONE woman have BOTH breast AND ovarian cancer No   Any woman with breast cancer before 50yrs No   2 or more relatives with breast AND/OR ovarian cancer No   2 or more relatives with breast AND/OR bowel cancer No           Mammogram Screening - Mammogram every 1-2 years updated in Health Maintenance based on mutual decision making      History of abnormal Pap smear: No - age 65 or older with adequate negative prior screening test results (3 consecutive negative cytology results, 2 consecutive negative cotesting results, or 2 consecutive negative HrHPV test results within 10 years, with the most recent test occurring within the recommended screening interval for the test used)        Latest Ref Rng & Units 6/30/2021     8:11 AM 6/30/2021     8:00 AM 6/15/2016     9:31 AM   PAP / HPV   PAP (Historical)  NIL      HPV 16 DNA NEG^Negative  Negative  Negative    HPV 18 DNA NEG^Negative  Negative  Negative    Other HR HPV NEG^Negative  Negative  Negative      ASCVD Risk   The ASCVD  Risk score (Eleonora HARPER, et al., 2019) failed to calculate for the following reasons:    The valid HDL cholesterol range is 20 to 100 mg/dL            Reviewed and updated as needed this visit by Provider                    Lab work is in process  Labs reviewed in EPIC  Current providers sharing in care for this patient include:  Patient Care Team:  Miladis Mcintyre MD as PCP - General (Family Medicine)  Preston Cuadra MD as MD (Cardiology)  Phyllis Palomo MD as MD (Ophthalmology)  Andreina Moore AuD as Audiologist (Audiology)  Blanche Cummins APRN CNP (Inactive) as Referring Physician (Nurse Practitioner - Family)  John Ruiz MD as MD (Psychiatry)  Miladis Mcintyre MD as Assigned PCP  Navi Marie MD as Resident (Student in organized health care education/training program)  Melissa De La Rosa AuD as Audiologist (Audiology)  Melissa El PA-C as Assigned Rheumatology Provider  Ananda Evans MD as MD (OB/Gyn)  Kimberli Marquez MD as Physician (Neurology)  Ananda Evans MD as Assigned OBGYN Provider  KURT Grier MD as MD (Cardiovascular Disease)  Connie Munoz RPH as Pharmacist (Pharmacist Ambulatory Care)  Kimberli Marquez MD as Assigned Neuroscience Provider  Connie Munoz RPH as Assigned MTM Pharmacist  [facility], Stephanie Morel PA-C as Assigned Surgical Provider  Olga Lidia Gale DO as Physician (Gastroenterology)  Debora Arnett MD as Assigned Heart and Vascular Provider  Olga Lidia Gale DO as Assigned Gastroenterology Provider  Bhavya Gomez MD as Resident (Psychiatry)  Kelvin Morris MD as Assigned Musculoskeletal Provider    The following health maintenance items are reviewed in Epic and correct as of today:  Health Maintenance   Topic Date Due    BMP  08/01/2024    MICROALBUMIN  09/19/2024    MAMMO SCREENING  09/25/2024    COVID-19 Vaccine (6 - 2024-25 season) 11/29/2024    ASTHMA ACTION PLAN  12/08/2024  "   LIPID  01/19/2025    EYE EXAM  03/18/2025    ASTHMA CONTROL TEST  04/04/2025    PHQ-9  04/04/2025    MEDICARE ANNUAL WELLNESS VISIT  10/04/2025    ANNUAL REVIEW OF HM ORDERS  10/04/2025    FALL RISK ASSESSMENT  10/04/2025    GLUCOSE  02/01/2027    ADVANCE CARE PLANNING  06/27/2028    DTAP/TDAP/TD IMMUNIZATION (3 - Td or Tdap) 10/08/2030    COLORECTAL CANCER SCREENING  11/11/2031    DEXA  09/25/2038    HEPATITIS C SCREENING  Completed    DEPRESSION ACTION PLAN  Completed    INFLUENZA VACCINE  Completed    Pneumococcal Vaccine: 65+ Years  Completed    ZOSTER IMMUNIZATION  Completed    RSV VACCINE  Completed    HPV IMMUNIZATION  Aged Out    MENINGITIS IMMUNIZATION  Aged Out    RSV MONOCLONAL ANTIBODY  Aged Out    PAP  Discontinued        ROS: 10 point ROS neg other than the symptoms noted above in the HPI.       Objective    Exam  /82   Pulse 69   Temp 97.9  F (36.6  C) (Temporal)   Resp 18   Ht 1.59 m (5' 2.6\")   Wt 89.4 kg (197 lb 3.2 oz)   LMP 06/01/2012   SpO2 98%   BMI 35.38 kg/m     Estimated body mass index is 35.38 kg/m  as calculated from the following:    Height as of this encounter: 1.59 m (5' 2.6\").    Weight as of this encounter: 89.4 kg (197 lb 3.2 oz).    Physical Exam  GENERAL: alert and no distress  EYES: Eyes grossly normal to inspection, PERRL and conjunctivae and sclerae normal  MS: right foot: tenderness over plantar surface.  SKIN: no suspicious lesions or rashes  NEURO: Normal strength and tone, mentation intact and speech normal  PSYCH: mentation appears normal, affect normal/bright         10/4/2024   Mini Cog   Clock Draw Score 2 Normal   3 Item Recall 2 objects recalled   Mini Cog Total Score 4                Signed Electronically by: LAKESHA ANNE MD    Answers submitted by the patient for this visit:  Patient Health Questionnaire (Submitted on 10/3/2024)  If you checked off any problems, how difficult have these problems made it for you to do your work, take " care of things at home, or get along with other people?: Not difficult at all  PHQ9 TOTAL SCORE: 3

## 2024-10-04 NOTE — PATIENT INSTRUCTIONS
[employer]    [email or web address]-[email or web address]_source=google_PayStand&gad_source=1&gclid=[email or web address]      Patient Education   Preventive Care Advice   This is general advice given by our system to help you stay healthy. However, your care team may have specific advice just for you. Please talk to your care team about your preventive care needs.  Nutrition  Eat 5 or more servings of fruits and vegetables each day.  Try wheat bread, brown rice and whole grain pasta (instead of white bread, rice, and pasta).  Get enough calcium and vitamin D. Check the label on foods and aim for 100% of the RDA (recommended daily allowance).  Lifestyle  Exercise at least 150 minutes each week  (30 minutes a day, 5 days a week).  Do muscle strengthening activities 2 days a week. These help control your weight and prevent disease.  No smoking.  Wear sunscreen to prevent skin cancer.  Have a dental exam and cleaning every 6 months.  Yearly exams  See your health care team every year to talk about:  Any changes in your health.  Any medicines your care team has prescribed.  Preventive care, family planning, and ways to prevent chronic diseases.  Shots (vaccines)   HPV shots (up to age 26), if you've never had them before.  Hepatitis B shots (up to age 59), if you've never had them before.  COVID-19 shot: Get this shot when it's due.  Flu shot: Get a flu shot every year.  Tetanus shot: Get a tetanus shot every 10 years.  Pneumococcal, hepatitis A, and RSV shots: Ask your care team if you need these based on your risk.  Shingles shot (for age 50 and up)  General health tests  Diabetes screening:  Starting at age 35, Get screened for diabetes at least every 3 years.  If you are younger than age 35, ask your care team if you should be screened for diabetes.  Cholesterol test: At age 39, start having a cholesterol test every 5 years, or more often if advised.  Bone density scan  (DEXA): At age 50, ask your care team if you should have this scan for osteoporosis (brittle bones).  Hepatitis C: Get tested at least once in your life.  STIs (sexually transmitted infections)  Before age 24: Ask your care team if you should be screened for STIs.  After age 24: Get screened for STIs if you're at risk. You are at risk for STIs (including HIV) if:  You are sexually active with more than one person.  You don't use condoms every time.  You or a partner was diagnosed with a sexually transmitted infection.  If you are at risk for HIV, ask about PrEP medicine to prevent HIV.  Get tested for HIV at least once in your life, whether you are at risk for HIV or not.  Cancer screening tests  Cervical cancer screening: If you have a cervix, begin getting regular cervical cancer screening tests starting at age 21.  Breast cancer scan (mammogram): If you've ever had breasts, begin having regular mammograms starting at age 40. This is a scan to check for breast cancer.  Colon cancer screening: It is important to start screening for colon cancer at age 45.  Have a colonoscopy test every 10 years (or more often if you're at risk) Or, ask your provider about stool tests like a FIT test every year or Cologuard test every 3 years.  To learn more about your testing options, visit:   .  For help making a decision, visit:   https://bit.ly/xu21139.  Prostate cancer screening test: If you have a prostate, ask your care team if a prostate cancer screening test (PSA) at age 55 is right for you.  Lung cancer screening: If you are a current or former smoker ages 50 to 80, ask your care team if ongoing lung cancer screenings are right for you.  For informational purposes only. Not to replace the advice of your health care provider. Copyright   2023 [employer]. All rights reserved. Clinically reviewed by the  [employer] [place] [facility]. [employer] 531802 - REV 01/24.  Preventing Falls: Care  Instructions  Injuries and health problems such as trouble walking or poor eyesight can increase your risk of falling. So can some medicines. But there are things you can do to help prevent falls. You can exercise to get stronger. You can also arrange your home to make it safer.    Talk to your doctor about the medicines you take. Ask if any of them increase the risk of falls and whether they can be changed or stopped.   Try to exercise regularly. It can help improve your strength and balance. This can help lower your risk of falling.         Practice fall safety and prevention.   Wear low-heeled shoes that fit well and give your feet good support. Talk to your doctor if you have foot problems that make this hard.  Carry a cellphone or wear a medical alert device that you can use to call for help.  Use stepladders instead of chairs to reach high objects. Don't climb if you're at risk for falls. Ask for help, if needed.  Wear the correct eyeglasses, if you need them.        Make your home safer.   Remove rugs, cords, clutter, and furniture from walkways.  Keep your house well lit. Use night-lights in hallways and bathrooms.  Install and use sturdy handrails on stairways.  Wear nonskid footwear, even inside. Don't walk barefoot or in socks without shoes.        Be safe outside.   Use handrails, curb cuts, and ramps whenever possible.  Keep your hands free by using a shoulder bag or backpack.  Try to walk in well-lit areas. Watch out for uneven ground, changes in pavement, and debris.  Be careful in the winter. Walk on the grass or gravel when sidewalks are slippery. Use de-icer on steps and walkways. Add non-slip devices to shoes.    Put grab bars and nonskid mats in your shower or tub and near the toilet. Try to use a shower chair or bath bench when bathing.   Get into a tub or shower by putting in your weaker leg first. Get out with your strong side first. Have a phone or medical alert device in the bathroom with  "you.   Where can you learn more?  Go to https://www.[employer].net/patiented  Enter G117 in the search box to learn more about \"Preventing Falls: Care Instructions.\"  Current as of: July 17, 2023  Content Version: 14.2 2024 [employer].   Care instructions adapted under license by your healthcare professional. If you have questions about a medical condition or this instruction, always ask your healthcare professional. [employer] Incorporated disclaims any warranty or liability for your use of this information.    Hearing Loss: Care Instructions  Overview     Hearing loss is a sudden or slow decrease in how well you hear. It can range from slight to profound. Permanent hearing loss can occur with aging. It also can happen when you are exposed long-term to loud noise. Examples include listening to loud music, riding motorcycles, or being around other loud machines.  Hearing loss can affect your work and home life. It can make you feel lonely or depressed. You may feel that you have lost your independence. But hearing aids and other devices can help you hear better and feel connected to others.  Follow-up care is a key part of your treatment and safety. Be sure to make and go to all appointments, and call your doctor if you are having problems. It's also a good idea to know your test results and keep a list of the medicines you take.  How can you care for yourself at home?  Avoid loud noises whenever possible. This helps keep your hearing from getting worse.  Always wear hearing protection around loud noises.  Wear a hearing aid as directed.  A professional can help you pick a hearing aid that will work best for you.  You can also get hearing aids over the counter for mild to moderate hearing loss.  Have hearing tests as your doctor suggests. They can show whether your hearing has changed. Your hearing aid may need to be adjusted.  Use other devices as needed. These may include:  Telephone amplifiers and " "hearing aids that can connect to a television, stereo, radio, or microphone.  Devices that use lights or vibrations. These alert you to the doorbell, a ringing telephone, or a baby monitor.  Television closed-captioning. This shows the words at the bottom of the screen. Most new TVs can do this.  TTY (text telephone). This lets you type messages back and forth on the telephone instead of talking or listening. These devices are also called TDD. When messages are typed on the keyboard, they are sent over the phone line to a receiving TTY. The message is shown on a monitor.  Use text messaging, social media, and email if it is hard for you to communicate by telephone.  Try to learn a listening technique called speechreading. It is not lipreading. You pay attention to people's gestures, expressions, posture, and tone of voice. These clues can help you understand what a person is saying. Face the person you are talking to, and have them face you. Make sure the lighting is good. You need to see the other person's face clearly.  Think about counseling if you need help to adjust to your hearing loss.  When should you call for help?  Watch closely for changes in your health, and be sure to contact your doctor if:    You think your hearing is getting worse.     You have new symptoms, such as dizziness or nausea.   Where can you learn more?  Go to https://www.[employer].net/patiented  Enter R798 in the search box to learn more about \"Hearing Loss: Care Instructions.\"  Current as of: September 27, 2023  Content Version: 14.2 2024 [facility] [employer].   Care instructions adapted under license by your healthcare professional. If you have questions about a medical condition or this instruction, always ask your healthcare professional. Healthwise, Incorporated disclaims any warranty or liability for your use of this information.    Learning About Sleeping Well  What does sleeping well mean?     Sleeping well means getting " enough sleep to feel good and stay healthy. How much sleep is enough varies among people.  The number of hours you sleep and how you feel when you wake up are both important. If you do not feel refreshed, you probably need more sleep. Another sign of not getting enough sleep is feeling tired during the day.  Experts recommend that adults get at least 7 or more hours of sleep per day. Children and older adults need more sleep.  Why is getting enough sleep important?  Getting enough quality sleep is a basic part of good health. When your sleep suffers, your physical health, mood, and your thoughts can suffer too. You may find yourself feeling more grumpy or stressed. Not getting enough sleep also can lead to serious problems, including injury, accidents, anxiety, and depression.  What might cause poor sleeping?  Many things can cause sleep problems, including:  Changes to your sleep schedule.  Stress. Stress can be caused by fear about a single event, such as giving a speech. Or you may have ongoing stress, such as worry about work or school.  Depression, anxiety, and other mental or emotional conditions.  Changes in your sleep habits or surroundings. This includes changes that happen where you sleep, such as noise, light, or sleeping in a different bed. It also includes changes in your sleep pattern, such as having jet lag or working a late shift.  Health problems, such as pain, breathing problems, and restless legs syndrome.  Lack of regular exercise.  Using alcohol, nicotine, or caffeine before bed.  How can you help yourself?  Here are some tips that may help you sleep more soundly and wake up feeling more refreshed.  Your sleeping area   Use your bedroom only for sleeping and sex. A bit of light reading may help you fall asleep. But if it doesn't, do your reading elsewhere in the house. Try not to use your TV, computer, smartphone, or tablet while you are in bed.  Be sure your bed is big enough to stretch out  "comfortably, especially if you have a sleep partner.  Keep your bedroom quiet, dark, and cool. Use curtains, blinds, or a sleep mask to block out light. To block out noise, use earplugs, soothing music, or a \"white noise\" machine.  Your evening and bedtime routine   Create a relaxing bedtime routine. You might want to take a warm shower or bath, or listen to soothing music.  Go to bed at the same time every night. And get up at the same time every morning, even if you feel tired.  What to avoid   Limit caffeine (coffee, tea, caffeinated sodas) during the day, and don't have any for at least 6 hours before bedtime.  Avoid drinking alcohol before bedtime. Alcohol can cause you to wake up more often during the night.  Try not to smoke or use tobacco, especially in the evening. Nicotine can keep you awake.  Limit naps during the day, especially close to bedtime.  Avoid lying in bed awake for too long. If you can't fall asleep or if you wake up in the middle of the night and can't get back to sleep within about 20 minutes, get out of bed and go to another room until you feel sleepy.  Avoid taking medicine right before bed that may keep you awake or make you feel hyper or energized. Your doctor can tell you if your medicine may do this and if you can take it earlier in the day.  If you can't sleep   Imagine yourself in a peaceful, pleasant scene. Focus on the details and feelings of being in a place that is relaxing.  Get up and do a quiet or boring activity until you feel sleepy.  Avoid drinking any liquids before going to bed to help prevent waking up often to use the bathroom.  Where can you learn more?  Go to https://www.[employer].net/patiented  Enter J942 in the search box to learn more about \"Learning About Sleeping Well.\"  Current as of: July 10, 2023  Content Version: 14.2 2024 [employer].   Care instructions adapted under license by your healthcare professional. If you have questions about a " medical condition or this instruction, always ask your healthcare professional. [employer] disclaims any warranty or liability for your use of this information.    Bladder Training: Care Instructions  Your Care Instructions     Bladder training is used to treat urge incontinence and stress incontinence. Urge incontinence means that the need to urinate comes on so fast that you can't get to a toilet in time. Stress incontinence means that you leak urine because of pressure on your bladder. For example, it may happen when you laugh, cough, or lift something heavy.  Bladder training can increase how long you can wait before you have to urinate. It can also help your bladder hold more urine. And it can give you better control over the urge to urinate.  It is important to remember that bladder training takes a few weeks to a few months to make a difference. You may not see results right away, but don't give up.  Follow-up care is a key part of your treatment and safety. Be sure to make and go to all appointments, and call your doctor if you are having problems. It's also a good idea to know your test results and keep a list of the medicines you take.  How can you care for yourself at home?  Work with your doctor to come up with a bladder training program that is right for you. You may use one or more of the following methods.  Delayed urination  In the beginning, try to keep from urinating for 5 minutes after you first feel the need to go.  While you wait, take deep, slow breaths to relax. Kegel exercises can also help you delay the need to go to the bathroom.  After some practice, when you can easily wait 5 minutes to urinate, try to wait 10 minutes before you urinate.  Slowly increase the waiting period until you are able to control when you have to urinate.  Scheduled urination  Empty your bladder when you first wake up in the morning.  Schedule times throughout the day when you will urinate.  Start by  "going to the bathroom every hour, even if you don't need to go.  Slowly increase the time between trips to the bathroom.  When you have found a schedule that works well for you, keep doing it.  If you wake up during the night and have to urinate, do it. Apply your schedule to waking hours only.  Kegel exercises  These tighten and strengthen pelvic muscles, which can help you control the flow of urine. (If doing these exercises causes pain, stop doing them and talk with your doctor.) To do Kegel exercises:  Squeeze your muscles as if you were trying not to pass gas. Or squeeze your muscles as if you were stopping the flow of urine. Your belly, legs, and buttocks shouldn't move.  Hold the squeeze for 3 seconds, then relax for 5 to 10 seconds.  Start with 3 seconds, then add 1 second each week until you are able to squeeze for 10 seconds.  Repeat the exercise 10 times a session. Do 3 to 8 sessions a day.  When should you call for help?  Watch closely for changes in your health, and be sure to contact your doctor if:    Your incontinence is getting worse.     You do not get better as expected.   Where can you learn more?  Go to https://www.[employer].net/patiented  Enter V684 in the search box to learn more about \"Bladder Training: Care Instructions.\"  Current as of: November 15, 2023  Content Version: 14.2 2024 [employer] [facility] [employer].   Care instructions adapted under license by your healthcare professional. If you have questions about a medical condition or this instruction, always ask your healthcare professional. Healthwise, Incorporated disclaims any warranty or liability for your use of this information.       "

## 2024-10-05 LAB
CREAT UR-MCNC: 18.1 MG/DL
MICROALBUMIN UR-MCNC: <12 MG/L
MICROALBUMIN/CREAT UR: NORMAL MG/G{CREAT}

## 2024-10-10 ENCOUNTER — OFFICE VISIT (OUTPATIENT)
Dept: ORTHOPEDICS | Facility: CLINIC | Age: 64
End: 2024-10-10
Attending: PHYSICIAN ASSISTANT
Payer: COMMERCIAL

## 2024-10-10 ENCOUNTER — OFFICE VISIT (OUTPATIENT)
Dept: URGENT CARE | Facility: URGENT CARE | Age: 64
End: 2024-10-10
Payer: COMMERCIAL

## 2024-10-10 VITALS
WEIGHT: 197 LBS | RESPIRATION RATE: 16 BRPM | HEART RATE: 76 BPM | DIASTOLIC BLOOD PRESSURE: 79 MMHG | TEMPERATURE: 97.6 F | OXYGEN SATURATION: 97 % | BODY MASS INDEX: 35.35 KG/M2 | SYSTOLIC BLOOD PRESSURE: 135 MMHG

## 2024-10-10 DIAGNOSIS — M19.031 ARTHRITIS OF SCAPHOID-TRAPEZIUM-TRAPEZOID JOINT OF RIGHT HAND: Primary | ICD-10-CM

## 2024-10-10 DIAGNOSIS — H69.93 DYSFUNCTION OF BOTH EUSTACHIAN TUBES: Primary | ICD-10-CM

## 2024-10-10 DIAGNOSIS — M25.531 RIGHT WRIST PAIN: ICD-10-CM

## 2024-10-10 PROCEDURE — 99213 OFFICE O/P EST LOW 20 MIN: CPT | Performed by: STUDENT IN AN ORGANIZED HEALTH CARE EDUCATION/TRAINING PROGRAM

## 2024-10-10 PROCEDURE — 99244 OFF/OP CNSLTJ NEW/EST MOD 40: CPT | Performed by: PEDIATRICS

## 2024-10-10 RX ORDER — FLUTICASONE PROPIONATE 50 MCG
2 SPRAY, SUSPENSION (ML) NASAL DAILY
Qty: 16 G | Refills: 0 | Status: SHIPPED | OUTPATIENT
Start: 2024-10-10

## 2024-10-10 RX ORDER — PREDNISONE 20 MG/1
40 TABLET ORAL DAILY
Qty: 10 TABLET | Refills: 0 | Status: SHIPPED | OUTPATIENT
Start: 2024-10-10 | End: 2024-10-15

## 2024-10-10 ASSESSMENT — PAIN SCALES - GENERAL: PAINLEVEL: MILD PAIN (3)

## 2024-10-10 NOTE — PATIENT INSTRUCTIONS
We discussed supportive care of bracing and OTC medications, role of occupational hand therapy, consideration of injections and surgical referral.    Plan:  - Today's Plan of Care:  Rehab: Physical Therapy: [facility] - [phone]    Discussed activity considerations and other supportive care including Ice/Heat, OTC and other topical medications as needed.    -We also discussed other future treatment options:  US guided STT joint injection  Referral to Hand Surgery    Follow Up: as needed    If you have any further questions for your physician or physician s care team you can call [phone].

## 2024-10-10 NOTE — PROGRESS NOTES
Assessment & Plan     Dysfunction of both eustachian tubes  Discussed treatment with prednisone which may also help with laryngitis symptoms. Patient has tolerated prednisone well in the past. Add also Flonase to treat nasal congestion. Follow up if symptoms persist or worsen.    - predniSONE (DELTASONE) 20 MG tablet  Dispense: 10 tablet; Refill: 0  - fluticasone (FLONASE) 50 MCG/ACT nasal spray  Dispense: 16 g; Refill: 0         No follow-ups on file.    Delicia Escalante, ELIOT [facility]     Patricia is a 64 year old female who presents to clinic today for the following health issues:  Chief Complaint   Patient presents with    Urgent Care    Ear Problem     Hard time hearing from both ears    Throat Problem     Pt lost her voice          10/10/2024    10:33 AM   Additional Questions   Roomed by ca   Accompanied by self     HPI      Review of Systems  Constitutional, HEENT, cardiovascular, pulmonary, GI, , musculoskeletal, neuro, skin, endocrine and psych systems are negative, except as otherwise noted.      Objective    /79   Pulse 76   Temp 97.6  F (36.4  C) (Tympanic)   Resp 16   Wt 89.4 kg (197 lb)   LMP 06/01/2012   SpO2 97%   BMI 35.35 kg/m    Physical Exam   GENERAL: alert and no distress  EYES: Eyes grossly normal to inspection, PERRL and conjunctivae and sclerae normal  HENT: normal cephalic/atraumatic, right ear: clear effusion and bulging membrane, left ear: clear effusion and dull TM, nasal mucosa edematous , oropharynx clear, and oral mucous membranes moist  NECK: no adenopathy, no asymmetry, masses, or scars  RESP: lungs clear to auscultation - no rales, rhonchi or wheezes  CV: regular rate and rhythm, normal S1 S2, no S3 or S4, no murmur, click or rub  MS: no gross musculoskeletal defects noted, no edema  SKIN: no suspicious lesions or rashes  NEURO: Normal strength and tone, mentation intact and speech normal  PSYCH: mentation appears  normal, affect normal/bright    No results found. However, due to the size of the patient record, not all encounters were searched. Please check Results Review for a complete set of results.

## 2024-10-10 NOTE — PROGRESS NOTES
ASSESSMENT & PLAN    Patricia was seen today for injury.    Diagnoses and all orders for this visit:    Arthritis of tizaotfn-fhldnrawp-ipxiktnze joint of right hand  -     Occupational Therapy  Referral; Future    Right wrist pain  -     Orthopedic  Referral  -     Occupational Therapy  Referral; Future      This issue is acute on chronic and Unchanged.      ICD-10-CM    1. Arthritis of pxlhrrwq-nsraredmf-rlemeuvby joint of right hand  M19.031 Occupational Therapy  Referral      2. Right wrist pain  M25.531 Orthopedic  Referral     Occupational Therapy  Referral          We discussed supportive care of bracing and OTC medications, role of occupational hand therapy, consideration of injections and surgical referral.    Plan:  - Today's Plan of Care:  Rehab: Physical Therapy: [facility] - [phone]    Discussed activity considerations and other supportive care including Ice/Heat, OTC and other topical medications as needed.    -We also discussed other future treatment options:  US guided STT joint injection  Referral to Hand Surgery    Follow Up: as needed    Concerning signs and symptoms were reviewed and all questions were answered at this time.    Thanks for the opportunity to participate in the care of this patient, I will keep you updated on their progress.    CC: Melissa Tariq MD [facility]  Sports Medicine Physician  [facility]    -----  Chief Complaint   Patient presents with    Right Wrist - Injury       SUBJECTIVE  Patricia Perez is a/an 64 year old female who is seen in consultation at the request of  Melissa El PA-C for evaluation of right wrist pain.     The patient is seen by themselves.  The patient is Right handed    Onset: 8/16/24, 8 weeks ago the patient fell. Patient describes injury as carrying a microwave and went down on the microwave, unsure how her hand hit it.    Location  "of Pain: right wrist radial side, dorsal  Worsened by: wrist flexion, extension, grabbing, stirring (when cooking)   Better with: ibuprofen, bracing   Treatments tried: previous imaging (xray 9/20/24) and casting/splinting/bracing (black brace)  Associated symptoms: swelling, numbness, tingling, weakness of right hand, and \"noticeable bump\"    Orthopedic/Surgical history: YES - Date: has RA in her right wrist  Social History/Occupation: likes to cook, she is working at a desk. This is not affecting her job    REVIEW OF SYSTEMS:  Review of Systems    OBJECTIVE:  LMP 06/01/2012    General: healthy, alert and in no distress  Skin: no suspicious lesions or rash.  CV: distal perfusion intact   Resp: normal respiratory effort without conversational dyspnea   Psych: normal mood and affect  Gait: NORMAL  Neuro: Normal light sensory exam of upper extremity    Right Wrist and Hand exam    Inspection:       Swelling: mild dorsal swelling radial wrist and hand    Tender:       Mild dorsal hand and wrist    Non Tender:       Remainder of the Wrist and Hand bilateral    ROM:       Decreased ROM right wrist with flexion and extension right    Strength:       5/5 strength in the muscles of the hand, wrist and forearm right    Neurovascular:       2+ radial pulses bilaterally with brisk capillary refill and      normal sensation to light touch in the radial, median and ulnar nerve distributions       RADIOLOGY:  Final results and radiologist's interpretation, available in the [facility] health record.  Images were reviewed with the patient in the office today.  My personal interpretation of the performed imaging:     Reviewed right hand and wrist x-rays from 9/20/2024 along with right wrist x-rays from 8/16/2024 -advanced degenerative changes STT joint with cystic changes in the ulnar lunate, advanced degenerative arthritis and MCP joints with erosions, scattered mild IP joint degenerative changes    XR HAND RIGHT G/E 3 VIEWS, XR WRIST " RIGHT G/E 3 VIEWS   9/20/2024 10:40 AM   HISTORY: persistent pain and swelling after a fall; Rheumatoid  arthritis involving both hands with positive rheumatoid factor;  Rheumatoid arthritis involving both hands with positive rheumatoid  factor  COMPARISON: Right wrist radiographs 8/16/2024                                                         IMPRESSION: Advanced degenerative arthritis at the STT joint, similar  to 8/16/2024. Cystic change within the ulnar-sided lunate, ulnar head  and triquetrum is also unchanged. Advanced degenerative arthritis of  the third MCP joint with small cysts or erosions in the third  metacarpal head and third proximal phalangeal base. Scattered mild  degenerative changes throughout the remainder of the right wrist and  hand. No acute or healing fracture.  EL SCHMITT MD     EXAM: XR WRIST RIGHT G/E 3 VIEWS  [facility]  DATE: 8/16/2024  INDICATION: Fall, initial encounter.  COMPARISON: 6/30/2021.                                                      IMPRESSION: Anatomic alignment right wrist. No acute displaced right wrist fracture is identified. Progressive now severe joint space narrowing at the STT joint with milder scattered joint space narrowing in the wrist including at the distal radioulnar   joint. Cystic change in the ulnar-sided lunate, ulnar head and triquetrum is similar to prior. Mild right wrist soft tissue swelling.    Reviewed Rheumatology notes  Review of the result(s) of each unique test - XRs

## 2024-10-10 NOTE — LETTER
10/10/2024      Patricia Perez  [address] [address]      Dear Colleague,    Thank you for referring your patient, Patricia Perez, to the [facility]. Please see a copy of my visit note below.    ASSESSMENT & PLAN    Patricia was seen today for injury.    Diagnoses and all orders for this visit:    Arthritis of ydnbwkuj-glzzknacz-szovtncxi joint of right hand  -     Occupational Therapy  Referral; Future    Right wrist pain  -     Orthopedic  Referral  -     Occupational Therapy  Referral; Future      This issue is acute on chronic and Unchanged.      ICD-10-CM    1. Arthritis of epapwafl-eolrgiqac-taucfarwa joint of right hand  M19.031 Occupational Therapy  Referral      2. Right wrist pain  M25.531 Orthopedic  Referral     Occupational Therapy  Referral          We discussed supportive care of bracing and OTC medications, role of occupational hand therapy, consideration of injections and surgical referral.    Plan:  - Today's Plan of Care:  Rehab: Physical Therapy: [facility] - [phone]    Discussed activity considerations and other supportive care including Ice/Heat, OTC and other topical medications as needed.    -We also discussed other future treatment options:  US guided STT joint injection  Referral to Hand Surgery    Follow Up: as needed    Concerning signs and symptoms were reviewed and all questions were answered at this time.    Thanks for the opportunity to participate in the care of this patient, I will keep you updated on their progress.    CC: Melissa Tariq MD [facility]  Sports Medicine Physician  [facility]    -----  Chief Complaint   Patient presents with     Right Wrist - Injury       SUBJECTIVE  Patricia Perez is a/an 64 year old female who is seen in consultation at the request of  Melissa El PA-C for evaluation of  "right wrist pain.     The patient is seen by themselves.  The patient is Right handed    Onset: 8/16/24, 8 weeks ago the patient fell. Patient describes injury as carrying a microwave and went down on the microwave, unsure how her hand hit it.    Location of Pain: right wrist radial side, dorsal  Worsened by: wrist flexion, extension, grabbing, stirring (when cooking)   Better with: ibuprofen, bracing   Treatments tried: previous imaging (xray 9/20/24) and casting/splinting/bracing (black brace)  Associated symptoms: swelling, numbness, tingling, weakness of right hand, and \"noticeable bump\"    Orthopedic/Surgical history: YES - Date: has RA in her right wrist  Social History/Occupation: likes to cook, she is working at a desk. This is not affecting her job    REVIEW OF SYSTEMS:  Review of Systems    OBJECTIVE:  LMP 06/01/2012    General: healthy, alert and in no distress  Skin: no suspicious lesions or rash.  CV: distal perfusion intact   Resp: normal respiratory effort without conversational dyspnea   Psych: normal mood and affect  Gait: NORMAL  Neuro: Normal light sensory exam of upper extremity    Right Wrist and Hand exam    Inspection:       Swelling: mild dorsal swelling radial wrist and hand    Tender:       Mild dorsal hand and wrist    Non Tender:       Remainder of the Wrist and Hand bilateral    ROM:       Decreased ROM right wrist with flexion and extension right    Strength:       5/5 strength in the muscles of the hand, wrist and forearm right    Neurovascular:       2+ radial pulses bilaterally with brisk capillary refill and      normal sensation to light touch in the radial, median and ulnar nerve distributions       RADIOLOGY:  Final results and radiologist's interpretation, available in the [facility] health record.  Images were reviewed with the patient in the office today.  My personal interpretation of the performed imaging:     Reviewed right hand and wrist x-rays from 9/20/2024 along with right " wrist x-rays from 8/16/2024 -advanced degenerative changes STT joint with cystic changes in the ulnar lunate, advanced degenerative arthritis and MCP joints with erosions, scattered mild IP joint degenerative changes    XR HAND RIGHT G/E 3 VIEWS, XR WRIST RIGHT G/E 3 VIEWS   9/20/2024 10:40 AM   HISTORY: persistent pain and swelling after a fall; Rheumatoid  arthritis involving both hands with positive rheumatoid factor;  Rheumatoid arthritis involving both hands with positive rheumatoid  factor  COMPARISON: Right wrist radiographs 8/16/2024                                                         IMPRESSION: Advanced degenerative arthritis at the STT joint, similar  to 8/16/2024. Cystic change within the ulnar-sided lunate, ulnar head  and triquetrum is also unchanged. Advanced degenerative arthritis of  the third MCP joint with small cysts or erosions in the third  metacarpal head and third proximal phalangeal base. Scattered mild  degenerative changes throughout the remainder of the right wrist and  hand. No acute or healing fracture.  EL SHCMITT MD     EXAM: XR WRIST RIGHT G/E 3 VIEWS  LOCATION: [facility] [place]  DATE: 8/16/2024  INDICATION: Fall, initial encounter.  COMPARISON: 6/30/2021.                                                      IMPRESSION: Anatomic alignment right wrist. No acute displaced right wrist fracture is identified. Progressive now severe joint space narrowing at the STT joint with milder scattered joint space narrowing in the wrist including at the distal radioulnar   joint. Cystic change in the ulnar-sided lunate, ulnar head and triquetrum is similar to prior. Mild right wrist soft tissue swelling.    Reviewed Rheumatology notes  Review of the result(s) of each unique test - XRs             Again, thank you for allowing me to participate in the care of your patient.        Sincerely,        Marybel Tariq MD

## 2024-10-17 ENCOUNTER — ANCILLARY PROCEDURE (OUTPATIENT)
Dept: GENERAL RADIOLOGY | Facility: CLINIC | Age: 64
End: 2024-10-17
Attending: PODIATRIST
Payer: COMMERCIAL

## 2024-10-17 ENCOUNTER — ANCILLARY PROCEDURE (OUTPATIENT)
Dept: MRI IMAGING | Facility: CLINIC | Age: 64
End: 2024-10-17
Attending: ORTHOPAEDIC SURGERY
Payer: COMMERCIAL

## 2024-10-17 ENCOUNTER — OFFICE VISIT (OUTPATIENT)
Dept: PODIATRY | Facility: CLINIC | Age: 64
End: 2024-10-17
Payer: COMMERCIAL

## 2024-10-17 VITALS
HEART RATE: 80 BPM | SYSTOLIC BLOOD PRESSURE: 137 MMHG | WEIGHT: 196 LBS | DIASTOLIC BLOOD PRESSURE: 89 MMHG | HEIGHT: 63 IN | BODY MASS INDEX: 34.73 KG/M2

## 2024-10-17 DIAGNOSIS — M17.12 PRIMARY OSTEOARTHRITIS OF LEFT KNEE: ICD-10-CM

## 2024-10-17 DIAGNOSIS — M76.70 TENDINITIS OF PERONEUS LONGUS TENDON, UNSPECIFIED LATERALITY: ICD-10-CM

## 2024-10-17 DIAGNOSIS — M25.562 ACUTE PAIN OF LEFT KNEE: ICD-10-CM

## 2024-10-17 DIAGNOSIS — M76.70 TENDINITIS OF PERONEUS LONGUS TENDON, UNSPECIFIED LATERALITY: Primary | ICD-10-CM

## 2024-10-17 DIAGNOSIS — Q66.70 CONGENITAL CAVUS FOOT: ICD-10-CM

## 2024-10-17 PROCEDURE — 99243 OFF/OP CNSLTJ NEW/EST LOW 30: CPT | Performed by: PODIATRIST

## 2024-10-17 PROCEDURE — 73721 MRI JNT OF LWR EXTRE W/O DYE: CPT | Mod: LT | Performed by: RADIOLOGY

## 2024-10-17 PROCEDURE — 73630 X-RAY EXAM OF FOOT: CPT | Mod: TC | Performed by: RADIOLOGY

## 2024-10-17 NOTE — LETTER
10/17/2024      Patricia Perez  [address] [address]      Dear Colleague,    Thank you for referring your patient, Patricia Perez, to the [facility]. Please see a copy of my visit note below.    Subjective:    Pt is seen today in consult from Dr. Nini Baca with the c/c of painful right foot.  This has been symptomatic for the past 3 weeks.  Pt denies any hx of trauma.  Aggravated by activity and releaved by rest.  Describes as burning pain.  Is slowly getting worse.  Feels better wearing good shoes.  Point to peroneal tendon course lateral foot.   Patient [place] and she [place].  Became more painful.  Denies ecchymosis.  Denies increased deformity.  Has noted no numbness.  She presents today for further evaluation.    ROS:  see above       Allergies   Allergen Reactions     Atorvastatin      Leg myalgias       Current Outpatient Medications   Medication Sig Dispense Refill     acetaminophen (TYLENOL) 325 MG tablet Take 650 mg by mouth every 6 hours as needed for mild pain       albuterol (PROAIR HFA/PROVENTIL HFA/VENTOLIN HFA) 108 (90 Base) MCG/ACT inhaler Inhale 2 puffs into the lungs every 4 hours as needed for shortness of breath or wheezing. 18 g 3     ARIPiprazole (ABILIFY) 2 MG tablet Take 2 tablets (4 mg) by mouth daily. 60 tablet 3     aspirin 81 MG EC tablet Take 1 tablet (81 mg) by mouth daily 90 tablet 3     benzonatate (TESSALON) 100 MG capsule Take 1 capsule (100 mg) by mouth 3 times daily as needed for cough 30 capsule 0     blood glucose (NO BRAND SPECIFIED) lancing device Device to be used with lancets. 3 each 3     blood glucose (NO BRAND SPECIFIED) test strip Use to test blood sugar 3 times daily 300 strip 3     calcium carbonate-vitamin D (CALTRATE 600+D) 600-400 MG-UNIT CHEW Take 1 chew tab by mouth 2 times daily 180 tablet 3     carvedilol (COREG) 25 MG tablet Take 1 tablet (25 mg) by mouth 2 times daily  (with meals) 180 tablet 3     cetirizine (ZYRTEC) 10 MG tablet Take 1 tablet (10 mg) by mouth daily 30 tablet 3     Cyanocobalamin (VITAMIN B-12 SL) Place under the tongue every other day        diclofenac (VOLTAREN) 1 % topical gel Apply 2 g topically 4 times daily. 350 g 1     dicyclomine (BENTYL) 20 MG tablet Take 1 tablet (20 mg) by mouth every 6 hours 90 tablet 2     estradiol (ESTRACE) 1 MG tablet Take 1 tablet (1 mg) by mouth daily 90 tablet 3     ezetimibe (ZETIA) 10 MG tablet Take 1 tablet (10 mg) by mouth daily 90 tablet 3     fluticasone (FLONASE) 50 MCG/ACT nasal spray Spray 2 sprays into both nostrils daily. 16 g 0     hydroxychloroquine (PLAQUENIL) 200 MG tablet Take 1 tablet (200 mg) by mouth 2 times daily. Annual eye exam required. 180 tablet 0     isosorbide mononitrate (IMDUR) 30 MG 24 hr tablet Take 1 tablet (30 mg) by mouth daily 90 tablet 3     lisinopril (ZESTRIL) 5 MG tablet TAKE 1 TABLET(5 MG) BY MOUTH DAILY 90 tablet 3     meclizine (ANTIVERT) 25 MG tablet Take 1 tablet (25 mg) by mouth 3 times daily as needed for dizziness. 90 tablet 2     medroxyPROGESTERone (PROVERA) 2.5 MG tablet Take 1 tablet (2.5 mg) by mouth daily 90 tablet 3     Multiple Vitamin (MULTI-VITAMIN) per tablet Take 1 tablet by mouth daily. 100 tablet 3     NIFEdipine ER (ADALAT CC) 30 MG 24 hr tablet TAKE 1 TABLET(30 MG) BY MOUTH DAILY 60 tablet 1     omeprazole (PRILOSEC) 20 MG DR capsule Take 1 capsule (20 mg) by mouth daily. 90 capsule 3     ondansetron (ZOFRAN ODT) 4 MG ODT tab Take 1 tablet (4 mg) by mouth every 8 hours as needed for nausea. 30 tablet 2     simvastatin (ZOCOR) 40 MG tablet Take 1 tablet (40 mg) by mouth at bedtime 90 tablet 3     sulfaSALAzine ER (AZULFIDINE EN) 500 MG EC tablet Take 2 tablets (1,000 mg) by mouth 2 times daily. Take this medication with food. Labs every 8-12 weeks. 360 tablet 0     topiramate (TOPAMAX) 25 MG tablet 25mg in the morning and 25mg at night 60 tablet 3     venlafaxine  (EFFEXOR) 37.5 MG tablet Take 1 tablet (37.5 mg) by mouth daily. With 75 mg tablet of venlafaxine for a total morning dose of 112.5 mg 30 tablet 3     venlafaxine (EFFEXOR) 75 MG tablet Take 1 tablet (75 mg) by mouth 3 times daily. 90 tablet 3       Patient Active Problem List   Diagnosis     Dizziness and giddiness     Motion sickness     GERD (gastroesophageal reflux disease)     Idiopathic cardiomyopathy (H)     Sleep apnea     Hyperlipidemia LDL goal <100     Allergic rhinitis     Hypertension goal BP (blood pressure) < 130/80     Pelvic pain in female     24 hour contact handout given     Class 1 obesity with serious comorbidity and body mass index (BMI) of 33.0 to 33.9 in adult, unspecified obesity type     Elevated PTHrP level     H/O bariatric surgery     Hx of gastric bypass     Hypovitaminosis D     Allergic rhinitis     Attention deficit disorder of adult     Mild persistent asthma     Hand joint pain     Myopia, bilateral     Dumping syndrome     Benign essential hypertension     Adjustment disorder with depressed mood     Decreased hearing of both ears     BPV (benign positional vertigo), unspecified laterality     Vitamin D deficiency     Coronary artery disease involving native coronary artery of native heart without angina pectoris     Acute pain of left knee     Syncope     Posterior vitreous detachment of both eyes     Combined forms of age-related cataract, mild, of both eyes     Glaucoma suspect of both eyes     Sciatica of right side     Hypoglycemia     Raynaud's disease without gangrene     Class 2 severe obesity due to excess calories with serious comorbidity in adult (H)     Irritable bowel syndrome with both constipation and diarrhea     Plantar fasciitis       Past Medical History:   Diagnosis Date     Anxiety      Arthritis      ASCUS of cervix with negative high risk HPV 01/23/2006     Asthma      Bursitis of shoulder 03/04/2010     Chronic rhinitis 03/25/2009     Coronary artery disease       Depression      Depressive disorder      Dyspnea on exertion      GERD (gastroesophageal reflux disease) 10/14/2008     Hypertension      Idiopathic cardiomyopathy (H) 01/08/2009     Indigestion      Itchy eyes      Left leg pain 06/16/2011     Motion sickness 02/27/2008     Nasal congestion      Pneumonia      Problems related to lack of adequate sleep      Ringing in ears      Sleep apnea 03/25/2009     Sneezing        Past Surgical History:   Procedure Laterality Date     BARIATRIC SURGERY       COLONOSCOPY N/A 11/11/2021    Procedure: COLONOSCOPY, WITH POLYPECTOMY;  Surgeon: Stephanie Meneses MD;  [place] [facility] [place]     DIABETES RESOURCES  As Child    Tonsillectomy     ENT SURGERY       ESOPHAGOSCOPY, GASTROSCOPY, DUODENOSCOPY (EGD), COMBINED N/A 11/11/2021    Procedure: ESOPHAGOGASTRODUODENOSCOPY, WITH BIOPSY;  Surgeon: Stephanie Meneses MD;  [place] [facility] [place]     HERNIA REPAIR       LAPAROSCOPIC BYPASS GASTRIC  10/16/2012    Procedure: LAPAROSCOPIC BYPASS GASTRIC;  laparoscopic reyna en y gastric bypass;  Surgeon: Nish Bradford MD;  Location: [facility] OR     TONSILLECTOMY       Uretural Sticture  As Child       Family History   Problem Relation Age of Onset     Gastrointestinal Disease Mother         non cancerous pancreatic tumor     Hypertension Mother      Heart Disease Mother         A fib     Allergies Mother      Hypertension Father      Lipids Father      Alcohol/Drug Father         Abuse     Depression Father      Respiratory Father         COPD     Cardiovascular Father      Allergies Sister         Poison Ivy     Depression Brother      Hypertension Brother      Allergies Brother         Enviromental/ Bees     Breast Cancer Maternal Grandmother      Glaucoma Paternal Grandmother      Glaucoma Paternal Grandfather      Cerebrovascular Disease Other      Macular Degeneration Other      Diabetes No family hx of      Cancer - colorectal No family hx of      Retinal detachment No family hx of      Amblyopia  "No family hx of      Thyroid Disease No family hx of      Adrenal Disorder No family hx of        Social History     Tobacco Use     Smoking status: Never     Passive exposure: Never     Smokeless tobacco: Never   Substance Use Topics     Alcohol use: Yes     Comment: seldom             O:  /89   Pulse 80   Ht 1.6 m (5' 3\")   Wt 88.9 kg (196 lb)   LMP 06/01/2012   BMI 34.72 kg/m  .     Constitutional/ general:  Pt is in no apparent distress, appears well-nourished.  Cooperative with history and physical exam.     Psych:  The patient answered questions appropriately.  Normal affect.  Seems to have reasonable expectations, in terms of treatment.     Lungs:  Non labored breathing, non labored speech. No cough.  No audible wheezing. Even, quiet breathing.       Vascular:  Pedal pulses are palpable bilaterally for both the DP and PT arteries.  CFT < 3 sec.  No edema.  Pedal hair growth noted.     Neuro:  Alert and oriented x 3. Coordinated gait.  Light touch sensation is intact t    Derm: Normal texture and turgor.  No erythema, ecchymosis, or cyanosis.  No open lesions.     Musculoskeletal:    Lower extremity muscle strength is normal.  Patient is ambulatory without an assistive device or brace.     Cavus foot with weightbearing bilateral.  No forefoot or rear foot deformities noted.  MS 5/5 all compartments.  Normal ROM all fore foot and rearfoot joints with no pain or crepitus.  No equinus.  Pain right foot over peroneal tendon course lateral calcaneus and plantar lateral cuboid.  Pain most significant with stressing peroneus longus.  Some pain stressing peroneus brevis.  No subluxation of tendons around distal fibula.  Slight edema.  No masses.  No ecchymosis.      Radiographic Exam:  X-Ray Findings:  I personally reviewed the films, normal    A:  Cavus foot with suspected peroneus longus tendon rupture    Plan:  X-rays taken today.  Explained to her foot structure makes more prone to overuse peroneal " tendons.  Explained course of peroneus longus tendon and that stress and this quite painful.  Discussed MRI for further evaluation of this tendon and she is in agreement.  We placed order.  Discussed offloading this tendon with walking boot.  She is in agreement.  Will dispense cam walker today.  Patient to wear this at all times while walking.  When not walking patient will take this off and do ROM to prevent blood clot and joint stiffness.  Patient will not sleep with this on.  Will not drive at this time.  Can ice and use compression on this.  Will call patient with MRI results and decide on how to proceed.  Thank you for allowing me participate in the care of this patient.          Chemo Rodriguez DPM, FACFAS           Again, thank you for allowing me to participate in the care of your patient.        Sincerely,        Chemo Rodriguez DPM

## 2024-10-17 NOTE — PATIENT INSTRUCTIONS
"We wish you continued good healing. If you have any questions or concerns, please do not hesitate to contact us at  [phone]    [facility] (secure e-mail communication and access to your chart) to send a message or to make an appointment.    Please remember to call and schedule a follow up appointment if one was recommended at your earliest convenience.     PODIATRY CLINIC HOURS  TELEPHONE NUMBER    Dr. Chemo ALLENPSOO FACFAS        Clinics:  [facility]  Tuesday 1PM-6PM  [place]  Wednesday 745AM-330PM  [place]  Monday 2nd,4th  830AM-4PM  Thursday/Friday 745AM-230PM     [facility]POINT[facility]  [phone]    [place] APPOINTMENTS  [phone]          If you need a medication refill, please contact us you may need lab work and/or a follow up visit prior to your refill (i.e. Antifungal medications).  If MRI needed please call Imaging at [phone]   HOW DO I GET MY KNEE SCOOTER? Knee scooters can be picked up at ANY Medical Supply stores with your knee scooter Prescription.  OR  Bring your signed prescription to an [facility] showroom.   Set up an appointment for your custom Orthotics. Call any Orthotics locations call [phone]         [employer] / CAM WALKING BOOT INSTRUCTIONS  - Do NOT drive with CAM walker on. This is due to safety and legal issues.   - Do NOT wear the CAM walker on long car/train rides or on an airplane.  - Remove the CAM walker several times a day and do ankle range of motion (ROM) exercises/wiggle toes.  - It is recommended that a thick-soled shoe be worn on the other foot to offset any created leg length issue.    - You can purchase an \"even up\" on [employer] to place under the other shoe to help too.  - The boot does not have to be worn at night.   - There is an increased risk of developing a blood clot with lower extremity immobilization. ROM exercises and knee-high compression " (tenso /ACE wrap) is recommended to lower that risk.   - You should seek medical attention if you experience calf swelling and/or pain, chest pain, or shortness of breath.   If you need to return or exchange the boot, please contact [facility] @ [phone]

## 2024-10-17 NOTE — PROGRESS NOTES
Subjective:    Pt is seen today in consult from Dr. Nini Baca with the c/c of painful right foot.  This has been symptomatic for the past 3 weeks.  Pt denies any hx of trauma.  Aggravated by activity and releaved by rest.  Describes as burning pain.  Is slowly getting worse.  Feels better wearing good shoes.  Point to peroneal tendon course lateral foot.   Patient [place] and she felt a [place].  Became more painful.  Denies ecchymosis.  Denies increased deformity.  Has noted no numbness.  She presents today for further evaluation.    ROS:  see above       Allergies   Allergen Reactions    Atorvastatin      Leg myalgias       Current Outpatient Medications   Medication Sig Dispense Refill    acetaminophen (TYLENOL) 325 MG tablet Take 650 mg by mouth every 6 hours as needed for mild pain      albuterol (PROAIR HFA/PROVENTIL HFA/VENTOLIN HFA) 108 (90 Base) MCG/ACT inhaler Inhale 2 puffs into the lungs every 4 hours as needed for shortness of breath or wheezing. 18 g 3    ARIPiprazole (ABILIFY) 2 MG tablet Take 2 tablets (4 mg) by mouth daily. 60 tablet 3    aspirin 81 MG EC tablet Take 1 tablet (81 mg) by mouth daily 90 tablet 3    benzonatate (TESSALON) 100 MG capsule Take 1 capsule (100 mg) by mouth 3 times daily as needed for cough 30 capsule 0    blood glucose (NO BRAND SPECIFIED) lancing device Device to be used with lancets. 3 each 3    blood glucose (NO BRAND SPECIFIED) test strip Use to test blood sugar 3 times daily 300 strip 3    calcium carbonate-vitamin D (CALTRATE 600+D) 600-400 MG-UNIT CHEW Take 1 chew tab by mouth 2 times daily 180 tablet 3    carvedilol (COREG) 25 MG tablet Take 1 tablet (25 mg) by mouth 2 times daily (with meals) 180 tablet 3    cetirizine (ZYRTEC) 10 MG tablet Take 1 tablet (10 mg) by mouth daily 30 tablet 3    Cyanocobalamin (VITAMIN B-12 SL) Place under the tongue every other day       diclofenac (VOLTAREN) 1 % topical gel Apply 2 g topically 4  times daily. 350 g 1    dicyclomine (BENTYL) 20 MG tablet Take 1 tablet (20 mg) by mouth every 6 hours 90 tablet 2    estradiol (ESTRACE) 1 MG tablet Take 1 tablet (1 mg) by mouth daily 90 tablet 3    ezetimibe (ZETIA) 10 MG tablet Take 1 tablet (10 mg) by mouth daily 90 tablet 3    fluticasone (FLONASE) 50 MCG/ACT nasal spray Spray 2 sprays into both nostrils daily. 16 g 0    hydroxychloroquine (PLAQUENIL) 200 MG tablet Take 1 tablet (200 mg) by mouth 2 times daily. Annual eye exam required. 180 tablet 0    isosorbide mononitrate (IMDUR) 30 MG 24 hr tablet Take 1 tablet (30 mg) by mouth daily 90 tablet 3    lisinopril (ZESTRIL) 5 MG tablet TAKE 1 TABLET(5 MG) BY MOUTH DAILY 90 tablet 3    meclizine (ANTIVERT) 25 MG tablet Take 1 tablet (25 mg) by mouth 3 times daily as needed for dizziness. 90 tablet 2    medroxyPROGESTERone (PROVERA) 2.5 MG tablet Take 1 tablet (2.5 mg) by mouth daily 90 tablet 3    Multiple Vitamin (MULTI-VITAMIN) per tablet Take 1 tablet by mouth daily. 100 tablet 3    NIFEdipine ER (ADALAT CC) 30 MG 24 hr tablet TAKE 1 TABLET(30 MG) BY MOUTH DAILY 60 tablet 1    omeprazole (PRILOSEC) 20 MG DR capsule Take 1 capsule (20 mg) by mouth daily. 90 capsule 3    ondansetron (ZOFRAN ODT) 4 MG ODT tab Take 1 tablet (4 mg) by mouth every 8 hours as needed for nausea. 30 tablet 2    simvastatin (ZOCOR) 40 MG tablet Take 1 tablet (40 mg) by mouth at bedtime 90 tablet 3    sulfaSALAzine ER (AZULFIDINE EN) 500 MG EC tablet Take 2 tablets (1,000 mg) by mouth 2 times daily. Take this medication with food. Labs every 8-12 weeks. 360 tablet 0    topiramate (TOPAMAX) 25 MG tablet 25mg in the morning and 25mg at night 60 tablet 3    venlafaxine (EFFEXOR) 37.5 MG tablet Take 1 tablet (37.5 mg) by mouth daily. With 75 mg tablet of venlafaxine for a total morning dose of 112.5 mg 30 tablet 3    venlafaxine (EFFEXOR) 75 MG tablet Take 1 tablet (75 mg) by mouth 3 times daily. 90 tablet 3       Patient Active Problem  List   Diagnosis    Dizziness and giddiness    Motion sickness    GERD (gastroesophageal reflux disease)    Idiopathic cardiomyopathy (H)    Sleep apnea    Hyperlipidemia LDL goal <100    Allergic rhinitis    Hypertension goal BP (blood pressure) < 130/80    Pelvic pain in female    24 hour contact handout given    Class 1 obesity with serious comorbidity and body mass index (BMI) of 33.0 to 33.9 in adult, unspecified obesity type    Elevated PTHrP level    H/O bariatric surgery    Hx of gastric bypass    Hypovitaminosis D    Allergic rhinitis    Attention deficit disorder of adult    Mild persistent asthma    Hand joint pain    Myopia, bilateral    Dumping syndrome    Benign essential hypertension    Adjustment disorder with depressed mood    Decreased hearing of both ears    BPV (benign positional vertigo), unspecified laterality    Vitamin D deficiency    Coronary artery disease involving native coronary artery of native heart without angina pectoris    Acute pain of left knee    Syncope    Posterior vitreous detachment of both eyes    Combined forms of age-related cataract, mild, of both eyes    Glaucoma suspect of both eyes    Sciatica of right side    Hypoglycemia    Raynaud's disease without gangrene    Class 2 severe obesity due to excess calories with serious comorbidity in adult (H)    Irritable bowel syndrome with both constipation and diarrhea    Plantar fasciitis       Past Medical History:   Diagnosis Date    Anxiety     Arthritis     ASCUS of cervix with negative high risk HPV 01/23/2006    Asthma     Bursitis of shoulder 03/04/2010    Chronic rhinitis 03/25/2009    Coronary artery disease     Depression     Depressive disorder     Dyspnea on exertion     GERD (gastroesophageal reflux disease) 10/14/2008    Hypertension     Idiopathic cardiomyopathy (H) 01/08/2009    Indigestion     Itchy eyes     Left leg pain 06/16/2011    Motion sickness 02/27/2008    Nasal congestion     Pneumonia     Problems  "related to lack of adequate sleep     Ringing in ears     Sleep apnea 03/25/2009    Sneezing        Past Surgical History:   Procedure Laterality Date    BARIATRIC SURGERY      COLONOSCOPY N/A 11/11/2021    Procedure: COLONOSCOPY, WITH POLYPECTOMY;  Surgeon: Stephanie Meneses MD;  Location: [facility] [place] OR    DIABETES RESOURCES  As Child    Tonsillectomy    ENT SURGERY      ESOPHAGOSCOPY, GASTROSCOPY, DUODENOSCOPY (EGD), COMBINED N/A 11/11/2021    Procedure: ESOPHAGOGASTRODUODENOSCOPY, WITH BIOPSY;  Surgeon: Stephanie Meneses MD;  Location: [facility] [place] OR    HERNIA REPAIR      LAPAROSCOPIC BYPASS GASTRIC  10/16/2012    Procedure: LAPAROSCOPIC BYPASS GASTRIC;  laparoscopic reyna en y gastric bypass;  Surgeon: Nish Bradford MD;  Location: [facility] OR    TONSILLECTOMY      Uretural Sticture  As Child       Family History   Problem Relation Age of Onset    Gastrointestinal Disease Mother         non cancerous pancreatic tumor    Hypertension Mother     Heart Disease Mother         A fib    Allergies Mother     Hypertension Father     Lipids Father     Alcohol/Drug Father         Abuse    Depression Father     Respiratory Father         COPD    Cardiovascular Father     Allergies Sister         Poison Ivy    Depression Brother     Hypertension Brother     Allergies Brother         Enviromental/ Bees    Breast Cancer Maternal Grandmother     Glaucoma Paternal Grandmother     Glaucoma Paternal Grandfather     Cerebrovascular Disease Other     Macular Degeneration Other     Diabetes No family hx of     Cancer - colorectal No family hx of     Retinal detachment No family hx of     Amblyopia No family hx of     Thyroid Disease No family hx of     Adrenal Disorder No family hx of        Social History     Tobacco Use    Smoking status: Never     Passive exposure: Never    Smokeless tobacco: Never   Substance Use Topics    Alcohol use: Yes     Comment: seldom             O:  /89   Pulse 80   Ht 1.6 m (5' 3\")   Wt 88.9 kg (196 lb)   LMP " 06/01/2012   BMI 34.72 kg/m  .     Constitutional/ general:  Pt is in no apparent distress, appears well-nourished.  Cooperative with history and physical exam.     Psych:  The patient answered questions appropriately.  Normal affect.  Seems to have reasonable expectations, in terms of treatment.     Lungs:  Non labored breathing, non labored speech. No cough.  No audible wheezing. Even, quiet breathing.       Vascular:  Pedal pulses are palpable bilaterally for both the DP and PT arteries.  CFT < 3 sec.  No edema.  Pedal hair growth noted.     Neuro:  Alert and oriented x 3. Coordinated gait.  Light touch sensation is intact t    Derm: Normal texture and turgor.  No erythema, ecchymosis, or cyanosis.  No open lesions.     Musculoskeletal:    Lower extremity muscle strength is normal.  Patient is ambulatory without an assistive device or brace.     Cavus foot with weightbearing bilateral.  No forefoot or rear foot deformities noted.  MS 5/5 all compartments.  Normal ROM all fore foot and rearfoot joints with no pain or crepitus.  No equinus.  Pain right foot over peroneal tendon course lateral calcaneus and plantar lateral cuboid.  Pain most significant with stressing peroneus longus.  Some pain stressing peroneus brevis.  No subluxation of tendons around distal fibula.  Slight edema.  No masses.  No ecchymosis.      Radiographic Exam:  X-Ray Findings:  I personally reviewed the films, normal    A:  Cavus foot with suspected peroneus longus tendon rupture    Plan:  X-rays taken today.  Explained to her foot structure makes more prone to overuse peroneal tendons.  Explained course of peroneus longus tendon and that stress and this quite painful.  Discussed MRI for further evaluation of this tendon and she is in agreement.  We placed order.  Discussed offloading this tendon with walking boot.  She is in agreement.  Will dispense cam walker today.  Patient to wear this at all times while walking.  When not walking  patient will take this off and do ROM to prevent blood clot and joint stiffness.  Patient will not sleep with this on.  Will not drive at this time.  Can ice and use compression on this.  Will call patient with MRI results and decide on how to proceed.  Thank you for allowing me participate in the care of this patient.          Chemo Rodriguez DPM, FACFAS

## 2024-10-21 ENCOUNTER — ANCILLARY PROCEDURE (OUTPATIENT)
Dept: MAMMOGRAPHY | Facility: CLINIC | Age: 64
End: 2024-10-21
Attending: STUDENT IN AN ORGANIZED HEALTH CARE EDUCATION/TRAINING PROGRAM
Payer: COMMERCIAL

## 2024-10-21 DIAGNOSIS — Z12.31 VISIT FOR SCREENING MAMMOGRAM: ICD-10-CM

## 2024-10-21 PROCEDURE — 77067 SCR MAMMO BI INCL CAD: CPT | Mod: TC | Performed by: RADIOLOGY

## 2024-10-21 PROCEDURE — 77063 BREAST TOMOSYNTHESIS BI: CPT | Mod: TC | Performed by: RADIOLOGY

## 2024-10-25 ENCOUNTER — LAB (OUTPATIENT)
Dept: LAB | Facility: CLINIC | Age: 64
End: 2024-10-25
Payer: COMMERCIAL

## 2024-10-25 DIAGNOSIS — Z79.899 HIGH RISK MEDICATION USE: ICD-10-CM

## 2024-10-25 DIAGNOSIS — M05.742 RHEUMATOID ARTHRITIS INVOLVING BOTH HANDS WITH POSITIVE RHEUMATOID FACTOR (H): ICD-10-CM

## 2024-10-25 DIAGNOSIS — M05.741 RHEUMATOID ARTHRITIS INVOLVING BOTH HANDS WITH POSITIVE RHEUMATOID FACTOR (H): ICD-10-CM

## 2024-10-25 LAB
ALBUMIN SERPL BCG-MCNC: 3.9 G/DL (ref 3.5–5.2)
ALT SERPL W P-5'-P-CCNC: 33 U/L (ref 0–50)
AST SERPL W P-5'-P-CCNC: 30 U/L (ref 0–45)
CREAT SERPL-MCNC: 0.82 MG/DL (ref 0.51–0.95)
EGFRCR SERPLBLD CKD-EPI 2021: 78 ML/MIN/1.73M2
ERYTHROCYTE [DISTWIDTH] IN BLOOD BY AUTOMATED COUNT: 13 % (ref 10–15)
HCT VFR BLD AUTO: 36.4 % (ref 35–47)
HGB BLD-MCNC: 11.6 G/DL (ref 11.7–15.7)
MCH RBC QN AUTO: 32 PG (ref 26.5–33)
MCHC RBC AUTO-ENTMCNC: 31.9 G/DL (ref 31.5–36.5)
MCV RBC AUTO: 101 FL (ref 78–100)
PLATELET # BLD AUTO: 269 10E3/UL (ref 150–450)
RBC # BLD AUTO: 3.62 10E6/UL (ref 3.8–5.2)
WBC # BLD AUTO: 6.3 10E3/UL (ref 4–11)

## 2024-10-25 PROCEDURE — 36415 COLL VENOUS BLD VENIPUNCTURE: CPT

## 2024-10-25 PROCEDURE — 82565 ASSAY OF CREATININE: CPT

## 2024-10-25 PROCEDURE — 84450 TRANSFERASE (AST) (SGOT): CPT

## 2024-10-25 PROCEDURE — 85027 COMPLETE CBC AUTOMATED: CPT

## 2024-10-25 PROCEDURE — 84460 ALANINE AMINO (ALT) (SGPT): CPT

## 2024-10-25 PROCEDURE — 82040 ASSAY OF SERUM ALBUMIN: CPT

## 2024-10-31 ENCOUNTER — TELEPHONE (OUTPATIENT)
Dept: RHEUMATOLOGY | Facility: CLINIC | Age: 64
End: 2024-10-31

## 2024-11-05 ENCOUNTER — THERAPY VISIT (OUTPATIENT)
Dept: OCCUPATIONAL THERAPY | Facility: CLINIC | Age: 64
End: 2024-11-05
Payer: COMMERCIAL

## 2024-11-05 DIAGNOSIS — M25.531 RIGHT WRIST PAIN: ICD-10-CM

## 2024-11-05 DIAGNOSIS — M19.031 ARTHRITIS OF SCAPHOID-TRAPEZIUM-TRAPEZOID JOINT OF RIGHT HAND: ICD-10-CM

## 2024-11-05 PROCEDURE — 97165 OT EVAL LOW COMPLEX 30 MIN: CPT | Mod: GO

## 2024-11-05 PROCEDURE — 97760 ORTHOTIC MGMT&TRAING 1ST ENC: CPT | Mod: GO

## 2024-11-07 DIAGNOSIS — K58.2 IRRITABLE BOWEL SYNDROME WITH BOTH CONSTIPATION AND DIARRHEA: ICD-10-CM

## 2024-11-08 RX ORDER — DICYCLOMINE HCL 20 MG
20 TABLET ORAL EVERY 6 HOURS
Qty: 90 TABLET | Refills: 2 | Status: SHIPPED | OUTPATIENT
Start: 2024-11-08

## 2024-11-09 ENCOUNTER — HOSPITAL ENCOUNTER (OUTPATIENT)
Dept: MRI IMAGING | Facility: CLINIC | Age: 64
Discharge: HOME | End: 2024-11-09
Attending: PODIATRIST | Admitting: PODIATRIST
Payer: COMMERCIAL

## 2024-11-09 DIAGNOSIS — Q66.70 CONGENITAL CAVUS FOOT: ICD-10-CM

## 2024-11-09 DIAGNOSIS — M76.70 TENDINITIS OF PERONEUS LONGUS TENDON, UNSPECIFIED LATERALITY: ICD-10-CM

## 2024-11-09 PROCEDURE — 73721 MRI JNT OF LWR EXTRE W/O DYE: CPT | Mod: RT

## 2024-11-14 ENCOUNTER — TELEPHONE (OUTPATIENT)
Dept: PODIATRY | Facility: CLINIC | Age: 64
End: 2024-11-14
Payer: COMMERCIAL

## 2024-11-14 NOTE — TELEPHONE ENCOUNTER
Called patient with MRI results.  Explained peroneus longus high-grade tear.  Discussed treatment options.  She is wearing a walking boot for 3 to 4 weeks.  Will continue this for another 2 to 3 weeks.  Will return to clinic at that time.  Discussed if no better surgery possible.  Briefly discussed primary repair versus anastomosis.

## 2024-11-25 NOTE — PROGRESS NOTES
RESUME ASPIRIN 5 DAYS AFTER SURGERY    Cervical Discharge Instructions    [facility] Telephone Number: [phone]    Activity  Mobility  Walking is highly encouraged throughout the day, as tolerated.   Smaller distances are more easily tolerated.  Your goal is to increase the distance you walk each day.  Remember to listen to your body.  Exercise caution in adverse weather or terrain conditions.    Stairs  You may ascend and descend stairs as you tolerate.   Be safe and deliberate. Hold the handrail and advance one step at a time.  Avoid step-stools and ladders.     Restrictions  No forceful neck range of motion. You rotate your neck within comfortable limits.   No twisting, pulling, pushing, or lifting items with a force greater than 5 pounds. (A gallon of milk is about 8 lbs)  No lifting objects above the level of your shoulders.  Bend at the knees and hips, maintaining a straight back and neck.  Typically, your restrictions will be lessened at your two-week follow-up appointment, however we appreciate your compliance with restrictions until directed otherwise.   If you have been provided a walking aid such as a walker or cane, please use as directed.    Sitting  Fatigue is common after surgery and you may find respite in sitting. This is normal and encouraged. Please remember to be deliberately mobile throughout the day. Change your position frequently, as your muscles may get sore and stiff without movement.    It is recommended to sit in a chair which allows your knees to be at about the same level as your hips. This makes rising from a seated position more feasible.   Avoid overstuffed or plush chairs. Medium to firm chairs with straight backs give better support.   Recliners are OK but you may need to add pillows to avoid sinking into the chair.  Change position every 20-30 minutes throughout the day (example: after sitting, stand, then you can sit again for another 20-30 minutes).    Sexual  Service Date: 2020      RE:    Patricia Perez   MRN:  [id number]   :  1960      Patient:  To Whom It May Concern:      It was a pleasure participating in the care of your patient, Ms. Patricia Perez.  As you know, she is a 60-year-old lady who I saw today via virtual video visit via [employer] for a mild nonischemic cardiomyopathy, hypertension and hyperlipidemia.      Her past medical history is significant for the followin.  Hypertension.   2.  Hyperlipidemia.   3.  Sleep apnea, on CPAP.   4.  Depression.   5.  Gastroesophageal reflux disease.   6.  Asthma.   7.  Obesity.   8.  Status post gastric bypass surgery.      Her cardiac history is significant for a nonischemic cardiomyopathy that was mild to moderate degree back in .  She had a coronary angiogram back then that revealed only mild disease in the LAD and RCA territories.      I last saw her 2017 and that time she was doing adequately.        She did have an episode of syncope in 2020 which necessitated a coronary CTA on 2020 with the following results:     Left main okay.   LAD 25%-49% proximal to mid stenosis.   Circumflex 25%-49% proximal stenosis.   RCA 25%-49% proximal to mid stenosis.      Zio Patch monitor and echocardiogram were also relatively unremarkable.  Since then, she has not had any further episodes of syncope, rather she has had some episodes of tripping over her own feet and 3 falls due to clumsiness, but not losing consciousness.        She has had a very stressful year, not only with COVID, but with her job, where she was demoted, but not fired.        She does not exercise very much.  In fact, the only exercise, she does occasionally walk around her house and up and down stairs, which she can perform without symptoms or limitation.  If she is carrying something heavy, she may have to stop to rest.  She denies aissatou chest pain or exertional dyspnea, PND, orthopnea, edema, palpitations or near-syncope.       REVIEW OF SYSTEMS:  Positive for not taking her p.m. meds.  She forgets over half the time.  She for some reason also discontinued her aspirin.      CURRENT MEDICATIONS:     1.  Abilify.   2.  Carvedilol 25 mg twice daily.   3.  Hydralazine 10 mg twice daily.   4.  Imdur 30 mg a day.   5.  Lisinopril 5 mg a day.   6.  Sertraline.   7.  Zocor 20 mg a day.   8.  Venlafaxine.     9.  Adderall.      PHYSICAL EXAMINATION:     VITAL SIGNS:  Blood pressure at home generally runs in the 120s/70s.     GENERAL:  She appears comfortable and well groomed.   PSYCHIATRIC:  She is alert and oriented x3.   HEENT:  Her eyes do not appear grossly erythematous or have exudate.   RESPIRATORY:  She is breathing comfortably without gross cough.      The remainder of the comprehensive physical exam was deferred secondary to COVID-19 pandemic and secondary to video visit restrictions.      On 12/08/2020, LDL was 147, potassium 4.2, GFR normal, hemoglobin 10.5, TSH normal.      Echocardiogram 01/07/2020 reveals an ejection fraction of 50%-55% without significant valvular pathology, no change since 2016.      Zio Patch monitor 01/08/2020 reveals symptoms corresponded to ectopy.  She did have asymptomatic nonsustained VT and/or SVT less than or equal to 6 beats incidentally noted.        IMPRESSION:      Patricia is a 60-year-old lady with several active issues:     1.  Prior mild nonischemic cardiomyopathy.       Back in 2008, her ejection fraction was in the 40%-45% range.  With medical therapy, her ejection fraction improved to the 50% range in 2016.  Coronary angiography back then in 2008 revealed only mild nonobstructive disease, which is confirmed by her most recent coronary CTA of 01/07/2020.     She is currently asymptomatic at a low level of exertion.  We will continue to monitor clinically.     2.  Hypertension, well controlled.       She claims that her blood pressures at home have been running in the 120/70 range.  We will  Activity  You may resume presurgical sexual activity two weeks following surgery as tolerated and when approved by your surgeon.  Discontinue sexual activity if it causes or exacerbates your pain.     Exercise/Fitness Activity  Do not participate in this activity during the two weeks following surgery, unless instructed otherwise.  Your surgeon will lessen restrictions and progress your activity level when appropriate.     Driving  You may NOT drive while taking narcotics or muscle relaxants.  When you feel able and safe to drive you may return to driving. Slowly advance the complexity of your driving from short distance driving on local streets and in parking lots to longer distance driving including highway driving.  Remember that you may not be able to turn and look due to pain, so be mindful of this when returning to driving.      Travel  Avoid air travel and long-distance automobile travel the first two weeks following surgery.     Sleeping  You will require plenty of rest and sleep after surgery.   Use the position that provides you the most comfort.  You may use a pillow under knees, between legs, or behind back (if lying on your side), for comfort.   Log roll to turn and rise from a recumbent position.   You may have difficulty sleeping at night. This is not abnormal. If you experience this, try to avoid napping during the day.   It is common to fatigue easily after surgery, this will typically improve one month after your surgery.   Maintain clean sheets and pillow cases.   Rub, with a clean towel to dry.   Return to work  When cleared by surgeon. Discuss specific work activities with your surgeon at your follow-up visit.  Light/sedentary type work may be possible 2 weeks post-op.  Most work activities are permitted approximately 6 weeks after surgery.     If You Have a Neck Brace  Hard cervical collar  Wear as instructed; may remove when showering.  Place collar back on after showering.  Wear even when  continue to follow.     3.  Hyperlipidemia.       Her LDL is currently 147 and her goal LDL is less than 70 in the context of mild nonobstructive coronary disease.  She has gotten significant leg myalgias on Lipitor and does not wish to switch to this and she has taken simvastatin less than half the time because she misses her evening meds.  She will attempt to take her Zocor earlier as well as carvedilol in an and attempt to improve compliance.     4.  Mild nonobstructive coronary disease.      This was shown on coronary CTA 2020.  She has stopped taking her aspirin and denies having any significant bleeding side effects.  Will reinstitute.        PLAN:     1.  Restart aspirin 81 mg a day, enteric-coated.     2.  Add Zetia 10 mg a day in order to hopefully aid in bringing down her general cholesterol levels and her LDL in particular.     3.  She will improve compliance with simvastatin and take it every day at 6pm and we can titrate dosing accordingly.     4.  Recheck fasting lipid profile in 3 months and adjust the simvastatin dose in order to reach goal LDL of less than 70.     5.  Primary MD referral to help investigate mild anemia.     6.  Recheck fasting lipid profile in 3 months.     7.  Virtual video visit followup in 6 months with labs, earlier if needed.     8.  She also promises to increase her activity level and has suggested that she will turn on some music and try dancing each day.      Once again, it was a pleasure participating in the care of your patient, Ms. Patricia Perez.  Please feel free to contact me anytime if any questions regarding her care in the future.      Sincerely,           VALERIE YEAGER MD             D: 2020   T: 2020   MT: DAYAMI      Name:     PATRICIA PEREZ   MRN:      [id number]        Account:      [id number]   :      1960           Service Date: 2020      Document: [id number]       sleeping IF instructed to do so.  Exact time (weeks) of brace to be worn to be determined by surgeon; discuss at office visit  Keep neck straight while removing and replacing collar (No bending, turning or flexing neck with collar off)  EXCEPTION: Do NOT remove collar for any reason including showering IFyou had a  corepectomy. Keep incision dry. May remove collar only after being instructed at office visit.    Soft cervical collar  IF ordered, Wear for comfort   May remove when showering. Replace as needed, when done showering.    Dressing and Wound Care  You will have a water-resistant, clear, adhesive dressing over your wound. Please leave this dressing in place for seven days after surgery.   Remove dressing 7 days after surgery and inspect your wound. You will find adhesive tapes (steri-strips) crossing your wound, please leave these intact. If your wound continues to have fresh drainage, place a fresh gauze, apply a new adhesive dressing and leave in place until your first post-operative appointment. If your wound appears dry, you do not require reapplication of the adhesive dressing. You may leave your wound open to air or cover with gauze and paper tape.   Always wash hands before and after dressing changes.   Watch incision for any worsening redness, increased drainage, increased warmth or opening of the incision.  Call your surgeon’s office/answering service if you notice any of these.  Do not apply lotions or ointments on or near incision.      Bathing  You may shower over the adhesive dressing (Tegaderm) that is in place starting the day of surgery.  Have someone inspect your dressing prior to each shower to ensure the integrity of the seal at the dressing edges. If the bandage integrity is compromised, please apply an additional adhesive over the existing dressing.   Do not submerge your wound. No tub bathing, swimming, use of steam rooms or saunas for 6 weeks following surgery and when permitted by  your surgeon.  Do not scrub your incision site however you may allow soapy water to run over the site.   Dab the site dry with a clean towel.    Diet and Constipation Prevention  Your appetite may be poor. Your desire for food will return.  Drink plenty of liquids (water, juice) each day to prevent dehydration.  Maintain a healthy, well balanced diet that includes plenty of protein following surgery.  Foods that are high in fiber (bran, vegetables, fruit) are good ways to prevent constipation.   Use an over the counter stool softener while taking narcotics to prevent constipation; the use of medications such a Miralax or Milk of Magnesia may be needed.  An enema or glycerin suppository may be necessary if above measures do not work.  Contact your pharmacist, family doctor, or surgeon for advice.    No Smoking  Smoking will inhibit healing.  Smoking has been clearly shown to inhibit solid bony fusion and is counterproductive to the nature of your surgery.   Even one cigarette a day may cause problems.  Vaping, chewing tobacco, nicotine gum and patches will also inhibit healing.          Pain Management      Medication  No NSAIDS until okay with surgeon. NO NSAIDS for 3 months for fusions. This type of medication can interfere with bone healing and thus jeopardize the success of your surgery.  NSAIDS (non-steroidal anti-inflammatory) include: Aspirin, Motrin, ibuprofen, Advil, Aleve, Naprosyn, Indocin, Nuprin, Vioxx, Celebrex, Bextra. (COMPLETE LIST IN GUIDEBOOK)  Pain Management  Expectations  You will have incisional site pain. This is normal and expected after surgery.   You may continue to have leg symptoms which should improve over time.   It is not uncommon 48-72 hours after surgery for patients to experience worsening pain symptoms or return of leg pain/symptoms as post-surgical inflammation sets in. Do not be alarmed as this should gradually improve.   Non-medication Based Techniques  Relaxation techniques  A  way to focus your attention other than on your pain. You are innately capable of producing endorphins, a naturally occurring hormone, that can decrease pain. Some examples of relaxation techniques which may result in a release of endorphins include deep breathing, listening to music, prayer, or meditation.   The use of cold therapy for 20 minutes multiple times per day is encouraged.  You may apply heat to areas of muscle spasm only. Do not apply heat directly over your wound as this can lead to swelling and increased pain.   Gentle stretching may ease muscle spasm.  The idea is to “lengthen” the muscle that is in spasm. Avoid any aggressive bending, lifting, twisting with your neck or back. Gently bending and stretching is OK.  Gentle massage applied to the muscle spasm may help reduce discomfort.    Medication  You may be prescribed a specific NSAID by your surgeon unless contraindicated. Please take as directed as this will help with pain control and inflammation.  Discontinue use of presurgical NSAIDs or any new non-prescribed NSAIDs for 3 months (non-steroidal anti-inflammatories) which include: Aspirin, Motrin, Advil, Aleve, Naprosyn, Voltaren, Mobic, Indocin, Meloxicam, Ibuprofen, Indomethacin, Naproxen, Diclofenac. (COMPLETE LIST IN GUIDEBOOK)  Tylenol (acetaminophen) is another excellent medication for pain. Take caution as most narcotics contain acetaminophen. You may take a combined daily maximum 4 grams (4000mg) of Tylenol (acetaminophen) in 24 hours. More than this can lead to liver injury.   You will be prescribed a narcotic medication. Take this as needed for breakthrough pain. Gradually wean yourself from prescription medication. You may substitute for Tylenol (acetaminophen).  Consider taking pain medication 30 minutes prior to activity to avoid incisional pain.   Take muscle relaxants as needed only if experiencing muscle spasm.  Please allow medications 30-45 minutes to take effect.  Do NOT drink  alcohol while on pain medication.  Monitor need for pain medication refills closely.   Call your pharmacy or physician’s office at least five days before you run out of pain medication. If calling physician office after 3 pm, requests will be addressed on the next business day. Calls for refills on Fridays, holidays, and weekends may result in a processing delay of your refill until the next business day.     Post-op Office Visit  Patients are routinely seen 2 weeks, 6 weeks, 3 months, 6 months, and 1 year after surgery.   You will be scheduled for a post-surgical visit two weeks after surgery. Please confirm this appointment.  It is very important that you keep this appointment.  We recommend making a list of questions to bring with you as it is not uncommon for patients to forget questions while being seen.  Schedule a one week follow up with your Primary Care Physician. It is a good opportunity to review all your medications including any new additions we may provide.     When to contact your surgeon’s team:  Temperature > 101.5°F or 38.5°C.   Increasing pain, swelling, redness, odor, or drainage from your incision.  Separation of incision.  Sudden reappearance of pain that won’t go away with pain medication.  New numbness or weakness to arms or legs.  Progressively worsening swallowing issues.   Difficulty urinating or if incontinence of your bowel.   Headaches that worsen when standing/sitting and resolve when laid flat.  Any concerns, unanswered questions, or new problems.     Go directly to the ER or CALL 911 if you:  become short of breath  have chest pain  cough up blood  have unexplained anxiety with breathing

## 2024-12-03 NOTE — PROGRESS NOTES
HISTORY OF PRESENT ILLNESS    Patricia Perez is a 64 year old female seen for follow up of left  knee osteoarthritis, lateral meniscus tear, loose body   She got an MRI in October because the knee was worse.  After the MRI the knee got better again.   After walking down stairs 11/15, her knee got worse again.   She has grade     Feels like something loose in the knee  Pain anterior   Pain levels fluctuate  No catching, locking or giving-way.     Has been limiting activities.       KNEE EXAM:   Mild effusion  Diffuse tenderness.  Alignment: unchanged        Impression:   Osteoarthritis left knee. Mainly patellofemoral joint, but significant changes in the lateral compartment   Loose body left knee. Somewhat symptomatic presently  Lateral meniscus tear -- Somewhat symptomatic     Plan:  after careful consideration, she decided not to proceed with arthroscopy, rather would like to try a corticosteroid injection. Viscosupplementation injection an option as well.   She will eventually need total knee arthroplasty, and this was discussed as an option as well, that she is not prepared for.    With the patient's consent, left knee(s) injected intra-articularly with 80mg of Depomedrol and 4cc of local anesthetic after sterile prep.     Return to clinic prn    X-rays to be done next visit?: no     TING Morris MD  Dept. Orthopedic Surgery  [facility]

## 2024-12-04 ENCOUNTER — OFFICE VISIT (OUTPATIENT)
Dept: ORTHOPEDICS | Facility: CLINIC | Age: 64
End: 2024-12-04
Payer: COMMERCIAL

## 2024-12-04 VITALS — SYSTOLIC BLOOD PRESSURE: 139 MMHG | DIASTOLIC BLOOD PRESSURE: 81 MMHG | HEART RATE: 75 BPM | OXYGEN SATURATION: 98 %

## 2024-12-04 DIAGNOSIS — M17.12 PRIMARY OSTEOARTHRITIS OF LEFT KNEE: Primary | ICD-10-CM

## 2024-12-04 DIAGNOSIS — S83.272A COMPLEX TEAR OF LATERAL MENISCUS OF LEFT KNEE, UNSPECIFIED WHETHER OLD OR CURRENT TEAR, INITIAL ENCOUNTER: ICD-10-CM

## 2024-12-04 DIAGNOSIS — M23.42 LOOSE BODY OF LEFT KNEE: ICD-10-CM

## 2024-12-04 PROCEDURE — 99213 OFFICE O/P EST LOW 20 MIN: CPT | Mod: 25 | Performed by: ORTHOPAEDIC SURGERY

## 2024-12-04 PROCEDURE — 20610 DRAIN/INJ JOINT/BURSA W/O US: CPT | Mod: LT | Performed by: ORTHOPAEDIC SURGERY

## 2024-12-04 RX ORDER — LIDOCAINE HYDROCHLORIDE 10 MG/ML
4 INJECTION, SOLUTION EPIDURAL; INFILTRATION; INTRACAUDAL; PERINEURAL
Status: COMPLETED | OUTPATIENT
Start: 2024-12-04 | End: 2024-12-04

## 2024-12-04 RX ORDER — METHYLPREDNISOLONE ACETATE 80 MG/ML
80 INJECTION, SUSPENSION INTRA-ARTICULAR; INTRALESIONAL; INTRAMUSCULAR; SOFT TISSUE
Status: COMPLETED | OUTPATIENT
Start: 2024-12-04 | End: 2024-12-04

## 2024-12-04 RX ADMIN — LIDOCAINE HYDROCHLORIDE 4 ML: 10 INJECTION, SOLUTION EPIDURAL; INFILTRATION; INTRACAUDAL; PERINEURAL at 13:57

## 2024-12-04 RX ADMIN — METHYLPREDNISOLONE ACETATE 80 MG: 80 INJECTION, SUSPENSION INTRA-ARTICULAR; INTRALESIONAL; INTRAMUSCULAR; SOFT TISSUE at 13:57

## 2024-12-04 ASSESSMENT — PAIN SCALES - GENERAL: PAINLEVEL_OUTOF10: MODERATE PAIN (5)

## 2024-12-04 NOTE — PATIENT INSTRUCTIONS
AFTER VISIT SUMMARY    Thanks for coming in today.  [facility]    [address], [address]        [facility] CORTISONE Injection Discharge Instructions    You may shower, however avoid swimming, tub baths or hot tubs for 24 hours following your procedure    You may have a mild to moderate increase in pain for several days following the injection.    It may take up to 14 days for the steroid medication to start working although you may feel the effect as early as a few days after the procedure.    You may use ice packs for 10-15 minutes, 3 to 4 times a day at the injection site for comfort    You may use anti-inflammatory medications (such as Ibuprofen or Aleve or Advil) or Tylenol for pain control if necessary    If you were fasting, you may resume your normal diet and medications after the procedure    If you have diabetes, check your blood sugar more frequently than usual as your blood sugar may be higher than normal for 10-14 days following a steroid injection. Contact your doctor who manages your diabetes if your blood sugar is higher than usual      If you experience any of the following, call [facility] [phone]  -Fever over 100 degree F  -Swelling, bleeding, redness, drainage, warmth at the injection site  - New or worsening pain

## 2024-12-04 NOTE — PROGRESS NOTES
Large Joint Injection/Arthocentesis: L knee joint    Date/Time: 12/4/2024 1:57 PM    Performed by: Raymon Vogt  Authorized by: Kelvin Morris MD    Indications:  Pain and osteoarthritis  Needle Size:  22 G  Guidance: landmark guided    Approach:  Anterolateral  Location:  Knee      Medications:  80 mg methylPREDNISolone 80 MG/ML; 4 mL lidocaine (PF) 1 %  Outcome:  Tolerated well, no immediate complications  Procedure discussed: discussed risks, benefits, and alternatives    Consent Given by:  Patient  Timeout: timeout called immediately prior to procedure    Prep: patient was prepped and draped in usual sterile fashion

## 2024-12-27 PROBLEM — M25.531 RIGHT WRIST PAIN: Status: RESOLVED | Noted: 2024-11-05 | Resolved: 2024-12-27

## 2024-12-31 ENCOUNTER — THERAPY VISIT (OUTPATIENT)
Dept: PHYSICAL THERAPY | Facility: CLINIC | Age: 64
End: 2024-12-31
Attending: ORTHOPAEDIC SURGERY
Payer: COMMERCIAL

## 2024-12-31 DIAGNOSIS — M17.12 PRIMARY OSTEOARTHRITIS OF LEFT KNEE: ICD-10-CM

## 2024-12-31 DIAGNOSIS — G89.29 CHRONIC PAIN OF LEFT KNEE: Primary | ICD-10-CM

## 2024-12-31 DIAGNOSIS — M25.562 CHRONIC PAIN OF LEFT KNEE: Primary | ICD-10-CM

## 2024-12-31 PROCEDURE — 97110 THERAPEUTIC EXERCISES: CPT | Mod: GP | Performed by: PHYSICAL THERAPIST

## 2024-12-31 PROCEDURE — 97161 PT EVAL LOW COMPLEX 20 MIN: CPT | Mod: GP | Performed by: PHYSICAL THERAPIST

## 2024-12-31 ASSESSMENT — ACTIVITIES OF DAILY LIVING (ADL)
KNEEL ON THE FRONT OF YOUR KNEE: ACTIVITY IS SOMEWHAT DIFFICULT
HOW_WOULD_YOU_RATE_THE_OVERALL_FUNCTION_OF_YOUR_KNEE_DURING_YOUR_USUAL_DAILY_ACTIVITIES?: ABNORMAL
PAIN: THE SYMPTOM AFFECTS MY ACTIVITY MODERATELY
LIMPING: THE SYMPTOM AFFECTS MY ACTIVITY MODERATELY
WEAKNESS: I DO NOT HAVE THE SYMPTOM
RISE FROM A CHAIR: ACTIVITY IS MINIMALLY DIFFICULT
SQUAT: I AM UNABLE TO DO THE ACTIVITY
GIVING WAY, BUCKLING OR SHIFTING OF KNEE: THE SYMPTOM AFFECTS MY ACTIVITY SLIGHTLY
KNEEL ON THE FRONT OF YOUR KNEE: ACTIVITY IS SOMEWHAT DIFFICULT
HOW_WOULD_YOU_RATE_THE_CURRENT_FUNCTION_OF_YOUR_KNEE_DURING_YOUR_USUAL_DAILY_ACTIVITIES_ON_A_SCALE_FROM_0_TO_100_WITH_100_BEING_YOUR_LEVEL_OF_KNEE_FUNCTION_PRIOR_TO_YOUR_INJURY_AND_0_BEING_THE_INABILITY_TO_PERFORM_ANY_OF_YOUR_USUAL_DAILY_ACTIVITIES?: 50
RAW_SCORE: 41
LIMPING: THE SYMPTOM AFFECTS MY ACTIVITY MODERATELY
WEAKNESS: I DO NOT HAVE THE SYMPTOM
SWELLING: THE SYMPTOM AFFECTS MY ACTIVITY MODERATELY
HOW_WOULD_YOU_RATE_THE_OVERALL_FUNCTION_OF_YOUR_KNEE_DURING_YOUR_USUAL_DAILY_ACTIVITIES?: ABNORMAL
STIFFNESS: THE SYMPTOM AFFECTS MY ACTIVITY MODERATELY
GO UP STAIRS: ACTIVITY IS MINIMALLY DIFFICULT
AS_A_RESULT_OF_YOUR_KNEE_INJURY,_HOW_WOULD_YOU_RATE_YOUR_CURRENT_LEVEL_OF_DAILY_ACTIVITY?: ABNORMAL
STAND: ACTIVITY IS MINIMALLY DIFFICULT
AS_A_RESULT_OF_YOUR_KNEE_INJURY,_HOW_WOULD_YOU_RATE_YOUR_CURRENT_LEVEL_OF_DAILY_ACTIVITY?: ABNORMAL
GO DOWN STAIRS: ACTIVITY IS SOMEWHAT DIFFICULT
HOW_WOULD_YOU_RATE_THE_CURRENT_FUNCTION_OF_YOUR_KNEE_DURING_YOUR_USUAL_DAILY_ACTIVITIES_ON_A_SCALE_FROM_0_TO_100_WITH_100_BEING_YOUR_LEVEL_OF_KNEE_FUNCTION_PRIOR_TO_YOUR_INJURY_AND_0_BEING_THE_INABILITY_TO_PERFORM_ANY_OF_YOUR_USUAL_DAILY_ACTIVITIES?: 50
WALK: ACTIVITY IS SOMEWHAT DIFFICULT
SIT WITH YOUR KNEE BENT: ACTIVITY IS MINIMALLY DIFFICULT
STIFFNESS: THE SYMPTOM AFFECTS MY ACTIVITY MODERATELY
GO UP STAIRS: ACTIVITY IS MINIMALLY DIFFICULT
GO DOWN STAIRS: ACTIVITY IS SOMEWHAT DIFFICULT
GIVING WAY, BUCKLING OR SHIFTING OF KNEE: THE SYMPTOM AFFECTS MY ACTIVITY SLIGHTLY
SIT WITH YOUR KNEE BENT: ACTIVITY IS MINIMALLY DIFFICULT
PAIN: THE SYMPTOM AFFECTS MY ACTIVITY MODERATELY
STAND: ACTIVITY IS MINIMALLY DIFFICULT
SQUAT: I AM UNABLE TO DO THE ACTIVITY
PLEASE_INDICATE_YOR_PRIMARY_REASON_FOR_REFERRAL_TO_THERAPY:: KNEE
RISE FROM A CHAIR: ACTIVITY IS MINIMALLY DIFFICULT
KNEE_ACTIVITY_OF_DAILY_LIVING_SCORE: 58.57
WALK: ACTIVITY IS SOMEWHAT DIFFICULT
SWELLING: THE SYMPTOM AFFECTS MY ACTIVITY MODERATELY
KNEE_ACTIVITY_OF_DAILY_LIVING_SUM: 41

## 2024-12-31 NOTE — PROGRESS NOTES
PHYSICAL THERAPY EVALUATION  Type of Visit: Evaluation              Subjective         Presenting condition or subjective complaint: (Patient-Rptd) torn miniscus and pain in L knee  Fell down a hill in August and hurt her R knee. [place] she compensated too much with the L knee and after the R improved, the L continued to hurt. MRI confirmed L complex meniscal tears. Was recommended for injection and conservative cares rather than surgery to begin. She is hopeful to avoid surgery for now and manage sx. She was working with a  2x/week and has not been to the gym since September. Would like to return and start walking and Pilates reformer classes 2x/week but wants to be sure to not increase her sx.   EASES: NSAIDs, PRICE  AGGS: squats, stairs, pivoting, prolonged standing/walking (10 min)  Date of onset: 12/04/24    Relevant medical history: (Patient-Rptd) Arthritis; Depression   Dates & types of surgery: (Patient-Rptd) weight loss    Prior diagnostic imaging/testing results: (Patient-Rptd) MRI     Prior therapy history for the same diagnosis, illness or injury: (Patient-Rptd) No      Living Environment  Social support: (Patient-Rptd) With a significant other or spouse   Type of home: (Patient-Rptd) House   Stairs to enter the home: (Patient-Rptd) Yes       Ramp: (Patient-Rptd) No   Stairs inside the home: (Patient-Rptd) Yes (Patient-Rptd) 8 Is there a railing: (Patient-Rptd) Yes     Help at home: (Patient-Rptd) None  Equipment owned: (Patient-Rptd) Straight Cane     Employment: (Patient-Rptd) No    Hobbies/Interests:      Patient goals for therapy: (Patient-Rptd) walk comfortably    Pain assessment: see HPI     Objective   KNEE EVALUATION  PAIN:   INTEGUMENTARY (edema, incisions): no Baker's cyst or increased effusion at joint line  POSTURE: WNL  GAIT:  Weightbearing Status: WBAT  Assistive Device(s): None  Gait Deviations: WNL  BALANCE/PROPRIOCEPTION: able to toe walk and heel walk (pain)  WEIGHTBEARING  ALIGNMENT:   NON-WEIGHTBEARING ALIGNMENT:   ROM: WFL for transfers observed, loaded knee extension painful   STRENGTH: 4/5: hip abduction bilateral, knee flexion L // all other LE myotomes 5/5  FLEXIBILITY:   SPECIAL TESTS: + varus stress at 30 deg L for pain, - all other valgus/varus tests, - anterior/posterior drawer  FUNCTIONAL TESTS:   PALPATION: none TTP  JOINT MOBILITY:     Assessment & Plan   CLINICAL IMPRESSIONS  Medical Diagnosis: Primary osteoarthritis of left knee (M17.12)    Treatment Diagnosis: L knee pain - force production deficits in setting of MRI confirmed complex meniscal tear   Impression/Assessment: Patient is a 64 year old female with L knee pain complaints.  The following significant findings have been identified: Pain, Decreased strength, Impaired gait, Impaired muscle performance, and Decreased activity tolerance. These impairments interfere with their ability to perform recreational activities, household chores, and community mobility as compared to previous level of function.     Clinical Decision Making (Complexity):  Clinical Presentation: Stable/Uncomplicated  Clinical Presentation Rationale: based on medical and personal factors listed in PT evaluation  Clinical Decision Making (Complexity): Low complexity    PLAN OF CARE  Treatment Interventions:  Modalities: Cryotherapy, E-stim, Hot Pack  Interventions: Gait Training, Manual Therapy, Neuromuscular Re-education, Therapeutic Activity, Therapeutic Exercise, Self-Care/Home Management    Long Term Goals     PT Goal 1  Goal Identifier: Squatting  Goal Description: Patient will demonstrate squat to standard height chair/box with no compensations and no increase in pain x 5 reps to indicate ability to complete work place and recreational tasks.  Target Date: 03/31/25      Frequency of Treatment: PRN, every 6 weeks planned  Duration of Treatment: 4 months    Recommended Referrals to Other Professionals:  none  Education Assessment:    Education Comments: AVS    Risks and benefits of evaluation/treatment have been explained.   Patient/Family/caregiver agrees with Plan of Care.     Evaluation Time:     PT Eval, Low Complexity Minutes (84933): 20     Signing Clinician: DEREK LEAVITT, PT        [facility]                                                                                   OUTPATIENT PHYSICAL THERAPY      PLAN OF TREATMENT FOR OUTPATIENT REHABILITATION   Patient's Last Name, First Name, Patricia Person YOB: 1960   Provider's Name   [facility]   Medical Record No.  [id number]     Onset Date: 12/04/24  Start of Care Date: 12/31/24     Medical Diagnosis:  Primary osteoarthritis of left knee (M17.12)      PT Treatment Diagnosis:  L knee pain - force production deficits in setting of MRI confirmed complex meniscal tear Plan of Treatment  Frequency/Duration: PRN, every 6 weeks planned/ 4 months    Certification date from 12/31/24 to 03/31/25         See note for plan of treatment details and functional goals     DEREK LEAVITT, PT                         I CERTIFY THE NEED FOR THESE SERVICES FURNISHED UNDER        THIS PLAN OF TREATMENT AND WHILE UNDER MY CARE     (Physician attestation of this document indicates review and certification of the therapy plan).              Referring Provider:  Kelvin Morris    Initial Assessment  See Epic Evaluation- Start of Care Date: 12/31/24

## 2024-12-31 NOTE — PATIENT INSTRUCTIONS
GYM: start with 15 min of legs or slightly less - Pilates would be a good start  GYM: Okay to do more arms or core.   GYM: leave WANTING to do more.  WALKING: start with 5-10 min on bike or elliptical  WALKING: after 2 weeks, you can start walking if the machines felt good  AVOID (first month or so): high and medium pain (deep squats, single leg exercises, impact, brisk walking, knee extension machine)    Rule of thumb:   Comfort with exercise = good  Slight discomfort = acceptable  Low level pain, no form changes = acceptable  Medium or lasting high pain = Unacceptable, make a change    HELPFUL:   Ice, compression, elevation of the leg, NSAIDs as prescribed, rest days between workouts

## 2025-01-06 ENCOUNTER — MYC MEDICAL ADVICE (OUTPATIENT)
Dept: FAMILY MEDICINE | Facility: CLINIC | Age: 65
End: 2025-01-06
Payer: COMMERCIAL

## 2025-01-06 NOTE — TELEPHONE ENCOUNTER
"Would you like us to call pharmacy to see what is needed (PA etc.) or should we be sending this to psychiatry, Bhavya Gomez MD and directing patient to follow-up with them?    Note that Rx was last prescribed by Dr. Gomez, however her last visit notes from 10/1/2024 state:  \"5) Follow-up: Plan to transfer to PCP\"    Please advise.    Ann-Marie Benson RN    "

## 2025-01-07 ENCOUNTER — LAB (OUTPATIENT)
Dept: LAB | Facility: CLINIC | Age: 65
End: 2025-01-07
Payer: COMMERCIAL

## 2025-01-07 DIAGNOSIS — M05.742 RHEUMATOID ARTHRITIS INVOLVING BOTH HANDS WITH POSITIVE RHEUMATOID FACTOR (H): ICD-10-CM

## 2025-01-07 DIAGNOSIS — Z79.899 HIGH RISK MEDICATION USE: ICD-10-CM

## 2025-01-07 DIAGNOSIS — M05.741 RHEUMATOID ARTHRITIS INVOLVING BOTH HANDS WITH POSITIVE RHEUMATOID FACTOR (H): ICD-10-CM

## 2025-01-07 LAB
ERYTHROCYTE [DISTWIDTH] IN BLOOD BY AUTOMATED COUNT: 13.5 % (ref 10–15)
HCT VFR BLD AUTO: 36.4 % (ref 35–47)
HGB BLD-MCNC: 12 G/DL (ref 11.7–15.7)
MCH RBC QN AUTO: 32.3 PG (ref 26.5–33)
MCHC RBC AUTO-ENTMCNC: 33 G/DL (ref 31.5–36.5)
MCV RBC AUTO: 98 FL (ref 78–100)
PLATELET # BLD AUTO: 276 10E3/UL (ref 150–450)
RBC # BLD AUTO: 3.72 10E6/UL (ref 3.8–5.2)
WBC # BLD AUTO: 5.2 10E3/UL (ref 4–11)

## 2025-01-07 PROCEDURE — 84460 ALANINE AMINO (ALT) (SGPT): CPT

## 2025-01-07 PROCEDURE — 82565 ASSAY OF CREATININE: CPT

## 2025-01-07 PROCEDURE — 85027 COMPLETE CBC AUTOMATED: CPT

## 2025-01-07 PROCEDURE — 82040 ASSAY OF SERUM ALBUMIN: CPT

## 2025-01-07 PROCEDURE — 84450 TRANSFERASE (AST) (SGOT): CPT

## 2025-01-07 PROCEDURE — 36415 COLL VENOUS BLD VENIPUNCTURE: CPT

## 2025-01-07 NOTE — TELEPHONE ENCOUNTER
Called pharmacy and spoke with Rosenda.  She ran the prescription through and said everything went through just fine.    [employer] message sent    James Bowen RN  [facility]

## 2025-01-07 NOTE — TELEPHONE ENCOUNTER
Yes, please call the pharmacy to see what is needed. That would be great    Thank you    [facility] [place]

## 2025-01-08 LAB
ALBUMIN SERPL BCG-MCNC: 4.3 G/DL (ref 3.5–5.2)
ALT SERPL W P-5'-P-CCNC: 34 U/L (ref 0–50)
AST SERPL W P-5'-P-CCNC: 30 U/L (ref 0–45)
CREAT SERPL-MCNC: 0.91 MG/DL (ref 0.51–0.95)
EGFRCR SERPLBLD CKD-EPI 2021: 69 ML/MIN/1.73M2

## 2025-01-13 DIAGNOSIS — Z79.899 HIGH RISK MEDICATION USE: ICD-10-CM

## 2025-01-13 DIAGNOSIS — M05.742 RHEUMATOID ARTHRITIS INVOLVING BOTH HANDS WITH POSITIVE RHEUMATOID FACTOR (H): ICD-10-CM

## 2025-01-13 DIAGNOSIS — M05.741 RHEUMATOID ARTHRITIS INVOLVING BOTH HANDS WITH POSITIVE RHEUMATOID FACTOR (H): ICD-10-CM

## 2025-01-13 RX ORDER — HYDROXYCHLOROQUINE SULFATE 200 MG/1
200 TABLET, FILM COATED ORAL 2 TIMES DAILY
Qty: 180 TABLET | Refills: 0 | Status: SHIPPED | OUTPATIENT
Start: 2025-01-13

## 2025-01-13 NOTE — TELEPHONE ENCOUNTER
Patient was due for appt last OCT.  Has now made appt for early Feb.  Ok to refill?  1 month or full 3 month?  Tere SOTO   [facility] RN

## 2025-01-13 NOTE — TELEPHONE ENCOUNTER
Last Written Prescription Date:  9/30/24  Last Fill Quantity: 180,  # refills: 0   Last Filled by Pharmacy Date: 10/5/24  Last Office Visit: 9/20/2024 ; Last Virtual Visit: 4/3/2024   Next Appointment Due: ~12/20/24  Future Office Visit Scheduled:  none scheduled    Plaquenil started 9/20/24.  No eye exam on file since that time.

## 2025-01-20 ENCOUNTER — LAB (OUTPATIENT)
Dept: LAB | Facility: CLINIC | Age: 65
End: 2025-01-20
Payer: COMMERCIAL

## 2025-01-20 DIAGNOSIS — I25.10 CORONARY ARTERY DISEASE INVOLVING NATIVE CORONARY ARTERY OF NATIVE HEART WITHOUT ANGINA PECTORIS: ICD-10-CM

## 2025-01-20 LAB
ALT SERPL W P-5'-P-CCNC: 53 U/L (ref 0–50)
ANION GAP SERPL CALCULATED.3IONS-SCNC: 7 MMOL/L (ref 7–15)
AST SERPL W P-5'-P-CCNC: 48 U/L (ref 0–45)
BUN SERPL-MCNC: 9.1 MG/DL (ref 8–23)
CALCIUM SERPL-MCNC: 9.3 MG/DL (ref 8.8–10.4)
CHLORIDE SERPL-SCNC: 107 MMOL/L (ref 98–107)
CHOLEST SERPL-MCNC: 202 MG/DL
CREAT SERPL-MCNC: 0.72 MG/DL (ref 0.51–0.95)
EGFRCR SERPLBLD CKD-EPI 2021: >90 ML/MIN/1.73M2
FASTING STATUS PATIENT QL REPORTED: YES
GLUCOSE SERPL-MCNC: 84 MG/DL (ref 70–99)
HCO3 SERPL-SCNC: 26 MMOL/L (ref 22–29)
HDLC SERPL-MCNC: 114 MG/DL
LDLC SERPL CALC-MCNC: 79 MG/DL
NONHDLC SERPL-MCNC: 88 MG/DL
POTASSIUM SERPL-SCNC: 4.9 MMOL/L (ref 3.4–5.3)
SODIUM SERPL-SCNC: 140 MMOL/L (ref 135–145)
TRIGL SERPL-MCNC: 47 MG/DL

## 2025-01-20 PROCEDURE — 80061 LIPID PANEL: CPT

## 2025-01-20 PROCEDURE — 84450 TRANSFERASE (AST) (SGOT): CPT

## 2025-01-20 PROCEDURE — 84460 ALANINE AMINO (ALT) (SGPT): CPT

## 2025-01-20 PROCEDURE — 36415 COLL VENOUS BLD VENIPUNCTURE: CPT

## 2025-01-20 PROCEDURE — 80048 BASIC METABOLIC PNL TOTAL CA: CPT

## 2025-01-20 NOTE — PROGRESS NOTES
[facility] - [facility] [place]   Cardiology Clinic Note      HPI:   Ms. Patricia Perez is a pleasant 64 year old female with medical history pertinent for non-ischemic cardiomyopathy with recovered EF, HTN, HLD, and non-obstructive CAD. She presents to cardiology clinic for follow up    Patricia was most recently seen in cardiology clinic in January 2024 by Dr. Arnett. Hydralazine was stopped and she was recommended to continue monitoring BP at home.     Today in clinic, she denies chest pain, palpitations, dizziness, syncope, or lower extremity edema. Home BP has been 110-120/70s. She had several musculoskeletal injuries that prevented her from exercising this fall, just started exercising on the [employer] again.       PAST MEDICAL HISTORY:  Past Medical History:   Diagnosis Date    Anxiety     Arthritis     ASCUS of cervix with negative high risk HPV 01/23/2006    Asthma     Bursitis of shoulder 03/04/2010    Chronic rhinitis 03/25/2009    Coronary artery disease     Depression     Depressive disorder     Dyspnea on exertion     GERD (gastroesophageal reflux disease) 10/14/2008    Hypertension     Idiopathic cardiomyopathy (H) 01/08/2009    Indigestion     Itchy eyes     Left leg pain 06/16/2011    Motion sickness 02/27/2008    Nasal congestion     Pneumonia     Problems related to lack of adequate sleep     Ringing in ears     Sleep apnea 03/25/2009    Sneezing        FAMILY HISTORY:  Family History   Problem Relation Age of Onset    Gastrointestinal Disease Mother         non cancerous pancreatic tumor    Hypertension Mother     Heart Disease Mother         A fib    Allergies Mother     Hypertension Father     Lipids Father     Alcohol/Drug Father         Abuse    Depression Father     Respiratory Father         COPD    Cardiovascular Father     Allergies Sister         Poison Ivy    Depression Brother     Hypertension Brother     Allergies Brother         Enviromental/ Bees    Breast Cancer Maternal Grandmother      Glaucoma Paternal Grandmother     Glaucoma Paternal Grandfather     Cerebrovascular Disease Other     Macular Degeneration Other     Diabetes No family hx of     Cancer - colorectal No family hx of     Retinal detachment No family hx of     Amblyopia No family hx of     Thyroid Disease No family hx of     Adrenal Disorder No family hx of        SOCIAL HISTORY:  Social History     Socioeconomic History    Marital status:     Number of children: 0    Years of education: 12+   Occupational History     Employer: [employer]   Tobacco Use    Smoking status: Never     Passive exposure: Never    Smokeless tobacco: Never   Vaping Use    Vaping status: Never Used   Substance and Sexual Activity    Alcohol use: Yes     Comment: seldom    Drug use: No    Sexual activity: Not Currently     Partners: Male     Birth control/protection: Post-menopausal, None   Other Topics Concern    Parent/sibling w/ CABG, MI or angioplasty before 65F 55M? No   Social History Narrative    Dairy/d 2 servings/d.     Caffeine 3 servings/d    Exercise 2 x week    Sunscreen used - Yes    Seatbelts used - Yes    Working smoke/CO detectors in the home - Yes    Guns stored in the home - No    Self Breast Exams - Yes    Self Testicular Exam - NA    Eye Exam up to date - Yes    Dental Exam up to date - No    Pap Smear up to date - Yes    Mammogram up to date - Yes    PSA up to date - NA    Dexa Scan up to date - NA    Flex Sig / Colonoscopy up to date - Yes    Immunizations up to date - Yes    Abuse: Current or Past(Physical, Sexual or Emotional)- No    Do you feel safe in your environment - Yes    LOWELL SMITH LPN.    02/05/2007        May 19, 2021    ENVIRONMENTAL HISTORY: [place] The home is heated with a forced air. They do have central air conditioning. The patient's bedroom is furnished with carpeting in bedroom, allergen mattress cover, and animals sleep in bedroom.  Pets inside  the house include 1 cat(s). There is no history of cockroach or mice infestation. There is/are 0 smokers in the house.  The house does not have a damp basement, it's a split level.      Social Drivers of Health     Financial Resource Strain: Low Risk  (10/3/2024)    Financial Resource Strain     Within the past 12 months, have you or your family members you live with been unable to get utilities (heat, electricity) when it was really needed?: No   Food Insecurity: Low Risk  (10/3/2024)    Food Insecurity     Within the past 12 months, did you worry that your food would run out before you got money to buy more?: No     Within the past 12 months, did the food you bought just not last and you didn t have money to get more?: No   Transportation Needs: Low Risk  (10/3/2024)    Transportation Needs     Within the past 12 months, has lack of transportation kept you from medical appointments, getting your medicines, non-medical meetings or appointments, work, or from getting things that you need?: No   Physical Activity: Insufficiently Active (10/3/2024)    Exercise Vital Sign     Days of Exercise per Week: 3 days     Minutes of Exercise per Session: 30 min   Stress: No Stress Concern Present (10/3/2024)    [employer] [place] [employer] - Occupational Stress Questionnaire     Feeling of Stress : Only a little   Social Connections: Unknown (10/3/2024)    Social Connection and Isolation Panel [NHANES]     Frequency of Social Gatherings with Friends and Family: Once a week   Interpersonal Safety: Low Risk  (10/4/2024)    Interpersonal Safety     Do you feel physically and emotionally safe where you currently live?: Yes     Within the past 12 months, have you been hit, slapped, kicked or otherwise physically hurt by someone?: No     Within the past 12 months, have you been humiliated or emotionally abused in other ways by your partner or ex-partner?: No   Housing Stability: Low Risk  (10/3/2024)    Housing Stability      Do you have housing? : Yes     Are you worried about losing your housing?: No       CURRENT MEDICATIONS:  Current Outpatient Medications   Medication Sig Dispense Refill    acetaminophen (TYLENOL) 325 MG tablet Take 650 mg by mouth every 6 hours as needed for mild pain      albuterol (PROAIR HFA/PROVENTIL HFA/VENTOLIN HFA) 108 (90 Base) MCG/ACT inhaler Inhale 2 puffs into the lungs every 4 hours as needed for shortness of breath or wheezing. 18 g 3    ARIPiprazole (ABILIFY) 2 MG tablet Take 2 tablets (4 mg) by mouth daily. 60 tablet 3    aspirin 81 MG EC tablet Take 1 tablet (81 mg) by mouth daily. 90 tablet 3    blood glucose (NO BRAND SPECIFIED) lancing device Device to be used with lancets. 3 each 3    blood glucose (NO BRAND SPECIFIED) test strip Use to test blood sugar 3 times daily 300 strip 3    calcium carbonate-vitamin D (CALTRATE 600+D) 600-400 MG-UNIT CHEW Take 1 chew tab by mouth 2 times daily 180 tablet 3    carvedilol (COREG) 25 MG tablet Take 1 tablet (25 mg) by mouth 2 times daily (with meals). 180 tablet 3    cetirizine (ZYRTEC) 10 MG tablet Take 1 tablet (10 mg) by mouth daily 30 tablet 3    Cyanocobalamin (VITAMIN B-12 SL) Place under the tongue every other day       diclofenac (VOLTAREN) 1 % topical gel Apply 2 g topically 4 times daily. 350 g 1    dicyclomine (BENTYL) 20 MG tablet Take 1 tablet (20 mg) by mouth every 6 hours. 90 tablet 2    estradiol (ESTRACE) 1 MG tablet Take 1 tablet (1 mg) by mouth daily 90 tablet 3    ezetimibe (ZETIA) 10 MG tablet Take 1 tablet (10 mg) by mouth daily. 90 tablet 3    fluticasone (FLONASE) 50 MCG/ACT nasal spray Spray 2 sprays into both nostrils daily. (Patient taking differently: Spray 2 sprays into both nostrils as needed.) 16 g 0    hydroxychloroquine (PLAQUENIL) 200 MG tablet Take 1 tablet (200 mg) by mouth 2 times daily. Annual eye exam required. 180 tablet 0    isosorbide mononitrate (IMDUR) 30 MG 24 hr tablet Take 1 tablet (30 mg) by mouth daily 90  tablet 3    lisinopril (ZESTRIL) 5 MG tablet TAKE 1 TABLET(5 MG) BY MOUTH DAILY 90 tablet 3    meclizine (ANTIVERT) 25 MG tablet Take 1 tablet (25 mg) by mouth 3 times daily as needed for dizziness. 90 tablet 2    medroxyPROGESTERone (PROVERA) 2.5 MG tablet Take 1 tablet (2.5 mg) by mouth daily 90 tablet 3    Multiple Vitamin (MULTI-VITAMIN) per tablet Take 1 tablet by mouth daily. 100 tablet 3    NIFEdipine ER (ADALAT CC) 30 MG 24 hr tablet TAKE 1 TABLET(30 MG) BY MOUTH DAILY 60 tablet 1    omeprazole (PRILOSEC) 20 MG DR capsule Take 1 capsule (20 mg) by mouth daily. 90 capsule 3    ondansetron (ZOFRAN ODT) 4 MG ODT tab Take 1 tablet (4 mg) by mouth every 8 hours as needed for nausea. 30 tablet 2    simvastatin (ZOCOR) 40 MG tablet Take 1 tablet (40 mg) by mouth at bedtime. 90 tablet 3    sulfaSALAzine ER (AZULFIDINE EN) 500 MG EC tablet Take 2 tablets (1,000 mg) by mouth 2 times daily. Take this medication with food. Labs every 8-12 weeks. 360 tablet 0    venlafaxine (EFFEXOR) 37.5 MG tablet Take 1 tablet (37.5 mg) by mouth daily. With 75 mg tablet of venlafaxine for a total morning dose of 112.5 mg 30 tablet 3    venlafaxine (EFFEXOR) 75 MG tablet Take 1 tablet (75 mg) by mouth 3 times daily. 90 tablet 3    topiramate (TOPAMAX) 25 MG tablet 25mg in the morning and 25mg at night (Patient not taking: Reported on 1/22/2025) 60 tablet 3     No current facility-administered medications for this visit.       ROS:   Refer to HPI    EXAM:  BP (!) 147/94 (BP Location: Right arm, Patient Position: Chair, Cuff Size: Adult Regular)   Pulse 76   Wt 90.2 kg (198 lb 12.8 oz)   LMP 06/01/2012   SpO2 98%   BMI 35.22 kg/m    GENERAL: Appears comfortable, in no acute distress.   HEENT: Eye symmetrical, no discharge or icterus bilaterally. Mucous membranes moist and without lesions.  CV: RRR, +S1S2, no murmur, rub, or gallop.  RESPIRATORY: Respirations regular, even, and unlabored. Lungs CTA throughout.   GI: Soft and non  "distended with normoactive bowel sounds present in all quadrants. No tenderness, rebound, guarding.   EXTREMITIES: no peripheral edema. 2+ bilateral pedal pulses.   NEUROLOGIC: Alert and oriented x 3. No focal deficits.   MUSCULOSKELETAL: No joint swelling or tenderness.   SKIN: No jaundice. No rashes or lesions.     Labs, reviewed with patient in clinic today:  CBC RESULTS:  Lab Results   Component Value Date    WBC 5.2 01/07/2025    WBC 4.3 12/08/2020    RBC 3.72 (L) 01/07/2025    RBC 3.76 (L) 12/08/2020    HGB 12.0 01/07/2025    HGB 10.5 (L) 12/08/2020    HCT 36.4 01/07/2025    HCT 33.7 (L) 12/08/2020    MCV 98 01/07/2025    MCV 90 12/08/2020    MCH 32.3 01/07/2025    MCH 27.9 12/08/2020    MCHC 33.0 01/07/2025    MCHC 31.2 (L) 12/08/2020    RDW 13.5 01/07/2025    RDW 15.0 12/08/2020     01/07/2025     12/08/2020       CMP RESULTS:  Lab Results   Component Value Date     01/20/2025     06/30/2021    POTASSIUM 4.9 01/20/2025    POTASSIUM 4.6 02/06/2023    POTASSIUM 4.2 06/30/2021    CHLORIDE 107 01/20/2025    CHLORIDE 109 02/06/2023    CHLORIDE 109 06/30/2021    CO2 26 01/20/2025    CO2 26 02/06/2023    CO2 25 06/30/2021    ANIONGAP 7 01/20/2025    ANIONGAP 4 02/06/2023    ANIONGAP 4 06/30/2021    GLC 84 01/20/2025    GLC 93 02/06/2023    GLC 91 06/30/2021    BUN 9.1 01/20/2025    BUN 15 02/06/2023    BUN 17 06/30/2021    CR 0.72 01/20/2025    CR 0.77 06/30/2021    GFRESTIMATED >90 01/20/2025    GFRESTIMATED 82 06/30/2021    GFRESTBLACK >90 06/30/2021    TREVOR 9.3 01/20/2025    TREVOR 8.7 06/30/2021    BILITOTAL 0.3 02/01/2024    BILITOTAL 0.3 01/07/2020    ALBUMIN 4.3 01/07/2025    ALBUMIN 3.6 01/28/2022    ALBUMIN 3.1 (L) 01/07/2020    ALKPHOS 98 02/01/2024    ALKPHOS 83 01/07/2020    ALT 53 (H) 01/20/2025    ALT 24 01/07/2020    AST 48 (H) 01/20/2025    AST 12 01/07/2020        INR RESULTS:  No results found for: \"INR\"    Lab Results   Component Value Date    MAG 2.0 01/07/2020     Lab " "Results   Component Value Date    NTBNPI 159 2020     No results found for: \"NTBNP\"    LIPIDS:  Recent Labs   Lab Test 24  0731 23  0735   CHOL 205* 187    97   LDL 86 75   TRIG 61 74         EKG 2024: sinus bradycardia HR 58    Echocardiogram 2022:  Interpretation Summary  Left ventricular function is normal.The ejection fraction is 55-60%.  Abnormal non-specific septal motion is present.  Global right ventricular function is normal.  No significant valvular abnormalities present.  The inferior vena cava is normal.  No pericardial effusion is present.    Coronary CTA :  IMPRESSION:  1.  Mild non-obstructive coronary artery disease.  2.  Total Agatston score 362 placing the patient in the 97th  percentile when compared to age and gender matched control group.  3.  Please review Radiology report for incidental noncardiac findings  that will follow separately.     FINDINGS:     CORONARY CALCIUM SCORE     Total Agatston calcium score: 362   Left main: 0  Left anterior descendin  Left circumflex: 1  Right coronary artery: 12   This places the patient in the 97th  percentile when compared to age  and gender matched control group.    Assessment and Plan:   Ms. Perez is a 64 year old female with a PMH of non-ischemic cardiomyopathy with recovered EF, HTN, HLD, and non-obstructive CAD.     # Non-ischemic cardiomyopathy with recovered EF (55-60%, erick 40-45%)  - continue carvedilol 25mg BID    # Coronary artery disease, non-obstructive   CCTA (): LAD <50% stenosis, LCx <50% stenosis, RCA <30% stenosis   - continue simvastatin 40mg every day   - cont ASA 81mg every day   - continue zetia 10mg every day     # HTN  Well controlled at home, mildly elevated in clinic   - carvedilol 25mg BID  - Imdur 30mg every day  - lisinopril 5mg every day  - nifedipine 30mg every day (for Raynaud's)    # HLD  Most recent LDL 79 (24), history of myalgias with atorvastatin  - continue " simvastatin 40mg every day   - continue zetia 10mg every day       Follow up:  1 year  Chart review time today: 10 minutes  Visit time today: 15 minutes  Total time spent today: 25 minutes      Shalonda Chowdary CNP  General Cardiology   01/22/25

## 2025-01-21 ENCOUNTER — MYC MEDICAL ADVICE (OUTPATIENT)
Dept: FAMILY MEDICINE | Facility: CLINIC | Age: 65
End: 2025-01-21
Payer: COMMERCIAL

## 2025-01-21 DIAGNOSIS — F33.42 RECURRENT MAJOR DEPRESSIVE DISORDER, IN FULL REMISSION: ICD-10-CM

## 2025-01-22 ENCOUNTER — OFFICE VISIT (OUTPATIENT)
Dept: CARDIOLOGY | Facility: CLINIC | Age: 65
End: 2025-01-22
Attending: INTERNAL MEDICINE
Payer: COMMERCIAL

## 2025-01-22 VITALS
SYSTOLIC BLOOD PRESSURE: 147 MMHG | OXYGEN SATURATION: 98 % | WEIGHT: 198.8 LBS | BODY MASS INDEX: 35.22 KG/M2 | HEART RATE: 76 BPM | DIASTOLIC BLOOD PRESSURE: 94 MMHG

## 2025-01-22 DIAGNOSIS — I25.10 CORONARY ARTERY DISEASE INVOLVING NATIVE CORONARY ARTERY OF NATIVE HEART WITHOUT ANGINA PECTORIS: Primary | ICD-10-CM

## 2025-01-22 DIAGNOSIS — I10 ESSENTIAL HYPERTENSION WITH GOAL BLOOD PRESSURE LESS THAN 130/80: ICD-10-CM

## 2025-01-22 DIAGNOSIS — I42.9 CARDIOMYOPATHY, UNSPECIFIED TYPE (H): ICD-10-CM

## 2025-01-22 PROCEDURE — G0463 HOSPITAL OUTPT CLINIC VISIT: HCPCS | Performed by: CASE MANAGER/CARE COORDINATOR

## 2025-01-22 RX ORDER — EZETIMIBE 10 MG/1
10 TABLET ORAL DAILY
Qty: 90 TABLET | Refills: 3 | Status: SHIPPED | OUTPATIENT
Start: 2025-01-22

## 2025-01-22 RX ORDER — SIMVASTATIN 40 MG
40 TABLET ORAL AT BEDTIME
Qty: 90 TABLET | Refills: 3 | Status: SHIPPED | OUTPATIENT
Start: 2025-01-22

## 2025-01-22 RX ORDER — CARVEDILOL 25 MG/1
25 TABLET ORAL 2 TIMES DAILY WITH MEALS
Qty: 180 TABLET | Refills: 3 | Status: SHIPPED | OUTPATIENT
Start: 2025-01-22

## 2025-01-22 RX ORDER — LISINOPRIL 5 MG/1
TABLET ORAL
Qty: 90 TABLET | Refills: 3 | Status: SHIPPED | OUTPATIENT
Start: 2025-01-22

## 2025-01-22 RX ORDER — ASPIRIN 81 MG/1
81 TABLET ORAL DAILY
Qty: 90 TABLET | Refills: 3 | Status: SHIPPED | OUTPATIENT
Start: 2025-01-22

## 2025-01-22 ASSESSMENT — PAIN SCALES - GENERAL: PAINLEVEL_OUTOF10: NO PAIN (0)

## 2025-01-22 NOTE — PATIENT INSTRUCTIONS
You were seen today in the Cardiovascular Clinic at the [facility] by:       ELIOT WILKES CNP    Your visit summary and instructions are as follows:    No changes to medications  Increase physical activity as tolerated      Return to cardiology clinic in 1 year      Thank you for your visit today!     Please MyChart message me or call my nurse if you have any questions or concerns.      During Business Hours:  [phone], option # 1 (University) then option # 4 (medical questions) and ask to speak with my nurse.     After hours, weekends or holidays:   [phone], Option #4  Ask to speak to the On-Call Cardiologist. Inform them you are a cardiology patient at the [facility].

## 2025-01-22 NOTE — NURSING NOTE
Chief Complaint   Patient presents with    Follow Up     annual follow up for Dr. Arnett       Vitals were taken, medications reconciled.    Vinay Colby, EMT    10:34 AM

## 2025-01-22 NOTE — LETTER
1/22/2025      RE: Patricia Perez  [address] [facility] [address]       Dear Colleague,    Thank you for the opportunity to participate in the care of your patient, Patricia Perez, at the [facility] [place] at [facility]. Please see a copy of my visit note below.      [facility] - [facility]   Cardiology Clinic Note      HPI:   Ms. Patricia Perez is a pleasant 64 year old female with medical history pertinent for non-ischemic cardiomyopathy with recovered EF, HTN, HLD, and non-obstructive CAD. She presents to cardiology clinic for follow up    Patricia was most recently seen in cardiology clinic in January 2024 by Dr. Arnett. Hydralazine was stopped and she was recommended to continue monitoring BP at home.     Today in clinic, she denies chest pain, palpitations, dizziness, syncope, or lower extremity edema. Home BP has been 110-120/70s. She had several musculoskeletal injuries that prevented her from exercising this fall, just started exercising on the [employer] again.       PAST MEDICAL HISTORY:  Past Medical History:   Diagnosis Date     Anxiety      Arthritis      ASCUS of cervix with negative high risk HPV 01/23/2006     Asthma      Bursitis of shoulder 03/04/2010     Chronic rhinitis 03/25/2009     Coronary artery disease      Depression      Depressive disorder      Dyspnea on exertion      GERD (gastroesophageal reflux disease) 10/14/2008     Hypertension      Idiopathic cardiomyopathy (H) 01/08/2009     Indigestion      Itchy eyes      Left leg pain 06/16/2011     Motion sickness 02/27/2008     Nasal congestion      Pneumonia      Problems related to lack of adequate sleep      Ringing in ears      Sleep apnea 03/25/2009     Sneezing        FAMILY HISTORY:  Family History   Problem Relation Age of Onset     Gastrointestinal Disease Mother         non cancerous pancreatic tumor     Hypertension Mother      Heart Disease Mother          A fib     Allergies Mother      Hypertension Father      Lipids Father      Alcohol/Drug Father         Abuse     Depression Father      Respiratory Father         COPD     Cardiovascular Father      Allergies Sister         Poison Ivy     Depression Brother      Hypertension Brother      Allergies Brother         Enviromental/ Bees     Breast Cancer Maternal Grandmother      Glaucoma Paternal Grandmother      Glaucoma Paternal Grandfather      Cerebrovascular Disease Other      Macular Degeneration Other      Diabetes No family hx of      Cancer - colorectal No family hx of      Retinal detachment No family hx of      Amblyopia No family hx of      Thyroid Disease No family hx of      Adrenal Disorder No family hx of        SOCIAL HISTORY:  Social History     Socioeconomic History     Marital status:      Number of children: 0     Years of education: 12+   Occupational History     Employer: [employer]   Tobacco Use     Smoking status: Never     Passive exposure: Never     Smokeless tobacco: Never   Vaping Use     Vaping status: Never Used   Substance and Sexual Activity     Alcohol use: Yes     Comment: seldom     Drug use: No     Sexual activity: Not Currently     Partners: Male     Birth control/protection: Post-menopausal, None   Other Topics Concern     Parent/sibling w/ CABG, MI or angioplasty before 65F 55M? No   Social History Narrative    Dairy/d 2 servings/d.     Caffeine 3 servings/d    Exercise 2 x week    Sunscreen used - Yes    Seatbelts used - Yes    Working smoke/CO detectors in the home - Yes    Guns stored in the home - No    Self Breast Exams - Yes    Self Testicular Exam - NA    Eye Exam up to date - Yes    Dental Exam up to date - No    Pap Smear up to date - Yes    Mammogram up to date - Yes    PSA up to date - NA    Dexa Scan up to date - NA    Flex Sig / Colonoscopy up to date - Yes    Immunizations up to date - Yes    Abuse: Current or Past(Physical, Sexual or  Emotional)- No    Do you feel safe in your environment - Yes    LOWELL CAMPOSSly LAZO.    02/05/2009        May 19, 2021    ENVIRONMENTAL HISTORY: The family [place]. The home is heated with a forced air. They do have central air conditioning. The patient's bedroom is furnished with carpeting in bedroom, allergen mattress cover, and animals sleep in bedroom.  Pets inside the house include 1 cat(s). There is no history of cockroach or mice infestation. There is/are 0 smokers in the house.  The house does not have a damp basement, it's a split level.      Social Drivers of Health     Financial Resource Strain: Low Risk  (10/3/2024)    Financial Resource Strain      Within the past 12 months, have you or your family members you live with been unable to get utilities (heat, electricity) when it was really needed?: No   Food Insecurity: Low Risk  (10/3/2024)    Food Insecurity      Within the past 12 months, did you worry that your food would run out before you got money to buy more?: No      Within the past 12 months, did the food you bought just not last and you didn t have money to get more?: No   Transportation Needs: Low Risk  (10/3/2024)    Transportation Needs      Within the past 12 months, has lack of transportation kept you from medical appointments, getting your medicines, non-medical meetings or appointments, work, or from getting things that you need?: No   Physical Activity: Insufficiently Active (10/3/2024)    Exercise Vital Sign      Days of Exercise per Week: 3 days      Minutes of Exercise per Session: 30 min   Stress: No Stress Concern Present (10/3/2024)    [place] [employer] - Occupational Stress Questionnaire      Feeling of Stress : Only a little   Social Connections: Unknown (10/3/2024)    Social Connection and Isolation Panel [NHANES]      Frequency of Social Gatherings with Friends and Family: Once a week   Interpersonal Safety: Low Risk   (10/4/2024)    Interpersonal Safety      Do you feel physically and emotionally safe where you currently live?: Yes      Within the past 12 months, have you been hit, slapped, kicked or otherwise physically hurt by someone?: No      Within the past 12 months, have you been humiliated or emotionally abused in other ways by your partner or ex-partner?: No   Housing Stability: Low Risk  (10/3/2024)    Housing Stability      Do you have housing? : Yes      Are you worried about losing your housing?: No       CURRENT MEDICATIONS:  Current Outpatient Medications   Medication Sig Dispense Refill     acetaminophen (TYLENOL) 325 MG tablet Take 650 mg by mouth every 6 hours as needed for mild pain       albuterol (PROAIR HFA/PROVENTIL HFA/VENTOLIN HFA) 108 (90 Base) MCG/ACT inhaler Inhale 2 puffs into the lungs every 4 hours as needed for shortness of breath or wheezing. 18 g 3     ARIPiprazole (ABILIFY) 2 MG tablet Take 2 tablets (4 mg) by mouth daily. 60 tablet 3     aspirin 81 MG EC tablet Take 1 tablet (81 mg) by mouth daily. 90 tablet 3     blood glucose (NO BRAND SPECIFIED) lancing device Device to be used with lancets. 3 each 3     blood glucose (NO BRAND SPECIFIED) test strip Use to test blood sugar 3 times daily 300 strip 3     calcium carbonate-vitamin D (CALTRATE 600+D) 600-400 MG-UNIT CHEW Take 1 chew tab by mouth 2 times daily 180 tablet 3     carvedilol (COREG) 25 MG tablet Take 1 tablet (25 mg) by mouth 2 times daily (with meals). 180 tablet 3     cetirizine (ZYRTEC) 10 MG tablet Take 1 tablet (10 mg) by mouth daily 30 tablet 3     Cyanocobalamin (VITAMIN B-12 SL) Place under the tongue every other day        diclofenac (VOLTAREN) 1 % topical gel Apply 2 g topically 4 times daily. 350 g 1     dicyclomine (BENTYL) 20 MG tablet Take 1 tablet (20 mg) by mouth every 6 hours. 90 tablet 2     estradiol (ESTRACE) 1 MG tablet Take 1 tablet (1 mg) by mouth daily 90 tablet 3     ezetimibe (ZETIA) 10 MG tablet Take 1  tablet (10 mg) by mouth daily. 90 tablet 3     fluticasone (FLONASE) 50 MCG/ACT nasal spray Spray 2 sprays into both nostrils daily. (Patient taking differently: Spray 2 sprays into both nostrils as needed.) 16 g 0     hydroxychloroquine (PLAQUENIL) 200 MG tablet Take 1 tablet (200 mg) by mouth 2 times daily. Annual eye exam required. 180 tablet 0     isosorbide mononitrate (IMDUR) 30 MG 24 hr tablet Take 1 tablet (30 mg) by mouth daily 90 tablet 3     lisinopril (ZESTRIL) 5 MG tablet TAKE 1 TABLET(5 MG) BY MOUTH DAILY 90 tablet 3     meclizine (ANTIVERT) 25 MG tablet Take 1 tablet (25 mg) by mouth 3 times daily as needed for dizziness. 90 tablet 2     medroxyPROGESTERone (PROVERA) 2.5 MG tablet Take 1 tablet (2.5 mg) by mouth daily 90 tablet 3     Multiple Vitamin (MULTI-VITAMIN) per tablet Take 1 tablet by mouth daily. 100 tablet 3     NIFEdipine ER (ADALAT CC) 30 MG 24 hr tablet TAKE 1 TABLET(30 MG) BY MOUTH DAILY 60 tablet 1     omeprazole (PRILOSEC) 20 MG DR capsule Take 1 capsule (20 mg) by mouth daily. 90 capsule 3     ondansetron (ZOFRAN ODT) 4 MG ODT tab Take 1 tablet (4 mg) by mouth every 8 hours as needed for nausea. 30 tablet 2     simvastatin (ZOCOR) 40 MG tablet Take 1 tablet (40 mg) by mouth at bedtime. 90 tablet 3     sulfaSALAzine ER (AZULFIDINE EN) 500 MG EC tablet Take 2 tablets (1,000 mg) by mouth 2 times daily. Take this medication with food. Labs every 8-12 weeks. 360 tablet 0     venlafaxine (EFFEXOR) 37.5 MG tablet Take 1 tablet (37.5 mg) by mouth daily. With 75 mg tablet of venlafaxine for a total morning dose of 112.5 mg 30 tablet 3     venlafaxine (EFFEXOR) 75 MG tablet Take 1 tablet (75 mg) by mouth 3 times daily. 90 tablet 3     topiramate (TOPAMAX) 25 MG tablet 25mg in the morning and 25mg at night (Patient not taking: Reported on 1/22/2025) 60 tablet 3     No current facility-administered medications for this visit.       ROS:   Refer to HPI    EXAM:  BP (!) 147/94 (BP Location: Right  arm, Patient Position: Chair, Cuff Size: Adult Regular)   Pulse 76   Wt 90.2 kg (198 lb 12.8 oz)   LMP 06/01/2012   SpO2 98%   BMI 35.22 kg/m    GENERAL: Appears comfortable, in no acute distress.   HEENT: Eye symmetrical, no discharge or icterus bilaterally. Mucous membranes moist and without lesions.  CV: RRR, +S1S2, no murmur, rub, or gallop.  RESPIRATORY: Respirations regular, even, and unlabored. Lungs CTA throughout.   GI: Soft and non distended with normoactive bowel sounds present in all quadrants. No tenderness, rebound, guarding.   EXTREMITIES: no peripheral edema. 2+ bilateral pedal pulses.   NEUROLOGIC: Alert and oriented x 3. No focal deficits.   MUSCULOSKELETAL: No joint swelling or tenderness.   SKIN: No jaundice. No rashes or lesions.     Labs, reviewed with patient in clinic today:  CBC RESULTS:  Lab Results   Component Value Date    WBC 5.2 01/07/2025    WBC 4.3 12/08/2020    RBC 3.72 (L) 01/07/2025    RBC 3.76 (L) 12/08/2020    HGB 12.0 01/07/2025    HGB 10.5 (L) 12/08/2020    HCT 36.4 01/07/2025    HCT 33.7 (L) 12/08/2020    MCV 98 01/07/2025    MCV 90 12/08/2020    MCH 32.3 01/07/2025    MCH 27.9 12/08/2020    MCHC 33.0 01/07/2025    MCHC 31.2 (L) 12/08/2020    RDW 13.5 01/07/2025    RDW 15.0 12/08/2020     01/07/2025     12/08/2020       CMP RESULTS:  Lab Results   Component Value Date     01/20/2025     06/30/2021    POTASSIUM 4.9 01/20/2025    POTASSIUM 4.6 02/06/2023    POTASSIUM 4.2 06/30/2021    CHLORIDE 107 01/20/2025    CHLORIDE 109 02/06/2023    CHLORIDE 109 06/30/2021    CO2 26 01/20/2025    CO2 26 02/06/2023    CO2 25 06/30/2021    ANIONGAP 7 01/20/2025    ANIONGAP 4 02/06/2023    ANIONGAP 4 06/30/2021    GLC 84 01/20/2025    GLC 93 02/06/2023    GLC 91 06/30/2021    BUN 9.1 01/20/2025    BUN 15 02/06/2023    BUN 17 06/30/2021    CR 0.72 01/20/2025    CR 0.77 06/30/2021    GFRESTIMATED >90 01/20/2025    GFRESTIMATED 82 06/30/2021    GFRESTBLACK >90  "2021    TREVOR 9.3 2025    TREVOR 8.7 2021    BILITOTAL 0.3 2024    BILITOTAL 0.3 2020    ALBUMIN 4.3 2025    ALBUMIN 3.6 2022    ALBUMIN 3.1 (L) 2020    ALKPHOS 98 2024    ALKPHOS 83 2020    ALT 53 (H) 2025    ALT 24 2020    AST 48 (H) 2025    AST 12 2020        INR RESULTS:  No results found for: \"INR\"    Lab Results   Component Value Date    MAG 2.0 2020     Lab Results   Component Value Date    NTBNPI 159 2020     No results found for: \"NTBNP\"    LIPIDS:  Recent Labs   Lab Test 24  0731 23  0735   CHOL 205* 187    97   LDL 86 75   TRIG 61 74         EKG 2024: sinus bradycardia HR 58    Echocardiogram 2022:  Interpretation Summary  Left ventricular function is normal.The ejection fraction is 55-60%.  Abnormal non-specific septal motion is present.  Global right ventricular function is normal.  No significant valvular abnormalities present.  The inferior vena cava is normal.  No pericardial effusion is present.    Coronary CTA :  IMPRESSION:  1.  Mild non-obstructive coronary artery disease.  2.  Total Agatston score 362 placing the patient in the 97th  percentile when compared to age and gender matched control group.  3.  Please review Radiology report for incidental noncardiac findings  that will follow separately.     FINDINGS:     CORONARY CALCIUM SCORE     Total Agatston calcium score: 362   Left main: 0  Left anterior descendin  Left circumflex: 1  Right coronary artery: 12   This places the patient in the 97th  percentile when compared to age  and gender matched control group.    Assessment and Plan:   Ms. Perez is a 64 year old female with a PMH of non-ischemic cardiomyopathy with recovered EF, HTN, HLD, and non-obstructive CAD.     # Non-ischemic cardiomyopathy with recovered EF (55-60%, erick 40-45%)  - continue carvedilol 25mg BID    # Coronary artery disease, non-obstructive "   CCTA (2020): LAD <50% stenosis, LCx <50% stenosis, RCA <30% stenosis   - continue simvastatin 40mg every day   - cont ASA 81mg every day   - continue zetia 10mg every day     # HTN  Well controlled at home, mildly elevated in clinic   - carvedilol 25mg BID  - Imdur 30mg every day  - lisinopril 5mg every day  - nifedipine 30mg every day (for Raynaud's)    # HLD  Most recent LDL 79 (1/20/24), history of myalgias with atorvastatin  - continue simvastatin 40mg every day   - continue zetia 10mg every day       Follow up:  1 year  Chart review time today: 10 minutes  Visit time today: 15 minutes  Total time spent today: 25 minutes      Shalonda Chowdary CNP  General Cardiology   01/22/25            Please do not hesitate to contact me if you have any questions/concerns.     Sincerely,     ELIOT OCHOA CNP

## 2025-01-23 DIAGNOSIS — Z79.899 HIGH RISK MEDICATION USE: ICD-10-CM

## 2025-01-23 DIAGNOSIS — M05.741 RHEUMATOID ARTHRITIS INVOLVING BOTH HANDS WITH POSITIVE RHEUMATOID FACTOR (H): Primary | ICD-10-CM

## 2025-01-23 DIAGNOSIS — M05.742 RHEUMATOID ARTHRITIS INVOLVING BOTH HANDS WITH POSITIVE RHEUMATOID FACTOR (H): Primary | ICD-10-CM

## 2025-01-23 DIAGNOSIS — F33.42 RECURRENT MAJOR DEPRESSIVE DISORDER, IN FULL REMISSION: ICD-10-CM

## 2025-01-23 RX ORDER — ARIPIPRAZOLE 5 MG/1
5 TABLET ORAL DAILY
Qty: 30 TABLET | Refills: 0 | Status: SHIPPED | OUTPATIENT
Start: 2025-01-23

## 2025-01-28 DIAGNOSIS — Z79.899 HIGH RISK MEDICATION USE: ICD-10-CM

## 2025-01-28 DIAGNOSIS — M05.741 RHEUMATOID ARTHRITIS INVOLVING BOTH HANDS WITH POSITIVE RHEUMATOID FACTOR (H): ICD-10-CM

## 2025-01-28 DIAGNOSIS — M05.742 RHEUMATOID ARTHRITIS INVOLVING BOTH HANDS WITH POSITIVE RHEUMATOID FACTOR (H): ICD-10-CM

## 2025-01-28 RX ORDER — SULFASALAZINE 500 MG/1
1000 TABLET, DELAYED RELEASE ORAL 2 TIMES DAILY
Qty: 120 TABLET | Refills: 0 | Status: SHIPPED | OUTPATIENT
Start: 2025-01-28 | End: 2025-02-27

## 2025-01-28 NOTE — TELEPHONE ENCOUNTER
Last Written Prescription Date:  9/20/24  Last Fill Quantity: 360,  # refills: 0   Last Filled by Pharmacy Date: 9/23/24  Last Office Visit: 9/20/2024 ; Last Virtual Visit: 4/3/2024   Next Appointment Due: ~12/20/24  Future Office Visit Scheduled:  2/7/25

## 2025-01-30 DIAGNOSIS — I73.00 RAYNAUD'S DISEASE WITHOUT GANGRENE: ICD-10-CM

## 2025-01-30 RX ORDER — NIFEDIPINE 30 MG
30 TABLET, EXTENDED RELEASE ORAL DAILY
Qty: 60 TABLET | Refills: 1 | Status: SHIPPED | OUTPATIENT
Start: 2025-01-30

## 2025-02-15 DIAGNOSIS — I25.10 CORONARY ARTERY DISEASE INVOLVING NATIVE CORONARY ARTERY OF NATIVE HEART WITHOUT ANGINA PECTORIS: ICD-10-CM

## 2025-02-19 RX ORDER — EZETIMIBE 10 MG/1
10 TABLET ORAL DAILY
Qty: 90 TABLET | Refills: 3 | OUTPATIENT
Start: 2025-02-19

## 2025-02-24 ENCOUNTER — LAB (OUTPATIENT)
Dept: LAB | Facility: CLINIC | Age: 65
End: 2025-02-24
Payer: COMMERCIAL

## 2025-02-24 DIAGNOSIS — Z79.899 HIGH RISK MEDICATION USE: ICD-10-CM

## 2025-02-24 DIAGNOSIS — M05.742 RHEUMATOID ARTHRITIS INVOLVING BOTH HANDS WITH POSITIVE RHEUMATOID FACTOR (H): ICD-10-CM

## 2025-02-24 DIAGNOSIS — M05.741 RHEUMATOID ARTHRITIS INVOLVING BOTH HANDS WITH POSITIVE RHEUMATOID FACTOR (H): ICD-10-CM

## 2025-02-24 LAB
ALT SERPL W P-5'-P-CCNC: 37 U/L (ref 0–50)
AST SERPL W P-5'-P-CCNC: 32 U/L (ref 0–45)

## 2025-02-24 PROCEDURE — 84460 ALANINE AMINO (ALT) (SGPT): CPT

## 2025-02-24 PROCEDURE — 36415 COLL VENOUS BLD VENIPUNCTURE: CPT

## 2025-02-24 PROCEDURE — 84450 TRANSFERASE (AST) (SGOT): CPT

## 2025-02-26 ENCOUNTER — OFFICE VISIT (OUTPATIENT)
Dept: FAMILY MEDICINE | Facility: CLINIC | Age: 65
End: 2025-02-26
Payer: COMMERCIAL

## 2025-02-26 VITALS
RESPIRATION RATE: 18 BRPM | SYSTOLIC BLOOD PRESSURE: 126 MMHG | BODY MASS INDEX: 35.47 KG/M2 | DIASTOLIC BLOOD PRESSURE: 82 MMHG | HEIGHT: 63 IN | HEART RATE: 70 BPM | OXYGEN SATURATION: 97 % | TEMPERATURE: 98.2 F | WEIGHT: 200.2 LBS

## 2025-02-26 DIAGNOSIS — E16.2 HYPOGLYCEMIA: ICD-10-CM

## 2025-02-26 DIAGNOSIS — E66.01 CLASS 2 SEVERE OBESITY DUE TO EXCESS CALORIES WITH SERIOUS COMORBIDITY AND BODY MASS INDEX (BMI) OF 35.0 TO 35.9 IN ADULT (H): ICD-10-CM

## 2025-02-26 DIAGNOSIS — E66.812 CLASS 2 SEVERE OBESITY DUE TO EXCESS CALORIES WITH SERIOUS COMORBIDITY AND BODY MASS INDEX (BMI) OF 35.0 TO 35.9 IN ADULT (H): ICD-10-CM

## 2025-02-26 DIAGNOSIS — R53.83 OTHER FATIGUE: Primary | ICD-10-CM

## 2025-02-26 DIAGNOSIS — E55.9 VITAMIN D DEFICIENCY: ICD-10-CM

## 2025-02-26 LAB
ALBUMIN SERPL BCG-MCNC: 4.3 G/DL (ref 3.5–5.2)
ALP SERPL-CCNC: 100 U/L (ref 40–150)
ALT SERPL W P-5'-P-CCNC: 41 U/L (ref 0–50)
ANION GAP SERPL CALCULATED.3IONS-SCNC: 11 MMOL/L (ref 7–15)
AST SERPL W P-5'-P-CCNC: 36 U/L (ref 0–45)
BASOPHILS # BLD AUTO: 0 10E3/UL (ref 0–0.2)
BASOPHILS NFR BLD AUTO: 1 %
BILIRUB SERPL-MCNC: 0.3 MG/DL
BUN SERPL-MCNC: 13.3 MG/DL (ref 8–23)
CALCIUM SERPL-MCNC: 9.4 MG/DL (ref 8.8–10.4)
CHLORIDE SERPL-SCNC: 105 MMOL/L (ref 98–107)
CREAT SERPL-MCNC: 0.75 MG/DL (ref 0.51–0.95)
EGFRCR SERPLBLD CKD-EPI 2021: 87 ML/MIN/1.73M2
EOSINOPHIL # BLD AUTO: 0.1 10E3/UL (ref 0–0.7)
EOSINOPHIL NFR BLD AUTO: 3 %
ERYTHROCYTE [DISTWIDTH] IN BLOOD BY AUTOMATED COUNT: 13 % (ref 10–15)
EST. AVERAGE GLUCOSE BLD GHB EST-MCNC: 103 MG/DL
GLUCOSE SERPL-MCNC: 93 MG/DL (ref 70–99)
HBA1C MFR BLD: 5.2 % (ref 0–5.6)
HCO3 SERPL-SCNC: 24 MMOL/L (ref 22–29)
HCT VFR BLD AUTO: 38.4 % (ref 35–47)
HGB BLD-MCNC: 12.4 G/DL (ref 11.7–15.7)
IMM GRANULOCYTES # BLD: 0 10E3/UL
IMM GRANULOCYTES NFR BLD: 0 %
LYMPHOCYTES # BLD AUTO: 1.5 10E3/UL (ref 0.8–5.3)
LYMPHOCYTES NFR BLD AUTO: 33 %
MCH RBC QN AUTO: 31.3 PG (ref 26.5–33)
MCHC RBC AUTO-ENTMCNC: 32.3 G/DL (ref 31.5–36.5)
MCV RBC AUTO: 97 FL (ref 78–100)
MONOCYTES # BLD AUTO: 0.5 10E3/UL (ref 0–1.3)
MONOCYTES NFR BLD AUTO: 10 %
NEUTROPHILS # BLD AUTO: 2.5 10E3/UL (ref 1.6–8.3)
NEUTROPHILS NFR BLD AUTO: 54 %
PLATELET # BLD AUTO: 236 10E3/UL (ref 150–450)
POTASSIUM SERPL-SCNC: 5.2 MMOL/L (ref 3.4–5.3)
PROT SERPL-MCNC: 6.7 G/DL (ref 6.4–8.3)
RBC # BLD AUTO: 3.96 10E6/UL (ref 3.8–5.2)
SODIUM SERPL-SCNC: 140 MMOL/L (ref 135–145)
TSH SERPL DL<=0.005 MIU/L-ACNC: 2.41 UIU/ML (ref 0.3–4.2)
VIT B12 SERPL-MCNC: 1195 PG/ML (ref 232–1245)
VIT D+METAB SERPL-MCNC: 48 NG/ML (ref 20–50)
WBC # BLD AUTO: 4.6 10E3/UL (ref 4–11)

## 2025-02-26 PROCEDURE — 83036 HEMOGLOBIN GLYCOSYLATED A1C: CPT | Performed by: STUDENT IN AN ORGANIZED HEALTH CARE EDUCATION/TRAINING PROGRAM

## 2025-02-26 PROCEDURE — 36415 COLL VENOUS BLD VENIPUNCTURE: CPT | Performed by: STUDENT IN AN ORGANIZED HEALTH CARE EDUCATION/TRAINING PROGRAM

## 2025-02-26 PROCEDURE — 82306 VITAMIN D 25 HYDROXY: CPT | Performed by: STUDENT IN AN ORGANIZED HEALTH CARE EDUCATION/TRAINING PROGRAM

## 2025-02-26 PROCEDURE — 80053 COMPREHEN METABOLIC PANEL: CPT | Performed by: STUDENT IN AN ORGANIZED HEALTH CARE EDUCATION/TRAINING PROGRAM

## 2025-02-26 PROCEDURE — 85025 COMPLETE CBC W/AUTO DIFF WBC: CPT | Performed by: STUDENT IN AN ORGANIZED HEALTH CARE EDUCATION/TRAINING PROGRAM

## 2025-02-26 PROCEDURE — 84443 ASSAY THYROID STIM HORMONE: CPT | Performed by: STUDENT IN AN ORGANIZED HEALTH CARE EDUCATION/TRAINING PROGRAM

## 2025-02-26 PROCEDURE — 82607 VITAMIN B-12: CPT | Performed by: STUDENT IN AN ORGANIZED HEALTH CARE EDUCATION/TRAINING PROGRAM

## 2025-02-26 RX ORDER — CYANOCOBALAMIN 1000 UG/ML
1000 INJECTION, SOLUTION INTRAMUSCULAR; SUBCUTANEOUS
Status: ACTIVE | OUTPATIENT
Start: 2025-02-26 | End: 2026-02-21

## 2025-02-26 RX ADMIN — CYANOCOBALAMIN 1000 MCG: 1000 INJECTION, SOLUTION INTRAMUSCULAR; SUBCUTANEOUS at 08:34

## 2025-02-26 ASSESSMENT — ENCOUNTER SYMPTOMS: FATIGUE: 1

## 2025-02-26 NOTE — PROGRESS NOTES
Assessment & Plan     (R53.83) Other fatigue  (primary encounter diagnosis)  Comment: Chronic, uncontrolled. Will obtain baseline labs and will supplement if necessary. Did provide over the counter supplement options as well   Plan: CBC with platelets and differential, Vitamin         B12, Comprehensive metabolic panel (BMP + Alb,         Alk Phos, ALT, AST, Total. Bili, TP), TSH with         free T4 reflex, cyanocobalamin injection 1,000         mcg            (E55.9) Vitamin D deficiency  Comment: Stable  Plan: Vitamin D deficiency screening            (E16.2) Hypoglycemia  Comment: Stable  Plan: Hemoglobin A1c            (E66.812,  E66.01,  Z68.35) Class 2 severe obesity due to excess calories with serious comorbidity and body mass index (BMI) of 35.0 to 35.9 in adult (H)  Comment: Chronic, uncontrolled  Plan: Continue to monitor     I am utilizing AI dictation through [employer] to document the patient's history and physical examination. Please note that while the AI is designed to assist in capturing detailed information, there may be errors in the dictation. I will review and edit the content for accuracy before finalizing.        The longitudinal plan of care for the diagnosis(es)/condition(s) as documented were addressed during this visit. Due to the added complexity in care, I will continue to support Patricia in the subsequent management and with ongoing continuity of care.              Subjective   Patricia is a 64 year old, presenting for the following health issues:  Fatigue and Ear Problem (Right ear bothersome)    History of Present Illness       Reason for visit:  No energy, tire easily.  Symptom onset:  3-4 weeks ago  Symptoms include:  Lethargy.  Symptom intensity:  Severe  Symptom progression:  Staying the same  Had these symptoms before:  No  What makes it worse:  No  What makes it better:  No   She is taking medications regularly.       History of Present Illness  Patricia Perez is a 64 year old  "female who presents with fatigue and lack of energy for the past three to four weeks.    She has been experiencing significant fatigue and lack of energy for the past three to four weeks. She describes having 'absolutely no energy' and feels tired even in the mornings, with worsening symptoms as the day progresses. This level of fatigue is unusual for her, even compared to past experiences with depression.    She is concerned about her iron levels and has been taking vitamin D twice daily. She was taking vitamin B12 daily until she ran out last week, though she is unsure of the dosage, confirming it was an over-the-counter supplement. She is also curious about her thyroid function, feeling that her body is 'out of whack'.    She has a history of knee and foot problems, which had improved, allowing her to return to the gym. However, she continues to feel unwell despite this improvement.    Her brother was recently diagnosed with diabetes, raising her concern about her blood sugar levels and indicating a potential genetic predisposition.                 ROS: 10 point ROS neg other than the symptoms noted above in the HPI.        Objective    /82   Pulse 70   Temp 98.2  F (36.8  C) (Temporal)   Resp 18   Ht 1.6 m (5' 2.99\")   Wt 90.8 kg (200 lb 3.2 oz)   LMP 06/01/2012   SpO2 97%   BMI 35.47 kg/m    Body mass index is 35.47 kg/m .  Physical Exam   GENERAL: healthy, alert and no distress  EYES: Eyes grossly normal to inspection, PERRL and conjunctivae and sclerae normal  Neck: No visible JVD or lymphadenopathy   RESP: symmetrical rise in chest   CV: No peripheral edema notable   MS: no gross musculoskeletal defects noted  SKIN: no suspicious lesions or rashes  PSYCH: mentation appears normal, affect normal/bright              Signed Electronically by: LAKESHA ANNE MD    "

## 2025-02-27 DIAGNOSIS — F33.42 RECURRENT MAJOR DEPRESSIVE DISORDER, IN FULL REMISSION: ICD-10-CM

## 2025-02-27 RX ORDER — ARIPIPRAZOLE 5 MG/1
5 TABLET ORAL DAILY
Qty: 30 TABLET | Refills: 3 | Status: SHIPPED | OUTPATIENT
Start: 2025-02-27

## 2025-03-10 ENCOUNTER — MYC MEDICAL ADVICE (OUTPATIENT)
Dept: FAMILY MEDICINE | Facility: CLINIC | Age: 65
End: 2025-03-10
Payer: COMMERCIAL

## 2025-03-10 DIAGNOSIS — F33.42 RECURRENT MAJOR DEPRESSIVE DISORDER, IN FULL REMISSION: ICD-10-CM

## 2025-03-11 RX ORDER — VENLAFAXINE 37.5 MG/1
37.5 TABLET ORAL DAILY
Qty: 30 TABLET | Refills: 3 | Status: SHIPPED | OUTPATIENT
Start: 2025-03-11

## 2025-03-11 RX ORDER — VENLAFAXINE 75 MG/1
75 TABLET ORAL 3 TIMES DAILY
Qty: 90 TABLET | Refills: 3 | Status: SHIPPED | OUTPATIENT
Start: 2025-03-11

## 2025-03-11 NOTE — TELEPHONE ENCOUNTER
"Prescription for venlafaxine (EFFEXOR) 37.5 MG tablet and 75 mg tablet was last filled by Bhavya Gomez MD who patient saw 10/1/2024.    Per visit notes, states  \"  1) Medications:   -  continue taking venlafaxine 75 mg TID + additional dose of 37.5 with AM 75mg tablet  - Continue aripiprazole 4 mg daily  5) Follow-up: Plan to transfer to PCP  \"    Pended medication if okay. Please review/sign or advise.    Ann-Marie Benson RN   "

## 2025-03-23 DIAGNOSIS — I10 ESSENTIAL HYPERTENSION WITH GOAL BLOOD PRESSURE LESS THAN 130/80: ICD-10-CM

## 2025-03-26 RX ORDER — ISOSORBIDE MONONITRATE 30 MG/1
30 TABLET, EXTENDED RELEASE ORAL DAILY
Qty: 90 TABLET | Refills: 2 | Status: SHIPPED | OUTPATIENT
Start: 2025-03-26

## 2025-03-27 NOTE — TELEPHONE ENCOUNTER
Last Written Prescription:     isosorbide mononitrate (IMDUR) 30 MG 24 hr tablet 90 tablet 3 1/22/2024 -- No   Sig - Route: Take 1 tablet (30 mg) by mouth daily - Oral     ----------------------  Last Visit Date: 1/22/25  Future Visit Date: none  ----------------------      Refill decision: Medication refilled per  Medication Refill in Ambulatory Care  policy.         Request from pharmacy:  Requested Prescriptions   Pending Prescriptions Disp Refills    isosorbide mononitrate (IMDUR) 30 MG 24 hr tablet [Pharmacy Med Name: ISOSORBIDE MONONITRATE 30MG ER TABS] 90 tablet 3     Sig: TAKE 1 TABLET(30 MG) BY MOUTH DAILY       Nitrates Failed - 3/26/2025  8:13 PM        Failed - Always fail criteria - needs review> reviewed KTK     Review chart. Forward refill request to provider if patient has exceeded one bottle per 3 months.           Passed - Most recent blood pressure under 140/90 in past 12 months     BP Readings from Last 3 Encounters:   02/26/25 126/82   02/07/25 118/73   01/22/25 (!) 147/94       No data recorded            Passed - Pt is not on erectile dysfunction medications        Passed - Medication is active on med list and the sig matches. RN to manually verify dose and sig if red X/fail.     If the protocol passes (green check), you do not need to verify med dose and sig.    A prescription matches if they are the same clinical intention.    For Example: once daily and every morning are the same.    The protocol can not identify upper and lower case letters as matching and will fail.     For Example: Take 1 tablet (50 mg) by mouth daily     TAKE 1 TABLET (50 MG) BY MOUTH DAILY    For all fails (red x), verify dose and sig.    If the refill does match what is on file, the RN can still proceed to approve the refill request.       If they do not match, route to the appropriate provider.             Passed - Recent (12 mo) or future (90 days) visit within the authorizing provider's specialty     The patient  must have completed an in-person or virtual visit within the past 12 months or has a future visit scheduled within the next 90 days with the authorizing provider s specialty.  Urgent care and e-visits do not qualify as an office visit for this protocol.          Passed - Medication indicated for associated diagnosis     Medication is associated with one or more of the following diagnoses:    Anal fissure   Angina pectoris   Cardiac syndrome X   Congestive heart failure   Hypertension   Myocardial infarction   Pulmonary edema   Pulmonary hypertension          Passed - Patient is age 18 or older

## 2025-04-08 ENCOUNTER — TRANSFERRED RECORDS (OUTPATIENT)
Dept: HEALTH INFORMATION MANAGEMENT | Facility: CLINIC | Age: 65
End: 2025-04-08

## 2025-04-14 ENCOUNTER — LAB (OUTPATIENT)
Dept: LAB | Facility: CLINIC | Age: 65
End: 2025-04-14
Payer: COMMERCIAL

## 2025-04-14 ENCOUNTER — DOCUMENTATION ONLY (OUTPATIENT)
Dept: RHEUMATOLOGY | Facility: CLINIC | Age: 65
End: 2025-04-14

## 2025-04-14 DIAGNOSIS — M05.742 RHEUMATOID ARTHRITIS INVOLVING BOTH HANDS WITH POSITIVE RHEUMATOID FACTOR (H): Primary | ICD-10-CM

## 2025-04-14 DIAGNOSIS — M05.742 RHEUMATOID ARTHRITIS INVOLVING BOTH HANDS WITH POSITIVE RHEUMATOID FACTOR (H): ICD-10-CM

## 2025-04-14 DIAGNOSIS — M05.741 RHEUMATOID ARTHRITIS INVOLVING BOTH HANDS WITH POSITIVE RHEUMATOID FACTOR (H): Primary | ICD-10-CM

## 2025-04-14 DIAGNOSIS — Z79.899 HIGH RISK MEDICATION USE: ICD-10-CM

## 2025-04-14 DIAGNOSIS — M05.741 RHEUMATOID ARTHRITIS INVOLVING BOTH HANDS WITH POSITIVE RHEUMATOID FACTOR (H): ICD-10-CM

## 2025-04-14 LAB
ERYTHROCYTE [DISTWIDTH] IN BLOOD BY AUTOMATED COUNT: 12.8 % (ref 10–15)
HCT VFR BLD AUTO: 36.9 % (ref 35–47)
HGB BLD-MCNC: 11.6 G/DL (ref 11.7–15.7)
MCH RBC QN AUTO: 31.4 PG (ref 26.5–33)
MCHC RBC AUTO-ENTMCNC: 31.4 G/DL (ref 31.5–36.5)
MCV RBC AUTO: 100 FL (ref 78–100)
PLATELET # BLD AUTO: 263 10E3/UL (ref 150–450)
RBC # BLD AUTO: 3.7 10E6/UL (ref 3.8–5.2)
WBC # BLD AUTO: 6.2 10E3/UL (ref 4–11)

## 2025-04-14 PROCEDURE — 82040 ASSAY OF SERUM ALBUMIN: CPT

## 2025-04-14 PROCEDURE — 84460 ALANINE AMINO (ALT) (SGPT): CPT

## 2025-04-14 PROCEDURE — 36415 COLL VENOUS BLD VENIPUNCTURE: CPT

## 2025-04-14 PROCEDURE — 85027 COMPLETE CBC AUTOMATED: CPT

## 2025-04-14 PROCEDURE — 82565 ASSAY OF CREATININE: CPT

## 2025-04-14 PROCEDURE — 84450 TRANSFERASE (AST) (SGOT): CPT

## 2025-04-14 NOTE — PROGRESS NOTES
Patricia Perez has an upcoming lab appointment: Patient was in today and standing orders are , I extended to today but new orders will need to be placed     Future Appointments   Date Time Provider Department Center   10/7/2025  8:20 AM Miladis Mcintyre MD [facility] ANDRA BUNDY

## 2025-04-15 LAB
ALBUMIN SERPL BCG-MCNC: 4.3 G/DL (ref 3.5–5.2)
ALT SERPL W P-5'-P-CCNC: 36 U/L (ref 0–50)
AST SERPL W P-5'-P-CCNC: 33 U/L (ref 0–45)
CREAT SERPL-MCNC: 0.76 MG/DL (ref 0.51–0.95)
EGFRCR SERPLBLD CKD-EPI 2021: 85 ML/MIN/1.73M2

## 2025-04-18 ENCOUNTER — TRANSFERRED RECORDS (OUTPATIENT)
Dept: MULTI SPECIALTY CLINIC | Facility: CLINIC | Age: 65
End: 2025-04-18

## 2025-04-18 LAB — RETINOPATHY: NORMAL

## 2025-04-23 DIAGNOSIS — M05.742 RHEUMATOID ARTHRITIS INVOLVING BOTH HANDS WITH POSITIVE RHEUMATOID FACTOR (H): ICD-10-CM

## 2025-04-23 DIAGNOSIS — Z79.899 HIGH RISK MEDICATION USE: ICD-10-CM

## 2025-04-23 DIAGNOSIS — M05.741 RHEUMATOID ARTHRITIS INVOLVING BOTH HANDS WITH POSITIVE RHEUMATOID FACTOR (H): ICD-10-CM

## 2025-04-23 RX ORDER — SULFASALAZINE 500 MG/1
1000 TABLET, DELAYED RELEASE ORAL 2 TIMES DAILY
Qty: 360 TABLET | Refills: 0 | Status: SHIPPED | OUTPATIENT
Start: 2025-04-23

## 2025-04-23 NOTE — TELEPHONE ENCOUNTER
"Refill request for sulfasalazine 500mg tab: 2 tabs BID  Last filled 3/28/25 for 120 tabs (30 days)  Last visit with Nikko 2/7/25:  \"Plan:   Schedule follow-up with Melissa El PA-C in 4-6 months  Labs: CBC, creatinine, albumin, AST, ALT every 3 months-these will be due in mid April; Ast and ALT in February  Medications:  Continue Sulfasalazine 500mg: Take  2 tabs twice daily. Take this medication with food.  Continue Hydroxychloroquine 200mg: you would take 1 tab twice daily\"    Labs drawn 4/14/25 resulted by Nikko:  \"Your monitoring labs are stable.  Please let me know if you have any questions. \"    3 month fill provided per Rheumatology RN Protocol.    Jaime MISTRY [facility]    "

## 2025-05-19 ENCOUNTER — OFFICE VISIT (OUTPATIENT)
Dept: AUDIOLOGY | Facility: CLINIC | Age: 65
End: 2025-05-19
Payer: COMMERCIAL

## 2025-05-19 DIAGNOSIS — H90.3 BILATERAL SENSORINEURAL HEARING LOSS: Primary | ICD-10-CM

## 2025-05-19 PROCEDURE — V5299 HEARING SERVICE: HCPCS | Performed by: AUDIOLOGIST

## 2025-05-19 NOTE — PROGRESS NOTES
AUDIOLOGY REPORT    SUBJECTIVE:Patricia Perez is a 65 year old female who was seen in the Audiology Clinic at the [facility] on 5/19/2025  for a hearing aid check. They were unaccompanied today.      Patricia reports good sound quality with the hearing aid(s) and is using them on a part time basis at home.                    SIDE: Binaural:                          HEARING AID MAKE: Right: Phonak ; Left: Phonak                           HEARING AID MODEL #: Right: Slim L50-R ; Left: Slim L50-R                           SERIAL NUMBERS: Right: [id number] ; Left: [id number]                             WARRANTY END DATE: Right: 12/11/2026 ; Left:: 12/11/2026     OBJECTIVE:  General inspection, cleaning, and biologic listening check of the hearing aids was performed.  Based on findings no changes were made:    A follow up listening check indicated they sound crisp and clear. Following cleaning, Patricia reported good volume and sound quality.}      Otoscopy revealed ears are clear of cerumen bilaterally.       ASSESSMENT: A hearing aid check was completed today.  Changes to hearing aid was completed as outlined above. No charge visit today (in warranty hearing aid check).}    PLAN:Patricia will return for follow-up as needed, or at least every 9-12 months for cleaning and assessment of hearing aid.  Please call this clinic with any questions regarding today s appointment.    Char Carranza.   Doctor of Audiology  License #[id number]

## 2025-05-31 DIAGNOSIS — F33.42 RECURRENT MAJOR DEPRESSIVE DISORDER, IN FULL REMISSION: ICD-10-CM

## 2025-06-02 RX ORDER — ARIPIPRAZOLE 5 MG/1
5 TABLET ORAL DAILY
Qty: 30 TABLET | Refills: 3 | Status: SHIPPED | OUTPATIENT
Start: 2025-06-02

## 2025-06-06 PROBLEM — G43.109 MIGRAINE WITH AURA AND WITHOUT STATUS MIGRAINOSUS, NOT INTRACTABLE: Status: ACTIVE | Noted: 2025-06-06

## 2025-06-07 PROBLEM — M25.562 ACUTE PAIN OF LEFT KNEE: Status: RESOLVED | Noted: 2018-11-16 | Resolved: 2025-06-07

## 2025-07-08 DIAGNOSIS — M05.742 RHEUMATOID ARTHRITIS INVOLVING BOTH HANDS WITH POSITIVE RHEUMATOID FACTOR (H): ICD-10-CM

## 2025-07-08 DIAGNOSIS — M05.741 RHEUMATOID ARTHRITIS INVOLVING BOTH HANDS WITH POSITIVE RHEUMATOID FACTOR (H): ICD-10-CM

## 2025-07-08 DIAGNOSIS — Z79.899 HIGH RISK MEDICATION USE: ICD-10-CM

## 2025-07-08 RX ORDER — HYDROXYCHLOROQUINE SULFATE 200 MG/1
200 TABLET, FILM COATED ORAL 2 TIMES DAILY
Qty: 180 TABLET | Refills: 0 | Status: SHIPPED | OUTPATIENT
Start: 2025-07-08

## 2025-07-08 NOTE — TELEPHONE ENCOUNTER
Per 2/7 note:    Plan:   Schedule follow-up with Melissa lE PA-C in 4-6 months  Labs: CBC, creatinine, albumin, AST, ALT every 3 months-these will be due in mid April; Ast and ALT in February  Medications:  Continue Sulfasalazine 500mg: Take  2 tabs twice daily. Take this medication with food.  Continue Hydroxychloroquine 200mg: you would take 1 tab twice daily    Last labs from 4/14 stable. No follow up appointment. Due by August.     Refilled 90 day supply per specialty scope of practice but patient needs to schedule an eye exam and a follow up appointment. Sent reminders via [employer] message.    Vero Herman RN on 7/8/2025 at 4:01 PM

## 2025-07-08 NOTE — TELEPHONE ENCOUNTER
Date Last Authorized by Provider:  2/28/25  Quantity Last Authorized: 180,  # refills: 0   Date Last Filled by Pharmacy: 4/10/25  Last Office Visit: 2/7/2025 ; Last Virtual Visit: 4/3/2024   Date Next Appointment Due: ~8/7/25  Future Office Visit Scheduled: none scheduled    Plaquenil started 9/30/24.  No eye exam on file since that time.

## 2025-07-11 DIAGNOSIS — F33.42 RECURRENT MAJOR DEPRESSIVE DISORDER, IN FULL REMISSION: ICD-10-CM

## 2025-07-14 RX ORDER — VENLAFAXINE 37.5 MG/1
TABLET ORAL
Qty: 30 TABLET | Refills: 3 | Status: SHIPPED | OUTPATIENT
Start: 2025-07-14

## 2025-07-21 NOTE — PROGRESS NOTES
Rheumatology Clinic Visit  [facility]  ROSSY Haney     Patricia Farahney MRN# [id number]   YOB: 1960 Age: 65 year old   Date of Visit: 7/22/2025  Primary care provider: Claire Garcia          Assessment and Plan:     1.  Rheumatoid arthritis with an elevated rheumatoid factor  2. High Risk Medication use    Patient presents today for follow up.  She continues to do quite well.  Joints are stable.  No flares.  Tolerating her medications well.  She has noticed bump in her right hand on the palm.  She does get occasional trigger finger of the third digit.  Physical examination today does show a nodule on the tendon that is likely causing the triggering.  She does not feel that she needs to see orthopedics at this time but if it gets worse she will let me know and we can refer her.  She is due for labs so we will do those today.  She will follow-up with me in 4 to 6 months.      Plaquenil Eye Exam:  Treatment started 9/30/24.  No eye exam on file since that time (I've requested a copy of patient's reported 4/18/25 exam from [facility] 7/21) patient is going to call her eye clinic and get us the records as well.    The longitudinal plan of care for the diagnosis(es)/condition(s) as documented were addressed during this visit. Due to the added complexity in care, I will continue to support Patricia in the subsequent management and with ongoing continuity of care.      Plan:   Schedule follow-up with Melissa El PA-C in 4-6 months  Labs: CBC, creatinine, albumin, AST, ALT every 3 months-today and then in October  If that trigger finger gets worse, let me know and I will refer you to orthopedics  Medications:  Continue Sulfasalazine 500mg: Take  2 tabs twice daily. Take this medication with food.  Continue Hydroxychloroquine 200mg: you would take 1 tab twice daily      ROSSY Haney  Rheumatology         History of Present Illness:   Patricia Perez presents for evaluation  of raynaud's, elevated rheumatoid factor, joint pain.     Interval history July 22, 2025:  She has a bump on the right palm. The finger does occasionally get stuck, which it has done for years. She continues to tolerate her medications. She ahs been to the eye doctor and she said that everything looked fine but the macular was bumpy but they would keep an eye on it. She is not sure if they did OCT/VF.     Interval history February 7, 2025:  Joints have done quite well.  She has no concerns.  No flares.  Does notice some numbness in her left hand.  Thinks it is related to a pinched nerve.  Notices it mostly when she is exercising/lifting weights.    Interval history September 20, 2024:  She had a fall and her right hand has been painful and she has had swelling over the wrist. This feels different than her RA flare. She feels that the Sulfasalazine helps.     Interval history July 29, 2024:  Has had a flare of the right hand.  This has included swelling, pain and stiffness.  She is also noticing that her left fourth finger has been sticking.  The other day she did also notice some darkness over the hand but this is better today.    Interval history April 3, 2024:  She states that she has been good. She is getting over some bronchitis. She did need antibiotics for it.      She has been having pain in her left thigh. She feels that it is a muscle pain. This has been ongoing for about 1 month. No injury. No swelling.     Interval history January 10, 2024:  She states that she is tolerating the sulfasalazine well. She is not having any soreness in her hands. She states that she has some stiffness but it does not take very long to loosen up. She has also started to use a larger  pen and finds this to be helpful.      Interval history September 12, 2023:  No significant hand pain.  She does have a lot of stiffness particularly in the mornings and after periods of rest.  Raynaud's is overall controlled.     HPI from  consult of July 28, 2023:  She was diagnosed with Raynaud's and takes Nifedipine for it. She feels the Raynaud's is controlled. She gets some mild color changes but nothing like what it was in the past.     He states that she has thumb joint pain. She states that she thought it was carpal tunnel, but PT told her it was arthritis. She states that she feels achy in her right hand. Occasional aching the left hand. She has a lot of stiffness. She does get some swelling in the right hand. No skin rashes. No psoriasis. No ulcerative colitis or crohn's. She has had some fatigue. She states that she gets up she does have some stiffness. She states that it takes about 1 hour to loosen up. She states that her joints get worse if she is doing a lot of typing. The right middle finger that bothers her the most, it clicks and catches. She finds that she does  not have the hand strength to open things. By the end of the day, she can have more aching. She got some larger pens which has helped. No fevers. No dry eyes or dry mouth. No shortness of breath, unless related to her asthma. She does not notice any other significant joint pain. She does get some cramping in her feet.     Sister with Sjogren's.          Review of Systems:     Constitutional: negative  Skin: negative  Eyes: negative  Ears/Nose/Throat: negative  Respiratory: No shortness of breath, dyspnea on exertion, cough, or hemoptysis  Cardiovascular: negative  Gastrointestinal: negative  Genitourinary: negative  Musculoskeletal: as above  Neurologic: negative  Psychiatric: negative  Hematologic/Lymphatic/Immunologic: negative  Endocrine: negative         Active Problem List:     Patient Active Problem List    Diagnosis Date Noted    Hypertension goal BP (blood pressure) < 130/80 05/23/2011     Priority: High    High cholesterol 05/09/2010     Priority: High    Idiopathic cardiomyopathy (H) 01/08/2009     Priority: High     See Dr. Soto      Migraine with aura and  without status migrainosus, not intractable 06/06/2025     Priority: Medium    Chronic pain of left knee 12/31/2024     Priority: Medium    Arthritis of akbpbllf-robbvsbsd-gbznyvjou joint of right hand 11/05/2024     Priority: Medium    Plantar fasciitis 10/04/2024     Priority: Medium    Irritable bowel syndrome with both constipation and diarrhea 02/20/2024     Priority: Medium    Class 2 severe obesity due to excess calories with serious comorbidity in adult (H) 06/27/2023     Priority: Medium    Raynaud's disease without gangrene 04/13/2023     Priority: Medium    Hypoglycemia 07/07/2022     Priority: Medium    Sciatica of right side 04/18/2022     Priority: Medium    Posterior vitreous detachment of both eyes 01/23/2021     Priority: Medium    Combined forms of age-related cataract, mild, of both eyes 01/23/2021     Priority: Medium    Glaucoma suspect of both eyes 01/23/2021     Priority: Medium    Syncope 01/07/2020     Priority: Medium    Coronary artery disease involving native coronary artery of native heart without angina pectoris 08/06/2018     Priority: Medium    Vitamin D deficiency 03/01/2018     Priority: Medium    BPV (benign positional vertigo), unspecified laterality 08/31/2017     Priority: Medium    Adjustment disorder with depressed mood 06/22/2017     Priority: Medium    Decreased hearing of both ears 06/22/2017     Priority: Medium    Benign essential hypertension 06/15/2016     Priority: Medium    Dumping syndrome 09/22/2015     Priority: Medium    Myopia, bilateral 08/17/2015     Priority: Medium    Mild persistent asthma 04/03/2014     Priority: Medium    Attention deficit disorder of adult 02/27/2014     Priority: Medium    Hx of gastric bypass 08/21/2013     Priority: Medium    Hypovitaminosis D 08/21/2013     Priority: Medium    Elevated PTHrP level 06/15/2013     Priority: Medium    Pelvic pain in female 09/14/2011     Priority: Medium    Allergic rhinitis 09/09/2010     Priority:  Medium    Sleep apnea 03/25/2009     Priority: Medium    GERD (gastroesophageal reflux disease) 10/14/2008     Priority: Medium    Motion sickness 02/27/2008     Priority: Medium            Past Medical History:     Past Medical History:   Diagnosis Date    Anxiety     Arthritis     ASCUS of cervix with negative high risk HPV 01/23/2006    Asthma     Bursitis of shoulder 03/04/2010    Chronic rhinitis 03/25/2009    Coronary artery disease     Depression     Depressive disorder     Dyspnea on exertion     GERD (gastroesophageal reflux disease) 10/14/2008    Hearing problem 2020    Hypertension     Idiopathic cardiomyopathy (H) 01/08/2009    Indigestion     Itchy eyes     Left leg pain 06/16/2011    Motion sickness 02/27/2008    Nasal congestion     Pneumonia     Problems related to lack of adequate sleep     Ringing in ears     Sleep apnea 03/25/2009    Sneezing      Past Surgical History:   Procedure Laterality Date    BARIATRIC SURGERY      COLONOSCOPY N/A 11/11/2021    Procedure: COLONOSCOPY, WITH POLYPECTOMY;  Surgeon: Stephanie Meneses MD;  Location: [facility] OR    DIABETES RESOURCES  As Child    Tonsillectomy    ENT SURGERY      ESOPHAGOSCOPY, GASTROSCOPY, DUODENOSCOPY (EGD), COMBINED N/A 11/11/2021    Procedure: ESOPHAGOGASTRODUODENOSCOPY, WITH BIOPSY;  Surgeon: Stephanie Meneses MD;  Location: [facility] OR    HERNIA REPAIR      LAPAROSCOPIC BYPASS GASTRIC  10/16/2012    Procedure: LAPAROSCOPIC BYPASS GASTRIC;  laparoscopic reyna en y gastric bypass;  Surgeon: Nish Bradford MD;  Location: [facility] OR    TONSILLECTOMY      Uretural Sticture  As Child            Social History:     Social History     Socioeconomic History    Marital status:      Spouse name: Not on file    Number of children: 0    Years of education: 12+    Highest education level: Not on file   Occupational History     Employer: [employer]   Tobacco Use    Smoking status: Never     Passive exposure: Never    Smokeless tobacco: Never    Vaping Use    Vaping status: Never Used   Substance and Sexual Activity    Alcohol use: Yes     Comment: seldom    Drug use: No    Sexual activity: Not Currently     Partners: Male     Birth control/protection: Post-menopausal, None   Other Topics Concern    Parent/sibling w/ CABG, MI or angioplasty before 65F 55M? No   Social History Narrative    Dairy/d 2 servings/d.     Caffeine 3 servings/d    Exercise 2 x week    Sunscreen used - Yes    Seatbelts used - Yes    Working smoke/CO detectors in the home - Yes    Guns stored in the home - No    Self Breast Exams - Yes    Self Testicular Exam - NA    Eye Exam up to date - Yes    Dental Exam up to date - No    Pap Smear up to date - Yes    Mammogram up to date - Yes    PSA up to date - NA    Dexa Scan up to date - NA    Flex Sig / Colonoscopy up to date - Yes    Immunizations up to date - Yes    Abuse: Current or Past(Physical, Sexual or Emotional)- No    Do you feel safe in your environment - Yes    LOWELL SMITH LPN.    02/05/2007        May 19, 2021    ENVIRONMENTAL HISTORY: [place]. The home is heated with a forced air. They do have central air conditioning. The patient's bedroom is furnished with carpeting in bedroom, allergen mattress cover, and animals sleep in bedroom.  Pets inside the house include 1 cat(s). There is no history of cockroach or mice infestation. There is/are 0 smokers in the house.  The house does not have a damp basement, it's a split level.      Social Drivers of Health     Financial Resource Strain: Low Risk  (10/3/2024)    Financial Resource Strain     Within the past 12 months, have you or your family members you live with been unable to get utilities (heat, electricity) when it was really needed?: No   Food Insecurity: Low Risk  (10/3/2024)    Food Insecurity     Within the past 12 months, did you worry that your food would run out before you got money to buy more?: No     Within the past 12  months, did the food you bought just not last and you didn t have money to get more?: No   Transportation Needs: Low Risk  (10/3/2024)    Transportation Needs     Within the past 12 months, has lack of transportation kept you from medical appointments, getting your medicines, non-medical meetings or appointments, work, or from getting things that you need?: No   Physical Activity: Insufficiently Active (10/3/2024)    Exercise Vital Sign     Days of Exercise per Week: 3 days     Minutes of Exercise per Session: 30 min   Stress: No Stress Concern Present (10/3/2024)    [employer] - Occupational Stress Questionnaire     Feeling of Stress : Only a little   Social Connections: Unknown (10/3/2024)    Social Connection and Isolation Panel [NHANES]     Frequency of Communication with Friends and Family: Not on file     Frequency of Social Gatherings with Friends and Family: Once a week     Attends [employer]ism Services: Not on file     Active Member of Clubs or Organizations: Not on file     Attends Club or Organization Meetings: Not on file     Marital Status: Not on file   Interpersonal Safety: Low Risk  (6/6/2025)    Interpersonal Safety     Do you feel physically and emotionally safe where you currently live?: Yes     Within the past 12 months, have you been hit, slapped, kicked or otherwise physically hurt by someone?: No     Within the past 12 months, have you been humiliated or emotionally abused in other ways by your partner or ex-partner?: No   Housing Stability: Low Risk  (10/3/2024)    Housing Stability     Do you have housing? : Yes     Are you worried about losing your housing?: No          Family History:     Family History   Problem Relation Age of Onset    Gastrointestinal Disease Mother         non cancerous pancreatic tumor    Hypertension Mother     Heart Disease Mother         A fib    Allergies Mother     Obesity Mother     Hyperlipidemia Mother     Depression Mother      Hypertension Father     Lipids Father     Alcohol/Drug Father         Abuse    Depression Father     Respiratory Father         COPD    Cardiovascular Father     Coronary Artery Disease Father     Allergies Sister         Poison Ivy    Depression Sister     Depression Brother     Hypertension Brother     Allergies Brother         Enviromental/ Bees    Asthma Brother     Breast Cancer Maternal Grandmother     Glaucoma Paternal Grandmother     Glaucoma Paternal Grandfather     Cerebrovascular Disease Other     Macular Degeneration Other     Hypertension Brother     Depression Brother     Asthma Brother     Obesity Sister     Diabetes No family hx of     Cancer - colorectal No family hx of     Retinal detachment No family hx of     Amblyopia No family hx of     Thyroid Disease No family hx of     Adrenal Disorder No family hx of             Allergies:     Allergies   Allergen Reactions    Atorvastatin      Leg myalgias            Medications:     Current Outpatient Medications   Medication Sig Dispense Refill    acetaminophen (TYLENOL) 325 MG tablet Take 650 mg by mouth every 6 hours as needed for mild pain      albuterol (PROAIR HFA/PROVENTIL HFA/VENTOLIN HFA) 108 (90 Base) MCG/ACT inhaler Inhale 2 puffs into the lungs every 4 hours as needed for shortness of breath or wheezing. 18 g 3    ARIPiprazole (ABILIFY) 5 MG tablet TAKE 1 TABLET(5 MG) BY MOUTH DAILY 30 tablet 3    aspirin 81 MG EC tablet Take 1 tablet (81 mg) by mouth daily. 90 tablet 3    blood glucose (NO BRAND SPECIFIED) lancing device Device to be used with lancets. 3 each 3    blood glucose (NO BRAND SPECIFIED) test strip Use to test blood sugar 3 times daily 300 strip 3    calcium carbonate-vitamin D (CALTRATE 600+D) 600-400 MG-UNIT CHEW Take 1 chew tab by mouth 2 times daily 180 tablet 3    carvedilol (COREG) 25 MG tablet Take 1 tablet (25 mg) by mouth 2 times daily (with meals). 180 tablet 3    cetirizine (ZYRTEC) 10 MG tablet Take 1 tablet (10 mg) by  mouth daily 30 tablet 3    Cyanocobalamin (VITAMIN B-12 SL) Place under the tongue every other day       estradiol (ESTRACE) 1 MG tablet Take 1 tablet (1 mg) by mouth daily 90 tablet 3    ezetimibe (ZETIA) 10 MG tablet Take 1 tablet (10 mg) by mouth daily. 90 tablet 3    fluticasone (FLONASE) 50 MCG/ACT nasal spray Spray 2 sprays into both nostrils daily. (Patient taking differently: Spray 2 sprays into both nostrils daily as needed.) 16 g 0    hydroxychloroquine (PLAQUENIL) 200 MG tablet Take 1 tablet (200 mg) by mouth 2 times daily. Annual eye exam required. 180 tablet 0    isosorbide mononitrate (IMDUR) 30 MG 24 hr tablet TAKE 1 TABLET(30 MG) BY MOUTH DAILY 90 tablet 2    lisinopril (ZESTRIL) 5 MG tablet TAKE 1 TABLET(5 MG) BY MOUTH DAILY 90 tablet 3    meclizine (ANTIVERT) 25 MG tablet Take 1 tablet (25 mg) by mouth 3 times daily as needed for dizziness. 90 tablet 2    medroxyPROGESTERone (PROVERA) 2.5 MG tablet Take 1 tablet (2.5 mg) by mouth daily 90 tablet 3    Multiple Vitamin (MULTI-VITAMIN) per tablet Take 1 tablet by mouth daily. 100 tablet 3    NIFEdipine ER (ADALAT CC) 30 MG 24 hr tablet TAKE 1 TABLET(30 MG) BY MOUTH DAILY 60 tablet 1    omeprazole (PRILOSEC) 20 MG DR capsule Take 1 capsule (20 mg) by mouth daily. 90 capsule 3    ondansetron (ZOFRAN ODT) 4 MG ODT tab Take 1 tablet (4 mg) by mouth every 8 hours as needed for nausea. 20 tablet 0    simvastatin (ZOCOR) 40 MG tablet Take 1 tablet (40 mg) by mouth at bedtime. 90 tablet 3    sulfaSALAzine ER (AZULFIDINE EN) 500 MG EC tablet Take 2 tablets (1,000 mg) by mouth 2 times daily. Take this medication with food. RECHECK LABS MID APRIL 360 tablet 0    SUMAtriptan (IMITREX) 50 MG tablet Take 1 tablet (50 mg) by mouth at onset of headache for migraine. May repeat in 2 hours. Max 4 tablets/24 hours. 18 tablet 0    venlafaxine (EFFEXOR) 37.5 MG tablet TAKE 1 TABLET BY MOUTH DAILY WITH 75 MG TABLET OF VENLAFAXINE FOR A TOTAL MORNING DOSE .5MG 30  "tablet 3    venlafaxine (EFFEXOR) 75 MG tablet Take 1 tablet (75 mg) by mouth 3 times daily. 90 tablet 3            Physical Exam:   Blood pressure 118/77, pulse 86, temperature 97.1  F (36.2  C), temperature source Tympanic, resp. rate 18, height 1.6 m (5' 2.99\"), weight 93.9 kg (207 lb), last menstrual period 06/01/2012, SpO2 97%, not currently breastfeeding.  Wt Readings from Last 6 Encounters:   06/06/25 91.9 kg (202 lb 9.6 oz)   02/26/25 90.8 kg (200 lb 3.2 oz)   02/07/25 89.8 kg (198 lb)   01/22/25 90.2 kg (198 lb 12.8 oz)   10/17/24 88.9 kg (196 lb)   10/10/24 89.4 kg (197 lb)     Constitutional: well-developed, appearing stated age; cooperative  Eyes: nl conjunctiva, sclera  ENT: nl external ears, nose, hearing, lips,   Neck: no visible mass or thyroid enlargement  Resp: No shortness of breath with normal conversation  MSK: No active synovitis or dactylitis.  Full fist formation.  No tenderness over her MCPs or PIPs.  Nodule over the right third extensor tendon.  Psych: nl judgement, orientation, memory, affect.           Data:   Imaging:  X-ray cervical spine 3/28/2022  IMPRESSION: Mild degenerative retrolisthesis of C3 upon C4 and minimal  degenerative anterolisthesis of C7 upon T1. Alignment otherwise  normal. Vertebral body heights normal. No fractures. Moderate-severe  facet arthropathy throughout the cervical spine. Loss of disc space  height and degenerative endplate spurring at all levels of the  cervical spine with exception of the C2-C3 level.    X-ray lumbar spine 3/28/2022  IMPRESSION: Five lumbar type vertebrae. Moderate left convex curvature  of the lumbar spine centered at L2 has developed since the comparison  study. Mild degenerative retrolisthesis of L1 upon L2 and L2 upon L3  again noted. Degenerative grade 1 anterolisthesis of L4 upon L5 again  noted. Alignment otherwise normal. Vertebral body heights normal. No  evidence for recent fracture. Presumed chronic fracture deformity or  large " Schmorl's node deformity at the anterior superior corner of the  L4 vertebral body again noted. Loss of disc space height and  degenerative endplate spurring at T12-L1, L1-L2 and L2-L3. Facet  arthropathy at L3-L4, L4-L5 and L5-S1.    X-ray right hand 6/30/2021  IMPRESSION: Moderate degenerative arthrosis at the STT and distal  radioulnar joints. There is cystic change at the proximal-medial  aspect of the lunate. This could relate to ulnolunate impaction or  subchondral cystic change. Mild subchondral cystic change at the  proximal aspect of the triquetrum. Mild third MCP joint space  narrowing. No fracture identified.     X-ray right foot.  12/29/2020  IMPRESSION:  1.  Moderate right second TMT degenerative arthrosis.  2.  Mild right first MTP degenerative arthrosis.  3.  Small osteophyte at the lateral margin of the fifth metatarsal  head.  4.  Plantar calcaneal spur.  5.  No fracture.  6.  Second hammertoe deformity.    X-ray bilateral hand 7/28/2023  IMPRESSION:   1.  No definite osseous erosions. Scattered periarticular lucencies  bilaterally, such as within the distal ulna on the right and at the  triscaphe joint within the distal pole of the scaphoid bilaterally  which have characteristics most consistent with subchondral cysts or  intraosseous ganglions as likely sequela of degenerative arthritis.  2.  Moderate second and third MCP joint space narrowing on the right  with periarticular soft tissue thickening which may reflect synovitis.  3.  No obvious periarticular osteopenia.  4.  Mild to moderate degenerative arthritis involving multiple joints  of both hands and wrists, greatest involving the first CMC and  triscaphe joints bilaterally.     MRI right hand 8/21/2023  IMPRESSION:  1.  While lack of contrast limits differentiation between synovitis and joint effusion, there is a large joint effusion versus synovitis within the wrist with multifocal subcortical cystic change and patchy areas of marrow  edema. While the more advanced   arthrosis in the triscaphe and first CMC joints is typical of osteoarthritis, additional sites within the wrist may represent an inflammatory arthropathy although mechanical changes/osteoarthritis is not completely excluded.  2.  Scattered joint effusions versus synovitis within the MCP and PIP joints of the fingers can be seen with both mechanical as well as inflammatory causes.  3.  High-grade cartilage loss and patchy subchondral bone marrow edema with subcortical cystic change at the long finger MCP joint can be seen with an inflammatory arthropathy as well as mechanical change.  4.  Mild, scattered tenosynovitis within the flexor and extensor tendons, primarily at the wrist as described.   5.  Given the overall appearance of the hands and wrists, a combination of an inflammatory and degenerative arthritis is favored.    Laboratory:  4/14/2023  CRP less than 2.9  Rheumatoid factor 12  Sed rate 8  FAHEEM negative    7/28/2023  CRP less than 3.0  CCP antibody 1.1  Sed rate 9  SSA and SSB negative    12/29/2023  Creatinine 0.81, GFR 80  Albumin 4.0  ALT 54, AST 40  White blood cell count 4.4, hemoglobin 11.5, platelet count 243    2/2/2024  Creatinine 0.84, GFR 77  Albumin 4.2  ALT 36, AST 31  White blood cell count 5.6, hemoglobin 12.4, platelet count 249    6/24/2024  Creatinine 0.85, GFR 75  Albumin 4.2  ALT 39, AST 33  White blood cell count 5.3, hemoglobin 12.7, platelet count 229    9/13/2024  Gran 0 point send 3, GFR 90  Albumin 4.1  ALT 44, AST 34  Thomas is a 4.9, hemoglobin 12.2, platelet count 240    1/7/2025  Creatinine 0.91, GFR 69  Albumin 4.3  ALT 34, AST 30  White blood cell count 5.2, hemoglobin 12.0, platelet count 276    1/20/2025  Creatinine 0.72, GFR greater than 90  ALT 53, AST 48  Cholesterol 202    4/14/2025  Creatinine 0.76, GFR 85  Albumin 4.3  ALT 36, AST 33  WBC 6.2, hgb 11.6, plt 263

## 2025-07-21 NOTE — PROGRESS NOTES
HISTORY OF PRESENT ILLNESS    Patricia Perez is a 65 year old female seen for follow up of left  knee osteoarthritis    Cortisone Injection(s) last time?: 12/4/24 left knee.  Length of effectiveness: 8 months.      Other injections:     Viscosupplementation injections: no    Physical therapy: yes  Has been doing Pilates, tries to ride a stationary bike.  : no    Present symptoms:   No mechanical symptoms   Pain to walk, with stairs.  Worse lateral and anterior.      KNEE EXAM:   Good range of motion   Alignment: mild valgus  Mild effusion       EXAM: XR KNEE LEFT 3 VIEWS  LOCATION: [facility]  DATE: 9/1/2024   Moderate-severe tricompartmental degenerative arthritic changes, with joint space narrowing, subchondral sclerosis, marginal osteophytes, greatest in the patellofemoral compartment. Normal joint alignment. No fracture or erosion. No   significant knee joint effusion.      MR left knee without contrast 10/18/2024 7:58 AM  Loose body 10 x 10 x 5 mm in the posteromedial intercondylar notch.                                                       Impression:  1. 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  2. Likely cartilaginous loose body in the posteromedial intercondylar  notch  3. Complex degenerative tearing of the posterior horn of the lateral  meniscus.      Impression:   Osteoarthritis left knee. Mainly patellofemoral joint, but significant changes in the lateral compartment   Loose body left knee. No mechanical symptoms   Lateral meniscus tear -- no mechanical symptoms     Plan:  since it worked so well, she would like to try another corticosteroid injection   With the patient's consent, left knee(s) injected intra-articularly with 80mg of Depomedrol and 4cc of local anesthetic after sterile prep.     Return to clinic prn  Consider surgical intervention if injections don't work well and mayb  consider arthroscopy if mechanical symptoms become prevalent.    TING Morris MD  Dept. Orthopedic Surgery  [facility]

## 2025-07-22 ENCOUNTER — OFFICE VISIT (OUTPATIENT)
Dept: ORTHOPEDICS | Facility: CLINIC | Age: 65
End: 2025-07-22
Payer: COMMERCIAL

## 2025-07-22 ENCOUNTER — OFFICE VISIT (OUTPATIENT)
Dept: RHEUMATOLOGY | Facility: CLINIC | Age: 65
End: 2025-07-22
Payer: COMMERCIAL

## 2025-07-22 VITALS — HEART RATE: 71 BPM | OXYGEN SATURATION: 98 % | SYSTOLIC BLOOD PRESSURE: 106 MMHG | DIASTOLIC BLOOD PRESSURE: 68 MMHG

## 2025-07-22 VITALS
WEIGHT: 207 LBS | SYSTOLIC BLOOD PRESSURE: 118 MMHG | HEIGHT: 63 IN | HEART RATE: 86 BPM | RESPIRATION RATE: 18 BRPM | OXYGEN SATURATION: 97 % | TEMPERATURE: 97.1 F | DIASTOLIC BLOOD PRESSURE: 77 MMHG | BODY MASS INDEX: 36.68 KG/M2

## 2025-07-22 DIAGNOSIS — M05.741 RHEUMATOID ARTHRITIS INVOLVING BOTH HANDS WITH POSITIVE RHEUMATOID FACTOR (H): ICD-10-CM

## 2025-07-22 DIAGNOSIS — M17.12 PRIMARY OSTEOARTHRITIS OF LEFT KNEE: Primary | ICD-10-CM

## 2025-07-22 DIAGNOSIS — M05.742 RHEUMATOID ARTHRITIS INVOLVING BOTH HANDS WITH POSITIVE RHEUMATOID FACTOR (H): ICD-10-CM

## 2025-07-22 DIAGNOSIS — Z79.899 HIGH RISK MEDICATION USE: ICD-10-CM

## 2025-07-22 DIAGNOSIS — S83.272A COMPLEX TEAR OF LATERAL MENISCUS OF LEFT KNEE, UNSPECIFIED WHETHER OLD OR CURRENT TEAR, INITIAL ENCOUNTER: ICD-10-CM

## 2025-07-22 DIAGNOSIS — M23.42 LOOSE BODY OF LEFT KNEE: ICD-10-CM

## 2025-07-22 LAB
ALBUMIN SERPL BCG-MCNC: 4.2 G/DL (ref 3.5–5.2)
ALT SERPL W P-5'-P-CCNC: 27 U/L (ref 0–50)
AST SERPL W P-5'-P-CCNC: 26 U/L (ref 0–45)
CREAT SERPL-MCNC: 0.79 MG/DL (ref 0.51–0.95)
CRP SERPL-MCNC: <3 MG/L
EGFRCR SERPLBLD CKD-EPI 2021: 82 ML/MIN/1.73M2
ERYTHROCYTE [DISTWIDTH] IN BLOOD BY AUTOMATED COUNT: 12.6 % (ref 10–15)
ERYTHROCYTE [SEDIMENTATION RATE] IN BLOOD BY WESTERGREN METHOD: 9 MM/HR (ref 0–30)
HCT VFR BLD AUTO: 34.9 % (ref 35–47)
HGB BLD-MCNC: 11.6 G/DL (ref 11.7–15.7)
MCH RBC QN AUTO: 31.8 PG (ref 26.5–33)
MCHC RBC AUTO-ENTMCNC: 33.2 G/DL (ref 31.5–36.5)
MCV RBC AUTO: 96 FL (ref 78–100)
PLATELET # BLD AUTO: 239 10E3/UL (ref 150–450)
RBC # BLD AUTO: 3.65 10E6/UL (ref 3.8–5.2)
WBC # BLD AUTO: 6.6 10E3/UL (ref 4–11)

## 2025-07-22 PROCEDURE — 84460 ALANINE AMINO (ALT) (SGPT): CPT | Performed by: PHYSICIAN ASSISTANT

## 2025-07-22 PROCEDURE — G2211 COMPLEX E/M VISIT ADD ON: HCPCS | Performed by: PHYSICIAN ASSISTANT

## 2025-07-22 PROCEDURE — 1125F AMNT PAIN NOTED PAIN PRSNT: CPT | Performed by: ORTHOPAEDIC SURGERY

## 2025-07-22 PROCEDURE — 99213 OFFICE O/P EST LOW 20 MIN: CPT | Performed by: PHYSICIAN ASSISTANT

## 2025-07-22 PROCEDURE — 82565 ASSAY OF CREATININE: CPT | Performed by: PHYSICIAN ASSISTANT

## 2025-07-22 PROCEDURE — 3078F DIAST BP <80 MM HG: CPT | Performed by: PHYSICIAN ASSISTANT

## 2025-07-22 PROCEDURE — 3074F SYST BP LT 130 MM HG: CPT | Performed by: ORTHOPAEDIC SURGERY

## 2025-07-22 PROCEDURE — 3074F SYST BP LT 130 MM HG: CPT | Performed by: PHYSICIAN ASSISTANT

## 2025-07-22 PROCEDURE — 85652 RBC SED RATE AUTOMATED: CPT | Performed by: PHYSICIAN ASSISTANT

## 2025-07-22 PROCEDURE — 84450 TRANSFERASE (AST) (SGOT): CPT | Performed by: PHYSICIAN ASSISTANT

## 2025-07-22 PROCEDURE — 85027 COMPLETE CBC AUTOMATED: CPT | Performed by: PHYSICIAN ASSISTANT

## 2025-07-22 PROCEDURE — 20610 DRAIN/INJ JOINT/BURSA W/O US: CPT | Mod: LT | Performed by: ORTHOPAEDIC SURGERY

## 2025-07-22 PROCEDURE — 99213 OFFICE O/P EST LOW 20 MIN: CPT | Mod: 25 | Performed by: ORTHOPAEDIC SURGERY

## 2025-07-22 PROCEDURE — 1126F AMNT PAIN NOTED NONE PRSNT: CPT | Performed by: PHYSICIAN ASSISTANT

## 2025-07-22 PROCEDURE — 36415 COLL VENOUS BLD VENIPUNCTURE: CPT | Performed by: PHYSICIAN ASSISTANT

## 2025-07-22 PROCEDURE — 82040 ASSAY OF SERUM ALBUMIN: CPT | Performed by: PHYSICIAN ASSISTANT

## 2025-07-22 PROCEDURE — 86140 C-REACTIVE PROTEIN: CPT | Performed by: PHYSICIAN ASSISTANT

## 2025-07-22 PROCEDURE — 3078F DIAST BP <80 MM HG: CPT | Performed by: ORTHOPAEDIC SURGERY

## 2025-07-22 RX ORDER — SULFASALAZINE 500 MG/1
1000 TABLET, DELAYED RELEASE ORAL 2 TIMES DAILY
Qty: 360 TABLET | Refills: 0 | Status: SHIPPED | OUTPATIENT
Start: 2025-07-22

## 2025-07-22 RX ORDER — METHYLPREDNISOLONE ACETATE 80 MG/ML
80 INJECTION, SUSPENSION INTRA-ARTICULAR; INTRALESIONAL; INTRAMUSCULAR; SOFT TISSUE
Status: COMPLETED | OUTPATIENT
Start: 2025-07-22 | End: 2025-07-22

## 2025-07-22 RX ORDER — LIDOCAINE HYDROCHLORIDE 10 MG/ML
4 INJECTION, SOLUTION INFILTRATION; PERINEURAL
Status: COMPLETED | OUTPATIENT
Start: 2025-07-22 | End: 2025-07-22

## 2025-07-22 RX ADMIN — METHYLPREDNISOLONE ACETATE 80 MG: 80 INJECTION, SUSPENSION INTRA-ARTICULAR; INTRALESIONAL; INTRAMUSCULAR; SOFT TISSUE at 09:06

## 2025-07-22 RX ADMIN — LIDOCAINE HYDROCHLORIDE 4 ML: 10 INJECTION, SOLUTION INFILTRATION; PERINEURAL at 09:06

## 2025-07-22 ASSESSMENT — PAIN SCALES - GENERAL
PAINLEVEL_OUTOF10: NO PAIN (0)
PAINLEVEL_OUTOF10: MODERATE PAIN (6)

## 2025-07-22 NOTE — LETTER
7/22/2025      Patricia Perez  [address] [address]      Dear Colleague,    Thank you for referring your patient, Patricia Perez, to the [facility]. Please see a copy of my visit note below.    HISTORY OF PRESENT ILLNESS    Patricia Perez is a 65 year old female seen for follow up of left  knee osteoarthritis    Cortisone Injection(s) last time?: 12/4/24 left knee.  Length of effectiveness: 8 months.      Other injections:     Viscosupplementation injections: no    Physical therapy: yes  Has been doing Pilates, tries to ride a stationary bike.  : no    Present symptoms:   No mechanical symptoms   Pain to walk, with stairs.  Worse lateral and anterior.      KNEE EXAM:   Good range of motion   Alignment: mild valgus  Mild effusion       EXAM: XR KNEE LEFT 3 VIEWS  LOCATION: [facility] [place]  DATE: 9/1/2024   Moderate-severe tricompartmental degenerative arthritic changes, with joint space narrowing, subchondral sclerosis, marginal osteophytes, greatest in the patellofemoral compartment. Normal joint alignment. No fracture or erosion. No   significant knee joint effusion.      MR left knee without contrast 10/18/2024 7:58 AM  Loose body 10 x 10 x 5 mm in the posteromedial intercondylar notch.                                                       Impression:  1. 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  2. Likely cartilaginous loose body in the posteromedial intercondylar  notch  3. Complex degenerative tearing of the posterior horn of the lateral  meniscus.      Impression:   Osteoarthritis left knee. Mainly patellofemoral joint, but significant changes in the lateral compartment   Loose body left knee. No mechanical symptoms   Lateral meniscus tear -- no mechanical symptoms     Plan:  since it worked so well, she would like to try another corticosteroid injection    With the patient's consent, left knee(s) injected intra-articularly with 80mg of Depomedrol and 4cc of local anesthetic after sterile prep.     Return to clinic prn  Consider surgical intervention if injections don't work well and mayb consider arthroscopy if mechanical symptoms become prevalent.    TING Morris MD  Dept. Orthopedic Surgery  [facility]    Large Joint Injection/Arthocentesis: L knee joint    Date/Time: 7/22/2025 9:06 AM    Performed by: Kelvin Morris MD  Authorized by: Kelvin Morris MD    Indications:  Pain  Needle Size:  22 G  Guidance: landmark guided    Approach:  Medial  Location:  Knee      Medications:  80 mg methylPREDNISolone 80 MG/ML; 4 mL lidocaine 1 %  Outcome:  Tolerated well, no immediate complications  Procedure discussed: discussed risks, benefits, and alternatives    Consent Given by:  Patient  Timeout: timeout called immediately prior to procedure    Prep: patient was prepped and draped in usual sterile fashion          Again, thank you for allowing me to participate in the care of your patient.        Sincerely,        Kelvin Morris MD    Electronically signed

## 2025-07-22 NOTE — PROGRESS NOTES
Large Joint Injection/Arthocentesis: L knee joint    Date/Time: 7/22/2025 9:06 AM    Performed by: Kelvin Morris MD  Authorized by: Kelvin Morris MD    Indications:  Pain  Needle Size:  22 G  Guidance: landmark guided    Approach:  Medial  Location:  Knee      Medications:  80 mg methylPREDNISolone 80 MG/ML; 4 mL lidocaine 1 %  Outcome:  Tolerated well, no immediate complications  Procedure discussed: discussed risks, benefits, and alternatives    Consent Given by:  Patient  Timeout: timeout called immediately prior to procedure    Prep: patient was prepped and draped in usual sterile fashion

## 2025-07-22 NOTE — NURSING NOTE
RENATO faxed to [facility], requesting patient's most recent eye exam records.  [phone].  Transmission confirmed via Right Fax.

## 2025-07-22 NOTE — PATIENT INSTRUCTIONS
After Visit Instructions:     Thank you for coming to [facility] for your care. It is my goal to partner with you to help you reach your optimal state of health.       Plan:     Schedule follow-up with Melissa El PA-C in 4-6 months  Labs: CBC, creatinine, albumin, AST, ALT every 3 months-today and then in October  If that trigger finger gets worse, let me know and I will refer you to orthopedics  Medications:  Continue Sulfasalazine 500mg: Take  2 tabs twice daily. Take this medication with food.  Continue Hydroxychloroquine 200mg: you would take 1 tab twice daily    Melissa El PA-C  [facility]  [facility]    Contact information: [facility]  Clinic Number:  [phone]  Please call or send a Yillio message with any questions about your care

## 2025-08-13 PROBLEM — R55 SYNCOPE: Status: RESOLVED | Noted: 2020-01-07 | Resolved: 2025-08-13

## 2025-08-21 ENCOUNTER — VIRTUAL VISIT (OUTPATIENT)
Dept: FAMILY MEDICINE | Facility: CLINIC | Age: 65
End: 2025-08-21
Payer: COMMERCIAL

## 2025-08-21 DIAGNOSIS — E66.812 CLASS 2 SEVERE OBESITY DUE TO EXCESS CALORIES WITH SERIOUS COMORBIDITY AND BODY MASS INDEX (BMI) OF 35.0 TO 35.9 IN ADULT (H): ICD-10-CM

## 2025-08-21 DIAGNOSIS — F32.1 CURRENT MODERATE EPISODE OF MAJOR DEPRESSIVE DISORDER, UNSPECIFIED WHETHER RECURRENT (H): Primary | ICD-10-CM

## 2025-08-21 DIAGNOSIS — E66.01 CLASS 2 SEVERE OBESITY DUE TO EXCESS CALORIES WITH SERIOUS COMORBIDITY AND BODY MASS INDEX (BMI) OF 35.0 TO 35.9 IN ADULT (H): ICD-10-CM

## 2025-08-21 ASSESSMENT — ANXIETY QUESTIONNAIRES
6. BECOMING EASILY ANNOYED OR IRRITABLE: SEVERAL DAYS
7. FEELING AFRAID AS IF SOMETHING AWFUL MIGHT HAPPEN: NOT AT ALL
GAD7 TOTAL SCORE: 8
GAD7 TOTAL SCORE: 8
8. IF YOU CHECKED OFF ANY PROBLEMS, HOW DIFFICULT HAVE THESE MADE IT FOR YOU TO DO YOUR WORK, TAKE CARE OF THINGS AT HOME, OR GET ALONG WITH OTHER PEOPLE?: SOMEWHAT DIFFICULT
7. FEELING AFRAID AS IF SOMETHING AWFUL MIGHT HAPPEN: NOT AT ALL
IF YOU CHECKED OFF ANY PROBLEMS ON THIS QUESTIONNAIRE, HOW DIFFICULT HAVE THESE PROBLEMS MADE IT FOR YOU TO DO YOUR WORK, TAKE CARE OF THINGS AT HOME, OR GET ALONG WITH OTHER PEOPLE: SOMEWHAT DIFFICULT
1. FEELING NERVOUS, ANXIOUS, OR ON EDGE: SEVERAL DAYS
2. NOT BEING ABLE TO STOP OR CONTROL WORRYING: SEVERAL DAYS
4. TROUBLE RELAXING: MORE THAN HALF THE DAYS
5. BEING SO RESTLESS THAT IT IS HARD TO SIT STILL: SEVERAL DAYS
GAD7 TOTAL SCORE: 8
3. WORRYING TOO MUCH ABOUT DIFFERENT THINGS: MORE THAN HALF THE DAYS

## 2025-08-26 ENCOUNTER — MYC REFILL (OUTPATIENT)
Dept: FAMILY MEDICINE | Facility: CLINIC | Age: 65
End: 2025-08-26
Payer: COMMERCIAL

## 2025-08-26 DIAGNOSIS — F32.1 CURRENT MODERATE EPISODE OF MAJOR DEPRESSIVE DISORDER, UNSPECIFIED WHETHER RECURRENT (H): ICD-10-CM

## 2025-08-27 RX ORDER — BREXPIPRAZOLE 0.5 MG/1
0.5 TABLET ORAL DAILY
Qty: 30 TABLET | Refills: 0 | OUTPATIENT
Start: 2025-08-27

## 2025-08-27 RX ORDER — BREXPIPRAZOLE 0.5 MG/1
0.5 TABLET ORAL DAILY
Qty: 30 TABLET | Refills: 0 | Status: SHIPPED | OUTPATIENT
Start: 2025-08-27

## 2025-09-03 ENCOUNTER — OFFICE VISIT (OUTPATIENT)
Dept: ORTHOPEDICS | Facility: CLINIC | Age: 65
End: 2025-09-03
Payer: COMMERCIAL

## 2025-09-03 VITALS
HEART RATE: 78 BPM | DIASTOLIC BLOOD PRESSURE: 82 MMHG | BODY MASS INDEX: 36.15 KG/M2 | SYSTOLIC BLOOD PRESSURE: 120 MMHG | WEIGHT: 204 LBS

## 2025-09-03 DIAGNOSIS — S83.272A COMPLEX TEAR OF LATERAL MENISCUS OF LEFT KNEE, UNSPECIFIED WHETHER OLD OR CURRENT TEAR, INITIAL ENCOUNTER: ICD-10-CM

## 2025-09-03 DIAGNOSIS — M17.12 PRIMARY OSTEOARTHRITIS OF LEFT KNEE: Primary | ICD-10-CM

## 2025-09-03 DIAGNOSIS — M23.42 LOOSE BODY OF LEFT KNEE: ICD-10-CM

## 2025-09-03 RX ORDER — LIDOCAINE HYDROCHLORIDE 10 MG/ML
4 INJECTION, SOLUTION INFILTRATION; PERINEURAL
Status: COMPLETED | OUTPATIENT
Start: 2025-09-03 | End: 2025-09-03

## 2025-09-03 RX ORDER — METHYLPREDNISOLONE ACETATE 80 MG/ML
80 INJECTION, SUSPENSION INTRA-ARTICULAR; INTRALESIONAL; INTRAMUSCULAR; SOFT TISSUE
Status: COMPLETED | OUTPATIENT
Start: 2025-09-03 | End: 2025-09-03

## 2025-09-03 RX ADMIN — METHYLPREDNISOLONE ACETATE 80 MG: 80 INJECTION, SUSPENSION INTRA-ARTICULAR; INTRALESIONAL; INTRAMUSCULAR; SOFT TISSUE at 14:45

## 2025-09-03 RX ADMIN — LIDOCAINE HYDROCHLORIDE 4 ML: 10 INJECTION, SOLUTION INFILTRATION; PERINEURAL at 14:45

## (undated) DEVICE — SYR 30ML SLIP TIP W/O NDL 302833

## (undated) DEVICE — SUCTION MANIFOLD NEPTUNE 2 SYS 1 PORT 702-025-000

## (undated) DEVICE — GLOVE EXAM NITRILE LG PF LATEX FREE 5064

## (undated) DEVICE — SPECIMEN CONTAINER 3OZ W/FORMALIN 59901

## (undated) DEVICE — ENDO FORCEP SPIKED SERRATED SHAFT JUMBO 239CM G56998

## (undated) DEVICE — KIT ENDO TURNOVER/PROCEDURE CARRY-ON 101822

## (undated) DEVICE — TUBING SUCTION 12"X1/4" N612

## (undated) DEVICE — SOL WATER IRRIG 500ML BOTTLE 2F7113

## (undated) DEVICE — ENDO FORCEP BX CAPTURA PRO SPIKE G50696

## (undated) DEVICE — GOWN IMPERVIOUS 2XL BLUE

## (undated) DEVICE — ENDO BITE BLOCK ADULT OMNI-BLOC

## (undated) DEVICE — SUCTION CATH AIRLIFE TRI-FLO W/CONTROL PORT 14FR  T60C

## (undated) DEVICE — FCP BIOPSY RADIAL JAW 4 JUMBO 3.2MM CHANNEL M00513370

## (undated) RX ORDER — NITROGLYCERIN 0.4 MG/1
TABLET SUBLINGUAL
Status: DISPENSED
Start: 2020-01-07